# Patient Record
Sex: FEMALE | Race: BLACK OR AFRICAN AMERICAN | NOT HISPANIC OR LATINO | Employment: FULL TIME | URBAN - METROPOLITAN AREA
[De-identification: names, ages, dates, MRNs, and addresses within clinical notes are randomized per-mention and may not be internally consistent; named-entity substitution may affect disease eponyms.]

---

## 2017-01-17 ENCOUNTER — OFFICE VISIT (OUTPATIENT)
Dept: FAMILY MEDICINE | Facility: CLINIC | Age: 62
End: 2017-01-17
Payer: MEDICAID

## 2017-01-17 ENCOUNTER — HOSPITAL ENCOUNTER (OUTPATIENT)
Dept: RADIOLOGY | Facility: HOSPITAL | Age: 62
Discharge: HOME | End: 2017-01-17
Attending: FAMILY MEDICINE
Payer: MEDICAID

## 2017-01-17 VITALS
HEIGHT: 62 IN | WEIGHT: 172.81 LBS | BODY MASS INDEX: 31.8 KG/M2 | SYSTOLIC BLOOD PRESSURE: 134 MMHG | TEMPERATURE: 97 F | DIASTOLIC BLOOD PRESSURE: 62 MMHG | OXYGEN SATURATION: 98 % | HEART RATE: 68 BPM

## 2017-01-17 DIAGNOSIS — J00 ACUTE NASOPHARYNGITIS: ICD-10-CM

## 2017-01-17 DIAGNOSIS — R91.8 LUNG NODULES: ICD-10-CM

## 2017-01-17 DIAGNOSIS — R91.8 LUNG NODULES: Primary | ICD-10-CM

## 2017-01-17 PROCEDURE — 71020 XR CHEST PA AND LATERAL: CPT | Mod: 26,,, | Performed by: RADIOLOGY

## 2017-01-17 PROCEDURE — 99214 OFFICE O/P EST MOD 30 MIN: CPT | Mod: S$PBB,,, | Performed by: FAMILY MEDICINE

## 2017-01-17 PROCEDURE — 99999 PR PBB SHADOW E&M-EST. PATIENT-LVL IV: CPT | Mod: PBBFAC,,, | Performed by: FAMILY MEDICINE

## 2017-01-17 PROCEDURE — 71020 XR CHEST PA AND LATERAL: CPT | Mod: TC,PO

## 2017-01-17 RX ORDER — FLUTICASONE PROPIONATE 50 MCG
2 SPRAY, SUSPENSION (ML) NASAL DAILY
Qty: 16 G | Refills: 11 | Status: SHIPPED | OUTPATIENT
Start: 2017-01-17 | End: 2017-02-15 | Stop reason: SDUPTHER

## 2017-01-17 RX ORDER — AMOXICILLIN AND CLAVULANATE POTASSIUM 875; 125 MG/1; MG/1
1 TABLET, FILM COATED ORAL 2 TIMES DAILY
Qty: 20 TABLET | Refills: 0 | Status: SHIPPED | OUTPATIENT
Start: 2017-01-17 | End: 2017-01-27

## 2017-01-17 NOTE — MR AVS SNAPSHOT
[facility] Medicine  [address]  [facility] [address]  Phone: [phone]  Fax: [phone]                  Anne Farmer   2017 2:40 PM   Office Visit    Description:  Female : 1955   Provider:  Chiquita Peña MD   Department:  [facility] Medicine           Reason for Visit     Breast Pain     Nasal Congestion           Diagnoses this Visit        Comments    Lung nodules    -  Primary     Acute nasopharyngitis                To Do List           Future Appointments        Provider Department Dept Phone    2017 4:00 PM DSSH XR1 [facility] [phone]    2017 9:00 AM SUMH PETCT1 LIMIT 400 LBS [facility] [phone]    2017 1:30 PM Fabrice Cadet MD [place] - Hematology Oncology [phone]      Goals (5 Years of Data)     None       These Medications        Disp Refills Start End    amoxicillin-clavulanate 875-125mg (AUGMENTIN) 875-125 mg per tablet 20 tablet 0 2017    Take 1 tablet by mouth 2 (two) times daily. - Oral    Pharmacy: [facility]/pharmacy #[id number] - [place] Khan [address] AT [facility] [place] Ph #: [phone]       fluticasone (FLONASE) 50 mcg/actuation nasal spray 16 g 11 2017     2 sprays by Each Nare route once daily. - Each Nare    Pharmacy: [facility]/pharmacy #[id number] - [place] Khan - [address] AT [facility] [place] Ph #: [phone]         [facility]     [facility] On [facility] Nurse Care Line -  Assistance  Registered nurses in the [facility] On Call Center provide clinical advisement, health education, appointment booking, and other advisory services.  Call for this free service at [phone].             Medications           Message regarding Medications     Verify the changes and/or additions to your medication regime listed below are the same as discussed with your clinician today.  If any of these  changes or additions are incorrect, please notify your healthcare provider.        START taking these NEW medications        Refills    amoxicillin-clavulanate 875-125mg (AUGMENTIN) 875-125 mg per tablet 0    Sig: Take 1 tablet by mouth 2 (two) times daily.    Class: Normal    Route: Oral    fluticasone (FLONASE) 50 mcg/actuation nasal spray 11    Si sprays by Each Nare route once daily.    Class: Normal    Route: Each Nare           Verify that the below list of medications is an accurate representation of the medications you are currently taking.  If none reported, the list may be blank. If incorrect, please contact your healthcare provider. Carry this list with you in case of emergency.           Current Medications     amlodipine (NORVASC) 10 MG tablet TAKE 1 TABLET BY MOUTH EVERY DAY    aspirin (ECOTRIN) 81 MG EC tablet Take 1 tablet (81 mg total) by mouth once daily.    cetirizine (ZYRTEC) 10 MG tablet Take 10 mg by mouth once daily.    cyclobenzaprine (FLEXERIL) 10 MG tablet TAKE 1 TABLET BY MOUTH 3 TIMES A DAY AS NEEDED FOR MUSCLE SPASMS    fluticasone (FLONASE) 50 mcg/actuation nasal spray SPRAY 2 SPRAYS IN EACH NOSTRIL ONCE TIME A DAY    hydrochlorothiazide (HYDRODIURIL) 25 MG tablet TAKE 1 TABLET BY MOUTH EVERY DAY    hydroxychloroquine (PLAQUENIL) 200 mg tablet Take 200 mg by mouth once daily.     losartan (COZAAR) 100 MG tablet TAKE 1 TABLET BY MOUTH ONCE DAILY    omeprazole (PRILOSEC) 20 MG capsule Take 1 capsule (20 mg total) by mouth once daily.    pravastatin (PRAVACHOL) 20 MG tablet TAKE 1 TABLET BY MOUTH EVERY DAY    tramadol (ULTRAM) 50 mg tablet TAKE ONE TABLET BY MOUTH THREE TIMES DAILY AFTER A MEAL    amoxicillin-clavulanate 875-125mg (AUGMENTIN) 875-125 mg per tablet Take 1 tablet by mouth 2 (two) times daily.    fluticasone (FLONASE) 50 mcg/actuation nasal spray 2 sprays by Each Nare route once daily.    sodium,potassium,mag sulfates (SUPREP BOWEL PREP KIT) 17.5-3.13-1.6 gram SolR Take 1  "Units by mouth as directed.           Clinical Reference Information           Vital Signs - Last Recorded  Most recent update: 1/17/2017  3:02 PM by Jyoti Portillo MA    BP Pulse Temp Ht    134/62 (BP Location: Left arm, Patient Position: Sitting, BP Method: Manual) 68 97 °F (36.1 °C) (Tympanic) 5' 2" (1.575 m)    Wt SpO2 BMI    78.4 kg (172 lb 13.5 oz) 98% 31.61 kg/m2      Blood Pressure          Most Recent Value    BP  134/62      Allergies as of 1/17/2017     No Known Allergies      Immunizations Administered on Date of Encounter - 1/17/2017     None      Orders Placed During Today's Visit      Normal Orders This Visit    Ambulatory consult to Hematology / Oncology     Future Labs/Procedures Expected by Expires    NM PET CT Routine Skull to Mid Thigh  1/17/2017 4/17/2017    X-Ray Chest PA And Lateral  1/17/2017 4/17/2017      [facility] Sign-Up     Activating your [facility] account is as easy as 1-2-3!     1) Visit [email or web address], select Sign Up Now, enter this activation code and your date of birth, then select Next.  [email or web address]  Expires: 3/3/2017  3:44 PM      2) Create a username and password to use when you visit [facility] in the future and select a security question in case you lose your password and select Next.    3) Enter your e-mail address and click Sign Up!    Additional Information  If you have questions, please e-mail [email or web address] or call [phone] to talk to our [facility] staff. Remember, [facility] is NOT to be used for urgent needs. For medical emergencies, dial 911.         "

## 2017-01-17 NOTE — PROGRESS NOTES
Subjective:       Patient ID: Anne Farmer is a 61 y.o. female.    Chief Complaint: Breast Pain and Nasal Congestion    HPI Comments: 61 y old female with hx of cholelithiasis with   RUQ pain since the weekend . Not  Linked to melas . Rates it a 1 /10 . She has not taken any meds. [facility] . Intermittent . She also would like to f.u with HEM and ONC  Given lung nodules seen on 9/15 . Dr Sam recommended to see Hem and ONC then but she transferred care to [facility] clinic and per pt she had colectomy then given Colon Ca but she doesn't recall having lung nodules addressed  then . She has cold like symptoms now , no chest pain, sob , night sweats, weight loss     Review of Systems   Constitutional: Negative.    HENT: Positive for rhinorrhea and sinus pressure.    Respiratory: Positive for cough.    Cardiovascular: Negative.        Objective:      Physical Exam   Constitutional: She is oriented to person, place, and time. She appears well-developed and well-nourished. No distress.   HENT:   Head: Normocephalic and atraumatic.   Right Ear: External ear normal.   Left Ear: External ear normal.   Mouth/Throat: No oropharyngeal exudate.   Eyes: Conjunctivae and EOM are normal. Pupils are equal, round, and reactive to light. Right eye exhibits no discharge. Left eye exhibits no discharge. No scleral icterus.   Neck: Normal range of motion. Neck supple. No JVD present. No tracheal deviation present. No thyromegaly present.   Cardiovascular: Normal rate, regular rhythm and normal heart sounds.  Exam reveals no gallop and no friction rub.    No murmur heard.  Pulmonary/Chest: Effort normal. No stridor. No respiratory distress. She has decreased breath sounds in the right middle field and the left middle field. She has no wheezes. She has no rales. She exhibits no tenderness.   Abdominal: Soft. Bowel sounds are normal. She exhibits no distension. There is no tenderness. There is no rebound and no guarding.   Musculoskeletal:  Normal range of motion.   Lymphadenopathy:     She has no cervical adenopathy.   Neurological: She is alert and oriented to person, place, and time.   Skin: Skin is warm and dry. She is not diaphoretic.   Psychiatric: She has a normal mood and affect. Her behavior is normal. Judgment and thought content normal.       Assessment:     Anne was seen today for breast pain and nasal congestion.    Diagnoses and all orders for this visit:    Lung nodules  -     X-Ray Chest PA And Lateral; Future  -     NM PET CT Routine Skull to Mid Thigh; Future  -     Ambulatory consult to Hematology / Oncology    Acute nasopharyngitis    Other orders  -     amoxicillin-clavulanate 875-125mg (AUGMENTIN) 875-125 mg per tablet; Take 1 tablet by mouth 2 (two) times daily.  -     fluticasone (FLONASE) 50 mcg/actuation nasal spray; 2 sprays by Each Nare route once daily.      Plan:   F.u with ONC   Call or return to clinic prn if these symptoms worsen or fail to improve as anticipated.

## 2017-01-23 ENCOUNTER — HOSPITAL ENCOUNTER (OUTPATIENT)
Dept: RADIOLOGY | Facility: HOSPITAL | Age: 62
Discharge: HOME | End: 2017-01-23
Attending: FAMILY MEDICINE
Payer: MEDICAID

## 2017-01-23 ENCOUNTER — TELEPHONE (OUTPATIENT)
Dept: FAMILY MEDICINE | Facility: CLINIC | Age: 62
End: 2017-01-23

## 2017-01-23 DIAGNOSIS — R91.8 LUNG NODULES: ICD-10-CM

## 2017-01-23 PROCEDURE — 78815 PET IMAGE W/CT SKULL-THIGH: CPT | Mod: 26,,, | Performed by: RADIOLOGY

## 2017-01-23 PROCEDURE — A9552 F18 FDG: HCPCS | Mod: PO

## 2017-01-23 NOTE — TELEPHONE ENCOUNTER
----- Message from Stephanie Yousifite sent at 1/23/2017  9:00 AM CST -----  Contact: June with [employer]  June called to see if the peer to peer was done for this pt and if so she needs the authorization number. She can be reached at [phone].    Thanks,  TF

## 2017-01-23 NOTE — TELEPHONE ENCOUNTER
Called and spoke with [employer]. Informed response still not received on pts approval or denial of PET scan. Informed would let them know as soon as response was received.

## 2017-01-31 ENCOUNTER — LAB VISIT (OUTPATIENT)
Dept: LAB | Facility: HOSPITAL | Age: 62
End: 2017-01-31
Attending: INTERNAL MEDICINE
Payer: MEDICAID

## 2017-01-31 ENCOUNTER — INITIAL CONSULT (OUTPATIENT)
Dept: HEMATOLOGY/ONCOLOGY | Facility: CLINIC | Age: 62
End: 2017-01-31
Payer: MEDICAID

## 2017-01-31 VITALS
WEIGHT: 173.94 LBS | TEMPERATURE: 98 F | HEIGHT: 66 IN | HEART RATE: 66 BPM | RESPIRATION RATE: 18 BRPM | DIASTOLIC BLOOD PRESSURE: 62 MMHG | OXYGEN SATURATION: 96 % | BODY MASS INDEX: 27.95 KG/M2 | SYSTOLIC BLOOD PRESSURE: 134 MMHG

## 2017-01-31 DIAGNOSIS — Z85.038 HISTORY OF COLON CANCER, STAGE I: ICD-10-CM

## 2017-01-31 DIAGNOSIS — R59.0 MEDIASTINAL LYMPHADENOPATHY: ICD-10-CM

## 2017-01-31 DIAGNOSIS — R91.8 PULMONARY NODULES/LESIONS, MULTIPLE: Primary | ICD-10-CM

## 2017-01-31 DIAGNOSIS — R91.8 PULMONARY NODULES/LESIONS, MULTIPLE: ICD-10-CM

## 2017-01-31 LAB
ALBUMIN SERPL BCP-MCNC: 4 G/DL
ALP SERPL-CCNC: 129 U/L
ALT SERPL W/O P-5'-P-CCNC: 49 U/L
ANION GAP SERPL CALC-SCNC: 11 MMOL/L
AST SERPL-CCNC: 50 U/L
BASOPHILS # BLD AUTO: 0.03 K/UL
BASOPHILS NFR BLD: 0.6 %
BILIRUB SERPL-MCNC: 0.4 MG/DL
BUN SERPL-MCNC: 14 MG/DL
CALCIUM SERPL-MCNC: 10 MG/DL
CEA SERPL-MCNC: 2.8 NG/ML
CHLORIDE SERPL-SCNC: 101 MMOL/L
CO2 SERPL-SCNC: 25 MMOL/L
CREAT SERPL-MCNC: 0.8 MG/DL
DIFFERENTIAL METHOD: ABNORMAL
EOSINOPHIL # BLD AUTO: 0.3 K/UL
EOSINOPHIL NFR BLD: 5.9 %
ERYTHROCYTE [DISTWIDTH] IN BLOOD BY AUTOMATED COUNT: 13.9 %
EST. GFR  (AFRICAN AMERICAN): >60 ML/MIN/1.73 M^2
EST. GFR  (NON AFRICAN AMERICAN): >60 ML/MIN/1.73 M^2
GLUCOSE SERPL-MCNC: 180 MG/DL
HCT VFR BLD AUTO: 37.5 %
HGB BLD-MCNC: 12.7 G/DL
LDH SERPL L TO P-CCNC: 272 U/L
LYMPHOCYTES # BLD AUTO: 1.5 K/UL
LYMPHOCYTES NFR BLD: 27 %
MCH RBC QN AUTO: 31.4 PG
MCHC RBC AUTO-ENTMCNC: 33.9 %
MCV RBC AUTO: 93 FL
MONOCYTES # BLD AUTO: 0.5 K/UL
MONOCYTES NFR BLD: 9.1 %
NEUTROPHILS # BLD AUTO: 3.1 K/UL
NEUTROPHILS NFR BLD: 57.4 %
PLATELET # BLD AUTO: 263 K/UL
PMV BLD AUTO: 8.9 FL
POTASSIUM SERPL-SCNC: 3.9 MMOL/L
PROT SERPL-MCNC: 8.6 G/DL
RBC # BLD AUTO: 4.04 M/UL
SODIUM SERPL-SCNC: 137 MMOL/L
WBC # BLD AUTO: 5.4 K/UL

## 2017-01-31 PROCEDURE — 85025 COMPLETE CBC W/AUTO DIFF WBC: CPT | Mod: PO

## 2017-01-31 PROCEDURE — 82378 CARCINOEMBRYONIC ANTIGEN: CPT

## 2017-01-31 PROCEDURE — 82164 ANGIOTENSIN I ENZYME TEST: CPT

## 2017-01-31 PROCEDURE — 99999 PR PBB SHADOW E&M-EST. PATIENT-LVL III: CPT | Mod: PBBFAC,,, | Performed by: INTERNAL MEDICINE

## 2017-01-31 PROCEDURE — 83615 LACTATE (LD) (LDH) ENZYME: CPT | Mod: PO

## 2017-01-31 PROCEDURE — 36415 COLL VENOUS BLD VENIPUNCTURE: CPT | Mod: PO

## 2017-01-31 PROCEDURE — 99213 OFFICE O/P EST LOW 20 MIN: CPT | Mod: PBBFAC,PO | Performed by: INTERNAL MEDICINE

## 2017-01-31 PROCEDURE — 80053 COMPREHEN METABOLIC PANEL: CPT | Mod: PO

## 2017-01-31 PROCEDURE — 99204 OFFICE O/P NEW MOD 45 MIN: CPT | Mod: S$PBB,,, | Performed by: INTERNAL MEDICINE

## 2017-01-31 RX ORDER — AMOXICILLIN AND CLAVULANATE POTASSIUM 875; 125 MG/1; MG/1
1 TABLET, FILM COATED ORAL 2 TIMES DAILY
OUTPATIENT
Start: 2017-01-31

## 2017-01-31 RX ORDER — AMOXICILLIN AND CLAVULANATE POTASSIUM 875; 125 MG/1; MG/1
1 TABLET, FILM COATED ORAL 2 TIMES DAILY
COMMUNITY
End: 2017-04-12 | Stop reason: ALTCHOICE

## 2017-01-31 NOTE — TELEPHONE ENCOUNTER
Last office visit 01/17/2017. Last refill date 01/17/2017. Pt is requesting a refill on amox-clav 875/125mg #30 bid

## 2017-01-31 NOTE — LETTER
February 5, 2017      Chiquita Peña MD  [address] [address] [address]           [facility] - Hemotology Oncology  [address]  [place] [address]  Phone: [phone]  Fax: [phone]          Patient: Anne Farmer   MR Number: [id number]   YOB: 1955   Date of Visit: 1/31/2017       Dear Dr. Chiquita Peña:    Thank you for referring Anne Farmer to me for evaluation. Attached you will find relevant portions of my assessment and plan of care.    If you have questions, please do not hesitate to call me. I look forward to following Anne Farmer along with you.    Sincerely,    Bong Perez MD    Enclosure  CC:  No Recipients    If you would like to receive this communication electronically, please contact [email or web address] or [phone] to request more information on [employer] Link access.    For providers and/or their staff who would like to refer a patient to [facility], please contact us through our one-stop-shop provider referral line, [facility], at [phone].    If you feel you have received this communication in error or would no longer like to receive these types of communications, please e-mail [email or web address]

## 2017-02-01 LAB — ACE SERPL-CCNC: 88 U/L

## 2017-02-02 RX ORDER — AMOXICILLIN AND CLAVULANATE POTASSIUM 875; 125 MG/1; MG/1
1 TABLET, FILM COATED ORAL 2 TIMES DAILY
Qty: 20 TABLET | Refills: 0 | OUTPATIENT
Start: 2017-02-02 | End: 2017-02-12

## 2017-02-05 NOTE — PROGRESS NOTES
Provider requesting consultation: Dr. Chiquita Campos with internal medicine  Reason for Consult: Diffuse lymphadenopathy and bilateral lung nodules    HPI:   It is a 61-year-old -American female who presents to the hematology oncology clinic today in consultation to discuss further evaluation and management recommendations for diffuse lymphadenopathy and bilateral lung nodules.  The patient has been referred to me by Dr. Chiquita Campos with internal medicine.  I have reviewed all of the patient's relevant clinical history available in the medical record and have utilized this in my evaluation and management recommendations today.  Today the patient reports that overall she feels well and has no specific complaints she denies any fevers, chills or night sweats.  She denies any melena, hematochezia, hematemesis, hemoptysis or hematuria.  She denies any nausea, vomiting or abdominal pain.  She denies any bowel or urinary complaints.  She denies any chest pain or shortness of breath.  Of note the patient's prior medical history is significant for treatment for stage I colorectal carcinoma in the [facility] in 2015.    PAST MEDICAL HISTORY:   1.  History of stage I colorectal adenocarcinoma [T1 N0] in 2015  2.  Hypertension  3.  Osteoarthritis  4.  Vitamin D deficiency  5.  Hypertensive cardiomyopathy  6.  Diastolic dysfunction  7.  Type 2 diabetes mellitus  8.  Dyslipidemia  9.  Hepatitis C antibody positive with negative PCR testing    SURGICAL HISTORY:   1.  Laparoscopic right colon resection for colon cancer   2.      FAMILY HISTORY: She denies any immediate family members with cancer or bleeding/clotting disorders.    SOCIAL HISTORY: She drinks alcohol socially.  She has never used any recreational drugs.  She used to work in housekeeping.    ALLERGIES: [NKDA]    MEDICATIONS: [Medcard has been reviewed and/or reconciled.]    REVIEW OF SYSTEMS:   GENERAL: [No fevers, chills or sweats. No fatigue,  weight loss or loss of appetite.]  HEENT: [No blurred vision, tinnitus, nasal discharge, sorethroat or dysphagia.]  HEART: [No chest pain, palpitations or shortness of breath.]    LUNGS: [No cough, hemoptysis or breathing problems.]  ABDOMEN: [No abdominal pain, nausea, vomiting, diarrhea, constipation or melena.]  GENITOURINARY: [No dysuria, bleeding or malodorous discharge.]  NEURO: [No headache, dizziness or vertigo.]  HEMATOLOGY: [No easy bruising, spontaneous bleeding or blood clots in the past].  MUSCULOSKELETAL: [No arthralgias, myalgias or bone pains.]  SKIN: [No rashes or skin lesions.]  PSYCHIATRY: [No depression or anxiety.]    PHYSICAL EXAMINATION:   VS: Reviewed on nurse's notes.  APPEARANCE: The patient is a well-developed, well-nourished and well-groomed -American female who appears in no acute distress.  HEENT: No scleral icterus. Both external auditory canals clear. No oral ulcers, lesions. Throat clear  HEAD: No sinus tenderness.  NECK: Supple. No palpable lymphadenopathy. Thyroid non-tender, no palpable masses  CHEST: Breath sounds clear bilaterally. No rales. No rhonchi. Unlabored respirations.  CARDIOVASCULAR: Normal S1, S2. Normal rate. Regular rhythm.  ABDOMEN: Bowel sounds normal. No tenderness. No abdominal distention. No hepatomegaly. No splenomegaly.  LYMPHATIC: No palpable supraclavicular, axillary nodes  EXTREMITIES: No clubbing, cyanosis, edema  SKIN: No lesions. No petechiae. No ecchymoses. No induration or nodules  NEUROLOGIC: No focal findings. Alert & Oriented x 3. Mood appropriate to affect    LABS:   Reviewed    IMAGING:  Reviewed    IMPRESSION:  1.  Diffuse lymphadenopathy  2.  Bilateral pulmonary nodules  3.  History of stage I colorectal adenocarcinoma    PLAN:  1.  I had a detailed discussion with the patient today with regard to her current clinical situation.  We discussed that the differential diagnosis for her abnormal PET/CT scan was broad and did include  metastatic colorectal carcinoma [unlikely], new primary malignancy or benign/inflammatory etiologies such as sarcoidosis among other possibilities.  2.  I will check lab work today.  I will also plan on reviewing imaging studies with radiology.  I will contact patient with recommendations to discuss further management based on this.    She knows to call sooner if any new problems or questions.  All of the above was discussed in detail with her today and all of her questions were answered to her satisfaction today.    Bong Perez MD

## 2017-02-06 ENCOUNTER — TELEPHONE (OUTPATIENT)
Dept: FAMILY MEDICINE | Facility: CLINIC | Age: 62
End: 2017-02-06

## 2017-02-06 NOTE — TELEPHONE ENCOUNTER
----- Message from Isis Galeano sent at 2/6/2017  3:00 PM CST -----  Contact: [facility]  pharmacy calling rewarding fax sent for pt Augmentin 875 mg, please call [phone].

## 2017-02-06 NOTE — TELEPHONE ENCOUNTER
Returned call. No answer. No one available to take my call at the pharm. i waited on hold for 19 mins and 55 sec and still no answer. Call was disconnected.

## 2017-02-07 ENCOUNTER — TELEPHONE (OUTPATIENT)
Dept: HEMATOLOGY/ONCOLOGY | Facility: CLINIC | Age: 62
End: 2017-02-07

## 2017-02-07 NOTE — TELEPHONE ENCOUNTER
----- Message from Bong Preez MD sent at 2/7/2017 10:31 AM CST -----  Please let the patient know that overall her lab results appear to point to the fact that she may have a benign reason for her lymph nodes being abnormal on the recent PET/CT scan. In any case she will need biopsy of one of her lymph nodes to try to get an answer as to what is going on.  Please schedule her to see Dr. Ashford with pulmonary ASAP to discuss this further. Thank you.

## 2017-02-07 NOTE — TELEPHONE ENCOUNTER
Called l/m for pt to call me back/ appt sched for pulmonary. Also sent a note to staff to try and work pt in sooner

## 2017-02-09 ENCOUNTER — TELEPHONE (OUTPATIENT)
Dept: HEMATOLOGY/ONCOLOGY | Facility: CLINIC | Age: 62
End: 2017-02-09

## 2017-02-09 NOTE — TELEPHONE ENCOUNTER
----- Message from Jen Baumann sent at 2/9/2017  9:33 AM CST -----  Contact: pt   Call pt regarding getting lab results.   ..[phone] (opfy)

## 2017-02-15 ENCOUNTER — OFFICE VISIT (OUTPATIENT)
Dept: PULMONOLOGY | Facility: CLINIC | Age: 62
End: 2017-02-15
Payer: MEDICAID

## 2017-02-15 ENCOUNTER — LAB VISIT (OUTPATIENT)
Dept: LAB | Facility: HOSPITAL | Age: 62
End: 2017-02-15
Attending: INTERNAL MEDICINE
Payer: MEDICAID

## 2017-02-15 VITALS
HEIGHT: 66 IN | OXYGEN SATURATION: 93 % | HEART RATE: 54 BPM | DIASTOLIC BLOOD PRESSURE: 64 MMHG | SYSTOLIC BLOOD PRESSURE: 138 MMHG | RESPIRATION RATE: 18 BRPM | BODY MASS INDEX: 28.27 KG/M2 | WEIGHT: 175.94 LBS

## 2017-02-15 DIAGNOSIS — Z85.038 HISTORY OF COLON CANCER, STAGE I: Chronic | ICD-10-CM

## 2017-02-15 DIAGNOSIS — R59.0 MEDIASTINAL ADENOPATHY: Chronic | ICD-10-CM

## 2017-02-15 DIAGNOSIS — R59.0 MEDIASTINAL ADENOPATHY: Primary | Chronic | ICD-10-CM

## 2017-02-15 DIAGNOSIS — R74.8 ABNORMAL SERUM ACE LEVEL: Chronic | ICD-10-CM

## 2017-02-15 DIAGNOSIS — R94.8 ABNORMAL PET SCAN OF MEDIASTINUM: Chronic | ICD-10-CM

## 2017-02-15 PROCEDURE — 99205 OFFICE O/P NEW HI 60 MIN: CPT | Mod: S$PBB,,, | Performed by: INTERNAL MEDICINE

## 2017-02-15 PROCEDURE — 36415 COLL VENOUS BLD VENIPUNCTURE: CPT

## 2017-02-15 PROCEDURE — 86612 BLASTOMYCES ANTIBODY: CPT

## 2017-02-15 PROCEDURE — 99999 PR PBB SHADOW E&M-EST. PATIENT-LVL III: CPT | Mod: PBBFAC,,, | Performed by: INTERNAL MEDICINE

## 2017-02-15 PROCEDURE — 86480 TB TEST CELL IMMUN MEASURE: CPT

## 2017-02-15 NOTE — MR AVS SNAPSHOT
[facility]  [address] [place] [address]  [place] [address]  Phone: [phone]  Fax: [phone]                  Anne Farmer   2/15/2017 11:00 AM   Office Visit    Description:  Female : 1955   Provider:  Bandar sAhford MD   Department:  [facility]           Reason for Visit     Pulmonary Nodules           Diagnoses this Visit        Comments    Mediastinal adenopathy    -  Primary     Abnormal PET scan of mediastinum         History of colon cancer, stage I         Abnormal serum ACE level                To Do List           Goals (5 Years of Data)     None      Follow-Up and Disposition     Return in about 4 weeks (around 3/15/2017), or if symptoms worsen or fail to improve, for EBUS, Labs today, PPD, .      [facility] On [facility]     [facility] On Call Nurse Care Line -  Assistance  Registered nurses in the [facility] On Call Center provide clinical advisement, health education, appointment booking, and other advisory services.  Call for this free service at [phone].             Medications           Message regarding Medications     Verify the changes and/or additions to your medication regime listed below are the same as discussed with your clinician today.  If any of these changes or additions are incorrect, please notify your healthcare provider.             Verify that the below list of medications is an accurate representation of the medications you are currently taking.  If none reported, the list may be blank. If incorrect, please contact your healthcare provider. Carry this list with you in case of emergency.           Current Medications     amlodipine (NORVASC) 10 MG tablet TAKE 1 TABLET BY MOUTH EVERY DAY    amoxicillin-clavulanate 875-125mg (AUGMENTIN) 875-125 mg per tablet Take 1 tablet by mouth 2 (two) times daily.    aspirin (ECOTRIN) 81 MG EC tablet Take 1 tablet (81 mg total) by mouth once daily.    cetirizine (ZYRTEC) 10 MG  "tablet Take 10 mg by mouth once daily.    cyclobenzaprine (FLEXERIL) 10 MG tablet TAKE 1 TABLET BY MOUTH 3 TIMES A DAY AS NEEDED FOR MUSCLE SPASMS    fluticasone (FLONASE) 50 mcg/actuation nasal spray SPRAY 2 SPRAYS IN EACH NOSTRIL ONCE TIME A DAY    hydrochlorothiazide (HYDRODIURIL) 25 MG tablet TAKE 1 TABLET BY MOUTH EVERY DAY    hydroxychloroquine (PLAQUENIL) 200 mg tablet Take 200 mg by mouth once daily.     losartan (COZAAR) 100 MG tablet TAKE 1 TABLET BY MOUTH ONCE DAILY    omeprazole (PRILOSEC) 20 MG capsule Take 1 capsule (20 mg total) by mouth once daily.    pravastatin (PRAVACHOL) 20 MG tablet TAKE 1 TABLET BY MOUTH EVERY DAY    tramadol (ULTRAM) 50 mg tablet TAKE ONE TABLET BY MOUTH THREE TIMES DAILY AFTER A MEAL    sodium,potassium,mag sulfates (SUPREP BOWEL PREP KIT) 17.5-3.13-1.6 gram SolR Take 1 Units by mouth as directed.           Clinical Reference Information           Your Vitals Were     BP Pulse Resp Height Weight SpO2    138/64 54 18 5' 6" (1.676 m) 79.8 kg (175 lb 14.8 oz) 93%    BMI                28.4 kg/m2          Blood Pressure          Most Recent Value    BP  138/64      Allergies as of 2/15/2017     No Known Allergies      Immunizations Administered on Date of Encounter - 2/15/2017     Name Date Dose VIS Date Route    PPD Test  Incomplete 0.1 mL N/A Subcutaneous      Orders Placed During Today's Visit      Normal Orders This Visit    Case request GI: BRONCHOSCOPY     POCT TB Skin Test     Future Labs/Procedures Expected by Expires    FUNGAL IMMUNODIFFUSION - BLOOD  2/15/2017 4/16/2018    QUANTIFERON GOLD TB  2/15/2017 4/16/2018    Complete PFT w/ bronchodilator  As directed 2/15/2018      [facility] Sign-Up     Activating your [facility] account is as easy as 1-2-3!     1) Visit [email or web address], select Sign Up Now, enter this activation code and your date of birth, then select Next.  [email or web address]  Expires: 3/3/2017  3:44 PM      2) Create a username and password to use when you " visit [facility] in the future and select a security question in case you lose your password and select Next.    3) Enter your e-mail address and click Sign Up!    Additional Information  If you have questions, please e-mail [email or web address] or call [phone] to talk to our [facility] staff. Remember, [facility] is NOT to be used for urgent needs. For medical emergencies, dial 911.         Instructions      Flexible Bronchoscopy  A flexible bronchoscopy is an exam of the airways of your lungs. A thin, flexible instrument called a bronchoscope is used. It has a light and small camera that allow the healthcare provider to view your airways.  Call your doctor if you have shortness of breath, a temperature above 101.0°F (38.3°C) for more than 24 hours, or bleeding from your nose or throat. If you have chest pain or severe shortness of breath, call right away.   Before your test    · Follow your healthcare provider's instructions carefully. If you dont, the exam may be canceled or need to be repeated.  · If you are taking blood-thinning medicine, ask your health care provider whether you should stop taking the medicine before this test.  · Have no food or drink for 6 to 12 hours before the test. Also, avoid smoking for 24 hours before the test.  · You will need to remove any dentures or bridgework.  · Right before the test, you will be given sedating medications to help you relax. The medication may be given intravenously (IV) into one of your veins. In addition, your nose and throat may be numbed with a special spray to help prevent gagging and coughing.  · If you are having this test as an outpatient, make sure you have an adult friend or family member to drive you home.  During your test  Bronchoscopy takes 45 to 60 minutes and includes the following steps:  · You may receive anesthesia so that you are unconscious or asleep during the procedure.  · The healthcare provider inserts the tube into your nose or  mouth.  · If you have not received anesthesia, you might feel a gagging sensation. To help relieve this feeling, you will be told to swallow or take deep breaths. Your airway will remain open even with the tube in place. But you wont be able to talk.  · The provider examines your breathing passages. He or she may also remove tiny tissue samples for biopsy.  After your test  · You may have a mild sore throat. Your voice may also be hoarse. And, you may have a cough.  · Do not eat or drink until the anesthesia wears off.  · If you had a biopsy, avoid coughing hard and clearing your throat.  · Call your healthcare provider if you have:  ¨ Shortness of breath  ¨ Chest pain  ¨ Bleeding from your nose or throat  ¨ A fever  Date Last Reviewed: 7/24/2014  © 2258-3639 [employer]. [address] [place] [address]. All rights reserved. This information is not intended as a substitute for professional medical care. Always follow your healthcare professional's instructions.        Causes of an elevated serum angiotensin converting enzyme (ACE) level  Asbestosis   Beryllium disease   Coccidioidomycosis   Diabetes mellitus   Gaucher disease   Hodgkin disease   Hypersensitivity pneumonitis   Hyperthyroidism   Leprosy   Lung cancer   Primary biliary cirrhosis   Sarcoidosis   Silicosis   Tuberculosis            Language Assistance Services     ATTENTION: Language assistance services are available, free of charge. Please call [phone].      ATENCIÓN: Si habla español, tiene a wilkins disposición servicios gratuitos de asistencia lingüística. Llame al [phone].     [facility] Ý: N?u b?n nói Ti?ng Vi?t, có các d?ch v? h? tr? ngôn ng? mi?n phí dành cho b?n. G?i s? [phone].         [facility] complies with applicable Federal civil rights laws and does not discriminate on the basis of race, color, national origin, age, disability, or sex.

## 2017-02-15 NOTE — PROGRESS NOTES
History & Physical    SUBJECTIVE:     History of Present Illness:  Patient is a 61 y.o. female presents with  abnormal chest CT scan and PET scan  His referred to me by Dr. Arias  Patient is a history of colon cancer treated surgically [facility] 2015. No chemo  No respiratory symptoms: No cough, No SOB, No wheezing,  No fever or weight loss  Never smoker  No exposure   Worked as house keeper  No Mold exposure  No TB or asbestos exposure  Reviewed CT chest and PET CT: [facility]  FDG avid areas in midline: Cervical, paratracheal, hilar, paraaortic  ACE was elevated  Patient had an EKG and stress test normal 11/2016  Discussed differentials  Will need some type of LN sampling  EBUS is indicated, so nodes may also be accessible by EUS  If  Non diagnostic then Mediastinoscopy or surgical biopsy of Cervical LN.            Chief Complaint   Patient presents with    Pulmonary Nodules     ref pft       Review of patient's allergies indicates:  No Known Allergies    Current Outpatient Prescriptions   Medication Sig Dispense Refill    amlodipine (NORVASC) 10 MG tablet TAKE 1 TABLET BY MOUTH EVERY DAY 30 tablet 2    amoxicillin-clavulanate 875-125mg (AUGMENTIN) 875-125 mg per tablet Take 1 tablet by mouth 2 (two) times daily.      aspirin (ECOTRIN) 81 MG EC tablet Take 1 tablet (81 mg total) by mouth once daily. 30 tablet 0    cetirizine (ZYRTEC) 10 MG tablet Take 10 mg by mouth once daily.      cyclobenzaprine (FLEXERIL) 10 MG tablet TAKE 1 TABLET BY MOUTH 3 TIMES A DAY AS NEEDED FOR MUSCLE SPASMS 30 tablet 2    fluticasone (FLONASE) 50 mcg/actuation nasal spray SPRAY 2 SPRAYS IN EACH NOSTRIL ONCE TIME A DAY 16 g 1    hydrochlorothiazide (HYDRODIURIL) 25 MG tablet TAKE 1 TABLET BY MOUTH EVERY DAY 30 tablet 2    hydroxychloroquine (PLAQUENIL) 200 mg tablet Take 200 mg by mouth once daily.       losartan (COZAAR) 100 MG tablet TAKE 1 TABLET BY MOUTH ONCE DAILY 30 tablet 2    omeprazole (PRILOSEC) 20 MG capsule Take 1  capsule (20 mg total) by mouth once daily. 30 capsule 11    pravastatin (PRAVACHOL) 20 MG tablet TAKE 1 TABLET BY MOUTH EVERY DAY 30 tablet 2    tramadol (ULTRAM) 50 mg tablet TAKE ONE TABLET BY MOUTH THREE TIMES DAILY AFTER A MEAL 90 tablet 2    sodium,potassium,mag sulfates (SUPREP BOWEL PREP KIT) 17.5-3.13-1.6 gram SolR Take 1 Units by mouth as directed. 1 Bottle 0     No current facility-administered medications for this visit.        Past Medical History   Diagnosis Date    Allergy     Arthritis     CHF (congestive heart failure)     Colon cancer screening 2015    Diabetes mellitus type 2 in nonobese 3/9/2016    Diverticulosis     Hyperlipidemia 10/25/2016    Hypertension     Insomnia      Past Surgical History   Procedure Laterality Date     section      Colonoscopy N/A 2015     Procedure: COLONOSCOPY;  Surgeon: Adela Sam MD;  Location: [facility];  Service: Endoscopy;  Laterality: N/A;     Family History   Problem Relation Age of Onset    Hypertension Mother     Diabetes Mother     Hypertension Father     Hypertension Sister     Hypertension Brother     Hypertension Sister     Hypertension Sister     Hypertension Sister     Hypertension Brother     Hypertension Brother      Social History   Substance Use Topics    Smoking status: Never Smoker    Smokeless tobacco: Never Used    Alcohol use 0.0 oz/week     0 Standard drinks or equivalent per week      Comment: weekends        Review of Systems:  Review of Systems   Constitutional: Positive for appetite change.        Denies night sweats   HENT: Negative.    Eyes: Negative.    Respiratory: Negative for apnea, cough, choking, chest tightness, shortness of breath, wheezing and stridor.    Cardiovascular: Negative.    Gastrointestinal: Negative.    Endocrine: Negative.    Genitourinary: Negative.    Musculoskeletal: Negative.    Skin: Negative.    Allergic/Immunologic: Negative.    Neurological: Negative.   "  Hematological: Negative.    Psychiatric/Behavioral: Negative.        OBJECTIVE:     Vital Signs (Most Recent)  Pulse: (!) 54 (02/15/17 1051)  Resp: 18 (02/15/17 1051)  BP: 138/64 (02/15/17 1051)  SpO2: (!) 93 % (02/15/17 1051)  5' 6" (1.676 m)  79.8 kg (175 lb 14.8 oz)     Physical Exam:  Physical Exam   Constitutional: She is oriented to person, place, and time. She appears well-developed and well-nourished. No distress.   HENT:   Head: Normocephalic and atraumatic.   Nose: Nose normal.   Mouth/Throat: Oropharynx is clear and moist. No oropharyngeal exudate.   Has partial denture mandibular   Eyes: EOM are normal. Pupils are equal, round, and reactive to light. Right eye exhibits no discharge. Left eye exhibits no discharge. No scleral icterus.   Neck: Normal range of motion. Neck supple. No JVD present. No tracheal deviation present. No thyromegaly present.   Cardiovascular: Normal rate, regular rhythm, normal heart sounds and intact distal pulses.    No murmur heard.  Pulmonary/Chest: Effort normal and breath sounds normal. No respiratory distress. She has no wheezes. She has no rales. She exhibits no tenderness.   Abdominal: Soft. Bowel sounds are normal. She exhibits no distension. There is no tenderness.   Musculoskeletal: Normal range of motion. She exhibits no edema, tenderness or deformity.   Lymphadenopathy:     She has no cervical adenopathy.     She has no axillary adenopathy.        Right: No supraclavicular adenopathy present.        Left: No supraclavicular adenopathy present.   Neurological: She is alert and oriented to person, place, and time. She has normal reflexes. No cranial nerve deficit.   Skin: Skin is warm and dry. She is not diaphoretic. No erythema.   Psychiatric: She has a normal mood and affect. Her behavior is normal.   Nursing note and vitals reviewed.      Laboratory  CBC: Reviewed  BMP: Reviewed  ACE level was 88    Diagnostic Results:  PET CT  1. Extensive FDG avid lymphadenopathy " in the bilateral supraclavicular regions, mediastinum, bilateral hilar regions and  periportal region.    Findings may be related to metastatic disease although a 2nd primary would be difficult to exclude.  Other infectious or inflammatory etiologies cannot be completely excluded as well.    The majority of these lymph nodes were present on the CT from 09/29/2015 with a a few new lymph nodes better on the current study.  No FDG avidity associated with the pulmonary opacities.       Chest CT  No evidence of hepatic metastatic disease.  Cholelithiasis.  No evidence of abdominal mass or ascites.    Multiple poorly defined pulmonary opacities involving both lung fields not particularly suspicious for pulmonary metastatic disease.    Indeterminate mediastinal lymph nodes as well as multiple calcified mediastinal lymph nodes.    Overall findings of low degree of suspicion for metastatic disease.  Follow-up recommended.  Mild thoracolumbar degenerative disk disease.    No evidence of skeletal metastatic disease.    Path  Specimen:              Terminal ileum, cecum, appendix, and right     colon                                            Procedure:             Right hemicolectomy                              Tumor site:            Right (ascending) colon, per clinical                                   information                                      Tumor size:            1.6 cm in greatest dimension (includes                                  adenoma and invasive carcinoma)                  Macroscopic tumor      Not identified                                   perforation:                                                            Macroscopic intactness Not applicable                                   of mesorectum:                                                          Histologic type:       Adenocarcinoma                                   Histologic grade:      Low grade (moderately differentiated)               Microscopic tumor      Tumor focally invades into the submucosa         extension:                                                              Margins:               All margins uninvolved by invasive                                      carcinoma.                        ASSESSMENT/PLAN:     Problem List Items Addressed This Visit     Mediastinal adenopathy - Primary (Chronic)     Differentials: Lymphoreticular neoplasm vs granulomatous disorder  Level 4, 7 and 10 LN are accessible by EBUS  EUS may also be considered  In non diagnostic will need mediastinoscopy  Consider ENT eval for uptake in mandibular area         Relevant Orders    POCT TB Skin Test (Completed)    QUANTIFERON GOLD TB    FUNGAL IMMUNODIFFUSION - BLOOD    Case request GI: BRONCHOSCOPY (Completed)    Complete PFT w/ bronchodilator    Abnormal PET scan of mediastinum (Chronic)     For EBUS assessment of LN in mediastinum         Relevant Orders    POCT TB Skin Test (Completed)    QUANTIFERON GOLD TB    FUNGAL IMMUNODIFFUSION - BLOOD    Case request GI: BRONCHOSCOPY (Completed)    Complete PFT w/ bronchodilator    History of colon cancer, stage I (Chronic)    Relevant Orders    POCT TB Skin Test (Completed)    QUANTIFERON GOLD TB    FUNGAL IMMUNODIFFUSION - BLOOD    Case request GI: BRONCHOSCOPY (Completed)    Abnormal serum ACE level (Chronic)     Either false +ve or related to sarcoid in appropriate clinical setting         Relevant Orders    Complete PFT w/ bronchodilator          PLAN:Plan      My diagnostic impression and work-up plan were discussed at length with patient. Risks were discussed. EBUS procedure. Complications of the procedure discussed in detail with patient. Complications including but not limited to infection that may require hospital admission, bleeding that may require blood transfusion and or hospital admission, perforation of the lung which may require surgery,chance of injury to the throat, windpipe or bronchial tubes,  laryngospam, coughing, aspiration, hypoxemia or cardiac arrythmias. Patient expressed and verbalized understanding. The material risks of anesthesia in connection with the procedure including brain damage, paralysis from the neck downwards, paralysis from the waist downwards, loss of function of an arm or a leg, disfigurement (incluing scars)and death were discussed with patient who expressedand verbalized understanding. Alternate treatments and material risks associated with such alternatives were discussed with pateint. These include radiologic surveillance with minimal risk and sugery with an indeterminate risk. The material risks of refusing the procedure was discussed in detail. This includes no diagnosis or confirmation of diagnisis and rendering of appropriate treatment the risk of which depends on the nature of the diagnosed illness. Patient expressed and verbalized understanding. Procedure scheduled for date 02/20/2017. Consent signed. Orders entered. Coagulation studies per orders.   All questions were answered and the patient expressed understanding      Return in about 4 weeks (around 3/15/2017), or if symptoms worsen or fail to improve, for EBUS, Labs today, PPD, .    This note was prepared using voice recognition system and is likely to have sound alike errors that may have been overlooked even after proof reading.  Please call me with any questions    Discussed diagnosis, its evaluation, treatment and usual course. All questions answered.    Thank you for the courtesy of participating in the care of this patient    Bandar Ashford MD

## 2017-02-15 NOTE — PATIENT INSTRUCTIONS
Flexible Bronchoscopy  A flexible bronchoscopy is an exam of the airways of your lungs. A thin, flexible instrument called a bronchoscope is used. It has a light and small camera that allow the healthcare provider to view your airways.  Call your doctor if you have shortness of breath, a temperature above 101.0°F (38.3°C) for more than 24 hours, or bleeding from your nose or throat. If you have chest pain or severe shortness of breath, call right away.   Before your test    · Follow your healthcare provider's instructions carefully. If you dont, the exam may be canceled or need to be repeated.  · If you are taking blood-thinning medicine, ask your health care provider whether you should stop taking the medicine before this test.  · Have no food or drink for 6 to 12 hours before the test. Also, avoid smoking for 24 hours before the test.  · You will need to remove any dentures or bridgework.  · Right before the test, you will be given sedating medications to help you relax. The medication may be given intravenously (IV) into one of your veins. In addition, your nose and throat may be numbed with a special spray to help prevent gagging and coughing.  · If you are having this test as an outpatient, make sure you have an adult friend or family member to drive you home.  During your test  Bronchoscopy takes 45 to 60 minutes and includes the following steps:  · You may receive anesthesia so that you are unconscious or asleep during the procedure.  · The healthcare provider inserts the tube into your nose or mouth.  · If you have not received anesthesia, you might feel a gagging sensation. To help relieve this feeling, you will be told to swallow or take deep breaths. Your airway will remain open even with the tube in place. But you wont be able to talk.  · The provider examines your breathing passages. He or she may also remove tiny tissue samples for biopsy.  After your test  · You may have a mild sore throat. Your  voice may also be hoarse. And, you may have a cough.  · Do not eat or drink until the anesthesia wears off.  · If you had a biopsy, avoid coughing hard and clearing your throat.  · Call your healthcare provider if you have:  ¨ Shortness of breath  ¨ Chest pain  ¨ Bleeding from your nose or throat  ¨ A fever  Date Last Reviewed: 7/24/2014  © 8215-0217 [employer]. [address] [place] [address], [place] [address]. All rights reserved. This information is not intended as a substitute for professional medical care. Always follow your healthcare professional's instructions.        Causes of an elevated serum angiotensin converting enzyme (ACE) level  Asbestosis   Beryllium disease   Coccidioidomycosis   Diabetes mellitus   Gaucher disease   Hodgkin disease   Hypersensitivity pneumonitis   Hyperthyroidism   Leprosy   Lung cancer   Primary biliary cirrhosis   Sarcoidosis   Silicosis   Tuberculosis

## 2017-02-15 NOTE — ASSESSMENT & PLAN NOTE
Differentials: Lymphoreticular neoplasm vs granulomatous disorder  Level 4, 7 and 10 LN are accessible by EBUS  EUS may also be considered  In non diagnostic will need mediastinoscopy  Consider ENT eval for uptake in mandibular area

## 2017-02-17 ENCOUNTER — PROCEDURE VISIT (OUTPATIENT)
Dept: PULMONOLOGY | Facility: CLINIC | Age: 62
End: 2017-02-17
Payer: MEDICAID

## 2017-02-17 DIAGNOSIS — Z11.1 ENCOUNTER FOR PPD SKIN TEST READING: Primary | ICD-10-CM

## 2017-02-17 LAB
MITOGEN NIL: 2.48 IU/ML
NIL: 0.08 IU/ML
TB ANTIGEN NIL: 0.01 IU/ML
TB ANTIGEN: 0.09 IU/ML
TB GOLD: NEGATIVE
TB INDURATION - 48 HR READ: ABNORMAL MM
TB INDURATION - 72 HR READ: ABNORMAL MM
TB SKIN TEST - 48 HR READ: ABNORMAL
TB SKIN TEST - 72 HR READ: ABNORMAL

## 2017-02-27 ENCOUNTER — SURGERY (OUTPATIENT)
Age: 62
End: 2017-02-27

## 2017-02-27 ENCOUNTER — HOSPITAL ENCOUNTER (OUTPATIENT)
Facility: HOSPITAL | Age: 62
Discharge: HOME | End: 2017-02-27
Attending: INTERNAL MEDICINE | Admitting: INTERNAL MEDICINE
Payer: MEDICAID

## 2017-02-27 VITALS
WEIGHT: 175 LBS | TEMPERATURE: 98 F | SYSTOLIC BLOOD PRESSURE: 127 MMHG | RESPIRATION RATE: 18 BRPM | OXYGEN SATURATION: 96 % | HEART RATE: 55 BPM | BODY MASS INDEX: 28.12 KG/M2 | DIASTOLIC BLOOD PRESSURE: 58 MMHG | HEIGHT: 66 IN

## 2017-02-27 DIAGNOSIS — R59.0 MEDIASTINAL ADENOPATHY: ICD-10-CM

## 2017-02-27 LAB — POCT GLUCOSE: 132 MG/DL (ref 70–110)

## 2017-02-27 PROCEDURE — 25000003 PHARM REV CODE 250: Performed by: INTERNAL MEDICINE

## 2017-02-27 PROCEDURE — 37000009 HC ANESTHESIA EA ADD 15 MINS: Performed by: INTERNAL MEDICINE

## 2017-02-27 PROCEDURE — 88172 CYTP DX EVAL FNA 1ST EA SITE: CPT | Performed by: PATHOLOGY

## 2017-02-27 PROCEDURE — 88305 TISSUE EXAM BY PATHOLOGIST: CPT | Mod: 26,,, | Performed by: PATHOLOGY

## 2017-02-27 PROCEDURE — 88305 TISSUE EXAM BY PATHOLOGIST: CPT | Performed by: PATHOLOGY

## 2017-02-27 PROCEDURE — 31653 BRONCH EBUS SAMPLNG 3/> NODE: CPT | Mod: ,,, | Performed by: INTERNAL MEDICINE

## 2017-02-27 PROCEDURE — 88173 CYTOPATH EVAL FNA REPORT: CPT | Mod: 26,,, | Performed by: PATHOLOGY

## 2017-02-27 PROCEDURE — 31653 BRONCH EBUS SAMPLNG 3/> NODE: CPT | Performed by: INTERNAL MEDICINE

## 2017-02-27 PROCEDURE — 88172 CYTP DX EVAL FNA 1ST EA SITE: CPT | Mod: 26,,, | Performed by: PATHOLOGY

## 2017-02-27 PROCEDURE — 37000008 HC ANESTHESIA 1ST 15 MINUTES: Performed by: INTERNAL MEDICINE

## 2017-02-27 RX ORDER — SODIUM CHLORIDE, SODIUM LACTATE, POTASSIUM CHLORIDE, CALCIUM CHLORIDE 600; 310; 30; 20 MG/100ML; MG/100ML; MG/100ML; MG/100ML
INJECTION, SOLUTION INTRAVENOUS CONTINUOUS
Status: DISCONTINUED | OUTPATIENT
Start: 2017-02-27 | End: 2017-02-27 | Stop reason: HOSPADM

## 2017-02-27 RX ADMIN — SODIUM CHLORIDE, SODIUM LACTATE, POTASSIUM CHLORIDE, AND CALCIUM CHLORIDE: 600; 310; 30; 20 INJECTION, SOLUTION INTRAVENOUS at 06:02

## 2017-02-27 NOTE — OR NURSING
Specimen # 3, Station 4 L, Air fixed to frozen Pass 1, 2, 3, 4                                                95% ETOH fixed, Pass 1, 2, 3, 4                                                All passes in formalin for cell block  15.1 cm

## 2017-02-27 NOTE — OP NOTE
Flexible bronchoscopy performed  Normal Airways  Some erythema and scanty secretions    EBUS performed  Start 07:21 am  End 08:44 am  1 hr 23 mins    Station 10 R: 7.8 mm  4 passes      Station 4 R: 14.7 mm  3 passes    Station 4 L: 15.1 mm  4 massess

## 2017-02-27 NOTE — OR NURSING
Specimen # 2, Station 4 R , Air fixed to frozen Pass 1, 2, 3                                                95% ETOH fixed, Pass 1, 2, 3                                                All passes in formalin for cell block  14.7 cm

## 2017-02-27 NOTE — H&P (VIEW-ONLY)
History & Physical    SUBJECTIVE:     History of Present Illness:  Patient is a 61 y.o. female presents with  abnormal chest CT scan and PET scan  His referred to me by Dr. Arias  Patient is a history of colon cancer treated surgically [facility] 2015. No chemo  No respiratory symptoms: No cough, No SOB, No wheezing,  No fever or weight loss  Never smoker  No exposure   Worked as house keeper  No Mold exposure  No TB or asbestos exposure  Reviewed CT chest and PET CT: [facility]  FDG avid areas in midline: Cervical, paratracheal, hilar, paraaortic  ACE was elevated  Patient had an EKG and stress test normal 11/2016  Discussed differentials  Will need some type of LN sampling  EBUS is indicated, so nodes may also be accessible by EUS  If  Non diagnostic then Mediastinoscopy or surgical biopsy of Cervical LN.            Chief Complaint   Patient presents with    Pulmonary Nodules     ref pft       Review of patient's allergies indicates:  No Known Allergies    Current Outpatient Prescriptions   Medication Sig Dispense Refill    amlodipine (NORVASC) 10 MG tablet TAKE 1 TABLET BY MOUTH EVERY DAY 30 tablet 2    amoxicillin-clavulanate 875-125mg (AUGMENTIN) 875-125 mg per tablet Take 1 tablet by mouth 2 (two) times daily.      aspirin (ECOTRIN) 81 MG EC tablet Take 1 tablet (81 mg total) by mouth once daily. 30 tablet 0    cetirizine (ZYRTEC) 10 MG tablet Take 10 mg by mouth once daily.      cyclobenzaprine (FLEXERIL) 10 MG tablet TAKE 1 TABLET BY MOUTH 3 TIMES A DAY AS NEEDED FOR MUSCLE SPASMS 30 tablet 2    fluticasone (FLONASE) 50 mcg/actuation nasal spray SPRAY 2 SPRAYS IN EACH NOSTRIL ONCE TIME A DAY 16 g 1    hydrochlorothiazide (HYDRODIURIL) 25 MG tablet TAKE 1 TABLET BY MOUTH EVERY DAY 30 tablet 2    hydroxychloroquine (PLAQUENIL) 200 mg tablet Take 200 mg by mouth once daily.       losartan (COZAAR) 100 MG tablet TAKE 1 TABLET BY MOUTH ONCE DAILY 30 tablet 2    omeprazole (PRILOSEC) 20 MG capsule Take 1  capsule (20 mg total) by mouth once daily. 30 capsule 11    pravastatin (PRAVACHOL) 20 MG tablet TAKE 1 TABLET BY MOUTH EVERY DAY 30 tablet 2    tramadol (ULTRAM) 50 mg tablet TAKE ONE TABLET BY MOUTH THREE TIMES DAILY AFTER A MEAL 90 tablet 2    sodium,potassium,mag sulfates (SUPREP BOWEL PREP KIT) 17.5-3.13-1.6 gram SolR Take 1 Units by mouth as directed. 1 Bottle 0     No current facility-administered medications for this visit.        Past Medical History   Diagnosis Date    Allergy     Arthritis     CHF (congestive heart failure)     Colon cancer screening 2015    Diabetes mellitus type 2 in nonobese 3/9/2016    Diverticulosis     Hyperlipidemia 10/25/2016    Hypertension     Insomnia      Past Surgical History   Procedure Laterality Date     section      Colonoscopy N/A 2015     Procedure: COLONOSCOPY;  Surgeon: Adela Sam MD;  Location[facility];  Service: Endoscopy;  Laterality: N/A;     Family History   Problem Relation Age of Onset    Hypertension Mother     Diabetes Mother     Hypertension Father     Hypertension Sister     Hypertension Brother     Hypertension Sister     Hypertension Sister     Hypertension Sister     Hypertension Brother     Hypertension Brother      Social History   Substance Use Topics    Smoking status: Never Smoker    Smokeless tobacco: Never Used    Alcohol use 0.0 oz/week     0 Standard drinks or equivalent per week      Comment: weekends        Review of Systems:  Review of Systems   Constitutional: Positive for appetite change.        Denies night sweats   HENT: Negative.    Eyes: Negative.    Respiratory: Negative for apnea, cough, choking, chest tightness, shortness of breath, wheezing and stridor.    Cardiovascular: Negative.    Gastrointestinal: Negative.    Endocrine: Negative.    Genitourinary: Negative.    Musculoskeletal: Negative.    Skin: Negative.    Allergic/Immunologic: Negative.    Neurological: Negative.   "  Hematological: Negative.    Psychiatric/Behavioral: Negative.        OBJECTIVE:     Vital Signs (Most Recent)  Pulse: (!) 54 (02/15/17 1051)  Resp: 18 (02/15/17 1051)  BP: 138/64 (02/15/17 1051)  SpO2: (!) 93 % (02/15/17 1051)  5' 6" (1.676 m)  79.8 kg (175 lb 14.8 oz)     Physical Exam:  Physical Exam   Constitutional: She is oriented to person, place, and time. She appears well-developed and well-nourished. No distress.   HENT:   Head: Normocephalic and atraumatic.   Nose: Nose normal.   Mouth/Throat: Oropharynx is clear and moist. No oropharyngeal exudate.   Has partial denture mandibular   Eyes: EOM are normal. Pupils are equal, round, and reactive to light. Right eye exhibits no discharge. Left eye exhibits no discharge. No scleral icterus.   Neck: Normal range of motion. Neck supple. No JVD present. No tracheal deviation present. No thyromegaly present.   Cardiovascular: Normal rate, regular rhythm, normal heart sounds and intact distal pulses.    No murmur heard.  Pulmonary/Chest: Effort normal and breath sounds normal. No respiratory distress. She has no wheezes. She has no rales. She exhibits no tenderness.   Abdominal: Soft. Bowel sounds are normal. She exhibits no distension. There is no tenderness.   Musculoskeletal: Normal range of motion. She exhibits no edema, tenderness or deformity.   Lymphadenopathy:     She has no cervical adenopathy.     She has no axillary adenopathy.        Right: No supraclavicular adenopathy present.        Left: No supraclavicular adenopathy present.   Neurological: She is alert and oriented to person, place, and time. She has normal reflexes. No cranial nerve deficit.   Skin: Skin is warm and dry. She is not diaphoretic. No erythema.   Psychiatric: She has a normal mood and affect. Her behavior is normal.   Nursing note and vitals reviewed.      Laboratory  CBC: Reviewed  BMP: Reviewed  ACE level was 88    Diagnostic Results:  PET CT  1. Extensive FDG avid lymphadenopathy " in the bilateral supraclavicular regions, mediastinum, bilateral hilar regions and  periportal region.    Findings may be related to metastatic disease although a 2nd primary would be difficult to exclude.  Other infectious or inflammatory etiologies cannot be completely excluded as well.    The majority of these lymph nodes were present on the CT from 09/29/2015 with a a few new lymph nodes better on the current study.  No FDG avidity associated with the pulmonary opacities.       Chest CT  No evidence of hepatic metastatic disease.  Cholelithiasis.  No evidence of abdominal mass or ascites.    Multiple poorly defined pulmonary opacities involving both lung fields not particularly suspicious for pulmonary metastatic disease.    Indeterminate mediastinal lymph nodes as well as multiple calcified mediastinal lymph nodes.    Overall findings of low degree of suspicion for metastatic disease.  Follow-up recommended.  Mild thoracolumbar degenerative disk disease.    No evidence of skeletal metastatic disease.    Path  Specimen:              Terminal ileum, cecum, appendix, and right     colon                                            Procedure:             Right hemicolectomy                              Tumor site:            Right (ascending) colon, per clinical                                   information                                      Tumor size:            1.6 cm in greatest dimension (includes                                  adenoma and invasive carcinoma)                  Macroscopic tumor      Not identified                                   perforation:                                                            Macroscopic intactness Not applicable                                   of mesorectum:                                                          Histologic type:       Adenocarcinoma                                   Histologic grade:      Low grade (moderately differentiated)               Microscopic tumor      Tumor focally invades into the submucosa         extension:                                                              Margins:               All margins uninvolved by invasive                                      carcinoma.                        ASSESSMENT/PLAN:     Problem List Items Addressed This Visit     Mediastinal adenopathy - Primary (Chronic)     Differentials: Lymphoreticular neoplasm vs granulomatous disorder  Level 4, 7 and 10 LN are accessible by EBUS  EUS may also be considered  In non diagnostic will need mediastinoscopy  Consider ENT eval for uptake in mandibular area         Relevant Orders    POCT TB Skin Test (Completed)    QUANTIFERON GOLD TB    FUNGAL IMMUNODIFFUSION - BLOOD    Case request GI: BRONCHOSCOPY (Completed)    Complete PFT w/ bronchodilator    Abnormal PET scan of mediastinum (Chronic)     For EBUS assessment of LN in mediastinum         Relevant Orders    POCT TB Skin Test (Completed)    QUANTIFERON GOLD TB    FUNGAL IMMUNODIFFUSION - BLOOD    Case request GI: BRONCHOSCOPY (Completed)    Complete PFT w/ bronchodilator    History of colon cancer, stage I (Chronic)    Relevant Orders    POCT TB Skin Test (Completed)    QUANTIFERON GOLD TB    FUNGAL IMMUNODIFFUSION - BLOOD    Case request GI: BRONCHOSCOPY (Completed)    Abnormal serum ACE level (Chronic)     Either false +ve or related to sarcoid in appropriate clinical setting         Relevant Orders    Complete PFT w/ bronchodilator          PLAN:Plan      My diagnostic impression and work-up plan were discussed at length with patient. Risks were discussed. EBUS procedure. Complications of the procedure discussed in detail with patient. Complications including but not limited to infection that may require hospital admission, bleeding that may require blood transfusion and or hospital admission, perforation of the lung which may require surgery,chance of injury to the throat, windpipe or bronchial tubes,  laryngospam, coughing, aspiration, hypoxemia or cardiac arrythmias. Patient expressed and verbalized understanding. The material risks of anesthesia in connection with the procedure including brain damage, paralysis from the neck downwards, paralysis from the waist downwards, loss of function of an arm or a leg, disfigurement (incluing scars)and death were discussed with patient who expressedand verbalized understanding. Alternate treatments and material risks associated with such alternatives were discussed with pateint. These include radiologic surveillance with minimal risk and sugery with an indeterminate risk. The material risks of refusing the procedure was discussed in detail. This includes no diagnosis or confirmation of diagnisis and rendering of appropriate treatment the risk of which depends on the nature of the diagnosed illness. Patient expressed and verbalized understanding. Procedure scheduled for date 02/20/2017. Consent signed. Orders entered. Coagulation studies per orders.   All questions were answered and the patient expressed understanding      Return in about 4 weeks (around 3/15/2017), or if symptoms worsen or fail to improve, for EBUS, Labs today, PPD, .    This note was prepared using voice recognition system and is likely to have sound alike errors that may have been overlooked even after proof reading.  Please call me with any questions    Discussed diagnosis, its evaluation, treatment and usual course. All questions answered.    Thank you for the courtesy of participating in the care of this patient    Bandar Ashford MD

## 2017-02-27 NOTE — IP AVS SNAPSHOT
[address] [place] [address]           Patient Discharge Instructions     Our goal is to set you up for success. This packet includes information on your condition, medications, and your home care. It will help you to care for yourself so you don't get sicker and need to go back to the hospital.     Please ask your nurse if you have any questions.        There are many details to remember when preparing to leave the hospital. Here is what you will need to do:    1. Take your medicine. If you are prescribed medications, review your Medication List in the following pages. You may have new medications to  at the pharmacy and others that you'll need to stop taking. Review the instructions for how and when to take your medications. Talk with your doctor or nurses if you are unsure of what to do.     2. Go to your follow-up appointments. Specific follow-up information is listed in the following pages. Your may be contacted by a transition nurse or clinical provider about future appointments. Be sure we have all of the phone numbers to reach you, if needed. Please contact your provider's office if you are unable to make an appointment.     3. Watch for warning signs. Your doctor or nurse will give you detailed warning signs to watch for and when to call for assistance. These instructions may also include educational information about your condition. If you experience any of warning signs to your health, call your doctor.               ** Verify the list of medication(s) below is accurate and up to date. Carry this with you in case of emergency. If your medications have changed, please notify your healthcare provider.             Medication List      CONTINUE taking these medications        Additional Info                      amlodipine 10 MG tablet   Commonly known as:  NORVASC   Quantity:  30 tablet   Refills:  2    Instructions:  TAKE 1 TABLET BY MOUTH EVERY DAY      Begin Date    AM    Noon    PM    Bedtime       amoxicillin-clavulanate 875-125mg 875-125 mg per tablet   Commonly known as:  AUGMENTIN   Refills:  0   Dose:  1 tablet    Instructions:  Take 1 tablet by mouth 2 (two) times daily.     Begin Date    AM    Noon    PM    Bedtime       aspirin 81 MG EC tablet   Commonly known as:  ECOTRIN   Quantity:  30 tablet   Refills:  0   Dose:  81 mg    Instructions:  Take 1 tablet (81 mg total) by mouth once daily.     Begin Date    AM    Noon    PM    Bedtime       cetirizine 10 MG tablet   Commonly known as:  ZYRTEC   Refills:  0   Dose:  10 mg    Instructions:  Take 10 mg by mouth once daily.     Begin Date    AM    Noon    PM    Bedtime       cyclobenzaprine 10 MG tablet   Commonly known as:  FLEXERIL   Quantity:  30 tablet   Refills:  2    Instructions:  TAKE 1 TABLET BY MOUTH 3 TIMES A DAY AS NEEDED FOR MUSCLE SPASMS     Begin Date    AM    Noon    PM    Bedtime       fluticasone 50 mcg/actuation nasal spray   Commonly known as:  FLONASE   Quantity:  16 g   Refills:  1    Instructions:  SPRAY 2 SPRAYS IN EACH NOSTRIL ONCE TIME A DAY     Begin Date    AM    Noon    PM    Bedtime       hydrochlorothiazide 25 MG tablet   Commonly known as:  HYDRODIURIL   Quantity:  30 tablet   Refills:  2    Instructions:  TAKE 1 TABLET BY MOUTH EVERY DAY     Begin Date    AM    Noon    PM    Bedtime       hydroxychloroquine 200 mg tablet   Commonly known as:  PLAQUENIL   Refills:  0   Dose:  200 mg    Instructions:  Take 200 mg by mouth once daily.     Begin Date    AM    Noon    PM    Bedtime       losartan 100 MG tablet   Commonly known as:  COZAAR   Quantity:  30 tablet   Refills:  2    Instructions:  TAKE 1 TABLET BY MOUTH ONCE DAILY     Begin Date    AM    Noon    PM    Bedtime       omeprazole 20 MG capsule   Commonly known as:  PRILOSEC   Quantity:  30 capsule   Refills:  11   Dose:  20 mg    Instructions:  Take 1 capsule (20 mg total) by mouth once daily.     Begin Date    AM     Noon    PM    Bedtime       pravastatin 20 MG tablet   Commonly known as:  PRAVACHOL   Quantity:  30 tablet   Refills:  2    Instructions:  TAKE 1 TABLET BY MOUTH EVERY DAY     Begin Date    AM    Noon    PM    Bedtime       sodium,potassium,mag sulfates 17.5-3.13-1.6 gram Solr   Commonly known as:  SUPREP BOWEL PREP KIT   Quantity:  1 Bottle   Refills:  0   Dose:  1 Units    Instructions:  Take 1 Units by mouth as directed.     Begin Date    AM    Noon    PM    Bedtime       tramadol 50 mg tablet   Commonly known as:  ULTRAM   Quantity:  90 tablet   Refills:  2    Instructions:  TAKE ONE TABLET BY MOUTH THREE TIMES DAILY AFTER A MEAL     Begin Date    AM    Noon    PM    Bedtime                  Please bring to all follow up appointments:    1. A copy of your discharge instructions.  2. All medicines you are currently taking in their original bottles.  3. Identification and insurance card.    Please arrive 15 minutes ahead of scheduled appointment time.    Please call 24 hours in advance if you must reschedule your appointment and/or time.        Your Scheduled Appointments     Mar 15, 2017  9:40 AM CDT   Established Patient Visit with MD Judy Rios - [facility] ([facility])    [address] [place] [id number] [phone]                  Discharge Instructions       Bronchoscopy   Call your doctor if you have shortness of breath, a temperature above 101.0° F for more than 24 hours, or bleeding from your nose or throat. If you have chest pain or severe shortness of breath, call right away.      Bronchoscopy is an exam used to help diagnose lung problems. A thin, flexible tube called a bronchoscope is used. A special light and a tiny camera are attached to the tube. This equipment lets your doctor view your breathing passages.   Before Your Test   Follow your doctors instructions carefully. If you dont, the exam may be cancelled or need to be repeated.   Have no food  or drink for 6-12 hours before the test. Also, avoid smoking for 24 hours before the test.   Remove any dentures or bridgework.   Before changing into a hospital gown, empty your bladder.   Right before the test, you will be given medications to help you relax and to prevent gagging. These medications may be given by IV. In addition, your nose and throat may be numbed with a special spray.   Make sure you have an adult friend or family member available to drive you home.  During Your Test   You will lie on a table with your head raised or sit in a special chair. The room is likely to be darkened. Bronchoscopy takes 45-60 minutes and includes the following steps:   The doctor inserts the tube into your nose or mouth. You might feel a gagging sensation. To help relieve this feeling, you will be told to swallow or take deep breaths. Your airway will remain open even with the tube in place. But you wont be able to talk.   The doctor examines your breathing passages. He or she may also remove tiny tissue samples for biopsy.  After Your Test   To avoid choking, spit out any mouth secretions instead of swallowing.   Do not eat or drink until the anesthetic wears off fully.   If you had a biopsy, avoid coughing hard and clearing your throat.   The doctor will discuss your results over the phone or at your next visit.    Please call office [phone] for any questions        General Information:    1.  Do not drink alcoholic beverages including beer for 24 hours or as long as you are on pain medication..  2.  Do not drive a motor vehicle, operate machinery or power tools, or signs legal papers for 24 hours or as long as you are on pain medication.   3.  You may experience light-headedness, dizziness, and sleepiness following surgery. Please do not stay alone. A responsible adult should be with you for this 24 hour period.  4.  Go home and rest.    5. Progress slowly to a normal diet unless instructed.  Otherwise, begin  with liquids such as soft drinks, then soup and crackers working up to solid foods. Drink plenty of nonalcoholic fluids.  6.  Certain anesthetics and pain medications produce nausea and vomiting in certain       individuals. If nausea becomes a problem at home, call you doctor.    7. A nurse will be calling you sometime after surgery. Do not be alarmed. This is our way of finding out how you are doing.    8. Several times every hour while you are awake, take 2-3 deep breaths and cough. If you had stomach surgery hold a pillow or rolled towel firmly against your stomach before you cough. This will help with any pain the cough might cause.  9. Several times every hour while you are awake, pump and flex your feet 5-6 times and do foot circles. This will help prevent blood clots.    10.Call your doctor for severe pain, bleeding, fever, or signs or symptoms of infection (pain, swelling, redness, foul odor, drainage).    11.You can contact your doctor anytime by [phone] for the [facility] (at [facility]) or [phone] for the [facility] on [facility].   [email or web address] is another way to contact your doctor if you are an active participant online with [facility].        Discharge References/Attachments     BRONCHOSCOPY (ENGLISH)        Admission Information     Date & Time Provider Department CSN    2017  5:34 AM Bandar Ashford MD [facility] [id number]      Care Providers     Provider Role Specialty Primary office phone    Bandar Ashford MD Attending Provider Pulmonary Disease [phone]    Bandar Ashford MD Surgeon  Pulmonary Disease [phone]      Your Vitals Were     BP                   149/90           Recent Lab Values        1/15/2015 3/8/2016 10/20/2016                     9:46 AM  1:31 PM  3:14 PM         A1C 6.6 (H) 6.9 (H) 7.2 (H)         Comment for A1C at  3:14 PM on 10/20/2016:  According to ADA guidelines, hemoglobin A1C <7.0%  represents  optimal control in non-pregnant diabetic patients.  Different  metrics may apply to specific populations.   Standards of Medical Care in Diabetes - 2016.  For the purpose of screening for the presence of diabetes:  <5.7%     Consistent with the absence of diabetes  5.7-6.4%  Consistent with increasing risk for diabetes   (prediabetes)  >or=6.5%  Consistent with diabetes  Currently no consensus exists for use of hemoglobin A1C  for diagnosis of diabetes for children.        Pending Labs     Order Current Status    Cytology Specimen-FNA Radiology Guided, Bronch/EBUS, EUS/GI with Path Adequacy Collected (02/27/17 0836)    Cytology Specimen-FNA Radiology Guided, Bronch/EBUS, EUS/GI with Path Adequacy Collected (02/27/17 0842)    Cytology Specimen-FNA Radiology Guided, Bronch/EBUS, EUS/GI with Path Adequacy In process      Allergies as of 2/27/2017     No Known Allergies      [facility]     [facility] Nurse Care Line - 24/7 Assistance  Unless otherwise directed by your provider, please contact [facility], our nurse care line that is available for 24/7 assistance.     Registered nurses in the [facility] provide clinical advisement, health education, appointment booking, and other advisory services.  Call for this free service at [phone].        Advance Directives     An advance directive is a document which, in the event you are no longer able to make decisions for yourself, tells your healthcare team what kind of treatment you do or do not want to receive, or who you would like to make those decisions for you.  If you do not currently have an advance directive, [facility] encourages you to create one.  For more information call:  [phone]), [phone] ([phone]),  or log on to [email or web address].        Language Assistance Services     ATTENTION: Language assistance services are available, free of charge. Please call [phone].      ATENCIÓN:  Si habla sueañol, tiene a wilkins disposición servicios gratuitos de asistencia lingüística. Llame al [phone].     [facility] Ý: N?u b?n nói Ti?ng Vi?t, có các d?ch v? h? tr? ngôn ng? mi?n phí dành cho b?n. G?i s? [phone].        Diabetes Discharge Instructions                                   [facility] Sign-Up     Activating your [facility] account is as easy as 1-2-3!     1) Visit [email or web address], select Sign Up Now, enter this activation code and your date of birth, then select Next.  [email or web address]  Expires: 3/3/2017  3:44 PM      2) Create a username and password to use when you visit [facility] in the future and select a security question in case you lose your password and select Next.    3) Enter your e-mail address and click Sign Up!    Additional Information  If you have questions, please e-mail [email or web address] or call [phone] to talk to our [facility] staff. Remember, [facility] is NOT to be used for urgent needs. For medical emergencies, dial 911.          [facility] complies with applicable Federal civil rights laws and does not discriminate on the basis of race, color, national origin, age, disability, or sex.

## 2017-02-27 NOTE — BRIEF OP NOTE
Flexible bronchoscopy performed  Normal Airways  Some erythema and scanty secretions    EBUS performed  Start 07:21 am  End 08:44 am  1 hr 23 mins    Station 10 R: 7.8 mm  4 passes      Station 4 R: 14.7 mm  3 passes    Station 4 L: 15.1 mm  4 massess      The elements below should be documented in the discharge summary:   Date of discharge: 02/27/2017   Principal diagnosis: Mediastinal adenopathy   Outcome of the treatment, procedures or surgery: fair   Disposition of the case (d/c disposition): home   Plan for follow up care: in office    Patient instructed to call [phone] for any questions  Has follow up appointment  Post procedure cxr okay

## 2017-02-27 NOTE — DISCHARGE SUMMARY
Flexible bronchoscopy performed  Normal Airways  Some erythema and scanty secretions    EBUS performed  Start 07:21 am  End 08:44 am  1 hr 23 mins    Station 10 R: 7.8 mm  4 passes      Station 4 R: 14.7 mm  3 passes    Station 4 L: 15.1 mm  4 massess      The elements below should be documented in the discharge summary:   Date of discharge: 02/27/2017   Principal diagnosis: Mediastinal adenopathy   Outcome of the treatment, procedures or surgery: fair   Disposition of the case (d/c disposition): home   Plan for follow up care: in office    Patient instructed to call [phone] for any questions  Has follow up appointment  Post procedure cxr okay

## 2017-02-27 NOTE — DISCHARGE INSTRUCTIONS
Bronchoscopy   Call your doctor if you have shortness of breath, a temperature above 101.0° F for more than 24 hours, or bleeding from your nose or throat. If you have chest pain or severe shortness of breath, call right away.      Bronchoscopy is an exam used to help diagnose lung problems. A thin, flexible tube called a bronchoscope is used. A special light and a tiny camera are attached to the tube. This equipment lets your doctor view your breathing passages.   Before Your Test   Follow your doctors instructions carefully. If you dont, the exam may be cancelled or need to be repeated.   Have no food or drink for 6-12 hours before the test. Also, avoid smoking for 24 hours before the test.   Remove any dentures or bridgework.   Before changing into a hospital gown, empty your bladder.   Right before the test, you will be given medications to help you relax and to prevent gagging. These medications may be given by IV. In addition, your nose and throat may be numbed with a special spray.   Make sure you have an adult friend or family member available to drive you home.  During Your Test   You will lie on a table with your head raised or sit in a special chair. The room is likely to be darkened. Bronchoscopy takes 45-60 minutes and includes the following steps:   The doctor inserts the tube into your nose or mouth. You might feel a gagging sensation. To help relieve this feeling, you will be told to swallow or take deep breaths. Your airway will remain open even with the tube in place. But you wont be able to talk.   The doctor examines your breathing passages. He or she may also remove tiny tissue samples for biopsy.  After Your Test   To avoid choking, spit out any mouth secretions instead of swallowing.   Do not eat or drink until the anesthetic wears off fully.   If you had a biopsy, avoid coughing hard and clearing your throat.   The doctor will discuss your results over the phone or at your next  visit.    Please call office [phone] for any questions        General Information:    1.  Do not drink alcoholic beverages including beer for 24 hours or as long as you are on pain medication..  2.  Do not drive a motor vehicle, operate machinery or power tools, or signs legal papers for 24 hours or as long as you are on pain medication.   3.  You may experience light-headedness, dizziness, and sleepiness following surgery. Please do not stay alone. A responsible adult should be with you for this 24 hour period.  4.  Go home and rest.    5. Progress slowly to a normal diet unless instructed.  Otherwise, begin with liquids such as soft drinks, then soup and crackers working up to solid foods. Drink plenty of nonalcoholic fluids.  6.  Certain anesthetics and pain medications produce nausea and vomiting in certain       individuals. If nausea becomes a problem at home, call you doctor.    7. A nurse will be calling you sometime after surgery. Do not be alarmed. This is our way of finding out how you are doing.    8. Several times every hour while you are awake, take 2-3 deep breaths and cough. If you had stomach surgery hold a pillow or rolled towel firmly against your stomach before you cough. This will help with any pain the cough might cause.  9. Several times every hour while you are awake, pump and flex your feet 5-6 times and do foot circles. This will help prevent blood clots.    10.Call your doctor for severe pain, bleeding, fever, or signs or symptoms of infection (pain, swelling, redness, foul odor, drainage).    11.You can contact your doctor anytime by [phone] for the [facility] (at [facility]) or [phone] for the [facility] on [facility].   [email or web address] is another way to contact your doctor if you are an active participant online with [facility].

## 2017-03-06 ENCOUNTER — TELEPHONE (OUTPATIENT)
Dept: HEMATOLOGY/ONCOLOGY | Facility: CLINIC | Age: 62
End: 2017-03-06

## 2017-03-06 NOTE — TELEPHONE ENCOUNTER
----- Message from Bong Perez MD sent at 3/6/2017  8:39 AM CST -----  Please schedule patient for consultation with Dr. Conde with surgery to discuss mediastinoscopy for enlarged lymph nodes. Please also schedule follow up for the patient to see me either before Dr. Conde's visit or on the same day if possible to discuss. Please let patient know that I have reviewed and discussed with Dr. Ashford as well. Thank you.  ----- Message -----     From: Bandar Ashford MD     Sent: 3/3/2017   3:14 PM       To: Bong Perez MD    EBUS specimens were non diagnostic  Please ask Huang to do mediastinoscopy to the high tracheal LN  Spoke with patient

## 2017-03-10 ENCOUNTER — LAB VISIT (OUTPATIENT)
Dept: LAB | Facility: HOSPITAL | Age: 62
End: 2017-03-10
Attending: SURGERY
Payer: MEDICAID

## 2017-03-10 ENCOUNTER — OFFICE VISIT (OUTPATIENT)
Dept: SURGERY | Facility: CLINIC | Age: 62
End: 2017-03-10
Payer: MEDICAID

## 2017-03-10 ENCOUNTER — OFFICE VISIT (OUTPATIENT)
Dept: HEMATOLOGY/ONCOLOGY | Facility: CLINIC | Age: 62
End: 2017-03-10
Payer: MEDICAID

## 2017-03-10 VITALS
TEMPERATURE: 98 F | WEIGHT: 177.25 LBS | SYSTOLIC BLOOD PRESSURE: 130 MMHG | BODY MASS INDEX: 28.49 KG/M2 | HEIGHT: 66 IN | DIASTOLIC BLOOD PRESSURE: 62 MMHG | HEART RATE: 59 BPM | OXYGEN SATURATION: 98 %

## 2017-03-10 VITALS
HEART RATE: 60 BPM | TEMPERATURE: 98 F | WEIGHT: 177.25 LBS | DIASTOLIC BLOOD PRESSURE: 78 MMHG | SYSTOLIC BLOOD PRESSURE: 138 MMHG | BODY MASS INDEX: 28.61 KG/M2

## 2017-03-10 DIAGNOSIS — Z85.038 HISTORY OF COLON CANCER, STAGE I: Chronic | ICD-10-CM

## 2017-03-10 DIAGNOSIS — R94.8 ABNORMAL PET SCAN OF MEDIASTINUM: Primary | Chronic | ICD-10-CM

## 2017-03-10 DIAGNOSIS — R59.0 LYMPHADENOPATHY, MEDIASTINAL: ICD-10-CM

## 2017-03-10 DIAGNOSIS — R74.8 ABNORMAL SERUM ACE LEVEL: Chronic | ICD-10-CM

## 2017-03-10 DIAGNOSIS — R59.0 MEDIASTINAL ADENOPATHY: Chronic | ICD-10-CM

## 2017-03-10 DIAGNOSIS — R59.0 LYMPHADENOPATHY, MEDIASTINAL: Primary | ICD-10-CM

## 2017-03-10 LAB
ALBUMIN SERPL BCP-MCNC: 4 G/DL
ALP SERPL-CCNC: 86 U/L
ALT SERPL W/O P-5'-P-CCNC: 27 U/L
ANION GAP SERPL CALC-SCNC: 14 MMOL/L
AST SERPL-CCNC: 36 U/L
BASOPHILS # BLD AUTO: 0.03 K/UL
BASOPHILS NFR BLD: 0.6 %
BILIRUB SERPL-MCNC: 0.5 MG/DL
BUN SERPL-MCNC: 15 MG/DL
CALCIUM SERPL-MCNC: 9.9 MG/DL
CHLORIDE SERPL-SCNC: 101 MMOL/L
CO2 SERPL-SCNC: 24 MMOL/L
CREAT SERPL-MCNC: 0.7 MG/DL
DIFFERENTIAL METHOD: ABNORMAL
EOSINOPHIL # BLD AUTO: 0.3 K/UL
EOSINOPHIL NFR BLD: 5.9 %
ERYTHROCYTE [DISTWIDTH] IN BLOOD BY AUTOMATED COUNT: 14.3 %
EST. GFR  (AFRICAN AMERICAN): >60 ML/MIN/1.73 M^2
EST. GFR  (NON AFRICAN AMERICAN): >60 ML/MIN/1.73 M^2
GLUCOSE SERPL-MCNC: 102 MG/DL
HCT VFR BLD AUTO: 37 %
HGB BLD-MCNC: 12.3 G/DL
LYMPHOCYTES # BLD AUTO: 1.5 K/UL
LYMPHOCYTES NFR BLD: 30.2 %
MCH RBC QN AUTO: 31.4 PG
MCHC RBC AUTO-ENTMCNC: 33.2 %
MCV RBC AUTO: 94 FL
MONOCYTES # BLD AUTO: 0.4 K/UL
MONOCYTES NFR BLD: 8.7 %
NEUTROPHILS # BLD AUTO: 2.7 K/UL
NEUTROPHILS NFR BLD: 54.4 %
PLATELET # BLD AUTO: 295 K/UL
PMV BLD AUTO: 9.7 FL
POTASSIUM SERPL-SCNC: 3.6 MMOL/L
PROT SERPL-MCNC: 8.3 G/DL
RBC # BLD AUTO: 3.92 M/UL
SODIUM SERPL-SCNC: 139 MMOL/L
WBC # BLD AUTO: 4.93 K/UL

## 2017-03-10 PROCEDURE — 99213 OFFICE O/P EST LOW 20 MIN: CPT | Mod: PBBFAC,27,PO | Performed by: INTERNAL MEDICINE

## 2017-03-10 PROCEDURE — 99203 OFFICE O/P NEW LOW 30 MIN: CPT | Mod: S$PBB,,, | Performed by: SURGERY

## 2017-03-10 PROCEDURE — 99999 PR PBB SHADOW E&M-EST. PATIENT-LVL III: CPT | Mod: PBBFAC,,, | Performed by: INTERNAL MEDICINE

## 2017-03-10 PROCEDURE — 99999 PR PBB SHADOW E&M-EST. PATIENT-LVL III: CPT | Mod: PBBFAC,,, | Performed by: SURGERY

## 2017-03-10 PROCEDURE — 99214 OFFICE O/P EST MOD 30 MIN: CPT | Mod: S$PBB,,, | Performed by: INTERNAL MEDICINE

## 2017-03-10 RX ORDER — SODIUM CHLORIDE 9 MG/ML
INJECTION, SOLUTION INTRAVENOUS CONTINUOUS
Status: CANCELLED | OUTPATIENT
Start: 2017-03-10

## 2017-03-10 NOTE — LETTER
March 10, 2017      Bong Perez MD  [address] [facility] [address]  [place] [address]           [facility]  [address] [place] [id number]  Phone: [phone]  Fax: [phone]          Patient: Anne Farmer   MR Number: [id number]   YOB: 1955   Date of Visit: 3/10/2017       Dear Dr. Bong Perez:    Thank you for referring Anne Farmer to me for evaluation. Attached you will find relevant portions of my assessment and plan of care.    If you have questions, please do not hesitate to call me. I look forward to following Anne Farmer along with you.    Sincerely,    Huang Conde MD    Enclosure  CC:  No Recipients    If you would like to receive this communication electronically, please contact [email or web address] or [phone] to request more information on [employer] [facility] access.    For providers and/or their staff who would like to refer a patient to [facility], please contact us through our one-stop-shop provider referral line, [facility], at [phone].    If you feel you have received this communication in error or would no longer like to receive these types of communications, please e-mail [email or web address]

## 2017-03-10 NOTE — MR AVS SNAPSHOT
[facility]  [address]  [place] [address]  Phone: [phone]  Fax: [phone]                  Anne Farmer   3/10/2017 9:00 AM   Office Visit    Description:  Female : 1955   Provider:  Huang Conde MD   Department:  [facility] Surgery           Reason for Visit     Consult           Diagnoses this Visit        Comments    Lymphadenopathy, mediastinal    -  Primary            To Do List           Future Appointments        Provider Department Dept Phone    3/10/2017 10:45 AM LAB, SAME DAY [facility] [phone]    3/10/2017 11:20 AM Bong Perez MD [facility] - Hemotology Oncology [phone]    3/15/2017 9:40 AM Bandar Ashford MD [facility] - Pulmonary Services [phone]    2017 9:00 AM Huang Conde MD [facility] [phone]      Goals (5 Years of Data)     None      [facility]     [facility] -  Assistance  Registered nurses in the [facility] provide clinical advisement, health education, appointment booking, and other advisory services.  Call for this free service at [phone].             Medications           Message regarding Medications     Verify the changes and/or additions to your medication regime listed below are the same as discussed with your clinician today.  If any of these changes or additions are incorrect, please notify your healthcare provider.             Verify that the below list of medications is an accurate representation of the medications you are currently taking.  If none reported, the list may be blank. If incorrect, please contact your healthcare provider. Carry this list with you in case of emergency.           Current Medications     amlodipine (NORVASC) 10 MG tablet TAKE 1 TABLET BY MOUTH EVERY DAY    amoxicillin-clavulanate 875-125mg (AUGMENTIN) 875-125 mg per tablet Take 1 tablet by mouth 2 (two) times  daily.    aspirin (ECOTRIN) 81 MG EC tablet Take 1 tablet (81 mg total) by mouth once daily.    cetirizine (ZYRTEC) 10 MG tablet Take 10 mg by mouth once daily.    cyclobenzaprine (FLEXERIL) 10 MG tablet TAKE 1 TABLET BY MOUTH 3 TIMES A DAY AS NEEDED FOR MUSCLE SPASMS    fluticasone (FLONASE) 50 mcg/actuation nasal spray SPRAY 2 SPRAYS IN EACH NOSTRIL ONCE TIME A DAY    hydrochlorothiazide (HYDRODIURIL) 25 MG tablet TAKE 1 TABLET BY MOUTH EVERY DAY    hydroxychloroquine (PLAQUENIL) 200 mg tablet Take 200 mg by mouth once daily.     losartan (COZAAR) 100 MG tablet TAKE 1 TABLET BY MOUTH ONCE DAILY    omeprazole (PRILOSEC) 20 MG capsule Take 1 capsule (20 mg total) by mouth once daily.    pravastatin (PRAVACHOL) 20 MG tablet TAKE 1 TABLET BY MOUTH EVERY DAY    tramadol (ULTRAM) 50 mg tablet TAKE ONE TABLET BY MOUTH THREE TIMES DAILY AFTER A MEAL    sodium,potassium,mag sulfates (SUPREP BOWEL PREP KIT) 17.5-3.13-1.6 gram SolR Take 1 Units by mouth as directed.           Clinical Reference Information           Your Vitals Were     BP Pulse Temp Weight BMI    138/78 60 98.2 °F (36.8 °C) (Oral) 80.4 kg (177 lb 4 oz) 28.61 kg/m2      Blood Pressure          Most Recent Value    BP  138/78      Allergies as of 3/10/2017     No Known Allergies      Immunizations Administered on Date of Encounter - 3/10/2017     None      Orders Placed During Today's Visit      Normal Orders This Visit    Case Request Operating Room: MEDIASTINOSCOPY,cervical     Future Labs/Procedures Expected by Expires    CBC auto differential  3/10/2017 5/9/2018    Comprehensive metabolic panel  3/10/2017 5/9/2018      [facility] Sign-Up     Activating your [facility] account is as easy as 1-2-3!     1) Visit [email or web address], select Sign Up Now, enter this activation code and your date of birth, then select Next.  [email or web address]  Expires: 4/24/2017  9:53 AM      2) Create a username and password to use when you visit [facility] in the future and select a  security question in case you lose your password and select Next.    3) Enter your e-mail address and click Sign Up!    Additional Information  If you have questions, please e-mail [email or web address] or call [phone] to talk to our [facility] staff. Remember, [facility] is NOT to be used for urgent needs. For medical emergencies, dial 911.         Language Assistance Services     ATTENTION: Language assistance services are available, free of charge. Please call [phone].      ATENCIÓN: Si habla español, tiene a wilkins disposición servicios gratuitos de asistencia lingüística. Llame al [phone].     CHÚ Ý: N?u b?n nói Ti?ng Vi?t, có các d?ch v? h? tr? ngôn ng? mi?n phí dành cho b?n. G?i s? [phone].         [facility] complies with applicable Federal civil rights laws and does not discriminate on the basis of race, color, national origin, age, disability, or sex.

## 2017-03-14 NOTE — PROGRESS NOTES
Reason for visit: Diffuse lymphadenopathy and bilateral lung nodules    HPI:   The patient is a 61-year-old -American female who presents to the hematology oncology clinic today to discuss further evaluation and management recommendations for diffuse lymphadenopathy and bilateral lung nodules.  I have reviewed all of the patient's relevant clinical history available in the medical record and have utilized this in my evaluation and management recommendations today.  EBUS from 17 was non diagnostic.  Today the patient reports that overall she feels well and has no specific complaints she denies any fevers, chills or night sweats.  She denies any melena, hematochezia, hematemesis, hemoptysis or hematuria.  She denies any nausea, vomiting or abdominal pain.  She denies any bowel or urinary complaints.  She denies any chest pain or shortness of breath.  Of note the patient's prior medical history is significant for treatment for stage I colorectal carcinoma in the [facility] in .    PAST MEDICAL HISTORY:   1.  History of stage I colorectal adenocarcinoma [T1 N0] in 2015  2.  Hypertension  3.  Osteoarthritis  4.  Vitamin D deficiency  5.  Hypertensive cardiomyopathy  6.  Diastolic dysfunction  7.  Type 2 diabetes mellitus  8.  Dyslipidemia  9.  Hepatitis C antibody positive with negative PCR testing    SURGICAL HISTORY:   1.  Laparoscopic right colon resection for colon cancer   2.      FAMILY HISTORY: She denies any immediate family members with cancer or bleeding/clotting disorders.    SOCIAL HISTORY: She drinks alcohol socially.  She has never used any recreational drugs.  She used to work in housekeeping.    ALLERGIES: [NKDA]    MEDICATIONS: [Medcard has been reviewed and/or reconciled.]    REVIEW OF SYSTEMS:   GENERAL: [No fevers, chills or sweats. No fatigue, weight loss or loss of appetite.]  HEENT: [No blurred vision, tinnitus, nasal discharge, sorethroat or dysphagia.]  HEART: [No chest  pain, palpitations or shortness of breath.]    LUNGS: [No cough, hemoptysis or breathing problems.]  ABDOMEN: [No abdominal pain, nausea, vomiting, diarrhea, constipation or melena.]  GENITOURINARY: [No dysuria, bleeding or malodorous discharge.]  NEURO: [No headache, dizziness or vertigo.]  HEMATOLOGY: [No easy bruising, spontaneous bleeding or blood clots in the past].  MUSCULOSKELETAL: [No arthralgias, myalgias or bone pains.]  SKIN: [No rashes or skin lesions.]  PSYCHIATRY: [No depression or anxiety.]    PHYSICAL EXAMINATION:   VS: Reviewed on nurse's notes.  APPEARANCE: The patient is a well-developed, well-nourished and well-groomed -American female who appears in no acute distress.  HEENT: No scleral icterus. Both external auditory canals clear. No oral ulcers, lesions. Throat clear  HEAD: No sinus tenderness.  NECK: Supple. No palpable lymphadenopathy. Thyroid non-tender, no palpable masses  CHEST: Breath sounds clear bilaterally. No rales. No rhonchi. Unlabored respirations.  CARDIOVASCULAR: Normal S1, S2. Normal rate. Regular rhythm.  ABDOMEN: Bowel sounds normal. No tenderness. No abdominal distention. No hepatomegaly. No splenomegaly.  LYMPHATIC: No palpable supraclavicular, axillary nodes  EXTREMITIES: No clubbing, cyanosis, edema  SKIN: No lesions. No petechiae. No ecchymoses. No induration or nodules  NEUROLOGIC: No focal findings. Alert & Oriented x 3. Mood appropriate to affect    LABS:   Reviewed    IMAGING:  Reviewed    IMPRESSION:  1.  Diffuse lymphadenopathy  2.  Bilateral pulmonary nodules  3.  History of stage I colorectal adenocarcinoma    PLAN:  1.  I had a detailed discussion with the patient today with regard to her current clinical situation.  We discussed that the differential diagnosis for her abnormal PET/CT scan was broad and did include metastatic colorectal carcinoma [unlikely], new primary malignancy or benign/inflammatory etiologies such as sarcoidosis among other  possibilities. I have discussed that amongst these possibilities sarcoidosis was the most likely diagnosis.  2. She did meet with Dr. Conde with surgery today and is scheduled to get mediastinoscopy for further evaluation of her mediastinal lymphadenopathy.    She will follow up with Dr. Ashford as recommended. She will see me back for follow up if needed if results do show evidence of malignancy. All of this was discussed in detail with her today and all of her questions were answered to her satisfaction today. She expressed understanding.    Bong Perez MD

## 2017-03-14 NOTE — PROGRESS NOTES
CONSULTATION NOTE    The patient is seen in consultation for Dr. Perez.    REASON FOR CONSULTATION:  Mediastinal lymphadenopathy.    HISTORY OF PRESENT ILLNESS:  The patient is a 61-year-old black female who was   noticed to have some diffuse lymphadenopathy.  The lymphadenopathy with some in   the neck, most of it appears to be in the chest and these nodes also appear to   be in the periportal area.  They had been present at least for about a year.    They are PET avid with some nodes in the mediastinum with an SUV of 8.6.  The   patient does have a history of colon cancer removed in , but she did not   need any chemotherapy.  She denies really any weight loss, or any fevers or   chills.  She occasionally gets some sweats at night, but that has been on for   years since menopause.  Denies any vomiting, any fatigue.  No real GI or    complaints.  The patient had an EBUS done to biopsy some of these lymph nodes;   however, some of these have no adequate tissue could be obtained.  The patient   denies any family history of any sarcoidosis.  Her ACE level is mildly elevated   at 88.  She denies any cough or any shortness of breath.  She also has some mild   changes of sort of infiltrates or interstitial-type changes that have been   present over the last year.    PAST MEDICAL HISTORY:  Again, stage I colorectal cancer in 2015, hypertension,   arthritis, vitamin D deficiency.  She also had some mild cardiomyopathy;   however, her last echo showed normal ejection fraction.  She has type 2 diabetes   and hyperlipidemia.  She is hepatitis C positive, negative by PCR testing.    PAST SURGICAL HISTORY:  Again, laparoscopic-assisted right hemicolectomy for   colon cancer in 2015 and .    SOCIAL HISTORY:  She does not smoke.  Occasionally drinks alcohol.    FAMILY HISTORY:  Positive for hypertension and diabetes.    MEDICATIONS:  Include Norvasc, hydrochlorothiazide, Cozaar, also omeprazole for   mild reflux  symptoms.  She is also on Pravachol.    ALLERGIES:  She has no known drug allergies.    REVIEW OF SYSTEMS:  GENERAL:  Again, no fever, chills or any weight loss.  HEENT:  Denies any gross visual or auditory complaints.  She says her vision has   decreased slowly over the years, but there is no acute change in her vision.    Denies any gross auditory complaints.  SKIN:  She denies any significant rash.  CARDIOVASCULAR:  She denies any chest pains, any palpitations or shortness of   breath.  She had a normal stress test in December of last year and has had an   echo, which also showed normal ejection fraction and some mild aortic and mitral   regurgitation.  RESPIRATORY:  See present illness, but no real significant cough or shortness of   breath.  GASTROINTESTINAL:  No vomiting, no abdominal pains, diarrhea, or constipation.    She had a colonoscopy in followup a couple of years ago, which was normal.  GENITOURINARY:  No genitourinary complaints, trouble starting or stopping urine.  HEMATOLOGIC:  Denies any excess bleeding or any anemia.  MUSCULOSKELETAL:  Occasional joint pains.  PSYCHIATRIC:  Denies any severe depression or anxiety.  The balance of the   review of systems is negative.    PHYSICAL EXAMINATION:  VITAL SIGNS:  Blood pressure 138/78, pulse of 60 and temperature 98.2.  She is   80 kg.  GENERAL:  She is alert and oriented x3.  HEENT:  Extraocular movements were intact.  No gross visual or auditory defect.  NECK:  Shows no deformity.  No tracheal deviation.  I cannot feel any distinct   lymphadenopathy.  Still have a little bit of fullness just to the right of the   larynx, but I cannot feel any distinct adenopathy.  CHEST:  Clear to auscultation throughout.  Good respiratory excursion.  HEART:  Reveals a normal rate and rhythm with soft murmur.  She has no axillary   adenopathy.  ABDOMEN:  Mildly obese.  It is soft and nontender.  EXTREMITIES:  Reveal adequate strength and range of motion.  NEUROLOGIC:   Gross neuro is intact.  No gross deformities on the back.    I did review her CT scans and her PET scans.  The significant findings on those   scan shows some mild small areas of interstitial changes in the lungs.  Also has   lymphadenopathy some in the neck, which is not palpable to me.  There are also   several large lymph nodes along the paratracheal area and in the hilar area and   the periportal area.  These nodes are PET avid.    IMPRESSION:  Diffuse lymphadenopathy with PET avid lymph nodes and the nodes   appear to have been there for a while.  The concern is whether this might   represent as metastatic disease or possible lymphoma, although the patient does   not seem to be real symptomatic.  I think in view of the interstitial changes in   the lung and her elevated ACE level are compatible with probable sarcoidosis.    I did talk to the patient about the cervical mediastinoscopy with biopsy of some   paratracheal nodes.  These nodes cannot be reached or aspirated with EBUS.  I   told her the main reason is for biopsy of the nodes to rule out any metastatic   cancer or lymphoma.  Also, if the nodes prove to show findings consistent with   sarcoidosis, then if she were to develop symptoms, then would proceed with   therapy.  I have counseled her as to the procedure, the pre and postoperative   course, the risks including bleeding, infection, bleeding significant enough to   require a thoracotomy, the risk of possible pneumothorax, possibly not being   able to reach or obtain any lymph nodes, and the patient agrees with the   procedure.  She is to hold her aspirin for five days before, take her morning   losartan, amlodipine and Prilosec.      BPC/PN  dd: 03/10/2017 14:45:16 (CST)  td: 03/10/2017 16:25:41 (CST)  Doc ID   #[id number]  Job ID #[id number]    CC: Bandar Perez M.D.

## 2017-03-15 LAB
ASPERGILLUS AB SER QL ID: NORMAL
B DERMAT AB SER QL ID: NORMAL
COCCIDIOIDES AB SPEC QL ID: NORMAL
H CAPSUL AB TITR SER ID: NORMAL {TITER}

## 2017-03-27 ENCOUNTER — ANESTHESIA EVENT (OUTPATIENT)
Dept: SURGERY | Facility: HOSPITAL | Age: 62
End: 2017-03-27
Payer: MEDICAID

## 2017-03-27 RX ORDER — MULTIVITAMIN
1 TABLET ORAL DAILY
COMMUNITY

## 2017-03-27 RX ORDER — AMOXICILLIN 500 MG
2 CAPSULE ORAL DAILY
COMMUNITY
End: 2017-09-20

## 2017-03-27 NOTE — PRE ADMISSION SCREENING
Pre op instructions reviewed with patient per phone:    To confirm, Your surgeon has instructed you:  Surgery is scheduled 03/28/17 at 0700.      Please report to [facility] 1st floor main lobby by 0530  Pre admit office to call later today only if arrival time changes.      INSTRUCTIONS IMPORTANT!!!  ¨ Do not eat, drink, or smoke after 12 midnight-including water. OK to brush teeth, no gum, candy or mints!    ¨ Take only these medicines with a small swallow of water-morning of surgery.  Amlodipine, Losartan, Prilosec, Pravastatin       ____  Do not wear makeup, including mascara.  ____  No powder, lotions or creams to surgical area.  ____  Please remove all jewelry, including piercings and leave at home.  ____  No money or valuables needed. Please leave at home.  ____  Please bring identification and insurance information to hospital.  ____  If going home the same day, arrange for a ride home. You will not be able to   drive if Anesthesia was used.  ____  Children, under 12 years old, must remain in the waiting room with an adult.  They are not allowed in patient areas.  ____  Wear loose fitting clothing. Allow for dressings, bandages.  ____  Stop Aspirin, Ibuprofen, Motrin and Aleve at least 5-7 days before surgery, unless otherwise instructed by your doctor, or the nurse.   You MAY use Tylenol/acetaminophen until day of surgery.  ____  If you take diabetic medication, do not take am of surgery unless instructed by   Doctor.  ____ Stop taking any Fish Oil supplement or any Vitamins that contain Vitamin E at least 5 days prior to surgery.          Bathing Instructions-- The night before surgery and the morning prior to coming to the hospital:   -Do not shave the surgical area.   -Shower and wash your hair and body as usual with anti-bacterial  soap and shampoo.   -Rinse your hair and body completely.   -Use one packet of hibiclens to wash the surgical site (using your hand) gently for 5  minutes.  Do not scrub you skin too hard.   -Do not use hibiclens on your head, face, or genitals.   -Do not wash with anti-bacterial soap after you use the hibiclens.   -Rinse your body thoroughly.   -Dry with clean, soft towel.  Do not use lotion, cream, deodorant, or powders on   the surgical site.    Use antibacterial soap in place of hibiclens if your surgery is on the head, face or genitals.         Surgical Site Infection    Prevention of surgical site infections:     -Keep incisions clean and dry.   -Do not soak/submerge incisions in water until completely healed.   -Do not apply lotions, powders, creams, or deodorants to site.   -Always make sure hands are cleaned with antibacterial soap/ alcohol-based   prior to touching the surgical site.  (This includes doctors, nurses, staff, and yourself.)    Signs and symptoms:   -Redness and pain around the area where you had surgery   -Drainage of cloudy fluid from your surgical wound   -Fever over 100.4  I have read or had read and explained to me, and understand the above information.

## 2017-03-27 NOTE — H&P (VIEW-ONLY)
CONSULTATION NOTE    The patient is seen in consultation for Dr. Perez.    REASON FOR CONSULTATION:  Mediastinal lymphadenopathy.    HISTORY OF PRESENT ILLNESS:  The patient is a 61-year-old black female who was   noticed to have some diffuse lymphadenopathy.  The lymphadenopathy with some in   the neck, most of it appears to be in the chest and these nodes also appear to   be in the periportal area.  They had been present at least for about a year.    They are PET avid with some nodes in the mediastinum with an SUV of 8.6.  The   patient does have a history of colon cancer removed in , but she did not   need any chemotherapy.  She denies really any weight loss, or any fevers or   chills.  She occasionally gets some sweats at night, but that has been on for   years since menopause.  Denies any vomiting, any fatigue.  No real GI or    complaints.  The patient had an EBUS done to biopsy some of these lymph nodes;   however, some of these have no adequate tissue could be obtained.  The patient   denies any family history of any sarcoidosis.  Her ACE level is mildly elevated   at 88.  She denies any cough or any shortness of breath.  She also has some mild   changes of sort of infiltrates or interstitial-type changes that have been   present over the last year.    PAST MEDICAL HISTORY:  Again, stage I colorectal cancer in 2015, hypertension,   arthritis, vitamin D deficiency.  She also had some mild cardiomyopathy;   however, her last echo showed normal ejection fraction.  She has type 2 diabetes   and hyperlipidemia.  She is hepatitis C positive, negative by PCR testing.    PAST SURGICAL HISTORY:  Again, laparoscopic-assisted right hemicolectomy for   colon cancer in 2015 and .    SOCIAL HISTORY:  She does not smoke.  Occasionally drinks alcohol.    FAMILY HISTORY:  Positive for hypertension and diabetes.    MEDICATIONS:  Include Norvasc, hydrochlorothiazide, Cozaar, also omeprazole for   mild reflux  symptoms.  She is also on Pravachol.    ALLERGIES:  She has no known drug allergies.    REVIEW OF SYSTEMS:  GENERAL:  Again, no fever, chills or any weight loss.  HEENT:  Denies any gross visual or auditory complaints.  She says her vision has   decreased slowly over the years, but there is no acute change in her vision.    Denies any gross auditory complaints.  SKIN:  She denies any significant rash.  CARDIOVASCULAR:  She denies any chest pains, any palpitations or shortness of   breath.  She had a normal stress test in December of last year and has had an   echo, which also showed normal ejection fraction and some mild aortic and mitral   regurgitation.  RESPIRATORY:  See present illness, but no real significant cough or shortness of   breath.  GASTROINTESTINAL:  No vomiting, no abdominal pains, diarrhea, or constipation.    She had a colonoscopy in followup a couple of years ago, which was normal.  GENITOURINARY:  No genitourinary complaints, trouble starting or stopping urine.  HEMATOLOGIC:  Denies any excess bleeding or any anemia.  MUSCULOSKELETAL:  Occasional joint pains.  PSYCHIATRIC:  Denies any severe depression or anxiety.  The balance of the   review of systems is negative.    PHYSICAL EXAMINATION:  VITAL SIGNS:  Blood pressure 138/78, pulse of 60 and temperature 98.2.  She is   80 kg.  GENERAL:  She is alert and oriented x3.  HEENT:  Extraocular movements were intact.  No gross visual or auditory defect.  NECK:  Shows no deformity.  No tracheal deviation.  I cannot feel any distinct   lymphadenopathy.  Still have a little bit of fullness just to the right of the   larynx, but I cannot feel any distinct adenopathy.  CHEST:  Clear to auscultation throughout.  Good respiratory excursion.  HEART:  Reveals a normal rate and rhythm with soft murmur.  She has no axillary   adenopathy.  ABDOMEN:  Mildly obese.  It is soft and nontender.  EXTREMITIES:  Reveal adequate strength and range of motion.  NEUROLOGIC:   Gross neuro is intact.  No gross deformities on the back.    I did review her CT scans and her PET scans.  The significant findings on those   scan shows some mild small areas of interstitial changes in the lungs.  Also has   lymphadenopathy some in the neck, which is not palpable to me.  There are also   several large lymph nodes along the paratracheal area and in the hilar area and   the periportal area.  These nodes are PET avid.    IMPRESSION:  Diffuse lymphadenopathy with PET avid lymph nodes and the nodes   appear to have been there for a while.  The concern is whether this might   represent as metastatic disease or possible lymphoma, although the patient does   not seem to be real symptomatic.  I think in view of the interstitial changes in   the lung and her elevated ACE level are compatible with probable sarcoidosis.    I did talk to the patient about the cervical mediastinoscopy with biopsy of some   paratracheal nodes.  These nodes cannot be reached or aspirated with EBUS.  I   told her the main reason is for biopsy of the nodes to rule out any metastatic   cancer or lymphoma.  Also, if the nodes prove to show findings consistent with   sarcoidosis, then if she were to develop symptoms, then would proceed with   therapy.  I have counseled her as to the procedure, the pre and postoperative   course, the risks including bleeding, infection, bleeding significant enough to   require a thoracotomy, the risk of possible pneumothorax, possibly not being   able to reach or obtain any lymph nodes, and the patient agrees with the   procedure.  She is to hold her aspirin for five days before, take her morning   losartan, amlodipine and Prilosec.      BPC/PN  dd: 03/10/2017 14:45:16 (CST)  td: 03/10/2017 16:25:41 (CST)  Doc ID   #[id number]  Job ID #[id number]    CC: Bandar Perez M.D.

## 2017-03-28 ENCOUNTER — ANESTHESIA (OUTPATIENT)
Dept: SURGERY | Facility: HOSPITAL | Age: 62
End: 2017-03-28
Payer: MEDICAID

## 2017-03-28 ENCOUNTER — HOSPITAL ENCOUNTER (OUTPATIENT)
Facility: HOSPITAL | Age: 62
Discharge: HOME | End: 2017-03-28
Attending: SURGERY | Admitting: SURGERY
Payer: MEDICAID

## 2017-03-28 VITALS
OXYGEN SATURATION: 95 % | RESPIRATION RATE: 14 BRPM | WEIGHT: 173.06 LBS | HEART RATE: 56 BPM | HEIGHT: 66 IN | TEMPERATURE: 98 F | SYSTOLIC BLOOD PRESSURE: 139 MMHG | DIASTOLIC BLOOD PRESSURE: 68 MMHG | BODY MASS INDEX: 27.81 KG/M2

## 2017-03-28 DIAGNOSIS — R59.0 LYMPHADENOPATHY, MEDIASTINAL: ICD-10-CM

## 2017-03-28 LAB
POCT GLUCOSE: 132 MG/DL (ref 70–110)
POCT GLUCOSE: 142 MG/DL (ref 70–110)

## 2017-03-28 PROCEDURE — 39402 MEDIASTINOSCPY W/LMPH NOD BX: CPT | Mod: ,,, | Performed by: SURGERY

## 2017-03-28 PROCEDURE — 71000015 HC POSTOP RECOV 1ST HR: Performed by: SURGERY

## 2017-03-28 PROCEDURE — 63600175 PHARM REV CODE 636 W HCPCS: Performed by: ANESTHESIOLOGY

## 2017-03-28 PROCEDURE — 71000039 HC RECOVERY, EACH ADD'L HOUR: Performed by: SURGERY

## 2017-03-28 PROCEDURE — 88312 SPECIAL STAINS GROUP 1: CPT | Mod: 26,,, | Performed by: PATHOLOGY

## 2017-03-28 PROCEDURE — 63600175 PHARM REV CODE 636 W HCPCS: Performed by: NURSE ANESTHETIST, CERTIFIED REGISTERED

## 2017-03-28 PROCEDURE — 88305 TISSUE EXAM BY PATHOLOGIST: CPT | Performed by: PATHOLOGY

## 2017-03-28 PROCEDURE — 25000003 PHARM REV CODE 250: Performed by: NURSE ANESTHETIST, CERTIFIED REGISTERED

## 2017-03-28 PROCEDURE — 87102 FUNGUS ISOLATION CULTURE: CPT

## 2017-03-28 PROCEDURE — 36000711: Performed by: SURGERY

## 2017-03-28 PROCEDURE — 87116 MYCOBACTERIA CULTURE: CPT

## 2017-03-28 PROCEDURE — 63600175 PHARM REV CODE 636 W HCPCS: Performed by: SURGERY

## 2017-03-28 PROCEDURE — P9045 ALBUMIN (HUMAN), 5%, 250 ML: HCPCS | Performed by: NURSE ANESTHETIST, CERTIFIED REGISTERED

## 2017-03-28 PROCEDURE — 88305 TISSUE EXAM BY PATHOLOGIST: CPT | Mod: 26,,, | Performed by: PATHOLOGY

## 2017-03-28 PROCEDURE — 37000009 HC ANESTHESIA EA ADD 15 MINS: Performed by: SURGERY

## 2017-03-28 PROCEDURE — 25000003 PHARM REV CODE 250: Performed by: SURGERY

## 2017-03-28 PROCEDURE — 71000033 HC RECOVERY, INTIAL HOUR: Performed by: SURGERY

## 2017-03-28 PROCEDURE — 88331 PATH CONSLTJ SURG 1 BLK 1SPC: CPT | Mod: 26,,, | Performed by: PATHOLOGY

## 2017-03-28 PROCEDURE — 37000008 HC ANESTHESIA 1ST 15 MINUTES: Performed by: SURGERY

## 2017-03-28 PROCEDURE — 36000710: Performed by: SURGERY

## 2017-03-28 RX ORDER — ALBUMIN HUMAN 50 G/1000ML
SOLUTION INTRAVENOUS CONTINUOUS PRN
Status: DISCONTINUED | OUTPATIENT
Start: 2017-03-28 | End: 2017-03-28

## 2017-03-28 RX ORDER — SODIUM CHLORIDE, SODIUM LACTATE, POTASSIUM CHLORIDE, CALCIUM CHLORIDE 600; 310; 30; 20 MG/100ML; MG/100ML; MG/100ML; MG/100ML
INJECTION, SOLUTION INTRAVENOUS CONTINUOUS
Status: DISCONTINUED | OUTPATIENT
Start: 2017-03-28 | End: 2017-03-28 | Stop reason: HOSPADM

## 2017-03-28 RX ORDER — LIDOCAINE HYDROCHLORIDE 10 MG/ML
1 INJECTION, SOLUTION EPIDURAL; INFILTRATION; INTRACAUDAL; PERINEURAL ONCE
Status: DISCONTINUED | OUTPATIENT
Start: 2017-03-28 | End: 2017-03-28 | Stop reason: HOSPADM

## 2017-03-28 RX ORDER — HYDROCODONE BITARTRATE AND ACETAMINOPHEN 5; 325 MG/1; MG/1
1 TABLET ORAL
Qty: 12 TABLET | Refills: 0 | Status: SHIPPED | OUTPATIENT
Start: 2017-03-28 | End: 2017-04-07

## 2017-03-28 RX ORDER — ONDANSETRON 8 MG/1
8 TABLET, ORALLY DISINTEGRATING ORAL EVERY 8 HOURS PRN
Status: DISCONTINUED | OUTPATIENT
Start: 2017-03-28 | End: 2017-03-28 | Stop reason: HOSPADM

## 2017-03-28 RX ORDER — GLYCOPYRROLATE 0.2 MG/ML
INJECTION INTRAMUSCULAR; INTRAVENOUS
Status: DISCONTINUED | OUTPATIENT
Start: 2017-03-28 | End: 2017-03-28

## 2017-03-28 RX ORDER — ONDANSETRON 2 MG/ML
INJECTION INTRAMUSCULAR; INTRAVENOUS
Status: DISCONTINUED | OUTPATIENT
Start: 2017-03-28 | End: 2017-03-28

## 2017-03-28 RX ORDER — SODIUM CHLORIDE 9 MG/ML
3 INJECTION, SOLUTION INTRAMUSCULAR; INTRAVENOUS; SUBCUTANEOUS EVERY 8 HOURS
Status: DISCONTINUED | OUTPATIENT
Start: 2017-03-28 | End: 2017-03-28 | Stop reason: HOSPADM

## 2017-03-28 RX ORDER — LIDOCAINE HYDROCHLORIDE 10 MG/ML
INJECTION INFILTRATION; PERINEURAL
Status: DISCONTINUED | OUTPATIENT
Start: 2017-03-28 | End: 2017-03-28

## 2017-03-28 RX ORDER — NEOSTIGMINE METHYLSULFATE 1 MG/ML
INJECTION, SOLUTION INTRAVENOUS
Status: DISCONTINUED | OUTPATIENT
Start: 2017-03-28 | End: 2017-03-28

## 2017-03-28 RX ORDER — MEPERIDINE HYDROCHLORIDE 50 MG/ML
12.5 INJECTION INTRAMUSCULAR; INTRAVENOUS; SUBCUTANEOUS ONCE AS NEEDED
Status: DISCONTINUED | OUTPATIENT
Start: 2017-03-28 | End: 2017-03-28 | Stop reason: HOSPADM

## 2017-03-28 RX ORDER — SODIUM CHLORIDE 9 MG/ML
INJECTION, SOLUTION INTRAVENOUS CONTINUOUS
Status: DISCONTINUED | OUTPATIENT
Start: 2017-03-28 | End: 2017-03-28 | Stop reason: HOSPADM

## 2017-03-28 RX ORDER — CEFAZOLIN SODIUM 2 G/50ML
2 SOLUTION INTRAVENOUS
Status: COMPLETED | OUTPATIENT
Start: 2017-03-28 | End: 2017-03-28

## 2017-03-28 RX ORDER — PROPOFOL 10 MG/ML
VIAL (ML) INTRAVENOUS
Status: DISCONTINUED | OUTPATIENT
Start: 2017-03-28 | End: 2017-03-28

## 2017-03-28 RX ORDER — SODIUM CHLORIDE, SODIUM LACTATE, POTASSIUM CHLORIDE, CALCIUM CHLORIDE 600; 310; 30; 20 MG/100ML; MG/100ML; MG/100ML; MG/100ML
INJECTION, SOLUTION INTRAVENOUS CONTINUOUS PRN
Status: DISCONTINUED | OUTPATIENT
Start: 2017-03-28 | End: 2017-03-28

## 2017-03-28 RX ORDER — FENTANYL CITRATE 50 UG/ML
INJECTION, SOLUTION INTRAMUSCULAR; INTRAVENOUS
Status: DISCONTINUED | OUTPATIENT
Start: 2017-03-28 | End: 2017-03-28

## 2017-03-28 RX ORDER — HYDROCODONE BITARTRATE AND ACETAMINOPHEN 5; 325 MG/1; MG/1
1 TABLET ORAL EVERY 4 HOURS PRN
Status: DISCONTINUED | OUTPATIENT
Start: 2017-03-28 | End: 2017-03-28 | Stop reason: HOSPADM

## 2017-03-28 RX ORDER — ROCURONIUM BROMIDE 10 MG/ML
INJECTION, SOLUTION INTRAVENOUS
Status: DISCONTINUED | OUTPATIENT
Start: 2017-03-28 | End: 2017-03-28

## 2017-03-28 RX ORDER — MORPHINE SULFATE 10 MG/ML
2 INJECTION INTRAMUSCULAR; INTRAVENOUS; SUBCUTANEOUS EVERY 5 MIN PRN
Status: DISCONTINUED | OUTPATIENT
Start: 2017-03-28 | End: 2017-03-28 | Stop reason: HOSPADM

## 2017-03-28 RX ORDER — MIDAZOLAM HYDROCHLORIDE 1 MG/ML
INJECTION, SOLUTION INTRAMUSCULAR; INTRAVENOUS
Status: DISCONTINUED | OUTPATIENT
Start: 2017-03-28 | End: 2017-03-28

## 2017-03-28 RX ORDER — SODIUM CHLORIDE 9 MG/ML
3 INJECTION, SOLUTION INTRAMUSCULAR; INTRAVENOUS; SUBCUTANEOUS
Status: DISCONTINUED | OUTPATIENT
Start: 2017-03-28 | End: 2017-03-28 | Stop reason: HOSPADM

## 2017-03-28 RX ORDER — BUPIVACAINE HYDROCHLORIDE AND EPINEPHRINE 2.5; 5 MG/ML; UG/ML
INJECTION, SOLUTION EPIDURAL; INFILTRATION; INTRACAUDAL; PERINEURAL
Status: DISCONTINUED | OUTPATIENT
Start: 2017-03-28 | End: 2017-03-28 | Stop reason: HOSPADM

## 2017-03-28 RX ADMIN — EPHEDRINE SULFATE 10 MG: 50 INJECTION, SOLUTION INTRAMUSCULAR; INTRAVENOUS; SUBCUTANEOUS at 08:03

## 2017-03-28 RX ADMIN — ROCURONIUM BROMIDE 50 MG: 10 INJECTION, SOLUTION INTRAVENOUS at 07:03

## 2017-03-28 RX ADMIN — SODIUM CHLORIDE, SODIUM LACTATE, POTASSIUM CHLORIDE, AND CALCIUM CHLORIDE: 600; 310; 30; 20 INJECTION, SOLUTION INTRAVENOUS at 06:03

## 2017-03-28 RX ADMIN — MIDAZOLAM HYDROCHLORIDE 2 MG: 1 INJECTION, SOLUTION INTRAMUSCULAR; INTRAVENOUS at 06:03

## 2017-03-28 RX ADMIN — NEOSTIGMINE METHYLSULFATE 5 MG: 1 INJECTION INTRAVENOUS at 08:03

## 2017-03-28 RX ADMIN — PROPOFOL 150 MG: 10 INJECTION, EMULSION INTRAVENOUS at 07:03

## 2017-03-28 RX ADMIN — MORPHINE SULFATE 2 MG: 10 INJECTION, SOLUTION INTRAMUSCULAR; INTRAVENOUS at 10:03

## 2017-03-28 RX ADMIN — GLYCOPYRROLATE 0.8 MG: 0.2 INJECTION, SOLUTION INTRAMUSCULAR; INTRAVENOUS at 08:03

## 2017-03-28 RX ADMIN — ALBUMIN (HUMAN): 12.5 SOLUTION INTRAVENOUS at 07:03

## 2017-03-28 RX ADMIN — ONDANSETRON 4 MG: 2 INJECTION, SOLUTION INTRAMUSCULAR; INTRAVENOUS at 08:03

## 2017-03-28 RX ADMIN — GLYCOPYRROLATE 0.2 MG: 0.2 INJECTION, SOLUTION INTRAMUSCULAR; INTRAVENOUS at 06:03

## 2017-03-28 RX ADMIN — CEFAZOLIN SODIUM 2 G: 2 SOLUTION INTRAVENOUS at 07:03

## 2017-03-28 RX ADMIN — EPHEDRINE SULFATE 15 MG: 50 INJECTION, SOLUTION INTRAMUSCULAR; INTRAVENOUS; SUBCUTANEOUS at 08:03

## 2017-03-28 RX ADMIN — EPHEDRINE SULFATE 25 MG: 50 INJECTION, SOLUTION INTRAMUSCULAR; INTRAVENOUS; SUBCUTANEOUS at 07:03

## 2017-03-28 RX ADMIN — HYDROCODONE BITARTRATE AND ACETAMINOPHEN 1 TABLET: 5; 325 TABLET ORAL at 10:03

## 2017-03-28 RX ADMIN — GLYCOPYRROLATE 0.4 MG: 0.2 INJECTION, SOLUTION INTRAMUSCULAR; INTRAVENOUS at 07:03

## 2017-03-28 RX ADMIN — FENTANYL CITRATE 100 MCG: 50 INJECTION, SOLUTION INTRAMUSCULAR; INTRAVENOUS at 07:03

## 2017-03-28 RX ADMIN — LIDOCAINE HYDROCHLORIDE 80 MG: 10 INJECTION, SOLUTION INFILTRATION; PERINEURAL at 07:03

## 2017-03-28 NOTE — BRIEF OP NOTE
[facility] -   Brief Operative Note     SUMMARY     Surgery Date: 3/28/2017     Surgeon(s) and Role:     * Huang Conde MD - Primary    Assisting Surgeon: None    Pre-op Diagnosis:  Lymphadenopathy, mediastinal [R59.0]    Post-op Diagnosis:  Post-Op Diagnosis Codes:     * Lymphadenopathy, mediastinal [R59.0]with lymph node biopsy c/w sarcoidosis    Procedure(s) (LRB):  MEDIASTINOSCOPY,cervical (N/A),biopsy of deep cervical lymph node    Anesthesia: General and 11ml of .25%marcaine with epi locally    Description of the findings of the procedure:      Findings/Key Components: firm lymph node right paratracheal in upper mediastinum . fs showed non caseating granulomas    Estimated Blood Loss:5ml         Specimens:   Specimen (12h ago through future)    Start     Ordered    03/28/17 0817  Specimen to Pathology - Surgery  Once     Comments:  1) Right Paratracheal Lymph Node (froz)  2) Right Paratracheal Lymph Node (perm)    Dx: Enlarged lymph nodes in chest    03/28/17 0823          Discharge Note    SUMMARY     Admit Date: 3/28/2017    Discharge Date and Time:  03/28/2017 8:54 AM    Hospital Course (synopsis of major diagnoses, care, treatment, and services provided during the course of the hospital stay): tolerated procedure well     Final Diagnosis: Post-Op Diagnosis Codes:     * Lymphadenopathy, mediastinal [R59.0]    Disposition: Home or Self Care    Follow Up/Patient Instructions:     Medications:  Reconciled Home Medications:   Current Discharge Medication List      START taking these medications    Details   hydrocodone-acetaminophen 5-325mg (NORCO) 5-325 mg per tablet Take 1 tablet by mouth every 4 to 6 hours as needed for Pain.  Qty: 12 tablet, Refills: 0         CONTINUE these medications which have NOT CHANGED    Details   amlodipine (NORVASC) 10 MG tablet TAKE 1 TABLET BY MOUTH EVERY DAY  Qty: 30 tablet, Refills: 2      amoxicillin-clavulanate 875-125mg (AUGMENTIN) 875-125 mg per tablet  Take 1 tablet by mouth 2 (two) times daily.      ASCORBIC ACID/VITAMIN E/BIOTIN (HAIR, SKIN, NAILS WITH BIOTIN ORAL) Take by mouth.      aspirin (ECOTRIN) 81 MG EC tablet Take 1 tablet (81 mg total) by mouth once daily.  Qty: 30 tablet, Refills: 0    Associated Diagnoses: Essential hypertension; Diabetes mellitus type 2 in nonobese      cetirizine (ZYRTEC) 10 MG tablet Take 10 mg by mouth once daily.      cyclobenzaprine (FLEXERIL) 10 MG tablet TAKE 1 TABLET BY MOUTH 3 TIMES A DAY AS NEEDED FOR MUSCLE SPASMS  Qty: 30 tablet, Refills: 2    Associated Diagnoses: Arthritis; Multiple joint pain      fish oil-omega-3 fatty acids 300-1,000 mg capsule Take 2 g by mouth once daily.      fluticasone (FLONASE) 50 mcg/actuation nasal spray SPRAY 2 SPRAYS IN EACH NOSTRIL ONCE TIME A DAY  Qty: 16 g, Refills: 1    Associated Diagnoses: Acute sinusitis, recurrence not specified, unspecified location      GARLIC ORAL Take by mouth.      hydrochlorothiazide (HYDRODIURIL) 25 MG tablet TAKE 1 TABLET BY MOUTH EVERY DAY  Qty: 30 tablet, Refills: 2      losartan (COZAAR) 100 MG tablet TAKE 1 TABLET BY MOUTH ONCE DAILY  Qty: 30 tablet, Refills: 2      multivitamin (ONE DAILY MULTIVITAMIN) per tablet Take 1 tablet by mouth once daily.      omeprazole (PRILOSEC) 20 MG capsule Take 1 capsule (20 mg total) by mouth once daily.  Qty: 30 capsule, Refills: 11      pravastatin (PRAVACHOL) 20 MG tablet TAKE 1 TABLET BY MOUTH EVERY DAY  Qty: 30 tablet, Refills: 2      UNABLE TO FIND medication name: Ocuvite      hydroxychloroquine (PLAQUENIL) 200 mg tablet Take 200 mg by mouth once daily.       sodium,potassium,mag sulfates (SUPREP BOWEL PREP KIT) 17.5-3.13-1.6 gram SolR Take 1 Units by mouth as directed.  Qty: 1 Bottle, Refills: 0    Associated Diagnoses: Colon cancer      tramadol (ULTRAM) 50 mg tablet TAKE ONE TABLET BY MOUTH THREE TIMES DAILY AFTER A MEAL  Qty: 90 tablet, Refills: 2             Discharge Procedure Orders  Diet general      Activity as tolerated     Shower on day dressing removed (No bath)     Lifting restrictions     Call MD for:  temperature >100.4     Call MD for:  difficulty breathing, headache or visual disturbances     Call MD for:  redness, tenderness, or signs of infection (pain, swelling, redness, odor or green/yellow discharge around incision site)     Remove dressing in 24 hours       Follow-up Information     Follow up with Huang Conde MD. Schedule an appointment as soon as possible for a visit in 10 days.    Specialty:  General Surgery    Why:  For wound re-check    Contact information:    [address] [id number] [phone]

## 2017-03-28 NOTE — ANESTHESIA RELEASE NOTE
"Anesthesia Release from PACU Note    Patient: Anne Farmer    Procedure(s) Performed: Procedure(s) (LRB):  MEDIASTINOSCOPY,cervical (N/A)    Anesthesia type: general    Post pain: Adequate analgesia    Post assessment: no apparent anesthetic complications, tolerated procedure well and no evidence of recall    Last Vitals:   Visit Vitals    /68    Pulse (!) 56    Temp 36.5 °C (97.7 °F) (Temporal)    Resp 14    Ht 5' 6" (1.676 m)    Wt 78.5 kg (173 lb 1 oz)    SpO2 95%    Breastfeeding No    BMI 27.93 kg/m2       Post vital signs: stable    Level of consciousness: awake, alert  and oriented    Nausea/Vomiting: no nausea/no vomiting    Complications: none    Airway Patency: patent    Respiratory: unassisted, spontaneous ventilation, room air    Cardiovascular: stable and blood pressure at baseline    Hydration: euvolemic  "

## 2017-03-28 NOTE — ANESTHESIA PREPROCEDURE EVALUATION
03/28/2017  Anne Farmer is a 61 y.o., female.    [facility] Anesthesia Evaluation    I have reviewed the Patient Summary Reports.    I have reviewed the Nursing Notes.   I have reviewed the Medications.     Review of Systems  Anesthesia Hx:  No problems with previous Anesthesia  History of prior surgery of interest to airway management or planning: Denies Family Hx of Anesthesia complications.   Denies Personal Hx of Anesthesia complications.   Social:  Non-Smoker    Hematology/Oncology:         -- Cancer in past history:   Cardiovascular:   Hypertension, well controlled CHF hyperlipidemia ECG has been reviewed. Echo 2014  CONCLUSIONS     1 - Concentric hypertrophy.     2 - Normal left ventricular systolic function (EF 60-65%).     3 - Normal left ventricular diastolic function.     4 - Right atrial enlargement.     5 - Normal right ventricular systolic function .     6 - Pulmonary hypertension.     7 - Mild aortic regurgitation.     8 - Mild mitral regurgitation.     9 - Moderate tricuspid regurgitation.     10 - Mild to moderate pulmonic regurgitation.      Congestive Heart Failure (CHF) , Chronic Congestive Heart Failure , compensated, on chronic Rx, no recent change EF 60-65% moderate tricuspid and pulmonic regurgitation mild aortic and mitral regurge Hypertension    Pulmonary:  Pulmonary Normal    Hepatic/GI:   Hepatitis, C    Musculoskeletal:   Arthritis     Endocrine:   Diabetes, type 2        Physical Exam  General:  Well nourished, Obesity    Airway/Jaw/Neck:  Airway Findings: Mouth Opening: Normal General Airway Assessment: Adult  Mallampati: II  Improves to II with phonation.  TM Distance: Normal, at least 6 cm  Jaw/Neck Findings:  Neck ROM: Normal ROM      Dental:  Dental Findings: upper partial dentures   Chest/Lungs:  Chest/Lungs Findings: Clear to auscultation, Normal Respiratory Rate      Heart/Vascular:  Heart Findings: Rate: Normal  Rhythm: Regular Rhythm  Heart Murmur  Systolic  Systolic Heart Murmur Description: R Upper Sternal Border, Ejection Type  Systolic Heart Murmur Grade: Grade III     Abdomen:  Abdomen Findings:  Normal       Mental Status:  Mental Status Findings:  Cooperative, Alert and Oriented         Anesthesia Plan  Type of Anesthesia, risks & benefits discussed:  Anesthesia Type:  general  Patient's Preference:   Intra-op Monitoring Plan: arterial line  Intra-op Monitoring Plan Comments:   Post Op Pain Control Plan:   Post Op Pain Control Plan Comments:   Induction:   IV  Beta Blocker:  Patient is not currently on a Beta-Blocker (No further documentation required).       Informed Consent: Patient understands risks and agrees with Anesthesia plan.  Questions answered. Anesthesia consent signed with patient.  ASA Score: 3     Day of Surgery Review of History & Physical: I have interviewed and examined the patient. I have reviewed the patient's H&P dated:  There are no significant changes.          Ready For Surgery From Anesthesia Perspective.

## 2017-03-28 NOTE — IP AVS SNAPSHOT
[address] [place] [address]           Patient Discharge Instructions   Our goal is to set you up for success. This packet includes information on your condition, medications, and your home care.  It will help you care for yourself to prevent having to return to the hospital.     Please ask your nurse if you have any questions.      There are many details to remember when preparing to leave the hospital. Here is what you will need to do:    1. Take your medicine. If you are prescribed medications, review your Medication List on the following pages. You may have new medications to  at the pharmacy and others that you'll need to stop taking. Review the instructions for how and when to take your medications. Talk with your doctor or nurses if you are unsure of what to do.     2. Go to your follow-up appointments. Specific follow-up information is listed in the following pages. Your may be contacted by a nurse or clinical provider about future appointments. Be sure we have all of the phone numbers to reach you. Please contact your provider's office if you are unable to make an appointment.     3. Watch for warning signs. Your doctor or nurse will give you detailed warning signs to watch for and when to call for assistance. These instructions may also include educational information about your condition. If you experience any of warning signs to your health, call your doctor.               ** Verify the list of medication(s) below is accurate and up to date. Carry this with you in case of emergency. If your medications have changed, please notify your healthcare provider.             Medication List      START taking these medications        Additional Info                      hydrocodone-acetaminophen 5-325mg 5-325 mg per tablet   Commonly known as:  NORCO   Quantity:  12 tablet   Refills:  0   Dose:  1 tablet    Instructions:  Take 1 tablet by mouth every 4 to  6 hours as needed for Pain.     Begin Date    AM    Noon    PM    Bedtime         CONTINUE taking these medications        Additional Info                      amlodipine 10 MG tablet   Commonly known as:  NORVASC   Quantity:  30 tablet   Refills:  2    Instructions:  TAKE 1 TABLET BY MOUTH EVERY DAY     Begin Date    AM    Noon    PM    Bedtime       amoxicillin-clavulanate 875-125mg 875-125 mg per tablet   Commonly known as:  AUGMENTIN   Refills:  0   Dose:  1 tablet    Instructions:  Take 1 tablet by mouth 2 (two) times daily.     Begin Date    AM    Noon    PM    Bedtime       aspirin 81 MG EC tablet   Commonly known as:  ECOTRIN   Quantity:  30 tablet   Refills:  0   Dose:  81 mg    Instructions:  Take 1 tablet (81 mg total) by mouth once daily.     Begin Date    AM    Noon    PM    Bedtime       cetirizine 10 MG tablet   Commonly known as:  ZYRTEC   Refills:  0   Dose:  10 mg    Instructions:  Take 10 mg by mouth once daily.     Begin Date    AM    Noon    PM    Bedtime       cyclobenzaprine 10 MG tablet   Commonly known as:  FLEXERIL   Quantity:  30 tablet   Refills:  2    Instructions:  TAKE 1 TABLET BY MOUTH 3 TIMES A DAY AS NEEDED FOR MUSCLE SPASMS     Begin Date    AM    Noon    PM    Bedtime       fish oil-omega-3 fatty acids 300-1,000 mg capsule   Refills:  0   Dose:  2 g    Instructions:  Take 2 g by mouth once daily.     Begin Date    AM    Noon    PM    Bedtime       fluticasone 50 mcg/actuation nasal spray   Commonly known as:  FLONASE   Quantity:  16 g   Refills:  1    Instructions:  SPRAY 2 SPRAYS IN EACH NOSTRIL ONCE TIME A DAY     Begin Date    AM    Noon    PM    Bedtime       GARLIC ORAL   Refills:  0    Instructions:  Take by mouth.     Begin Date    AM    Noon    PM    Bedtime       HAIR, SKIN, NAILS WITH BIOTIN ORAL   Refills:  0    Instructions:  Take by mouth.     Begin Date    AM    Noon    PM    Bedtime       hydrochlorothiazide 25 MG tablet   Commonly known as:  HYDRODIURIL   Quantity:   30 tablet   Refills:  2    Instructions:  TAKE 1 TABLET BY MOUTH EVERY DAY     Begin Date    AM    Noon    PM    Bedtime       hydroxychloroquine 200 mg tablet   Commonly known as:  PLAQUENIL   Refills:  0   Dose:  200 mg    Instructions:  Take 200 mg by mouth once daily.     Begin Date    AM    Noon    PM    Bedtime       losartan 100 MG tablet   Commonly known as:  COZAAR   Quantity:  30 tablet   Refills:  2    Instructions:  TAKE 1 TABLET BY MOUTH ONCE DAILY     Begin Date    AM    Noon    PM    Bedtime       omeprazole 20 MG capsule   Commonly known as:  PRILOSEC   Quantity:  30 capsule   Refills:  11   Dose:  20 mg    Instructions:  Take 1 capsule (20 mg total) by mouth once daily.     Begin Date    AM    Noon    PM    Bedtime       ONE DAILY MULTIVITAMIN per tablet   Refills:  0   Dose:  1 tablet   Generic drug:  multivitamin    Instructions:  Take 1 tablet by mouth once daily.     Begin Date    AM    Noon    PM    Bedtime       pravastatin 20 MG tablet   Commonly known as:  PRAVACHOL   Quantity:  30 tablet   Refills:  2    Instructions:  TAKE 1 TABLET BY MOUTH EVERY DAY     Begin Date    AM    Noon    PM    Bedtime       sodium,potassium,mag sulfates 17.5-3.13-1.6 gram Solr   Commonly known as:  SUPREP BOWEL PREP KIT   Quantity:  1 Bottle   Refills:  0   Dose:  1 Units    Instructions:  Take 1 Units by mouth as directed.     Begin Date    AM    Noon    PM    Bedtime       tramadol 50 mg tablet   Commonly known as:  ULTRAM   Quantity:  90 tablet   Refills:  2    Instructions:  TAKE ONE TABLET BY MOUTH THREE TIMES DAILY AFTER A MEAL     Begin Date    AM    Noon    PM    Bedtime       UNABLE TO FIND   Refills:  0    Instructions:  medication name: Ocuvite     Begin Date    AM    Noon    PM    Bedtime            Where to Get Your Medications      You can get these medications from any pharmacy     Bring a paper prescription for each of these medications     hydrocodone-acetaminophen 5-325mg 5-325 mg per tablet                   Please bring to all follow up appointments:    1. A copy of your discharge instructions.  2. All medicines you are currently taking in their original bottles.  3. Identification and insurance card.    Please arrive 15 minutes ahead of scheduled appointment time.    Please call 24 hours in advance if you must reschedule your appointment and/or time.        Your Scheduled Appointments     Apr 07, 2017 10:30 AM CDT   Established Patient Visit with Huang Conde MD   [facility] ([facility])    [address] [place] [id number] [phone]              Follow-up Information     Follow up with Huang Conde MD. Schedule an appointment as soon as possible for a visit in 10 days.    Specialty:  General Surgery    Why:  For wound re-check    Contact information:    [address]  [facility] [address] [phone]          Discharge Instructions     Future Orders    Activity as tolerated     Call MD for:  difficulty breathing, headache or visual disturbances     Call MD for:  redness, tenderness, or signs of infection (pain, swelling, redness, odor or green/yellow discharge around incision site)     Call MD for:  temperature >100.4     Diet general     Questions:    Total calories:      Fat restriction, if any:      Protein restriction, if any:      Na restriction, if any:      Fluid restriction:      Additional restrictions:      Lifting restrictions     Remove dressing in 24 hours     Shower on day dressing removed (No bath)         Discharge Instructions         Surgical Diagnosis of Chest, Lung Problems  Youve been told you need a surgical procedure to diagnose a problem in your chest or lung. Surgical procedures are used to get large samples of tissue or lymph nodes from the chest or lung. These samples allow for more complete testing. Surgical procedures typically require incisions and may take some time to recover from.     With mediastinoscopy, the scope  enters the mediastinum through an incision in the neck.            A sample of mass can be taken using video-assisted thoracic surgery (VATS). In some cases, a mass or part of the lung is removed if cancer is found.      Mediastinoscopy  Mediastinoscopy allows the doctor to see inside the mediastinum (area between the two lungs) and remove large lymph node samples (biopsy). First, an incision is made at the base of the neck. A scope is passed through the incision down into the mediastinum. Then a biopsy instrument is passed through the center of the scope. Once the lymph node sample is taken, the instrument is pulled up through the scope. The sample is then tested for cancer or other problems.  Preparing for the procedure  Before your procedure, do the following:  · Follow your doctors instructions about eating and drinking.  · Tell your doctor about the medications you take. You may need to stop taking certain medications before the procedure, especially aspirin, Coumadin, or other blood thinners.  · Discuss any allergies and health problems with your doctor.  · Tell your doctor if you are pregnant.  During the procedure  You receive general anesthesia (medication to make you sleep) during the procedure. Once you are asleep, incisions are made in the neck, chest, side, or back to allow the doctor to view the area or take a biopsy if needed. A tube placed in the chest during surgery drains fluid.     Risks and complications  · Hoarseness  · Bleeding  · Infection  · Abnormal heart rate  · Pneumothorax (collapsed lung)  · Injury to other structures in the chest  · Respiratory failure (rare)  · Nerve damage  · Death (rare)   Date Last Reviewed: 8/4/2014 © 2000-2016 [employer]. [address] [place] [address], [place] [address]. All rights reserved. This information is not intended as a substitute for professional medical care. Always follow your healthcare professional's instructions.      General  Information:  1.  Do not drink alcoholic beverages including beer for 24 hours or as long as you are on pain medication..  2.  Do not drive a motor vehicle, operate machinery or power tools, or signs legal papers for 24 hours or as long as you are on pain medication.   3.  You may experience light-headedness, dizziness, and sleepiness following surgery. Please do not stay alone. A responsible adult should be with you for this 24 hour period.  4.  Go home and rest.  5. Progress slowly to a normal diet unless instructed.  Otherwise, begin with liquids such as soft drinks, then soup and crackers working up to solid foods. Drink plenty of nonalcoholic fluids.  6.  Certain anesthetics and pain medications produce nausea and vomiting in certain       individuals. If nausea becomes a problem at home, call you doctor.  7. A nurse will be calling you sometime after surgery. Do not be alarmed. This is our way of finding out how you are doing.  8. Several times every hour while you are awake, take 2-3 deep breaths and cough. If you had stomach surgery hold a pillow or rolled towel firmly against your stomach before you cough. This will help with any pain the cough might cause.  9. Several times every hour while you are awake, pump and flex your feet 5-6 times and do foot circles. This will help prevent blood clots.  10.Call your doctor for severe pain, bleeding, fever, or signs or symptoms of infection (pain, swelling, redness, foul odor, drainage).  11.You can contact your doctor anytime by [phone] for the [facility] (at [facility]) or [phone] for the [facility] on [facility].   [email or web address] is another way to contact your doctor if you are an active participant online with [facility].          Acetaminophen; Hydrocodone tablets or capsules  What is this medicine?  ACETAMINOPHEN; HYDROCODONE (a set a SHAMA td fen; erasto droe KOE done) is a pain reliever. It is used to treat moderate to severe  pain.  How should I use this medicine?  Take this medicine by mouth with a glass of water. Follow the directions on the prescription label. You can take it with or without food. If it upsets your stomach, take it with food. Do not take your medicine more often than directed.  A special MedGuide will be given to you by the pharmacist with each prescription and refill. Be sure to read this information carefully each time.  Talk to your pediatrician regarding the use of this medicine in children. Special care may be needed.  What side effects may I notice from receiving this medicine?  Side effects that you should report to your doctor or health care professional as soon as possible:  · allergic reactions like skin rash, itching or hives, swelling of the face, lips, or tongue  · breathing problems  · confusion  · redness, blistering, peeling or loosening of the skin, including inside the mouth  · signs and symptoms of low blood pressure like dizziness; feeling faint or lightheaded, falls; unusually weak or tired  · trouble passing urine or change in the amount of urine  · yellowing of the eyes or skin  Side effects that usually do not require medical attention (report to your doctor or health care professional if they continue or are bothersome):  · constipation  · dry mouth  · nausea, vomiting  · tiredness  What may interact with this medicine?  This medicine may interact with the following medications:  · alcohol  · antiviral medicines for HIV or AIDS  · atropine  · antihistamines for allergy, cough and cold  · certain antibiotics like erythromycin, clarithromycin  · certain medicines for anxiety or sleep  · certain medicines for bladder problems like oxybutynin, tolterodine  · certain medicines for depression like amitriptyline, fluoxetine, sertraline  · certain medicines for fungal infections like ketoconazole and itraconazole  · certain medicines for Parkinson's disease like benztropine, trihexyphenidyl  · certain  medicines for seizures like carbamazepine, phenobarbital, phenytoin, primidone  · certain medicines for stomach problems like dicyclomine, hyoscyamine  · certain medicines for travel sickness like scopolamine  · general anesthetics like halothane, isoflurane, methoxyflurane, propofol  · ipratropium  · local anesthetics like lidocaine, pramoxine, tetracaine  · MAOIs like Carbex, Eldepryl, Marplan, Nardil, and Parnate  · medicines that relax muscles for surgery  · other medicines with acetaminophen  · other narcotic medicines for pain or cough  · phenothiazines like chlorpromazine, mesoridazine, prochlorperazine, thioridazine  · rifampin  What if I miss a dose?  If you miss a dose, take it as soon as you can. If it is almost time for your next dose, take only that dose. Do not take double or extra doses.  Where should I keep my medicine?  Keep out of the reach of children. This medicine can be abused. Keep your medicine in a safe place to protect it from theft. Do not share this medicine with anyone. Selling or giving away this medicine is dangerous and against the law.  This medicine may cause accidental overdose and death if it taken by other adults, children, or pets. Mix any unused medicine with a substance like cat litter or coffee grounds. Then throw the medicine away in a sealed container like a sealed bag or a coffee can with a lid. Do not use the medicine after the expiration date.  Store at room temperature between 15 and 30 degrees C (59 and 86 degrees F).  What should I tell my health care provider before I take this medicine?  They need to know if you have any of these conditions:  · brain tumor  · Crohn's disease, inflammatory bowel disease, or ulcerative colitis  · drug abuse or addiction  · head injury  · heart or circulation problems  · if you often drink alcohol  · kidney disease or problems going to the bathroom  · liver disease  · lung disease, asthma, or breathing problems  · an unusual or  allergic reaction to acetaminophen, hydrocodone, other opioid analgesics, other medicines, foods, dyes, or preservatives  · pregnant or trying to get pregnant  · breast-feeding  What should I watch for while using this medicine?  Tell your doctor or health care professional if your pain does not go away, if it gets worse, or if you have new or a different type of pain. You may develop tolerance to the medicine. Tolerance means that you will need a higher dose of the medicine for pain relief. Tolerance is normal and is expected if you take the medicine for a long time.  Do not suddenly stop taking your medicine because you may develop a severe reaction. Your body becomes used to the medicine. This does NOT mean you are addicted. Addiction is a behavior related to getting and using a drug for a non-medical reason. If you have pain, you have a medical reason to take pain medicine. Your doctor will tell you how much medicine to take. If your doctor wants you to stop the medicine, the dose will be slowly lowered over time to avoid any side effects.  There are different types of narcotic medicines (opiates). If you take more than one type at the same time or if you are taking another medicine that also causes drowsiness, you may have more side effects. Give your health care provider a list of all medicines you use. Your doctor will tell you how much medicine to take. Do not take more medicine than directed. Call emergency for help if you have problems breathing or unusual sleepiness.  Do not take other medicines that contain acetaminophen with this medicine. Always read labels carefully. If you have questions, ask your doctor or pharmacist.  If you take too much acetaminophen get medical help right away. Too much acetaminophen can be very dangerous and cause liver damage. Even if you do not have symptoms, it is important to get help right away.  You may get drowsy or dizzy. Do not drive, use machinery, or do anything that  "needs mental alertness until you know how this medicine affects you. Do not stand or sit up quickly, especially if you are an older patient. This reduces the risk of dizzy or fainting spells. Alcohol may interfere with the effect of this medicine. Avoid alcoholic drinks.  The medicine will cause constipation. Try to have a bowel movement at least every 2 to 3 days. If you do not have a bowel movement for 3 days, call your doctor or health care professional.  Your mouth may get dry. Chewing sugarless gum or sucking hard candy, and drinking plenty of water may help. Contact your doctor if the problem does not go away or is severe.  Date Last Reviewed:   NOTE:This sheet is a summary. It may not cover all possible information. If you have questions about this medicine, talk to your doctor, pharmacist, or health care provider. Copyright© 2016 [employer]              Primary Diagnosis     Your primary diagnosis was:  Mediastinal Disease      Admission Information     Date & Time Provider Department CSN    3/28/2017  5:37 AM Huang Conde MD [facility] [id number]      Care Providers     Provider Role Specialty Primary office phone    Huang Conde MD Attending Provider General Surgery [phone]    Huang Conde MD Surgeon  General Surgery [phone]      Your Vitals Were     BP Pulse Temp Resp Height Weight    126/63 63 97.9 °F (36.6 °C) (Temporal) 18 5' 6" (1.676 m) 78.5 kg (173 lb 1 oz)    SpO2 BMI             96% 27.93 kg/m2         Recent Lab Values        1/15/2015 3/8/2016 10/20/2016                     9:46 AM  1:31 PM  3:14 PM         A1C 6.6 (H) 6.9 (H) 7.2 (H)         Comment for A1C at  3:14 PM on 10/20/2016:  According to ADA guidelines, hemoglobin A1C <7.0% represents  optimal control in non-pregnant diabetic patients.  Different  metrics may apply to specific populations.   Standards of Medical Care in Diabetes - 2016.  For the purpose of screening for the presence of " diabetes:  <5.7%     Consistent with the absence of diabetes  5.7-6.4%  Consistent with increasing risk for diabetes   (prediabetes)  >or=6.5%  Consistent with diabetes  Currently no consensus exists for use of hemoglobin A1C  for diagnosis of diabetes for children.        Pending Labs     Order Current Status    AFB Culture & Smear In process    Fungus culture In process    Specimen to Pathology - Surgery In process      Allergies as of 3/28/2017     No Known Allergies      [facility]     [facility] Nurse Care Line - 24/7 Assistance  Unless otherwise directed by your provider, please contact [facility], our nurse care line that is available for 24/7 assistance.     Registered nurses in the [facility] provide clinical advisement, health education, appointment booking, and other advisory services.  Call for this free service at [phone].        Advance Directives     An advance directive is a document which, in the event you are no longer able to make decisions for yourself, tells your healthcare team what kind of treatment you do or do not want to receive, or who you would like to make those decisions for you.  If you do not currently have an advance directive, [facility] encourages you to create one.  For more information call:  [phone]), [phone] ([phone]),  or log on to [email or web address].        Language Assistance Services     ATTENTION: Language assistance services are available, free of charge. Please call [phone].      ATENCIÓN: Si habla español, tiene a wilkins disposición servicios gratuitos de asistencia lingüística. Llame al [phone].     [facility] Ý: N?u b?n nói Ti?ng Vi?t, có các d?ch v? h? tr? ngôn ng? mi?n phí dành cho b?n. G?i s? [phone].        Diabetes Discharge Instructions                                   [facility] Sign-Up     Activating your [facility] account is as easy as 1-2-3!     1) Visit [email or web address], select Sign  Up Now, enter this activation code and your date of birth, then select Next.  [email or web address]  Expires: 4/24/2017 10:53 AM      2) Create a username and password to use when you visit [facility] in the future and select a security question in case you lose your password and select Next.    3) Enter your e-mail address and click Sign Up!    Additional Information  If you have questions, please e-mail [email or web address] or call [phone] to talk to our [facility] staff. Remember, [facility] is NOT to be used for urgent needs. For medical emergencies, dial 911.          [facility] complies with applicable Federal civil rights laws and does not discriminate on the basis of race, color, national origin, age, disability, or sex.

## 2017-03-28 NOTE — ANESTHESIA POSTPROCEDURE EVALUATION
"Anesthesia Post Evaluation    Patient: Anne Farmer    Procedure(s) Performed: Procedure(s) (LRB):  MEDIASTINOSCOPY,cervical (N/A)    Final Anesthesia Type: general  Patient location during evaluation: PACU  Patient participation: Yes- Able to Participate  Level of consciousness: awake and alert and oriented  Post-procedure vital signs: reviewed and stable  Pain management: adequate  Airway patency: patent  PONV status at discharge: No PONV  Anesthetic complications: no      Cardiovascular status: hemodynamically stable  Respiratory status: spontaneous ventilation  Hydration status: euvolemic  Follow-up not needed.        Visit Vitals    /68    Pulse (!) 56    Temp 36.5 °C (97.7 °F) (Temporal)    Resp 14    Ht 5' 6" (1.676 m)    Wt 78.5 kg (173 lb 1 oz)    SpO2 95%    Breastfeeding No    BMI 27.93 kg/m2       Pain/Bernadette Score: Pain Assessment Performed: Yes (3/28/2017  8:48 AM)  Presence of Pain: denies (3/28/2017 10:45 AM)  Pain Rating Prior to Med Admin: 6 (3/28/2017 10:10 AM)  Bernadette Score: 9 (3/28/2017 10:45 AM)      "

## 2017-03-28 NOTE — TRANSFER OF CARE
"Anesthesia Transfer of Care Note    Patient: Anne Farmer    Procedure(s) Performed: Procedure(s) (LRB):  MEDIASTINOSCOPY,cervical (N/A)    Patient location: PACU    Anesthesia Type: general    Transport from OR: Transported from OR on room air with adequate spontaneous ventilation    Post pain: adequate analgesia    Post assessment: no apparent anesthetic complications    Post vital signs: stable    Level of consciousness: awake    Nausea/Vomiting: no nausea/vomiting    Complications: none          Last vitals:   Visit Vitals    BP (!) 148/69 (BP Location: Left arm, Patient Position: Lying, BP Method: Automatic)    Pulse (!) 57    Temp 36.6 °C (97.8 °F) (Oral)    Resp 18    Ht 5' 6" (1.676 m)    Wt 78.5 kg (173 lb 1 oz)    SpO2 95%    Breastfeeding No    BMI 27.93 kg/m2     "

## 2017-03-28 NOTE — PLAN OF CARE
Pt will maintain a patent airway. Pt will maintain vital signs WDL. Pt will remain in the perioperative care setting until PACU discharge criteria issatisfied. NAD Noted. Resp E/U. AAOX3. GCS 15. PERRLA. Neuro intact.

## 2017-03-28 NOTE — DISCHARGE INSTRUCTIONS
Surgical Diagnosis of Chest, Lung Problems  Youve been told you need a surgical procedure to diagnose a problem in your chest or lung. Surgical procedures are used to get large samples of tissue or lymph nodes from the chest or lung. These samples allow for more complete testing. Surgical procedures typically require incisions and may take some time to recover from.     With mediastinoscopy, the scope enters the mediastinum through an incision in the neck.            A sample of mass can be taken using video-assisted thoracic surgery (VATS). In some cases, a mass or part of the lung is removed if cancer is found.      Mediastinoscopy  Mediastinoscopy allows the doctor to see inside the mediastinum (area between the two lungs) and remove large lymph node samples (biopsy). First, an incision is made at the base of the neck. A scope is passed through the incision down into the mediastinum. Then a biopsy instrument is passed through the center of the scope. Once the lymph node sample is taken, the instrument is pulled up through the scope. The sample is then tested for cancer or other problems.  Preparing for the procedure  Before your procedure, do the following:  · Follow your doctors instructions about eating and drinking.  · Tell your doctor about the medications you take. You may need to stop taking certain medications before the procedure, especially aspirin, Coumadin, or other blood thinners.  · Discuss any allergies and health problems with your doctor.  · Tell your doctor if you are pregnant.  During the procedure  You receive general anesthesia (medication to make you sleep) during the procedure. Once you are asleep, incisions are made in the neck, chest, side, or back to allow the doctor to view the area or take a biopsy if needed. A tube placed in the chest during surgery drains fluid.     Risks and complications  · Hoarseness  · Bleeding  · Infection  · Abnormal heart rate  · Pneumothorax (collapsed  lung)  · Injury to other structures in the chest  · Respiratory failure (rare)  · Nerve damage  · Death (rare)   Date Last Reviewed: 2014-2016 [employer]. [address] [place] [address], [place] [address]. All rights reserved. This information is not intended as a substitute for professional medical care. Always follow your healthcare professional's instructions.      General Information:  1.  Do not drink alcoholic beverages including beer for 24 hours or as long as you are on pain medication..  2.  Do not drive a motor vehicle, operate machinery or power tools, or signs legal papers for 24 hours or as long as you are on pain medication.   3.  You may experience light-headedness, dizziness, and sleepiness following surgery. Please do not stay alone. A responsible adult should be with you for this 24 hour period.  4.  Go home and rest.  5. Progress slowly to a normal diet unless instructed.  Otherwise, begin with liquids such as soft drinks, then soup and crackers working up to solid foods. Drink plenty of nonalcoholic fluids.  6.  Certain anesthetics and pain medications produce nausea and vomiting in certain       individuals. If nausea becomes a problem at home, call you doctor.  7. A nurse will be calling you sometime after surgery. Do not be alarmed. This is our way of finding out how you are doing.  8. Several times every hour while you are awake, take 2-3 deep breaths and cough. If you had stomach surgery hold a pillow or rolled towel firmly against your stomach before you cough. This will help with any pain the cough might cause.  9. Several times every hour while you are awake, pump and flex your feet 5-6 times and do foot circles. This will help prevent blood clots.  10.Call your doctor for severe pain, bleeding, fever, or signs or symptoms of infection (pain, swelling, redness, foul odor, drainage).  11.You can contact your doctor anytime by [phone] for the [facility]  (at [facility]) or [phone] for the [facility] on [facility].   [email or web address] is another way to contact your doctor if you are an active participant online with [facility].          Acetaminophen; Hydrocodone tablets or capsules  What is this medicine?  ACETAMINOPHEN; HYDROCODONE (a set a SHAMA td fen; erasto droe KOE done) is a pain reliever. It is used to treat moderate to severe pain.  How should I use this medicine?  Take this medicine by mouth with a glass of water. Follow the directions on the prescription label. You can take it with or without food. If it upsets your stomach, take it with food. Do not take your medicine more often than directed.  A special MedGuide will be given to you by the pharmacist with each prescription and refill. Be sure to read this information carefully each time.  Talk to your pediatrician regarding the use of this medicine in children. Special care may be needed.  What side effects may I notice from receiving this medicine?  Side effects that you should report to your doctor or health care professional as soon as possible:  · allergic reactions like skin rash, itching or hives, swelling of the face, lips, or tongue  · breathing problems  · confusion  · redness, blistering, peeling or loosening of the skin, including inside the mouth  · signs and symptoms of low blood pressure like dizziness; feeling faint or lightheaded, falls; unusually weak or tired  · trouble passing urine or change in the amount of urine  · yellowing of the eyes or skin  Side effects that usually do not require medical attention (report to your doctor or health care professional if they continue or are bothersome):  · constipation  · dry mouth  · nausea, vomiting  · tiredness  What may interact with this medicine?  This medicine may interact with the following medications:  · alcohol  · antiviral medicines for HIV or AIDS  · atropine  · antihistamines for allergy, cough and cold  · certain  antibiotics like erythromycin, clarithromycin  · certain medicines for anxiety or sleep  · certain medicines for bladder problems like oxybutynin, tolterodine  · certain medicines for depression like amitriptyline, fluoxetine, sertraline  · certain medicines for fungal infections like ketoconazole and itraconazole  · certain medicines for Parkinson's disease like benztropine, trihexyphenidyl  · certain medicines for seizures like carbamazepine, phenobarbital, phenytoin, primidone  · certain medicines for stomach problems like dicyclomine, hyoscyamine  · certain medicines for travel sickness like scopolamine  · general anesthetics like halothane, isoflurane, methoxyflurane, propofol  · ipratropium  · local anesthetics like lidocaine, pramoxine, tetracaine  · MAOIs like Carbex, Eldepryl, Marplan, Nardil, and Parnate  · medicines that relax muscles for surgery  · other medicines with acetaminophen  · other narcotic medicines for pain or cough  · phenothiazines like chlorpromazine, mesoridazine, prochlorperazine, thioridazine  · rifampin  What if I miss a dose?  If you miss a dose, take it as soon as you can. If it is almost time for your next dose, take only that dose. Do not take double or extra doses.  Where should I keep my medicine?  Keep out of the reach of children. This medicine can be abused. Keep your medicine in a safe place to protect it from theft. Do not share this medicine with anyone. Selling or giving away this medicine is dangerous and against the law.  This medicine may cause accidental overdose and death if it taken by other adults, children, or pets. Mix any unused medicine with a substance like cat litter or coffee grounds. Then throw the medicine away in a sealed container like a sealed bag or a coffee can with a lid. Do not use the medicine after the expiration date.  Store at room temperature between 15 and 30 degrees C (59 and 86 degrees F).  What should I tell my health care provider before I  take this medicine?  They need to know if you have any of these conditions:  · brain tumor  · Crohn's disease, inflammatory bowel disease, or ulcerative colitis  · drug abuse or addiction  · head injury  · heart or circulation problems  · if you often drink alcohol  · kidney disease or problems going to the bathroom  · liver disease  · lung disease, asthma, or breathing problems  · an unusual or allergic reaction to acetaminophen, hydrocodone, other opioid analgesics, other medicines, foods, dyes, or preservatives  · pregnant or trying to get pregnant  · breast-feeding  What should I watch for while using this medicine?  Tell your doctor or health care professional if your pain does not go away, if it gets worse, or if you have new or a different type of pain. You may develop tolerance to the medicine. Tolerance means that you will need a higher dose of the medicine for pain relief. Tolerance is normal and is expected if you take the medicine for a long time.  Do not suddenly stop taking your medicine because you may develop a severe reaction. Your body becomes used to the medicine. This does NOT mean you are addicted. Addiction is a behavior related to getting and using a drug for a non-medical reason. If you have pain, you have a medical reason to take pain medicine. Your doctor will tell you how much medicine to take. If your doctor wants you to stop the medicine, the dose will be slowly lowered over time to avoid any side effects.  There are different types of narcotic medicines (opiates). If you take more than one type at the same time or if you are taking another medicine that also causes drowsiness, you may have more side effects. Give your health care provider a list of all medicines you use. Your doctor will tell you how much medicine to take. Do not take more medicine than directed. Call emergency for help if you have problems breathing or unusual sleepiness.  Do not take other medicines that contain  acetaminophen with this medicine. Always read labels carefully. If you have questions, ask your doctor or pharmacist.  If you take too much acetaminophen get medical help right away. Too much acetaminophen can be very dangerous and cause liver damage. Even if you do not have symptoms, it is important to get help right away.  You may get drowsy or dizzy. Do not drive, use machinery, or do anything that needs mental alertness until you know how this medicine affects you. Do not stand or sit up quickly, especially if you are an older patient. This reduces the risk of dizzy or fainting spells. Alcohol may interfere with the effect of this medicine. Avoid alcoholic drinks.  The medicine will cause constipation. Try to have a bowel movement at least every 2 to 3 days. If you do not have a bowel movement for 3 days, call your doctor or health care professional.  Your mouth may get dry. Chewing sugarless gum or sucking hard candy, and drinking plenty of water may help. Contact your doctor if the problem does not go away or is severe.  Date Last Reviewed:   NOTE:This sheet is a summary. It may not cover all possible information. If you have questions about this medicine, talk to your doctor, pharmacist, or health care provider. Copyright© 2016 [employer]

## 2017-03-28 NOTE — PLAN OF CARE
Pt and pt son given discharge instructions. Both stated understanding. Pt brought to main entrance via wheelchair with RN.

## 2017-03-28 NOTE — PLAN OF CARE
Pt lying in bed sleeping between care. Pt states no pain at this time. Son called and will be back at hospital around 11am.

## 2017-03-29 NOTE — OP NOTE
DATE OF PROCEDURE:  03/28/2017    PREOPERATIVE DIAGNOSIS:  Enlarged paratracheal mediastinal lymph nodes.    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    OPERATIVE PROCEDURE:  Cervical mediastinoscopy and biopsy of paratracheal lymph   nodes in the lower neck and upper mediastinum.    SURGEON:  Huang Conde M.D.    ESTIMATED BLOOD LOSS:  10 mL.    FLUIDS RECEIVED:  500 mL of crystalloid and 5% albumin.    ANESTHESIA:  General anesthesia.  Also, 11 mL of 0.25% Marcaine with epinephrine   injected locally, injecting muscle fascia, subcu and skin area.    OPERATIVE FINDINGS:  The patient had a large lymph node about 2 cm x 1 cm in the   right paratracheal area, it could be palpated in the mediastinum and thus   cervical mediastinoscope could see it.  However, on manipulating, I actually   pulled up into the neck incision and was removed from there.  The frozen section   showed noncaseating granulomatous inflammation, consistent with sarcoid,   although also cultures were sent for AFB and fungal.    DESCRIPTION OF PROCEDURE:  After adequate general endotracheal anesthesia   obtained in supine position, the patient's neck was slightly extended.  The arms   were tucked.  Preoperatively, she had SCD hose and IV antibiotics.  Next, a   transverse incision was made approximately 1 fingerbreadth above the sternal   notch through the skin and crease.  It was carried through the skin using knife   and through this into the subcutaneous tissue using cautery.  Dissection was   then carried down using cautery to the strap muscles.  The midline was   identified and dissection was carried in the midline between the strap muscles.    The fascia between the strap muscles were incised.  Dissection was then carried   bluntly down to the trachea and paratracheal fat was dissected off and   cauterized.   After this was done, a finger was then placed along the trachea and   used to dissect inferiorly.  Just below the level of the clavicle, the right   paratracheal lymph node was seen on the PET scan has been metabolically active,   was palpated and the cervical mediastinoscope was then placed and was visualized   and dissected out some.  Then, it was noted on grasping this node could be   actually pulled up into the wound and so was pulled up into the wound and pulled   up above the level of the clavicle, so under direct visualization, it could be   biopsied and it was grasped and using careful blunt dissection and light cautery   was dissected out.  It was clamped at its base and ligated with 2-0 silk.  Part   of the node was sent for frozen section, part for permanent and part for fungal   and AFB cultures.  After this was done, the wound was irrigated.  No bleeding   was noted.  After this was done, the strap muscles were reapproximated in the   midline using interrupted 3-0 Vicryl.  Platysma was closed using running 4-0   Vicryl.  Skin was closed using running 4-0 Vicryl subcuticular.  Steri-Strips   were applied, 4 x 4s and tape.  The patient was then awakened in the Operating   Room and taken to Recovery Room in stable condition.      HERBERTH/  dd: 03/28/2017 14:08:06 (CDT)  td: 03/28/2017 16:37:32 (CDT)  Doc ID   #[id number]  Job ID #[id number]    CC: [employer] [facility]

## 2017-03-30 ENCOUNTER — TELEPHONE (OUTPATIENT)
Dept: PULMONOLOGY | Facility: CLINIC | Age: 62
End: 2017-03-30

## 2017-03-30 DIAGNOSIS — M19.90 ARTHRITIS: ICD-10-CM

## 2017-03-30 DIAGNOSIS — M25.50 MULTIPLE JOINT PAIN: ICD-10-CM

## 2017-03-30 NOTE — TELEPHONE ENCOUNTER
Patient informed  No malignancy    Likely sarcoid    ORDERING PHYSICIAN(S)  ANNETTE CONDE ALEXANDER  CLINICAL DIAGNOSIS/INFORMATION  jm  PreOperative Diagnosis  Enlarged lymph nodes in chest.  SPECIMEN  1) Right paratracheal lymph node.  2) Right paratracheal lymph node.  FINAL PATHOLOGIC DIAGNOSIS  1. Right paratracheal lymph node:  Granulomatous inflammation with necrosis (caseating).  Special stains (AFB and GMS) will be performed and the results will be issued in a supplemental report.  2. Right paratracheal lymph node:  Granulomatous inflammation with necrosis (caseating).  Special stains (AFB and GMS) will be performed and the results will be issued in a supplemental report.  [facility]  Diagnosed by: Vinh Baires M.D.  (Electronically Signed: 2017-03-30 15:26:37)  Frozen Section Diagnosis  1. Noncaseating granulomas. WILLAM/GISELA  Called to Dr. Conde  [facility]  Vinh Baires M.D.  (Reported: 2017-03-28 08:20:00)

## 2017-04-03 RX ORDER — CYCLOBENZAPRINE HCL 10 MG
TABLET ORAL
Qty: 30 TABLET | Refills: 2 | Status: SHIPPED | OUTPATIENT
Start: 2017-04-03 | End: 2017-12-06 | Stop reason: SDUPTHER

## 2017-04-06 DIAGNOSIS — M25.50 MULTIPLE JOINT PAIN: ICD-10-CM

## 2017-04-06 DIAGNOSIS — M19.90 ARTHRITIS: ICD-10-CM

## 2017-04-06 RX ORDER — CYCLOBENZAPRINE HCL 10 MG
TABLET ORAL
Qty: 90 TABLET | Refills: 2 | Status: SHIPPED | OUTPATIENT
Start: 2017-04-06 | End: 2017-04-12

## 2017-04-06 RX ORDER — AMLODIPINE BESYLATE 10 MG/1
TABLET ORAL
Qty: 30 TABLET | Refills: 2 | Status: SHIPPED | OUTPATIENT
Start: 2017-04-06 | End: 2017-06-30 | Stop reason: SDUPTHER

## 2017-04-06 RX ORDER — HYDROCHLOROTHIAZIDE 25 MG/1
TABLET ORAL
Qty: 30 TABLET | Refills: 2 | Status: SHIPPED | OUTPATIENT
Start: 2017-04-06 | End: 2017-06-30 | Stop reason: SDUPTHER

## 2017-04-06 RX ORDER — PRAVASTATIN SODIUM 20 MG/1
TABLET ORAL
Qty: 30 TABLET | Refills: 2 | Status: SHIPPED | OUTPATIENT
Start: 2017-04-06 | End: 2017-06-30 | Stop reason: SDUPTHER

## 2017-04-07 ENCOUNTER — OFFICE VISIT (OUTPATIENT)
Dept: SURGERY | Facility: CLINIC | Age: 62
End: 2017-04-07
Payer: MEDICAID

## 2017-04-07 VITALS
TEMPERATURE: 98 F | SYSTOLIC BLOOD PRESSURE: 134 MMHG | HEART RATE: 60 BPM | BODY MASS INDEX: 28.47 KG/M2 | DIASTOLIC BLOOD PRESSURE: 74 MMHG | WEIGHT: 176.38 LBS

## 2017-04-07 DIAGNOSIS — Z98.890 POST-OPERATIVE STATE: Primary | ICD-10-CM

## 2017-04-07 PROCEDURE — 99213 OFFICE O/P EST LOW 20 MIN: CPT | Mod: PBBFAC | Performed by: SURGERY

## 2017-04-07 PROCEDURE — 99024 POSTOP FOLLOW-UP VISIT: CPT | Mod: ,,, | Performed by: SURGERY

## 2017-04-07 PROCEDURE — 99999 PR PBB SHADOW E&M-EST. PATIENT-LVL III: CPT | Mod: PBBFAC,,, | Performed by: SURGERY

## 2017-04-07 NOTE — PROGRESS NOTES
Subjective:       Patient ID: Anne Farmer is a 61 y.o. female.    Chief Complaint: Post-op Evaluation    HPI Comments: Patient returns now 10 days post cervical mediastinoscopy to bx  Enlarged paratracheal lymph nodes. Patient feels well is eating and ambulating well with no hoarseness or wound complaints    Review of Systems    Objective:      Physical Exam   Constitutional: She is oriented to person, place, and time. She appears well-developed and well-nourished.   Eyes: EOM are normal.   Neck: Normal range of motion. Neck supple.   Incision is clean and dry , healing well, no seroma   Cardiovascular: Normal rate and regular rhythm.    Pulmonary/Chest: Effort normal and breath sounds normal.   Neurological: She is alert and oriented to person, place, and time.   Psychiatric: She has a normal mood and affect.   Vitals reviewed.      Assessment:     stable post mediastinoscopy and paratracheal node bx. Patient has caseating granuloma. I think this is most likely sarcoid although caseation is present.    she has no fever or chills. The afb stains are negative.      Plan:       Follow up with Dr. Ashford. Await his opinion and await afb and fungal cultures. See me prn.

## 2017-04-07 NOTE — MR AVS SNAPSHOT
[facility]  [address]  [place] [address]  Phone: [phone]  Fax: [phone]                  Anne Farmer   2017 10:30 AM   Office Visit    Description:  Female : 1955   Provider:  Huang Conde MD   Department:  [facility]           Reason for Visit     Post-op Evaluation                To Do List           Future Appointments        Provider Department Dept Phone    2017 1:20 PM Bandar Ashford MD [facility] [phone]      Goals (5 Years of Data)     None      [facility]     [facility] On Call Nurse Care Line -  Assistance  Unless otherwise directed by your provider, please contact [facility], our nurse care line that is available for  assistance.     Registered nurses in the [facility] On Call Center provide: appointment scheduling, clinical advisement, health education, and other advisory services.  Call: [phone] (toll free)               Medications           Message regarding Medications     Verify the changes and/or additions to your medication regime listed below are the same as discussed with your clinician today.  If any of these changes or additions are incorrect, please notify your healthcare provider.        STOP taking these medications     hydrocodone-acetaminophen 5-325mg (NORCO) 5-325 mg per tablet Take 1 tablet by mouth every 4 to 6 hours as needed for Pain.           Verify that the below list of medications is an accurate representation of the medications you are currently taking.  If none reported, the list may be blank. If incorrect, please contact your healthcare provider. Carry this list with you in case of emergency.           Current Medications     amlodipine (NORVASC) 10 MG tablet TAKE 1 TABLET BY MOUTH EVERY DAY    amoxicillin-clavulanate 875-125mg (AUGMENTIN) 875-125 mg per tablet Take 1 tablet by mouth 2 (two) times daily.    ASCORBIC ACID/VITAMIN  E/BIOTIN (HAIR, SKIN, NAILS WITH BIOTIN ORAL) Take by mouth.    aspirin (ECOTRIN) 81 MG EC tablet Take 1 tablet (81 mg total) by mouth once daily.    cetirizine (ZYRTEC) 10 MG tablet Take 10 mg by mouth once daily.    cyclobenzaprine (FLEXERIL) 10 MG tablet TAKE 1 TABLET BY MOUTH 3 TIMES A DAY AS NEEDED FOR MUSCLE SPASMS    cyclobenzaprine (FLEXERIL) 10 MG tablet TAKE 1 TABLET BY MOUTH 3 TIMES A DAY AS NEEDED FOR MUSCLE SPASMS    fish oil-omega-3 fatty acids 300-1,000 mg capsule Take 2 g by mouth once daily.    fluticasone (FLONASE) 50 mcg/actuation nasal spray SPRAY 2 SPRAYS IN EACH NOSTRIL ONCE TIME A DAY    GARLIC ORAL Take by mouth.    hydrochlorothiazide (HYDRODIURIL) 25 MG tablet TAKE 1 TABLET BY MOUTH EVERY DAY    hydroxychloroquine (PLAQUENIL) 200 mg tablet Take 200 mg by mouth once daily.     losartan (COZAAR) 100 MG tablet TAKE 1 TABLET BY MOUTH ONCE DAILY    multivitamin (ONE DAILY MULTIVITAMIN) per tablet Take 1 tablet by mouth once daily.    omeprazole (PRILOSEC) 20 MG capsule Take 1 capsule (20 mg total) by mouth once daily.    pravastatin (PRAVACHOL) 20 MG tablet TAKE 1 TABLET BY MOUTH EVERY DAY    sodium,potassium,mag sulfates (SUPREP BOWEL PREP KIT) 17.5-3.13-1.6 gram SolR Take 1 Units by mouth as directed.    tramadol (ULTRAM) 50 mg tablet TAKE ONE TABLET BY MOUTH THREE TIMES DAILY AFTER A MEAL    UNABLE TO FIND medication name: Ocuvite           Clinical Reference Information           Your Vitals Were     BP Pulse Temp Weight BMI    134/74 60 97.9 °F (36.6 °C) (Oral) 80 kg (176 lb 5.9 oz) 28.47 kg/m2      Blood Pressure          Most Recent Value    BP  134/74      Allergies as of 4/7/2017     No Known Allergies      Immunizations Administered on Date of Encounter - 4/7/2017     None      [facility] Sign-Up     Activating your [facility] account is as easy as 1-2-3!     1) Visit [email or web address], select Sign Up Now, enter this activation code and your date of birth, then select  Next.  [email or web address]  Expires: 4/24/2017 10:53 AM      2) Create a username and password to use when you visit [facility] in the future and select a security question in case you lose your password and select Next.    3) Enter your e-mail address and click Sign Up!    Additional Information  If you have questions, please e-mail [email or web address] or call [phone] to talk to our [facility] staff. Remember, [facility] is NOT to be used for urgent needs. For medical emergencies, dial 911.         Language Assistance Services     ATTENTION: Language assistance services are available, free of charge. Please call [phone].      ATENCIÓN: Si kaileyla artem, tiene a wilkins disposición servicios gratuitos de asistencia lingüística. Llame al [phone].     CHÚ Ý: N?u b?n nói Ti?ng Vi?t, có các d?ch v? h? tr? ngôn ng? mi?n phí dành cho b?n. G?i s? [phone].         [facility] complies with applicable Federal civil rights laws and does not discriminate on the basis of race, color, national origin, age, disability, or sex.

## 2017-04-11 ENCOUNTER — TELEPHONE (OUTPATIENT)
Dept: PULMONOLOGY | Facility: CLINIC | Age: 62
End: 2017-04-11

## 2017-04-12 ENCOUNTER — OFFICE VISIT (OUTPATIENT)
Dept: PULMONOLOGY | Facility: CLINIC | Age: 62
End: 2017-04-12
Payer: MEDICAID

## 2017-04-12 VITALS
HEART RATE: 71 BPM | RESPIRATION RATE: 18 BRPM | WEIGHT: 177.94 LBS | HEIGHT: 66 IN | SYSTOLIC BLOOD PRESSURE: 120 MMHG | BODY MASS INDEX: 28.6 KG/M2 | DIASTOLIC BLOOD PRESSURE: 60 MMHG | OXYGEN SATURATION: 96 %

## 2017-04-12 DIAGNOSIS — R59.0 LYMPHADENOPATHY, MEDIASTINAL: ICD-10-CM

## 2017-04-12 DIAGNOSIS — D86.1 SARCOIDOSIS OF LYMPH NODES: Primary | ICD-10-CM

## 2017-04-12 DIAGNOSIS — R59.0 MEDIASTINAL ADENOPATHY: Chronic | ICD-10-CM

## 2017-04-12 DIAGNOSIS — R74.8 ABNORMAL SERUM ACE LEVEL: Chronic | ICD-10-CM

## 2017-04-12 PROCEDURE — 99999 PR PBB SHADOW E&M-EST. PATIENT-LVL IV: CPT | Mod: PBBFAC,,, | Performed by: INTERNAL MEDICINE

## 2017-04-12 PROCEDURE — 99214 OFFICE O/P EST MOD 30 MIN: CPT | Mod: PBBFAC | Performed by: INTERNAL MEDICINE

## 2017-04-12 PROCEDURE — 99213 OFFICE O/P EST LOW 20 MIN: CPT | Mod: S$PBB,,, | Performed by: INTERNAL MEDICINE

## 2017-04-12 NOTE — PROGRESS NOTES
Subjective:      Anne Farmer is a 61 y.o. female    Follow-up visit after mediastinoscopy.  Specimens were positive for granulomatous necrosis which would be consistent with sarcoidosis.  QuantiFERON Gold test was negative.  No respiratory symptoms no cough no wheezing no shortness of breath.  Immunizations up-to-date  Biopsy results were reviewed and information shared about natural disease course of sarcoid  I like her to see ophthalmology  We will get baseline spirometry  No indication for steroids intervention at this time    The following portions of the patient's history were reviewed and updated as appropriate:   She  has a past medical history of Allergy; Arthritis; CHF (congestive heart failure); Colon cancer screening (2015); Diabetes mellitus type 2 in nonobese (3/9/2016); Diverticulosis; Hyperlipidemia (10/25/2016); Hypertension; and Insomnia.  She  does not have any pertinent problems on file.  She  has a past surgical history that includes  section and Colonoscopy (N/A, 2015).  Her family history includes Diabetes in her mother; Hypertension in her brother, brother, brother, father, mother, sister, sister, sister, and sister.  She  reports that she has never smoked. She has never used smokeless tobacco. She reports that she drinks alcohol. She reports that she does not use illicit drugs.  She has a current medication list which includes the following prescription(s): amlodipine, ascorbic acid/vitamin e/biotin, aspirin, cetirizine, cyclobenzaprine, fish oil-omega-3 fatty acids, fluticasone, garlic, hydrochlorothiazide, hydroxychloroquine, losartan, multivitamin, omeprazole, pravastatin, tramadol, and UNABLE TO FIND.  Current Outpatient Prescriptions on File Prior to Visit   Medication Sig Dispense Refill    amlodipine (NORVASC) 10 MG tablet TAKE 1 TABLET BY MOUTH EVERY DAY 30 tablet 2    ASCORBIC ACID/VITAMIN E/BIOTIN (HAIR, SKIN, NAILS WITH BIOTIN ORAL) Take by mouth.       "aspirin (ECOTRIN) 81 MG EC tablet Take 1 tablet (81 mg total) by mouth once daily. 30 tablet 0    cetirizine (ZYRTEC) 10 MG tablet Take 10 mg by mouth once daily.      cyclobenzaprine (FLEXERIL) 10 MG tablet TAKE 1 TABLET BY MOUTH 3 TIMES A DAY AS NEEDED FOR MUSCLE SPASMS 30 tablet 2    fish oil-omega-3 fatty acids 300-1,000 mg capsule Take 2 g by mouth once daily.      fluticasone (FLONASE) 50 mcg/actuation nasal spray SPRAY 2 SPRAYS IN EACH NOSTRIL ONCE TIME A DAY 16 g 1    GARLIC ORAL Take by mouth.      hydrochlorothiazide (HYDRODIURIL) 25 MG tablet TAKE 1 TABLET BY MOUTH EVERY DAY 30 tablet 2    hydroxychloroquine (PLAQUENIL) 200 mg tablet Take 200 mg by mouth once daily.       losartan (COZAAR) 100 MG tablet TAKE 1 TABLET BY MOUTH ONCE DAILY 30 tablet 2    multivitamin (ONE DAILY MULTIVITAMIN) per tablet Take 1 tablet by mouth once daily.      omeprazole (PRILOSEC) 20 MG capsule Take 1 capsule (20 mg total) by mouth once daily. 30 capsule 11    pravastatin (PRAVACHOL) 20 MG tablet TAKE 1 TABLET BY MOUTH EVERY DAY 30 tablet 2    tramadol (ULTRAM) 50 mg tablet TAKE ONE TABLET BY MOUTH THREE TIMES DAILY AFTER A MEAL 90 tablet 2    UNABLE TO FIND medication name: Ocuvite      [DISCONTINUED] amoxicillin-clavulanate 875-125mg (AUGMENTIN) 875-125 mg per tablet Take 1 tablet by mouth 2 (two) times daily.      [DISCONTINUED] cyclobenzaprine (FLEXERIL) 10 MG tablet TAKE 1 TABLET BY MOUTH 3 TIMES A DAY AS NEEDED FOR MUSCLE SPASMS 90 tablet 2    [DISCONTINUED] sodium,potassium,mag sulfates (SUPREP BOWEL PREP KIT) 17.5-3.13-1.6 gram SolR Take 1 Units by mouth as directed. 1 Bottle 0     No current facility-administered medications on file prior to visit.      She has No Known Allergies..    Review of Systems  A comprehensive review of systems was negative.      Objective:      /60  Pulse 71  Resp 18  Ht 5' 6" (1.676 m)  Wt 80.7 kg (177 lb 14.6 oz)  SpO2 96%  BMI 28.72 kg/m2  General appearance: " alert, appears stated age and cooperative  Diagnostic Review  PAth  ORDERING PHYSICIAN(S)  ANNETTE CONDE ALEXANDER  CLINICAL DIAGNOSIS/INFORMATION  jm  PreOperative Diagnosis  Enlarged lymph nodes in chest.  SPECIMEN  1) Right paratracheal lymph node.  2) Right paratracheal lymph node.  FINAL PATHOLOGIC DIAGNOSIS  1. Right paratracheal lymph node:  Granulomatous inflammation with necrosis (caseating).  Special stains (AFB and GMS) will be performed and the results will be issued in a supplemental report.  2. Right paratracheal lymph node:  Granulomatous inflammation with necrosis (caseating).  Special stains (AFB and GMS) will be performed and the results will be issued in a supplemental report.  [facility]  Diagnosed by: Vinh Baires M.D.  (Electronically Signed: 2017-03-30 15:26:37)  Frozen Section Diagnosis  1. Noncaseating granulomas. WILLAM/GISELA  Called to Dr. Conde  [facility]  Vinh Baires M.D.  (Reported: 2017-03-28 08:20:00)       Assessment:      Problem List Items Addressed This Visit     Abnormal serum ACE level (Chronic)    Relevant Orders    Complete PFT without bronchodilator - Clinic    X-Ray Chest PA And Lateral    ANGIOTENSIN CONVERTING ENZYME    Vitamin D    Ambulatory Referral to Ophthalmology    Mediastinal adenopathy (Chronic)    Lymphadenopathy, mediastinal    Sarcoidosis of lymph nodes - Primary    Relevant Orders    Complete PFT without bronchodilator - Clinic    X-Ray Chest PA And Lateral    ANGIOTENSIN CONVERTING ENZYME    Vitamin D    Ambulatory Referral to Ophthalmology    Spirometry with/without bronchodilator         Plan:      Return in about 6 months (around 10/12/2017) for PFT next week, cxr next visit, vit D, ACE level, eye exam.    This note was prepared using voice recognition system and is likely to have sound alike errors that may have been overlooked even after proof reading.  Please call me with any questions      Time spent: 20 minutes in face to face  discussion  concerning diagnosis, prognosis, review of lab and test results, benefits of treatment as well as management of disease, counseling of patient and coordination of care between various health  care providers . Greater than half the time spent was used for coordination of care and counseling of patient.     Bandar Ashford    Pulmonary/Critical care/Sleepmedicine

## 2017-04-12 NOTE — MR AVS SNAPSHOT
[facility]  [address]  [place] [address]  Phone: [phone]  Fax: [phone]                  Anne Farmer   2017 1:20 PM   Office Visit    Description:  Female : 1955   Provider:  Bandar Ashford MD   Department:  [facility]           Reason for Visit     mediastinal adenopathy           Diagnoses this Visit        Comments    Sarcoidosis of lymph nodes    -  Primary     Lymphadenopathy, mediastinal         Mediastinal adenopathy         Abnormal serum ACE level                To Do List           Future Appointments        Provider Department Dept Phone    2017 3:40 PM PULMONARY LAB, [facility] [phone]    10/18/2017 1:50 PM LABORATORY, [facility] [phone]    10/18/2017 2:00 PM [facility] XR1- [facility] [phone]    10/18/2017 2:20 PM PULMONARY LAB, [facility] [phone]    10/18/2017 2:40 PM Bandar Ashford MD [facility] [phone]      Goals (5 Years of Data)     None      Follow-Up and Disposition     Return in about 6 months (around 10/12/2017) for PFT next week, cxr next visit, vit D, ACE level, eye exam.      [facility]     [facility] Nurse Care Line - / Assistance  Unless otherwise directed by your provider, please contact [facility], our nurse care line that is available for 24/7 assistance.     Registered nurses in the [facility] provide: appointment scheduling, clinical advisement, health education, and other advisory services.  Call: [phone] (toll free)               Medications           Message regarding Medications     Verify the changes and/or additions to your medication regime listed below are the same as discussed with your clinician today.  If any of these changes or additions are incorrect, please notify your healthcare provider.    "     STOP taking these medications     amoxicillin-clavulanate 875-125mg (AUGMENTIN) 875-125 mg per tablet Take 1 tablet by mouth 2 (two) times daily.    sodium,potassium,mag sulfates (SUPREP BOWEL PREP KIT) 17.5-3.13-1.6 gram SolR Take 1 Units by mouth as directed.           Verify that the below list of medications is an accurate representation of the medications you are currently taking.  If none reported, the list may be blank. If incorrect, please contact your healthcare provider. Carry this list with you in case of emergency.           Current Medications     amlodipine (NORVASC) 10 MG tablet TAKE 1 TABLET BY MOUTH EVERY DAY    ASCORBIC ACID/VITAMIN E/BIOTIN (HAIR, SKIN, NAILS WITH BIOTIN ORAL) Take by mouth.    aspirin (ECOTRIN) 81 MG EC tablet Take 1 tablet (81 mg total) by mouth once daily.    cetirizine (ZYRTEC) 10 MG tablet Take 10 mg by mouth once daily.    cyclobenzaprine (FLEXERIL) 10 MG tablet TAKE 1 TABLET BY MOUTH 3 TIMES A DAY AS NEEDED FOR MUSCLE SPASMS    fish oil-omega-3 fatty acids 300-1,000 mg capsule Take 2 g by mouth once daily.    fluticasone (FLONASE) 50 mcg/actuation nasal spray SPRAY 2 SPRAYS IN EACH NOSTRIL ONCE TIME A DAY    GARLIC ORAL Take by mouth.    hydrochlorothiazide (HYDRODIURIL) 25 MG tablet TAKE 1 TABLET BY MOUTH EVERY DAY    hydroxychloroquine (PLAQUENIL) 200 mg tablet Take 200 mg by mouth once daily.     losartan (COZAAR) 100 MG tablet TAKE 1 TABLET BY MOUTH ONCE DAILY    multivitamin (ONE DAILY MULTIVITAMIN) per tablet Take 1 tablet by mouth once daily.    omeprazole (PRILOSEC) 20 MG capsule Take 1 capsule (20 mg total) by mouth once daily.    pravastatin (PRAVACHOL) 20 MG tablet TAKE 1 TABLET BY MOUTH EVERY DAY    tramadol (ULTRAM) 50 mg tablet TAKE ONE TABLET BY MOUTH THREE TIMES DAILY AFTER A MEAL    UNABLE TO FIND medication name: Ocuvite           Clinical Reference Information           Your Vitals Were     BP Pulse Resp Height Weight SpO2    120/60 71 18 5' 6" " (1.676 m) 80.7 kg (177 lb 14.6 oz) 96%    BMI                28.72 kg/m2          Blood Pressure          Most Recent Value    BP  120/60      Allergies as of 4/12/2017     No Known Allergies      Immunizations Administered on Date of Encounter - 4/12/2017     None      Orders Placed During Today's Visit      Normal Orders This Visit    Ambulatory Referral to Ophthalmology     Future Labs/Procedures Expected by Expires    ANGIOTENSIN CONVERTING ENZYME  4/12/2017 6/11/2018    Vitamin D  4/12/2017 6/11/2018    X-Ray Chest PA And Lateral  4/12/2017 4/12/2018    Complete PFT without bronchodilator - Clinic  As directed 4/12/2018    Spirometry with/without bronchodilator  As directed 4/12/2018      [facility] Sign-Up     Activating your [facility] account is as easy as 1-2-3!     1) Visit [email or web address], select Sign Up Now, enter this activation code and your date of birth, then select Next.  [email or web address]  Expires: 4/24/2017 10:53 AM      2) Create a username and password to use when you visit [facility] in the future and select a security question in case you lose your password and select Next.    3) Enter your e-mail address and click Sign Up!    Additional Information  If you have questions, please e-mail [email or web address] or call [phone] to talk to our [facility] staff. Remember, [facility] is NOT to be used for urgent needs. For medical emergencies, dial 911.         Instructions    Sarcoidosis, Schaumann's Disease, Sarcoid of [place]  Sarcoidosis appears briefly and heals naturally in 60 to 70 percent of cases, often without the patient knowing or doing anything about it. 20 to 30 percent of patients with sarcoidosis are left with some permanent lung damage. In 10 to 15 percent of the patients, sarcoidosis can become chronic (long lasting). When either the granulomas or fibrosis seriously affect the function of a vital organ (lungs, heart, nervous system, liver, or kidneys), sarcoidosis can be fatal. This  occurs 5 to 10 percent of the time. No one can predict how sarcoidosis will progress in an individual patient. The symptoms the patient experiences, the caregiver's findings, and the patient's race can give some clues.  Sarcoidosis was once considered a rare disease. We now know that it is a common chronic illness that appears all over the world. It is the most common of the fibrotic (scarring) lung disorders. Anyone can get sarcoidosis. It occurs in all races and in both sexes. The risk is greater if you are a young black adult, especially a black woman, or are of Scandinavian, Bulgarian, Syriac, or B[place]an origin.  In sarcoidosis, small lumps (also called nodules or granulomas) develop in multiple organs of the body. These granulomas are small collections of inflamed cells. They commonly appear in the lungs. This is the most common organ affected. They also occur in the lymph nodes (your glands), skin, liver, and eyes. The granulomas vary in the amount of disease they produce from very little with no problems (symptoms) to causing severe illness. The cause of sarcoidosis is not known. It may be due to an abnormal immune reaction in the body. Most people will recover. A few people will develop long lasting conditions that may get worse. Women are affected more often than men. The majority of those affected are under forty years of age. Because we do not know the cause, we do not have ways to prevent it.  SYMPTOMS  Fever.   Loss of appetite.   Night sweats.   Joint pain.   Aching muscles   Symptoms vary because the disease affects different parts of the body in different people. Most people who see their caregiver with sarcoidosis have lung problems. The first signs are usually a dry cough and shortness of breath. There may also be wheezing, chest pain, or a cough that brings up bloody mucus. In severe cases, lung function may become so poor that the person cannot perform even the simple routine tasks of daily  life.  Other symptoms of sarcoidosis are less common than lung symptoms. They can include:  Skin symptoms. Sarcoidosis can appear as a collection of tender, red bumps called erythema nodosum. These bumps usually occur on the face, shins, and arms. They can also occur as a scaly, purplish discoloration on the nose, cheeks, and ears. This is called lupus pernio. Less often, sarcoidosis causes cysts, pimples, or disfiguring over growths of skin. In many cases, the disfiguring over growths develop in areas of scars or tattoos.   Eye symptoms. These include redness, eye pain, and sensitivity to light.   Heart symptoms. These include irregular heartbeat and heart failure.   Other symptoms. A person may have paralyzed facial muscles, seizures, psychiatric symptoms, swollen salivary glands, or bone pain.   DIAGNOSIS  Even when there are no symptoms, your caregiver can sometimes  signs of sarcoidosis during a routine examination, usually through a chest x-ray or when checking other complaints. The patient's age and race or ethnic group can raise an additional red flag that a sign or symptom could be related to sarcoidosis.  Enlargement of the salivary or tear glands and cysts in bone tissue may also be caused by sarcoidosis.   You may have had a biopsy done that shows signs of sarcoidosis. A biopsy is a small tissue sample that is removed for laboratory testing. This tissue sample can be taken from your lung, skin, lip, or another inflamed or abnormal area of the body.   You may have had an abnormal chest X-ray. Although you appear healthy, a chest X-ray ordered for other reasons may turn up abnormalities that suggest sarcoidosis.   Other tests may be needed. These tests may be done to rule out other illnesses or to determine the amount of organ damage caused by sarcoidosis. Some of the most common tests are:   Blood levels of calcium or angiotensin-converting enzyme may be high in people with sarcoidosis.   Blood  tests to evaluate how well your liver is functioning.   Lung function tests to measure how well you are breathing.   A complete eye examination.   TREATMENT  If sarcoidosis does not cause any problems, treatment may not be necessary. Your caregiver may decide to simply monitor your condition. As part of this monitoring process, you may have frequent office visits, follow-up chest X-rays, and tests of your lung function.If you have signs of moderate or severe lung disease, your doctor may recommend:  A corticosteroid drug, such as prednisone (sold under several brand names).   Corticosteroids also are used to treat sarcoidosis of the eyes, joints, skin, nerves, or heart.   Corticosteroid eye drops may be used for the eyes.   Over-the-counter medications like nonsteroidal anti-inflammatory drugs (NSAID) often are used to treat joint pain first before corticosteroids, which tend to have more side effects.   If corticosteroids are not effective or cause serious side effects, other drugs that alter or suppress the immune system may be used.   In rare cases, when sarcoidosis causes life-threatening lung disease, a lung transplant may be necessary. However, there is some risk that the new lungs also will be attacked by sarcoidosis.   SEEK IMMEDIATE MEDICAL CARE IF:  You suffer from shortness of breath or a lingering cough.   You develop new problems that may be related to the disease. Remember this disease can affect almost all organs of the body and cause many different problem      Please call office [phone] for any questions      Thank you for using [facility] and your visit to our facility.  To rate you experience with Dr Bandar Ashford please click on link below    [email or web address]    Rate your Physician         Language Assistance Services     ATTENTION: Language assistance services are available, free of charge. Please call [phone].      ATENCIÓN: Si  habla artem, tiene a wilkins disposición servicios gratuitos de asistencia lingüística. Llame al [phone].     CHÚ Ý: N?u b?n nói Ti?ng Vi?t, có các d?ch v? h? tr? ngôn ng? mi?n phí dành cho b?n. G?i s? [phone].         [facility] complies with applicable Federal civil rights laws and does not discriminate on the basis of race, color, national origin, age, disability, or sex.

## 2017-04-12 NOTE — PATIENT INSTRUCTIONS
Sarcoidosis, Schaumann's Disease, Sarcoid of [place]  Sarcoidosis appears briefly and heals naturally in 60 to 70 percent of cases, often without the patient knowing or doing anything about it. 20 to 30 percent of patients with sarcoidosis are left with some permanent lung damage. In 10 to 15 percent of the patients, sarcoidosis can become chronic (long lasting). When either the granulomas or fibrosis seriously affect the function of a vital organ (lungs, heart, nervous system, liver, or kidneys), sarcoidosis can be fatal. This occurs 5 to 10 percent of the time. No one can predict how sarcoidosis will progress in an individual patient. The symptoms the patient experiences, the caregiver's findings, and the patient's race can give some clues.  Sarcoidosis was once considered a rare disease. We now know that it is a common chronic illness that appears all over the world. It is the most common of the fibrotic (scarring) lung disorders. Anyone can get sarcoidosis. It occurs in all races and in both sexes. The risk is greater if you are a young black adult, especially a black woman, or are of Scandinavian, Jordanian, Icelandic, or Samoan origin.  In sarcoidosis, small lumps (also called nodules or granulomas) develop in multiple organs of the body. These granulomas are small collections of inflamed cells. They commonly appear in the lungs. This is the most common organ affected. They also occur in the lymph nodes (your glands), skin, liver, and eyes. The granulomas vary in the amount of disease they produce from very little with no problems (symptoms) to causing severe illness. The cause of sarcoidosis is not known. It may be due to an abnormal immune reaction in the body. Most people will recover. A few people will develop long lasting conditions that may get worse. Women are affected more often than men. The majority of those affected are under forty years of age. Because we do not know the cause, we do not have ways to  prevent it.  SYMPTOMS  Fever.   Loss of appetite.   Night sweats.   Joint pain.   Aching muscles   Symptoms vary because the disease affects different parts of the body in different people. Most people who see their caregiver with sarcoidosis have lung problems. The first signs are usually a dry cough and shortness of breath. There may also be wheezing, chest pain, or a cough that brings up bloody mucus. In severe cases, lung function may become so poor that the person cannot perform even the simple routine tasks of daily life.  Other symptoms of sarcoidosis are less common than lung symptoms. They can include:  Skin symptoms. Sarcoidosis can appear as a collection of tender, red bumps called erythema nodosum. These bumps usually occur on the face, shins, and arms. They can also occur as a scaly, purplish discoloration on the nose, cheeks, and ears. This is called lupus pernio. Less often, sarcoidosis causes cysts, pimples, or disfiguring over growths of skin. In many cases, the disfiguring over growths develop in areas of scars or tattoos.   Eye symptoms. These include redness, eye pain, and sensitivity to light.   Heart symptoms. These include irregular heartbeat and heart failure.   Other symptoms. A person may have paralyzed facial muscles, seizures, psychiatric symptoms, swollen salivary glands, or bone pain.   DIAGNOSIS  Even when there are no symptoms, your caregiver can sometimes  signs of sarcoidosis during a routine examination, usually through a chest x-ray or when checking other complaints. The patient's age and race or ethnic group can raise an additional red flag that a sign or symptom could be related to sarcoidosis.  Enlargement of the salivary or tear glands and cysts in bone tissue may also be caused by sarcoidosis.   You may have had a biopsy done that shows signs of sarcoidosis. A biopsy is a small tissue sample that is removed for laboratory testing. This tissue sample can be taken from  your lung, skin, lip, or another inflamed or abnormal area of the body.   You may have had an abnormal chest X-ray. Although you appear healthy, a chest X-ray ordered for other reasons may turn up abnormalities that suggest sarcoidosis.   Other tests may be needed. These tests may be done to rule out other illnesses or to determine the amount of organ damage caused by sarcoidosis. Some of the most common tests are:   Blood levels of calcium or angiotensin-converting enzyme may be high in people with sarcoidosis.   Blood tests to evaluate how well your liver is functioning.   Lung function tests to measure how well you are breathing.   A complete eye examination.   TREATMENT  If sarcoidosis does not cause any problems, treatment may not be necessary. Your caregiver may decide to simply monitor your condition. As part of this monitoring process, you may have frequent office visits, follow-up chest X-rays, and tests of your lung function.If you have signs of moderate or severe lung disease, your doctor may recommend:  A corticosteroid drug, such as prednisone (sold under several brand names).   Corticosteroids also are used to treat sarcoidosis of the eyes, joints, skin, nerves, or heart.   Corticosteroid eye drops may be used for the eyes.   Over-the-counter medications like nonsteroidal anti-inflammatory drugs (NSAID) often are used to treat joint pain first before corticosteroids, which tend to have more side effects.   If corticosteroids are not effective or cause serious side effects, other drugs that alter or suppress the immune system may be used.   In rare cases, when sarcoidosis causes life-threatening lung disease, a lung transplant may be necessary. However, there is some risk that the new lungs also will be attacked by sarcoidosis.   SEEK IMMEDIATE MEDICAL CARE IF:  You suffer from shortness of breath or a lingering cough.   You develop new problems that may be related to the disease. Remember this disease  can affect almost all organs of the body and cause many different problem      Please call office [phone] for any questions      Thank you for using [facility] and your visit to our facility.  To rate you experience with Dr Bandar Ashford please click on link below    [email or web address]    Rate your Physician

## 2017-04-19 ENCOUNTER — PROCEDURE VISIT (OUTPATIENT)
Dept: PULMONOLOGY | Facility: CLINIC | Age: 62
End: 2017-04-19
Payer: MEDICAID

## 2017-04-19 DIAGNOSIS — R74.8 ABNORMAL SERUM ACE LEVEL: Chronic | ICD-10-CM

## 2017-04-19 DIAGNOSIS — D86.1 SARCOIDOSIS OF LYMPH NODES: ICD-10-CM

## 2017-04-19 LAB
POST FEF 25 75: 2.81 L/S (ref 1.38–2.85)
POST FET 100: 1.97 S
POST FEV1 FVC: 92 %
POST FEV1: 1.56 L (ref 1.89–2.54)
POST FIF 50: 0.58 L/S
POST FVC: 1.7 L (ref 2.44–3.2)
POST PEF: 3.56 L/S (ref 4.68–6.82)
PRE DLCO: 14.22 ML/MMHG/MIN (ref 18.45–26.74)
PRE ERV: 0.16 L
PRE FEF 25 75: 2.82 L/S (ref 1.38–2.85)
PRE FET 100: 3.46 S
PRE FEV1 FVC: 89 %
PRE FEV1: 1.56 L (ref 1.89–2.54)
PRE FIF 50: 0.61 L/S
PRE FRC PL: 1.83 L (ref 2.33–3.28)
PRE FVC: 1.75 L (ref 2.44–3.2)
PRE KROGHS K: 3.64 1/MIN
PRE PEF: 5.24 L/S (ref 4.68–6.82)
PRE RV: 1.66 L (ref 1.68–2.39)
PRE SVC: 1.75 L
PRE TLC: 3.41 L (ref 4.89–5.66)
PREDICTED DLCO: 22.59 ML/MMHG/MIN (ref 18.45–26.74)
PREDICTED FEV1 FVC: 77.42 % (ref 72.52–82.32)
PREDICTED FEV1: 2.21 L (ref 1.89–2.54)
PREDICTED FRC N2: 2.81 L (ref 2.33–3.28)
PREDICTED FRC PL: 2.81 L (ref 2.33–3.28)
PREDICTED FVC: 2.82 L (ref 2.44–3.2)
PREDICTED RV: 2.03 L (ref 1.68–2.39)
PREDICTED SVC: 3.2 L
PREDICTED TLC: 5.27 L (ref 4.89–5.66)
PROVOCATION PROTOCOL: ABNORMAL

## 2017-04-19 PROCEDURE — 94060 EVALUATION OF WHEEZING: CPT | Mod: 26,S$PBB,, | Performed by: INTERNAL MEDICINE

## 2017-04-19 PROCEDURE — 94726 PLETHYSMOGRAPHY LUNG VOLUMES: CPT | Mod: PBBFAC

## 2017-04-19 PROCEDURE — 94729 DIFFUSING CAPACITY: CPT | Mod: 26,S$PBB,, | Performed by: INTERNAL MEDICINE

## 2017-04-19 PROCEDURE — 94729 DIFFUSING CAPACITY: CPT | Mod: PBBFAC

## 2017-04-19 PROCEDURE — 94060 EVALUATION OF WHEEZING: CPT | Mod: PBBFAC

## 2017-04-19 PROCEDURE — 94726 PLETHYSMOGRAPHY LUNG VOLUMES: CPT | Mod: 26,S$PBB,, | Performed by: INTERNAL MEDICINE

## 2017-04-28 ENCOUNTER — TELEPHONE (OUTPATIENT)
Dept: SURGERY | Facility: CLINIC | Age: 62
End: 2017-04-28

## 2017-04-28 NOTE — TELEPHONE ENCOUNTER
----- Message from Julia Flores sent at 4/28/2017  9:47 AM CDT -----  Contact: self [phone]  States that she is calling to check status on paperwork. Please call back at [phone]//thank you acc

## 2017-05-01 ENCOUNTER — TELEPHONE (OUTPATIENT)
Dept: SURGERY | Facility: CLINIC | Age: 62
End: 2017-05-01

## 2017-05-02 ENCOUNTER — OFFICE VISIT (OUTPATIENT)
Dept: OPHTHALMOLOGY | Facility: CLINIC | Age: 62
End: 2017-05-02
Payer: MEDICAID

## 2017-05-02 DIAGNOSIS — H52.4 BILATERAL PRESBYOPIA: ICD-10-CM

## 2017-05-02 DIAGNOSIS — E11.9 DIABETES MELLITUS TYPE 2 WITHOUT RETINOPATHY: Primary | ICD-10-CM

## 2017-05-02 PROCEDURE — 92014 COMPRE OPH EXAM EST PT 1/>: CPT | Mod: S$PBB,,, | Performed by: OPTOMETRIST

## 2017-05-02 PROCEDURE — 99211 OFF/OP EST MAY X REQ PHY/QHP: CPT | Mod: PBBFAC,PO | Performed by: OPTOMETRIST

## 2017-05-02 PROCEDURE — 99999 PR PBB SHADOW E&M-EST. PATIENT-LVL I: CPT | Mod: PBBFAC,,, | Performed by: OPTOMETRIST

## 2017-05-02 PROCEDURE — 92015 DETERMINE REFRACTIVE STATE: CPT | Mod: ,,, | Performed by: OPTOMETRIST

## 2017-05-02 NOTE — LETTER
May 2, 2017      Bandar Ashford MD  [address] [facility] [address] [place] [address]           [facility] - Ophthalmology  [address] [facility] [address] [place] [address]  Phone: [phone]  Fax: [phone]          Patient: Anne Farmer   MR Number: [id number]   YOB: 1955   Date of Visit: 5/2/2017       Dear Dr. Bandar Ashford:    Thank you for referring Anne Farmer to me for evaluation. Attached you will find relevant portions of my assessment and plan of care.    If you have questions, please do not hesitate to call me. I look forward to following Anne Farmer along with you.    Sincerely,    Ajay Dinero, OD    Enclosure  CC:  No Recipients    If you would like to receive this communication electronically, please contact [email or web address] or [phone] to request more information on [facility] [facility] access.    For providers and/or their staff who would like to refer a patient to [facility], please contact us through our one-stop-shop provider referral line, [facility] , at [phone].    If you feel you have received this communication in error or would no longer like to receive these types of communications, please e-mail [email or web address]

## 2017-05-03 LAB — FUNGUS SPEC CULT: NORMAL

## 2017-05-03 RX ORDER — LOSARTAN POTASSIUM 100 MG/1
TABLET ORAL
Qty: 90 TABLET | Refills: 2 | Status: SHIPPED | OUTPATIENT
Start: 2017-05-03 | End: 2018-04-26 | Stop reason: SDUPTHER

## 2017-05-15 ENCOUNTER — OFFICE VISIT (OUTPATIENT)
Dept: FAMILY MEDICINE | Facility: CLINIC | Age: 62
End: 2017-05-15
Payer: MEDICAID

## 2017-05-15 VITALS
WEIGHT: 177.38 LBS | HEIGHT: 66 IN | TEMPERATURE: 98 F | SYSTOLIC BLOOD PRESSURE: 140 MMHG | DIASTOLIC BLOOD PRESSURE: 84 MMHG | BODY MASS INDEX: 28.51 KG/M2 | OXYGEN SATURATION: 97 % | HEART RATE: 64 BPM

## 2017-05-15 DIAGNOSIS — A08.4 VIRAL ENTERITIS: ICD-10-CM

## 2017-05-15 DIAGNOSIS — Z85.038 HISTORY OF COLON CANCER: Primary | ICD-10-CM

## 2017-05-15 DIAGNOSIS — F51.01 PRIMARY INSOMNIA: ICD-10-CM

## 2017-05-15 PROCEDURE — 99213 OFFICE O/P EST LOW 20 MIN: CPT | Mod: S$PBB,,, | Performed by: FAMILY MEDICINE

## 2017-05-15 PROCEDURE — 99999 PR PBB SHADOW E&M-EST. PATIENT-LVL III: CPT | Mod: PBBFAC,,, | Performed by: FAMILY MEDICINE

## 2017-05-15 PROCEDURE — 99213 OFFICE O/P EST LOW 20 MIN: CPT | Mod: PBBFAC,PO | Performed by: FAMILY MEDICINE

## 2017-05-15 RX ORDER — ZOLPIDEM TARTRATE 5 MG/1
5 TABLET ORAL NIGHTLY PRN
Qty: 30 TABLET | Refills: 2 | Status: SHIPPED | OUTPATIENT
Start: 2017-05-15 | End: 2018-04-26 | Stop reason: SDUPTHER

## 2017-05-15 NOTE — MR AVS SNAPSHOT
[facility]  [address]  [facility] [address]  Phone: [phone]  Fax: [phone]                  Anne Farmer   5/15/2017 3:40 PM   Office Visit    Description:  Female : 1955   Provider:  Chiquita Peña MD   Department:  [facility]           Reason for Visit     Diarrhea     Insomnia           Diagnoses this Visit        Comments    History of colon cancer    -  Primary     Viral enteritis         Primary insomnia                To Do List           Future Appointments        Provider Department Dept Phone    10/18/2017 1:50 PM LABORATORY, [facility] [phone]    10/18/2017 2:00 PM ONLH XR1- [facility] [phone]    10/18/2017 2:20 PM PULMONARY LAB, [facility] [phone]    10/18/2017 2:40 PM Bandar Ashford MD [facility] [phone]      Goals (5 Years of Data)     None       These Medications        Disp Refills Start End    zolpidem (AMBIEN) 5 MG Tab 30 tablet 2 5/15/2017 2017    Take 1 tablet (5 mg total) by mouth nightly as needed. - Oral    Pharmacy: [id number] - [place] - [address] AT [facility] Ph #: [phone]         [facility] On Call     [facility] On [facility] Nurse Care Line - 24/ Assistance  Unless otherwise directed by your provider, please contact [facility], our nurse care line that is available for 24/7 assistance.     Registered nurses in the [facility] provide: appointment scheduling, clinical advisement, health education, and other advisory services.  Call: [phone] (toll free)               Medications           Message regarding Medications     Verify the changes and/or additions to your medication regime listed below are the same as discussed with your clinician today.  If any of these changes or additions are incorrect, please  notify your healthcare provider.        START taking these NEW medications        Refills    zolpidem (AMBIEN) 5 MG Tab 2    Sig: Take 1 tablet (5 mg total) by mouth nightly as needed.    Class: Print    Route: Oral      STOP taking these medications     hydroxychloroquine (PLAQUENIL) 200 mg tablet Take 200 mg by mouth once daily.            Verify that the below list of medications is an accurate representation of the medications you are currently taking.  If none reported, the list may be blank. If incorrect, please contact your healthcare provider. Carry this list with you in case of emergency.           Current Medications     amlodipine (NORVASC) 10 MG tablet TAKE 1 TABLET BY MOUTH EVERY DAY    ASCORBIC ACID/VITAMIN E/BIOTIN (HAIR, SKIN, NAILS WITH BIOTIN ORAL) Take by mouth.    aspirin (ECOTRIN) 81 MG EC tablet Take 1 tablet (81 mg total) by mouth once daily.    cetirizine (ZYRTEC) 10 MG tablet Take 10 mg by mouth once daily.    cyclobenzaprine (FLEXERIL) 10 MG tablet TAKE 1 TABLET BY MOUTH 3 TIMES A DAY AS NEEDED FOR MUSCLE SPASMS    fish oil-omega-3 fatty acids 300-1,000 mg capsule Take 2 g by mouth once daily.    fluticasone (FLONASE) 50 mcg/actuation nasal spray SPRAY 2 SPRAYS IN EACH NOSTRIL ONCE TIME A DAY    GARLIC ORAL Take by mouth.    hydrochlorothiazide (HYDRODIURIL) 25 MG tablet TAKE 1 TABLET BY MOUTH EVERY DAY    losartan (COZAAR) 100 MG tablet TAKE 1 TABLET BY MOUTH ONCE DAILY    multivitamin (ONE DAILY MULTIVITAMIN) per tablet Take 1 tablet by mouth once daily.    omeprazole (PRILOSEC) 20 MG capsule Take 1 capsule (20 mg total) by mouth once daily.    pravastatin (PRAVACHOL) 20 MG tablet TAKE 1 TABLET BY MOUTH EVERY DAY    tramadol (ULTRAM) 50 mg tablet TAKE ONE TABLET BY MOUTH THREE TIMES DAILY AFTER A MEAL    UNABLE TO FIND medication name: Ocuvite    zolpidem (AMBIEN) 5 MG Tab Take 1 tablet (5 mg total) by mouth nightly as needed.           Clinical Reference Information           Your Vitals  "Were     BP Pulse Temp Height Weight SpO2    140/84 (BP Location: Left arm, Patient Position: Sitting, BP Method: Manual) 64 98.1 °F (36.7 °C) (Oral) 5' 6" (1.676 m) 80.4 kg (177 lb 5.8 oz) 97%    BMI                28.63 kg/m2          Blood Pressure          Most Recent Value    BP  (!)  140/84      Allergies as of 5/15/2017     No Known Allergies      Immunizations Administered on Date of Encounter - 5/15/2017     None      [facility] Sign-Up     Activating your [facility] account is as easy as 1-2-3!     1) Visit [email or web address], select Sign Up Now, enter this activation code and your date of birth, then select Next.  [email or web address]  Expires: 6/29/2017  4:27 PM      2) Create a username and password to use when you visit [facility] in the future and select a security question in case you lose your password and select Next.    3) Enter your e-mail address and click Sign Up!    Additional Information  If you have questions, please e-mail [email or web address] or call [phone] to talk to our [facility] staff. Remember, [facility] is NOT to be used for urgent needs. For medical emergencies, dial 911.         Language Assistance Services     ATTENTION: Language assistance services are available, free of charge. Please call [phone].      ATENCIÓN: Si habla español, tiene a wilkins disposición servicios gratuitos de asistencia lingüística. Llame al [phone].     [facility] Ý: N?u b?n nói Ti?ng Vi?t, có các d?ch v? h? tr? ngôn ng? mi?n phí dành cho b?n. G?i s? [phone].         [facility] complies with applicable Federal civil rights laws and does not discriminate on the basis of race, color, national origin, age, disability, or sex.        "

## 2017-05-15 NOTE — PROGRESS NOTES
Subjective:       Patient ID: Anne Farmer is a 61 y.o. female.    Chief Complaint: Diarrhea and Insomnia    HPI Comments: 61 y old female with hx of colon Ca(s/p colectomy 3 y ago)  with losses  stools since Saturday  , no sick contacts , no blood in the stool .  No abx exposure , no cramps , no fever . She has  Been taking Pepto bismol . No nausea . No  vomit . Not eating much . She is also having a hard time falling and staying asleep . No stressor , no naps , minimal coffee intake      Diarrhea        Review of Systems   Constitutional: Negative.    HENT: Negative.    Gastrointestinal: Positive for diarrhea.   Psychiatric/Behavioral: Negative.        Objective:      Physical Exam   Constitutional: She is oriented to person, place, and time. She appears well-developed and well-nourished. No distress.   HENT:   Head: Normocephalic and atraumatic.   Right Ear: External ear normal.   Left Ear: External ear normal.   Mouth/Throat: No oropharyngeal exudate.   Eyes: Conjunctivae and EOM are normal. Pupils are equal, round, and reactive to light. Right eye exhibits no discharge. Left eye exhibits no discharge. No scleral icterus.   Neck: Normal range of motion. Neck supple. No JVD present. No tracheal deviation present. No thyromegaly present.   Cardiovascular: Normal rate, regular rhythm and normal heart sounds.  Exam reveals no gallop and no friction rub.    No murmur heard.  Pulmonary/Chest: Effort normal and breath sounds normal. No stridor. No respiratory distress. She has no wheezes. She has no rales. She exhibits no tenderness.   Abdominal: Soft. Bowel sounds are normal. She exhibits no distension. There is no tenderness. There is no rebound and no guarding.   Musculoskeletal: Normal range of motion.   Lymphadenopathy:     She has no cervical adenopathy.   Neurological: She is alert and oriented to person, place, and time.   Skin: Skin is warm and dry. She is not diaphoretic.   Psychiatric: She has a  normal mood and affect. Her behavior is normal. Judgment and thought content normal.       Assessment:     Anne was seen today for diarrhea and insomnia.    Diagnoses and all orders for this visit:    History of colon cancer    Viral enteritis    Primary insomnia    Other orders  -     zolpidem (AMBIEN) 5 MG Tab; Take 1 tablet (5 mg total) by mouth nightly as needed.      Plan:   Get rec. F.u with GI   Symptomatic treatment . Prn f.u   Start Ambien . SE discussed

## 2017-05-24 ENCOUNTER — TELEPHONE (OUTPATIENT)
Dept: SURGERY | Facility: CLINIC | Age: 62
End: 2017-05-24

## 2017-05-24 NOTE — TELEPHONE ENCOUNTER
----- Message from Cash Guerrero sent at 5/24/2017 11:01 AM CDT -----  Contact: pt  She's calling in regards to the critical paperwork dropped of for the dr to complete, please advise, [phone] (home)

## 2017-05-30 LAB
ACID FAST MOD KINY STN SPEC: NORMAL
MYCOBACTERIUM SPEC QL CULT: NORMAL

## 2017-06-30 NOTE — TELEPHONE ENCOUNTER
----- Message from Stephanie Arellano sent at 6/30/2017 11:13 AM CDT -----  Contact: Ayanna with [facility]   Ayanna called and stated she needed to speak to the nurse. She stated she was checking the status of refill requests that were faxed over. She can be reached at [phone].    Thanks,  TF

## 2017-07-03 RX ORDER — AMLODIPINE BESYLATE 10 MG/1
10 TABLET ORAL DAILY
Qty: 30 TABLET | Refills: 0 | Status: SHIPPED | OUTPATIENT
Start: 2017-07-03 | End: 2017-07-06 | Stop reason: SDUPTHER

## 2017-07-03 RX ORDER — HYDROCHLOROTHIAZIDE 25 MG/1
25 TABLET ORAL DAILY
Qty: 30 TABLET | Refills: 0 | Status: SHIPPED | OUTPATIENT
Start: 2017-07-03 | End: 2017-07-06 | Stop reason: SDUPTHER

## 2017-07-03 RX ORDER — PRAVASTATIN SODIUM 20 MG/1
20 TABLET ORAL DAILY
Qty: 30 TABLET | Refills: 0 | Status: SHIPPED | OUTPATIENT
Start: 2017-07-03 | End: 2017-07-06 | Stop reason: SDUPTHER

## 2017-07-06 RX ORDER — HYDROCHLOROTHIAZIDE 25 MG/1
TABLET ORAL
Qty: 90 TABLET | Refills: 1 | Status: SHIPPED | OUTPATIENT
Start: 2017-07-06 | End: 2017-12-07 | Stop reason: SDUPTHER

## 2017-07-06 RX ORDER — AMLODIPINE BESYLATE 10 MG/1
TABLET ORAL
Qty: 90 TABLET | Refills: 1 | Status: SHIPPED | OUTPATIENT
Start: 2017-07-06 | End: 2017-12-07 | Stop reason: SDUPTHER

## 2017-07-06 RX ORDER — PRAVASTATIN SODIUM 20 MG/1
TABLET ORAL
Qty: 90 TABLET | Refills: 1 | Status: SHIPPED | OUTPATIENT
Start: 2017-07-06 | End: 2018-04-26 | Stop reason: SDUPTHER

## 2017-08-15 ENCOUNTER — TELEPHONE (OUTPATIENT)
Dept: FAMILY MEDICINE | Facility: CLINIC | Age: 62
End: 2017-08-15

## 2017-08-15 NOTE — TELEPHONE ENCOUNTER
----- Message from Adela Sam MD sent at 8/14/2017  3:32 PM CDT -----  Patient has h/o colon cancer but looks like she did not follow up for a repeat colonoscopy. I will ask my MA to get her in for a GI appointment to proceed with surveillance colonoscopies.

## 2017-08-18 ENCOUNTER — OFFICE VISIT (OUTPATIENT)
Dept: GASTROENTEROLOGY | Facility: CLINIC | Age: 62
End: 2017-08-18
Payer: MEDICAID

## 2017-08-18 VITALS
SYSTOLIC BLOOD PRESSURE: 144 MMHG | BODY MASS INDEX: 28.14 KG/M2 | HEIGHT: 66 IN | DIASTOLIC BLOOD PRESSURE: 62 MMHG | WEIGHT: 175.06 LBS | HEART RATE: 60 BPM

## 2017-08-18 DIAGNOSIS — Z12.11 COLON CANCER SCREENING: Primary | ICD-10-CM

## 2017-08-18 DIAGNOSIS — Z85.038 HISTORY OF COLON CANCER: ICD-10-CM

## 2017-08-18 PROCEDURE — 99213 OFFICE O/P EST LOW 20 MIN: CPT | Mod: PBBFAC | Performed by: NURSE PRACTITIONER

## 2017-08-18 PROCEDURE — 99999 PR PBB SHADOW E&M-EST. PATIENT-LVL III: CPT | Mod: PBBFAC,,, | Performed by: NURSE PRACTITIONER

## 2017-08-18 PROCEDURE — 99499 UNLISTED E&M SERVICE: CPT | Mod: S$PBB,,, | Performed by: NURSE PRACTITIONER

## 2017-08-18 RX ORDER — SODIUM, POTASSIUM,MAG SULFATES 17.5-3.13G
SOLUTION, RECONSTITUTED, ORAL ORAL
Qty: 354 ML | Refills: 0 | Status: ON HOLD | OUTPATIENT
Start: 2017-08-18 | End: 2017-08-24

## 2017-08-18 NOTE — PROGRESS NOTES
Clinic Consult:  [facility] Consultation [facility]    Reason for Consult:  The primary encounter diagnosis was Colon cancer screening. A diagnosis of History of colon cancer was also pertinent to this visit.    PCP: TOMÁS COX       HPI:  This is a 61 y.o. female here for evaluation of colon cancer screening. She has a history of colon cancer that was diagnosed in 2015 via colonoscopy. That was her last colonoscopy. It revealed a 3mm polyp in the transverse colon and a 15mm polypoid lesion at the hepatic flexure. Pathology was consistent with adenocarcinoma. She had a laparoscopic right hemicolectomy on 11/5/15 with [facility]. Colonoscopy recall in 1 year. She has no family history of colon cancer. She denies any GI complaints at this time. No abdominal pain, hematochezia, melena, nausea, vomiting, or weight loss.     Review of Systems   Constitutional: Negative for fever, malaise/fatigue and weight loss.   HENT: Negative for sore throat.    Respiratory: Negative for cough and wheezing.    Cardiovascular: Negative for chest pain and palpitations.   Gastrointestinal: Negative for abdominal pain, blood in stool, constipation, diarrhea, heartburn, melena, nausea and vomiting.   Genitourinary: Negative for dysuria and frequency.   Musculoskeletal: Negative for back pain, joint pain, myalgias and neck pain.   Skin: Negative for itching and rash.   Neurological: Negative for dizziness, speech change, seizures, loss of consciousness and headaches.   Psychiatric/Behavioral: Negative for depression and substance abuse. The patient is not nervous/anxious.        Medical History:  has a past medical history of Allergy; Arthritis; CHF (congestive heart failure); Colon cancer screening (2015); Diabetes mellitus type 2 in nonobese (3/9/2016); Diverticulosis; Hyperlipidemia (10/25/2016); Hypertension; and Insomnia.    Surgical History:  has a past surgical history that includes   section and Colonoscopy (N/A, 9/21/2015).    Family History: family history includes Diabetes in her mother; Hypertension in her brother, brother, brother, father, mother, sister, sister, sister, and sister..     Social History:  reports that she has never smoked. She has never used smokeless tobacco. She reports that she drinks alcohol. She reports that she does not use drugs.    Allergies: Reviewed    Home Medications:   Current Outpatient Prescriptions on File Prior to Visit   Medication Sig Dispense Refill    amlodipine (NORVASC) 10 MG tablet TAKE 1 TABLET BY MOUTH EVERY DAY 90 tablet 1    aspirin (ECOTRIN) 81 MG EC tablet Take 1 tablet (81 mg total) by mouth once daily. 30 tablet 0    cetirizine (ZYRTEC) 10 MG tablet Take 10 mg by mouth once daily.      cyclobenzaprine (FLEXERIL) 10 MG tablet TAKE 1 TABLET BY MOUTH 3 TIMES A DAY AS NEEDED FOR MUSCLE SPASMS 30 tablet 2    fish oil-omega-3 fatty acids 300-1,000 mg capsule Take 2 g by mouth once daily.      fluticasone (FLONASE) 50 mcg/actuation nasal spray SPRAY 2 SPRAYS IN EACH NOSTRIL ONCE TIME A DAY 16 g 1    GARLIC ORAL Take by mouth.      hydrochlorothiazide (HYDRODIURIL) 25 MG tablet TAKE 1 TABLET BY MOUTH EVERY DAY 90 tablet 1    losartan (COZAAR) 100 MG tablet TAKE 1 TABLET BY MOUTH ONCE DAILY 90 tablet 2    multivitamin (ONE DAILY MULTIVITAMIN) per tablet Take 1 tablet by mouth once daily.      omeprazole (PRILOSEC) 20 MG capsule Take 1 capsule (20 mg total) by mouth once daily. 30 capsule 11    pravastatin (PRAVACHOL) 20 MG tablet TAKE 1 TABLET BY MOUTH EVERY DAY 90 tablet 1    tramadol (ULTRAM) 50 mg tablet TAKE ONE TABLET BY MOUTH THREE TIMES DAILY AFTER A MEAL 90 tablet 2    zolpidem (AMBIEN) 5 MG Tab Take 1 tablet (5 mg total) by mouth nightly as needed. 30 tablet 2    [DISCONTINUED] ASCORBIC ACID/VITAMIN E/BIOTIN (HAIR, SKIN, NAILS WITH BIOTIN ORAL) Take by mouth.      [DISCONTINUED] UNABLE TO FIND medication name: Ocuvite       No  "current facility-administered medications on file prior to visit.        Physical Exam:  Vital Signs:  BP (!) 144/62   Pulse 60   Ht 5' 6" (1.676 m)   Wt 79.4 kg (175 lb 0.7 oz)   BMI 28.25 kg/m²   Body mass index is 28.25 kg/m².  Physical Exam   Constitutional: She is oriented to person, place, and time and well-developed, well-nourished, and in no distress. No distress.   HENT:   Head: Normocephalic.   Eyes: Conjunctivae are normal. Pupils are equal, round, and reactive to light.   Cardiovascular: Normal rate, regular rhythm and normal heart sounds.    Pulmonary/Chest: Effort normal and breath sounds normal. No respiratory distress.   Abdominal: Soft. Bowel sounds are normal. She exhibits no distension. There is no tenderness.   Neurological: She is alert and oriented to person, place, and time. No cranial nerve deficit.   Skin: Skin is warm and dry. No rash noted.   Psychiatric: Mood and affect normal.       Labs: Pertinent labs reviewed.     CRC Screening: September 2015    Assessment:  1. Colon cancer screening    2. History of colon cancer           Recommendations:  Due for screening colonoscopy. High risk surveillance.   -     Case request GI: COLONOSCOPY  -     SUPREP BOWEL PREP KIT 17.5-3.13-1.6 gram SolR; Use as directed  Dispense: 354 mL; Refill: 0    Follow up to be determined after procedure.    Thank you so much for allowing me to participate in the care of ALVIN Almanza  "

## 2017-08-22 ENCOUNTER — TELEPHONE (OUTPATIENT)
Dept: GASTROENTEROLOGY | Facility: CLINIC | Age: 62
End: 2017-08-22

## 2017-08-22 NOTE — TELEPHONE ENCOUNTER
----- Message from Maxine Esparza sent at 8/22/2017  3:07 PM CDT -----  Contact: ani/aj  Would like to speak to nurse about rx medication not being covered under insurance. Please call back at [phone]. thanks

## 2017-08-22 NOTE — TELEPHONE ENCOUNTER
----- Message from Maxine Esparza sent at 8/22/2017  3:07 PM CDT -----  Contact: ain/aj  Would like to speak to nurse about rx medication not being covered under insurance. Please call back at [phone]. thanks

## 2017-08-23 RX ORDER — POLYETHYLENE GLYCOL 3350, SODIUM SULFATE ANHYDROUS, SODIUM BICARBONATE, SODIUM CHLORIDE, POTASSIUM CHLORIDE 236; 22.74; 6.74; 5.86; 2.97 G/4L; G/4L; G/4L; G/4L; G/4L
POWDER, FOR SOLUTION ORAL
Qty: 4000 ML | Refills: 0 | Status: ON HOLD | OUTPATIENT
Start: 2017-08-23 | End: 2017-08-24

## 2017-08-24 ENCOUNTER — SURGERY (OUTPATIENT)
Age: 62
End: 2017-08-24

## 2017-08-24 ENCOUNTER — TELEPHONE (OUTPATIENT)
Dept: FAMILY MEDICINE | Facility: CLINIC | Age: 62
End: 2017-08-24

## 2017-08-24 ENCOUNTER — HOSPITAL ENCOUNTER (OUTPATIENT)
Facility: HOSPITAL | Age: 62
Discharge: HOME | End: 2017-08-24
Attending: INTERNAL MEDICINE | Admitting: INTERNAL MEDICINE
Payer: MEDICAID

## 2017-08-24 ENCOUNTER — ANESTHESIA EVENT (OUTPATIENT)
Dept: ENDOSCOPY | Facility: HOSPITAL | Age: 62
End: 2017-08-24
Payer: MEDICAID

## 2017-08-24 ENCOUNTER — ANESTHESIA (OUTPATIENT)
Dept: ENDOSCOPY | Facility: HOSPITAL | Age: 62
End: 2017-08-24
Payer: MEDICAID

## 2017-08-24 VITALS
RESPIRATION RATE: 17 BRPM | OXYGEN SATURATION: 97 % | DIASTOLIC BLOOD PRESSURE: 71 MMHG | BODY MASS INDEX: 28.28 KG/M2 | SYSTOLIC BLOOD PRESSURE: 139 MMHG | HEART RATE: 54 BPM | RESPIRATION RATE: 16 BRPM | HEIGHT: 66 IN | TEMPERATURE: 98 F | WEIGHT: 176 LBS

## 2017-08-24 DIAGNOSIS — Z86.010 PERSONAL HISTORY OF COLONIC POLYPS: ICD-10-CM

## 2017-08-24 DIAGNOSIS — D12.4 BENIGN NEOPLASM OF DESCENDING COLON: ICD-10-CM

## 2017-08-24 DIAGNOSIS — Z85.038 HISTORY OF COLON CANCER, STAGE I: Primary | Chronic | ICD-10-CM

## 2017-08-24 DIAGNOSIS — K57.30 DIVERTICULOSIS OF LARGE INTESTINE WITHOUT HEMORRHAGE: ICD-10-CM

## 2017-08-24 PROBLEM — Z86.0100 PERSONAL HISTORY OF COLONIC POLYPS: Status: ACTIVE | Noted: 2017-08-24

## 2017-08-24 PROCEDURE — 25000003 PHARM REV CODE 250: Performed by: INTERNAL MEDICINE

## 2017-08-24 PROCEDURE — 37000009 HC ANESTHESIA EA ADD 15 MINS: Performed by: INTERNAL MEDICINE

## 2017-08-24 PROCEDURE — 88305 TISSUE EXAM BY PATHOLOGIST: CPT | Mod: 26,,, | Performed by: PATHOLOGY

## 2017-08-24 PROCEDURE — 45380 COLONOSCOPY AND BIOPSY: CPT | Performed by: INTERNAL MEDICINE

## 2017-08-24 PROCEDURE — 37000008 HC ANESTHESIA 1ST 15 MINUTES: Performed by: INTERNAL MEDICINE

## 2017-08-24 PROCEDURE — 27201012 HC FORCEPS, HOT/COLD, DISP: Performed by: INTERNAL MEDICINE

## 2017-08-24 PROCEDURE — 88305 TISSUE EXAM BY PATHOLOGIST: CPT | Performed by: PATHOLOGY

## 2017-08-24 PROCEDURE — 45380 COLONOSCOPY AND BIOPSY: CPT | Mod: ,,, | Performed by: INTERNAL MEDICINE

## 2017-08-24 RX ORDER — SODIUM CHLORIDE, SODIUM LACTATE, POTASSIUM CHLORIDE, CALCIUM CHLORIDE 600; 310; 30; 20 MG/100ML; MG/100ML; MG/100ML; MG/100ML
INJECTION, SOLUTION INTRAVENOUS CONTINUOUS
Status: DISCONTINUED | OUTPATIENT
Start: 2017-08-24 | End: 2017-08-24 | Stop reason: HOSPADM

## 2017-08-24 RX ADMIN — SODIUM CHLORIDE, SODIUM LACTATE, POTASSIUM CHLORIDE, AND CALCIUM CHLORIDE: 600; 310; 30; 20 INJECTION, SOLUTION INTRAVENOUS at 01:08

## 2017-08-24 RX ADMIN — SODIUM CHLORIDE, SODIUM LACTATE, POTASSIUM CHLORIDE, AND CALCIUM CHLORIDE: 600; 310; 30; 20 INJECTION, SOLUTION INTRAVENOUS at 02:08

## 2017-08-24 NOTE — TRANSFER OF CARE
"Anesthesia Transfer of Care Note    Patient: Anne Farmer    Procedure(s) Performed: Procedure(s) (LRB):  COLONOSCOPY (N/A)    Patient location: PACU    Anesthesia Type: MAC    Transport from OR: Transported from OR on room air with adequate spontaneous ventilation    Post pain: adequate analgesia    Post assessment: no apparent anesthetic complications and tolerated procedure well    Post vital signs: stable    Level of consciousness: awake and responds to stimulation    Nausea/Vomiting: no nausea/vomiting    Complications: none    Transfer of care protocol was followed      Last vitals:   Visit Vitals  BP (!) 128/55   Pulse (!) 51   Temp 36.6 °C (97.9 °F) (Oral)   Resp 14   Ht 5' 6" (1.676 m)   Wt 79.8 kg (176 lb)   SpO2 97%   Breastfeeding? No   BMI 28.41 kg/m²     "

## 2017-08-24 NOTE — ANESTHESIA RELEASE NOTE
"Anesthesia Release from PACU Note    Patient: Anne Farmer    Procedure(s) Performed: Procedure(s) (LRB):  COLONOSCOPY (N/A)    Anesthesia type: MAC    Post pain: Adequate analgesia    Post assessment: no apparent anesthetic complications, tolerated procedure well and no evidence of recall    Last Vitals:   Visit Vitals  BP (!) 128/55   Pulse (!) 51   Temp 36.6 °C (97.9 °F) (Oral)   Resp 14   Ht 5' 6" (1.676 m)   Wt 79.8 kg (176 lb)   SpO2 97%   Breastfeeding? No   BMI 28.41 kg/m²       Post vital signs: stable    Level of consciousness: awake, alert  and oriented    Nausea/Vomiting: no nausea/no vomiting    Complications: none    Airway Patency: patent    Respiratory: unassisted, spontaneous ventilation, room air    Cardiovascular: stable and blood pressure at baseline    Hydration: euvolemic  "

## 2017-08-24 NOTE — H&P (VIEW-ONLY)
Clinic Consult:  [facility] Consultation Note    Reason for Consult:  The primary encounter diagnosis was Colon cancer screening. A diagnosis of History of colon cancer was also pertinent to this visit.    PCP: TOMÁS COX       HPI:  This is a 61 y.o. female here for evaluation of colon cancer screening. She has a history of colon cancer that was diagnosed in 2015 via colonoscopy. That was her last colonoscopy. It revealed a 3mm polyp in the transverse colon and a 15mm polypoid lesion at the hepatic flexure. Pathology was consistent with adenocarcinoma. She had a laparoscopic right hemicolectomy on 11/5/15 with [facility]. Colonoscopy recall in 1 year. She has no family history of colon cancer. She denies any GI complaints at this time. No abdominal pain, hematochezia, melena, nausea, vomiting, or weight loss.     Review of Systems   Constitutional: Negative for fever, malaise/fatigue and weight loss.   HENT: Negative for sore throat.    Respiratory: Negative for cough and wheezing.    Cardiovascular: Negative for chest pain and palpitations.   Gastrointestinal: Negative for abdominal pain, blood in stool, constipation, diarrhea, heartburn, melena, nausea and vomiting.   Genitourinary: Negative for dysuria and frequency.   Musculoskeletal: Negative for back pain, joint pain, myalgias and neck pain.   Skin: Negative for itching and rash.   Neurological: Negative for dizziness, speech change, seizures, loss of consciousness and headaches.   Psychiatric/Behavioral: Negative for depression and substance abuse. The patient is not nervous/anxious.        Medical History:  has a past medical history of Allergy; Arthritis; CHF (congestive heart failure); Colon cancer screening (2015); Diabetes mellitus type 2 in nonobese (3/9/2016); Diverticulosis; Hyperlipidemia (10/25/2016); Hypertension; and Insomnia.    Surgical History:  has a past surgical history that includes   section and Colonoscopy (N/A, 9/21/2015).    Family History: family history includes Diabetes in her mother; Hypertension in her brother, brother, brother, father, mother, sister, sister, sister, and sister..     Social History:  reports that she has never smoked. She has never used smokeless tobacco. She reports that she drinks alcohol. She reports that she does not use drugs.    Allergies: Reviewed    Home Medications:   Current Outpatient Prescriptions on File Prior to Visit   Medication Sig Dispense Refill    amlodipine (NORVASC) 10 MG tablet TAKE 1 TABLET BY MOUTH EVERY DAY 90 tablet 1    aspirin (ECOTRIN) 81 MG EC tablet Take 1 tablet (81 mg total) by mouth once daily. 30 tablet 0    cetirizine (ZYRTEC) 10 MG tablet Take 10 mg by mouth once daily.      cyclobenzaprine (FLEXERIL) 10 MG tablet TAKE 1 TABLET BY MOUTH 3 TIMES A DAY AS NEEDED FOR MUSCLE SPASMS 30 tablet 2    fish oil-omega-3 fatty acids 300-1,000 mg capsule Take 2 g by mouth once daily.      fluticasone (FLONASE) 50 mcg/actuation nasal spray SPRAY 2 SPRAYS IN EACH NOSTRIL ONCE TIME A DAY 16 g 1    GARLIC ORAL Take by mouth.      hydrochlorothiazide (HYDRODIURIL) 25 MG tablet TAKE 1 TABLET BY MOUTH EVERY DAY 90 tablet 1    losartan (COZAAR) 100 MG tablet TAKE 1 TABLET BY MOUTH ONCE DAILY 90 tablet 2    multivitamin (ONE DAILY MULTIVITAMIN) per tablet Take 1 tablet by mouth once daily.      omeprazole (PRILOSEC) 20 MG capsule Take 1 capsule (20 mg total) by mouth once daily. 30 capsule 11    pravastatin (PRAVACHOL) 20 MG tablet TAKE 1 TABLET BY MOUTH EVERY DAY 90 tablet 1    tramadol (ULTRAM) 50 mg tablet TAKE ONE TABLET BY MOUTH THREE TIMES DAILY AFTER A MEAL 90 tablet 2    zolpidem (AMBIEN) 5 MG Tab Take 1 tablet (5 mg total) by mouth nightly as needed. 30 tablet 2    [DISCONTINUED] ASCORBIC ACID/VITAMIN E/BIOTIN (HAIR, SKIN, NAILS WITH BIOTIN ORAL) Take by mouth.      [DISCONTINUED] UNABLE TO FIND medication name: Ocuvite       No  "current facility-administered medications on file prior to visit.        Physical Exam:  Vital Signs:  BP (!) 144/62   Pulse 60   Ht 5' 6" (1.676 m)   Wt 79.4 kg (175 lb 0.7 oz)   BMI 28.25 kg/m²   Body mass index is 28.25 kg/m².  Physical Exam   Constitutional: She is oriented to person, place, and time and well-developed, well-nourished, and in no distress. No distress.   HENT:   Head: Normocephalic.   Eyes: Conjunctivae are normal. Pupils are equal, round, and reactive to light.   Cardiovascular: Normal rate, regular rhythm and normal heart sounds.    Pulmonary/Chest: Effort normal and breath sounds normal. No respiratory distress.   Abdominal: Soft. Bowel sounds are normal. She exhibits no distension. There is no tenderness.   Neurological: She is alert and oriented to person, place, and time. No cranial nerve deficit.   Skin: Skin is warm and dry. No rash noted.   Psychiatric: Mood and affect normal.       Labs: Pertinent labs reviewed.     CRC Screening: September 2015    Assessment:  1. Colon cancer screening    2. History of colon cancer           Recommendations:  Due for screening colonoscopy. High risk surveillance.   -     Case request GI: COLONOSCOPY  -     SUPREP BOWEL PREP KIT 17.5-3.13-1.6 gram SolR; Use as directed  Dispense: 354 mL; Refill: 0    Follow up to be determined after procedure.    Thank you so much for allowing me to participate in the care of ALVIN Almanza  "

## 2017-08-24 NOTE — ANESTHESIA PREPROCEDURE EVALUATION
08/24/2017  Anne Farmer is a 61 y.o., female.    Pre-op Assessment    I have reviewed the Patient Summary Reports.     I have reviewed the Nursing Notes.   I have reviewed the Medications.     Review of Systems  Anesthesia Hx:  No problems with previous Anesthesia  History of prior surgery of interest to airway management or planning: Denies Family Hx of Anesthesia complications.   Denies Personal Hx of Anesthesia complications.   Social:  Non-Smoker    Hematology/Oncology:         -- Cancer in past history:   Cardiovascular:   Hypertension, well controlled CHF hyperlipidemia ECG has been reviewed. Echo 2014  CONCLUSIONS     1 - Concentric hypertrophy.     2 - Normal left ventricular systolic function (EF 60-65%).     3 - Normal left ventricular diastolic function.     4 - Right atrial enlargement.     5 - Normal right ventricular systolic function .     6 - Pulmonary hypertension.     7 - Mild aortic regurgitation.     8 - Mild mitral regurgitation.     9 - Moderate tricuspid regurgitation.     10 - Mild to moderate pulmonic regurgitation.      Congestive Heart Failure (CHF) , Chronic Congestive Heart Failure , compensated, on chronic Rx, no recent change EF 60-65% moderate tricuspid and pulmonic regurgitation mild aortic and mitral regurge Hypertension    Pulmonary:  Pulmonary Normal    Hepatic/GI:   Bowel Prep. Hepatitis, C    Musculoskeletal:   Arthritis     Endocrine:   Diabetes, type 2        Physical Exam  General:  Well nourished, Obesity    Airway/Jaw/Neck:  Airway Findings: Mouth Opening: Normal General Airway Assessment: Adult  Mallampati: II  Improves to II with phonation.  TM Distance: Normal, at least 6 cm  Jaw/Neck Findings:  Neck ROM: Normal ROM      Dental:  Dental Findings: upper partial dentures   Chest/Lungs:  Chest/Lungs Findings: Clear to auscultation, Normal Respiratory Rate      Heart/Vascular:  Heart Findings: Rate: Normal  Rhythm: Regular Rhythm  Heart Murmur  Systolic  Systolic Heart Murmur Description: R Upper Sternal Border, Ejection Type  Systolic Heart Murmur Grade: Grade III     Abdomen:  Abdomen Findings:  Normal       Mental Status:  Mental Status Findings:  Cooperative, Alert and Oriented         Anesthesia Plan  Type of Anesthesia, risks & benefits discussed:  Anesthesia Type:  MAC  Patient's Preference:   Intra-op Monitoring Plan:   Intra-op Monitoring Plan Comments:   Post Op Pain Control Plan:   Post Op Pain Control Plan Comments:   Induction:   IV  Beta Blocker:  Patient is not currently on a Beta-Blocker (No further documentation required).       Informed Consent: Patient understands risks and agrees with Anesthesia plan.  Questions answered. Anesthesia consent signed with patient.  ASA Score: 3     Day of Surgery Review of History & Physical: I have interviewed and examined the patient. I have reviewed the patient's H&P dated:  There are no significant changes.          Ready For Surgery From Anesthesia Perspective.

## 2017-08-24 NOTE — ANESTHESIA POSTPROCEDURE EVALUATION
"Anesthesia Post Evaluation    Patient: Anne Farmer    Procedure(s) Performed: Procedure(s) (LRB):  COLONOSCOPY (N/A)    Final Anesthesia Type: MAC  Patient location during evaluation: PACU  Patient participation: Yes- Able to Participate  Level of consciousness: awake and alert and oriented  Post-procedure vital signs: reviewed and stable  Pain management: adequate  Airway patency: patent  PONV status at discharge: No PONV  Anesthetic complications: no      Cardiovascular status: blood pressure returned to baseline, hemodynamically stable and stable  Respiratory status: unassisted, room air and spontaneous ventilation  Hydration status: euvolemic  Follow-up not needed.        Visit Vitals  BP (!) 128/55   Pulse (!) 51   Temp 36.6 °C (97.9 °F) (Oral)   Resp 14   Ht 5' 6" (1.676 m)   Wt 79.8 kg (176 lb)   SpO2 97%   Breastfeeding? No   BMI 28.41 kg/m²       Pain/Bernadette Score: Pain Assessment Performed: Yes (8/24/2017  1:06 PM)  Presence of Pain: non-verbal indicators absent (8/24/2017  2:32 PM)  Bernadette Score: 7 (8/24/2017  2:32 PM)      "

## 2017-08-24 NOTE — TELEPHONE ENCOUNTER
----- Message from Prerna Whatley sent at 8/24/2017 10:32 AM CDT -----  Contact: Pt   Pt called and requested to be seen today, pt states she has had a sinus headache for the longest, callback number is [phone]

## 2017-08-24 NOTE — INTERVAL H&P NOTE
The patient has been examined and the H&P has been reviewed:    I concur with the findings and no changes have occurred since H&P was written.    Anesthesia/Surgery risks, benefits and alternative options discussed and understood by patient/family.          Active Hospital Problems    Diagnosis  POA    *History of colon cancer, stage I [Z85.038]  Yes     Chronic      Resolved Hospital Problems    Diagnosis Date Resolved POA   No resolved problems to display.

## 2017-08-24 NOTE — DISCHARGE INSTRUCTIONS
If you develop fevers, severe abdominal pain, significant bleeding, nausea or vomiting, please contact us or come to our Emergency Department at [facility].  Our  contact number is [phone] available for emergencies or any question that you may have.      You may return to normal activities tomorrow.  Written discharge instructions were provided to you today and have them handy since you may not remember our conversation today.       I also recommend that you follow a high fiber diet and take calcium supplements daily as well as to continue your present medications.      If you take Aspirin, please resume taking it at your prior dose today. If we obtained biopsies or removed polyps during your procedure, we will contact you within 5 to 6 days with the results.      Thanks for trusting us with your healthcare needs.      Sincerely,     Chnitan Flores M.D.        To rate your experience with Dr. Flores, please click on the link below     [email or web address]

## 2017-08-24 NOTE — DISCHARGE SUMMARY
Endoscopy Discharge Summary      Admit Date: 8/24/2017    Discharge Date and Time:  8/24/2017 2:30 PM    Attending Physician: Chintan Flores MD     Discharge Physician: Chintan Flores MD     Principal Admitting Diagnoses: History of colon cancer, stage I         Discharge Diagnosis: The primary encounter diagnosis was History of colon cancer, stage I. Diagnoses of Personal history of colonic polyps, Diverticulosis of large intestine without hemorrhage, and Benign neoplasm of descending colon were also pertinent to this visit.     Discharged Condition: Good    Indication for Admission: History of colon cancer, stage I     Hospital Course: Patient was admitted for an inpatient procedure and tolerated the procedure well with no complications.    Significant Diagnostic Studies:  COLON    Pathology (if any):  Specimen (12h ago through future)    Start     Ordered    08/24/17 1421  Specimen to Pathology - Surgery  Once     Comments:  #1 colon polyp      08/24/17 1426          Disposition: Home.    Bleeding: None    No Implants         Follow Up/Patient Instructions:   Current Discharge Medication List      CONTINUE these medications which have NOT CHANGED    Details   amlodipine (NORVASC) 10 MG tablet TAKE 1 TABLET BY MOUTH EVERY DAY  Qty: 90 tablet, Refills: 1      cetirizine (ZYRTEC) 10 MG tablet Take 10 mg by mouth once daily.      cyclobenzaprine (FLEXERIL) 10 MG tablet TAKE 1 TABLET BY MOUTH 3 TIMES A DAY AS NEEDED FOR MUSCLE SPASMS  Qty: 30 tablet, Refills: 2    Associated Diagnoses: Arthritis; Multiple joint pain      fish oil-omega-3 fatty acids 300-1,000 mg capsule Take 2 g by mouth once daily.      fluticasone (FLONASE) 50 mcg/actuation nasal spray SPRAY 2 SPRAYS IN EACH NOSTRIL ONCE TIME A DAY  Qty: 16 g, Refills: 1    Associated Diagnoses: Acute sinusitis, recurrence not specified, unspecified location      GARLIC ORAL Take by mouth.      hydrochlorothiazide (HYDRODIURIL) 25 MG tablet TAKE 1 TABLET BY MOUTH  EVERY DAY  Qty: 90 tablet, Refills: 1      losartan (COZAAR) 100 MG tablet TAKE 1 TABLET BY MOUTH ONCE DAILY  Qty: 90 tablet, Refills: 2      multivitamin (ONE DAILY MULTIVITAMIN) per tablet Take 1 tablet by mouth once daily.      omeprazole (PRILOSEC) 20 MG capsule Take 1 capsule (20 mg total) by mouth once daily.  Qty: 30 capsule, Refills: 11      pravastatin (PRAVACHOL) 20 MG tablet TAKE 1 TABLET BY MOUTH EVERY DAY  Qty: 90 tablet, Refills: 1      zolpidem (AMBIEN) 5 MG Tab Take 1 tablet (5 mg total) by mouth nightly as needed.  Qty: 30 tablet, Refills: 2      aspirin (ECOTRIN) 81 MG EC tablet Take 1 tablet (81 mg total) by mouth once daily.  Qty: 30 tablet, Refills: 0    Associated Diagnoses: Essential hypertension; Diabetes mellitus type 2 in nonobese      tramadol (ULTRAM) 50 mg tablet TAKE ONE TABLET BY MOUTH THREE TIMES DAILY AFTER A MEAL  Qty: 90 tablet, Refills: 2               Discharge Procedure Orders  Diet general     Activity as tolerated     Call MD for:  temperature >100.4     Call MD for:  persistent nausea and vomiting     Call MD for:  severe uncontrolled pain     Call MD for:  difficulty breathing, headache or visual disturbances     Call MD for:  redness, tenderness, or signs of infection (pain, swelling, redness, odor or green/yellow discharge around incision site)     Call MD for:  hives     Call MD for:  persistent dizziness or light-headedness     No dressing needed         Follow-up Information     TOMÁS COX MD.    Specialty:  Family Medicine  Why:  As needed  Contact information:  [address] [place] [id number] [phone]

## 2017-09-18 RX ORDER — OMEPRAZOLE 20 MG/1
CAPSULE, DELAYED RELEASE ORAL
Qty: 30 CAPSULE | Refills: 11 | Status: SHIPPED | OUTPATIENT
Start: 2017-09-18 | End: 2018-01-16 | Stop reason: SDUPTHER

## 2017-09-20 ENCOUNTER — OFFICE VISIT (OUTPATIENT)
Dept: FAMILY MEDICINE | Facility: CLINIC | Age: 62
End: 2017-09-20
Payer: MEDICAID

## 2017-09-20 ENCOUNTER — TELEPHONE (OUTPATIENT)
Dept: FAMILY MEDICINE | Facility: CLINIC | Age: 62
End: 2017-09-20

## 2017-09-20 DIAGNOSIS — J06.9 UPPER RESPIRATORY TRACT INFECTION, UNSPECIFIED TYPE: Primary | ICD-10-CM

## 2017-09-20 PROCEDURE — 96372 THER/PROPH/DIAG INJ SC/IM: CPT | Mod: PBBFAC,PO

## 2017-09-20 PROCEDURE — 99999 PR PBB SHADOW E&M-EST. PATIENT-LVL II: CPT | Mod: PBBFAC,,, | Performed by: NURSE PRACTITIONER

## 2017-09-20 PROCEDURE — 3008F BODY MASS INDEX DOCD: CPT | Mod: ,,, | Performed by: NURSE PRACTITIONER

## 2017-09-20 PROCEDURE — 3078F DIAST BP <80 MM HG: CPT | Mod: ,,, | Performed by: NURSE PRACTITIONER

## 2017-09-20 PROCEDURE — 99213 OFFICE O/P EST LOW 20 MIN: CPT | Mod: S$PBB,,, | Performed by: NURSE PRACTITIONER

## 2017-09-20 PROCEDURE — 3077F SYST BP >= 140 MM HG: CPT | Mod: ,,, | Performed by: NURSE PRACTITIONER

## 2017-09-20 PROCEDURE — 99212 OFFICE O/P EST SF 10 MIN: CPT | Mod: PBBFAC,PO | Performed by: NURSE PRACTITIONER

## 2017-09-20 RX ADMIN — METHYLPREDNISOLONE ACETATE 40 MG: 40 INJECTION, SUSPENSION INTRALESIONAL; INTRAMUSCULAR; INTRASYNOVIAL; SOFT TISSUE at 02:09

## 2017-09-20 NOTE — PROGRESS NOTES
CC: No chief complaint on file.    HPI: This is a new problem.   Anne Farmer is a 61 y.o. female with a complaint of URI.  The current episode started in the past 4 days.   The problem has been gradually worsening.   Associated symptoms included nasal congestion, rhinorrhea.    Pertinent negatives include fever, chills, dyspnea, wheezing   Treatments tried: zyrtec has been used and this has provided no relief.     [unfilled]  Outpatient Medications Prior to Visit   Medication Sig Dispense Refill    amlodipine (NORVASC) 10 MG tablet TAKE 1 TABLET BY MOUTH EVERY DAY 90 tablet 1    aspirin (ECOTRIN) 81 MG EC tablet Take 1 tablet (81 mg total) by mouth once daily. 30 tablet 0    cetirizine (ZYRTEC) 10 MG tablet Take 10 mg by mouth once daily.      cyclobenzaprine (FLEXERIL) 10 MG tablet TAKE 1 TABLET BY MOUTH 3 TIMES A DAY AS NEEDED FOR MUSCLE SPASMS 30 tablet 2    fluticasone (FLONASE) 50 mcg/actuation nasal spray SPRAY 2 SPRAYS IN EACH NOSTRIL ONCE TIME A DAY 16 g 1    hydrochlorothiazide (HYDRODIURIL) 25 MG tablet TAKE 1 TABLET BY MOUTH EVERY DAY 90 tablet 1    losartan (COZAAR) 100 MG tablet TAKE 1 TABLET BY MOUTH ONCE DAILY 90 tablet 2    multivitamin (ONE DAILY MULTIVITAMIN) per tablet Take 1 tablet by mouth once daily.      omeprazole (PRILOSEC) 20 MG capsule TAKE 1 CAPSULE BY MOUTH DAILY 30 capsule 11    pravastatin (PRAVACHOL) 20 MG tablet TAKE 1 TABLET BY MOUTH EVERY DAY 90 tablet 1    tramadol (ULTRAM) 50 mg tablet TAKE ONE TABLET BY MOUTH THREE TIMES DAILY AFTER A MEAL 90 tablet 2    zolpidem (AMBIEN) 5 MG Tab Take 1 tablet (5 mg total) by mouth nightly as needed. 30 tablet 2     No facility-administered medications prior to visit.         Physical Exam   There were no vitals taken for this visit.  Constitutional: The patient appears well-developed and well-nourished.   Head: Normocephalic and atraumatic.   Right Ear: Tympanic membrane and ear canal normal. No drainage, swelling or  tenderness. Tympanic membrane is not injected, not erythematous and not bulging.   Left Ear: Ear canal normal. No drainage, swelling or tenderness. Tympanic membrane is not injected, not erythematous and not bulging.   Nose: Mucosal edema and rhinorrhea present. No sinus tenderness on palpation  Mouth/Throat: Uvula is midline. Posterior oropharyngeal erythema present. No oropharyngeal exudate.        THE MUCOSA IS BOGGY AND ERYTHEMATOUS.     Eyes: Conjunctivae normal and lids are normal. Pupils are equal, round, and reactive to light. Right eye exhibits no discharge. Left eye exhibits no discharge. Right eye exhibits normal extraocular motion. Left eye exhibits normal extraocular motion.   Neck: Trachea normal and normal range of motion. Neck supple. No tracheal tenderness present. No mass and no thyromegaly present.   Cardiovascular: Normal rate, regular rhythm, S1 normal, S2 normal and normal heart sounds.  Exam reveals no gallop, no S3, no S4 and no friction rub.    No murmur heard.  Pulmonary/Chest: Effort normal and breath sounds normal. No stridor. Not tachypneic. No respiratory distress. The patient has no wheezes. The patient has no rhonchi. The patient has no rales.   Skin: The patient is not diaphoretic.     No diagnosis found.    PLAN:    Upper respiratory tract infection, viral    -continue zyrtec.  Pt will purchase otc flonase.    Depo medrol 40mg IM given  There are no diagnoses linked to this encounter.     No orders of the defined types were placed in this encounter.    RTC if symptoms are worsening or changing significantly or if not improved by the end of therapy.

## 2017-09-20 NOTE — TELEPHONE ENCOUNTER
----- Message from David Butler sent at 9/20/2017 10:35 AM CDT -----  Contact: Pt  Pt is requesting to be seen today for her sinuses./ Pt can be reached at [phone] ([email or web address])

## 2017-09-22 ENCOUNTER — CLINICAL SUPPORT (OUTPATIENT)
Dept: FAMILY MEDICINE | Facility: CLINIC | Age: 62
End: 2017-09-22
Payer: MEDICAID

## 2017-09-22 DIAGNOSIS — Z23 IMMUNIZATION DUE: Primary | ICD-10-CM

## 2017-09-22 PROCEDURE — 99999 PR PBB SHADOW E&M-EST. PATIENT-LVL II: CPT | Mod: PBBFAC,,,

## 2017-09-22 PROCEDURE — 90688 IIV4 VACCINE SPLT 0.5 ML IM: CPT | Mod: PBBFAC,PO

## 2017-09-22 PROCEDURE — 99212 OFFICE O/P EST SF 10 MIN: CPT | Mod: PBBFAC,PO

## 2017-10-17 ENCOUNTER — TELEPHONE (OUTPATIENT)
Dept: PULMONOLOGY | Facility: CLINIC | Age: 62
End: 2017-10-17

## 2017-11-03 ENCOUNTER — PATIENT OUTREACH (OUTPATIENT)
Dept: ADMINISTRATIVE | Facility: HOSPITAL | Age: 62
End: 2017-11-03

## 2017-11-10 DIAGNOSIS — E11.9 TYPE 2 DIABETES MELLITUS WITHOUT COMPLICATION: ICD-10-CM

## 2017-12-06 DIAGNOSIS — M19.90 ARTHRITIS: ICD-10-CM

## 2017-12-06 DIAGNOSIS — M25.50 MULTIPLE JOINT PAIN: ICD-10-CM

## 2017-12-06 RX ORDER — ZOLPIDEM TARTRATE 5 MG/1
TABLET ORAL
Qty: 30 TABLET | OUTPATIENT
Start: 2017-12-06

## 2017-12-07 RX ORDER — AMLODIPINE BESYLATE 10 MG/1
TABLET ORAL
Qty: 30 TABLET | Refills: 2 | Status: SHIPPED | OUTPATIENT
Start: 2017-12-07 | End: 2018-03-06 | Stop reason: SDUPTHER

## 2017-12-07 RX ORDER — CYCLOBENZAPRINE HCL 10 MG
TABLET ORAL
Qty: 30 TABLET | Refills: 2 | Status: SHIPPED | OUTPATIENT
Start: 2017-12-07 | End: 2019-01-10 | Stop reason: SDUPTHER

## 2017-12-07 RX ORDER — HYDROCHLOROTHIAZIDE 25 MG/1
TABLET ORAL
Qty: 30 TABLET | Refills: 2 | Status: SHIPPED | OUTPATIENT
Start: 2017-12-07 | End: 2018-02-24 | Stop reason: SDUPTHER

## 2017-12-07 NOTE — TELEPHONE ENCOUNTER
Called patient to inform needs an appointment to get prescription. No answer, unable to leave message. Line rings then makes busy signal.

## 2018-01-16 RX ORDER — OMEPRAZOLE 20 MG/1
CAPSULE, DELAYED RELEASE ORAL
Qty: 30 CAPSULE | Refills: 11 | Status: SHIPPED | OUTPATIENT
Start: 2018-01-16 | End: 2018-04-26 | Stop reason: SDUPTHER

## 2018-01-24 ENCOUNTER — OFFICE VISIT (OUTPATIENT)
Dept: FAMILY MEDICINE | Facility: CLINIC | Age: 63
End: 2018-01-24
Payer: COMMERCIAL

## 2018-01-24 VITALS
TEMPERATURE: 98 F | BODY MASS INDEX: 28.7 KG/M2 | DIASTOLIC BLOOD PRESSURE: 66 MMHG | OXYGEN SATURATION: 97 % | SYSTOLIC BLOOD PRESSURE: 126 MMHG | HEIGHT: 66 IN | WEIGHT: 178.56 LBS | HEART RATE: 75 BPM

## 2018-01-24 DIAGNOSIS — J01.10 ACUTE NON-RECURRENT FRONTAL SINUSITIS: Primary | ICD-10-CM

## 2018-01-24 DIAGNOSIS — I10 ESSENTIAL HYPERTENSION: ICD-10-CM

## 2018-01-24 DIAGNOSIS — E11.9 DIABETES MELLITUS TYPE 2 IN NONOBESE: ICD-10-CM

## 2018-01-24 PROCEDURE — 99999 PR PBB SHADOW E&M-EST. PATIENT-LVL III: CPT | Mod: PBBFAC,,, | Performed by: FAMILY MEDICINE

## 2018-01-24 PROCEDURE — 99214 OFFICE O/P EST MOD 30 MIN: CPT | Mod: 25,S$GLB,, | Performed by: FAMILY MEDICINE

## 2018-01-24 PROCEDURE — 96372 THER/PROPH/DIAG INJ SC/IM: CPT | Mod: S$GLB,,, | Performed by: FAMILY MEDICINE

## 2018-01-24 RX ORDER — BETAMETHASONE SODIUM PHOSPHATE AND BETAMETHASONE ACETATE 3; 3 MG/ML; MG/ML
6 INJECTION, SUSPENSION INTRA-ARTICULAR; INTRALESIONAL; INTRAMUSCULAR; SOFT TISSUE
Status: COMPLETED | OUTPATIENT
Start: 2018-01-24 | End: 2018-01-24

## 2018-01-24 RX ORDER — PROMETHAZINE HYDROCHLORIDE AND DEXTROMETHORPHAN HYDROBROMIDE 6.25; 15 MG/5ML; MG/5ML
5 SYRUP ORAL 3 TIMES DAILY PRN
Qty: 118 ML | Refills: 0 | Status: SHIPPED | OUTPATIENT
Start: 2018-01-24 | End: 2019-01-24 | Stop reason: SDUPTHER

## 2018-01-24 RX ORDER — AMOXICILLIN AND CLAVULANATE POTASSIUM 875; 125 MG/1; MG/1
1 TABLET, FILM COATED ORAL EVERY 12 HOURS
Qty: 20 TABLET | Refills: 0 | Status: SHIPPED | OUTPATIENT
Start: 2018-01-24 | End: 2018-04-26 | Stop reason: ALTCHOICE

## 2018-01-24 RX ADMIN — BETAMETHASONE SODIUM PHOSPHATE AND BETAMETHASONE ACETATE 6 MG: 3; 3 INJECTION, SUSPENSION INTRA-ARTICULAR; INTRALESIONAL; INTRAMUSCULAR; SOFT TISSUE at 03:01

## 2018-01-24 NOTE — PROGRESS NOTES
Subjective:       Patient ID: Anne Farmer is a 62 y.o. female.    Chief Complaint: Cough; Chest Congestion; and Nasal Congestion      HPI Comments:       Current Outpatient Prescriptions:     amLODIPine (NORVASC) 10 MG tablet, TAKE 1 TABLET BY MOUTH EVERY DAY, Disp: 30 tablet, Rfl: 2    cetirizine (ZYRTEC) 10 MG tablet, Take 10 mg by mouth once daily., Disp: , Rfl:     cyclobenzaprine (FLEXERIL) 10 MG tablet, TAKE 1 TABLET BY MOUTH 3 TIMES A DAY AS NEEDED FOR MUSCLE SPASMS, Disp: 30 tablet, Rfl: 2    fluticasone (FLONASE) 50 mcg/actuation nasal spray, SPRAY 2 SPRAYS IN EACH NOSTRIL ONCE TIME A DAY, Disp: 16 g, Rfl: 1    hydroCHLOROthiazide (HYDRODIURIL) 25 MG tablet, TAKE 1 TABLET BY MOUTH EVERY DAY, Disp: 30 tablet, Rfl: 2    losartan (COZAAR) 100 MG tablet, TAKE 1 TABLET BY MOUTH ONCE DAILY, Disp: 90 tablet, Rfl: 2    multivitamin (ONE DAILY MULTIVITAMIN) per tablet, Take 1 tablet by mouth once daily., Disp: , Rfl:     omeprazole (PRILOSEC) 20 MG capsule, TAKE 1 CAPSULE BY MOUTH DAILY, Disp: 30 capsule, Rfl: 11    pravastatin (PRAVACHOL) 20 MG tablet, TAKE 1 TABLET BY MOUTH EVERY DAY, Disp: 90 tablet, Rfl: 1    tramadol (ULTRAM) 50 mg tablet, TAKE ONE TABLET BY MOUTH THREE TIMES DAILY AFTER A MEAL, Disp: 90 tablet, Rfl: 2    zolpidem (AMBIEN) 5 MG Tab, Take 1 tablet (5 mg total) by mouth nightly as needed., Disp: 30 tablet, Rfl: 2    amoxicillin-clavulanate 875-125mg (AUGMENTIN) 875-125 mg per tablet, Take 1 tablet by mouth every 12 (twelve) hours., Disp: 20 tablet, Rfl: 0    promethazine-dextromethorphan (PROMETHAZINE-DM) 6.25-15 mg/5 mL Syrp, Take 5 mLs by mouth 3 (three) times daily as needed., Disp: 118 mL, Rfl: 0  No current facility-administered medications for this visit.       This is my first time seeing this patient who is a fairly well-controlled diabetic.  He presents with 2 weeks of cold symptoms cough and congestion.  Intermittent body aches and frontal headache.  Occasional  "chills but no fever.  Cough is nonproductive. Using Kristie-[place] plus.  Nonsmoker.  Has had  Intramuscular steroids in the past without any complications.      Cough   This is a chronic problem. The current episode started 1 to 4 weeks ago. The problem has been unchanged. The problem occurs every few minutes. The cough is non-productive. Associated symptoms include chills, headaches, nasal congestion, postnasal drip, rhinorrhea and shortness of breath. Pertinent negatives include no chest pain, ear pain, fever, hemoptysis, myalgias, sore throat or wheezing. She has tried OTC cough suppressant for the symptoms. There is no history of asthma or pneumonia.     Review of Systems   Constitutional: Positive for chills. Negative for activity change, appetite change and fever.   HENT: Positive for postnasal drip and rhinorrhea. Negative for ear pain and sore throat.    Respiratory: Positive for cough and shortness of breath. Negative for hemoptysis and wheezing.    Cardiovascular: Negative for chest pain.   Gastrointestinal: Negative for abdominal pain, diarrhea and nausea.   Genitourinary: Negative for difficulty urinating.   Musculoskeletal: Negative for arthralgias and myalgias.   Neurological: Positive for headaches. Negative for dizziness.       Objective:      Vitals:    01/24/18 1501   BP: 126/66   Pulse: 75   Temp: 98.2 °F (36.8 °C)   TempSrc: Tympanic   SpO2: 97%   Weight: 81 kg (178 lb 9.2 oz)   Height: 5' 6" (1.676 m)     Physical Exam   Constitutional: She is oriented to person, place, and time. She appears well-developed and well-nourished. She does not appear ill. No distress.   HENT:   Head: Normocephalic.   Right Ear: Tympanic membrane, external ear and ear canal normal.   Left Ear: Tympanic membrane, external ear and ear canal normal.   Nose: Mucosal edema and rhinorrhea present. Right sinus exhibits frontal sinus tenderness. Left sinus exhibits frontal sinus tenderness.   Mouth/Throat: Mucous membranes are " normal. Posterior oropharyngeal edema present. No oropharyngeal exudate or posterior oropharyngeal erythema.   Neck: Neck supple. No thyromegaly present.   Cardiovascular: Normal rate, regular rhythm and normal heart sounds.    No murmur heard.  Pulmonary/Chest: Effort normal and breath sounds normal. She has no wheezes. She has no rales.   Abdominal: Soft. She exhibits no distension.   Musculoskeletal: She exhibits no edema.   Lymphadenopathy:     She has no cervical adenopathy.   Neurological: She is alert and oriented to person, place, and time.   Skin: Skin is warm and dry. She is not diaphoretic.   Psychiatric: She has a normal mood and affect. Her behavior is normal. Judgment and thought content normal.   Nursing note and vitals reviewed.      Assessment:       1. Acute non-recurrent frontal sinusitis    2. Diabetes mellitus type 2 in nonobese    3. Essential hypertension        Plan:   Acute non-recurrent frontal sinusitis  Comments:  Prolonged course.  Augmentin, Celestone, Promethazine DM at night.  Mucinex DM during day.  Rest and hydration  Orders:  -     betamethasone acetate-betamethasone sodium phosphate injection 6 mg; Inject 1 mL (6 mg total) into the muscle one time.    Diabetes mellitus type 2 in nonobese  Comments:  has tolerated IM steroids in past    Essential hypertension  Comments:  controlled    Other orders  -     amoxicillin-clavulanate 875-125mg (AUGMENTIN) 875-125 mg per tablet; Take 1 tablet by mouth every 12 (twelve) hours.  Dispense: 20 tablet; Refill: 0  -     promethazine-dextromethorphan (PROMETHAZINE-DM) 6.25-15 mg/5 mL Syrp; Take 5 mLs by mouth 3 (three) times daily as needed.  Dispense: 118 mL; Refill: 0

## 2018-02-26 RX ORDER — HYDROCHLOROTHIAZIDE 25 MG/1
TABLET ORAL
Qty: 30 TABLET | Refills: 0 | Status: SHIPPED | OUTPATIENT
Start: 2018-02-26 | End: 2018-03-25 | Stop reason: SDUPTHER

## 2018-03-06 RX ORDER — AMLODIPINE BESYLATE 10 MG/1
TABLET ORAL
Qty: 30 TABLET | Refills: 2 | Status: SHIPPED | OUTPATIENT
Start: 2018-03-06 | End: 2018-04-26 | Stop reason: SDUPTHER

## 2018-03-26 RX ORDER — HYDROCHLOROTHIAZIDE 25 MG/1
TABLET ORAL
Qty: 30 TABLET | Refills: 0 | Status: SHIPPED | OUTPATIENT
Start: 2018-03-26 | End: 2018-04-17 | Stop reason: SDUPTHER

## 2018-03-26 NOTE — TELEPHONE ENCOUNTER
Needs current labs and health maintenance update. Phylicia schedule appt with myself or Dr. camarillo before next refill request

## 2018-04-17 RX ORDER — HYDROCHLOROTHIAZIDE 25 MG/1
TABLET ORAL
Qty: 30 TABLET | Refills: 0 | Status: SHIPPED | OUTPATIENT
Start: 2018-04-17 | End: 2018-04-26 | Stop reason: SDUPTHER

## 2018-04-17 NOTE — TELEPHONE ENCOUNTER
Pt is due for DM chronic care appt. Please schedule before next refill request. Medication will not be refilled if no appt with current labs. Please come fasting

## 2018-04-25 ENCOUNTER — PATIENT OUTREACH (OUTPATIENT)
Dept: ADMINISTRATIVE | Facility: HOSPITAL | Age: 63
End: 2018-04-25

## 2018-04-25 NOTE — LETTER
April 25, 2018    Anne Farmer  [address] [place] [address]             [facility]  [address]  [place] [address]  Phone: [phone] Dear Mrs. Farmer:    [facility] is committed to your overall health.  To help you get the most out of each of your visits, we will review your information to make sure you are up to date on all of your recommended tests and/or procedures.      TOMÁS COX MD has found that you may be due for   Health Maintenance Due   Topic    Pneumococcal PCV13 (High Risk) (1 - PCV13 Required)    Pneumococcal PPSV23 (High Risk) (1)    Zoster Vaccine     Mammogram     Hemoglobin A1c     DM Foot Exam     Lipid Panel     DM Eye Exam     If you have had any of the above done at another facility, please bring the records or information with you so that your record at [facility] will be complete.    If you are currently taking medication, please bring it with you to your appointment for review.    We will be happy to assist you with scheduling any necessary appointments or you may contact the [facility] appointment desk at [phone] to schedule at your convenience.     Thank you for choosing [facility] for your healthcare needs,    Maria Del Rosario GATICA LPN  Care Coordination Department  [facility]  Phone Number: [phone]

## 2018-04-26 ENCOUNTER — LAB VISIT (OUTPATIENT)
Dept: LAB | Facility: HOSPITAL | Age: 63
End: 2018-04-26
Attending: FAMILY MEDICINE
Payer: COMMERCIAL

## 2018-04-26 ENCOUNTER — OFFICE VISIT (OUTPATIENT)
Dept: FAMILY MEDICINE | Facility: CLINIC | Age: 63
End: 2018-04-26
Payer: COMMERCIAL

## 2018-04-26 VITALS
SYSTOLIC BLOOD PRESSURE: 130 MMHG | DIASTOLIC BLOOD PRESSURE: 62 MMHG | HEART RATE: 58 BPM | HEIGHT: 66 IN | WEIGHT: 175.94 LBS | OXYGEN SATURATION: 97 % | BODY MASS INDEX: 28.27 KG/M2 | TEMPERATURE: 98 F

## 2018-04-26 DIAGNOSIS — E11.69 TYPE 2 DIABETES MELLITUS WITH HYPERCHOLESTEROLEMIA: ICD-10-CM

## 2018-04-26 DIAGNOSIS — E11.9 TYPE 2 DIABETES MELLITUS WITHOUT COMPLICATION: ICD-10-CM

## 2018-04-26 DIAGNOSIS — G47.00 INSOMNIA, UNSPECIFIED TYPE: ICD-10-CM

## 2018-04-26 DIAGNOSIS — I10 HYPERTENSION, UNSPECIFIED TYPE: ICD-10-CM

## 2018-04-26 DIAGNOSIS — E78.00 TYPE 2 DIABETES MELLITUS WITH HYPERCHOLESTEROLEMIA: ICD-10-CM

## 2018-04-26 DIAGNOSIS — E78.5 HYPERLIPIDEMIA, UNSPECIFIED HYPERLIPIDEMIA TYPE: ICD-10-CM

## 2018-04-26 DIAGNOSIS — J01.90 ACUTE SINUSITIS, RECURRENCE NOT SPECIFIED, UNSPECIFIED LOCATION: ICD-10-CM

## 2018-04-26 DIAGNOSIS — Z12.31 ENCOUNTER FOR SCREENING MAMMOGRAM FOR MALIGNANT NEOPLASM OF BREAST: Primary | ICD-10-CM

## 2018-04-26 PROBLEM — D12.4 BENIGN NEOPLASM OF DESCENDING COLON: Status: RESOLVED | Noted: 2017-08-24 | Resolved: 2018-04-26

## 2018-04-26 PROBLEM — R59.0 LYMPHADENOPATHY, MEDIASTINAL: Status: RESOLVED | Noted: 2017-03-28 | Resolved: 2018-04-26

## 2018-04-26 PROBLEM — R94.8 ABNORMAL PET SCAN OF MEDIASTINUM: Chronic | Status: RESOLVED | Noted: 2017-02-15 | Resolved: 2018-04-26

## 2018-04-26 PROBLEM — R59.0 MEDIASTINAL ADENOPATHY: Chronic | Status: RESOLVED | Noted: 2017-02-15 | Resolved: 2018-04-26

## 2018-04-26 PROBLEM — R74.8 ABNORMAL SERUM ACE LEVEL: Chronic | Status: RESOLVED | Noted: 2017-02-15 | Resolved: 2018-04-26

## 2018-04-26 LAB
ALBUMIN SERPL BCP-MCNC: 4 G/DL
ALP SERPL-CCNC: 87 U/L
ALT SERPL W/O P-5'-P-CCNC: 30 U/L
ANION GAP SERPL CALC-SCNC: 11 MMOL/L
AST SERPL-CCNC: 36 U/L
BASOPHILS # BLD AUTO: 0.05 K/UL
BASOPHILS NFR BLD: 1 %
BILIRUB SERPL-MCNC: 0.5 MG/DL
BUN SERPL-MCNC: 15 MG/DL
CALCIUM SERPL-MCNC: 10.5 MG/DL
CHLORIDE SERPL-SCNC: 101 MMOL/L
CHOLEST SERPL-MCNC: 224 MG/DL
CHOLEST SERPL-MCNC: 224 MG/DL
CHOLEST/HDLC SERPL: 2.8 {RATIO}
CHOLEST/HDLC SERPL: 2.8 {RATIO}
CO2 SERPL-SCNC: 27 MMOL/L
CREAT SERPL-MCNC: 0.7 MG/DL
CREAT UR-MCNC: 191 MG/DL
DIFFERENTIAL METHOD: ABNORMAL
EOSINOPHIL # BLD AUTO: 0.4 K/UL
EOSINOPHIL NFR BLD: 8.7 %
ERYTHROCYTE [DISTWIDTH] IN BLOOD BY AUTOMATED COUNT: 13.3 %
EST. GFR  (AFRICAN AMERICAN): >60 ML/MIN/1.73 M^2
EST. GFR  (NON AFRICAN AMERICAN): >60 ML/MIN/1.73 M^2
ESTIMATED AVG GLUCOSE: 148 MG/DL
GLUCOSE SERPL-MCNC: 117 MG/DL
HBA1C MFR BLD HPLC: 6.8 %
HCT VFR BLD AUTO: 38.7 %
HDLC SERPL-MCNC: 81 MG/DL
HDLC SERPL-MCNC: 81 MG/DL
HDLC SERPL: 36.2 %
HDLC SERPL: 36.2 %
HGB BLD-MCNC: 12.8 G/DL
IMM GRANULOCYTES # BLD AUTO: 0.01 K/UL
IMM GRANULOCYTES NFR BLD AUTO: 0.2 %
LDLC SERPL CALC-MCNC: 129 MG/DL
LDLC SERPL CALC-MCNC: 129 MG/DL
LYMPHOCYTES # BLD AUTO: 1.5 K/UL
LYMPHOCYTES NFR BLD: 29.7 %
MCH RBC QN AUTO: 31 PG
MCHC RBC AUTO-ENTMCNC: 33.1 G/DL
MCV RBC AUTO: 94 FL
MICROALBUMIN UR DL<=1MG/L-MCNC: 36 UG/ML
MICROALBUMIN/CREATININE RATIO: 18.8 UG/MG
MONOCYTES # BLD AUTO: 0.5 K/UL
MONOCYTES NFR BLD: 10.1 %
NEUTROPHILS # BLD AUTO: 2.5 K/UL
NEUTROPHILS NFR BLD: 50.3 %
NONHDLC SERPL-MCNC: 143 MG/DL
NONHDLC SERPL-MCNC: 143 MG/DL
NRBC BLD-RTO: 0 /100 WBC
PLATELET # BLD AUTO: 280 K/UL
PMV BLD AUTO: 9.6 FL
POTASSIUM SERPL-SCNC: 4.1 MMOL/L
PROT SERPL-MCNC: 8.4 G/DL
RBC # BLD AUTO: 4.13 M/UL
SODIUM SERPL-SCNC: 139 MMOL/L
TRIGL SERPL-MCNC: 70 MG/DL
TRIGL SERPL-MCNC: 70 MG/DL
WBC # BLD AUTO: 4.95 K/UL

## 2018-04-26 PROCEDURE — 90471 IMMUNIZATION ADMIN: CPT | Mod: S$GLB,,, | Performed by: FAMILY MEDICINE

## 2018-04-26 PROCEDURE — 36415 COLL VENOUS BLD VENIPUNCTURE: CPT | Mod: PO

## 2018-04-26 PROCEDURE — 3075F SYST BP GE 130 - 139MM HG: CPT | Mod: CPTII,S$GLB,, | Performed by: FAMILY MEDICINE

## 2018-04-26 PROCEDURE — 90732 PPSV23 VACC 2 YRS+ SUBQ/IM: CPT | Mod: S$GLB,,, | Performed by: FAMILY MEDICINE

## 2018-04-26 PROCEDURE — 99999 PR PBB SHADOW E&M-EST. PATIENT-LVL IV: CPT | Mod: PBBFAC,,, | Performed by: FAMILY MEDICINE

## 2018-04-26 PROCEDURE — 82043 UR ALBUMIN QUANTITATIVE: CPT

## 2018-04-26 PROCEDURE — 85025 COMPLETE CBC W/AUTO DIFF WBC: CPT

## 2018-04-26 PROCEDURE — 99214 OFFICE O/P EST MOD 30 MIN: CPT | Mod: 25,S$GLB,, | Performed by: FAMILY MEDICINE

## 2018-04-26 PROCEDURE — 83036 HEMOGLOBIN GLYCOSYLATED A1C: CPT

## 2018-04-26 PROCEDURE — 80053 COMPREHEN METABOLIC PANEL: CPT

## 2018-04-26 PROCEDURE — 80061 LIPID PANEL: CPT

## 2018-04-26 PROCEDURE — 3078F DIAST BP <80 MM HG: CPT | Mod: CPTII,S$GLB,, | Performed by: FAMILY MEDICINE

## 2018-04-26 RX ORDER — OMEPRAZOLE 20 MG/1
20 CAPSULE, DELAYED RELEASE ORAL DAILY
Qty: 30 CAPSULE | Refills: 2 | Status: SHIPPED | OUTPATIENT
Start: 2018-04-26 | End: 2019-01-10 | Stop reason: SDUPTHER

## 2018-04-26 RX ORDER — OMEPRAZOLE 20 MG/1
20 CAPSULE, DELAYED RELEASE ORAL DAILY
Qty: 30 CAPSULE | Refills: 2 | Status: SHIPPED | OUTPATIENT
Start: 2018-04-26 | End: 2018-04-26 | Stop reason: SDUPTHER

## 2018-04-26 RX ORDER — FLUTICASONE PROPIONATE 50 MCG
SPRAY, SUSPENSION (ML) NASAL
Qty: 16 G | Refills: 1 | Status: SHIPPED | OUTPATIENT
Start: 2018-04-26 | End: 2018-10-23 | Stop reason: SDUPTHER

## 2018-04-26 RX ORDER — FLUTICASONE PROPIONATE 50 MCG
SPRAY, SUSPENSION (ML) NASAL
Qty: 16 G | Refills: 1 | Status: SHIPPED | OUTPATIENT
Start: 2018-04-26 | End: 2018-04-26 | Stop reason: SDUPTHER

## 2018-04-26 RX ORDER — ZOLPIDEM TARTRATE 5 MG/1
5 TABLET ORAL NIGHTLY PRN
Qty: 30 TABLET | Refills: 2 | Status: SHIPPED | OUTPATIENT
Start: 2018-04-26 | End: 2019-01-10

## 2018-04-26 RX ORDER — PRAVASTATIN SODIUM 20 MG/1
20 TABLET ORAL DAILY
Qty: 30 TABLET | Refills: 2 | Status: SHIPPED | OUTPATIENT
Start: 2018-04-26 | End: 2019-01-10 | Stop reason: SDUPTHER

## 2018-04-26 RX ORDER — OXYCODONE AND ACETAMINOPHEN TABLETS 5; 300 MG/1; MG/1
TABLET ORAL
COMMUNITY
End: 2019-01-21

## 2018-04-26 RX ORDER — AMLODIPINE BESYLATE 10 MG/1
10 TABLET ORAL DAILY
Qty: 30 TABLET | Refills: 2 | Status: SHIPPED | OUTPATIENT
Start: 2018-04-26 | End: 2018-04-26 | Stop reason: SDUPTHER

## 2018-04-26 RX ORDER — HYDROCHLOROTHIAZIDE 25 MG/1
25 TABLET ORAL DAILY
Qty: 30 TABLET | Refills: 3 | Status: SHIPPED | OUTPATIENT
Start: 2018-04-26 | End: 2018-05-15 | Stop reason: SDUPTHER

## 2018-04-26 RX ORDER — AMLODIPINE BESYLATE 10 MG/1
10 TABLET ORAL DAILY
Qty: 30 TABLET | Refills: 2 | Status: SHIPPED | OUTPATIENT
Start: 2018-04-26 | End: 2018-10-15 | Stop reason: SDUPTHER

## 2018-04-26 RX ORDER — HYDROCHLOROTHIAZIDE 25 MG/1
25 TABLET ORAL DAILY
Qty: 30 TABLET | Refills: 3 | Status: SHIPPED | OUTPATIENT
Start: 2018-04-26 | End: 2018-04-26 | Stop reason: SDUPTHER

## 2018-04-26 RX ORDER — LOSARTAN POTASSIUM 100 MG/1
100 TABLET ORAL DAILY
Qty: 30 TABLET | Refills: 2 | Status: SHIPPED | OUTPATIENT
Start: 2018-04-26 | End: 2018-04-26 | Stop reason: SDUPTHER

## 2018-04-26 RX ORDER — LOSARTAN POTASSIUM 100 MG/1
100 TABLET ORAL DAILY
Qty: 30 TABLET | Refills: 2 | Status: SHIPPED | OUTPATIENT
Start: 2018-04-26 | End: 2018-05-30 | Stop reason: SDUPTHER

## 2018-04-26 RX ORDER — PRAVASTATIN SODIUM 20 MG/1
20 TABLET ORAL DAILY
Qty: 30 TABLET | Refills: 2 | Status: SHIPPED | OUTPATIENT
Start: 2018-04-26 | End: 2018-04-26 | Stop reason: SDUPTHER

## 2018-04-26 NOTE — PROGRESS NOTES
Subjective:       Patient ID: Anne Farmer is a 62 y.o. female.    Chief Complaint: Medication Refill (health maintenance)    62 y  Old female  here for f/u on DM, htn  And dlp. On aspirin, exercises  : stays active at work , Opthalmology ,: seen By Dr Dinero  last May  . Not on hypoglycemic agents. Sleeps well with Ambien       Medication Refill       Review of Systems   Constitutional: Negative.    HENT: Negative.    Eyes: Negative.    Respiratory: Negative.    Cardiovascular: Negative.    Gastrointestinal: Negative.    Genitourinary: Negative.    Musculoskeletal: Negative.    Skin: Negative.    Hematological: Negative.        Objective:      Physical Exam   Constitutional: She is oriented to person, place, and time. She appears well-developed and well-nourished. No distress.   HENT:   Head: Normocephalic and atraumatic.   Right Ear: External ear normal.   Left Ear: External ear normal.   Mouth/Throat: No oropharyngeal exudate.   Eyes: Conjunctivae and EOM are normal. Pupils are equal, round, and reactive to light. Right eye exhibits no discharge. Left eye exhibits no discharge. No scleral icterus.   Neck: Normal range of motion. Neck supple. No JVD present. No tracheal deviation present. No thyromegaly present.   Cardiovascular: Normal rate, regular rhythm and normal heart sounds.  Exam reveals no gallop and no friction rub.    No murmur heard.  Pulses:       Dorsalis pedis pulses are 2+ on the right side, and 2+ on the left side.   Pulmonary/Chest: Effort normal and breath sounds normal. No stridor. No respiratory distress. She has no wheezes. She has no rales. She exhibits no tenderness.   Abdominal: Soft. Bowel sounds are normal. She exhibits no distension. There is no tenderness. There is no rebound and no guarding.   Musculoskeletal: Normal range of motion.        Right foot: There is normal range of motion and no deformity.   Feet:   Right Foot:   Protective Sensation: 10 sites tested. 10 sites  sensed.   Skin Integrity: Negative for ulcer, blister, skin breakdown, erythema, callus or dry skin.   Left Foot:   Protective Sensation: 10 sites tested. 10 sites sensed.   Skin Integrity: Negative for ulcer, blister, skin breakdown, erythema, warmth, callus or dry skin.   Lymphadenopathy:     She has no cervical adenopathy.   Neurological: She is alert and oriented to person, place, and time.   Skin: Skin is warm and dry. She is not diaphoretic.   Dystrophic yellowish toenail plates bilaterally    Psychiatric: She has a normal mood and affect. Her behavior is normal. Judgment and thought content normal.       Assessment:       Anne was seen today for medication refill.    Diagnoses and all orders for this visit:    Encounter for screening mammogram for malignant neoplasm of breast  -     Mammo Digital Screening Bilateral With CAD; Future    Hyperlipidemia, unspecified hyperlipidemia type    Type 2 diabetes mellitus with hypercholesterolemia  -     CBC auto differential; Future  -     Comprehensive metabolic panel; Future  -     Hemoglobin A1c; Future  -     Lipid panel; Future  -     Microalbumin/creatinine urine ratio  -     Ambulatory consult to Podiatry    Hypertension, unspecified type    Acute sinusitis, recurrence not specified, unspecified location  -     fluticasone (FLONASE) 50 mcg/actuation nasal spray; SPRAY 2 SPRAYS IN EACH NOSTRIL ONCE TIME A DAY    Insomnia, unspecified type    Other orders  -     Cancel: Hemoglobin A1c; Future  -     Cancel: Lipid panel; Future  -     Cancel: Mammo Digital Screening Bilateral With CAD; Future  -     (In Office Administered) Pneumococcal Polysaccharide Vaccine (23 Valent) (SQ/IM)  -     zolpidem (AMBIEN) 5 MG Tab; Take 1 tablet (5 mg total) by mouth nightly as needed.  -     pravastatin (PRAVACHOL) 20 MG tablet; Take 1 tablet (20 mg total) by mouth once daily.  -     omeprazole (PRILOSEC) 20 MG capsule; Take 1 capsule (20 mg total) by mouth once daily.  -      losartan (COZAAR) 100 MG tablet; Take 1 tablet (100 mg total) by mouth once daily.  -     hydroCHLOROthiazide (HYDRODIURIL) 25 MG tablet; Take 1 tablet (25 mg total) by mouth once daily.  -     amLODIPine (NORVASC) 10 MG tablet; Take 1 tablet (10 mg total) by mouth once daily.      Plan:     Anne was seen today for medication refill.    Diagnoses and all orders for this visit:    Encounter for screening mammogram for malignant neoplasm of breast    Hyperlipidemia, unspecified hyperlipidemia type    Other orders  -     Cancel: Hemoglobin A1c; Future  -     Cancel: Lipid panel; Future  -     Cancel: Mammo Digital Screening Bilateral With CAD; Future     Diet and ex     Pt. encouraged to follow a strict diabetic type diet.   Encouraged daily physical activity/exercise for at least 30mins if tolerated.   Weight control recommenced as always.   Discussed how lifestyle changes make biggest impact on diabetes control.   Continue home glucose monitoring once daily and keep log.   Counseling provided today about hypoglycemia as well as about how diabetes increases risk of cardiovascular, cerebrovascular ,peripheral vascular disease and other serious health complications. He has been instructed to call if developing any new symptoms or hypoglycemia related symptoms.   See encounter for associated orders/medications.   - Lipid panel; Future  Controlled. Cont med    accessed. Controlled. Cont med

## 2018-05-15 RX ORDER — HYDROCHLOROTHIAZIDE 25 MG/1
TABLET ORAL
Qty: 90 TABLET | Refills: 0 | Status: SHIPPED | OUTPATIENT
Start: 2018-05-15 | End: 2018-08-08 | Stop reason: SDUPTHER

## 2018-05-30 RX ORDER — LOSARTAN POTASSIUM 100 MG/1
TABLET ORAL
Qty: 90 TABLET | Refills: 2 | Status: SHIPPED | OUTPATIENT
Start: 2018-05-30 | End: 2019-01-10 | Stop reason: SDUPTHER

## 2018-05-30 NOTE — TELEPHONE ENCOUNTER
Pt due for DM eye exam and mammogram.  Pt was scheduled, but cancelled. Would she like to reschedule?

## 2018-08-08 RX ORDER — HYDROCHLOROTHIAZIDE 25 MG/1
TABLET ORAL
Qty: 90 TABLET | Refills: 0 | Status: SHIPPED | OUTPATIENT
Start: 2018-08-08 | End: 2018-11-01 | Stop reason: SDUPTHER

## 2018-08-17 RX ORDER — AMLODIPINE BESYLATE 10 MG/1
TABLET ORAL
Qty: 30 TABLET | Refills: 0 | Status: SHIPPED | OUTPATIENT
Start: 2018-08-17 | End: 2018-10-15 | Stop reason: SDUPTHER

## 2018-10-15 RX ORDER — AMLODIPINE BESYLATE 10 MG/1
TABLET ORAL
Qty: 30 TABLET | Refills: 0 | Status: SHIPPED | OUTPATIENT
Start: 2018-10-15 | End: 2019-01-10 | Stop reason: SDUPTHER

## 2018-10-23 RX ORDER — FLUTICASONE PROPIONATE 50 MCG
SPRAY, SUSPENSION (ML) NASAL
Qty: 16 ML | Refills: 0 | Status: SHIPPED | OUTPATIENT
Start: 2018-10-23 | End: 2019-01-21 | Stop reason: SDUPTHER

## 2018-11-01 RX ORDER — HYDROCHLOROTHIAZIDE 25 MG/1
TABLET ORAL
Qty: 90 TABLET | Refills: 0 | Status: SHIPPED | OUTPATIENT
Start: 2018-11-01 | End: 2019-01-10 | Stop reason: SDUPTHER

## 2018-11-23 DIAGNOSIS — E11.9 TYPE 2 DIABETES MELLITUS WITHOUT COMPLICATION: ICD-10-CM

## 2018-12-03 DIAGNOSIS — M25.50 MULTIPLE JOINT PAIN: ICD-10-CM

## 2018-12-03 DIAGNOSIS — M19.90 ARTHRITIS: ICD-10-CM

## 2018-12-03 RX ORDER — CYCLOBENZAPRINE HCL 10 MG
TABLET ORAL
Qty: 30 TABLET | Refills: 2 | OUTPATIENT
Start: 2018-12-03

## 2018-12-31 DIAGNOSIS — M25.50 MULTIPLE JOINT PAIN: ICD-10-CM

## 2018-12-31 DIAGNOSIS — M19.90 ARTHRITIS: ICD-10-CM

## 2019-01-02 RX ORDER — CYCLOBENZAPRINE HCL 10 MG
TABLET ORAL
Qty: 30 TABLET | Refills: 2 | OUTPATIENT
Start: 2019-01-02

## 2019-01-09 DIAGNOSIS — M19.90 ARTHRITIS: ICD-10-CM

## 2019-01-09 DIAGNOSIS — M25.50 MULTIPLE JOINT PAIN: ICD-10-CM

## 2019-01-09 RX ORDER — CYCLOBENZAPRINE HCL 10 MG
TABLET ORAL
Qty: 30 TABLET | Refills: 2 | OUTPATIENT
Start: 2019-01-09

## 2019-01-10 ENCOUNTER — OFFICE VISIT (OUTPATIENT)
Dept: FAMILY MEDICINE | Facility: CLINIC | Age: 64
End: 2019-01-10
Attending: FAMILY MEDICINE
Payer: MEDICARE

## 2019-01-10 VITALS
HEART RATE: 82 BPM | SYSTOLIC BLOOD PRESSURE: 140 MMHG | TEMPERATURE: 99 F | OXYGEN SATURATION: 98 % | HEIGHT: 66 IN | DIASTOLIC BLOOD PRESSURE: 70 MMHG | BODY MASS INDEX: 28.31 KG/M2 | WEIGHT: 176.13 LBS

## 2019-01-10 DIAGNOSIS — G47.00 INSOMNIA, UNSPECIFIED TYPE: ICD-10-CM

## 2019-01-10 DIAGNOSIS — E78.5 DM TYPE 2 WITH DIABETIC DYSLIPIDEMIA: Primary | ICD-10-CM

## 2019-01-10 DIAGNOSIS — M25.50 MULTIPLE JOINT PAIN: ICD-10-CM

## 2019-01-10 DIAGNOSIS — Z12.39 BREAST CANCER SCREENING: ICD-10-CM

## 2019-01-10 DIAGNOSIS — Z13.820 OSTEOPOROSIS SCREENING: ICD-10-CM

## 2019-01-10 DIAGNOSIS — M19.90 ARTHRITIS: ICD-10-CM

## 2019-01-10 DIAGNOSIS — M94.9 DISORDER OF CARTILAGE: ICD-10-CM

## 2019-01-10 DIAGNOSIS — E11.69 DM TYPE 2 WITH DIABETIC DYSLIPIDEMIA: Primary | ICD-10-CM

## 2019-01-10 DIAGNOSIS — J01.90 ACUTE NON-RECURRENT SINUSITIS, UNSPECIFIED LOCATION: ICD-10-CM

## 2019-01-10 PROCEDURE — 99999 PR PBB SHADOW E&M-EST. PATIENT-LVL IV: ICD-10-PCS | Mod: PBBFAC,,, | Performed by: FAMILY MEDICINE

## 2019-01-10 PROCEDURE — 99214 OFFICE O/P EST MOD 30 MIN: CPT | Mod: S$GLB,,, | Performed by: FAMILY MEDICINE

## 2019-01-10 PROCEDURE — 82043 UR ALBUMIN QUANTITATIVE: CPT

## 2019-01-10 PROCEDURE — 99214 PR OFFICE/OUTPT VISIT, EST, LEVL IV, 30-39 MIN: ICD-10-PCS | Mod: S$GLB,,, | Performed by: FAMILY MEDICINE

## 2019-01-10 PROCEDURE — 99214 OFFICE O/P EST MOD 30 MIN: CPT | Mod: PBBFAC,PO | Performed by: FAMILY MEDICINE

## 2019-01-10 PROCEDURE — 99999 PR PBB SHADOW E&M-EST. PATIENT-LVL IV: CPT | Mod: PBBFAC,,, | Performed by: FAMILY MEDICINE

## 2019-01-10 RX ORDER — TRAZODONE HYDROCHLORIDE 50 MG/1
50 TABLET ORAL NIGHTLY PRN
Qty: 30 TABLET | Refills: 3 | Status: SHIPPED | OUTPATIENT
Start: 2019-01-10 | End: 2019-01-10 | Stop reason: SDUPTHER

## 2019-01-10 RX ORDER — LOSARTAN POTASSIUM 100 MG/1
100 TABLET ORAL DAILY
Qty: 90 TABLET | Refills: 2 | Status: SHIPPED | OUTPATIENT
Start: 2019-01-10 | End: 2020-01-16

## 2019-01-10 RX ORDER — PRAVASTATIN SODIUM 20 MG/1
20 TABLET ORAL DAILY
Qty: 30 TABLET | Refills: 2 | Status: SHIPPED | OUTPATIENT
Start: 2019-01-10 | End: 2019-03-29 | Stop reason: SDUPTHER

## 2019-01-10 RX ORDER — AMOXICILLIN AND CLAVULANATE POTASSIUM 875; 125 MG/1; MG/1
1 TABLET, FILM COATED ORAL 2 TIMES DAILY
Qty: 20 TABLET | Refills: 0 | Status: SHIPPED | OUTPATIENT
Start: 2019-01-10 | End: 2019-01-10 | Stop reason: SDUPTHER

## 2019-01-10 RX ORDER — AMLODIPINE BESYLATE 10 MG/1
10 TABLET ORAL DAILY
Qty: 30 TABLET | Refills: 0 | Status: SHIPPED | OUTPATIENT
Start: 2019-01-10 | End: 2019-02-25 | Stop reason: SDUPTHER

## 2019-01-10 RX ORDER — OMEPRAZOLE 20 MG/1
20 CAPSULE, DELAYED RELEASE ORAL DAILY
Qty: 30 CAPSULE | Refills: 2 | Status: SHIPPED | OUTPATIENT
Start: 2019-01-10 | End: 2019-03-29 | Stop reason: SDUPTHER

## 2019-01-10 RX ORDER — HYDROCHLOROTHIAZIDE 25 MG/1
25 TABLET ORAL DAILY
Qty: 90 TABLET | Refills: 0 | Status: SHIPPED | OUTPATIENT
Start: 2019-01-10 | End: 2019-01-27 | Stop reason: SDUPTHER

## 2019-01-10 RX ORDER — FLUTICASONE PROPIONATE 50 MCG
2 SPRAY, SUSPENSION (ML) NASAL DAILY
Qty: 16 G | Refills: 0 | Status: SHIPPED | OUTPATIENT
Start: 2019-01-10 | End: 2019-03-08 | Stop reason: SDUPTHER

## 2019-01-10 RX ORDER — FLUTICASONE PROPIONATE 50 MCG
2 SPRAY, SUSPENSION (ML) NASAL DAILY
Qty: 16 G | Refills: 0 | Status: SHIPPED | OUTPATIENT
Start: 2019-01-10 | End: 2019-01-10 | Stop reason: SDUPTHER

## 2019-01-10 RX ORDER — CYCLOBENZAPRINE HCL 10 MG
10 TABLET ORAL 3 TIMES DAILY PRN
Qty: 30 TABLET | Refills: 2 | Status: SHIPPED | OUTPATIENT
Start: 2019-01-10 | End: 2019-04-04 | Stop reason: SDUPTHER

## 2019-01-10 RX ORDER — TRAZODONE HYDROCHLORIDE 50 MG/1
50 TABLET ORAL NIGHTLY PRN
Qty: 30 TABLET | Refills: 3 | Status: SHIPPED | OUTPATIENT
Start: 2019-01-10 | End: 2019-04-25 | Stop reason: SDUPTHER

## 2019-01-10 RX ORDER — AMOXICILLIN AND CLAVULANATE POTASSIUM 875; 125 MG/1; MG/1
1 TABLET, FILM COATED ORAL 2 TIMES DAILY
Qty: 20 TABLET | Refills: 0 | Status: SHIPPED | OUTPATIENT
Start: 2019-01-10 | End: 2019-01-21

## 2019-01-10 NOTE — PROGRESS NOTES
Subjective:       Patient ID: Anne Farmer is a 63 y.o. female.    Chief Complaint: Nasal Congestion    63  Old female  With nasal congestion , otalgia and non productive cough for 3 w .Taking grady seltzer cold and flu. No sob , no cp, she works at a nursing home . Also with insomnia . Ambien has not worked.       Review of Systems   Constitutional: Negative.    HENT: Negative.    Eyes: Negative.    Respiratory: Negative.    Cardiovascular: Negative.    Gastrointestinal: Negative.    Genitourinary: Negative.    Musculoskeletal: Negative.    Skin: Negative.    Hematological: Negative.        Objective:      Physical Exam   Constitutional: She is oriented to person, place, and time. She appears well-developed and well-nourished. No distress.   HENT:   Head: Normocephalic and atraumatic.   Right Ear: External ear normal.   Left Ear: External ear normal.   Nose: Mucosal edema and rhinorrhea present.   Mouth/Throat: No oropharyngeal exudate.   Eyes: Conjunctivae and EOM are normal. Pupils are equal, round, and reactive to light. Right eye exhibits no discharge. Left eye exhibits no discharge. No scleral icterus.   Neck: Normal range of motion. Neck supple. No JVD present. No tracheal deviation present. No thyromegaly present.   Cardiovascular: Normal rate, regular rhythm and normal heart sounds. Exam reveals no gallop and no friction rub.   No murmur heard.  Pulmonary/Chest: Effort normal and breath sounds normal. No stridor. No respiratory distress. She has no wheezes. She has no rales. She exhibits no tenderness.   Abdominal: Soft. Bowel sounds are normal. She exhibits no distension. There is no tenderness. There is no rebound and no guarding.   Musculoskeletal: Normal range of motion.   Lymphadenopathy:     She has no cervical adenopathy.   Neurological: She is alert and oriented to person, place, and time.   Skin: Skin is warm and dry. She is not diaphoretic.   Psychiatric: She has a normal mood and affect.  Her behavior is normal. Judgment and thought content normal.       Assessment:       Anne was seen today for nasal congestion.    Diagnoses and all orders for this visit:    DM type 2 with diabetic dyslipidemia  -     CBC auto differential; Future  -     Comprehensive metabolic panel; Future  -     Hemoglobin A1c; Future  -     Microalbumin/creatinine urine ratio  -     Lipid panel; Future    Breast cancer screening  -     Mammo Digital Screening Bilateral With CAD; Future    Osteoporosis screening  -     DXA Bone Density Spine And Hip; Future    Disorder of cartilage   -     DXA Bone Density Spine And Hip; Future    Insomnia, unspecified type    Arthritis  -     cyclobenzaprine (FLEXERIL) 10 MG tablet; Take 1 tablet (10 mg total) by mouth 3 (three) times daily as needed.    Multiple joint pain  -     cyclobenzaprine (FLEXERIL) 10 MG tablet; Take 1 tablet (10 mg total) by mouth 3 (three) times daily as needed.    Acute non-recurrent sinusitis, unspecified location    Other orders  -     Discontinue: amoxicillin-clavulanate 875-125mg (AUGMENTIN) 875-125 mg per tablet; Take 1 tablet by mouth 2 (two) times daily. for 10 days  -     Discontinue: fluticasone (FLONASE) 50 mcg/actuation nasal spray; 2 sprays (100 mcg total) by Each Nare route once daily.  -     Discontinue: traZODone (DESYREL) 50 MG tablet; Take 1 tablet (50 mg total) by mouth nightly as needed for Insomnia.  -     traZODone (DESYREL) 50 MG tablet; Take 1 tablet (50 mg total) by mouth nightly as needed for Insomnia.  -     pravastatin (PRAVACHOL) 20 MG tablet; Take 1 tablet (20 mg total) by mouth once daily.  -     omeprazole (PRILOSEC) 20 MG capsule; Take 1 capsule (20 mg total) by mouth once daily.  -     losartan (COZAAR) 100 MG tablet; Take 1 tablet (100 mg total) by mouth once daily.  -     hydroCHLOROthiazide (HYDRODIURIL) 25 MG tablet; Take 1 tablet (25 mg total) by mouth once daily.  -     fluticasone (FLONASE) 50 mcg/actuation nasal spray; 2  sprays (100 mcg total) by Each Nare route once daily.  -     amoxicillin-clavulanate 875-125mg (AUGMENTIN) 875-125 mg per tablet; Take 1 tablet by mouth 2 (two) times daily. for 10 days  -     amLODIPine (NORVASC) 10 MG tablet; Take 1 tablet (10 mg total) by mouth once daily.      Plan:   Due for labs .   Avoid Cough suppressant with Sugar   Trazodone   Call or return to clinic prn if these symptoms worsen or fail to improve as anticipated.  High BP . Avoid decongestants

## 2019-01-11 ENCOUNTER — LAB VISIT (OUTPATIENT)
Dept: LAB | Facility: HOSPITAL | Age: 64
End: 2019-01-11
Attending: FAMILY MEDICINE
Payer: COMMERCIAL

## 2019-01-11 DIAGNOSIS — E11.69 DM TYPE 2 WITH DIABETIC DYSLIPIDEMIA: ICD-10-CM

## 2019-01-11 DIAGNOSIS — E78.5 DM TYPE 2 WITH DIABETIC DYSLIPIDEMIA: ICD-10-CM

## 2019-01-11 LAB
ALBUMIN SERPL BCP-MCNC: 4 G/DL
ALBUMIN/CREAT UR: 41.2 UG/MG
ALP SERPL-CCNC: 69 U/L
ALT SERPL W/O P-5'-P-CCNC: 25 U/L
ANION GAP SERPL CALC-SCNC: 11 MMOL/L
AST SERPL-CCNC: 32 U/L
BASOPHILS # BLD AUTO: 0.04 K/UL
BASOPHILS NFR BLD: 1 %
BILIRUB SERPL-MCNC: 0.5 MG/DL
BUN SERPL-MCNC: 18 MG/DL
CALCIUM SERPL-MCNC: 10.2 MG/DL
CHLORIDE SERPL-SCNC: 102 MMOL/L
CHOLEST SERPL-MCNC: 213 MG/DL
CHOLEST/HDLC SERPL: 2.8 {RATIO}
CO2 SERPL-SCNC: 24 MMOL/L
CREAT SERPL-MCNC: 0.9 MG/DL
CREAT UR-MCNC: 85 MG/DL
DIFFERENTIAL METHOD: ABNORMAL
EOSINOPHIL # BLD AUTO: 0.3 K/UL
EOSINOPHIL NFR BLD: 7.8 %
ERYTHROCYTE [DISTWIDTH] IN BLOOD BY AUTOMATED COUNT: 13.1 %
EST. GFR  (AFRICAN AMERICAN): >60 ML/MIN/1.73 M^2
EST. GFR  (NON AFRICAN AMERICAN): >60 ML/MIN/1.73 M^2
GLUCOSE SERPL-MCNC: 249 MG/DL
HCT VFR BLD AUTO: 37.7 %
HDLC SERPL-MCNC: 76 MG/DL
HDLC SERPL: 35.7 %
HGB BLD-MCNC: 12.1 G/DL
IMM GRANULOCYTES # BLD AUTO: 0.01 K/UL
IMM GRANULOCYTES NFR BLD AUTO: 0.3 %
LDLC SERPL CALC-MCNC: 121.2 MG/DL
LYMPHOCYTES # BLD AUTO: 1.2 K/UL
LYMPHOCYTES NFR BLD: 29.2 %
MCH RBC QN AUTO: 30.6 PG
MCHC RBC AUTO-ENTMCNC: 32.1 G/DL
MCV RBC AUTO: 95 FL
MICROALBUMIN UR DL<=1MG/L-MCNC: 35 UG/ML
MONOCYTES # BLD AUTO: 0.4 K/UL
MONOCYTES NFR BLD: 11.1 %
NEUTROPHILS # BLD AUTO: 2 K/UL
NEUTROPHILS NFR BLD: 50.6 %
NONHDLC SERPL-MCNC: 137 MG/DL
NRBC BLD-RTO: 0 /100 WBC
PLATELET # BLD AUTO: 277 K/UL
PMV BLD AUTO: 9.7 FL
POTASSIUM SERPL-SCNC: 4.1 MMOL/L
PROT SERPL-MCNC: 8.1 G/DL
RBC # BLD AUTO: 3.96 M/UL
SODIUM SERPL-SCNC: 137 MMOL/L
TRIGL SERPL-MCNC: 79 MG/DL
WBC # BLD AUTO: 3.97 K/UL

## 2019-01-11 PROCEDURE — 36415 COLL VENOUS BLD VENIPUNCTURE: CPT | Mod: PO

## 2019-01-11 PROCEDURE — 80061 LIPID PANEL: CPT

## 2019-01-11 PROCEDURE — 85025 COMPLETE CBC W/AUTO DIFF WBC: CPT

## 2019-01-11 PROCEDURE — 83036 HEMOGLOBIN GLYCOSYLATED A1C: CPT

## 2019-01-11 PROCEDURE — 80053 COMPREHEN METABOLIC PANEL: CPT

## 2019-01-12 LAB
ESTIMATED AVG GLUCOSE: 151 MG/DL
HBA1C MFR BLD HPLC: 6.9 %

## 2019-01-21 ENCOUNTER — OFFICE VISIT (OUTPATIENT)
Dept: FAMILY MEDICINE | Facility: CLINIC | Age: 64
End: 2019-01-21
Attending: FAMILY MEDICINE
Payer: COMMERCIAL

## 2019-01-21 VITALS
HEART RATE: 77 BPM | SYSTOLIC BLOOD PRESSURE: 124 MMHG | HEIGHT: 66 IN | WEIGHT: 180.13 LBS | OXYGEN SATURATION: 98 % | DIASTOLIC BLOOD PRESSURE: 70 MMHG | BODY MASS INDEX: 28.95 KG/M2 | TEMPERATURE: 98 F

## 2019-01-21 DIAGNOSIS — E78.5 DM TYPE 2 WITH DIABETIC DYSLIPIDEMIA: Primary | ICD-10-CM

## 2019-01-21 DIAGNOSIS — I10 HYPERTENSION, UNSPECIFIED TYPE: ICD-10-CM

## 2019-01-21 DIAGNOSIS — E11.69 DM TYPE 2 WITH DIABETIC DYSLIPIDEMIA: Primary | ICD-10-CM

## 2019-01-21 PROCEDURE — 99214 PR OFFICE/OUTPT VISIT, EST, LEVL IV, 30-39 MIN: ICD-10-PCS | Mod: S$GLB,,, | Performed by: FAMILY MEDICINE

## 2019-01-21 PROCEDURE — 99999 PR PBB SHADOW E&M-EST. PATIENT-LVL III: ICD-10-PCS | Mod: PBBFAC,,, | Performed by: FAMILY MEDICINE

## 2019-01-21 PROCEDURE — 99999 PR PBB SHADOW E&M-EST. PATIENT-LVL III: CPT | Mod: PBBFAC,,, | Performed by: FAMILY MEDICINE

## 2019-01-21 PROCEDURE — 99214 OFFICE O/P EST MOD 30 MIN: CPT | Mod: S$GLB,,, | Performed by: FAMILY MEDICINE

## 2019-01-21 PROCEDURE — 99213 OFFICE O/P EST LOW 20 MIN: CPT | Mod: PBBFAC,PO | Performed by: FAMILY MEDICINE

## 2019-01-21 NOTE — PROGRESS NOTES
Subjective:       Patient ID: Anne Farmer is a 63 y.o. female.    Chief Complaint: Follow-up    63 y old  Female here for f/u on DM, HTN and dlp .  On aspirin , exercises ; None , Ophthalmology : will see Dr Yanelis isaac  , not monitoring BS . Not on any diet       Review of Systems   Constitutional: Negative.    HENT: Negative.    Eyes: Negative.    Respiratory: Negative.    Cardiovascular: Negative.    Gastrointestinal: Negative.    Genitourinary: Negative.    Musculoskeletal: Negative.    Skin: Negative.    Hematological: Negative.        Objective:      Physical Exam   Constitutional: She is oriented to person, place, and time. No distress.   HENT:   Head: Normocephalic and atraumatic.   Right Ear: External ear normal.   Left Ear: External ear normal.   Mouth/Throat: No oropharyngeal exudate.   Eyes: Conjunctivae and EOM are normal. Pupils are equal, round, and reactive to light. Right eye exhibits no discharge. Left eye exhibits no discharge. No scleral icterus.   Neck: Normal range of motion. Neck supple. No JVD present. No tracheal deviation present. No thyromegaly present.   Cardiovascular: Normal rate, regular rhythm and normal heart sounds. Exam reveals no gallop and no friction rub.   No murmur heard.  Pulmonary/Chest: Effort normal and breath sounds normal. No stridor. No respiratory distress. She has no wheezes. She has no rales. She exhibits no tenderness.   Abdominal: Soft. Bowel sounds are normal. She exhibits no distension. There is no tenderness. There is no rebound and no guarding.   Musculoskeletal: Normal range of motion.   Lymphadenopathy:     She has no cervical adenopathy.   Neurological: She is alert and oriented to person, place, and time.   Skin: Skin is warm and dry. She is not diaphoretic.   Psychiatric: She has a normal mood and affect. Her behavior is normal. Judgment and thought content normal.       Assessment:     Anne was seen today for follow-up.    Diagnoses and all  orders for this visit:    DM type 2 with diabetic dyslipidemia    Hypertension, unspecified type      Plan:   . Type II diabetes mellitus  Stable and well controlled currently. Continue Current medications as prescribed. No medication changes made today.   Pt. encouraged to follow a strict diabetic type diet.   Encouraged daily physical activity/exercise for at least 30mins if tolerated.   Weight control recommenced as always.   Discussed how lifestyle changes make biggest impact on diabetes control.   Continue home glucose monitoring once daily and keep log.   Counseling provided today about hypoglycemia as well as about how diabetes increases risk of cardiovascular, cerebrovascular ,peripheral vascular disease and other serious health complications. He has been instructed to call if developing any new symptoms or hypoglycemia related symptoms.   See encounter for associated orders/medications.   - Lipid panel; Future    Controlled. Cont med .   F.u in 4 m

## 2019-01-24 RX ORDER — PROMETHAZINE HYDROCHLORIDE AND DEXTROMETHORPHAN HYDROBROMIDE 6.25; 15 MG/5ML; MG/5ML
5 SYRUP ORAL 3 TIMES DAILY PRN
Qty: 118 ML | Refills: 0 | Status: SHIPPED | OUTPATIENT
Start: 2019-01-24 | End: 2019-02-03

## 2019-01-24 RX ORDER — AMOXICILLIN AND CLAVULANATE POTASSIUM 875; 125 MG/1; MG/1
1 TABLET, FILM COATED ORAL 2 TIMES DAILY
Qty: 20 TABLET | Refills: 0 | OUTPATIENT
Start: 2019-01-24 | End: 2019-02-03

## 2019-01-25 ENCOUNTER — HOSPITAL ENCOUNTER (OUTPATIENT)
Dept: RADIOLOGY | Facility: HOSPITAL | Age: 64
Discharge: HOME | End: 2019-01-25
Attending: FAMILY MEDICINE
Payer: COMMERCIAL

## 2019-01-25 DIAGNOSIS — Z12.39 BREAST CANCER SCREENING: ICD-10-CM

## 2019-01-25 PROCEDURE — 77063 BREAST TOMOSYNTHESIS BI: CPT | Mod: 26,,, | Performed by: RADIOLOGY

## 2019-01-25 PROCEDURE — 77067 SCR MAMMO BI INCL CAD: CPT | Mod: 26,,, | Performed by: RADIOLOGY

## 2019-01-25 PROCEDURE — 77067 MAMMO DIGITAL SCREENING BILAT WITH TOMOSYNTHESIS_CAD: ICD-10-PCS | Mod: 26,,, | Performed by: RADIOLOGY

## 2019-01-25 PROCEDURE — 77063 MAMMO DIGITAL SCREENING BILAT WITH TOMOSYNTHESIS_CAD: ICD-10-PCS | Mod: 26,,, | Performed by: RADIOLOGY

## 2019-01-25 PROCEDURE — 77067 SCR MAMMO BI INCL CAD: CPT | Mod: TC

## 2019-01-28 RX ORDER — HYDROCHLOROTHIAZIDE 25 MG/1
TABLET ORAL
Qty: 90 TABLET | Refills: 0 | Status: SHIPPED | OUTPATIENT
Start: 2019-01-28 | End: 2019-06-19 | Stop reason: SDUPTHER

## 2019-02-13 ENCOUNTER — OFFICE VISIT (OUTPATIENT)
Dept: OPHTHALMOLOGY | Facility: CLINIC | Age: 64
End: 2019-02-13
Payer: MEDICARE

## 2019-02-13 DIAGNOSIS — H25.13 CATARACT, NUCLEAR SCLEROTIC SENILE, BILATERAL: ICD-10-CM

## 2019-02-13 DIAGNOSIS — H52.4 BILATERAL PRESBYOPIA: ICD-10-CM

## 2019-02-13 DIAGNOSIS — E11.9 DIABETES MELLITUS TYPE 2 WITHOUT RETINOPATHY: Primary | ICD-10-CM

## 2019-02-13 PROCEDURE — 92015 DETERMINE REFRACTIVE STATE: CPT | Mod: S$GLB,,, | Performed by: OPTOMETRIST

## 2019-02-13 PROCEDURE — 92015 PR REFRACTION: ICD-10-PCS | Mod: S$GLB,,, | Performed by: OPTOMETRIST

## 2019-02-13 PROCEDURE — 92014 PR EYE EXAM, EST PATIENT,COMPREHESV: ICD-10-PCS | Mod: S$GLB,,, | Performed by: OPTOMETRIST

## 2019-02-13 PROCEDURE — 99211 OFF/OP EST MAY X REQ PHY/QHP: CPT | Mod: PBBFAC | Performed by: OPTOMETRIST

## 2019-02-13 PROCEDURE — 99999 PR PBB SHADOW E&M-EST. PATIENT-LVL I: CPT | Mod: PBBFAC,,, | Performed by: OPTOMETRIST

## 2019-02-13 PROCEDURE — 99999 PR PBB SHADOW E&M-EST. PATIENT-LVL I: ICD-10-PCS | Mod: PBBFAC,,, | Performed by: OPTOMETRIST

## 2019-02-13 PROCEDURE — 92014 COMPRE OPH EXAM EST PT 1/>: CPT | Mod: S$GLB,,, | Performed by: OPTOMETRIST

## 2019-02-13 NOTE — PROGRESS NOTES
HPI     NIDDM exam.  Decrease night visual acuity.  Lights bother patient at night.  Last eye exam 05/02/2017 [facility].    Last edited by Kristen Butler on 2/13/2019  3:04 PM. (History)            Assessment /Plan     For exam results, see Encounter Report.    Diabetes mellitus type 2 without retinopathy    Cataract, nuclear sclerotic senile, bilateral    Bilateral presbyopia      No Background Diabetic Retinopathy    Significant cataracts OU, pt defers the cataract evaluation consult.  Failed glare test OD    Dispense Final Rx for glasses or may use OTC glasses.  RTC 1 year, sooner if decreased vision  Discussed above and answered questions.

## 2019-02-15 ENCOUNTER — OFFICE VISIT (OUTPATIENT)
Dept: FAMILY MEDICINE | Facility: CLINIC | Age: 64
End: 2019-02-15
Payer: COMMERCIAL

## 2019-02-15 VITALS
OXYGEN SATURATION: 97 % | HEIGHT: 66 IN | HEART RATE: 66 BPM | BODY MASS INDEX: 28.9 KG/M2 | TEMPERATURE: 98 F | DIASTOLIC BLOOD PRESSURE: 73 MMHG | WEIGHT: 179.81 LBS | SYSTOLIC BLOOD PRESSURE: 156 MMHG

## 2019-02-15 DIAGNOSIS — J06.9 VIRAL UPPER RESPIRATORY TRACT INFECTION: Primary | ICD-10-CM

## 2019-02-15 PROCEDURE — 99213 PR OFFICE/OUTPT VISIT, EST, LEVL III, 20-29 MIN: ICD-10-PCS | Mod: S$GLB,,, | Performed by: NURSE PRACTITIONER

## 2019-02-15 PROCEDURE — 99999 PR PBB SHADOW E&M-EST. PATIENT-LVL IV: ICD-10-PCS | Mod: PBBFAC,,, | Performed by: NURSE PRACTITIONER

## 2019-02-15 PROCEDURE — 99214 OFFICE O/P EST MOD 30 MIN: CPT | Mod: PBBFAC,25,PO | Performed by: NURSE PRACTITIONER

## 2019-02-15 PROCEDURE — 99213 OFFICE O/P EST LOW 20 MIN: CPT | Mod: S$GLB,,, | Performed by: NURSE PRACTITIONER

## 2019-02-15 PROCEDURE — 99999 PR PBB SHADOW E&M-EST. PATIENT-LVL IV: CPT | Mod: PBBFAC,,, | Performed by: NURSE PRACTITIONER

## 2019-02-15 PROCEDURE — 96372 THER/PROPH/DIAG INJ SC/IM: CPT | Mod: PBBFAC,PO

## 2019-02-15 RX ORDER — METHYLPREDNISOLONE ACETATE 40 MG/ML
40 INJECTION, SUSPENSION INTRA-ARTICULAR; INTRALESIONAL; INTRAMUSCULAR; SOFT TISSUE
Status: COMPLETED | OUTPATIENT
Start: 2019-02-15 | End: 2019-02-15

## 2019-02-15 RX ORDER — LEVOCETIRIZINE DIHYDROCHLORIDE 5 MG/1
5 TABLET, FILM COATED ORAL NIGHTLY
Qty: 30 TABLET | Refills: 0 | Status: SHIPPED | OUTPATIENT
Start: 2019-02-15 | End: 2019-03-06 | Stop reason: SDUPTHER

## 2019-02-15 RX ORDER — PROMETHAZINE HYDROCHLORIDE AND DEXTROMETHORPHAN HYDROBROMIDE 6.25; 15 MG/5ML; MG/5ML
5 SYRUP ORAL 3 TIMES DAILY PRN
Qty: 118 ML | Refills: 0 | Status: SHIPPED | OUTPATIENT
Start: 2019-02-15 | End: 2019-02-25

## 2019-02-15 RX ADMIN — METHYLPREDNISOLONE ACETATE 40 MG: 40 INJECTION, SUSPENSION INTRALESIONAL; INTRAMUSCULAR; INTRASYNOVIAL; SOFT TISSUE at 03:02

## 2019-02-15 NOTE — PROGRESS NOTES
"CC:   Chief Complaint   Patient presents with    Chest Congestion    Cough     HPI: This is a new problem.   Anne Farmer is a 63 y.o. female with a complaint of URI.  The current episode started in the past 7 days.   The problem has been gradually worsening.   Associated symptoms included nasal congestion, rhinorrhea, cough.    Pertinent negatives include fever, chills, chest pain, dyspnea, wheezing   Treatments tried: flonase has been used and this has provided no relief.     [unfilled]  Outpatient Medications Prior to Visit   Medication Sig Dispense Refill    amLODIPine (NORVASC) 10 MG tablet Take 1 tablet (10 mg total) by mouth once daily. 30 tablet 0    cyclobenzaprine (FLEXERIL) 10 MG tablet Take 1 tablet (10 mg total) by mouth 3 (three) times daily as needed. 30 tablet 2    fluticasone (FLONASE) 50 mcg/actuation nasal spray 2 sprays (100 mcg total) by Each Nare route once daily. 16 g 0    hydroCHLOROthiazide (HYDRODIURIL) 25 MG tablet TAKE 1 TABLET BY MOUTH EVERY DAY 90 tablet 0    losartan (COZAAR) 100 MG tablet Take 1 tablet (100 mg total) by mouth once daily. 90 tablet 2    multivitamin (ONE DAILY MULTIVITAMIN) per tablet Take 1 tablet by mouth once daily.      omeprazole (PRILOSEC) 20 MG capsule Take 1 capsule (20 mg total) by mouth once daily. 30 capsule 2    pravastatin (PRAVACHOL) 20 MG tablet Take 1 tablet (20 mg total) by mouth once daily. 30 tablet 2    tramadol (ULTRAM) 50 mg tablet TAKE ONE TABLET BY MOUTH THREE TIMES DAILY AFTER A MEAL 90 tablet 2    traZODone (DESYREL) 50 MG tablet Take 1 tablet (50 mg total) by mouth nightly as needed for Insomnia. 30 tablet 3     No facility-administered medications prior to visit.         Physical Exam   BP (!) 156/73   Pulse 66   Temp 98.4 °F (36.9 °C) (Tympanic)   Ht 5' 6" (1.676 m)   Wt 81.5 kg (179 lb 12.6 oz)   SpO2 97%   BMI 29.02 kg/m²   Constitutional: The patient appears well-developed and well-nourished.   Head: " Normocephalic and atraumatic.   Right Ear: Tympanic membrane and ear canal normal. No drainage, swelling or tenderness. Tympanic membrane is not injected, not erythematous and not bulging.   Left Ear: Ear canal normal. No drainage, swelling or tenderness. Tympanic membrane is not injected, not erythematous and not bulging.   Nose: Mucosal edema and rhinorrhea present. No sinus tenderness on palpation   Mouth/Throat: Uvula is midline. Posterior oropharyngeal erythema present. No oropharyngeal exudate.        THE MUCOSA IS BOGGY AND ERYTHEMATOUS.     Eyes: Conjunctivae normal and lids are normal. Pupils are equal, round, and reactive to light. Right eye exhibits no discharge. Left eye exhibits no discharge. Right eye exhibits normal extraocular motion. Left eye exhibits normal extraocular motion.   Neck: Trachea normal and normal range of motion. Neck supple. No tracheal tenderness present. No mass and no thyromegaly present.   Cardiovascular: Normal rate, regular rhythm, S1 normal, S2 normal and normal heart sounds.  Exam reveals no gallop, no S3, no S4 and no friction rub.    No murmur heard.  Pulmonary/Chest: Effort normal and breath sounds normal. No stridor. Not tachypneic. No respiratory distress. The patient has no wheezes. The patient has no rhonchi. The patient has no rales.   Skin: The patient is not diaphoretic.     Encounter Diagnosis   Name Primary?    Viral upper respiratory tract infection Yes       PLAN:    Anne was seen today for chest congestion and cough.    Diagnoses and all orders for this visit:    Viral upper respiratory tract infection  -     levocetirizine (XYZAL) 5 MG tablet; Take 1 tablet (5 mg total) by mouth every evening.  -     promethazine-dextromethorphan (PROMETHAZINE-DM) 6.25-15 mg/5 mL Syrp; Take 5 mLs by mouth 3 (three) times daily as needed (cough).  -     methylPREDNISolone acetate injection 40 mg  -symptomatic treatment discussed. Verbalized understanding     Medications  Ordered This Encounter   Medications    levocetirizine (XYZAL) 5 MG tablet     Sig: Take 1 tablet (5 mg total) by mouth every evening.     Dispense:  30 tablet     Refill:  0    methylPREDNISolone acetate injection 40 mg    promethazine-dextromethorphan (PROMETHAZINE-DM) 6.25-15 mg/5 mL Syrp     Sig: Take 5 mLs by mouth 3 (three) times daily as needed (cough).     Dispense:  118 mL     Refill:  0     No orders of the defined types were placed in this encounter.    RTC if symptoms are worsening or changing significantly or if not improved by the end of therapy.

## 2019-02-25 RX ORDER — AMLODIPINE BESYLATE 10 MG/1
TABLET ORAL
Qty: 90 TABLET | Refills: 0 | Status: SHIPPED | OUTPATIENT
Start: 2019-02-25 | End: 2019-04-01 | Stop reason: SDUPTHER

## 2019-03-04 RX ORDER — AMLODIPINE BESYLATE 10 MG/1
TABLET ORAL
Qty: 30 TABLET | Refills: 0 | Status: SHIPPED | OUTPATIENT
Start: 2019-03-04 | End: 2019-03-30 | Stop reason: SDUPTHER

## 2019-03-06 DIAGNOSIS — J06.9 VIRAL UPPER RESPIRATORY TRACT INFECTION: ICD-10-CM

## 2019-03-06 RX ORDER — PROMETHAZINE HYDROCHLORIDE AND DEXTROMETHORPHAN HYDROBROMIDE 6.25; 15 MG/5ML; MG/5ML
5 SYRUP ORAL 3 TIMES DAILY PRN
Qty: 118 ML | Refills: 0 | OUTPATIENT
Start: 2019-03-06 | End: 2019-03-16

## 2019-03-06 RX ORDER — LEVOCETIRIZINE DIHYDROCHLORIDE 5 MG/1
TABLET, FILM COATED ORAL
Qty: 30 TABLET | Refills: 0 | Status: SHIPPED | OUTPATIENT
Start: 2019-03-06 | End: 2019-04-23 | Stop reason: SDUPTHER

## 2019-03-08 RX ORDER — FLUTICASONE PROPIONATE 50 MCG
SPRAY, SUSPENSION (ML) NASAL
Qty: 16 ML | Refills: 0 | Status: SHIPPED | OUTPATIENT
Start: 2019-03-08 | End: 2019-03-30 | Stop reason: SDUPTHER

## 2019-03-20 DIAGNOSIS — J06.9 VIRAL UPPER RESPIRATORY TRACT INFECTION: ICD-10-CM

## 2019-03-21 RX ORDER — PROMETHAZINE HYDROCHLORIDE AND DEXTROMETHORPHAN HYDROBROMIDE 6.25; 15 MG/5ML; MG/5ML
5 SYRUP ORAL 3 TIMES DAILY PRN
Qty: 118 ML | Refills: 0 | OUTPATIENT
Start: 2019-03-21 | End: 2019-03-31

## 2019-03-29 RX ORDER — PRAVASTATIN SODIUM 20 MG/1
TABLET ORAL
Qty: 30 TABLET | Refills: 2 | Status: SHIPPED | OUTPATIENT
Start: 2019-03-29 | End: 2019-07-30 | Stop reason: SDUPTHER

## 2019-03-29 RX ORDER — OMEPRAZOLE 20 MG/1
CAPSULE, DELAYED RELEASE ORAL
Qty: 30 CAPSULE | Refills: 2 | Status: SHIPPED | OUTPATIENT
Start: 2019-03-29 | End: 2019-07-08 | Stop reason: SDUPTHER

## 2019-04-01 RX ORDER — FLUTICASONE PROPIONATE 50 MCG
SPRAY, SUSPENSION (ML) NASAL
Qty: 16 ML | Refills: 0 | Status: SHIPPED | OUTPATIENT
Start: 2019-04-01 | End: 2019-11-11 | Stop reason: SDUPTHER

## 2019-04-01 RX ORDER — AMLODIPINE BESYLATE 10 MG/1
TABLET ORAL
Qty: 90 TABLET | Refills: 0 | Status: SHIPPED | OUTPATIENT
Start: 2019-04-01 | End: 2019-06-27 | Stop reason: SDUPTHER

## 2019-04-04 DIAGNOSIS — M25.50 MULTIPLE JOINT PAIN: ICD-10-CM

## 2019-04-04 DIAGNOSIS — M19.90 ARTHRITIS: ICD-10-CM

## 2019-04-05 RX ORDER — CYCLOBENZAPRINE HCL 10 MG
TABLET ORAL
Qty: 30 TABLET | Refills: 0 | Status: SHIPPED | OUTPATIENT
Start: 2019-04-05 | End: 2019-06-03 | Stop reason: SDUPTHER

## 2019-04-22 DIAGNOSIS — J06.9 VIRAL UPPER RESPIRATORY TRACT INFECTION: ICD-10-CM

## 2019-04-22 RX ORDER — PROMETHAZINE HYDROCHLORIDE AND DEXTROMETHORPHAN HYDROBROMIDE 6.25; 15 MG/5ML; MG/5ML
5 SYRUP ORAL 3 TIMES DAILY PRN
Qty: 118 ML | Refills: 0 | OUTPATIENT
Start: 2019-04-22 | End: 2019-05-02

## 2019-04-23 DIAGNOSIS — J06.9 VIRAL UPPER RESPIRATORY TRACT INFECTION: ICD-10-CM

## 2019-04-23 RX ORDER — LEVOCETIRIZINE DIHYDROCHLORIDE 5 MG/1
TABLET, FILM COATED ORAL
Qty: 90 TABLET | Refills: 0 | Status: SHIPPED | OUTPATIENT
Start: 2019-04-23 | End: 2020-09-23 | Stop reason: SDUPTHER

## 2019-04-23 RX ORDER — PROMETHAZINE HYDROCHLORIDE AND DEXTROMETHORPHAN HYDROBROMIDE 6.25; 15 MG/5ML; MG/5ML
5 SYRUP ORAL 3 TIMES DAILY PRN
Qty: 118 ML | Refills: 0 | OUTPATIENT
Start: 2019-04-23 | End: 2019-05-03

## 2019-04-25 RX ORDER — TRAZODONE HYDROCHLORIDE 50 MG/1
50 TABLET ORAL NIGHTLY PRN
Qty: 30 TABLET | Refills: 3 | Status: SHIPPED | OUTPATIENT
Start: 2019-04-25 | End: 2020-09-23 | Stop reason: SDUPTHER

## 2019-05-02 DIAGNOSIS — J06.9 VIRAL UPPER RESPIRATORY TRACT INFECTION: ICD-10-CM

## 2019-05-03 RX ORDER — PROMETHAZINE HYDROCHLORIDE AND DEXTROMETHORPHAN HYDROBROMIDE 6.25; 15 MG/5ML; MG/5ML
5 SYRUP ORAL 3 TIMES DAILY PRN
Qty: 118 ML | Refills: 0 | OUTPATIENT
Start: 2019-05-03 | End: 2019-05-13

## 2019-05-11 DIAGNOSIS — J06.9 VIRAL UPPER RESPIRATORY TRACT INFECTION: ICD-10-CM

## 2019-05-11 RX ORDER — PROMETHAZINE HYDROCHLORIDE AND DEXTROMETHORPHAN HYDROBROMIDE 6.25; 15 MG/5ML; MG/5ML
5 SYRUP ORAL 3 TIMES DAILY PRN
Qty: 118 ML | Refills: 0 | OUTPATIENT
Start: 2019-05-11 | End: 2019-05-21

## 2019-05-21 ENCOUNTER — OFFICE VISIT (OUTPATIENT)
Dept: FAMILY MEDICINE | Facility: CLINIC | Age: 64
End: 2019-05-21
Attending: FAMILY MEDICINE
Payer: COMMERCIAL

## 2019-05-21 VITALS
DIASTOLIC BLOOD PRESSURE: 78 MMHG | WEIGHT: 184.31 LBS | OXYGEN SATURATION: 95 % | HEART RATE: 92 BPM | HEIGHT: 66 IN | SYSTOLIC BLOOD PRESSURE: 138 MMHG | TEMPERATURE: 98 F | BODY MASS INDEX: 29.62 KG/M2

## 2019-05-21 DIAGNOSIS — E11.29 TYPE 2 DIABETES MELLITUS WITH MICROALBUMINURIA, WITHOUT LONG-TERM CURRENT USE OF INSULIN: Primary | ICD-10-CM

## 2019-05-21 DIAGNOSIS — R80.9 TYPE 2 DIABETES MELLITUS WITH MICROALBUMINURIA, WITHOUT LONG-TERM CURRENT USE OF INSULIN: Primary | ICD-10-CM

## 2019-05-21 DIAGNOSIS — I10 HYPERTENSION, UNSPECIFIED TYPE: ICD-10-CM

## 2019-05-21 LAB
ALBUMIN/CREAT UR: 25.2 UG/MG (ref 0–30)
CREAT UR-MCNC: 111 MG/DL (ref 15–325)
MICROALBUMIN UR DL<=1MG/L-MCNC: 28 UG/ML

## 2019-05-21 PROCEDURE — 99999 PR PBB SHADOW E&M-EST. PATIENT-LVL III: ICD-10-PCS | Mod: PBBFAC,,, | Performed by: FAMILY MEDICINE

## 2019-05-21 PROCEDURE — 99213 OFFICE O/P EST LOW 20 MIN: CPT | Mod: PBBFAC,PO | Performed by: FAMILY MEDICINE

## 2019-05-21 PROCEDURE — 99999 PR PBB SHADOW E&M-EST. PATIENT-LVL III: CPT | Mod: PBBFAC,,, | Performed by: FAMILY MEDICINE

## 2019-05-21 PROCEDURE — 82043 UR ALBUMIN QUANTITATIVE: CPT

## 2019-05-21 PROCEDURE — G0009 ADMIN PNEUMOCOCCAL VACCINE: HCPCS | Mod: PBBFAC,PO

## 2019-05-21 PROCEDURE — 99214 PR OFFICE/OUTPT VISIT, EST, LEVL IV, 30-39 MIN: ICD-10-PCS | Mod: 25,S$GLB,, | Performed by: FAMILY MEDICINE

## 2019-05-21 PROCEDURE — 99214 OFFICE O/P EST MOD 30 MIN: CPT | Mod: 25,S$GLB,, | Performed by: FAMILY MEDICINE

## 2019-05-21 RX ORDER — PROMETHAZINE HYDROCHLORIDE AND DEXTROMETHORPHAN HYDROBROMIDE 6.25; 15 MG/5ML; MG/5ML
SYRUP ORAL
Qty: 118 ML | Refills: 0 | Status: SHIPPED | OUTPATIENT
Start: 2019-05-21 | End: 2019-11-11

## 2019-05-21 NOTE — PROGRESS NOTES
Subjective:       Patient ID: Anne Farmer is a 63 y.o. female.    Chief Complaint: Follow-up    63 y old  Female here for f/u on DM, HTN and dlp .  On aspirin , exercises ; None , Ophthalmology : current eye exam   , not monitoring BS . Not on any diet     Review of Systems   Constitutional: Negative.    HENT: Negative.    Eyes: Negative.    Respiratory: Negative.    Cardiovascular: Negative.    Gastrointestinal: Negative.    Genitourinary: Negative.    Musculoskeletal: Negative.    Skin: Negative.    Hematological: Negative.        Objective:      Physical Exam   Constitutional: She is oriented to person, place, and time. No distress.   HENT:   Head: Normocephalic and atraumatic.   Right Ear: External ear normal.   Left Ear: External ear normal.   Mouth/Throat: No oropharyngeal exudate.   Eyes: Pupils are equal, round, and reactive to light. Conjunctivae and EOM are normal. Right eye exhibits no discharge. Left eye exhibits no discharge. No scleral icterus.   Neck: Normal range of motion. Neck supple. No JVD present. No tracheal deviation present. No thyromegaly present.   Cardiovascular: Normal rate, regular rhythm and normal heart sounds. Exam reveals no gallop and no friction rub.   No murmur heard.  Pulses:       Dorsalis pedis pulses are 2+ on the right side, and 2+ on the left side.        Posterior tibial pulses are 2+ on the right side, and 2+ on the left side.   Pulmonary/Chest: Effort normal and breath sounds normal. No stridor. No respiratory distress. She has no wheezes. She has no rales. She exhibits no tenderness.   Abdominal: Soft. Bowel sounds are normal. She exhibits no distension. There is no tenderness. There is no rebound and no guarding.   Musculoskeletal: Normal range of motion.        Right foot: There is normal range of motion and no deformity.        Left foot: There is normal range of motion and no deformity.   Feet:   Right Foot:   Protective Sensation: 10 sites tested. 0 sites  sensed.   Skin Integrity: Positive for dry skin. Negative for ulcer, blister, skin breakdown, erythema, warmth or callus.   Left Foot:   Protective Sensation: 10 sites tested. 0 sites sensed.   Skin Integrity: Positive for dry skin. Negative for ulcer, blister, skin breakdown, erythema, warmth or callus.   Lymphadenopathy:     She has no cervical adenopathy.   Neurological: She is alert and oriented to person, place, and time.   Skin: Skin is warm and dry. She is not diaphoretic.   Psychiatric: She has a normal mood and affect. Her behavior is normal. Judgment and thought content normal.       Assessment:     Anne was seen today for follow-up.    Diagnoses and all orders for this visit:    Type 2 diabetes mellitus with microalbuminuria, without long-term current use of insulin  -     CBC auto differential; Future  -     Comprehensive metabolic panel; Future  -     Hemoglobin A1c; Future  -     Lipid panel; Future  -     Microalbumin/creatinine urine ratio  -     Ambulatory consult to Podiatry    Hypertension, unspecified type  -     CBC auto differential; Future  -     Comprehensive metabolic panel; Future  -     Hemoglobin A1c; Future  -     Lipid panel; Future  -     Microalbumin/creatinine urine ratio  -     Ambulatory consult to Podiatry    Other orders  -     (In Office Administered) Pneumococcal Conjugate Vaccine (13 Valent) (IM)  -     promethazine-dextromethorphan (PROMETHAZINE-DM) 6.25-15 mg/5 mL Syrp; 5 ml TID prn      Plan:    . Type II diabetes mellitus  Stable and well controlled currently. Continue Current medications as prescribed. No medication changes made today.   Pt. encouraged to follow a strict diabetic type diet.   Encouraged daily physical activity/exercise for at least 30mins if tolerated.   Weight control recommenced as always.   Discussed how lifestyle changes make biggest impact on diabetes control.   Continue home glucose monitoring once daily and keep log.   Counseling provided today  about hypoglycemia as well as about how diabetes increases risk of cardiovascular, cerebrovascular ,peripheral vascular disease and other serious health complications. He has been instructed to call if developing any new symptoms or hypoglycemia related symptoms.   See encounter for associated orders/medications.   - Lipid panel; Future    Controlled. Cont med

## 2019-06-03 DIAGNOSIS — M25.50 MULTIPLE JOINT PAIN: ICD-10-CM

## 2019-06-03 DIAGNOSIS — M19.90 ARTHRITIS: ICD-10-CM

## 2019-06-05 RX ORDER — CYCLOBENZAPRINE HCL 10 MG
TABLET ORAL
Qty: 30 TABLET | Refills: 0 | Status: SHIPPED | OUTPATIENT
Start: 2019-06-05 | End: 2019-10-24 | Stop reason: SDUPTHER

## 2019-06-07 ENCOUNTER — LAB VISIT (OUTPATIENT)
Dept: LAB | Facility: HOSPITAL | Age: 64
End: 2019-06-07
Attending: FAMILY MEDICINE
Payer: MEDICARE

## 2019-06-07 DIAGNOSIS — E11.29 TYPE 2 DIABETES MELLITUS WITH MICROALBUMINURIA, WITHOUT LONG-TERM CURRENT USE OF INSULIN: ICD-10-CM

## 2019-06-07 DIAGNOSIS — R80.9 TYPE 2 DIABETES MELLITUS WITH MICROALBUMINURIA, WITHOUT LONG-TERM CURRENT USE OF INSULIN: ICD-10-CM

## 2019-06-07 DIAGNOSIS — I10 HYPERTENSION, UNSPECIFIED TYPE: ICD-10-CM

## 2019-06-07 LAB
ALBUMIN SERPL BCP-MCNC: 4 G/DL (ref 3.5–5.2)
ALP SERPL-CCNC: 83 U/L (ref 55–135)
ALT SERPL W/O P-5'-P-CCNC: 23 U/L (ref 10–44)
ANION GAP SERPL CALC-SCNC: 11 MMOL/L (ref 8–16)
AST SERPL-CCNC: 28 U/L (ref 10–40)
BASOPHILS # BLD AUTO: 0.05 K/UL (ref 0–0.2)
BASOPHILS NFR BLD: 1 % (ref 0–1.9)
BILIRUB SERPL-MCNC: 0.4 MG/DL (ref 0.1–1)
BUN SERPL-MCNC: 20 MG/DL (ref 8–23)
CALCIUM SERPL-MCNC: 10.3 MG/DL (ref 8.7–10.5)
CHLORIDE SERPL-SCNC: 101 MMOL/L (ref 95–110)
CHOLEST SERPL-MCNC: 204 MG/DL (ref 120–199)
CHOLEST/HDLC SERPL: 2.1 {RATIO} (ref 2–5)
CO2 SERPL-SCNC: 26 MMOL/L (ref 23–29)
CREAT SERPL-MCNC: 0.8 MG/DL (ref 0.5–1.4)
DIFFERENTIAL METHOD: ABNORMAL
EOSINOPHIL # BLD AUTO: 0.3 K/UL (ref 0–0.5)
EOSINOPHIL NFR BLD: 5.9 % (ref 0–8)
ERYTHROCYTE [DISTWIDTH] IN BLOOD BY AUTOMATED COUNT: 13.2 % (ref 11.5–14.5)
EST. GFR  (AFRICAN AMERICAN): >60 ML/MIN/1.73 M^2
EST. GFR  (NON AFRICAN AMERICAN): >60 ML/MIN/1.73 M^2
GLUCOSE SERPL-MCNC: 114 MG/DL (ref 70–110)
HCT VFR BLD AUTO: 38.4 % (ref 37–48.5)
HDLC SERPL-MCNC: 97 MG/DL (ref 40–75)
HDLC SERPL: 47.5 % (ref 20–50)
HGB BLD-MCNC: 12.9 G/DL (ref 12–16)
IMM GRANULOCYTES # BLD AUTO: 0.01 K/UL (ref 0–0.04)
IMM GRANULOCYTES NFR BLD AUTO: 0.2 % (ref 0–0.5)
LDLC SERPL CALC-MCNC: 93.4 MG/DL (ref 63–159)
LYMPHOCYTES # BLD AUTO: 1.5 K/UL (ref 1–4.8)
LYMPHOCYTES NFR BLD: 30.4 % (ref 18–48)
MCH RBC QN AUTO: 31.1 PG (ref 27–31)
MCHC RBC AUTO-ENTMCNC: 33.6 G/DL (ref 32–36)
MCV RBC AUTO: 93 FL (ref 82–98)
MONOCYTES # BLD AUTO: 0.5 K/UL (ref 0.3–1)
MONOCYTES NFR BLD: 10.6 % (ref 4–15)
NEUTROPHILS # BLD AUTO: 2.5 K/UL (ref 1.8–7.7)
NEUTROPHILS NFR BLD: 51.9 % (ref 38–73)
NONHDLC SERPL-MCNC: 107 MG/DL
NRBC BLD-RTO: 0 /100 WBC
PLATELET # BLD AUTO: 285 K/UL (ref 150–350)
PMV BLD AUTO: 10 FL (ref 9.2–12.9)
POTASSIUM SERPL-SCNC: 3.8 MMOL/L (ref 3.5–5.1)
PROT SERPL-MCNC: 8.2 G/DL (ref 6–8.4)
RBC # BLD AUTO: 4.15 M/UL (ref 4–5.4)
SODIUM SERPL-SCNC: 138 MMOL/L (ref 136–145)
TRIGL SERPL-MCNC: 68 MG/DL (ref 30–150)
WBC # BLD AUTO: 4.9 K/UL (ref 3.9–12.7)

## 2019-06-07 PROCEDURE — 85025 COMPLETE CBC W/AUTO DIFF WBC: CPT

## 2019-06-07 PROCEDURE — 80053 COMPREHEN METABOLIC PANEL: CPT

## 2019-06-07 PROCEDURE — 80061 LIPID PANEL: CPT

## 2019-06-07 PROCEDURE — 83036 HEMOGLOBIN GLYCOSYLATED A1C: CPT

## 2019-06-07 PROCEDURE — 36415 COLL VENOUS BLD VENIPUNCTURE: CPT | Mod: PO

## 2019-06-08 LAB
ESTIMATED AVG GLUCOSE: 163 MG/DL (ref 68–131)
HBA1C MFR BLD HPLC: 7.3 % (ref 4–5.6)

## 2019-06-19 ENCOUNTER — OFFICE VISIT (OUTPATIENT)
Dept: FAMILY MEDICINE | Facility: CLINIC | Age: 64
End: 2019-06-19
Attending: FAMILY MEDICINE
Payer: MEDICARE

## 2019-06-19 VITALS
DIASTOLIC BLOOD PRESSURE: 80 MMHG | TEMPERATURE: 98 F | OXYGEN SATURATION: 98 % | BODY MASS INDEX: 29.07 KG/M2 | HEART RATE: 57 BPM | HEIGHT: 66 IN | SYSTOLIC BLOOD PRESSURE: 132 MMHG | WEIGHT: 180.88 LBS

## 2019-06-19 DIAGNOSIS — E11.69 DM TYPE 2 WITH DIABETIC DYSLIPIDEMIA: Primary | ICD-10-CM

## 2019-06-19 DIAGNOSIS — E78.5 DM TYPE 2 WITH DIABETIC DYSLIPIDEMIA: Primary | ICD-10-CM

## 2019-06-19 DIAGNOSIS — I10 HYPERTENSION, UNSPECIFIED TYPE: ICD-10-CM

## 2019-06-19 PROCEDURE — 99999 PR PBB SHADOW E&M-EST. PATIENT-LVL III: CPT | Mod: PBBFAC,,, | Performed by: FAMILY MEDICINE

## 2019-06-19 PROCEDURE — 99214 PR OFFICE/OUTPT VISIT, EST, LEVL IV, 30-39 MIN: ICD-10-PCS | Mod: S$GLB,,, | Performed by: FAMILY MEDICINE

## 2019-06-19 PROCEDURE — 99213 OFFICE O/P EST LOW 20 MIN: CPT | Mod: PBBFAC,PO | Performed by: FAMILY MEDICINE

## 2019-06-19 PROCEDURE — 99214 OFFICE O/P EST MOD 30 MIN: CPT | Mod: S$GLB,,, | Performed by: FAMILY MEDICINE

## 2019-06-19 PROCEDURE — 99999 PR PBB SHADOW E&M-EST. PATIENT-LVL III: ICD-10-PCS | Mod: PBBFAC,,, | Performed by: FAMILY MEDICINE

## 2019-06-19 RX ORDER — INSULIN PUMP SYRINGE, 3 ML
EACH MISCELLANEOUS
Qty: 1 EACH | Refills: 0 | Status: SHIPPED | OUTPATIENT
Start: 2019-06-19 | End: 2019-06-21 | Stop reason: SDUPTHER

## 2019-06-19 RX ORDER — METFORMIN HYDROCHLORIDE 750 MG/1
750 TABLET, EXTENDED RELEASE ORAL
Qty: 30 TABLET | Refills: 3 | Status: SHIPPED | OUTPATIENT
Start: 2019-06-19 | End: 2019-10-08 | Stop reason: SDUPTHER

## 2019-06-19 RX ORDER — LANCETS
EACH MISCELLANEOUS
Qty: 180 EACH | Refills: 0 | Status: SHIPPED | OUTPATIENT
Start: 2019-06-19 | End: 2019-06-21 | Stop reason: SDUPTHER

## 2019-06-19 RX ORDER — HYDROCHLOROTHIAZIDE 25 MG/1
25 TABLET ORAL DAILY
Qty: 90 TABLET | Refills: 0 | Status: SHIPPED | OUTPATIENT
Start: 2019-06-19 | End: 2019-10-14 | Stop reason: SDUPTHER

## 2019-06-19 NOTE — PROGRESS NOTES
Subjective:       Patient ID: Anne Farmer is a 63 y.o. female.    Chief Complaint: Follow-up    63 y old female with t2 dm , htn and dlp here to [facility] on Labs . DM has been managed with diet . Has had dietary indiscretions lately , not exercising    Lab Results       Component                Value               Date                       HGBA1C                   7.3 (H)             06/07/2019              Review of Systems   Constitutional: Negative.    HENT: Negative.    Eyes: Negative.    Respiratory: Negative.    Cardiovascular: Negative.    Gastrointestinal: Negative.    Genitourinary: Negative.    Musculoskeletal: Negative.    Skin: Negative.    Hematological: Negative.        Objective:      Physical Exam   Constitutional: She is oriented to person, place, and time. No distress.   HENT:   Head: Normocephalic and atraumatic.   Right Ear: External ear normal.   Left Ear: External ear normal.   Mouth/Throat: No oropharyngeal exudate.   Eyes: Pupils are equal, round, and reactive to light. Conjunctivae and EOM are normal. Right eye exhibits no discharge. Left eye exhibits no discharge. No scleral icterus.   Neck: Normal range of motion. Neck supple. No JVD present. No tracheal deviation present. No thyromegaly present.   Cardiovascular: Normal rate, regular rhythm and normal heart sounds. Exam reveals no gallop and no friction rub.   No murmur heard.  Pulmonary/Chest: Effort normal and breath sounds normal. No stridor. No respiratory distress. She has no wheezes. She has no rales. She exhibits no tenderness.   Abdominal: Soft. Bowel sounds are normal. She exhibits no distension. There is no tenderness. There is no rebound and no guarding.   Musculoskeletal: Normal range of motion.   Lymphadenopathy:     She has no cervical adenopathy.   Neurological: She is alert and oriented to person, place, and time.   Skin: Skin is warm and dry. She is not diaphoretic.   Psychiatric: She has a normal mood and affect.  Her behavior is normal. Judgment and thought content normal.       Assessment:         Anne was seen today for follow-up.    Diagnoses and all orders for this visit:    DM type 2 with diabetic dyslipidemia    Hypertension, unspecified type    Other orders  -     hydroCHLOROthiazide (HYDRODIURIL) 25 MG tablet; Take 1 tablet (25 mg total) by mouth once daily.  -     metFORMIN (GLUCOPHAGE-XR) 750 MG 24 hr tablet; Take 1 tablet (750 mg total) by mouth daily with breakfast.  -     blood-glucose meter kit; To check BG 2  times daily, to use with insurance preferred meter  -     lancets Misc; To check BG 2 times daily, to use with insurance preferred meter  -     blood sugar diagnostic Strp; To check BG 2 times daily, to use with insurance preferred meter      Plan:     Anne was seen today for follow-up.    Diagnoses and all orders for this visit:    DM type 2 with diabetic dyslipidemia    Other orders  -     hydroCHLOROthiazide (HYDRODIURIL) 25 MG tablet; Take 1 tablet (25 mg total) by mouth once daily.  -     metFORMIN (GLUCOPHAGE-XR) 750 MG 24 hr tablet; Take 1 tablet (750 mg total) by mouth daily with breakfast.  -     blood-glucose meter kit; To check BG 2  times daily, to use with insurance preferred meter  -     lancets Misc; To check BG 2 times daily, to use with insurance preferred meter  -     blood sugar diagnostic Strp; To check BG 2 times daily, to use with insurance preferred meter     start metformin . Diet and ex. Keep BSL   Diet and ex   Controlled. Cont med

## 2019-06-19 NOTE — PATIENT INSTRUCTIONS
Diet: Diabetes  Food is an important tool that you can use to control diabetes and stay healthy. Eating well-balanced meals in the correct amounts will help you control your blood glucose levels and prevent low blood sugar reactions. It will also help you reduce the health risks of diabetes. There is no one specific diet that is right for everyone with diabetes. But there are general guidelines to follow. A registered dietitian (RD) will create a tailored diet approach thats just right for you. He or she will also help you plan healthy meals and snacks. If you have any questions, call your dietitian for advice.     Guidelines for success  Talk with your healthcare provider before starting a diabetes diet or weight loss program. If you haven't talked with a dietitian yet, ask your provider for a referral. The following guidelines can help you succeed:  · Select foods from the 6 food groups below. Your dietitian will help you find food choices within each group. He or she will also show you serving sizes and how many servings you can have at each meal.  ¨ Grains, beans, and starchy vegetables  ¨ Vegetables  ¨ Fruit  ¨ Milk or yogurt  ¨ Meat, poultry, fish, or tofu  ¨ Healthy fats  · Check your blood sugar levels as directed by your provider. Take any medicine as prescribed by your provider.  · Learn to read food labels and pick the right portion sizes.  · Eat only the amount of food in your meal plan. Eat about the same amount of food at regular times each day. Dont skip meals. Eat meals 4 to 5 hours apart, with snacks in between.  · Limit alcohol. It raises blood sugar levels. Drink water or calorie-free diet drinks that use safe sweeteners.  · Eat less fat to help lower your risk of heart disease. Use nonfat or low-fat dairy products and lean meats. Avoid fried foods. Use cooking oils that are unsaturated, such as olive, canola, or peanut oil.  · Talk with your dietitian about safe sugar substitutes.  · Avoid  added salt. It can contribute to high blood pressure, which can cause heart disease. People with diabetes already have a risk of high blood pressure and heart disease.  · Stay at a healthy weight. If you need to lose weight, cut down on your portion sizes. But dont skip meals. Exercise is an important part of any weight management program. Talk with your provider about an exercise program thats right for you.  · For more information about the best diet plan for you, talk with a registered dietitian (RD). To find an RD in your area, contact:  ¨ Academy of Nutrition and Dietetics www.eatright.org  ¨ The American Diabetes Association [phone] www.diabetes.org  Date Last Reviewed: 8/1/2016 © 2000-2017 [employer]. [address] [place] [address], [place] [address]. All rights reserved. This information is not intended as a substitute for professional medical care. Always follow your healthcare professional's instructions.        Healthy Meals for Diabetes     A healthcare provider will help you develop a meal plan that fits your needs.   Ask your healthcare team to help you make a meal plan that fits your needs. Your meal plan tells you when to eat your meals and snacks, what kinds of foods to eat, and how much of each food to eat. You dont have to give up all the foods you like. But you do need to follow some guidelines.  Choose healthy carbohydrates  Starches, sugars, and fiber are all types of carbohydrates. Fiber can help lower your cholesterol and triglycerides. Fiber is also healthy for your heart. You should have 20 to 35 grams of total fiber each day. Fiber-rich foods include:  · Whole-grain breads and cereals  · Bulgur wheat  · Brown rice     · Whole-wheat pasta  · Fruits and vegetables  · Dry beans, and peas   Keep track of the amount of carbohydrates you eat. This can help you keep the right balance of physical activity and medicine. The amount of carbohydrates needed will vary for each person.  It depends on many things such as your health, the medicines you take, and how active you are. Your healthcare team will help you figure out the right amount of carbohydrates for you. You may start with around 45 to 60 grams of carbohydrates per meal, depending on your situation.   Here are some examples of foods containing about 15 grams of carbohydrates (1 serving of carbohydrates):  · 1/2 cup of canned or frozen fruit  · A small piece of fresh fruit (4 ounces)  · 1 slice of bread  · 1/2 cup of oatmeal  · 1/3 cup of rice  · 4 to 6 crackers  · 1/2 English muffin  · 1/2 cup of black beans  · 1/4 of a large baked potato (3 ounces)  · 2/3 cup of plain fat-free yogurt  · 1 cup of soup  · 1/2 cup of casserole  · 6 chicken nuggets  · 2-inch-square brownie or cake without frosting  · 2 small cookies  · 1/2 cup of ice cream or sherbet  Choose healthy protein foods  Eating protein that is low in fat can help you control your weight. It also helps keep your heart healthy. Low-fat protein foods include:  · Fish  · Plant proteins, such as dry beans and peas, nuts, and soy products like tofu and soymilk  · Lean meat with all visible fat removed  · Poultry with the skin removed  · Low-fat or nonfat milk, cheese, and yogurt  Limit unhealthy fats and sugar  Saturated and trans fats are unhealthy for your heart. They raise LDL (bad) cholesterol. Fat is also high in calories, so it can make you gain weight. To cut down on unhealthy fats and sugar, limit these foods:  · Butter or margarine  · Palm and palm kernel oils and coconut oil  · Cream  · Cheese  · Spivey  · Lunch meats     · Ice cream  · Sweet bakery goods such as pies, muffins, and donuts  · Jams and jellies  · Candy bars  · Regular sodas   How much to eat  The amount of food you eat affects your blood sugar. It also affects your weight. Your healthcare team will tell you how much of each type of food you should eat.  · Use measuring cups and spoons and a food scale to  measure serving sizes.  · Learn what a correct serving size looks like on your plate. This will help when you are away from home and cant measure your servings.  · Eat only the number of servings given on your meal plan for each food. Dont take seconds.  · Learn to read food labels. Be sure to look at serving size, total carbohydrates, fiber, calories, sugar, and saturated and trans fats. Look for healthier alternatives to foods that have added sugar.  · Plan ahead for parties so you can still have a good time without going overboard with unhealthy food choices. Set a good example yourself by bringing a healthy dish to pot lucks.   Choose healthy snacks  When it comes to snacks, we usually think about foods with added sugar and fats. But there are many other options for healthier snack choices. Here are a few snack ideas to choose from:  Snacks with less than 5 grams of carbohydrates  · 1 piece of string cheese  · 3 celery sticks plus 1 tablespoon of peanut butter  · 5 cherry tomatoes plus 1 tablespoon of ranch dressing  · 1 hard-boiled egg  · 1/4 cup of fresh blueberries  ·  5 baby carrots  · 1 cup of light popcorn  · 1/2 cup of sugar-free gelatin  · 15 almonds  Snacks with about 10 to 20 grams of carbohydrates  · 1/3 cup of hummus plus 1 cup of fresh cut nonstarchy vegetables (carrots, green peppers, broccoli, celery, or a combination)  · 1/2 cup of fresh or canned fruit plus 1/4 cup of cottage cheese  · 1/2 cup of tuna salad with 4 crackers  · 2 rice cakes and a tablespoon of peanut butter  · 1 small apple or orange  · 3 cups light popcorn  · 1/2 of a turkey sandwich (1 slice of whole-wheat bread, 2 ounces of turkey, and mustard)  Portion sizes are important to controlling your blood sugar and staying at a healthy weight. Stock up on healthy snack items so you always have them on hand.  When to eat  Your meal plan will likely include breakfast, lunch, dinner, and some snacks.  · Try to eat your meals and snacks  "at about the same times each day.  · Eat all your meals and snacks. Skipping a meal or snack can make your blood sugar drop too low. It can also cause you to eat too much at the next meal or snack. Then your blood sugar could get too high.  Date Last Reviewed: 7/1/2016  © 6149-5513 [employer]. [address] [place] [address], [place] [address]. All rights reserved. This information is not intended as a substitute for professional medical care. Always follow your healthcare professional's instructions.        Diabetes: Shopping for and Preparing Meals    Having diabetes doesnt mean you have to shop in a special aisle or look for special foods. But you will need to make choices. By comparing items and reading food labels, you can find the healthiest foods for you and your family.  Comparing items  When you shop, compare items to find the best ones for your needs. Keep these facts in mind:  · No sugar added does not mean a product is sugar-free.  · "Sugar-free" means less than 1/2 gram (g) of sugar per serving.  · Fat free means less than 1/2 g of fat per serving. This does not necessarily mean the product is low in calories.  · Low fat means 3 g fat or less per serving. Reduced fat or less fat means 25% less fat than the regular version. Some of this fat may be saturated or trans fat. And calories per serving may be similar to the regular version.  Making small changes  Dont try to change all of your eating habits at once. Here are some ideas to start with:  · Try fat-free or low-fat cheese, milk, and yogurt. Also try leaner cuts of meat. This will help you cut down on saturated fat.  · Try whole-grain breads, brown rice, and whole-wheat pasta.  · Load up on fresh or frozen vegetables. If you buy canned, choose low-sodium vegetables.  · Avoid processed foods as much as possible. They tend to be low in fiber and high in trans fats and sodium.  · Try tofu, soymilk, or meat substitutes.?They can help " you cut cholesterol and saturated fat out of your diet.  Learning to read food labels  To find healthy foods that help you control blood sugar, learn how to read food labels. Look for the Nutrition Facts label on packaged foods. It will tell you how much carbohydrate, sugar, fat, and fiber is in each serving. Then, you can decide whether or not the food fits into your meal plan.  Using the food label  So, once you have the food label, what do you do with it? The food label helps in many ways. Use it to:  · Compare items and decide which is the best for your health needs.  · Track the number of carbohydrates in your portions.  · Figure out how many servings of a food you can have and still stay within the number of carbohydrates for that meal.  Planning meals  For good blood sugar control, plan what and when youll eat. Start by creating a meal plan that includes all the food groups. Then, time your meals to help keep your blood sugar level steady. You may need to adjust your plan for special situations.  Eat from all the food groups  The basis of a healthy meal plan is variety (eating many different types of foods). Look for lean meats, fresh fruits and vegetables, whole grains, and low-fat or non-fat dairy products. Eating a wide variety of foods provides the nutrients your body needs. It can also keep you from getting bored with your meal plan.  Reduce liquid sugars  Extra calories from sodas, sports drinks, and fruit drinks make it hard to keep blood sugar in range. Cut as many liquid sugars from your meal plan as you can. This includes most fruit juices, which are often high in natural or added sugar. Instead, drink plenty of water and other sugar-free beverages.  Eat less fat  If you need to lose some weight, try to reduce the amount of fat in your diet. This can also help lower your cholesterol level to keep blood vessels healthier. Cut fat by using only small amounts of liquid oil for cooking. Read food  labels carefully to avoid foods with unhealthy trans fats.  Timing your meals  When it comes to blood sugar control, when you eat is as important as what you eat. You may need to eat several small meals spaced evenly throughout the day to stay in your target range. So dont skip breakfast or wait until late in the day to get most of your calories. Doing so can cause your blood sugar to rise too high or fall too low.  Cooking wisely  · Broil, steam, bake, or grill meats and vegetables, instead of frying.  · Instead of cream-based sauces or sugary glazes, flavor foods with vegetable purée, lemon or lime juice, or herb seasonings.  · Remove skin from chicken and turkey before serving.  · Look in cookbooks for easy, low-fat, low-sugar recipes. When making your usual recipes, cut sugar by 1/2 and fat by 1/3.  Date Last Reviewed: 8/1/2016  © 1099-8273 [employer]. [address] [place] [address], [place] [address]. All rights reserved. This information is not intended as a substitute for professional medical care. Always follow your healthcare professional's instructions.        Diabetes: The Benefits of Exercise     Take the stairs whenever you can.   Exercise can lower blood sugar, help control weight, and boost your mood. It can also improve blood flow, lower blood pressure, help you use oxygen more efficiently, and improve heart health. Even a small amount of regular activity can have a big impact on your health.  What can exercise help?  · Blood sugar. Regular exercise improves blood sugar control by helping your body use insulin.  · Mental and emotional health. Physical activity relieves stress and helps you sleep better.  · Heart health. With regular exercise, you can reduce your risk of heart disease and high blood pressure. You can also improve your cholesterol and triglyceride levels.  · Weight. Exercise helps you lose fat, gain muscle, and control your weight.  · Health of blood vessels and  nerves. Activity helps lower blood sugar. This helps prevent damage to blood vessels and nerves that can cause problems with your brain, eyes, feet, and legs.  · Finances. If you manage your blood sugar, you may spend less on medical care.  2 types of exercise  Two types of exercise help your body use blood sugar. Experts advise both types of exercise for people with diabetes:  · Aerobic exercise. This is a rhythmic, repeated, continued movement of large muscle groups for at least 10 minutes at a time. You should do this about 30 minutes a day on most days of the week. Examples include walking, bicycling, jogging, swimming, water aerobics, and many sports.  · Resistance exercise (strength training). This type of exercise uses muscles to move weight or work against resistance. You can do it with free weights, machines, resistance tubing, or your own body weight. Adults with diabetes should aim for 2 to 3 sessions of resistance exercise each week. Its best to skip a day in between.  A goal to shoot for  Your main goal is to become more active. Even a little bit helps. Choose an activity that you like. Walking is one great form of exercise that everyone can do. Talk to your healthcare provider about any limits you may have before starting with an exercise program. Then aim for 150 minutes a week of physical activity. Dont let more than 2 days go by without exercise. When you are sitting for long periods of time, get up for short sessions of light activity every 30 minutes.  Getting activity into your day  Being more active doesnt have to be hard work. Try these to get more activity into your day:  · Take the stairs instead of the elevator  · Garden, do housework, and yard work  · Choose a parking space farther from the store  · Walk to talk to a co-worker instead of calling  · Take a 10-minute walk around the block at lunch  · Walk to a bus stop a little farther from your home or office  · Walk the dog after  dinner  Date Last Reviewed: 5/1/2016  © 1296-6745 [employer]. [address] [place] [address], [place] [address]. All rights reserved. This information is not intended as a substitute for professional medical care. Always follow your healthcare professional's instructions.

## 2019-06-21 DIAGNOSIS — E78.5 DM TYPE 2 WITH DIABETIC DYSLIPIDEMIA: Primary | ICD-10-CM

## 2019-06-21 DIAGNOSIS — E11.69 DM TYPE 2 WITH DIABETIC DYSLIPIDEMIA: Primary | ICD-10-CM

## 2019-06-21 RX ORDER — LANCETS
1 EACH MISCELLANEOUS DAILY
Qty: 180 EACH | Refills: 0 | Status: SHIPPED | OUTPATIENT
Start: 2019-06-21 | End: 2022-06-16

## 2019-06-21 RX ORDER — INSULIN PUMP SYRINGE, 3 ML
EACH MISCELLANEOUS
Qty: 1 EACH | Refills: 0 | Status: SHIPPED | OUTPATIENT
Start: 2019-06-21 | End: 2022-06-16

## 2019-06-21 NOTE — TELEPHONE ENCOUNTER
Received fax from [facility] stating that since the patient does not use insulin, insurance will only pay for testing once daily. Requesting new prescription for One touch ultra strips, meter, and delica lancets with diagnosis code.

## 2019-06-27 RX ORDER — AMLODIPINE BESYLATE 10 MG/1
TABLET ORAL
Qty: 90 TABLET | Refills: 0 | Status: SHIPPED | OUTPATIENT
Start: 2019-06-27 | End: 2019-10-13 | Stop reason: SDUPTHER

## 2019-07-08 RX ORDER — OMEPRAZOLE 20 MG/1
CAPSULE, DELAYED RELEASE ORAL
Qty: 30 CAPSULE | Refills: 2 | Status: SHIPPED | OUTPATIENT
Start: 2019-07-08 | End: 2019-08-30 | Stop reason: SDUPTHER

## 2019-07-18 RX ORDER — TRAZODONE HYDROCHLORIDE 50 MG/1
TABLET ORAL
Qty: 30 TABLET | Refills: 3 | OUTPATIENT
Start: 2019-07-18

## 2019-07-30 DIAGNOSIS — D86.1 SARCOIDOSIS OF LYMPH NODES: Primary | ICD-10-CM

## 2019-07-30 RX ORDER — PRAVASTATIN SODIUM 20 MG/1
TABLET ORAL
Qty: 90 TABLET | Refills: 0 | Status: SHIPPED | OUTPATIENT
Start: 2019-07-30 | End: 2019-09-27 | Stop reason: SDUPTHER

## 2019-07-31 ENCOUNTER — OFFICE VISIT (OUTPATIENT)
Dept: PODIATRY | Facility: CLINIC | Age: 64
End: 2019-07-31
Attending: FAMILY MEDICINE
Payer: COMMERCIAL

## 2019-07-31 ENCOUNTER — HOSPITAL ENCOUNTER (OUTPATIENT)
Dept: RADIOLOGY | Facility: HOSPITAL | Age: 64
Discharge: HOME | End: 2019-07-31
Attending: INTERNAL MEDICINE
Payer: COMMERCIAL

## 2019-07-31 ENCOUNTER — OFFICE VISIT (OUTPATIENT)
Dept: PULMONOLOGY | Facility: CLINIC | Age: 64
End: 2019-07-31
Attending: FAMILY MEDICINE
Payer: MEDICARE

## 2019-07-31 VITALS
HEART RATE: 74 BPM | SYSTOLIC BLOOD PRESSURE: 118 MMHG | HEIGHT: 66 IN | BODY MASS INDEX: 28.23 KG/M2 | WEIGHT: 175.69 LBS | OXYGEN SATURATION: 98 % | WEIGHT: 175.63 LBS | DIASTOLIC BLOOD PRESSURE: 63 MMHG | RESPIRATION RATE: 18 BRPM | HEART RATE: 56 BPM | BODY MASS INDEX: 28.34 KG/M2 | DIASTOLIC BLOOD PRESSURE: 68 MMHG | SYSTOLIC BLOOD PRESSURE: 146 MMHG

## 2019-07-31 DIAGNOSIS — E78.00 PURE HYPERCHOLESTEROLEMIA: ICD-10-CM

## 2019-07-31 DIAGNOSIS — M79.674 PAIN IN TOES OF BOTH FEET: ICD-10-CM

## 2019-07-31 DIAGNOSIS — I43 CARDIOMYOPATHY DUE TO HYPERTENSION, WITHOUT HEART FAILURE: ICD-10-CM

## 2019-07-31 DIAGNOSIS — I10 ESSENTIAL HYPERTENSION: ICD-10-CM

## 2019-07-31 DIAGNOSIS — I51.89 DIASTOLIC DYSFUNCTION: ICD-10-CM

## 2019-07-31 DIAGNOSIS — D86.1 SARCOIDOSIS OF LYMPH NODES: Primary | ICD-10-CM

## 2019-07-31 DIAGNOSIS — M79.675 PAIN IN TOES OF BOTH FEET: ICD-10-CM

## 2019-07-31 DIAGNOSIS — I27.89 OTHER SPECIFIED PULMONARY HEART DISEASES: ICD-10-CM

## 2019-07-31 DIAGNOSIS — E11.9 DIABETES MELLITUS TYPE 2 IN NONOBESE: ICD-10-CM

## 2019-07-31 DIAGNOSIS — J98.4 CHRONIC RESTRICTIVE LUNG DISEASE: ICD-10-CM

## 2019-07-31 DIAGNOSIS — B35.1 ONYCHOMYCOSIS: Primary | ICD-10-CM

## 2019-07-31 DIAGNOSIS — R94.2 ABNORMAL DIFFUSION CAPACITY DETERMINED BY PULMONARY FUNCTION TEST: ICD-10-CM

## 2019-07-31 DIAGNOSIS — D86.1 SARCOIDOSIS OF LYMPH NODES: ICD-10-CM

## 2019-07-31 DIAGNOSIS — I11.9 CARDIOMYOPATHY DUE TO HYPERTENSION, WITHOUT HEART FAILURE: ICD-10-CM

## 2019-07-31 PROCEDURE — 99999 PR PBB SHADOW E&M-EST. PATIENT-LVL II: ICD-10-PCS | Mod: PBBFAC,,, | Performed by: PODIATRIST

## 2019-07-31 PROCEDURE — 99203 PR OFFICE/OUTPT VISIT, NEW, LEVL III, 30-44 MIN: ICD-10-PCS | Mod: S$GLB,,, | Performed by: PODIATRIST

## 2019-07-31 PROCEDURE — 99999 PR PBB SHADOW E&M-EST. PATIENT-LVL II: CPT | Mod: PBBFAC,,, | Performed by: PODIATRIST

## 2019-07-31 PROCEDURE — 99999 PR PBB SHADOW E&M-EST. PATIENT-LVL IV: ICD-10-PCS | Mod: PBBFAC,,, | Performed by: INTERNAL MEDICINE

## 2019-07-31 PROCEDURE — 99212 OFFICE O/P EST SF 10 MIN: CPT | Mod: PBBFAC,25 | Performed by: PODIATRIST

## 2019-07-31 PROCEDURE — 71046 X-RAY EXAM CHEST 2 VIEWS: CPT | Mod: TC

## 2019-07-31 PROCEDURE — 99214 OFFICE O/P EST MOD 30 MIN: CPT | Mod: S$GLB,,, | Performed by: INTERNAL MEDICINE

## 2019-07-31 PROCEDURE — 71046 XR CHEST PA AND LATERAL: ICD-10-PCS | Mod: 26,,, | Performed by: RADIOLOGY

## 2019-07-31 PROCEDURE — 99203 OFFICE O/P NEW LOW 30 MIN: CPT | Mod: S$GLB,,, | Performed by: PODIATRIST

## 2019-07-31 PROCEDURE — 99214 PR OFFICE/OUTPT VISIT, EST, LEVL IV, 30-39 MIN: ICD-10-PCS | Mod: S$GLB,,, | Performed by: INTERNAL MEDICINE

## 2019-07-31 PROCEDURE — 99999 PR PBB SHADOW E&M-EST. PATIENT-LVL IV: CPT | Mod: PBBFAC,,, | Performed by: INTERNAL MEDICINE

## 2019-07-31 PROCEDURE — 71046 X-RAY EXAM CHEST 2 VIEWS: CPT | Mod: 26,,, | Performed by: RADIOLOGY

## 2019-07-31 PROCEDURE — 99214 OFFICE O/P EST MOD 30 MIN: CPT | Mod: PBBFAC,25,27 | Performed by: INTERNAL MEDICINE

## 2019-07-31 RX ORDER — TERBINAFINE HYDROCHLORIDE 250 MG/1
250 TABLET ORAL DAILY
Qty: 30 TABLET | Refills: 2 | Status: SHIPPED | OUTPATIENT
Start: 2019-07-31 | End: 2019-08-30

## 2019-07-31 NOTE — PROGRESS NOTES
Subjective:       Patient ID: Anne Farmer is a 63 y.o. female.    Chief Complaint: Foot Problem (Possible bilateral foot fungus, pt states no pain, diabetic, wears tennis shoes, PCP Dr Campos)      HPI:  Patient presents to the office this afternoon, with complaint of elongated and thickened nail plates to the left and right.  Patient states using over-the-counter topical medications which have not been helpful curative.  The patient states slight discomfort due to the nail thickening and/or elongation.  Patient is interested in the definitive medical diagnosis.  Symptoms are exacerbated with tight shoe gear.  Pains are approximately 5/10. This patient's PMD is Chiquita Talley MD. The patient states that the the nail plate/nails have appeared such for the past several months to years. Denies recent trauma which could lead to the diagnoses of nail dystrophy.     Review of patient's allergies indicates:  No Known Allergies    Past Medical History:   Diagnosis Date    Allergy     Arthritis     Cancer 2016    colon    CHF (congestive heart failure)     Colon cancer 2004    Colon cancer screening 9/21/2015    Diabetes mellitus type 2 in nonobese 3/9/2016    borderline    Diverticulosis     DM (diabetes mellitus) 03/2016    BS didn't check 02/13/2019    Hyperlipidemia 10/25/2016    Hypertension     Insomnia        Family History   Problem Relation Age of Onset    Hypertension Mother     Diabetes Mother     Hypertension Father     Hypertension Sister     Hypertension Brother     Hypertension Sister     Hypertension Sister     Hypertension Sister     Hypertension Brother     Hypertension Brother        Social History     Socioeconomic History    Marital status: Single     Spouse name: Not on file    Number of children: Not on file    Years of education: Not on file    Highest education level: Not on file   Occupational History     Employer: [employer]   Social Needs     Financial resource strain: Not on file    Food insecurity:     Worry: Not on file     Inability: Not on file    Transportation needs:     Medical: Not on file     Non-medical: Not on file   Tobacco Use    Smoking status: Never Smoker    Smokeless tobacco: Never Used   Substance and Sexual Activity    Alcohol use: Yes     Alcohol/week: 0.0 oz     Comment: weekends.   No alcohol prior to sx    Drug use: No    Sexual activity: Never   Lifestyle    Physical activity:     Days per week: Not on file     Minutes per session: Not on file    Stress: Not on file   Relationships    Social connections:     Talks on phone: Not on file     Gets together: Not on file     Attends [employer]ism service: Not on file     Active member of club or organization: Not on file     Attends meetings of clubs or organizations: Not on file     Relationship status: Not on file   Other Topics Concern    Not on file   Social History Narrative    Not on file       Past Surgical History:   Procedure Laterality Date    BIOPSY-LYMPH NODE N/A 3/28/2017    Performed [facility] at [facility]    BRONCHOSCOPY/EBUS N/A 2017    Performed by Bandar Ashford MD at [facility] ENDO     SECTION      COLON SURGERY      COLONOSCOPY N/A 2017    Performed by Chintan Flores MD at [facility] ENDO    COLONOSCOPY N/A 2015    Performed by Adela Sam MD at [facility] ENDO    MEDIASTINOSCOPY N/A 3/28/2017    Performed by Huang [facility]de [facility] at [facility] OR       Review of Systems   Constitutional: Negative for chills, fatigue and fever.   HENT: Negative for hearing loss.    Eyes: Negative for photophobia and visual disturbance.   Respiratory: Negative for cough, chest tightness, shortness of breath and wheezing.    Cardiovascular: Negative for chest pain and palpitations.   Gastrointestinal: Negative for constipation, diarrhea, nausea and vomiting.   Endocrine: Negative for cold intolerance and heat intolerance.   Genitourinary: Negative  for flank pain.   Musculoskeletal: Negative for gait problem, neck pain and neck stiffness.   Skin: Negative for wound.   Neurological: Negative for light-headedness and headaches.   Psychiatric/Behavioral: Negative for sleep disturbance.          Objective:   BP (!) 146/63 (BP Location: Right arm, Patient Position: Sitting, BP Method: Medium (Automatic))   Pulse (!) 56   Wt 79.6 kg (175 lb 9.5 oz)   BMI 28.34 kg/m²     Comprehensive metabolic panel   Order: [id number]   Status:  Final result   Visible to patient:  No (Not Released) Next appt:  Today at 09:00 AM in Pulmonology (Bandar Ashford MD) Dx:  Type 2 diabetes mellitus with microal...    Ref Range & Units 1mo ago 6mo ago 1yr ago   Sodium 136 - 145 mmol/L 138  137  139    Potassium 3.5 - 5.1 mmol/L 3.8  4.1  4.1    Chloride 95 - 110 mmol/L 101  102  101    CO2 23 - 29 mmol/L 26  24  27    Glucose 70 - 110 mg/dL 114High   249High   117High     BUN, Bld 8 - 23 mg/dL 20  18  15    Creatinine 0.5 - 1.4 mg/dL 0.8  0.9  0.7    Calcium 8.7 - 10.5 mg/dL 10.3  10.2  10.5    Total Protein 6.0 - 8.4 g/dL 8.2  8.1  8.4    Albumin 3.5 - 5.2 g/dL 4.0  4.0  4.0    Total Bilirubin 0.1 - 1.0 mg/dL 0.4  0.5 CM 0.5 CM   Comment: For infants and newborns, interpretation of results should be based   on gestational age, weight and in agreement with clinical   observations.   Premature Infant recommended reference ranges:   Up to 24 hours.............<8.0 mg/dL   Up to 48 hours............<12.0 mg/dL   3-5 days..................<15.0 mg/dL   6-29 days.................<15.0 mg/dL    Alkaline Phosphatase 55 - 135 U/L 83  69  87    AST 10 - 40 U/L 28  32  36    ALT 10 - 44 U/L 23  25  30    Anion Gap 8 - 16 mmol/L 11  11  11    eGFR if African American >60 mL/min/1.73 m^2 >60.0  >60.0  >60.0    eGFR if non African American >60 mL/min/1.73 m^2 >60.0  >60.0 CM >60.0 CM   Comment: Calculation used to obtain the estimated glomerular filtration   rate (eGFR) is the CKD-EPI equation.     Resulting Agency  [facility]         Specimen Collected: 06/07/19 11:40 Last Resulted: 06/07/19 22:35 Lab Flowsheet Order Details View Encounter Lab and Collection Details Routing Result History      CM=Additional comments                  LOWER EXTREMITY PHYSICAL EXAMINATION  NEUROLOGY: Sensation to light touch is intact. Proprioception is intact.     DERMATOLOGY:  Skin is supple, dry and intact. No ulcerations are noted. No hyperkeratosis or calluses are noted. No ecchymosis appreciated.  There are nail changes which are consistent with onychomycosis on the left and right foot. On the left foot, nail #1 is/are thickened, is/are dystrophic, with yellow discoloration, and with malodorous subungual debris. On the right foot, nail #1 is/are thickened, is/are dystrophic, with yellow discoloration, and with malodorous subungual debris.     ORTHOPEDIC: Manual Muscle Testing is 5/5 in all planes on the left and right, without pains, with and without resistance. No pains to palpation of the medial or lateral ankle ligaments. No discomfort to palpation of the posterior tibial tendon, peroneal tendon, Achilles tendon or the anterior ankle tendons.  Rectus foot type is noted. No pain upon range of motion of the midfoot or hindfoot joints. No crepitus upon range of motion and midfoot or hindfoot joints. No ankle pathology is noted. Gait pattern is non-antalgic.    VASCULAR: The right DP pulse is 2/4 and the left DP is 2/4. The right PT pulse is 2/4 and the left PT pulse is 2/4. Proximal to distal, warm to warm. No dependent rubor or elevation palor is noted. Capillary refill time is less than 3 seconds. Hair growth is appreciated to the dorsal foot and digits.     Assessment:     1. Onychomycosis    2. Pain in toes of both feet        Plan:     Onychomycosis    Pain in toes of both feet      Most recent labs reviewed in detail with the patient. All questions and concerns regarding findings and its/their implications are  outlined and discussed.  Patient's past medical history is thoroughly reviewed today, in light of the fact that surgical intervention is discussed/planned/initiated.     Discussed the various treatment options concerning onychomycosis, these being over-the-counter topicals (efficacy is low in regards to cure), prescription strength topicals (better efficacy as compared to OTC versions, but only slightly so, and potentially costly), laser therapy (which is not covered by insurance companies), oral medications (patient is advised that there is a potential, though less than 5%, for the potential of adverse health and side effects; namely liver pathology) and doing nothing (frequent debridements) as onychomycosis is a cosmetic ailment, and has no potential for long-term deleterious effects on the health. Did discuss the sides effects of PO therapy and what to monitor for, namely, headache, diarrhea, itching and rash, indigestion, liver enzyme abnormalities, taste disturbance, nausea, abdominal pain, flatulence (gas), vomiting and upper respiratory tract infection. Rx. for 90 days course is provided. Please repeat LFTs Q30 days.         Future Appointments   Date Time Provider Department Center   7/31/2019  8:45 AM [facility] XR1-DR [facility]   7/31/2019  9:00 AM MD DANIELLE Rios [facility]   2/19/2020  3:15 PM NEWTON Ely [facility]

## 2019-07-31 NOTE — LETTER
July 31, 2019      Chiquita Talley MD  [address] [facility] [address]           [facility]  [address] [place] [id number]  Phone: [phone]  Fax: [phone]          Patient: Anne Farmer   MR Number: [id number]   YOB: 1955   Date of Visit: 7/31/2019       Dear Dr. Chiquita Talley:    Thank you for referring Anne Farmer to me for evaluation. Attached you will find relevant portions of my assessment and plan of care.    If you have questions, please do not hesitate to call me. I look forward to following Anne Farmer along with you.    Sincerely,    Bandar Ashford MD    Enclosure  CC:  No Recipients    If you would like to receive this communication electronically, please contact [email or web address] or [phone] to request more information on [employer] Link access.    For providers and/or their staff who would like to refer a patient to [facility], please contact us through our one-stop-shop provider referral line, [facility], at [phone].    If you feel you have received this communication in error or would no longer like to receive these types of communications, please e-mail [email or web address]

## 2019-07-31 NOTE — PROGRESS NOTES
Subjective:     Patient Active Problem List   Diagnosis    HTN (hypertension)    Arthritis    Hypertensive cardiomyopathy    Diastolic dysfunction    Allergic rhinitis    Vitamin D deficiency    Hepatitis C antibody test positive    Diabetes mellitus type 2 in nonobese    Hyperlipidemia    History of colon cancer, stage I    Sarcoidosis of lymph nodes    Personal history of colonic polyps    Diverticulosis of large intestine without hemorrhage    Chronic restrictive lung disease    Abnormal diffusion capacity determined by pulmonary function test          Anne Farmer is a 63 y.o. female    Last visit was 2017: missed f/u appts  Reviewed CXR today: STABLE chronic findings   LN sarcoidosis: not of steroids; Restrictive lung physiology and reduced DLCO  Also has diastolic dysfunction  Doing well  No cough, No SOB, No wheezing  Saw eye doc, pending cardiology  Not on oxygen.  Some snoring and daytime fatigue  Last PFT : restrictive and reduced DLCO      Immunization History   Administered Date(s) Administered    Influenza - Quadrivalent 2017    Influenza - Quadrivalent - PF 2009, 2010    PPD Test 02/15/2017    Pneumococcal Conjugate - 13 Valent 2019    Pneumococcal Polysaccharide - 23 Valent 2018    Tdap 01/15/2015       Social History     Tobacco Use   Smoking Status Never Smoker   Smokeless Tobacco Never Used         The following portions of the patient's history were reviewed and updated as appropriate:   She  has a past medical history of Allergy, Arthritis, Cancer (), CHF (congestive heart failure), Colon cancer (), Colon cancer screening (2015), Diabetes mellitus type 2 in nonobese (3/9/2016), Diverticulosis, DM (diabetes mellitus) (2016), Hyperlipidemia (10/25/2016), Hypertension, and Insomnia.  She does not have any pertinent problems on file.  She  has a past surgical history that includes  section; Colonoscopy (N/A, 2015);  Colon surgery; and Colonoscopy (N/A, 8/24/2017).  Her family history includes Diabetes in her mother; Hypertension in her brother, brother, brother, father, mother, sister, sister, sister, and sister.  She  reports that she has never smoked. She has never used smokeless tobacco. She reports that she drinks alcohol. She reports that she does not use drugs.  She has a current medication list which includes the following prescription(s): amlodipine, blood sugar diagnostic, blood-glucose meter, cyclobenzaprine, fluticasone propionate, hydrochlorothiazide, lancets, levocetirizine, losartan, metformin, one daily multivitamin, omeprazole, pravastatin, promethazine-dextromethorphan, terbinafine hcl, tramadol, and trazodone.  Current Outpatient Medications on File Prior to Visit   Medication Sig Dispense Refill    amLODIPine (NORVASC) 10 MG tablet TAKE 1 TABLET BY MOUTH EVERY DAY 90 tablet 0    blood sugar diagnostic Strp 1 strip by Misc.(Non-Drug; Combo Route) route once daily. One Touch Ultra 180 each 0    blood-glucose meter kit Use to check blood sugar once daily.  One Touch Ultra 1 each 0    cyclobenzaprine (FLEXERIL) 10 MG tablet TAKE 1 TABLET BY MOUTH THREE TIMES A DAY AS NEEDED 30 tablet 0    fluticasone (FLONASE) 50 mcg/actuation nasal spray SPRAY 2 SPRAYS INTO EACH NOSTRIL EVERY DAY 16 mL 0    hydroCHLOROthiazide (HYDRODIURIL) 25 MG tablet Take 1 tablet (25 mg total) by mouth once daily. 90 tablet 0    lancets Misc 1 lancet by Misc.(Non-Drug; Combo Route) route once daily. One Touch Delica 180 each 0    levocetirizine (XYZAL) 5 MG tablet TAKE 1 TABLET BY MOUTH EVERY DAY IN THE EVENING 90 tablet 0    losartan (COZAAR) 100 MG tablet Take 1 tablet (100 mg total) by mouth once daily. 90 tablet 2    metFORMIN (GLUCOPHAGE-XR) 750 MG 24 hr tablet Take 1 tablet (750 mg total) by mouth daily with breakfast. 30 tablet 3    multivitamin (ONE DAILY MULTIVITAMIN) per tablet Take 1 tablet by mouth once daily.       "omeprazole (PRILOSEC) 20 MG capsule TAKE 1 CAPSULE BY MOUTH EVERY DAY 30 capsule 2    pravastatin (PRAVACHOL) 20 MG tablet TAKE 1 TABLET BY MOUTH EVERY DAY 90 tablet 0    promethazine-dextromethorphan (PROMETHAZINE-DM) 6.25-15 mg/5 mL Syrp 5 ml TID prn 118 mL 0    tramadol (ULTRAM) 50 mg tablet TAKE ONE TABLET BY MOUTH THREE TIMES DAILY AFTER A MEAL 90 tablet 2    traZODone (DESYREL) 50 MG tablet TAKE 1 TABLET (50 MG TOTAL) BY MOUTH NIGHTLY AS NEEDED FOR INSOMNIA. 30 tablet 3     No current facility-administered medications on file prior to visit.      She has No Known Allergies..    Review of Systems   Constitutional: Negative.    HENT: Negative.    Eyes: Negative.    Respiratory: Negative.  Negative for cough, hemoptysis, sputum production, shortness of breath and wheezing.    Cardiovascular: Negative.  Negative for chest pain.   Gastrointestinal: Negative.    Genitourinary: Negative.    Musculoskeletal: Negative.    Skin: Negative.    Neurological: Negative.    Endo/Heme/Allergies: Negative.    Psychiatric/Behavioral: Negative.    All other systems reviewed and are negative.       Objective:      /68   Pulse 74   Resp 18   Ht 5' 6" (1.676 m)   Wt 79.7 kg (175 lb 11.3 oz)   SpO2 98%   BMI 28.36 kg/m²     Physical Exam   Constitutional: She is oriented to person, place, and time. She appears well-developed and well-nourished.   HENT:   Head: Normocephalic and atraumatic.   Mouth/Throat: Oropharynx is clear and moist.   Eyes: Pupils are equal, round, and reactive to light. Conjunctivae and EOM are normal.   Neck: Normal range of motion. Neck supple.   Cardiovascular: Normal rate, regular rhythm and normal heart sounds.   No murmur heard.  Pulmonary/Chest: Effort normal and breath sounds normal. No stridor. No respiratory distress. She has no wheezes.   Abdominal: Soft. Bowel sounds are normal.   Musculoskeletal: Normal range of motion. She exhibits no edema or deformity.   Neurological: She is alert " and oriented to person, place, and time. No cranial nerve deficit.   Skin: Skin is warm and dry. Capillary refill takes 2 to 3 seconds.   Psychiatric: She has a normal mood and affect.   Nursing note and vitals reviewed.        Diagnostic Review  CXR  COMPARISON:  Chest x-ray which was performed in March 2017.    FINDINGS:  Scarring or atelectasis in the right lower lung zone.  No focal consolidation or effusion.  Aortic atherosclerosis and tortuosity is noted.  Some prominence in the upper mediastinal regions is noted.  This may relate to lymphadenopathy which was better visualized on the PET-CT from 01/23/2017.  Patient reportedly has history of sarcoid.  The cardiomediastinal silhouette is stable in appearance.  Degenerative changes of the spine are visualized    Component      Latest Ref Rng & Units 1/31/2017   Angio Convert Enzyme      8 - 53 U/L 88 (H)       Assessment:      Problem List Items Addressed This Visit     HTN (hypertension)     stable         Hypertensive cardiomyopathy     Will refer to cardiology, last seen 2016         Relevant Orders    Ambulatory Referral to Cardiology    Diastolic dysfunction     Will refer to cardiology, last seen 2016         Relevant Orders    Ambulatory Referral to Cardiology    Diabetes mellitus type 2 in nonobese    Hyperlipidemia     stable         Sarcoidosis of lymph nodes - Primary     Not currently on streiods  No symptoms  Follow up: opthalmology, cardiology         Relevant Orders    Ambulatory Referral to Cardiology    Vitamin D    ANGIOTENSIN CONVERTING ENZYME    Complete PFT without bronchodilator - Clinic    X-Ray Chest PA And Lateral    Stress test, pulmonary    PULSE OXIMETRY OVERNIGHT    PULM - Arterial Blood Gases--in addition to PFT only    Chronic restrictive lung disease     FVC: 1.75L( 62%)  TLC: 3.41L( 65%)  DLCO 14.2( 63%)  Will follow next visit         Relevant Orders    Vitamin D    ANGIOTENSIN CONVERTING ENZYME    Complete PFT without  bronchodilator - Clinic    X-Ray Chest PA And Lateral    Stress test, pulmonary    PULSE OXIMETRY OVERNIGHT    PULM - Arterial Blood Gases--in addition to PFT only    Abnormal diffusion capacity determined by pulmonary function test     Not on Oxygen  DLCO was 63%         Relevant Orders    Vitamin D    ANGIOTENSIN CONVERTING ENZYME    Complete PFT without bronchodilator - Clinic    X-Ray Chest PA And Lateral    Stress test, pulmonary    PULSE OXIMETRY OVERNIGHT    PULM - Arterial Blood Gases--in addition to PFT only      Other Visit Diagnoses     Other specified pulmonary heart diseases         Relevant Orders    PULSE OXIMETRY OVERNIGHT         Plan:      Follow up in about 1 year (around 7/31/2020), or Desoto score, ONSAT next week, ref cardiology, labs next visit, PFT, ABG, 6MWD, .    Orders Placed This Encounter   Procedures    X-Ray Chest PA And Lateral     Standing Status:   Future     Standing Expiration Date:   7/30/2020    Vitamin D     Standing Status:   Future     Standing Expiration Date:   9/28/2020    ANGIOTENSIN CONVERTING ENZYME     Standing Status:   Future     Standing Expiration Date:   9/28/2020    Ambulatory Referral to Cardiology     Referral Priority:   Routine     Referral Type:   Consultation     Referral Reason:   Specialty Services Required     Requested Specialty:   Cardiology     Number of Visits Requested:   1    Complete PFT without bronchodilator - Clinic     Standing Status:   Future     Standing Expiration Date:   7/31/2020    Stress test, pulmonary     Standing Status:   Future     Standing Expiration Date:   7/30/2020    PULM - Arterial Blood Gases--in addition to PFT only     Standing Status:   Future     Standing Expiration Date:   7/30/2020    PULSE OXIMETRY OVERNIGHT     Standing Status:   Future     Standing Expiration Date:   7/31/2020     Order Specific Question:   Reason for Test?     Answer:   O2 Qualification     Order Specific Question:   Symptoms:     Answer:    Snoring         This note was prepared using voice recognition system and is likely to have sound alike errors that may have been overlooked even after proof reading.  Please call me with any questions      Time spent: 20 minutes in face to face  discussion concerning diagnosis, prognosis, review of lab and test results, benefits of treatment as well as management of disease, counseling of patient and coordination of care between various health  care providers . Greater than half the time spent was used for coordination of care and counseling of patient.     Bandar Ashford    Pulmonary/Critical care/Sleepmedicine

## 2019-08-09 ENCOUNTER — CLINICAL SUPPORT (OUTPATIENT)
Dept: PULMONOLOGY | Facility: CLINIC | Age: 64
End: 2019-08-09
Payer: MEDICARE

## 2019-08-09 DIAGNOSIS — I27.89 OTHER SPECIFIED PULMONARY HEART DISEASES: ICD-10-CM

## 2019-08-09 DIAGNOSIS — R94.2 ABNORMAL DIFFUSION CAPACITY DETERMINED BY PULMONARY FUNCTION TEST: ICD-10-CM

## 2019-08-09 DIAGNOSIS — D86.1 SARCOIDOSIS OF LYMPH NODES: ICD-10-CM

## 2019-08-09 DIAGNOSIS — J98.4 CHRONIC RESTRICTIVE LUNG DISEASE: ICD-10-CM

## 2019-08-09 PROCEDURE — 94762 N-INVAS EAR/PLS OXIMTRY CONT: CPT | Mod: PBBFAC

## 2019-08-12 NOTE — PROCEDURES
[facility]  [address] * [place] [address]  Telephone: [phone]  Test date: 19 Start: 19 20:56:05 Anne Farmer  Doctor: Dr. Ashford End: 19 03:40:05 [id number]  Oximetry: Summary Report  Comments: Room Air  Recording time: 06:44:00 Highest pulse: 104 Highest SpO2: 98%  Excluded samplin:00:08 Lowest pulse: 47 Lowest SpO2: 81%  Total valid samplin:43:52 Mean pulse: 60 Mean SpO2: 91.1%  1 S.D.: 6.3 1 S.D.: 1.8  Time with SpO2<90: 1:16:08, 18.9%  Time with SpO2<80: 0:00:00, 0.0%  Time with SpO2<70: 0:00:00, 0.0%  Time with SpO2<60: 0:00:00, 0.0%  Time with SpO2<89: 0:24:40, 6.1%  Time with SpO2 =>90: 5:27:44, 81.1%  Time with SpO2=>80 & <90: 1:16:08, 18.9%  Time with SpO2=>70 & <80: 0:00:00, 0.0%  Time with SpO2=>60 & <70: 0:00:00, 0.0%  The longest continuous time with saturation <=88 was 00:02:28, which started at  19 22:28:13.  A desaturation event was defined as a decrease of saturation by 4 or more.  No events were excluded due to artifact.  There were 23 desaturation events over 3 minutes duration.  There were 26 desaturation events of less than 3 minutes duration during which:  The mean high was 93.2%. The mean low was 88.2%.  The number of these events that were:  > 0 & <10 seconds: 0 > 0 seconds: 26  =>10 & <20 seconds: 2 =>10 seconds: 26  =>20 & <30 seconds: 5 =>20 seconds: 24  =>30 & <40 seconds: 1 =>30 seconds: 19  =>40 & <50 seconds: 2 =>40 seconds: 18  =>50 & <60 seconds: 2 =>50 seconds: 16  =>60 seconds: 14 =>60 seconds: 14  The mean length of desaturation events that were >=10 sec & <=3 mins was: 68.2 sec.  Desaturation event index (events >=10 sec per sampled hour): 3.9  Desaturation event index (events >= 0 sec per sampled hour): 3.9    OVERNIGHT OXIMETRY REPORT:    Dictated by: Bandar Ashford MD  Test date: 2019  Dictated on: 2019      Comment: This test was performed on Room Air     A desaturation event was defined  as a decrease of saturation by 4 or more.    REPORT SUMMARY  Total valid samplin:43:2   High SpO2: 98%    Low SpO2: 81%    Mean SpO2  91.1 %  Cumulative time with oxygen saturation less than 88% (TC88): 0:24:40    CLINICAL INTERPRETATION   There is  significant nocturnal oxygen desaturation,  Clinical correlation is advised and  Recommend overnight polysomnography if clinically indicated    Medicare Criteria Comments:   Oximetry test results suggest the patient falls under Medicare Group 1 Criteria. ( Arterial oxygen saturation at or below 88% for at least 5 minutes taken during sleep)  Bandar Ashford MD    Details about Medicare Group Criteria coverage can be found at http://www.cms.hhs.gov/manuals/downloads/

## 2019-08-13 ENCOUNTER — TELEPHONE (OUTPATIENT)
Dept: PULMONOLOGY | Facility: CLINIC | Age: 64
End: 2019-08-13

## 2019-08-13 NOTE — TELEPHONE ENCOUNTER
----- Message from Bandar Ashford MD sent at 2019  9:28 PM CDT -----   Please inform patient overnight saturation study  Showed  the need for nighttime oxygen  Please send her report, needs to come discuss.    Bandar Ashford MD    OVERNIGHT OXIMETRY REPORT:    Dictated by: Bandar Ashford MD  Test date: 2019  Dictated on: 2019      Comment: This test was performed on Room Air     A desaturation event was defined as a decrease of saturation by 4 or more.    REPORT SUMMARY  Total valid samplin:43:2   High SpO2: 98%    Low SpO2: 81%    Mean SpO2  91.1 %  Cumulative time with oxygen saturation less than 88% (TC88): 0:24:40    CLINICAL INTERPRETATION   There is  significant nocturnal oxygen desaturation,  Clinical correlation is advised and  Recommend overnight polysomnography if clinically indicated    Medicare Criteria Comments:   Oximetry test results suggest the patient falls under Medicare Group 1 Criteria. ( Arterial oxygen saturation at or below 88% for at least 5 minutes taken during sleep)  Bandar Ashford MD    Details about Medicare Group Criteria coverage can be found at http://www.cms.hhs.gov/manuals/downloads/

## 2019-08-27 DIAGNOSIS — I10 HTN (HYPERTENSION): Primary | ICD-10-CM

## 2019-08-29 ENCOUNTER — CLINICAL SUPPORT (OUTPATIENT)
Dept: CARDIOLOGY | Facility: CLINIC | Age: 64
End: 2019-08-29
Payer: COMMERCIAL

## 2019-08-29 ENCOUNTER — OFFICE VISIT (OUTPATIENT)
Dept: CARDIOLOGY | Facility: CLINIC | Age: 64
End: 2019-08-29
Payer: COMMERCIAL

## 2019-08-29 VITALS
HEART RATE: 61 BPM | SYSTOLIC BLOOD PRESSURE: 140 MMHG | WEIGHT: 170.63 LBS | DIASTOLIC BLOOD PRESSURE: 52 MMHG | HEIGHT: 66 IN | BODY MASS INDEX: 27.42 KG/M2

## 2019-08-29 DIAGNOSIS — I43 CARDIOMYOPATHY DUE TO HYPERTENSION, WITHOUT HEART FAILURE: ICD-10-CM

## 2019-08-29 DIAGNOSIS — I10 ESSENTIAL HYPERTENSION: Primary | ICD-10-CM

## 2019-08-29 DIAGNOSIS — E11.9 DIABETES MELLITUS TYPE 2 IN NONOBESE: ICD-10-CM

## 2019-08-29 DIAGNOSIS — I51.89 DIASTOLIC DYSFUNCTION: ICD-10-CM

## 2019-08-29 DIAGNOSIS — E78.00 PURE HYPERCHOLESTEROLEMIA: ICD-10-CM

## 2019-08-29 DIAGNOSIS — I11.9 CARDIOMYOPATHY DUE TO HYPERTENSION, WITHOUT HEART FAILURE: ICD-10-CM

## 2019-08-29 DIAGNOSIS — R76.8 HEPATITIS C ANTIBODY TEST POSITIVE: ICD-10-CM

## 2019-08-29 DIAGNOSIS — J98.4 CHRONIC RESTRICTIVE LUNG DISEASE: ICD-10-CM

## 2019-08-29 DIAGNOSIS — Z85.038 HISTORY OF COLON CANCER, STAGE I: Chronic | ICD-10-CM

## 2019-08-29 DIAGNOSIS — I10 HTN (HYPERTENSION): ICD-10-CM

## 2019-08-29 DIAGNOSIS — D86.1 SARCOIDOSIS OF LYMPH NODES: ICD-10-CM

## 2019-08-29 DIAGNOSIS — E55.9 VITAMIN D DEFICIENCY: ICD-10-CM

## 2019-08-29 DIAGNOSIS — R94.2 ABNORMAL DIFFUSION CAPACITY DETERMINED BY PULMONARY FUNCTION TEST: ICD-10-CM

## 2019-08-29 PROCEDURE — 93005 ELECTROCARDIOGRAM TRACING: CPT | Mod: PBBFAC | Performed by: INTERNAL MEDICINE

## 2019-08-29 PROCEDURE — 93010 ELECTROCARDIOGRAM REPORT: CPT | Mod: S$GLB,,, | Performed by: INTERNAL MEDICINE

## 2019-08-29 PROCEDURE — 99999 PR PBB SHADOW E&M-EST. PATIENT-LVL III: ICD-10-PCS | Mod: PBBFAC,,, | Performed by: INTERNAL MEDICINE

## 2019-08-29 PROCEDURE — 93010 EKG 12-LEAD: ICD-10-PCS | Mod: S$GLB,,, | Performed by: INTERNAL MEDICINE

## 2019-08-29 PROCEDURE — 99999 PR PBB SHADOW E&M-EST. PATIENT-LVL III: CPT | Mod: PBBFAC,,, | Performed by: INTERNAL MEDICINE

## 2019-08-29 PROCEDURE — 99214 OFFICE O/P EST MOD 30 MIN: CPT | Mod: S$GLB,,, | Performed by: INTERNAL MEDICINE

## 2019-08-29 PROCEDURE — 93005 EKG 12-LEAD: ICD-10-PCS | Mod: S$GLB,,, | Performed by: INTERNAL MEDICINE

## 2019-08-29 PROCEDURE — 99213 OFFICE O/P EST LOW 20 MIN: CPT | Mod: PBBFAC | Performed by: INTERNAL MEDICINE

## 2019-08-29 PROCEDURE — 93005 ELECTROCARDIOGRAM TRACING: CPT | Mod: S$GLB,,, | Performed by: INTERNAL MEDICINE

## 2019-08-29 PROCEDURE — 99214 PR OFFICE/OUTPT VISIT, EST, LEVL IV, 30-39 MIN: ICD-10-PCS | Mod: S$GLB,,, | Performed by: INTERNAL MEDICINE

## 2019-08-29 NOTE — LETTER
August 29, 2019      Bandar Ashford MD  [address]  [place] [address]           [facility]  [address] [place] [address]  Phone: [phone]  Fax: [phone]          Patient: Anne Farmer   MR Number: [id number]   YOB: 1955   Date of Visit: 8/29/2019       Dear Dr. Bandar Ashford:    Thank you for referring Anne Farmer to me for evaluation. Attached you will find relevant portions of my assessment and plan of care.    If you have questions, please do not hesitate to call me. I look forward to following Anne Farmer along with you.    Sincerely,    Amy Gonzales MD    Enclosure  CC:  No Recipients    If you would like to receive this communication electronically, please contact [email or web address] or [phone] to request more information on [employer] Link access.    For providers and/or their staff who would like to refer a patient to [facility], please contact us through our one-stop-shop provider referral line, [facility], at [phone].    If you feel you have received this communication in error or would no longer like to receive these types of communications, please e-mail [email or web address]

## 2019-08-29 NOTE — PROGRESS NOTES
Subjective:   Patient ID:  Anne Farmer is a 63 y.o. female who presents for follow up of Hypertension      HPI  A 62 yo female with restrictive lung disease htn hlp diabetes is here for f/u,. She sarcoidosis and has seen pulmonary she ahs shortness of breath with minimal activity . This has been stable has cough no wheezing no leg swelling  Her diabetes is better controlled she  Is better with compliance now but exercise is an issue due to shortness of breath.no chf symptoms no0 angina shortness of breath unchanged.she needs better compliance with diet.  Past Medical History:   Diagnosis Date    Allergy     Arthritis     Cancer 2016    colon    CHF (congestive heart failure)     Colon cancer 2004    Colon cancer screening 2015    Diabetes mellitus type 2 in nonobese 3/9/2016    borderline    Diverticulosis     DM (diabetes mellitus) 2016    BS didn't check 2019    Hyperlipidemia 10/25/2016    Hypertension     Insomnia        Past Surgical History:   Procedure Laterality Date    BIOPSY-LYMPH NODE N/A 3/28/2017    [facility]de [facility] at [facility] OR    BRONCHOSCOPY/EBUS N/A 2017    [facility] at [facility] ENDO     SECTION      COLON SURGERY      COLONOSCOPY N/A 2017    [facility]an Flores [facility] at [facility] ENDO    COLONOSCOPY N/A 2015    [facility] Sam [facility] at [facility] ENDO    MEDIASTINOSCOPY N/A 3/28/2017    Per[facility] by [facility]de [facility] at [facility] OR       Social History     Tobacco Use    Smoking status: Never Smoker    Smokeless tobacco: Never Used   Substance Use Topics    Alcohol use: Yes     Alcohol/week: 0.0 oz     Comment: weekends.   No alcohol prior to sx    Drug use: No       Family History   Problem Relation Age of Onset    Hypertension Mother     Diabetes Mother     Hypertension Father     Hypertension Sister     Hypertension Brother     Hypertension Sister     Hypertension Sister      Hypertension Sister     Hypertension Brother     Hypertension Brother        Current Outpatient Medications   Medication Sig    amLODIPine (NORVASC) 10 MG tablet TAKE 1 TABLET BY MOUTH EVERY DAY    blood sugar diagnostic Strp 1 strip by Misc.(Non-Drug; Combo Route) route once daily. One Touch Ultra    blood-glucose meter kit Use to check blood sugar once daily.  One Touch Ultra    cyclobenzaprine (FLEXERIL) 10 MG tablet TAKE 1 TABLET BY MOUTH THREE TIMES A DAY AS NEEDED    fluticasone (FLONASE) 50 mcg/actuation nasal spray SPRAY 2 SPRAYS INTO EACH NOSTRIL EVERY DAY    hydroCHLOROthiazide (HYDRODIURIL) 25 MG tablet Take 1 tablet (25 mg total) by mouth once daily.    lancets Misc 1 lancet by Misc.(Non-Drug; Combo Route) route once daily. One Touch Delica    levocetirizine (XYZAL) 5 MG tablet TAKE 1 TABLET BY MOUTH EVERY DAY IN THE EVENING    losartan (COZAAR) 100 MG tablet Take 1 tablet (100 mg total) by mouth once daily.    metFORMIN (GLUCOPHAGE-XR) 750 MG 24 hr tablet Take 1 tablet (750 mg total) by mouth daily with breakfast.    multivitamin (ONE DAILY MULTIVITAMIN) per tablet Take 1 tablet by mouth once daily.    omeprazole (PRILOSEC) 20 MG capsule TAKE 1 CAPSULE BY MOUTH EVERY DAY    pravastatin (PRAVACHOL) 20 MG tablet TAKE 1 TABLET BY MOUTH EVERY DAY    promethazine-dextromethorphan (PROMETHAZINE-DM) 6.25-15 mg/5 mL Syrp 5 ml TID prn    terbinafine HCl (LAMISIL) 250 mg tablet Take 1 tablet (250 mg total) by mouth once daily.    tramadol (ULTRAM) 50 mg tablet TAKE ONE TABLET BY MOUTH THREE TIMES DAILY AFTER A MEAL    traZODone (DESYREL) 50 MG tablet TAKE 1 TABLET (50 MG TOTAL) BY MOUTH NIGHTLY AS NEEDED FOR INSOMNIA.     No current facility-administered medications for this visit.      Current Outpatient Medications on File Prior to Visit   Medication Sig    amLODIPine (NORVASC) 10 MG tablet TAKE 1 TABLET BY MOUTH EVERY DAY    blood sugar diagnostic Strp 1 strip by Misc.(Non-Drug; Combo  Route) route once daily. One Touch Ultra    blood-glucose meter kit Use to check blood sugar once daily.  One Touch Ultra    cyclobenzaprine (FLEXERIL) 10 MG tablet TAKE 1 TABLET BY MOUTH THREE TIMES A DAY AS NEEDED    fluticasone (FLONASE) 50 mcg/actuation nasal spray SPRAY 2 SPRAYS INTO EACH NOSTRIL EVERY DAY    hydroCHLOROthiazide (HYDRODIURIL) 25 MG tablet Take 1 tablet (25 mg total) by mouth once daily.    lancets Misc 1 lancet by Misc.(Non-Drug; Combo Route) route once daily. One Touch Delica    levocetirizine (XYZAL) 5 MG tablet TAKE 1 TABLET BY MOUTH EVERY DAY IN THE EVENING    losartan (COZAAR) 100 MG tablet Take 1 tablet (100 mg total) by mouth once daily.    metFORMIN (GLUCOPHAGE-XR) 750 MG 24 hr tablet Take 1 tablet (750 mg total) by mouth daily with breakfast.    multivitamin (ONE DAILY MULTIVITAMIN) per tablet Take 1 tablet by mouth once daily.    omeprazole (PRILOSEC) 20 MG capsule TAKE 1 CAPSULE BY MOUTH EVERY DAY    pravastatin (PRAVACHOL) 20 MG tablet TAKE 1 TABLET BY MOUTH EVERY DAY    promethazine-dextromethorphan (PROMETHAZINE-DM) 6.25-15 mg/5 mL Syrp 5 ml TID prn    terbinafine HCl (LAMISIL) 250 mg tablet Take 1 tablet (250 mg total) by mouth once daily.    tramadol (ULTRAM) 50 mg tablet TAKE ONE TABLET BY MOUTH THREE TIMES DAILY AFTER A MEAL    traZODone (DESYREL) 50 MG tablet TAKE 1 TABLET (50 MG TOTAL) BY MOUTH NIGHTLY AS NEEDED FOR INSOMNIA.     No current facility-administered medications on file prior to visit.      Review of patient's allergies indicates:  No Known Allergies  Review of Systems   Constitution: Negative for diaphoresis, malaise/fatigue and weight gain.   HENT: Negative for hoarse voice.    Eyes: Negative for double vision and visual disturbance.   Cardiovascular: Negative for chest pain, claudication, cyanosis, dyspnea on exertion, irregular heartbeat, leg swelling, near-syncope, orthopnea, palpitations, paroxysmal nocturnal dyspnea and syncope.    Respiratory: Positive for shortness of breath. Negative for cough, hemoptysis and snoring.    Hematologic/Lymphatic: Negative for bleeding problem. Does not bruise/bleed easily.   Skin: Negative for color change and poor wound healing.   Musculoskeletal: Negative for muscle cramps, muscle weakness and myalgias.   Gastrointestinal: Negative for bloating, abdominal pain, change in bowel habit, diarrhea, heartburn, hematemesis, hematochezia, melena and nausea.   Neurological: Negative for excessive daytime sleepiness, dizziness, headaches, light-headedness, loss of balance, numbness and weakness.   Psychiatric/Behavioral: Negative for memory loss. The patient does not have insomnia.    Allergic/Immunologic: Negative for hives.       Objective:   Physical Exam   Constitutional: She is oriented to person, place, and time. She appears well-developed and well-nourished. She does not appear ill. No distress.   HENT:   Head: Normocephalic and atraumatic.   Eyes: Pupils are equal, round, and reactive to light. EOM are normal. No scleral icterus.   Neck: Normal range of motion. Neck supple. Normal carotid pulses, no hepatojugular reflux and no JVD present. Carotid bruit is not present. No tracheal deviation present. No thyromegaly present.   Cardiovascular: Normal rate, regular rhythm and normal pulses. Exam reveals no gallop and no friction rub.   Murmur heard.   Harsh midsystolic murmur is present with a grade of 1/6 at the upper right sternal border radiating to the neck.  Pulses:       Carotid pulses are 2+ on the right side, and 2+ on the left side.       Radial pulses are 2+ on the right side, and 2+ on the left side.        Femoral pulses are 2+ on the right side, and 2+ on the left side.       Popliteal pulses are 2+ on the right side, and 2+ on the left side.        Dorsalis pedis pulses are 2+ on the right side, and 2+ on the left side.        Posterior tibial pulses are 2+ on the right side, and 2+ on the left  "side.   Pulmonary/Chest: Effort normal and breath sounds normal. No respiratory distress. She has no wheezes. She has no rhonchi. She has no rales. She exhibits no tenderness.   Abdominal: Soft. Normal appearance, normal aorta and bowel sounds are normal. She exhibits no abdominal bruit, no ascites and no pulsatile midline mass. There is no hepatomegaly. There is no tenderness.   Musculoskeletal: She exhibits no edema.        Right shoulder: She exhibits no deformity.   Neurological: She is alert and oriented to person, place, and time. She has normal strength. No cranial nerve deficit. Coordination normal.   Skin: Skin is warm and dry. No rash noted. She is not diaphoretic. No cyanosis or erythema. Nails show no clubbing.   Psychiatric: She has a normal mood and affect. Her speech is normal and behavior is normal.   Nursing note and vitals reviewed.    Vitals:    08/29/19 1534 08/29/19 1543   BP: (!) 140/50 (!) 140/52   BP Location: Right arm Left arm   Pulse: 61    Weight: 77.4 kg (170 lb 10.2 oz)    Height: 5' 6" (1.676 m)      Lab Results   Component Value Date    CHOL 204 (H) 06/07/2019    CHOL 213 (H) 01/11/2019    CHOL 224 (H) 04/26/2018    CHOL 224 (H) 04/26/2018     Lab Results   Component Value Date    HDL 97 (H) 06/07/2019    HDL 76 (H) 01/11/2019    HDL 81 (H) 04/26/2018    HDL 81 (H) 04/26/2018     Lab Results   Component Value Date    LDLCALC 93.4 06/07/2019    LDLCALC 121.2 01/11/2019    LDLCALC 129.0 04/26/2018    LDLCALC 129.0 04/26/2018     Lab Results   Component Value Date    TRIG 68 06/07/2019    TRIG 79 01/11/2019    TRIG 70 04/26/2018    TRIG 70 04/26/2018     Lab Results   Component Value Date    CHOLHDL 47.5 06/07/2019    CHOLHDL 35.7 01/11/2019    CHOLHDL 36.2 04/26/2018    CHOLHDL 36.2 04/26/2018       Chemistry        Component Value Date/Time     06/07/2019 1140    K 3.8 06/07/2019 1140     06/07/2019 1140    CO2 26 06/07/2019 1140    BUN 20 06/07/2019 1140    CREATININE 0.8 " 06/07/2019 1140     (H) 06/07/2019 1140        Component Value Date/Time    CALCIUM 10.3 06/07/2019 1140    ALKPHOS 83 06/07/2019 1140    AST 28 06/07/2019 1140    ALT 23 06/07/2019 1140    BILITOT 0.4 06/07/2019 1140    ESTGFRAFRICA >60.0 06/07/2019 1140    EGFRNONAA >60.0 06/07/2019 1140        Lab Results   Component Value Date    HGBA1C 7.3 (H) 06/07/2019       Lab Results   Component Value Date    TSH 1.444 10/22/2013     Lab Results   Component Value Date    INR 0.9 04/03/2012     Lab Results   Component Value Date    WBC 4.90 06/07/2019    HGB 12.9 06/07/2019    HCT 38.4 06/07/2019    MCV 93 06/07/2019     06/07/2019     BMP  Sodium   Date Value Ref Range Status   06/07/2019 138 136 - 145 mmol/L Final     Potassium   Date Value Ref Range Status   06/07/2019 3.8 3.5 - 5.1 mmol/L Final     Chloride   Date Value Ref Range Status   06/07/2019 101 95 - 110 mmol/L Final     CO2   Date Value Ref Range Status   06/07/2019 26 23 - 29 mmol/L Final     BUN, Bld   Date Value Ref Range Status   06/07/2019 20 8 - 23 mg/dL Final     Creatinine   Date Value Ref Range Status   06/07/2019 0.8 0.5 - 1.4 mg/dL Final     Calcium   Date Value Ref Range Status   06/07/2019 10.3 8.7 - 10.5 mg/dL Final     Anion Gap   Date Value Ref Range Status   06/07/2019 11 8 - 16 mmol/L Final     eGFR if    Date Value Ref Range Status   06/07/2019 >60.0 >60 mL/min/1.73 m^2 Final     eGFR if non    Date Value Ref Range Status   06/07/2019 >60.0 >60 mL/min/1.73 m^2 Final     Comment:     Calculation used to obtain the estimated glomerular filtration  rate (eGFR) is the CKD-EPI equation.        CrCl cannot be calculated (Patient's most recent lab result is older than the maximum 7 days allowed.).    Assessment:     1. Essential hypertension    2. History of colon cancer, stage I    3. Cardiomyopathy due to hypertension, without heart failure    4. Diastolic dysfunction    5. Vitamin D deficiency    6.  Hepatitis C antibody test positive    7. Diabetes mellitus type 2 in nonobese    8. Pure hypercholesterolemia    9. Sarcoidosis of lymph nodes    10. Chronic restrictive lung disease    11. Abnormal diffusion capacity determined by pulmonary function test      She needs better compliance with diet. counseled to improve diabetes and htn control as well as regular exercise. She has no cardiac symptoms ekg lvh with non specific t wave changes.  Will revaluate with echo make sure pa pressures are not more elevated.  Plan:   Continue current therapy  Cardiac low salt diet.  Risk factor modification and excercise program.g  Echo   F/u yearly..

## 2019-08-30 RX ORDER — OMEPRAZOLE 20 MG/1
CAPSULE, DELAYED RELEASE ORAL
Qty: 30 CAPSULE | Refills: 2 | Status: SHIPPED | OUTPATIENT
Start: 2019-08-30 | End: 2019-10-29 | Stop reason: SDUPTHER

## 2019-09-27 RX ORDER — PRAVASTATIN SODIUM 20 MG/1
TABLET ORAL
Qty: 90 TABLET | Refills: 0 | Status: SHIPPED | OUTPATIENT
Start: 2019-09-27 | End: 2019-11-25 | Stop reason: SDUPTHER

## 2019-10-08 RX ORDER — METFORMIN HYDROCHLORIDE 750 MG/1
TABLET, EXTENDED RELEASE ORAL
Qty: 90 TABLET | Refills: 1 | Status: SHIPPED | OUTPATIENT
Start: 2019-10-08 | End: 2020-04-27 | Stop reason: SDUPTHER

## 2019-10-14 RX ORDER — HYDROCHLOROTHIAZIDE 25 MG/1
TABLET ORAL
Qty: 90 TABLET | Refills: 0 | Status: SHIPPED | OUTPATIENT
Start: 2019-10-14 | End: 2019-11-15 | Stop reason: SDUPTHER

## 2019-10-14 RX ORDER — AMLODIPINE BESYLATE 10 MG/1
TABLET ORAL
Qty: 90 TABLET | Refills: 0 | Status: SHIPPED | OUTPATIENT
Start: 2019-10-14 | End: 2020-01-13

## 2019-10-17 DIAGNOSIS — M79.674 PAIN IN TOES OF BOTH FEET: ICD-10-CM

## 2019-10-17 DIAGNOSIS — B35.1 ONYCHOMYCOSIS: ICD-10-CM

## 2019-10-17 DIAGNOSIS — M79.675 PAIN IN TOES OF BOTH FEET: ICD-10-CM

## 2019-10-17 RX ORDER — TERBINAFINE HYDROCHLORIDE 250 MG/1
TABLET ORAL
Qty: 30 TABLET | Refills: 2 | OUTPATIENT
Start: 2019-10-17

## 2019-10-18 RX ORDER — HYDROCHLOROTHIAZIDE 25 MG/1
TABLET ORAL
Qty: 90 TABLET | Refills: 0 | Status: SHIPPED | OUTPATIENT
Start: 2019-10-18 | End: 2020-04-08

## 2019-10-24 DIAGNOSIS — M25.50 MULTIPLE JOINT PAIN: ICD-10-CM

## 2019-10-24 DIAGNOSIS — M19.90 ARTHRITIS: ICD-10-CM

## 2019-10-24 RX ORDER — CYCLOBENZAPRINE HCL 10 MG
TABLET ORAL
Qty: 30 TABLET | Refills: 0 | Status: SHIPPED | OUTPATIENT
Start: 2019-10-24 | End: 2020-04-03

## 2019-10-29 RX ORDER — OMEPRAZOLE 20 MG/1
CAPSULE, DELAYED RELEASE ORAL
Qty: 30 CAPSULE | Refills: 2 | Status: SHIPPED | OUTPATIENT
Start: 2019-10-29 | End: 2019-12-26

## 2019-11-07 RX ORDER — PROMETHAZINE HYDROCHLORIDE AND DEXTROMETHORPHAN HYDROBROMIDE 6.25; 15 MG/5ML; MG/5ML
SYRUP ORAL
Qty: 118 ML | Refills: 0 | OUTPATIENT
Start: 2019-11-07

## 2019-11-11 ENCOUNTER — TELEPHONE (OUTPATIENT)
Dept: FAMILY MEDICINE | Facility: CLINIC | Age: 64
End: 2019-11-11

## 2019-11-11 ENCOUNTER — HOSPITAL ENCOUNTER (OUTPATIENT)
Dept: RADIOLOGY | Facility: HOSPITAL | Age: 64
Discharge: HOME | End: 2019-11-11
Attending: FAMILY MEDICINE
Payer: COMMERCIAL

## 2019-11-11 ENCOUNTER — OFFICE VISIT (OUTPATIENT)
Dept: FAMILY MEDICINE | Facility: CLINIC | Age: 64
End: 2019-11-11
Attending: FAMILY MEDICINE
Payer: COMMERCIAL

## 2019-11-11 VITALS
TEMPERATURE: 99 F | HEIGHT: 66 IN | WEIGHT: 161.69 LBS | HEART RATE: 72 BPM | DIASTOLIC BLOOD PRESSURE: 62 MMHG | BODY MASS INDEX: 25.98 KG/M2 | SYSTOLIC BLOOD PRESSURE: 112 MMHG | OXYGEN SATURATION: 96 %

## 2019-11-11 DIAGNOSIS — K63.5 POLYP OF COLON, UNSPECIFIED PART OF COLON, UNSPECIFIED TYPE: ICD-10-CM

## 2019-11-11 DIAGNOSIS — J18.9 PNEUMONIA OF RIGHT MIDDLE LOBE DUE TO INFECTIOUS ORGANISM: ICD-10-CM

## 2019-11-11 DIAGNOSIS — E78.5 DM TYPE 2 WITH DIABETIC DYSLIPIDEMIA: ICD-10-CM

## 2019-11-11 DIAGNOSIS — D86.9 SARCOIDOSIS: ICD-10-CM

## 2019-11-11 DIAGNOSIS — R06.02 SOB (SHORTNESS OF BREATH): Primary | ICD-10-CM

## 2019-11-11 DIAGNOSIS — R06.02 SOB (SHORTNESS OF BREATH): ICD-10-CM

## 2019-11-11 DIAGNOSIS — E11.69 DM TYPE 2 WITH DIABETIC DYSLIPIDEMIA: ICD-10-CM

## 2019-11-11 PROCEDURE — 99214 OFFICE O/P EST MOD 30 MIN: CPT | Mod: S$GLB,,, | Performed by: FAMILY MEDICINE

## 2019-11-11 PROCEDURE — 71046 XR CHEST PA AND LATERAL: ICD-10-PCS | Mod: 26,,, | Performed by: RADIOLOGY

## 2019-11-11 PROCEDURE — 71046 X-RAY EXAM CHEST 2 VIEWS: CPT | Mod: TC,PO

## 2019-11-11 PROCEDURE — 99999 PR PBB SHADOW E&M-EST. PATIENT-LVL III: CPT | Mod: PBBFAC,,, | Performed by: FAMILY MEDICINE

## 2019-11-11 PROCEDURE — 99213 OFFICE O/P EST LOW 20 MIN: CPT | Mod: PBBFAC,25,PO | Performed by: FAMILY MEDICINE

## 2019-11-11 PROCEDURE — 71046 X-RAY EXAM CHEST 2 VIEWS: CPT | Mod: 26,,, | Performed by: RADIOLOGY

## 2019-11-11 PROCEDURE — 99999 PR PBB SHADOW E&M-EST. PATIENT-LVL III: ICD-10-PCS | Mod: PBBFAC,,, | Performed by: FAMILY MEDICINE

## 2019-11-11 PROCEDURE — 99214 PR OFFICE/OUTPT VISIT, EST, LEVL IV, 30-39 MIN: ICD-10-PCS | Mod: S$GLB,,, | Performed by: FAMILY MEDICINE

## 2019-11-11 RX ORDER — FLUTICASONE PROPIONATE 50 MCG
SPRAY, SUSPENSION (ML) NASAL
Qty: 16 ML | Refills: 0 | Status: SHIPPED | OUTPATIENT
Start: 2019-11-11 | End: 2019-12-03 | Stop reason: SDUPTHER

## 2019-11-11 RX ORDER — LEVOFLOXACIN 750 MG/1
750 TABLET ORAL DAILY
Qty: 10 TABLET | Refills: 0 | Status: SHIPPED | OUTPATIENT
Start: 2019-11-11 | End: 2020-03-12

## 2019-11-11 RX ORDER — AMOXICILLIN AND CLAVULANATE POTASSIUM 875; 125 MG/1; MG/1
1 TABLET, FILM COATED ORAL 2 TIMES DAILY
Qty: 14 TABLET | Refills: 0 | Status: SHIPPED | OUTPATIENT
Start: 2019-11-11 | End: 2019-11-11

## 2019-11-11 NOTE — PROGRESS NOTES
Subjective:       Patient ID: Anne Farmer is a 63 y.o. female.    Chief Complaint: Cough and Chest Congestion    63 y old female with chronic restrictive lung dx with productive cough and sob for 1 w. Not taking any meds . No fever.     Review of Systems   Constitutional: Negative.    HENT: Negative.    Eyes: Negative.    Respiratory: Positive for cough and shortness of breath.    Cardiovascular: Negative.    Gastrointestinal: Negative.    Genitourinary: Negative.    Musculoskeletal: Negative.    Skin: Negative.    Hematological: Negative.        Objective:      Physical Exam   Constitutional: She is oriented to person, place, and time. No distress.   HENT:   Head: Normocephalic and atraumatic.   Right Ear: External ear normal.   Left Ear: External ear normal.   Mouth/Throat: No oropharyngeal exudate.   Eyes: Pupils are equal, round, and reactive to light. Conjunctivae and EOM are normal. Right eye exhibits no discharge. Left eye exhibits no discharge. No scleral icterus.   Neck: Normal range of motion. Neck supple. No JVD present. No tracheal deviation present. No thyromegaly present.   Cardiovascular: Normal rate, regular rhythm and normal heart sounds. Exam reveals no gallop and no friction rub.   No murmur heard.  Pulmonary/Chest: Effort normal. No stridor. No respiratory distress. She has wheezes in the right lower field and the left lower field. She has no rales. She exhibits no tenderness.   Abdominal: Soft. Bowel sounds are normal. She exhibits no distension. There is no tenderness. There is no rebound and no guarding.   Musculoskeletal: Normal range of motion.   Lymphadenopathy:     She has no cervical adenopathy.   Neurological: She is alert and oriented to person, place, and time.   Skin: Skin is warm and dry. She is not diaphoretic.   Psychiatric: She has a normal mood and affect. Her behavior is normal. Judgment and thought content normal.       Assessment:     Anne was seen today for cough  and chest congestion.    Diagnoses and all orders for this visit:    SOB (shortness of breath)  -     X-Ray Chest PA And Lateral; Future    Polyp of colon, unspecified part of colon, unspecified type  -     Case request GI: COLONOSCOPY    Pneumonia of right middle lobe due to infectious organism  -     CBC auto differential; Future  -     Comprehensive metabolic panel; Future    Sarcoidosis    DM type 2 with diabetic dyslipidemia    Other orders  -     fluticasone propionate (FLONASE) 50 mcg/actuation nasal spray; SPRAY 2 SPRAYS INTO EACH NOSTRIL EVERY DAY  -     Discontinue: amoxicillin-clavulanate 875-125mg (AUGMENTIN) 875-125 mg per tablet; Take 1 tablet by mouth 2 (two) times daily.      Plan:     Anne was seen today for cough and chest congestion.    Diagnoses and all orders for this visit:    SOB (shortness of breath)  -     X-Ray Chest PA And Lateral; Future    Other orders  -     fluticasone propionate (FLONASE) 50 mcg/actuation nasal spray; SPRAY 2 SPRAYS INTO EACH NOSTRIL EVERY DAY  -     amoxicillin-clavulanate 875-125mg (AUGMENTIN) 875-125 mg per tablet; Take 1 tablet by mouth 2 (two) times daily.     Start Levaquin . WS discussed.   F.u in 3 d

## 2019-11-12 ENCOUNTER — TELEPHONE (OUTPATIENT)
Dept: ENDOSCOPY | Facility: HOSPITAL | Age: 64
End: 2019-11-12

## 2019-11-12 NOTE — TELEPHONE ENCOUNTER
Pt has an order for a colonoscopy. Will wait to schedule due to recent abnormal ekg and pneumonia dx.

## 2019-11-15 ENCOUNTER — OFFICE VISIT (OUTPATIENT)
Dept: FAMILY MEDICINE | Facility: CLINIC | Age: 64
End: 2019-11-15
Payer: COMMERCIAL

## 2019-11-15 ENCOUNTER — LAB VISIT (OUTPATIENT)
Dept: LAB | Facility: HOSPITAL | Age: 64
End: 2019-11-15
Attending: NURSE PRACTITIONER
Payer: COMMERCIAL

## 2019-11-15 DIAGNOSIS — J18.9 PNEUMONIA OF RIGHT MIDDLE LOBE DUE TO INFECTIOUS ORGANISM: Primary | ICD-10-CM

## 2019-11-15 DIAGNOSIS — E11.9 DIABETES MELLITUS TYPE 2 IN NONOBESE: ICD-10-CM

## 2019-11-15 PROCEDURE — 83036 HEMOGLOBIN GLYCOSYLATED A1C: CPT

## 2019-11-15 PROCEDURE — 99214 OFFICE O/P EST MOD 30 MIN: CPT | Mod: S$GLB,,, | Performed by: NURSE PRACTITIONER

## 2019-11-15 PROCEDURE — 99999 PR PBB SHADOW E&M-EST. PATIENT-LVL IV: ICD-10-PCS | Mod: PBBFAC,,, | Performed by: NURSE PRACTITIONER

## 2019-11-15 PROCEDURE — 99214 PR OFFICE/OUTPT VISIT, EST, LEVL IV, 30-39 MIN: ICD-10-PCS | Mod: S$GLB,,, | Performed by: NURSE PRACTITIONER

## 2019-11-15 PROCEDURE — 99214 OFFICE O/P EST MOD 30 MIN: CPT | Mod: PBBFAC,PO | Performed by: NURSE PRACTITIONER

## 2019-11-15 PROCEDURE — 36415 COLL VENOUS BLD VENIPUNCTURE: CPT | Mod: PO

## 2019-11-15 PROCEDURE — 99999 PR PBB SHADOW E&M-EST. PATIENT-LVL IV: CPT | Mod: PBBFAC,,, | Performed by: NURSE PRACTITIONER

## 2019-11-15 RX ORDER — ALBUTEROL SULFATE 90 UG/1
2 AEROSOL, METERED RESPIRATORY (INHALATION) EVERY 6 HOURS PRN
Qty: 1 INHALER | Refills: 11 | Status: SHIPPED | OUTPATIENT
Start: 2019-11-15 | End: 2020-09-23

## 2019-11-15 RX ORDER — PREDNISONE 20 MG/1
20 TABLET ORAL DAILY
Qty: 3 TABLET | Refills: 0 | Status: SHIPPED | OUTPATIENT
Start: 2019-11-15 | End: 2020-09-23

## 2019-11-15 NOTE — LETTER
November 15, 2019      [facility]  [address]  [facility] [address]  Phone: [phone]  Fax: [phone]       Patient: Anne Farmer   YOB: 1955  Date of Visit: 11/15/2019    To Whom It May Concern:    Gladis Farmer  was at [facility] on 11/15/2019. She may also be excused from 11/14/19. She may return to work on 11/18/19 with no restrictions. If you have any questions or concerns, or if I can be of further assistance, please do not hesitate to contact me.    Sincerely,    Maxine Olmedo MA

## 2019-11-16 LAB
ESTIMATED AVG GLUCOSE: 134 MG/DL (ref 68–131)
HBA1C MFR BLD HPLC: 6.3 % (ref 4–5.6)

## 2019-11-21 ENCOUNTER — HOSPITAL ENCOUNTER (OUTPATIENT)
Dept: RADIOLOGY | Facility: HOSPITAL | Age: 64
Discharge: HOME | End: 2019-11-21
Attending: NURSE PRACTITIONER
Payer: COMMERCIAL

## 2019-11-21 ENCOUNTER — OFFICE VISIT (OUTPATIENT)
Dept: FAMILY MEDICINE | Facility: CLINIC | Age: 64
End: 2019-11-21
Payer: COMMERCIAL

## 2019-11-21 VITALS
OXYGEN SATURATION: 98 % | WEIGHT: 160.94 LBS | BODY MASS INDEX: 25.86 KG/M2 | SYSTOLIC BLOOD PRESSURE: 120 MMHG | HEIGHT: 66 IN | DIASTOLIC BLOOD PRESSURE: 68 MMHG | HEART RATE: 81 BPM | TEMPERATURE: 97 F

## 2019-11-21 VITALS
BODY MASS INDEX: 26.19 KG/M2 | DIASTOLIC BLOOD PRESSURE: 59 MMHG | WEIGHT: 162.94 LBS | SYSTOLIC BLOOD PRESSURE: 124 MMHG | HEIGHT: 66 IN | OXYGEN SATURATION: 97 % | HEART RATE: 72 BPM | TEMPERATURE: 98 F

## 2019-11-21 DIAGNOSIS — J18.9 PNEUMONIA OF RIGHT MIDDLE LOBE DUE TO INFECTIOUS ORGANISM: Primary | ICD-10-CM

## 2019-11-21 DIAGNOSIS — J16.0: ICD-10-CM

## 2019-11-21 PROCEDURE — 71046 X-RAY EXAM CHEST 2 VIEWS: CPT | Mod: TC,PO

## 2019-11-21 PROCEDURE — 99999 PR PBB SHADOW E&M-EST. PATIENT-LVL V: ICD-10-PCS | Mod: PBBFAC,,, | Performed by: NURSE PRACTITIONER

## 2019-11-21 PROCEDURE — 71046 XR CHEST PA AND LATERAL: ICD-10-PCS | Mod: 26,,, | Performed by: RADIOLOGY

## 2019-11-21 PROCEDURE — 99215 OFFICE O/P EST HI 40 MIN: CPT | Mod: PBBFAC,25,PO | Performed by: NURSE PRACTITIONER

## 2019-11-21 PROCEDURE — 99214 PR OFFICE/OUTPT VISIT, EST, LEVL IV, 30-39 MIN: ICD-10-PCS | Mod: S$GLB,,, | Performed by: NURSE PRACTITIONER

## 2019-11-21 PROCEDURE — 99999 PR PBB SHADOW E&M-EST. PATIENT-LVL V: CPT | Mod: PBBFAC,,, | Performed by: NURSE PRACTITIONER

## 2019-11-21 PROCEDURE — 71046 X-RAY EXAM CHEST 2 VIEWS: CPT | Mod: 26,,, | Performed by: RADIOLOGY

## 2019-11-21 PROCEDURE — 99214 OFFICE O/P EST MOD 30 MIN: CPT | Mod: S$GLB,,, | Performed by: NURSE PRACTITIONER

## 2019-11-21 RX ORDER — TRAMADOL HYDROCHLORIDE 50 MG/1
TABLET ORAL
Qty: 30 TABLET | Refills: 0 | Status: SHIPPED | OUTPATIENT
Start: 2019-11-21 | End: 2020-09-23 | Stop reason: SDUPTHER

## 2019-11-21 NOTE — PROGRESS NOTES
"Subjective:       Patient ID: Anne Farmer is a 63 y.o. female.    Chief Complaint: Follow-up  Pt reports to clinic for follow up evaluation of RLL pneumonia.  Reports "feeling better".  No fever, notes SOB on exertion.  Cough is non productive.  Notes wheezing.  Treated 4 days ago with levaquin and rocephin.  CXR results: 11/11/19 Cardiac silhouette and mediastinal contours are unchanged with cardiomegaly.  Lungs demonstrate developing right middle lobe opacity.  No large pleural effusion.  Osseous structures are intact.  Pneumonia   She complains of cough, difficulty breathing and wheezing. This is a new problem. The problem has been waxing and waning. The cough is productive of sputum. Associated symptoms include dyspnea on exertion. Her symptoms are aggravated by minimal activity. Her symptoms are alleviated by rest.     Review of Systems   Constitutional: Negative.    HENT: Negative.    Respiratory: Positive for cough and wheezing.    Cardiovascular: Positive for dyspnea on exertion.   Gastrointestinal: Negative.    Genitourinary: Negative.        Objective:      Physical Exam   Constitutional: She is oriented to person, place, and time. She appears well-developed and well-nourished.   Eyes: EOM are normal.   Neck: Neck supple.   Cardiovascular: Normal rate and normal heart sounds.   Pulmonary/Chest: She has wheezes in the right upper field, the right lower field, the left upper field and the left lower field. She has rhonchi.   Neurological: She is alert and oriented to person, place, and time.   Skin: Skin is warm and dry.   Vitals reviewed.      Assessment:       1. Pneumonia of right middle lobe due to infectious organism    2. Diabetes mellitus type 2 in nonobese        Plan:   Pneumonia of right middle lobe due to infectious organism  -     albuterol (PROVENTIL/VENTOLIN HFA) 90 mcg/actuation inhaler; Inhale 2 puffs into the lungs every 6 (six) hours as needed for Wheezing. Dispense with spacer.  " Dispense: 1 Inhaler; Refill: 11  -     predniSONE (DELTASONE) 20 MG tablet; Take 1 tablet (20 mg total) by mouth once daily.  Dispense: 3 tablet; Refill: 0  -follow up in 7 days   Diabetes mellitus type 2 in nonobese  -     Hemoglobin A1c; Future; Expected date: 11/15/2019  -     predniSONE (DELTASONE) 20 MG tablet; Take 1 tablet (20 mg total) by mouth once daily.  Dispense: 3 tablet; Refill: 0      No follow-ups on file.

## 2019-11-25 RX ORDER — PRAVASTATIN SODIUM 20 MG/1
TABLET ORAL
Qty: 30 TABLET | Refills: 2 | Status: SHIPPED | OUTPATIENT
Start: 2019-11-25 | End: 2020-01-22 | Stop reason: SDUPTHER

## 2019-11-27 NOTE — PROGRESS NOTES
Subjective:       Patient ID: Anne Farmer is a 63 y.o. female.    Chief Complaint: Back Pain  Pt reports to clinic for 10 day follow up of pneumonia.  Denies SOB or fever.  Reports improvement.  Cough remains, non productive.  Treated with levaquin and rocephin, prednisone taper and phenergan DM   Pneumonia   She complains of cough. There is no shortness of breath or sputum production. The current episode started in the past 7 days. The cough is productive of sputum. Her symptoms are aggravated by strenuous activity. She reports significant improvement on treatment.     Review of Systems   Constitutional: Negative.    HENT: Negative.    Respiratory: Positive for cough. Negative for sputum production and shortness of breath.    Cardiovascular: Negative.    Gastrointestinal: Negative.    Genitourinary: Negative.        Objective:      Physical Exam   Constitutional: She is oriented to person, place, and time. She appears well-developed and well-nourished.   HENT:   Head: Normocephalic.   Eyes: EOM are normal.   Neck: Neck supple.   Cardiovascular: Normal rate and normal heart sounds.   Pulmonary/Chest: Effort normal. She has decreased breath sounds in the right middle field, the right lower field and the left lower field.   Neurological: She is alert and oriented to person, place, and time.   Skin: Skin is warm and dry.   Vitals reviewed.      Assessment:       1. Pneumonia of right middle lobe due to infectious organism        Plan:   Pneumonia of right middle lobe due to infectious organism  -     X-Ray Chest PA And Lateral; Future; Expected date: 11/21/2019  Improving.  Interval improvement in previously noted right midlung airspace opacity with mild residual postinflammatory change or scarring noted.  No new focal consolidation.  Faint nodular densities are seen in the lungs.  For example, there is a 3.5 mm density projecting about the peripheral left upper lung.  A PET-CT from 01/23/2017 demonstrated  pulmonary nodules without uptake.  Chest CT from 09/29/2015 also demonstrated bilateral nodules.  If indicated, correlation with repeat chest CT could be performed.  Cardiomediastinal silhouette is enlarged.  Aortic atherosclerosis is noted.  Degenerative changes of the spine and shoulders are present    -CT recommended.   Other orders  -     traMADol (ULTRAM) 50 mg tablet; TAKE ONE TABLET BY MOUTH THREE TIMES DAILY AFTER A MEAL  Dispense: 30 tablet; Refill: 0      No follow-ups on file.

## 2019-12-03 RX ORDER — FLUTICASONE PROPIONATE 50 MCG
SPRAY, SUSPENSION (ML) NASAL
Qty: 16 ML | Refills: 0 | Status: SHIPPED | OUTPATIENT
Start: 2019-12-03 | End: 2020-01-02

## 2019-12-26 RX ORDER — OMEPRAZOLE 20 MG/1
CAPSULE, DELAYED RELEASE ORAL
Qty: 30 CAPSULE | Refills: 2 | Status: SHIPPED | OUTPATIENT
Start: 2019-12-26 | End: 2020-03-19

## 2020-01-02 RX ORDER — FLUTICASONE PROPIONATE 50 MCG
SPRAY, SUSPENSION (ML) NASAL
Qty: 16 ML | Refills: 0 | Status: SHIPPED | OUTPATIENT
Start: 2020-01-02 | End: 2020-02-03

## 2020-01-13 RX ORDER — AMLODIPINE BESYLATE 10 MG/1
TABLET ORAL
Qty: 90 TABLET | Refills: 0 | Status: SHIPPED | OUTPATIENT
Start: 2020-01-13 | End: 2020-04-13 | Stop reason: SDUPTHER

## 2020-01-16 RX ORDER — LOSARTAN POTASSIUM 100 MG/1
TABLET ORAL
Qty: 90 TABLET | Refills: 2 | Status: SHIPPED | OUTPATIENT
Start: 2020-01-16 | End: 2020-04-20 | Stop reason: SDUPTHER

## 2020-01-24 RX ORDER — PRAVASTATIN SODIUM 20 MG/1
TABLET ORAL
Qty: 30 TABLET | Refills: 2 | Status: SHIPPED | OUTPATIENT
Start: 2020-01-24 | End: 2020-04-20 | Stop reason: SDUPTHER

## 2020-01-24 RX ORDER — PRAVASTATIN SODIUM 20 MG/1
20 TABLET ORAL DAILY
Qty: 30 TABLET | Refills: 2 | Status: SHIPPED | OUTPATIENT
Start: 2020-01-24 | End: 2020-01-24 | Stop reason: SDUPTHER

## 2020-01-28 ENCOUNTER — TELEPHONE (OUTPATIENT)
Dept: FAMILY MEDICINE | Facility: CLINIC | Age: 65
End: 2020-01-28

## 2020-01-28 DIAGNOSIS — Z12.39 BREAST CANCER SCREENING: Primary | ICD-10-CM

## 2020-01-28 DIAGNOSIS — Z12.31 ENCOUNTER FOR SCREENING MAMMOGRAM FOR MALIGNANT NEOPLASM OF BREAST: ICD-10-CM

## 2020-01-28 NOTE — TELEPHONE ENCOUNTER
----- Message from Elena Zapata sent at 1/28/2020  1:42 PM CST -----  Contact: pt  She's calling in regards to mammo orders     ,pls call pt back at [phone] (home)

## 2020-02-03 RX ORDER — FLUTICASONE PROPIONATE 50 MCG
SPRAY, SUSPENSION (ML) NASAL
Qty: 16 ML | Refills: 0 | Status: SHIPPED | OUTPATIENT
Start: 2020-02-03 | End: 2020-09-23 | Stop reason: SDUPTHER

## 2020-02-06 RX ORDER — TRAMADOL HYDROCHLORIDE 50 MG/1
TABLET ORAL
Qty: 21 TABLET | Refills: 1 | OUTPATIENT
Start: 2020-02-06

## 2020-02-28 ENCOUNTER — PATIENT OUTREACH (OUTPATIENT)
Dept: ADMINISTRATIVE | Facility: OTHER | Age: 65
End: 2020-02-28

## 2020-02-28 NOTE — PROGRESS NOTES
Chart Reviewed  Care Everywhere updated  Immunizations reconciled  Health Maintenance updated  Orders placed n/a  Upcoming Appts: 3/4/2020 ophthalmology

## 2020-03-04 ENCOUNTER — OFFICE VISIT (OUTPATIENT)
Dept: OPHTHALMOLOGY | Facility: CLINIC | Age: 65
End: 2020-03-04
Payer: COMMERCIAL

## 2020-03-04 DIAGNOSIS — E11.36 DIABETIC CATARACT: ICD-10-CM

## 2020-03-04 DIAGNOSIS — E11.9 DIABETES MELLITUS TYPE 2 WITHOUT RETINOPATHY: Primary | ICD-10-CM

## 2020-03-04 DIAGNOSIS — H52.4 BILATERAL PRESBYOPIA: ICD-10-CM

## 2020-03-04 PROCEDURE — 92015 DETERMINE REFRACTIVE STATE: CPT | Mod: S$GLB,,, | Performed by: OPTOMETRIST

## 2020-03-04 PROCEDURE — 99211 OFF/OP EST MAY X REQ PHY/QHP: CPT | Mod: PBBFAC | Performed by: OPTOMETRIST

## 2020-03-04 PROCEDURE — 92014 COMPRE OPH EXAM EST PT 1/>: CPT | Mod: S$GLB,,, | Performed by: OPTOMETRIST

## 2020-03-04 PROCEDURE — 99999 PR PBB SHADOW E&M-EST. PATIENT-LVL I: CPT | Mod: PBBFAC,,, | Performed by: OPTOMETRIST

## 2020-03-04 PROCEDURE — 99999 PR PBB SHADOW E&M-EST. PATIENT-LVL I: ICD-10-PCS | Mod: PBBFAC,,, | Performed by: OPTOMETRIST

## 2020-03-04 PROCEDURE — 92014 PR EYE EXAM, EST PATIENT,COMPREHESV: ICD-10-PCS | Mod: S$GLB,,, | Performed by: OPTOMETRIST

## 2020-03-04 PROCEDURE — 92015 PR REFRACTION: ICD-10-PCS | Mod: S$GLB,,, | Performed by: OPTOMETRIST

## 2020-03-04 NOTE — PROGRESS NOTES
HPI     NIDDM exam.  Decreased night visual acuity.  Lights bother patient at night.  Decrease distance visual acuity.  Last eye exam 02/13/2019 TRF.  Update glasses RX.  Lab Results       Component                Value               Date                       HGBA1C                   6.3 (H)             11/15/2019                Last edited by Ajay Dinero, OD on 3/4/2020  3:09 PM. (History)            Assessment /Plan     For exam results, see Encounter Report.    Diabetes mellitus type 2 without retinopathy    Diabetic cataract    Bilateral presbyopia      No Background Diabetic Retinopathy    Moderate cataracts OU, not surgical    Dispense Final Rx for glasses.  RTC 1 year  Discussed above and answered questions.

## 2020-03-12 ENCOUNTER — OFFICE VISIT (OUTPATIENT)
Dept: FAMILY MEDICINE | Facility: CLINIC | Age: 65
End: 2020-03-12
Attending: FAMILY MEDICINE
Payer: MEDICARE

## 2020-03-12 ENCOUNTER — HOSPITAL ENCOUNTER (OUTPATIENT)
Dept: RADIOLOGY | Facility: HOSPITAL | Age: 65
Discharge: HOME | End: 2020-03-12
Attending: FAMILY MEDICINE
Payer: MEDICARE

## 2020-03-12 VITALS
WEIGHT: 165.13 LBS | HEIGHT: 66 IN | OXYGEN SATURATION: 94 % | HEART RATE: 57 BPM | DIASTOLIC BLOOD PRESSURE: 72 MMHG | TEMPERATURE: 99 F | SYSTOLIC BLOOD PRESSURE: 168 MMHG | BODY MASS INDEX: 26.54 KG/M2

## 2020-03-12 DIAGNOSIS — E78.5 DM TYPE 2 WITH DIABETIC DYSLIPIDEMIA: ICD-10-CM

## 2020-03-12 DIAGNOSIS — J01.40 ACUTE PANSINUSITIS, RECURRENCE NOT SPECIFIED: ICD-10-CM

## 2020-03-12 DIAGNOSIS — E11.69 DM TYPE 2 WITH DIABETIC DYSLIPIDEMIA: ICD-10-CM

## 2020-03-12 DIAGNOSIS — D86.9 SARCOIDOSIS: ICD-10-CM

## 2020-03-12 DIAGNOSIS — R06.2 WHEEZE: ICD-10-CM

## 2020-03-12 DIAGNOSIS — K63.5 POLYP OF COLON, UNSPECIFIED PART OF COLON, UNSPECIFIED TYPE: ICD-10-CM

## 2020-03-12 DIAGNOSIS — R06.2 WHEEZE: Primary | ICD-10-CM

## 2020-03-12 DIAGNOSIS — I10 HYPERTENSION, UNSPECIFIED TYPE: ICD-10-CM

## 2020-03-12 PROCEDURE — 99999 PR PBB SHADOW E&M-EST. PATIENT-LVL III: ICD-10-PCS | Mod: PBBFAC,,, | Performed by: FAMILY MEDICINE

## 2020-03-12 PROCEDURE — 99999 PR PBB SHADOW E&M-EST. PATIENT-LVL III: CPT | Mod: PBBFAC,,, | Performed by: FAMILY MEDICINE

## 2020-03-12 PROCEDURE — 71046 X-RAY EXAM CHEST 2 VIEWS: CPT | Mod: 26,,, | Performed by: RADIOLOGY

## 2020-03-12 PROCEDURE — 71046 XR CHEST PA AND LATERAL: ICD-10-PCS | Mod: 26,,, | Performed by: RADIOLOGY

## 2020-03-12 PROCEDURE — 99214 PR OFFICE/OUTPT VISIT, EST, LEVL IV, 30-39 MIN: ICD-10-PCS | Mod: S$GLB,,, | Performed by: FAMILY MEDICINE

## 2020-03-12 PROCEDURE — 99214 OFFICE O/P EST MOD 30 MIN: CPT | Mod: S$GLB,,, | Performed by: FAMILY MEDICINE

## 2020-03-12 PROCEDURE — 71046 X-RAY EXAM CHEST 2 VIEWS: CPT | Mod: TC,PO

## 2020-03-12 PROCEDURE — 99213 OFFICE O/P EST LOW 20 MIN: CPT | Mod: PBBFAC,25,PO | Performed by: FAMILY MEDICINE

## 2020-03-12 RX ORDER — AMOXICILLIN AND CLAVULANATE POTASSIUM 875; 125 MG/1; MG/1
1 TABLET, FILM COATED ORAL EVERY 12 HOURS
Qty: 20 TABLET | Refills: 0 | Status: SHIPPED | OUTPATIENT
Start: 2020-03-12 | End: 2020-04-27

## 2020-03-12 RX ORDER — CODEINE PHOSPHATE AND GUAIFENESIN 10; 100 MG/5ML; MG/5ML
5 SOLUTION ORAL 3 TIMES DAILY PRN
Qty: 118 ML | Refills: 0 | Status: SHIPPED | OUTPATIENT
Start: 2020-03-12 | End: 2020-03-22

## 2020-03-12 NOTE — PROGRESS NOTES
Subjective:       Patient ID: Anne Farmer is a 64 y.o. female.    Chief Complaint: Cough (x 1 week) and Headache    64 y old female with sarcoidosis , t2 dm , htn with productive cough , wheezes and nasal congestion for 1 w. Not taking any meds. She works as a  at [employer]     Review of Systems   Constitutional: Negative.    HENT: Positive for sinus pressure and sinus pain.    Eyes: Negative.    Respiratory: Positive for shortness of breath and wheezing.    Cardiovascular: Negative.    Gastrointestinal: Negative.    Genitourinary: Negative.    Musculoskeletal: Negative.    Skin: Negative.    Hematological: Negative.        Objective:      Physical Exam   Constitutional: She is oriented to person, place, and time. No distress.   HENT:   Head: Normocephalic and atraumatic.   Right Ear: External ear normal.   Left Ear: External ear normal.   Mouth/Throat: No oropharyngeal exudate.   Eyes: Pupils are equal, round, and reactive to light. Conjunctivae and EOM are normal. Right eye exhibits no discharge. Left eye exhibits no discharge. No scleral icterus.   Neck: Normal range of motion. Neck supple. No JVD present. No tracheal deviation present. No thyromegaly present.   Cardiovascular: Normal rate, regular rhythm and normal heart sounds. Exam reveals no gallop and no friction rub.   No murmur heard.  Pulmonary/Chest: Effort normal. No stridor. No respiratory distress. She has no wheezes. She has rhonchi in the right upper field, the right middle field and the left middle field. She has no rales. She exhibits no tenderness.   Abdominal: Soft. Bowel sounds are normal. She exhibits no distension. There is no tenderness. There is no rebound and no guarding.   Musculoskeletal: Normal range of motion.   Lymphadenopathy:     She has no cervical adenopathy.   Neurological: She is alert and oriented to person, place, and time.   Skin: Skin is warm and dry. She is not diaphoretic.   Psychiatric: She has a  normal mood and affect. Her behavior is normal. Judgment and thought content normal.       Assessment:       1. Wheeze    2. Polyp of colon, unspecified part of colon, unspecified type    3. Sarcoidosis    4. DM type 2 with diabetic dyslipidemia    5. Hypertension, unspecified type        Plan:     Anne was seen today for cough and headache.    Diagnoses and all orders for this visit:    Wheeze  -     X-Ray Chest PA And Lateral; Future    Polyp of colon, unspecified part of colon, unspecified type  -     Case request GI: COLONOSCOPY    Sarcoidosis    DM type 2 with diabetic dyslipidemia    Hypertension, unspecified type    Other orders  -     guaifenesin-codeine 100-10 mg/5 ml (TUSSI-ORGANIDIN NR)  mg/5 mL syrup; Take 5 mLs by mouth 3 (three) times daily as needed for Cough.     Anne was seen today for cough and headache.    Diagnoses and all orders for this visit:    Wheeze  -     X-Ray Chest PA And Lateral; Future    Polyp of colon, unspecified part of colon, unspecified type  -     Case request GI: COLONOSCOPY    Sarcoidosis    DM type 2 with diabetic dyslipidemia    Hypertension, unspecified type    Other orders  -     guaifenesin-codeine 100-10 mg/5 ml (TUSSI-ORGANIDIN NR)  mg/5 mL syrup; Take 5 mLs by mouth 3 (three) times daily as needed for Cough.     Uncontrolled BP . She has not taken antihypertensive today. F.u in 1 w     No acute findings seen IN CXR .  ABx.Symptomatic treatment . WS discussed.

## 2020-03-19 RX ORDER — OMEPRAZOLE 20 MG/1
CAPSULE, DELAYED RELEASE ORAL
Qty: 30 CAPSULE | Refills: 2 | Status: SHIPPED | OUTPATIENT
Start: 2020-03-19 | End: 2020-06-11

## 2020-03-24 ENCOUNTER — TELEPHONE (OUTPATIENT)
Dept: FAMILY MEDICINE | Facility: CLINIC | Age: 65
End: 2020-03-24

## 2020-03-24 NOTE — TELEPHONE ENCOUNTER
----- Message from Chiquita Talley MD sent at 3/12/2020 11:28 AM CDT -----  No new findings in CXR. Abx for sinus infection also sent  To pharmacy . F.u in 1 w for DM and HTN please

## 2020-04-03 DIAGNOSIS — M19.90 ARTHRITIS: ICD-10-CM

## 2020-04-03 DIAGNOSIS — M25.50 MULTIPLE JOINT PAIN: ICD-10-CM

## 2020-04-03 RX ORDER — CYCLOBENZAPRINE HCL 10 MG
TABLET ORAL
Qty: 30 TABLET | Refills: 0 | Status: SHIPPED | OUTPATIENT
Start: 2020-04-03 | End: 2020-04-20 | Stop reason: SDUPTHER

## 2020-04-07 DIAGNOSIS — M19.90 ARTHRITIS: ICD-10-CM

## 2020-04-07 DIAGNOSIS — M25.50 MULTIPLE JOINT PAIN: ICD-10-CM

## 2020-04-07 RX ORDER — CYCLOBENZAPRINE HCL 10 MG
10 TABLET ORAL 3 TIMES DAILY PRN
Qty: 30 TABLET | Refills: 0 | OUTPATIENT
Start: 2020-04-07

## 2020-04-07 RX ORDER — TRAMADOL HYDROCHLORIDE 50 MG/1
TABLET ORAL
Qty: 30 TABLET | Refills: 0 | OUTPATIENT
Start: 2020-04-07

## 2020-04-07 NOTE — TELEPHONE ENCOUNTER
----- Message from Kalyn Diaz sent at 4/7/2020 11:12 AM CDT -----  Contact: Self  Pt calling in regards to results to her test, and refills on her meds     ==cyclobenzaprine (FLEXERIL) 10 MG tablet    ==traMADol (ULTRAM) 50 mg tablet    [id number] - [place], [place] - [address] [facility]    Pt needs nurse to contact her at [phone]

## 2020-04-08 RX ORDER — HYDROCHLOROTHIAZIDE 25 MG/1
TABLET ORAL
Qty: 90 TABLET | Refills: 0 | Status: SHIPPED | OUTPATIENT
Start: 2020-04-08 | End: 2020-04-20 | Stop reason: SDUPTHER

## 2020-04-13 RX ORDER — AMLODIPINE BESYLATE 10 MG/1
10 TABLET ORAL DAILY
Qty: 90 TABLET | Refills: 0 | Status: SHIPPED | OUTPATIENT
Start: 2020-04-13 | End: 2020-08-05

## 2020-04-20 DIAGNOSIS — M19.90 ARTHRITIS: ICD-10-CM

## 2020-04-20 DIAGNOSIS — M25.50 MULTIPLE JOINT PAIN: ICD-10-CM

## 2020-04-20 RX ORDER — PRAVASTATIN SODIUM 20 MG/1
20 TABLET ORAL DAILY
Qty: 30 TABLET | Refills: 2 | Status: SHIPPED | OUTPATIENT
Start: 2020-04-20 | End: 2020-09-23

## 2020-04-20 RX ORDER — LOSARTAN POTASSIUM 100 MG/1
100 TABLET ORAL DAILY
Qty: 90 TABLET | Refills: 2 | Status: SHIPPED | OUTPATIENT
Start: 2020-04-20 | End: 2020-09-23 | Stop reason: SDUPTHER

## 2020-04-20 RX ORDER — CYCLOBENZAPRINE HCL 10 MG
10 TABLET ORAL 3 TIMES DAILY PRN
Qty: 30 TABLET | Refills: 0 | Status: SHIPPED | OUTPATIENT
Start: 2020-04-20 | End: 2020-07-16

## 2020-04-20 RX ORDER — TRAMADOL HYDROCHLORIDE 50 MG/1
TABLET ORAL
Qty: 30 TABLET | Refills: 0 | Status: CANCELLED | OUTPATIENT
Start: 2020-04-20

## 2020-04-20 RX ORDER — HYDROCHLOROTHIAZIDE 25 MG/1
25 TABLET ORAL DAILY
Qty: 90 TABLET | Refills: 0 | Status: SHIPPED | OUTPATIENT
Start: 2020-04-20 | End: 2020-09-23 | Stop reason: SDUPTHER

## 2020-04-20 NOTE — TELEPHONE ENCOUNTER
----- Message from Susan Mathews sent at 4/20/2020 12:12 PM CDT -----  Pt needing a refill on   amoxicillin-clavulanate 875-125mg (AUGMENTIN) 875-125 mg per tablet    cyclobenzaprine (FLEXERIL) 10 MG tablet   hydroCHLOROthiazide (HYDRODIURIL) 25 MG tablet    losartan (COZAAR) 100 MG tablet  traMADol (ULTRAM) 50 mg tablet      Pt contact# [phone]

## 2020-04-22 ENCOUNTER — TELEPHONE (OUTPATIENT)
Dept: FAMILY MEDICINE | Facility: CLINIC | Age: 65
End: 2020-04-22

## 2020-04-22 NOTE — TELEPHONE ENCOUNTER
----- Message from Arsh Hernandez sent at 4/22/2020  9:47 AM CDT -----  Contact: pt   .Type:  RX Refill Request    Who Called:  Pt   Refill or New Rx:refill   RX Name and Strength amoxicillin-clavulanate 875-125mg (AUGMENTIN) 875-125 mg per tablet  How is the patient currently taking it? (ex. 1XDay): as needed   Is this a 30 day or 90 day RX: 30 day   [facility]   Local or Mail Order: local   Ordering Provider:rabia   Would the patient rather a call back or a response via [facility]? Callback   Best Call Back Number: .[phone] (home)    Additional Information:             [id number] - [place] - [address] [facility]  [address] [facility] [address]  Phone: [phone] Fax: [phone]

## 2020-04-27 RX ORDER — AMOXICILLIN AND CLAVULANATE POTASSIUM 875; 125 MG/1; MG/1
TABLET, FILM COATED ORAL
Qty: 20 TABLET | Refills: 0 | Status: SHIPPED | OUTPATIENT
Start: 2020-04-27 | End: 2020-09-23

## 2020-04-27 RX ORDER — METFORMIN HYDROCHLORIDE 750 MG/1
750 TABLET, EXTENDED RELEASE ORAL DAILY
Qty: 90 TABLET | Refills: 1 | Status: SHIPPED | OUTPATIENT
Start: 2020-04-27 | End: 2020-09-23 | Stop reason: SDUPTHER

## 2020-06-02 ENCOUNTER — TELEPHONE (OUTPATIENT)
Dept: ENDOSCOPY | Facility: HOSPITAL | Age: 65
End: 2020-06-02

## 2020-06-02 DIAGNOSIS — K63.5 POLYP OF COLON, UNSPECIFIED PART OF COLON, UNSPECIFIED TYPE: Primary | ICD-10-CM

## 2020-06-02 RX ORDER — SODIUM, POTASSIUM,MAG SULFATES 17.5-3.13G
1 SOLUTION, RECONSTITUTED, ORAL ORAL DAILY
Qty: 1 KIT | Refills: 0 | Status: SHIPPED | OUTPATIENT
Start: 2020-06-02 | End: 2020-06-04

## 2020-06-02 NOTE — TELEPHONE ENCOUNTER
COVID Screening     1. Have you had a fever in the last 7 days or have you used fever reducing medicines for a fever in the last 7 days?  no    2. Are you experiencing shortness of breath, cough, muscle aches, loss of taste or loss of smell?  no    3. Are you residing with anyone who has tested positive for Covid?  no    If answered yes to any of the above questions, the pt must be scheduled for an appointment with their PCP.    A message also needs to be sent to the endoscopist to ensure the patient gets rescheduled at a later date.     ENDO screening    1. Have you been admitted overnight to the hospital in the past 3 months? no   If yes, schedule an appointment with PCP before scheduling endoscopic procedure.     2. Have you had a stent placed in the last 12 months? no   If yes, for a screening visit, cancel and message the ordering provider.  The patient will need a new order when the time is appropriate.     3. Have you had a stroke or heart attack in the past 6 months? no   If yes, cancel and refer patient to ordering provider for clearance, also message ordering provider to inform.     4. Have you had any chest pain in the past 3 months? no   If yes, Have you been evaluated by your PCP and/or cardiologist and it was determined to not be heart related? not applicable   If No, Pt needs to be seen by PCP or Cardiologist .  Pt can be scheduled once clearance obtained by either of those providers.     5. Do you take prescription weight loss medications?  no   If yes, must stop for 2 weeks prior to procedure.     6. Have you been diagnosed with diverticulitis within the past 3 months? no   If yes, must have been seen by GI within the last 3 months, if not schedule with GI JEFF.    If pt has been seen by GI, schedule procedure 8-12 weeks post antibiotic treatment.     7. Are you on Dialysis? no  If yes, schedule procedure for the day AFTER dialysis.  Appt time should be 9am or later, patient arrival time is 2 hours  "prior.  Nulytely or    miralax prep for all patients with kidney disease.     8. Are you diabetic?  no   If yes, schedule morning appt. Advise pt to hold all diabetic meds day of procedure.     9. If pt is older than 80 years of age and HAS NOT been seen by GI or PCP within the last 6 months, needs appt with GI JEFF.   If pt has been seen by the GI provider or PCP within the past 6  months AND meets criteria, schedule procedure AND send message to the endoscopist.     10. Is patient on a "high risk" medication (blood thinner/antiplatelet agent)?  no   If yes, has cardiac clearance been obtained within the last 60 days? N/A   If no, a new clearance needs to be obtained.       I have reviewed the last colonoscopy for recommendations regarding next procedure bowel prep.  yes  I have reviewed medications and allergies.  yes  I have verified the pharmacy information and appropriate prep sent if needed. yes  Prep instructions have been mailed or sent to portal per patient request. yes  Mailed info. dw  If answers yes to any of the following, schedule at [place] ONLY. If No, OK for either location.     Is BMI over 45?   Any complaints of chest pain, new onset or at rest?  Does pt have an AICD?  Is there a diagnosis of heart failure?  Does patient have an insulin pump?  If procedure for esophageal banding?    06- [place]    "

## 2020-06-28 ENCOUNTER — APPOINTMENT (OUTPATIENT)
Dept: URGENT CARE | Facility: CLINIC | Age: 65
End: 2020-06-28
Payer: MEDICARE

## 2020-06-28 DIAGNOSIS — K63.5 POLYP OF COLON, UNSPECIFIED PART OF COLON, UNSPECIFIED TYPE: ICD-10-CM

## 2020-06-28 PROCEDURE — U0003 INFECTIOUS AGENT DETECTION BY NUCLEIC ACID (DNA OR RNA); SEVERE ACUTE RESPIRATORY SYNDROME CORONAVIRUS 2 (SARS-COV-2) (CORONAVIRUS DISEASE [COVID-19]), AMPLIFIED PROBE TECHNIQUE, MAKING USE OF HIGH THROUGHPUT TECHNOLOGIES AS DESCRIBED BY CMS-2020-01-R: HCPCS

## 2020-06-29 LAB — SARS-COV-2 RNA RESP QL NAA+PROBE: NOT DETECTED

## 2020-06-30 ENCOUNTER — HOSPITAL ENCOUNTER (OUTPATIENT)
Facility: HOSPITAL | Age: 65
Discharge: HOME | End: 2020-06-30
Attending: INTERNAL MEDICINE | Admitting: INTERNAL MEDICINE
Payer: MEDICARE

## 2020-06-30 ENCOUNTER — ANESTHESIA EVENT (OUTPATIENT)
Dept: ENDOSCOPY | Facility: HOSPITAL | Age: 65
End: 2020-06-30
Payer: MEDICARE

## 2020-06-30 ENCOUNTER — ANESTHESIA (OUTPATIENT)
Dept: ENDOSCOPY | Facility: HOSPITAL | Age: 65
End: 2020-06-30
Payer: MEDICARE

## 2020-06-30 VITALS
BODY MASS INDEX: 26.75 KG/M2 | WEIGHT: 166.44 LBS | HEART RATE: 54 BPM | DIASTOLIC BLOOD PRESSURE: 79 MMHG | OXYGEN SATURATION: 97 % | HEIGHT: 66 IN | TEMPERATURE: 98 F | SYSTOLIC BLOOD PRESSURE: 152 MMHG | RESPIRATION RATE: 18 BRPM

## 2020-06-30 DIAGNOSIS — Z85.038 HISTORY OF COLON CANCER, STAGE I: Primary | Chronic | ICD-10-CM

## 2020-06-30 DIAGNOSIS — Z86.010 PERSONAL HISTORY OF COLONIC POLYPS: ICD-10-CM

## 2020-06-30 DIAGNOSIS — E11.9 TYPE 2 DIABETES MELLITUS WITHOUT COMPLICATION: ICD-10-CM

## 2020-06-30 LAB — POCT GLUCOSE: 98 MG/DL (ref 70–110)

## 2020-06-30 PROCEDURE — G0105 COLORECTAL SCRN; HI RISK IND: HCPCS | Performed by: INTERNAL MEDICINE

## 2020-06-30 PROCEDURE — 37000008 HC ANESTHESIA 1ST 15 MINUTES: Performed by: INTERNAL MEDICINE

## 2020-06-30 PROCEDURE — 37000009 HC ANESTHESIA EA ADD 15 MINS: Performed by: INTERNAL MEDICINE

## 2020-06-30 PROCEDURE — 63600175 PHARM REV CODE 636 W HCPCS: Performed by: NURSE ANESTHETIST, CERTIFIED REGISTERED

## 2020-06-30 PROCEDURE — G0105 COLORECTAL SCRN; HI RISK IND: HCPCS | Mod: ,,, | Performed by: INTERNAL MEDICINE

## 2020-06-30 PROCEDURE — 25000003 PHARM REV CODE 250: Performed by: NURSE ANESTHETIST, CERTIFIED REGISTERED

## 2020-06-30 PROCEDURE — G0105 COLORECTAL SCRN; HI RISK IND: ICD-10-PCS | Mod: ,,, | Performed by: INTERNAL MEDICINE

## 2020-06-30 RX ORDER — PROPOFOL 10 MG/ML
INJECTION, EMULSION INTRAVENOUS
Status: DISCONTINUED | OUTPATIENT
Start: 2020-06-30 | End: 2020-06-30

## 2020-06-30 RX ORDER — LIDOCAINE HCL/PF 100 MG/5ML
SYRINGE (ML) INTRAVENOUS
Status: DISCONTINUED | OUTPATIENT
Start: 2020-06-30 | End: 2020-06-30

## 2020-06-30 RX ORDER — SODIUM CHLORIDE, SODIUM LACTATE, POTASSIUM CHLORIDE, CALCIUM CHLORIDE 600; 310; 30; 20 MG/100ML; MG/100ML; MG/100ML; MG/100ML
INJECTION, SOLUTION INTRAVENOUS CONTINUOUS PRN
Status: DISCONTINUED | OUTPATIENT
Start: 2020-06-30 | End: 2020-06-30

## 2020-06-30 RX ADMIN — PROPOFOL 30 MG: 10 INJECTION, EMULSION INTRAVENOUS at 12:06

## 2020-06-30 RX ADMIN — LIDOCAINE HYDROCHLORIDE 50 MG: 20 INJECTION, SOLUTION INTRAVENOUS at 12:06

## 2020-06-30 RX ADMIN — SODIUM CHLORIDE, SODIUM LACTATE, POTASSIUM CHLORIDE, AND CALCIUM CHLORIDE: 600; 310; 30; 20 INJECTION, SOLUTION INTRAVENOUS at 12:06

## 2020-06-30 RX ADMIN — PROPOFOL 70 MG: 10 INJECTION, EMULSION INTRAVENOUS at 12:06

## 2020-06-30 NOTE — [employer]OVATION PATIENT INSTRUCTIONS
Discharge Summary/Instructions after an Endoscopic Procedure  Patient Name: Anne Farmer  Patient MRN: [id number]  Patient YOB: 1955 Tuesday, June 30, 2020 Grisel Atkins MD  RESTRICTIONS:  During your procedure today, you received medications for sedation.  These   medications may affect your judgment, balance and coordination.  Therefore,   for 24 hours, you have the following restrictions:   - DO NOT drive a car, operate machinery, make legal/financial decisions,   sign important papers or drink alcohol.    ACTIVITY:  Today: no heavy lifting, straining or running due to procedural   sedation/anesthesia.  The following day: return to full activity including work.  DIET:  Eat and drink normally unless instructed otherwise.     TREATMENT FOR COMMON SIDE EFFECTS:  - Mild abdominal pain, nausea, belching, bloating or excessive gas:  rest,   eat lightly and use a heating pad.  - Sore Throat: treat with throat lozenges and/or gargle with warm salt   water.  - Because air was used during the procedure, expelling large amounts of air   from your rectum or belching is normal.  - If a bowel prep was taken, you may not have a bowel movement for 1-3 days.    This is normal.  SYMPTOMS TO WATCH FOR AND REPORT TO YOUR PHYSICIAN:  1. Abdominal pain or bloating, other than gas cramps.  2. Chest pain.  3. Back pain.  4. Signs of infection such as: chills or fever occurring within 24 hours   after the procedure.  5. Rectal bleeding, which would show as bright red, maroon, or black stools.   (A tablespoon of blood from the rectum is not serious, especially if   hemorrhoids are present.)  6. Vomiting.  7. Weakness or dizziness.  GO DIRECTLY TO THE NEAREST EMERGENCY ROOM IF YOU HAVE ANY OF THE FOLLOWING:      Difficulty breathing              Chills and/or fever over 101 F   Persistent vomiting and/or vomiting blood   Severe abdominal pain   Severe chest pain   Black, tarry stools   Bleeding- more than one  tablespoon   Any other symptom or condition that you feel may need urgent attention  Your doctor recommends these additional instructions:  If any biopsies were taken, your doctors clinic will contact you in 1 to 2   weeks with any results.  - Discharge patient to home (with escort).   - Resume previous diet.   - Continue present medications.   - Repeat colonoscopy in 5 years for surveillance.   - Patient has a contact number available for emergencies.  The signs and   symptoms of potential delayed complications were discussed with the   patient.  Return to normal activities tomorrow.  Written discharge   instructions were provided to the patient.  For questions, problems or results please call your physician Grisel Atkins MD at Work:  [phone]  If you have any questions about the above instructions, call the GI   department at [phone] or call the endoscopy unit at [phone]   from 7am until 3 pm.  [facility] [place], EMERGENCY ROOM PHONE NUMBER:   [phone]  IF A COMPLICATION OR EMERGENCY SITUATION ARISES AND YOU ARE UNABLE TO REACH   YOUR PHYSICIAN - GO DIRECTLY TO THE EMERGENCY ROOM.  I have read or have had read to me these discharge instructions for my   procedure and have received a written copy.  I understand these   instructions and will follow-up with my physician if I have any questions.     __________________________________       _____________________________________  Nurse Signature                                          Patient/Designated   Responsible Party Signature  MD Grisel Hummel MD  6/30/2020 12:47:30 PM  This report has been verified and signed electronically.  [employer]

## 2020-06-30 NOTE — ANESTHESIA PREPROCEDURE EVALUATION
06/30/2020  Anne Farmer is a 64 y.o., female.    Anesthesia Evaluation    I have reviewed the Patient Summary Reports.    I have reviewed the Nursing Notes. I have reviewed the NPO Status.   I have reviewed the Medications.     Review of Systems  Anesthesia Hx:  No problems with previous Anesthesia  Denies Family Hx of Anesthesia complications.   Denies Personal Hx of Anesthesia complications.   Social:  Non-Smoker    Hematology/Oncology:  Hematology Normal   Oncology Normal     EENT/Dental:EENT/Dental Normal   Cardiovascular:   Hypertension CHF hyperlipidemia    Pulmonary:  Pulmonary Normal    Renal/:  Renal/ Normal     Hepatic/GI:  Hepatic/GI Normal    Musculoskeletal:  Musculoskeletal Normal    Neurological:  Neurology Normal    Endocrine:   Diabetes, type 2    Dermatological:  Skin Normal    Psych:  Psychiatric Normal           Physical Exam  General:  Obesity    Airway/Jaw/Neck:  Airway Findings: Mouth Opening: Normal Tongue: Normal  General Airway Assessment: Adult, Average  Mallampati: II  TM Distance: Normal, at least 6 cm      Dental:  Dental Findings: In tact   Chest/Lungs:  Chest/Lungs Findings: Clear to auscultation, Normal Respiratory Rate     Heart/Vascular:  Heart Findings: Rate: Normal  Rhythm: Regular Rhythm        Mental Status:  Mental Status Findings:  Cooperative, Alert and Oriented         Anesthesia Plan  Type of Anesthesia, risks & benefits discussed:  Anesthesia Type:  MAC  Patient's Preference:   Intra-op Monitoring Plan: standard ASA monitors  Intra-op Monitoring Plan Comments:   Post Op Pain Control Plan:   Post Op Pain Control Plan Comments:   Induction:   IV  Beta Blocker:  Patient is not currently on a Beta-Blocker (No further documentation required).       Informed Consent: Patient understands risks and agrees with Anesthesia plan.  Questions answered. Anesthesia  consent signed with patient.  ASA Score: 2     Day of Surgery Review of History & Physical: I have interviewed and examined the patient. I have reviewed the patient's H&P dated:  There are no significant changes.          Ready For Surgery From Anesthesia Perspective.

## 2020-06-30 NOTE — H&P
PRE PROCEDURE H&P    Patient Name: Anne Farmer  MRN: [id number]  : 1955  Date of Procedure:  2020  Referring Physician: Richard Talley*  Primary Physician: Chiquita Talley MD  Procedure Physician: Grisel Atkins MD       Planned Procedure: Colonoscopy  Diagnosis: previous adenomatous polyp  Chief Complaint: Same as above    HPI: Patient is an 64 y.o. female is here for the above. She has a personal hx of colon cancer s/p right maia-colectomy. Last colonoscopy by Dr. Flores in . TA was removed in the descending colon. Surveillance was recommended in 2 years. She was also noted to have a healthy end-to-side ileo-colonic anastomosis at the hepatic flexure.     Last colonoscopy:   Family history: none  Anticoagulation: none    Past Medical History:   Past Medical History:   Diagnosis Date    Allergy     Arthritis     Cancer 2016    colon    CHF (congestive heart failure)     Colon cancer 2004    Colon cancer screening 2015    Diabetes mellitus type 2 in nonobese 3/9/2016    borderline    Diverticulosis     DM (diabetes mellitus) 2016    BS didn't check 2019    DM (diabetes mellitus) 2016    BS didn't check 2020    Hyperlipidemia 10/25/2016    Hypertension     Insomnia         Past Surgical History:  Past Surgical History:   Procedure Laterality Date     SECTION      COLON SURGERY      COLONOSCOPY N/A 2015    Procedure: COLONOSCOPY;  Surgeon: Adela Sam MD;  [facility] ENDO;  Service: Endoscopy;  Laterality: N/A;    COLONOSCOPY N/A 2017    Procedure: COLONOSCOPY;  Surgeon: Chintan Flores MD;  Location: [facility];  Service: Endoscopy;  Laterality: N/A;        Home Medications:  Prior to Admission medications    Medication Sig Start Date End Date Taking? Authorizing Provider   amLODIPine (NORVASC) 10 MG tablet Take 1 tablet (10 mg total) by mouth once daily. 20  Yes Chiquita Tlaley MD    cyclobenzaprine (FLEXERIL) 10 MG tablet Take 1 tablet (10 mg total) by mouth 3 (three) times daily as needed. 4/20/20  Yes Chiquita Talley MD   hydroCHLOROthiazide (HYDRODIURIL) 25 MG tablet Take 1 tablet (25 mg total) by mouth once daily. 4/20/20  Yes Chiquita Talley MD   levocetirizine (XYZAL) 5 MG tablet TAKE 1 TABLET BY MOUTH EVERY DAY IN THE EVENING 4/23/19  Yes Lana Jose NP   losartan (COZAAR) 100 MG tablet Take 1 tablet (100 mg total) by mouth once daily. 4/20/20  Yes Chiquita Talley MD   metFORMIN (GLUCOPHAGE-XR) 750 MG XR 24hr tablet Take 1 tablet (750 mg total) by mouth once daily. 4/27/20  Yes Chiquita Talley MD   multivitamin (ONE DAILY MULTIVITAMIN) per tablet Take 1 tablet by mouth once daily.   Yes Alfredo Barajas MD   omeprazole (PRILOSEC) 20 MG capsule TAKE 1 CAPSULE BY MOUTH EVERY DAY 6/11/20  Yes Chiquita Talley MD   pravastatin (PRAVACHOL) 20 MG tablet Take 1 tablet (20 mg total) by mouth once daily. 4/20/20  Yes Chiquita Talley MD   predniSONE (DELTASONE) 20 MG tablet Take 1 tablet (20 mg total) by mouth once daily. 11/15/19  Yes Lana Jose NP   traMADol (ULTRAM) 50 mg tablet TAKE ONE TABLET BY MOUTH THREE TIMES DAILY AFTER A MEAL 11/21/19  Yes Lana Jose NP   traZODone (DESYREL) 50 MG tablet TAKE 1 TABLET (50 MG TOTAL) BY MOUTH NIGHTLY AS NEEDED FOR INSOMNIA. 4/25/19  Yes Lana Jose NP   albuterol (PROVENTIL/VENTOLIN HFA) 90 mcg/actuation inhaler Inhale 2 puffs into the lungs every 6 (six) hours as needed for Wheezing. Dispense with spacer. 11/15/19 11/14/20  Lana Jose NP   amoxicillin-clavulanate 875-125mg (AUGMENTIN) 875-125 mg per tablet TAKE 1 TABLET BY MOUTH EVERY 12 HOURS 4/27/20   Chiquita Talley MD   blood sugar diagnostic Strp 1 strip by Misc.(Non-Drug; Combo Route) route once daily. One Touch Ultra 6/21/19   Lana Jose NP   blood-glucose meter kit Use to  check blood sugar once daily.  One Touch Ultra 6/21/19   Lana Jose NP   fluticasone propionate (FLONASE) 50 mcg/actuation nasal spray SPRAY 2 SPRAYS INTO EACH NOSTRIL EVERY DAY 2/3/20   Lana Jose NP   lancets Misc 1 lancet by Misc.(Non-Drug; Combo Route) route once daily. One Touch Delica 6/21/19   Lana Jose NP        Allergies:  Review of patient's allergies indicates:  No Known Allergies     Social History:   Social History     Socioeconomic History    Marital status: Single     Spouse name: Not on file    Number of children: Not on file    Years of education: Not on file    Highest education level: Not on file   Occupational History     Employer: [employer]   Social Needs    Financial resource strain: Not on file    Food insecurity     Worry: Not on file     Inability: Not on file    Transportation needs     Medical: Not on file     Non-medical: Not on file   Tobacco Use    Smoking status: Never Smoker    Smokeless tobacco: Never Used   Substance and Sexual Activity    Alcohol use: Yes     Alcohol/week: 0.0 standard drinks     Comment: weekends.       Drug use: No    Sexual activity: Not on file   Lifestyle    Physical activity     Days per week: Not on file     Minutes per session: Not on file    Stress: Not on file   Relationships    Social connections     Talks on phone: Not on file     Gets together: Not on file     Attends Oriental orthodox service: Not on file     Active member of club or organization: Not on file     Attends meetings of clubs or organizations: Not on file     Relationship status: Not on file   Other Topics Concern    Not on file   Social History Narrative    Not on file       Family History:  Family History   Problem Relation Age of Onset    Hypertension Mother     Diabetes Mother     Hypertension Father     Hypertension Sister     Hypertension Brother     Hypertension Sister     Hypertension Sister     Hypertension Sister     Hypertension  "Brother     Hypertension Brother        ROS: No acute cardiac events, no acute respiratory complaints.     Physical Exam (all patients):    BP (!) 148/68 (BP Location: Left arm, Patient Position: Lying)   Pulse 60   Temp 97.8 °F (36.6 °C) (Temporal)   Resp 16   Ht 5' 6" (1.676 m)   Wt 75.5 kg (166 lb 7.2 oz)   SpO2 98%   Breastfeeding No   BMI 26.87 kg/m²   Lungs: Clear to auscultation bilaterally, respirations unlabored  Heart: Regular rate and rhythm, S1 and S2 normal, no obvious murmurs  Abdomen:         Soft, non-tender, bowel sounds normal, no masses, no organomegaly    Lab Results   Component Value Date    WBC 4.90 06/07/2019    MCV 93 06/07/2019    RDW 13.2 06/07/2019     06/07/2019    INR 0.9 04/03/2012     (H) 06/07/2019    HGBA1C 6.3 (H) 11/15/2019    BUN 20 06/07/2019     06/07/2019    K 3.8 06/07/2019     06/07/2019        SEDATION PLAN: per anesthesia      History reviewed, vital signs satisfactory, cardiopulmonary status satisfactory, sedation options, risks and plans have been discussed with the patient  All their questions were answered and the patient agrees to the sedation procedures as planned and the patient is deemed an appropriate candidate for the sedation as planned.    The risks, benefits and alternatives of the procedure were discussed with the patient in detail. This discussion was had in the presence of endoscopy staff. The risks include, risks of adverse reaction to sedation requiring the use of reversal agents, bleeding requiring blood transfusion, perforation requiring surgical intervention and technical failure. Other risks include aspiration leading to respiratory distress and respiratory failure resulting in endotracheal intubation and mechanical ventilation including death. If anesthesia is being utilized for this procedure, it is up to the anesthesiologist to determine airway safety including elective endotracheal intubation. Questions were " answered, they agree to proceed. There was no language barriers.     Procedure explained to patient, informed consent obtained and placed in chart.    Grisel Atkins  6/30/2020  11:45 AM

## 2020-06-30 NOTE — ANESTHESIA POSTPROCEDURE EVALUATION
Anesthesia Post Evaluation    Patient: Anne Farmer    Procedure(s) Performed: Procedure(s) (LRB):  COLONOSCOPY (N/A)    Final Anesthesia Type: MAC    Patient location during evaluation: PACU  Patient participation: Yes- Able to Participate  Level of consciousness: awake and alert and awake  Post-procedure vital signs: reviewed and stable  Pain management: adequate  Airway patency: patent    PONV status at discharge: No PONV  Anesthetic complications: no      Cardiovascular status: blood pressure returned to baseline  Respiratory status: unassisted, spontaneous ventilation and room air  Hydration status: euvolemic  Follow-up not needed.          Vitals Value Taken Time   /68 06/30/20 1126   Temp 36.6 °C (97.8 °F) 06/30/20 1126   Pulse 60 06/30/20 1126   Resp 16 06/30/20 1126   SpO2 98 % 06/30/20 1126         No case tracking events are documented in the log.      Pain/Bernadette Score: No data recorded

## 2020-06-30 NOTE — DISCHARGE INSTRUCTIONS

## 2020-06-30 NOTE — TRANSFER OF CARE
"Anesthesia Transfer of Care Note    Patient: Anne Farmer    Procedure(s) Performed: Procedure(s) (LRB):  COLONOSCOPY (N/A)    Patient location: PACU    Anesthesia Type: MAC    Transport from OR: Transported from OR on room air with adequate spontaneous ventilation    Post pain: adequate analgesia    Post assessment: no apparent anesthetic complications    Post vital signs: stable    Level of consciousness: responds to stimulation    Nausea/Vomiting: no nausea/vomiting    Complications: none    Transfer of care protocol was followed      Last vitals:   Visit Vitals  BP (!) 148/68 (BP Location: Left arm, Patient Position: Lying)   Pulse 60   Temp 36.6 °C (97.8 °F) (Temporal)   Resp 16   Ht 5' 6" (1.676 m)   Wt 75.5 kg (166 lb 7.2 oz)   SpO2 98%   Breastfeeding No   BMI 26.87 kg/m²     "

## 2020-06-30 NOTE — ANESTHESIA RELEASE NOTE
"Anesthesia Release from PACU Note    Patient: Anne Farmer    Procedure(s) Performed: Procedure(s) (LRB):  COLONOSCOPY (N/A)    Anesthesia type: MAC    Post pain: Adequate analgesia    Post assessment: no apparent anesthetic complications, tolerated procedure well and no evidence of recall    Last Vitals:   Visit Vitals  BP (!) 148/68 (BP Location: Left arm, Patient Position: Lying)   Pulse 60   Temp 36.6 °C (97.8 °F) (Temporal)   Resp 16   Ht 5' 6" (1.676 m)   Wt 75.5 kg (166 lb 7.2 oz)   SpO2 98%   Breastfeeding No   BMI 26.87 kg/m²       Post vital signs: stable    Level of consciousness: responds to stimulation    Nausea/Vomiting: no nausea/no vomiting    Complications: none    Airway Patency: patent    Respiratory: unassisted    Cardiovascular: stable and blood pressure at baseline    Hydration: euvolemic     "

## 2020-07-09 DIAGNOSIS — D86.1 SARCOIDOSIS OF LYMPH NODES: ICD-10-CM

## 2020-07-09 DIAGNOSIS — J98.4 CHRONIC RESTRICTIVE LUNG DISEASE: Primary | ICD-10-CM

## 2020-07-17 ENCOUNTER — TELEPHONE (OUTPATIENT)
Dept: PULMONOLOGY | Facility: CLINIC | Age: 65
End: 2020-07-17

## 2020-07-17 NOTE — TELEPHONE ENCOUNTER
Left vm informing pt that upcoming appt has been rescheduled due to provider not being in clinic. Return call with any questions, appt letter mailed.

## 2020-09-01 RX ORDER — PRAVASTATIN SODIUM 20 MG/1
TABLET ORAL
Qty: 30 TABLET | Refills: 2 | OUTPATIENT
Start: 2020-09-01

## 2020-09-23 ENCOUNTER — OFFICE VISIT (OUTPATIENT)
Dept: FAMILY MEDICINE | Facility: CLINIC | Age: 65
End: 2020-09-23
Attending: FAMILY MEDICINE
Payer: MEDICARE

## 2020-09-23 VITALS
WEIGHT: 174.06 LBS | SYSTOLIC BLOOD PRESSURE: 132 MMHG | OXYGEN SATURATION: 97 % | DIASTOLIC BLOOD PRESSURE: 70 MMHG | HEART RATE: 92 BPM | BODY MASS INDEX: 27.97 KG/M2 | HEIGHT: 66 IN | TEMPERATURE: 99 F

## 2020-09-23 DIAGNOSIS — E78.5 DM TYPE 2 WITH DIABETIC DYSLIPIDEMIA: Primary | ICD-10-CM

## 2020-09-23 DIAGNOSIS — M51.36 DDD (DEGENERATIVE DISC DISEASE), LUMBAR: ICD-10-CM

## 2020-09-23 DIAGNOSIS — E11.69 DM TYPE 2 WITH DIABETIC DYSLIPIDEMIA: Primary | ICD-10-CM

## 2020-09-23 DIAGNOSIS — I10 HYPERTENSION, UNSPECIFIED TYPE: ICD-10-CM

## 2020-09-23 DIAGNOSIS — J06.9 VIRAL UPPER RESPIRATORY TRACT INFECTION: ICD-10-CM

## 2020-09-23 PROBLEM — M51.369 DDD (DEGENERATIVE DISC DISEASE), LUMBAR: Status: ACTIVE | Noted: 2020-09-23

## 2020-09-23 PROCEDURE — 99999 PR PBB SHADOW E&M-EST. PATIENT-LVL III: CPT | Mod: PBBFAC,,, | Performed by: FAMILY MEDICINE

## 2020-09-23 PROCEDURE — 99999 PR PBB SHADOW E&M-EST. PATIENT-LVL III: ICD-10-PCS | Mod: PBBFAC,,, | Performed by: FAMILY MEDICINE

## 2020-09-23 PROCEDURE — 99213 OFFICE O/P EST LOW 20 MIN: CPT | Mod: PBBFAC,PO | Performed by: FAMILY MEDICINE

## 2020-09-23 PROCEDURE — 99214 PR OFFICE/OUTPT VISIT, EST, LEVL IV, 30-39 MIN: ICD-10-PCS | Mod: S$PBB,,, | Performed by: FAMILY MEDICINE

## 2020-09-23 PROCEDURE — 99214 OFFICE O/P EST MOD 30 MIN: CPT | Mod: S$PBB,,, | Performed by: FAMILY MEDICINE

## 2020-09-23 RX ORDER — TRAMADOL HYDROCHLORIDE 50 MG/1
TABLET ORAL
Qty: 30 TABLET | Refills: 0 | Status: SHIPPED | OUTPATIENT
Start: 2020-09-23 | End: 2021-05-13 | Stop reason: SDUPTHER

## 2020-09-23 RX ORDER — TRAZODONE HYDROCHLORIDE 50 MG/1
50 TABLET ORAL NIGHTLY PRN
Qty: 30 TABLET | Refills: 3 | Status: SHIPPED | OUTPATIENT
Start: 2020-09-23 | End: 2020-12-16

## 2020-09-23 RX ORDER — TRAZODONE HYDROCHLORIDE 50 MG/1
50 TABLET ORAL NIGHTLY PRN
Qty: 30 TABLET | Refills: 3 | Status: SHIPPED | OUTPATIENT
Start: 2020-09-23 | End: 2020-09-23 | Stop reason: SDUPTHER

## 2020-09-23 RX ORDER — METFORMIN HYDROCHLORIDE 750 MG/1
750 TABLET, EXTENDED RELEASE ORAL DAILY
Qty: 90 TABLET | Refills: 1 | Status: SHIPPED | OUTPATIENT
Start: 2020-09-23 | End: 2021-03-23

## 2020-09-23 RX ORDER — PRAVASTATIN SODIUM 20 MG/1
20 TABLET ORAL DAILY
Qty: 30 TABLET | Refills: 2 | Status: SHIPPED | OUTPATIENT
Start: 2020-09-23 | End: 2020-12-16

## 2020-09-23 RX ORDER — HYDROCHLOROTHIAZIDE 25 MG/1
25 TABLET ORAL DAILY
Qty: 90 TABLET | Refills: 0 | Status: SHIPPED | OUTPATIENT
Start: 2020-09-23 | End: 2020-12-21

## 2020-09-23 RX ORDER — LOSARTAN POTASSIUM 100 MG/1
100 TABLET ORAL DAILY
Qty: 90 TABLET | Refills: 2 | Status: SHIPPED | OUTPATIENT
Start: 2020-09-23 | End: 2021-06-23

## 2020-09-23 RX ORDER — FLUTICASONE PROPIONATE 50 MCG
SPRAY, SUSPENSION (ML) NASAL
Qty: 16 ML | Refills: 0 | Status: SHIPPED | OUTPATIENT
Start: 2020-09-23 | End: 2020-10-15

## 2020-09-23 RX ORDER — LEVOCETIRIZINE DIHYDROCHLORIDE 5 MG/1
5 TABLET, FILM COATED ORAL NIGHTLY
Qty: 90 TABLET | Refills: 0 | Status: SHIPPED | OUTPATIENT
Start: 2020-09-23 | End: 2020-12-16

## 2020-09-23 NOTE — PROGRESS NOTES
Subjective:       Patient ID: Anne Farmer is a 64 y.o. female.    Chief Complaint: Follow-up    64  y old female with t2 dm , htn , chh , insomnia, lumbar ddd  here for f.u . Doing well . Current eye exam . Not on a structure exercise routine. Sleeps well with trazodone . No sob , no cp     Review of Systems   Constitutional: Negative.    HENT: Negative.    Eyes: Negative.    Respiratory: Negative.    Cardiovascular: Negative.    Gastrointestinal: Negative.    Genitourinary: Negative.    Musculoskeletal: Negative.    Skin: Negative.    Hematological: Negative.        Objective:      Physical Exam  Vitals signs and nursing note reviewed.   Constitutional:       General: She is not in acute distress.     Appearance: She is well-developed. She is not diaphoretic.   HENT:      Head: Normocephalic and atraumatic.      Right Ear: External ear normal.      Left Ear: External ear normal.      Nose: Nose normal.   Eyes:      General: No scleral icterus.        Left eye: No discharge.      Conjunctiva/sclera: Conjunctivae normal.      Pupils: Pupils are equal, round, and reactive to light.   Neck:      Musculoskeletal: Normal range of motion and neck supple.      Thyroid: No thyromegaly.      Vascular: No JVD.      Trachea: No tracheal deviation.   Cardiovascular:      Rate and Rhythm: Normal rate and regular rhythm.      Pulses:           Dorsalis pedis pulses are 2+ on the right side and 2+ on the left side.        Posterior tibial pulses are 2+ on the left side.      Heart sounds: Normal heart sounds. No murmur. No friction rub. No gallop.    Pulmonary:      Effort: Pulmonary effort is normal. No respiratory distress.      Breath sounds: Normal breath sounds. No wheezing or rales.   Chest:      Chest wall: No tenderness.   Abdominal:      General: Bowel sounds are normal. There is no distension.      Palpations: Abdomen is soft. There is no mass.      Tenderness: There is no abdominal tenderness. There is no  guarding.   Musculoskeletal:      Right foot: Normal range of motion. No deformity, bunion, Charcot foot, foot drop or prominent metatarsal heads.      Left foot: Normal range of motion. No deformity, bunion, Charcot foot, foot drop or prominent metatarsal heads.   Feet:      Right foot:      Protective Sensation: 10 sites tested. 10 sites sensed.      Skin integrity: Skin integrity normal. No ulcer, blister, skin breakdown, erythema, warmth, callus, dry skin or fissure.      Toenail Condition: Right toenails are abnormally thick. Fungal disease present.     Left foot:      Protective Sensation: 10 sites tested. 10 sites sensed.      Skin integrity: Skin integrity normal. No ulcer, blister, skin breakdown, erythema, warmth, callus, dry skin or fissure.      Toenail Condition: Left toenails are abnormally thick. Fungal disease present.  Lymphadenopathy:      Cervical: No cervical adenopathy.         Assessment:       Anne was seen today for follow-up.    Diagnoses and all orders for this visit:    DM type 2 with diabetic dyslipidemia  -     CBC auto differential; Future  -     Comprehensive metabolic panel; Future  -     Hemoglobin A1C; Future  -     Lipid Panel; Future  -     Microalbumin/creatinine urine ratio; Future    Hypertension, unspecified type    Viral upper respiratory tract infection  -     levocetirizine (XYZAL) 5 MG tablet; Take 1 tablet (5 mg total) by mouth every evening.    DDD (degenerative disc disease), lumbar    Other orders  -     Discontinue: traZODone (DESYREL) 50 MG tablet; Take 1 tablet (50 mg total) by mouth nightly as needed for Insomnia.  -     fluticasone propionate (FLONASE) 50 mcg/actuation nasal spray; SPRAY 2 SPRAYS INTO EACH NOSTRIL EVERY DAY  -     hydroCHLOROthiazide (HYDRODIURIL) 25 MG tablet; Take 1 tablet (25 mg total) by mouth once daily.  -     losartan (COZAAR) 100 MG tablet; Take 1 tablet (100 mg total) by mouth once daily.  -     metFORMIN (GLUCOPHAGE-XR) 750 MG ER 24hr  tablet; Take 1 tablet (750 mg total) by mouth once daily.  -     pravastatin (PRAVACHOL) 20 MG tablet; Take 1 tablet (20 mg total) by mouth once daily.  -     traZODone (DESYREL) 50 MG tablet; Take 1 tablet (50 mg total) by mouth nightly as needed for Insomnia.  -     traMADoL (ULTRAM) 50 mg tablet; TAKE ONE TABLET po tid prn pain      Plan:     Anne was seen today for follow-up.    Diagnoses and all orders for this visit:    DM type 2 with diabetic dyslipidemia  -     CBC auto differential; Future  -     Comprehensive metabolic panel; Future  -     Hemoglobin A1C; Future  -     Lipid Panel; Future  -     Microalbumin/creatinine urine ratio; Future    Hypertension, unspecified type    Viral upper respiratory tract infection  -     levocetirizine (XYZAL) 5 MG tablet; Take 1 tablet (5 mg total) by mouth every evening.    Other orders  -     Discontinue: traZODone (DESYREL) 50 MG tablet; Take 1 tablet (50 mg total) by mouth nightly as needed for Insomnia.  -     fluticasone propionate (FLONASE) 50 mcg/actuation nasal spray; SPRAY 2 SPRAYS INTO EACH NOSTRIL EVERY DAY  -     hydroCHLOROthiazide (HYDRODIURIL) 25 MG tablet; Take 1 tablet (25 mg total) by mouth once daily.  -     losartan (COZAAR) 100 MG tablet; Take 1 tablet (100 mg total) by mouth once daily.  -     metFORMIN (GLUCOPHAGE-XR) 750 MG ER 24hr tablet; Take 1 tablet (750 mg total) by mouth once daily.  -     pravastatin (PRAVACHOL) 20 MG tablet; Take 1 tablet (20 mg total) by mouth once daily.  -     traZODone (DESYREL) 50 MG tablet; Take 1 tablet (50 mg total) by mouth nightly as needed for Insomnia.  -     traMADoL (ULTRAM) 50 mg tablet; TAKE ONE TABLET po tid prn pain    diet and ex   Controlled . Cont med   Prn Tramadol /

## 2020-09-24 ENCOUNTER — LAB VISIT (OUTPATIENT)
Dept: LAB | Facility: HOSPITAL | Age: 65
End: 2020-09-24
Attending: FAMILY MEDICINE
Payer: MEDICARE

## 2020-09-24 DIAGNOSIS — E78.5 DM TYPE 2 WITH DIABETIC DYSLIPIDEMIA: ICD-10-CM

## 2020-09-24 DIAGNOSIS — E11.69 DM TYPE 2 WITH DIABETIC DYSLIPIDEMIA: ICD-10-CM

## 2020-09-24 LAB
BASOPHILS # BLD AUTO: 0.05 K/UL (ref 0–0.2)
BASOPHILS NFR BLD: 0.9 % (ref 0–1.9)
DIFFERENTIAL METHOD: ABNORMAL
EOSINOPHIL # BLD AUTO: 0.3 K/UL (ref 0–0.5)
EOSINOPHIL NFR BLD: 5 % (ref 0–8)
ERYTHROCYTE [DISTWIDTH] IN BLOOD BY AUTOMATED COUNT: 13.4 % (ref 11.5–14.5)
HCT VFR BLD AUTO: 36.9 % (ref 37–48.5)
HGB BLD-MCNC: 12 G/DL (ref 12–16)
IMM GRANULOCYTES # BLD AUTO: 0.03 K/UL (ref 0–0.04)
IMM GRANULOCYTES NFR BLD AUTO: 0.6 % (ref 0–0.5)
LYMPHOCYTES # BLD AUTO: 1.5 K/UL (ref 1–4.8)
LYMPHOCYTES NFR BLD: 27.6 % (ref 18–48)
MCH RBC QN AUTO: 31.4 PG (ref 27–31)
MCHC RBC AUTO-ENTMCNC: 32.5 G/DL (ref 32–36)
MCV RBC AUTO: 97 FL (ref 82–98)
MONOCYTES # BLD AUTO: 0.6 K/UL (ref 0.3–1)
MONOCYTES NFR BLD: 10.9 % (ref 4–15)
NEUTROPHILS # BLD AUTO: 3 K/UL (ref 1.8–7.7)
NEUTROPHILS NFR BLD: 55 % (ref 38–73)
NRBC BLD-RTO: 0 /100 WBC
PLATELET # BLD AUTO: 291 K/UL (ref 150–350)
PMV BLD AUTO: 9.7 FL (ref 9.2–12.9)
RBC # BLD AUTO: 3.82 M/UL (ref 4–5.4)
WBC # BLD AUTO: 5.4 K/UL (ref 3.9–12.7)

## 2020-09-24 PROCEDURE — 82043 UR ALBUMIN QUANTITATIVE: CPT

## 2020-09-24 PROCEDURE — 85025 COMPLETE CBC W/AUTO DIFF WBC: CPT

## 2020-09-24 PROCEDURE — 80053 COMPREHEN METABOLIC PANEL: CPT

## 2020-09-24 PROCEDURE — 36415 COLL VENOUS BLD VENIPUNCTURE: CPT | Mod: PO

## 2020-09-24 PROCEDURE — 83036 HEMOGLOBIN GLYCOSYLATED A1C: CPT

## 2020-09-24 PROCEDURE — 80061 LIPID PANEL: CPT

## 2020-09-25 LAB
ALBUMIN SERPL BCP-MCNC: 4.4 G/DL (ref 3.5–5.2)
ALBUMIN/CREAT UR: 60 UG/MG (ref 0–30)
ALP SERPL-CCNC: 70 U/L (ref 55–135)
ALT SERPL W/O P-5'-P-CCNC: 19 U/L (ref 10–44)
ANION GAP SERPL CALC-SCNC: 14 MMOL/L (ref 8–16)
AST SERPL-CCNC: 24 U/L (ref 10–40)
BILIRUB SERPL-MCNC: 0.3 MG/DL (ref 0.1–1)
BUN SERPL-MCNC: 21 MG/DL (ref 8–23)
CALCIUM SERPL-MCNC: 9.7 MG/DL (ref 8.7–10.5)
CHLORIDE SERPL-SCNC: 99 MMOL/L (ref 95–110)
CHOLEST SERPL-MCNC: 209 MG/DL (ref 120–199)
CHOLEST/HDLC SERPL: 2.5 {RATIO} (ref 2–5)
CO2 SERPL-SCNC: 24 MMOL/L (ref 23–29)
CREAT SERPL-MCNC: 0.8 MG/DL (ref 0.5–1.4)
CREAT UR-MCNC: 65 MG/DL (ref 15–325)
EST. GFR  (AFRICAN AMERICAN): >60 ML/MIN/1.73 M^2
EST. GFR  (NON AFRICAN AMERICAN): >60 ML/MIN/1.73 M^2
ESTIMATED AVG GLUCOSE: 140 MG/DL (ref 68–131)
GLUCOSE SERPL-MCNC: 79 MG/DL (ref 70–110)
HBA1C MFR BLD HPLC: 6.5 % (ref 4–5.6)
HDLC SERPL-MCNC: 85 MG/DL (ref 40–75)
HDLC SERPL: 40.7 % (ref 20–50)
LDLC SERPL CALC-MCNC: 106 MG/DL (ref 63–159)
MICROALBUMIN UR DL<=1MG/L-MCNC: 39 UG/ML
NONHDLC SERPL-MCNC: 124 MG/DL
POTASSIUM SERPL-SCNC: 3.8 MMOL/L (ref 3.5–5.1)
PROT SERPL-MCNC: 8.1 G/DL (ref 6–8.4)
SODIUM SERPL-SCNC: 137 MMOL/L (ref 136–145)
TRIGL SERPL-MCNC: 90 MG/DL (ref 30–150)

## 2020-10-30 ENCOUNTER — TELEPHONE (OUTPATIENT)
Dept: PULMONOLOGY | Facility: CLINIC | Age: 65
End: 2020-10-30

## 2020-10-30 NOTE — TELEPHONE ENCOUNTER
Left vm informing pt that upcoming appts had been canceled due to provider not being in clinic. Return call to clinic to reschedule appts.

## 2020-12-13 ENCOUNTER — LAB VISIT (OUTPATIENT)
Dept: URGENT CARE | Facility: CLINIC | Age: 65
End: 2020-12-13
Payer: MEDICARE

## 2020-12-13 DIAGNOSIS — J98.4 CHRONIC RESTRICTIVE LUNG DISEASE: ICD-10-CM

## 2020-12-13 DIAGNOSIS — D86.1 SARCOIDOSIS OF LYMPH NODES: ICD-10-CM

## 2020-12-13 PROCEDURE — U0003 INFECTIOUS AGENT DETECTION BY NUCLEIC ACID (DNA OR RNA); SEVERE ACUTE RESPIRATORY SYNDROME CORONAVIRUS 2 (SARS-COV-2) (CORONAVIRUS DISEASE [COVID-19]), AMPLIFIED PROBE TECHNIQUE, MAKING USE OF HIGH THROUGHPUT TECHNOLOGIES AS DESCRIBED BY CMS-2020-01-R: HCPCS

## 2020-12-14 LAB — SARS-COV-2 RNA RESP QL NAA+PROBE: NOT DETECTED

## 2020-12-16 ENCOUNTER — HOSPITAL ENCOUNTER (OUTPATIENT)
Dept: RADIOLOGY | Facility: HOSPITAL | Age: 65
Discharge: HOME | End: 2020-12-16
Attending: INTERNAL MEDICINE
Payer: MEDICARE

## 2020-12-16 ENCOUNTER — OFFICE VISIT (OUTPATIENT)
Dept: PULMONOLOGY | Facility: CLINIC | Age: 65
End: 2020-12-16
Payer: MEDICARE

## 2020-12-16 ENCOUNTER — CLINICAL SUPPORT (OUTPATIENT)
Dept: PULMONOLOGY | Facility: CLINIC | Age: 65
End: 2020-12-16
Payer: MEDICARE

## 2020-12-16 VITALS
HEIGHT: 66 IN | OXYGEN SATURATION: 98 % | DIASTOLIC BLOOD PRESSURE: 77 MMHG | BODY MASS INDEX: 27.99 KG/M2 | RESPIRATION RATE: 16 BRPM | HEIGHT: 66 IN | WEIGHT: 174.19 LBS | WEIGHT: 176.06 LBS | BODY MASS INDEX: 28.3 KG/M2 | HEART RATE: 57 BPM | SYSTOLIC BLOOD PRESSURE: 148 MMHG

## 2020-12-16 DIAGNOSIS — I43 CARDIOMYOPATHY DUE TO HYPERTENSION, WITHOUT HEART FAILURE: ICD-10-CM

## 2020-12-16 DIAGNOSIS — J98.4 CHRONIC RESTRICTIVE LUNG DISEASE: ICD-10-CM

## 2020-12-16 DIAGNOSIS — D86.1 SARCOIDOSIS OF LYMPH NODES: ICD-10-CM

## 2020-12-16 DIAGNOSIS — I11.9 CARDIOMYOPATHY DUE TO HYPERTENSION, WITHOUT HEART FAILURE: ICD-10-CM

## 2020-12-16 DIAGNOSIS — E78.00 PURE HYPERCHOLESTEROLEMIA: ICD-10-CM

## 2020-12-16 DIAGNOSIS — R94.2 ABNORMAL DIFFUSION CAPACITY DETERMINED BY PULMONARY FUNCTION TEST: ICD-10-CM

## 2020-12-16 DIAGNOSIS — I10 ESSENTIAL HYPERTENSION: ICD-10-CM

## 2020-12-16 DIAGNOSIS — J98.4 CHRONIC RESTRICTIVE LUNG DISEASE: Primary | ICD-10-CM

## 2020-12-16 LAB
ALLENS TEST: ABNORMAL
BRPFT: NORMAL
DELSYS: ABNORMAL
DLCO ADJ PRE: 10.83 ML/(MIN*MMHG)
DLCO SINGLE BREATH LLN: 17.32
DLCO SINGLE BREATH PRE REF: 47 %
DLCO SINGLE BREATH REF: 23.05
DLCOC SBVA LLN: 3
DLCOC SBVA PRE REF: 88.9 %
DLCOC SBVA REF: 4.35
DLCOC SINGLE BREATH LLN: 17.32
DLCOC SINGLE BREATH PRE REF: 47 %
DLCOC SINGLE BREATH REF: 23.05
DLCOVA LLN: 3
DLCOVA PRE REF: 88.9 %
DLCOVA PRE: 3.87 ML/(MIN*MMHG*L)
DLCOVA REF: 4.35
DLVAADJ PRE: 3.87 ML/(MIN*MMHG*L)
ERV LLN: -16449.28
ERV PRE REF: 47.8 %
ERV REF: 0.72
FEF 25 75 LLN: 0.73
FEF 25 75 PRE REF: 78 %
FEF 25 75 REF: 1.83
FEV1 FVC LLN: 66
FEV1 FVC PRE REF: 102.2 %
FEV1 FVC REF: 79
FEV1 LLN: 1.5
FEV1 PRE REF: 62.2 %
FEV1 REF: 2.12
FRCPLETH LLN: 2.01
FRCPLETH PREREF: 57.1 %
FRCPLETH REF: 2.83
FVC LLN: 1.95
FVC PRE REF: 60.5 %
FVC REF: 2.72
HCO3 UR-SCNC: 29.9 MMOL/L (ref 24–28)
IVC PRE: 1.73 L
IVC SINGLE BREATH LLN: 1.95
IVC SINGLE BREATH PRE REF: 63.8 %
IVC SINGLE BREATH REF: 2.72
MVV LLN: 80
MVV PRE REF: 80.5 %
MVV REF: 94
PCO2 BLDA: 45.6 MMHG (ref 35–45)
PEF LLN: 3.31
PEF PRE REF: 71.7 %
PEF REF: 5.45
PH SMN: 7.42 [PH] (ref 7.35–7.45)
PO2 BLDA: 76 MMHG (ref 80–100)
POC BE: 5 MMOL/L
POC SATURATED O2: 95 % (ref 95–100)
PRE DLCO: 10.83 ML/(MIN*MMHG)
PRE ERV: 0.34 L
PRE FEF 25 75: 1.43 L/S
PRE FET 100: 7.2 SEC
PRE FEV1 FVC: 80.35 %
PRE FEV1: 1.32 L
PRE FRC PL: 1.62 L
PRE FVC: 1.64 L
PRE MVV: 76 L/MIN
PRE PEF: 3.91 L/S
PRE RV: 1.27 L
PRE TLC: 3.11 L
RAW LLN: 3.06
RAW PRE REF: 162.1 %
RAW PRE: 4.96 CMH2O*S/L
RAW REF: 3.06
RV LLN: 1.54
RV PRE REF: 59.9 %
RV REF: 2.11
RVTLC LLN: 32
RVTLC PRE REF: 97.7 %
RVTLC PRE: 40.76 %
RVTLC REF: 42
SAMPLE: ABNORMAL
SITE: ABNORMAL
TLC LLN: 4.31
TLC PRE REF: 58.6 %
TLC REF: 5.3
VA PRE: 2.8 L
VA SINGLE BREATH LLN: 5.15
VA SINGLE BREATH PRE REF: 54.4 %
VA SINGLE BREATH REF: 5.15
VC LLN: 1.95
VC PRE REF: 67.7 %
VC PRE: 1.84 L
VC REF: 2.72
VTGRAWPRE: 2.19 L

## 2020-12-16 PROCEDURE — 99214 PR OFFICE/OUTPT VISIT, EST, LEVL IV, 30-39 MIN: ICD-10-PCS | Mod: 25,S$GLB,, | Performed by: INTERNAL MEDICINE

## 2020-12-16 PROCEDURE — 94726 PULM FUNCT TST PLETHYSMOGRAP: ICD-10-PCS | Mod: S$GLB,,, | Performed by: INTERNAL MEDICINE

## 2020-12-16 PROCEDURE — 3008F BODY MASS INDEX DOCD: CPT | Mod: CPTII,S$GLB,, | Performed by: INTERNAL MEDICINE

## 2020-12-16 PROCEDURE — 94726 PLETHYSMOGRAPHY LUNG VOLUMES: CPT | Mod: S$GLB,,, | Performed by: INTERNAL MEDICINE

## 2020-12-16 PROCEDURE — 1159F MED LIST DOCD IN RCRD: CPT | Mod: S$GLB,,, | Performed by: INTERNAL MEDICINE

## 2020-12-16 PROCEDURE — 99214 OFFICE O/P EST MOD 30 MIN: CPT | Mod: 25,S$GLB,, | Performed by: INTERNAL MEDICINE

## 2020-12-16 PROCEDURE — 3008F PR BODY MASS INDEX (BMI) DOCUMENTED: ICD-10-PCS | Mod: CPTII,S$GLB,, | Performed by: INTERNAL MEDICINE

## 2020-12-16 PROCEDURE — 3077F SYST BP >= 140 MM HG: CPT | Mod: CPTII,S$GLB,, | Performed by: INTERNAL MEDICINE

## 2020-12-16 PROCEDURE — 99999 PR PBB SHADOW E&M-EST. PATIENT-LVL IV: ICD-10-PCS | Mod: PBBFAC,,, | Performed by: INTERNAL MEDICINE

## 2020-12-16 PROCEDURE — 99999 PR PBB SHADOW E&M-EST. PATIENT-LVL IV: CPT | Mod: PBBFAC,,, | Performed by: INTERNAL MEDICINE

## 2020-12-16 PROCEDURE — 94729 PR C02/MEMBANE DIFFUSE CAPACITY: ICD-10-PCS | Mod: S$GLB,,, | Performed by: INTERNAL MEDICINE

## 2020-12-16 PROCEDURE — 1159F PR MEDICATION LIST DOCUMENTED IN MEDICAL RECORD: ICD-10-PCS | Mod: S$GLB,,, | Performed by: INTERNAL MEDICINE

## 2020-12-16 PROCEDURE — 71046 XR CHEST PA AND LATERAL: ICD-10-PCS | Mod: 26,,, | Performed by: RADIOLOGY

## 2020-12-16 PROCEDURE — 36600 WITHDRAWAL OF ARTERIAL BLOOD: CPT | Mod: S$GLB,,, | Performed by: INTERNAL MEDICINE

## 2020-12-16 PROCEDURE — 94618 PULMONARY STRESS TESTING: CPT | Mod: S$GLB,,, | Performed by: INTERNAL MEDICINE

## 2020-12-16 PROCEDURE — 99499 UNLISTED E&M SERVICE: CPT | Mod: S$GLB,,, | Performed by: INTERNAL MEDICINE

## 2020-12-16 PROCEDURE — 94729 DIFFUSING CAPACITY: CPT | Mod: S$GLB,,, | Performed by: INTERNAL MEDICINE

## 2020-12-16 PROCEDURE — 71046 X-RAY EXAM CHEST 2 VIEWS: CPT | Mod: 26,,, | Performed by: RADIOLOGY

## 2020-12-16 PROCEDURE — 3078F PR MOST RECENT DIASTOLIC BLOOD PRESSURE < 80 MM HG: ICD-10-PCS | Mod: CPTII,S$GLB,, | Performed by: INTERNAL MEDICINE

## 2020-12-16 PROCEDURE — 36600 PR WITHDRAWAL OF ARTERIAL BLOOD: ICD-10-PCS | Mod: S$GLB,,, | Performed by: INTERNAL MEDICINE

## 2020-12-16 PROCEDURE — 3078F DIAST BP <80 MM HG: CPT | Mod: CPTII,S$GLB,, | Performed by: INTERNAL MEDICINE

## 2020-12-16 PROCEDURE — 94618 PULMONARY STRESS TESTING: ICD-10-PCS | Mod: S$GLB,,, | Performed by: INTERNAL MEDICINE

## 2020-12-16 PROCEDURE — 82803 BLOOD GASES ANY COMBINATION: CPT | Mod: S$GLB,,, | Performed by: INTERNAL MEDICINE

## 2020-12-16 PROCEDURE — 94010 BREATHING CAPACITY TEST: CPT | Mod: S$GLB,,, | Performed by: INTERNAL MEDICINE

## 2020-12-16 PROCEDURE — 94010 BREATHING CAPACITY TEST: ICD-10-PCS | Mod: S$GLB,,, | Performed by: INTERNAL MEDICINE

## 2020-12-16 PROCEDURE — 99499 RISK ADDL DX/OHS AUDIT: ICD-10-PCS | Mod: S$GLB,,, | Performed by: INTERNAL MEDICINE

## 2020-12-16 PROCEDURE — 82803 PR  BLOOD GASES: PH, PO2 & PCO2: ICD-10-PCS | Mod: S$GLB,,, | Performed by: INTERNAL MEDICINE

## 2020-12-16 PROCEDURE — 3077F PR MOST RECENT SYSTOLIC BLOOD PRESSURE >= 140 MM HG: ICD-10-PCS | Mod: CPTII,S$GLB,, | Performed by: INTERNAL MEDICINE

## 2020-12-16 PROCEDURE — 71046 X-RAY EXAM CHEST 2 VIEWS: CPT | Mod: TC

## 2020-12-16 RX ORDER — INFLUENZA A VIRUS A/MICHIGAN/45/2015 X-275 (H1N1) ANTIGEN (FORMALDEHYDE INACTIVATED), INFLUENZA A VIRUS A/SINGAPORE/INFIMH-16-0019/2016 IVR-186 (H3N2) ANTIGEN (FORMALDEHYDE INACTIVATED), INFLUENZA B VIRUS B/PHUKET/3073/2013 ANTIGEN (FORMALDEHYDE INACTIVATED), AND INFLUENZA B VIRUS B/MARYLAND/15/2016 BX-69A ANTIGEN (FORMALDEHYDE INACTIVATED) 60; 60; 60; 60 UG/.7ML; UG/.7ML; UG/.7ML; UG/.7ML
INJECTION, SUSPENSION INTRAMUSCULAR
COMMUNITY
Start: 2020-09-23 | End: 2022-09-08

## 2020-12-16 NOTE — PROCEDURES
See ABG Results.    Primary metabolic alkalosis,  with superimposed respiratory acidosis    (expected Pco2 = 39 - 43)    expected pH = 7.43  expected CO2 = 43  expected HCO3- = 30

## 2020-12-16 NOTE — PROGRESS NOTES
Subjective:     Patient Active Problem List   Diagnosis    HTN (hypertension)    Arthritis    Hypertensive cardiomyopathy    Diastolic dysfunction    Allergic rhinitis    Vitamin D deficiency    Hepatitis C antibody test positive    Diabetes mellitus type 2 in nonobese    Hyperlipidemia    History of colon cancer, stage I    Sarcoidosis of lymph nodes    Personal history of colonic polyps    Diverticulosis of large intestine without hemorrhage    Chronic restrictive lung disease    Abnormal diffusion capacity determined by pulmonary function test    DDD (degenerative disc disease), lumbar          Anne Farmer is a 65 y.o. female    Last visit was 2019  Reviewed CXR today: STABLE chronic findings   Also had  ABMWD: Normal exercise capacity  PFT: Restriction with Normal corrected DLCO  CXR  LN sarcoidosis: not of steroids; Restrictive lung physiology and reduced DLCO  TLC and FVC declined: consider adding Prednisone  ACE level pending  Also has diastolic dysfunction  Doing well  No cough, No SOB, No wheezing  Saw EYE doc and  cardiology  Not on oxygen.  Last PFT : restrictive and reduced DLCO  Flu shot from [facility]    Immunization History   Administered Date(s) Administered    Influenza - Quadrivalent 2017    Influenza - Quadrivalent - PF *Preferred* (6 months and older) 2009, 2010    PPD Test 02/15/2017    Pneumococcal Conjugate - 13 Valent 2019    Pneumococcal Polysaccharide - 23 Valent 2018    Tdap 01/15/2015       Social History     Tobacco Use   Smoking Status Never Smoker   Smokeless Tobacco Never Used         The following portions of the patient's history were reviewed and updated as appropriate:   She  has a past medical history of Allergy, Arthritis, Cancer (), CHF (congestive heart failure), Colon cancer (), Colon cancer screening (2015), Diabetes mellitus type 2 in nonobese (3/9/2016), Diverticulosis, DM (diabetes mellitus)  (2016), DM (diabetes mellitus) (2016), Hyperlipidemia (10/25/2016), Hypertension, and Insomnia.  She does not have any pertinent problems on file.  She  has a past surgical history that includes  section; Colonoscopy (N/A, 2015); Colon surgery; Colonoscopy (N/A, 2017); and Colonoscopy (N/A, 2020).  Her family history includes Diabetes in her mother; Hypertension in her brother, brother, brother, father, mother, sister, sister, sister, and sister.  She  reports that she has never smoked. She has never used smokeless tobacco. She reports current alcohol use. She reports that she does not use drugs.  She has a current medication list which includes the following prescription(s): amlodipine, blood sugar diagnostic, blood-glucose meter, cyclobenzaprine, fluticasone propionate, fluzone highdose quad 20-21 pf, hydrochlorothiazide, lancets, levocetirizine, losartan, metformin, one daily multivitamin, omeprazole, pravastatin, tramadol, and trazodone.  Current Outpatient Medications on File Prior to Visit   Medication Sig Dispense Refill    amLODIPine (NORVASC) 10 MG tablet TAKE 1 TABLET BY MOUTH EVERY DAY 90 tablet 0    blood sugar diagnostic Strp 1 strip by Misc.(Non-Drug; Combo Route) route once daily. One Touch Ultra 180 each 0    blood-glucose meter kit Use to check blood sugar once daily.  One Touch Ultra 1 each 0    cyclobenzaprine (FLEXERIL) 10 MG tablet TAKE 1 TABLET BY MOUTH THREE TIMES A DAY AS NEEDED 30 tablet 0    fluticasone propionate (FLONASE) 50 mcg/actuation nasal spray SPRAY 2 SPRAYS INTO EACH NOSTRIL EVERY DAY 16 mL 0    FLUZONE HIGHDOSE QUAD 20-21  mcg/0.7 mL Syrg PHARMACY ADMINISTERED      hydroCHLOROthiazide (HYDRODIURIL) 25 MG tablet Take 1 tablet (25 mg total) by mouth once daily. 90 tablet 0    lancets Misc 1 lancet by Misc.(Non-Drug; Combo Route) route once daily. One Touch Delica 180 each 0    losartan (COZAAR) 100 MG tablet Take 1 tablet (100 mg total)  "by mouth once daily. 90 tablet 2    metFORMIN (GLUCOPHAGE-XR) 750 MG ER 24hr tablet Take 1 tablet (750 mg total) by mouth once daily. 90 tablet 1    multivitamin (ONE DAILY MULTIVITAMIN) per tablet Take 1 tablet by mouth once daily.      omeprazole (PRILOSEC) 20 MG capsule TAKE 1 CAPSULE BY MOUTH EVERY DAY 90 capsule 3    traMADoL (ULTRAM) 50 mg tablet TAKE ONE TABLET po tid prn pain 30 tablet 0    [DISCONTINUED] levocetirizine (XYZAL) 5 MG tablet Take 1 tablet (5 mg total) by mouth every evening. 90 tablet 0    [DISCONTINUED] pravastatin (PRAVACHOL) 20 MG tablet Take 1 tablet (20 mg total) by mouth once daily. 30 tablet 2    [DISCONTINUED] traZODone (DESYREL) 50 MG tablet Take 1 tablet (50 mg total) by mouth nightly as needed for Insomnia. 30 tablet 3     No current facility-administered medications on file prior to visit.      She has No Known Allergies..    Review of Systems   Constitutional: Negative.    HENT: Negative.    Eyes: Negative.    Respiratory: Negative.  Negative for cough, sputum production, shortness of breath and wheezing.    Cardiovascular: Negative.  Negative for chest pain.   Gastrointestinal: Negative.    Genitourinary: Negative.    Musculoskeletal: Negative.    Skin: Negative.    Neurological: Negative.    Endo/Heme/Allergies: Negative.    Psychiatric/Behavioral: Negative.    All other systems reviewed and are negative.       Objective:      BP (!) 148/77   Pulse (!) 57   Resp 16   Ht 5' 6" (1.676 m)   Wt 79 kg (174 lb 2.6 oz)   SpO2 98%   BMI 28.11 kg/m²     Physical Exam  Vitals signs and nursing note reviewed.   Constitutional:       Appearance: She is well-developed.   HENT:      Head: Normocephalic and atraumatic.   Eyes:      Conjunctiva/sclera: Conjunctivae normal.      Pupils: Pupils are equal, round, and reactive to light.   Neck:      Musculoskeletal: Normal range of motion and neck supple.   Cardiovascular:      Rate and Rhythm: Normal rate and regular rhythm.      " "Heart sounds: Normal heart sounds. No murmur.   Pulmonary:      Effort: Pulmonary effort is normal. No respiratory distress.      Breath sounds: Normal breath sounds. No stridor. No wheezing.   Abdominal:      General: Bowel sounds are normal.      Palpations: Abdomen is soft.   Musculoskeletal: Normal range of motion.         General: No deformity.   Skin:     General: Skin is warm and dry.      Capillary Refill: Capillary refill takes 2 to 3 seconds.   Neurological:      Mental Status: She is alert and oriented to person, place, and time.      Cranial Nerves: No cranial nerve deficit.           Diagnostic Review  CXR         Recent Labs     12/16/20  1508   PH 7.425   PCO2 45.6*   PO2 76*   HCO3 29.9*   POCSATURATED 95   BE 5        6MW Test  Height: 5' 6" (167.6 cm)  Weight: 79 kg (174 lb 2.6 oz)  BMI (Calculated): 28.1  Predicted Distance: 312.05  Interpretation  Predicted Distance Meters (Calculated): 452.47 meters  Ordering Provider: Dr. Ashford          Interpreting Provider: Dr. Ashford  Performing nurse/tech/RT: ARETHA Villarreal CRT  Diagnosis: (Chronic Restrictive Lung Disease)  Height: 5' 6" (167.6 cm)  Weight: 79.9 kg (176 lb 0.6 oz)  BMI (Calculated): 28.4              Patient Race:                                                                 Phase Oxygen Assessment Supplemental O2 Heart   Rate Blood Pressure Dorothy Dyspnea Scale Rating   Resting 98 % Room Air 57 bpm 148/77 2   Exercise             Minute             1 94 % Room Air 81 bpm       2 95 % Room Air 90 bpm       3 94 % Room Air 94 bpm       4 93 % Room Air 95 bpm       5 95 % Room Air 93 bpm       6  96 % Room Air 92 bpm 161/77 4   Recovery             Minute             1 96 % Room Air 87 bpm       2 97 % Room Air 79 bpm       3 97 % Room Air 68 bpm       4 99 % Room Air 60 bpm 151/69 1      Six Minute Walk Summary  6MWT Status: completed without stopping  Patient Reported: Dyspnea         Interpretation:  Did the patient stop " or pause?: No  Total Time Walked (Calculated): 360 seconds  Final Partial Lap Distance (feet): 125 feet  Total Distance Meters (Calculated): 403.86 meters  Predicted Distance Meters (Calculated): 450.52 meters  Percentage of Predicted (Calculated): 89.64  Peak VO2 (Calculated): 16.1  Mets: 4.6  Has The Patient Had a Previous Six Minute Walk Test?: No     Previous 6MWT Results  Has The Patient Had a Previous Six Minute Walk Test?: No      PFT  FEV1: 1.32L( 62.2%), FVC 1.64L( 60.5%)  FEV1/FVC 80  TLC 3.11L( 58.6%)  DLCO 10.83 ( 47%)    Assessment:      Problem List Items Addressed This Visit     Hypertensive cardiomyopathy    HTN (hypertension)     Stable : Losartan HCTZ         Hyperlipidemia     STABLE on Pravachol         Sarcoidosis of lymph nodes     Not currently on streiods  No symptoms  CRLD with decline in FVC and TLC  Await ACE  Follow up: opthalmology, cardiology         Chronic restrictive lung disease - Primary     FVC: 1.64L( 60.5%)  TLC: 3.11L( 58.6%)  DLCO  ( 88.9%)                 Plan:      Follow up in about 3 months (around 3/16/2021).    No orders of the defined types were placed in this encounter.    Await ACE level and trial of steriods    This note was prepared using voice recognition system and is likely to have sound alike errors that may have been overlooked even after proof reading.  Please call me with any questions      Time spent: 20 minutes in face to face  discussion concerning diagnosis, prognosis, review of lab and test results, benefits of treatment as well as management of disease, counseling of patient and coordination of care between various health  care providers . Greater than half the time spent was used for coordination of care and counseling of patient.     Bandar Ashford    Pulmonary/Critical care/Sleepmedicine

## 2020-12-16 NOTE — PROCEDURES
"[facility] - Pulm Function [facility]  Six Minute Walk     SUMMARY     Ordering Provider: Dr. Ashford   Interpreting Provider: Dr. Ashford  Performing nurse/tech/RT: ARETHA Villarreal CRT  Diagnosis: (Chronic Restrictive Lung Disease)  Height: 5' 6" (167.6 cm)  Weight: 79.9 kg (176 lb 0.6 oz)  BMI (Calculated): 28.4   Patient Race:             Phase Oxygen Assessment Supplemental O2 Heart   Rate Blood Pressure Dorohty Dyspnea Scale Rating   Resting 98 % Room Air 57 bpm 148/77 2   Exercise        Minute        1 94 % Room Air 81 bpm     2 95 % Room Air 90 bpm     3 94 % Room Air 94 bpm     4 93 % Room Air 95 bpm     5 95 % Room Air 93 bpm     6  96 % Room Air 92 bpm 161/77 4   Recovery        Minute        1 96 % Room Air 87 bpm     2 97 % Room Air 79 bpm     3 97 % Room Air 68 bpm     4 99 % Room Air 60 bpm 151/69 1     Six Minute Walk Summary  6MWT Status: completed without stopping  Patient Reported: Dyspnea     Interpretation:  Did the patient stop or pause?: No         Total Time Walked (Calculated): 360 seconds  Final Partial Lap Distance (feet): 125 feet  Total Distance Meters (Calculated): 403.86 meters  Predicted Distance Meters (Calculated): 450.52 meters  Percentage of Predicted (Calculated): 89.64  Peak VO2 (Calculated): 16.1  Mets: 4.6  Has The Patient Had a Previous Six Minute Walk Test?: No       Previous 6MWT Results  Has The Patient Had a Previous Six Minute Walk Test?: No         CLINICAL INTERPRETATION:  Six minute walk distance is 403.86m (89.64 % predicted) with moderate dyspnea.  During exercise, there was mild desaturation while breathing room air.  Blood pressure remained stable and Heart rate remained stable with walking.  The patient did not report non-pulmonary symptoms during exercise.  The patient did complete the study, walking 360 seconds of the 360 second test.  The patient may not benefit from using supplemental oxygen during exertion.  No previous study performed.  Based upon age and " body mass index, exercise capacity is normal.

## 2020-12-17 NOTE — ASSESSMENT & PLAN NOTE
Not currently on streiods  No symptoms  CRLD with decline in FVC and TLC  Await ACE  Follow up: opthalmology, cardiology

## 2020-12-21 RX ORDER — PROMETHAZINE HYDROCHLORIDE AND DEXTROMETHORPHAN HYDROBROMIDE 6.25; 15 MG/5ML; MG/5ML
SYRUP ORAL
Qty: 118 ML | Refills: 0 | OUTPATIENT
Start: 2020-12-21

## 2020-12-21 RX ORDER — HYDROCHLOROTHIAZIDE 25 MG/1
TABLET ORAL
Qty: 90 TABLET | Refills: 0 | Status: SHIPPED | OUTPATIENT
Start: 2020-12-21 | End: 2021-03-19

## 2021-01-25 ENCOUNTER — PATIENT OUTREACH (OUTPATIENT)
Dept: ADMINISTRATIVE | Facility: HOSPITAL | Age: 66
End: 2021-01-25

## 2021-01-25 DIAGNOSIS — Z12.31 ENCOUNTER FOR SCREENING MAMMOGRAM FOR BREAST CANCER: Primary | ICD-10-CM

## 2021-02-05 ENCOUNTER — HOSPITAL ENCOUNTER (OUTPATIENT)
Dept: RADIOLOGY | Facility: HOSPITAL | Age: 66
Discharge: HOME | End: 2021-02-05
Attending: FAMILY MEDICINE
Payer: MEDICARE

## 2021-02-05 DIAGNOSIS — Z12.31 ENCOUNTER FOR SCREENING MAMMOGRAM FOR BREAST CANCER: ICD-10-CM

## 2021-02-05 PROCEDURE — 77067 SCR MAMMO BI INCL CAD: CPT | Mod: 26,,, | Performed by: RADIOLOGY

## 2021-02-05 PROCEDURE — 77063 MAMMO DIGITAL SCREENING BILAT WITH TOMO: ICD-10-PCS | Mod: 26,,, | Performed by: RADIOLOGY

## 2021-02-05 PROCEDURE — 77063 BREAST TOMOSYNTHESIS BI: CPT | Mod: 26,,, | Performed by: RADIOLOGY

## 2021-02-05 PROCEDURE — 77067 MAMMO DIGITAL SCREENING BILAT WITH TOMO: ICD-10-PCS | Mod: 26,,, | Performed by: RADIOLOGY

## 2021-02-05 PROCEDURE — 77067 SCR MAMMO BI INCL CAD: CPT | Mod: TC

## 2021-03-09 ENCOUNTER — PATIENT OUTREACH (OUTPATIENT)
Dept: ADMINISTRATIVE | Facility: OTHER | Age: 66
End: 2021-03-09

## 2021-03-10 ENCOUNTER — TELEPHONE (OUTPATIENT)
Dept: OPHTHALMOLOGY | Facility: CLINIC | Age: 66
End: 2021-03-10

## 2021-03-10 ENCOUNTER — OFFICE VISIT (OUTPATIENT)
Dept: OPHTHALMOLOGY | Facility: CLINIC | Age: 66
End: 2021-03-10
Payer: MEDICARE

## 2021-03-10 DIAGNOSIS — H52.4 BILATERAL PRESBYOPIA: ICD-10-CM

## 2021-03-10 DIAGNOSIS — E11.36 DIABETIC CATARACT: ICD-10-CM

## 2021-03-10 DIAGNOSIS — E11.9 DIABETES MELLITUS TYPE 2 WITHOUT RETINOPATHY: Primary | ICD-10-CM

## 2021-03-10 PROCEDURE — 92014 COMPRE OPH EXAM EST PT 1/>: CPT | Mod: S$GLB,,, | Performed by: OPTOMETRIST

## 2021-03-10 PROCEDURE — 2023F PR DILATED RETINAL EXAM W/O EVID OF RETINOPATHY: ICD-10-PCS | Mod: S$GLB,,, | Performed by: OPTOMETRIST

## 2021-03-10 PROCEDURE — 92014 PR EYE EXAM, EST PATIENT,COMPREHESV: ICD-10-PCS | Mod: S$GLB,,, | Performed by: OPTOMETRIST

## 2021-03-10 PROCEDURE — 99999 PR PBB SHADOW E&M-EST. PATIENT-LVL III: CPT | Mod: PBBFAC,,, | Performed by: OPTOMETRIST

## 2021-03-10 PROCEDURE — 99499 RISK ADDL DX/OHS AUDIT: ICD-10-PCS | Mod: S$PBB,,, | Performed by: OPTOMETRIST

## 2021-03-10 PROCEDURE — 99499 UNLISTED E&M SERVICE: CPT | Mod: S$PBB,,, | Performed by: OPTOMETRIST

## 2021-03-10 PROCEDURE — 92015 DETERMINE REFRACTIVE STATE: CPT | Mod: S$GLB,,, | Performed by: OPTOMETRIST

## 2021-03-10 PROCEDURE — 99999 PR PBB SHADOW E&M-EST. PATIENT-LVL III: ICD-10-PCS | Mod: PBBFAC,,, | Performed by: OPTOMETRIST

## 2021-03-10 PROCEDURE — 92015 PR REFRACTION: ICD-10-PCS | Mod: S$GLB,,, | Performed by: OPTOMETRIST

## 2021-03-10 PROCEDURE — 2023F DILAT RTA XM W/O RTNOPTHY: CPT | Mod: S$GLB,,, | Performed by: OPTOMETRIST

## 2021-03-17 ENCOUNTER — OFFICE VISIT (OUTPATIENT)
Dept: PULMONOLOGY | Facility: CLINIC | Age: 66
End: 2021-03-17
Payer: MEDICARE

## 2021-03-17 ENCOUNTER — HOSPITAL ENCOUNTER (OUTPATIENT)
Dept: RADIOLOGY | Facility: HOSPITAL | Age: 66
Discharge: HOME | End: 2021-03-17
Attending: INTERNAL MEDICINE
Payer: MEDICARE

## 2021-03-17 ENCOUNTER — TELEPHONE (OUTPATIENT)
Dept: PULMONOLOGY | Facility: CLINIC | Age: 66
End: 2021-03-17

## 2021-03-17 VITALS
HEIGHT: 66 IN | BODY MASS INDEX: 28.68 KG/M2 | SYSTOLIC BLOOD PRESSURE: 140 MMHG | HEART RATE: 61 BPM | RESPIRATION RATE: 20 BRPM | DIASTOLIC BLOOD PRESSURE: 64 MMHG | OXYGEN SATURATION: 97 % | WEIGHT: 178.44 LBS

## 2021-03-17 DIAGNOSIS — D86.1 SARCOIDOSIS OF LYMPH NODES: Primary | ICD-10-CM

## 2021-03-17 DIAGNOSIS — J98.4 CHRONIC RESTRICTIVE LUNG DISEASE: ICD-10-CM

## 2021-03-17 DIAGNOSIS — E11.9 DIABETES MELLITUS TYPE 2 IN NONOBESE: ICD-10-CM

## 2021-03-17 DIAGNOSIS — D86.1 SARCOIDOSIS OF LYMPH NODES: ICD-10-CM

## 2021-03-17 DIAGNOSIS — R94.2 ABNORMAL DIFFUSION CAPACITY DETERMINED BY PULMONARY FUNCTION TEST: ICD-10-CM

## 2021-03-17 DIAGNOSIS — J98.4 CHRONIC RESTRICTIVE LUNG DISEASE: Primary | ICD-10-CM

## 2021-03-17 DIAGNOSIS — E78.00 PURE HYPERCHOLESTEROLEMIA: ICD-10-CM

## 2021-03-17 DIAGNOSIS — I10 ESSENTIAL HYPERTENSION: ICD-10-CM

## 2021-03-17 PROCEDURE — 3078F DIAST BP <80 MM HG: CPT | Mod: CPTII,S$GLB,, | Performed by: INTERNAL MEDICINE

## 2021-03-17 PROCEDURE — 99499 UNLISTED E&M SERVICE: CPT | Mod: S$PBB,,, | Performed by: INTERNAL MEDICINE

## 2021-03-17 PROCEDURE — 99499 RISK ADDL DX/OHS AUDIT: ICD-10-PCS | Mod: S$PBB,,, | Performed by: INTERNAL MEDICINE

## 2021-03-17 PROCEDURE — 3008F BODY MASS INDEX DOCD: CPT | Mod: CPTII,S$GLB,, | Performed by: INTERNAL MEDICINE

## 2021-03-17 PROCEDURE — 3044F PR MOST RECENT HEMOGLOBIN A1C LEVEL <7.0%: ICD-10-PCS | Mod: CPTII,S$GLB,, | Performed by: INTERNAL MEDICINE

## 2021-03-17 PROCEDURE — 1101F PR PT FALLS ASSESS DOC 0-1 FALLS W/OUT INJ PAST YR: ICD-10-PCS | Mod: CPTII,S$GLB,, | Performed by: INTERNAL MEDICINE

## 2021-03-17 PROCEDURE — 71046 XR CHEST PA AND LATERAL: ICD-10-PCS | Mod: 26,,, | Performed by: RADIOLOGY

## 2021-03-17 PROCEDURE — 1159F PR MEDICATION LIST DOCUMENTED IN MEDICAL RECORD: ICD-10-PCS | Mod: S$GLB,,, | Performed by: INTERNAL MEDICINE

## 2021-03-17 PROCEDURE — 3288F PR FALLS RISK ASSESSMENT DOCUMENTED: ICD-10-PCS | Mod: CPTII,S$GLB,, | Performed by: INTERNAL MEDICINE

## 2021-03-17 PROCEDURE — 99214 OFFICE O/P EST MOD 30 MIN: CPT | Mod: S$GLB,,, | Performed by: INTERNAL MEDICINE

## 2021-03-17 PROCEDURE — 99999 PR PBB SHADOW E&M-EST. PATIENT-LVL V: CPT | Mod: PBBFAC,,, | Performed by: INTERNAL MEDICINE

## 2021-03-17 PROCEDURE — 71046 X-RAY EXAM CHEST 2 VIEWS: CPT | Mod: 26,,, | Performed by: RADIOLOGY

## 2021-03-17 PROCEDURE — 3078F PR MOST RECENT DIASTOLIC BLOOD PRESSURE < 80 MM HG: ICD-10-PCS | Mod: CPTII,S$GLB,, | Performed by: INTERNAL MEDICINE

## 2021-03-17 PROCEDURE — 3077F PR MOST RECENT SYSTOLIC BLOOD PRESSURE >= 140 MM HG: ICD-10-PCS | Mod: CPTII,S$GLB,, | Performed by: INTERNAL MEDICINE

## 2021-03-17 PROCEDURE — 3008F PR BODY MASS INDEX (BMI) DOCUMENTED: ICD-10-PCS | Mod: CPTII,S$GLB,, | Performed by: INTERNAL MEDICINE

## 2021-03-17 PROCEDURE — 99999 PR PBB SHADOW E&M-EST. PATIENT-LVL V: ICD-10-PCS | Mod: PBBFAC,,, | Performed by: INTERNAL MEDICINE

## 2021-03-17 PROCEDURE — 1101F PT FALLS ASSESS-DOCD LE1/YR: CPT | Mod: CPTII,S$GLB,, | Performed by: INTERNAL MEDICINE

## 2021-03-17 PROCEDURE — 3077F SYST BP >= 140 MM HG: CPT | Mod: CPTII,S$GLB,, | Performed by: INTERNAL MEDICINE

## 2021-03-17 PROCEDURE — 99214 PR OFFICE/OUTPT VISIT, EST, LEVL IV, 30-39 MIN: ICD-10-PCS | Mod: S$GLB,,, | Performed by: INTERNAL MEDICINE

## 2021-03-17 PROCEDURE — 3044F HG A1C LEVEL LT 7.0%: CPT | Mod: CPTII,S$GLB,, | Performed by: INTERNAL MEDICINE

## 2021-03-17 PROCEDURE — 1159F MED LIST DOCD IN RCRD: CPT | Mod: S$GLB,,, | Performed by: INTERNAL MEDICINE

## 2021-03-17 PROCEDURE — 71046 X-RAY EXAM CHEST 2 VIEWS: CPT | Mod: TC

## 2021-03-17 PROCEDURE — 3288F FALL RISK ASSESSMENT DOCD: CPT | Mod: CPTII,S$GLB,, | Performed by: INTERNAL MEDICINE

## 2021-03-17 RX ORDER — ALBUTEROL SULFATE 90 UG/1
2 AEROSOL, METERED RESPIRATORY (INHALATION) EVERY 6 HOURS
Qty: 18 G | Refills: 3 | Status: SHIPPED | OUTPATIENT
Start: 2021-03-17 | End: 2021-08-01

## 2021-03-23 RX ORDER — METFORMIN HYDROCHLORIDE 750 MG/1
750 TABLET, EXTENDED RELEASE ORAL DAILY
Qty: 7 TABLET | Refills: 0 | Status: SHIPPED | OUTPATIENT
Start: 2021-03-23 | End: 2022-06-16

## 2021-04-09 ENCOUNTER — OFFICE VISIT (OUTPATIENT)
Dept: FAMILY MEDICINE | Facility: CLINIC | Age: 66
End: 2021-04-09
Payer: MEDICARE

## 2021-04-09 VITALS
DIASTOLIC BLOOD PRESSURE: 60 MMHG | OXYGEN SATURATION: 95 % | TEMPERATURE: 98 F | WEIGHT: 176.81 LBS | BODY MASS INDEX: 28.42 KG/M2 | HEIGHT: 66 IN | HEART RATE: 62 BPM | SYSTOLIC BLOOD PRESSURE: 138 MMHG

## 2021-04-09 DIAGNOSIS — I10 HYPERTENSION, UNSPECIFIED TYPE: ICD-10-CM

## 2021-04-09 DIAGNOSIS — G89.29 CHRONIC PAIN OF RIGHT KNEE: ICD-10-CM

## 2021-04-09 DIAGNOSIS — M25.561 CHRONIC PAIN OF RIGHT KNEE: ICD-10-CM

## 2021-04-09 DIAGNOSIS — D86.1 SARCOIDOSIS OF LYMPH NODES: ICD-10-CM

## 2021-04-09 DIAGNOSIS — J98.4 CHRONIC RESTRICTIVE LUNG DISEASE: ICD-10-CM

## 2021-04-09 DIAGNOSIS — J32.1 CHRONIC FRONTAL SINUSITIS: Primary | ICD-10-CM

## 2021-04-09 DIAGNOSIS — E78.5 DM TYPE 2 WITH DIABETIC DYSLIPIDEMIA: ICD-10-CM

## 2021-04-09 DIAGNOSIS — I51.89 DIASTOLIC DYSFUNCTION: ICD-10-CM

## 2021-04-09 DIAGNOSIS — E11.69 DM TYPE 2 WITH DIABETIC DYSLIPIDEMIA: ICD-10-CM

## 2021-04-09 PROCEDURE — 1159F PR MEDICATION LIST DOCUMENTED IN MEDICAL RECORD: ICD-10-PCS | Mod: S$GLB,,, | Performed by: FAMILY MEDICINE

## 2021-04-09 PROCEDURE — 1101F PR PT FALLS ASSESS DOC 0-1 FALLS W/OUT INJ PAST YR: ICD-10-PCS | Mod: CPTII,S$GLB,, | Performed by: FAMILY MEDICINE

## 2021-04-09 PROCEDURE — 99499 UNLISTED E&M SERVICE: CPT | Mod: S$PBB,,, | Performed by: FAMILY MEDICINE

## 2021-04-09 PROCEDURE — 3288F FALL RISK ASSESSMENT DOCD: CPT | Mod: CPTII,S$GLB,, | Performed by: FAMILY MEDICINE

## 2021-04-09 PROCEDURE — 99499 RISK ADDL DX/OHS AUDIT: ICD-10-PCS | Mod: S$PBB,,, | Performed by: FAMILY MEDICINE

## 2021-04-09 PROCEDURE — 96372 THER/PROPH/DIAG INJ SC/IM: CPT | Mod: S$GLB,,, | Performed by: FAMILY MEDICINE

## 2021-04-09 PROCEDURE — 96372 PR INJECTION,THERAP/PROPH/DIAG2ST, IM OR SUBCUT: ICD-10-PCS | Mod: S$GLB,,, | Performed by: FAMILY MEDICINE

## 2021-04-09 PROCEDURE — 3288F PR FALLS RISK ASSESSMENT DOCUMENTED: ICD-10-PCS | Mod: CPTII,S$GLB,, | Performed by: FAMILY MEDICINE

## 2021-04-09 PROCEDURE — 1159F MED LIST DOCD IN RCRD: CPT | Mod: S$GLB,,, | Performed by: FAMILY MEDICINE

## 2021-04-09 PROCEDURE — 3008F PR BODY MASS INDEX (BMI) DOCUMENTED: ICD-10-PCS | Mod: CPTII,S$GLB,, | Performed by: FAMILY MEDICINE

## 2021-04-09 PROCEDURE — 99214 PR OFFICE/OUTPT VISIT, EST, LEVL IV, 30-39 MIN: ICD-10-PCS | Mod: 25,S$GLB,, | Performed by: FAMILY MEDICINE

## 2021-04-09 PROCEDURE — 99214 OFFICE O/P EST MOD 30 MIN: CPT | Mod: 25,S$GLB,, | Performed by: FAMILY MEDICINE

## 2021-04-09 PROCEDURE — 99999 PR PBB SHADOW E&M-EST. PATIENT-LVL III: ICD-10-PCS | Mod: PBBFAC,,, | Performed by: FAMILY MEDICINE

## 2021-04-09 PROCEDURE — 1101F PT FALLS ASSESS-DOCD LE1/YR: CPT | Mod: CPTII,S$GLB,, | Performed by: FAMILY MEDICINE

## 2021-04-09 PROCEDURE — 3008F BODY MASS INDEX DOCD: CPT | Mod: CPTII,S$GLB,, | Performed by: FAMILY MEDICINE

## 2021-04-09 PROCEDURE — 99999 PR PBB SHADOW E&M-EST. PATIENT-LVL III: CPT | Mod: PBBFAC,,, | Performed by: FAMILY MEDICINE

## 2021-04-09 RX ORDER — BETAMETHASONE SODIUM PHOSPHATE AND BETAMETHASONE ACETATE 3; 3 MG/ML; MG/ML
6 INJECTION, SUSPENSION INTRA-ARTICULAR; INTRALESIONAL; INTRAMUSCULAR; SOFT TISSUE
Status: COMPLETED | OUTPATIENT
Start: 2021-04-09 | End: 2021-04-09

## 2021-04-09 RX ORDER — LEVOCETIRIZINE DIHYDROCHLORIDE 5 MG/1
5 TABLET, FILM COATED ORAL NIGHTLY
Qty: 90 TABLET | Refills: 0 | Status: SHIPPED | OUTPATIENT
Start: 2021-04-09 | End: 2021-11-01 | Stop reason: SDUPTHER

## 2021-04-09 RX ORDER — DICLOFENAC SODIUM 10 MG/G
2 GEL TOPICAL 2 TIMES DAILY PRN
Qty: 150 G | Refills: 1 | Status: SHIPPED | OUTPATIENT
Start: 2021-04-09 | End: 2021-06-21

## 2021-04-09 RX ADMIN — BETAMETHASONE SODIUM PHOSPHATE AND BETAMETHASONE ACETATE 6 MG: 3; 3 INJECTION, SUSPENSION INTRA-ARTICULAR; INTRALESIONAL; INTRAMUSCULAR; SOFT TISSUE at 03:04

## 2021-04-14 ENCOUNTER — TELEPHONE (OUTPATIENT)
Dept: OPHTHALMOLOGY | Facility: CLINIC | Age: 66
End: 2021-04-14

## 2021-04-16 ENCOUNTER — TELEPHONE (OUTPATIENT)
Dept: OPHTHALMOLOGY | Facility: CLINIC | Age: 66
End: 2021-04-16

## 2021-04-25 ENCOUNTER — PATIENT OUTREACH (OUTPATIENT)
Dept: ADMINISTRATIVE | Facility: OTHER | Age: 66
End: 2021-04-25

## 2021-04-26 ENCOUNTER — OFFICE VISIT (OUTPATIENT)
Dept: OPHTHALMOLOGY | Facility: CLINIC | Age: 66
End: 2021-04-26
Payer: MEDICARE

## 2021-04-26 DIAGNOSIS — E11.9 DIABETES MELLITUS TYPE 2 WITHOUT RETINOPATHY: ICD-10-CM

## 2021-04-26 DIAGNOSIS — H26.9 CORTICAL CATARACT OF BOTH EYES: Primary | ICD-10-CM

## 2021-04-26 DIAGNOSIS — H25.13 NUCLEAR SCLEROSIS OF BOTH EYES: ICD-10-CM

## 2021-04-26 DIAGNOSIS — E11.36 DIABETIC CATARACT: ICD-10-CM

## 2021-04-26 LAB
LEFT EYE DM RETINOPATHY: NEGATIVE
RIGHT EYE DM RETINOPATHY: NEGATIVE

## 2021-04-26 PROCEDURE — 99999 PR PBB SHADOW E&M-EST. PATIENT-LVL III: ICD-10-PCS | Mod: PBBFAC,,, | Performed by: OPHTHALMOLOGY

## 2021-04-26 PROCEDURE — 99999 PR PBB SHADOW E&M-EST. PATIENT-LVL III: CPT | Mod: PBBFAC,,, | Performed by: OPHTHALMOLOGY

## 2021-04-26 PROCEDURE — 1159F MED LIST DOCD IN RCRD: CPT | Mod: S$GLB,,, | Performed by: OPHTHALMOLOGY

## 2021-04-26 PROCEDURE — 92136 OPHTHALMIC BIOMETRY: CPT | Mod: RT,S$GLB,, | Performed by: OPHTHALMOLOGY

## 2021-04-26 PROCEDURE — 1159F PR MEDICATION LIST DOCUMENTED IN MEDICAL RECORD: ICD-10-PCS | Mod: S$GLB,,, | Performed by: OPHTHALMOLOGY

## 2021-04-26 PROCEDURE — 99204 PR OFFICE/OUTPT VISIT, NEW, LEVL IV, 45-59 MIN: ICD-10-PCS | Mod: S$GLB,,, | Performed by: OPHTHALMOLOGY

## 2021-04-26 PROCEDURE — 92136 IOL MASTER - OD - RIGHT EYE: ICD-10-PCS | Mod: RT,S$GLB,, | Performed by: OPHTHALMOLOGY

## 2021-04-26 PROCEDURE — 99204 OFFICE O/P NEW MOD 45 MIN: CPT | Mod: S$GLB,,, | Performed by: OPHTHALMOLOGY

## 2021-04-26 RX ORDER — PREDNISOLONE ACETATE 10 MG/ML
1 SUSPENSION/ DROPS OPHTHALMIC 4 TIMES DAILY
Qty: 1 BOTTLE | Refills: 2 | Status: SHIPPED | OUTPATIENT
Start: 2021-04-26 | End: 2021-07-26

## 2021-04-26 RX ORDER — MOXIFLOXACIN 5 MG/ML
1 SOLUTION/ DROPS OPHTHALMIC EVERY 12 HOURS
Qty: 5 ML | Refills: 2 | Status: SHIPPED | OUTPATIENT
Start: 2021-04-26 | End: 2022-06-16

## 2021-04-26 RX ORDER — KETOROLAC TROMETHAMINE 5 MG/ML
1 SOLUTION OPHTHALMIC 4 TIMES DAILY
Qty: 1 BOTTLE | Refills: 2 | Status: SHIPPED | OUTPATIENT
Start: 2021-04-26 | End: 2021-07-26

## 2021-05-13 ENCOUNTER — OFFICE VISIT (OUTPATIENT)
Dept: FAMILY MEDICINE | Facility: CLINIC | Age: 66
End: 2021-05-13
Attending: FAMILY MEDICINE
Payer: MEDICARE

## 2021-05-13 ENCOUNTER — HOSPITAL ENCOUNTER (OUTPATIENT)
Dept: RADIOLOGY | Facility: HOSPITAL | Age: 66
Discharge: HOME | End: 2021-05-13
Attending: FAMILY MEDICINE
Payer: MEDICARE

## 2021-05-13 VITALS
DIASTOLIC BLOOD PRESSURE: 70 MMHG | HEIGHT: 66 IN | SYSTOLIC BLOOD PRESSURE: 134 MMHG | HEART RATE: 60 BPM | TEMPERATURE: 98 F | BODY MASS INDEX: 28.68 KG/M2 | OXYGEN SATURATION: 96 % | WEIGHT: 178.44 LBS

## 2021-05-13 DIAGNOSIS — E11.69 DM TYPE 2 WITH DIABETIC DYSLIPIDEMIA: Primary | ICD-10-CM

## 2021-05-13 DIAGNOSIS — D86.9 SARCOIDOSIS: ICD-10-CM

## 2021-05-13 DIAGNOSIS — I11.9 CARDIOMYOPATHY DUE TO HYPERTENSION, WITHOUT HEART FAILURE: ICD-10-CM

## 2021-05-13 DIAGNOSIS — M19.90 ARTHRITIS: ICD-10-CM

## 2021-05-13 DIAGNOSIS — M25.569 KNEE PAIN, UNSPECIFIED CHRONICITY, UNSPECIFIED LATERALITY: ICD-10-CM

## 2021-05-13 DIAGNOSIS — I43 CARDIOMYOPATHY DUE TO HYPERTENSION, WITHOUT HEART FAILURE: ICD-10-CM

## 2021-05-13 DIAGNOSIS — C18.9 MALIGNANT NEOPLASM OF COLON, UNSPECIFIED PART OF COLON: ICD-10-CM

## 2021-05-13 DIAGNOSIS — M25.50 MULTIPLE JOINT PAIN: ICD-10-CM

## 2021-05-13 DIAGNOSIS — E78.5 DM TYPE 2 WITH DIABETIC DYSLIPIDEMIA: Primary | ICD-10-CM

## 2021-05-13 PROCEDURE — 1101F PT FALLS ASSESS-DOCD LE1/YR: CPT | Mod: CPTII,S$GLB,, | Performed by: FAMILY MEDICINE

## 2021-05-13 PROCEDURE — 3288F PR FALLS RISK ASSESSMENT DOCUMENTED: ICD-10-PCS | Mod: CPTII,S$GLB,, | Performed by: FAMILY MEDICINE

## 2021-05-13 PROCEDURE — 99999 PR PBB SHADOW E&M-EST. PATIENT-LVL IV: ICD-10-PCS | Mod: PBBFAC,,, | Performed by: FAMILY MEDICINE

## 2021-05-13 PROCEDURE — 99499 RISK ADDL DX/OHS AUDIT: ICD-10-PCS | Mod: S$PBB,,, | Performed by: FAMILY MEDICINE

## 2021-05-13 PROCEDURE — 3008F PR BODY MASS INDEX (BMI) DOCUMENTED: ICD-10-PCS | Mod: CPTII,S$GLB,, | Performed by: FAMILY MEDICINE

## 2021-05-13 PROCEDURE — 1125F PR PAIN SEVERITY QUANTIFIED, PAIN PRESENT: ICD-10-PCS | Mod: S$GLB,,, | Performed by: FAMILY MEDICINE

## 2021-05-13 PROCEDURE — 73562 XR KNEE ORTHO BILAT: ICD-10-PCS | Mod: 26,RT,, | Performed by: RADIOLOGY

## 2021-05-13 PROCEDURE — 73562 X-RAY EXAM OF KNEE 3: CPT | Mod: 26,LT,, | Performed by: RADIOLOGY

## 2021-05-13 PROCEDURE — 1159F MED LIST DOCD IN RCRD: CPT | Mod: S$GLB,,, | Performed by: FAMILY MEDICINE

## 2021-05-13 PROCEDURE — 73562 X-RAY EXAM OF KNEE 3: CPT | Mod: TC,50,PO

## 2021-05-13 PROCEDURE — 99214 PR OFFICE/OUTPT VISIT, EST, LEVL IV, 30-39 MIN: ICD-10-PCS | Mod: S$GLB,,, | Performed by: FAMILY MEDICINE

## 2021-05-13 PROCEDURE — 1159F PR MEDICATION LIST DOCUMENTED IN MEDICAL RECORD: ICD-10-PCS | Mod: S$GLB,,, | Performed by: FAMILY MEDICINE

## 2021-05-13 PROCEDURE — 99999 PR PBB SHADOW E&M-EST. PATIENT-LVL IV: CPT | Mod: PBBFAC,,, | Performed by: FAMILY MEDICINE

## 2021-05-13 PROCEDURE — 3288F FALL RISK ASSESSMENT DOCD: CPT | Mod: CPTII,S$GLB,, | Performed by: FAMILY MEDICINE

## 2021-05-13 PROCEDURE — 73562 X-RAY EXAM OF KNEE 3: CPT | Mod: 26,RT,, | Performed by: RADIOLOGY

## 2021-05-13 PROCEDURE — 1101F PR PT FALLS ASSESS DOC 0-1 FALLS W/OUT INJ PAST YR: ICD-10-PCS | Mod: CPTII,S$GLB,, | Performed by: FAMILY MEDICINE

## 2021-05-13 PROCEDURE — 99499 UNLISTED E&M SERVICE: CPT | Mod: S$PBB,,, | Performed by: FAMILY MEDICINE

## 2021-05-13 PROCEDURE — 1125F AMNT PAIN NOTED PAIN PRSNT: CPT | Mod: S$GLB,,, | Performed by: FAMILY MEDICINE

## 2021-05-13 PROCEDURE — 99214 OFFICE O/P EST MOD 30 MIN: CPT | Mod: S$GLB,,, | Performed by: FAMILY MEDICINE

## 2021-05-13 PROCEDURE — 3008F BODY MASS INDEX DOCD: CPT | Mod: CPTII,S$GLB,, | Performed by: FAMILY MEDICINE

## 2021-05-13 RX ORDER — TRAMADOL HYDROCHLORIDE 50 MG/1
TABLET ORAL
Qty: 12 TABLET | Refills: 0 | Status: SHIPPED | OUTPATIENT
Start: 2021-05-13 | End: 2022-09-08

## 2021-05-13 RX ORDER — CYCLOBENZAPRINE HCL 10 MG
10 TABLET ORAL 3 TIMES DAILY PRN
Qty: 30 TABLET | Refills: 0 | Status: SHIPPED | OUTPATIENT
Start: 2021-05-13 | End: 2021-11-10

## 2021-05-14 ENCOUNTER — TELEPHONE (OUTPATIENT)
Dept: FAMILY MEDICINE | Facility: CLINIC | Age: 66
End: 2021-05-14

## 2021-05-14 DIAGNOSIS — E83.52 HYPERCALCEMIA: Primary | ICD-10-CM

## 2021-06-02 ENCOUNTER — OUTSIDE PLACE OF SERVICE (OUTPATIENT)
Dept: OPHTHALMOLOGY | Facility: CLINIC | Age: 66
End: 2021-06-02
Payer: MEDICARE

## 2021-06-02 PROCEDURE — 66984 PR REMOVAL, CATARACT, W/INSRT INTRAOC LENS, W/O ENDO CYCLO: ICD-10-PCS | Mod: RT,,, | Performed by: OPHTHALMOLOGY

## 2021-06-02 PROCEDURE — 66984 XCAPSL CTRC RMVL W/O ECP: CPT | Mod: RT,,, | Performed by: OPHTHALMOLOGY

## 2021-06-03 ENCOUNTER — OFFICE VISIT (OUTPATIENT)
Dept: OPHTHALMOLOGY | Facility: CLINIC | Age: 66
End: 2021-06-03
Payer: MEDICARE

## 2021-06-03 DIAGNOSIS — Z98.890 POST-OPERATIVE STATE: Primary | ICD-10-CM

## 2021-06-03 PROCEDURE — 3288F FALL RISK ASSESSMENT DOCD: CPT | Mod: CPTII,S$GLB,, | Performed by: OPHTHALMOLOGY

## 2021-06-03 PROCEDURE — 3044F HG A1C LEVEL LT 7.0%: CPT | Mod: CPTII,S$GLB,, | Performed by: OPHTHALMOLOGY

## 2021-06-03 PROCEDURE — 99999 PR PBB SHADOW E&M-EST. PATIENT-LVL III: CPT | Mod: PBBFAC,,, | Performed by: OPHTHALMOLOGY

## 2021-06-03 PROCEDURE — 99024 PR POST-OP FOLLOW-UP VISIT: ICD-10-PCS | Mod: S$GLB,,, | Performed by: OPHTHALMOLOGY

## 2021-06-03 PROCEDURE — 3288F PR FALLS RISK ASSESSMENT DOCUMENTED: ICD-10-PCS | Mod: CPTII,S$GLB,, | Performed by: OPHTHALMOLOGY

## 2021-06-03 PROCEDURE — 3044F PR MOST RECENT HEMOGLOBIN A1C LEVEL <7.0%: ICD-10-PCS | Mod: CPTII,S$GLB,, | Performed by: OPHTHALMOLOGY

## 2021-06-03 PROCEDURE — 1101F PT FALLS ASSESS-DOCD LE1/YR: CPT | Mod: CPTII,S$GLB,, | Performed by: OPHTHALMOLOGY

## 2021-06-03 PROCEDURE — 99024 POSTOP FOLLOW-UP VISIT: CPT | Mod: S$GLB,,, | Performed by: OPHTHALMOLOGY

## 2021-06-03 PROCEDURE — 1126F PR PAIN SEVERITY QUANTIFIED, NO PAIN PRESENT: ICD-10-PCS | Mod: S$GLB,,, | Performed by: OPHTHALMOLOGY

## 2021-06-03 PROCEDURE — 1101F PR PT FALLS ASSESS DOC 0-1 FALLS W/OUT INJ PAST YR: ICD-10-PCS | Mod: CPTII,S$GLB,, | Performed by: OPHTHALMOLOGY

## 2021-06-03 PROCEDURE — 1126F AMNT PAIN NOTED NONE PRSNT: CPT | Mod: S$GLB,,, | Performed by: OPHTHALMOLOGY

## 2021-06-03 PROCEDURE — 99999 PR PBB SHADOW E&M-EST. PATIENT-LVL III: ICD-10-PCS | Mod: PBBFAC,,, | Performed by: OPHTHALMOLOGY

## 2021-06-08 ENCOUNTER — OFFICE VISIT (OUTPATIENT)
Dept: OPHTHALMOLOGY | Facility: CLINIC | Age: 66
End: 2021-06-08
Payer: MEDICARE

## 2021-06-08 DIAGNOSIS — Z98.890 POST-OPERATIVE STATE: Primary | ICD-10-CM

## 2021-06-08 PROCEDURE — 99024 POSTOP FOLLOW-UP VISIT: CPT | Mod: S$GLB,,, | Performed by: OPHTHALMOLOGY

## 2021-06-08 PROCEDURE — 99024 PR POST-OP FOLLOW-UP VISIT: ICD-10-PCS | Mod: S$GLB,,, | Performed by: OPHTHALMOLOGY

## 2021-06-08 PROCEDURE — 99999 PR PBB SHADOW E&M-EST. PATIENT-LVL III: CPT | Mod: PBBFAC,,, | Performed by: OPHTHALMOLOGY

## 2021-06-08 PROCEDURE — 3044F PR MOST RECENT HEMOGLOBIN A1C LEVEL <7.0%: ICD-10-PCS | Mod: CPTII,S$GLB,, | Performed by: OPHTHALMOLOGY

## 2021-06-08 PROCEDURE — 3044F HG A1C LEVEL LT 7.0%: CPT | Mod: CPTII,S$GLB,, | Performed by: OPHTHALMOLOGY

## 2021-06-08 PROCEDURE — 99999 PR PBB SHADOW E&M-EST. PATIENT-LVL III: ICD-10-PCS | Mod: PBBFAC,,, | Performed by: OPHTHALMOLOGY

## 2021-06-29 ENCOUNTER — OFFICE VISIT (OUTPATIENT)
Dept: OPHTHALMOLOGY | Facility: CLINIC | Age: 66
End: 2021-06-29
Payer: MEDICARE

## 2021-06-29 DIAGNOSIS — Z98.890 POST-OPERATIVE STATE: Primary | ICD-10-CM

## 2021-06-29 PROCEDURE — 3044F HG A1C LEVEL LT 7.0%: CPT | Mod: CPTII,S$GLB,, | Performed by: OPHTHALMOLOGY

## 2021-06-29 PROCEDURE — 99999 PR PBB SHADOW E&M-EST. PATIENT-LVL II: CPT | Mod: PBBFAC,,, | Performed by: OPHTHALMOLOGY

## 2021-06-29 PROCEDURE — 3044F PR MOST RECENT HEMOGLOBIN A1C LEVEL <7.0%: ICD-10-PCS | Mod: CPTII,S$GLB,, | Performed by: OPHTHALMOLOGY

## 2021-06-29 PROCEDURE — 99024 POSTOP FOLLOW-UP VISIT: CPT | Mod: S$GLB,,, | Performed by: OPHTHALMOLOGY

## 2021-06-29 PROCEDURE — 99024 PR POST-OP FOLLOW-UP VISIT: ICD-10-PCS | Mod: S$GLB,,, | Performed by: OPHTHALMOLOGY

## 2021-06-29 PROCEDURE — 99999 PR PBB SHADOW E&M-EST. PATIENT-LVL II: ICD-10-PCS | Mod: PBBFAC,,, | Performed by: OPHTHALMOLOGY

## 2021-07-06 ENCOUNTER — TELEPHONE (OUTPATIENT)
Dept: PULMONOLOGY | Facility: CLINIC | Age: 66
End: 2021-07-06

## 2021-07-27 ENCOUNTER — OFFICE VISIT (OUTPATIENT)
Dept: OPHTHALMOLOGY | Facility: CLINIC | Age: 66
End: 2021-07-27
Payer: MEDICARE

## 2021-07-27 DIAGNOSIS — Z98.890 POST-OPERATIVE STATE: Primary | ICD-10-CM

## 2021-07-27 PROCEDURE — 92015 DETERMINE REFRACTIVE STATE: CPT | Mod: S$GLB,,, | Performed by: OPTOMETRIST

## 2021-07-27 PROCEDURE — 3044F HG A1C LEVEL LT 7.0%: CPT | Mod: CPTII,S$GLB,, | Performed by: OPTOMETRIST

## 2021-07-27 PROCEDURE — 3044F PR MOST RECENT HEMOGLOBIN A1C LEVEL <7.0%: ICD-10-PCS | Mod: CPTII,S$GLB,, | Performed by: OPTOMETRIST

## 2021-07-27 PROCEDURE — 92015 PR REFRACTION: ICD-10-PCS | Mod: S$GLB,,, | Performed by: OPTOMETRIST

## 2021-07-27 PROCEDURE — 92014 COMPRE OPH EXAM EST PT 1/>: CPT | Mod: S$GLB,,, | Performed by: OPTOMETRIST

## 2021-07-27 PROCEDURE — 1159F MED LIST DOCD IN RCRD: CPT | Mod: CPTII,S$GLB,, | Performed by: OPTOMETRIST

## 2021-07-27 PROCEDURE — 99999 PR PBB SHADOW E&M-EST. PATIENT-LVL III: ICD-10-PCS | Mod: PBBFAC,,, | Performed by: OPTOMETRIST

## 2021-07-27 PROCEDURE — 92014 PR EYE EXAM, EST PATIENT,COMPREHESV: ICD-10-PCS | Mod: S$GLB,,, | Performed by: OPTOMETRIST

## 2021-07-27 PROCEDURE — 1159F PR MEDICATION LIST DOCUMENTED IN MEDICAL RECORD: ICD-10-PCS | Mod: CPTII,S$GLB,, | Performed by: OPTOMETRIST

## 2021-07-27 PROCEDURE — 99999 PR PBB SHADOW E&M-EST. PATIENT-LVL III: CPT | Mod: PBBFAC,,, | Performed by: OPTOMETRIST

## 2021-07-28 ENCOUNTER — PATIENT OUTREACH (OUTPATIENT)
Dept: ADMINISTRATIVE | Facility: OTHER | Age: 66
End: 2021-07-28

## 2021-07-29 ENCOUNTER — OFFICE VISIT (OUTPATIENT)
Dept: RHEUMATOLOGY | Facility: CLINIC | Age: 66
End: 2021-07-29
Payer: MEDICARE

## 2021-07-29 ENCOUNTER — LAB VISIT (OUTPATIENT)
Dept: LAB | Facility: HOSPITAL | Age: 66
End: 2021-07-29
Attending: INTERNAL MEDICINE
Payer: MEDICARE

## 2021-07-29 VITALS
HEART RATE: 63 BPM | HEIGHT: 66 IN | DIASTOLIC BLOOD PRESSURE: 70 MMHG | BODY MASS INDEX: 28.84 KG/M2 | SYSTOLIC BLOOD PRESSURE: 145 MMHG | WEIGHT: 179.44 LBS

## 2021-07-29 DIAGNOSIS — M15.9 PRIMARY OSTEOARTHRITIS INVOLVING MULTIPLE JOINTS: ICD-10-CM

## 2021-07-29 DIAGNOSIS — D86.1 SARCOIDOSIS OF LYMPH NODES: Primary | ICD-10-CM

## 2021-07-29 DIAGNOSIS — Z71.89 COUNSELING ON HEALTH PROMOTION AND DISEASE PREVENTION: ICD-10-CM

## 2021-07-29 DIAGNOSIS — D86.1 SARCOIDOSIS OF LYMPH NODES: ICD-10-CM

## 2021-07-29 LAB
ALBUMIN SERPL BCP-MCNC: 4.3 G/DL (ref 3.5–5.2)
ALP SERPL-CCNC: 88 U/L (ref 55–135)
ALT SERPL W/O P-5'-P-CCNC: 32 U/L (ref 10–44)
ANION GAP SERPL CALC-SCNC: 12 MMOL/L (ref 8–16)
AST SERPL-CCNC: 35 U/L (ref 10–40)
BILIRUB SERPL-MCNC: 0.4 MG/DL (ref 0.1–1)
BUN SERPL-MCNC: 16 MG/DL (ref 8–23)
CALCIUM SERPL-MCNC: 10.8 MG/DL (ref 8.7–10.5)
CHLORIDE SERPL-SCNC: 104 MMOL/L (ref 95–110)
CO2 SERPL-SCNC: 26 MMOL/L (ref 23–29)
CREAT SERPL-MCNC: 0.8 MG/DL (ref 0.5–1.4)
EST. GFR  (AFRICAN AMERICAN): >60 ML/MIN/1.73 M^2
EST. GFR  (NON AFRICAN AMERICAN): >60 ML/MIN/1.73 M^2
GLUCOSE SERPL-MCNC: 119 MG/DL (ref 70–110)
POTASSIUM SERPL-SCNC: 3.4 MMOL/L (ref 3.5–5.1)
PROT SERPL-MCNC: 9 G/DL (ref 6–8.4)
SODIUM SERPL-SCNC: 142 MMOL/L (ref 136–145)

## 2021-07-29 PROCEDURE — 3078F DIAST BP <80 MM HG: CPT | Mod: CPTII,S$GLB,, | Performed by: INTERNAL MEDICINE

## 2021-07-29 PROCEDURE — 20610 LARGE JOINT ASPIRATION/INJECTION: R KNEE JOINT: ICD-10-PCS | Mod: RT,S$GLB,, | Performed by: INTERNAL MEDICINE

## 2021-07-29 PROCEDURE — 99999 PR PBB SHADOW E&M-EST. PATIENT-LVL V: ICD-10-PCS | Mod: PBBFAC,,, | Performed by: INTERNAL MEDICINE

## 2021-07-29 PROCEDURE — 20610 DRAIN/INJ JOINT/BURSA W/O US: CPT | Mod: RT,S$GLB,, | Performed by: INTERNAL MEDICINE

## 2021-07-29 PROCEDURE — 83520 IMMUNOASSAY QUANT NOS NONAB: CPT | Performed by: INTERNAL MEDICINE

## 2021-07-29 PROCEDURE — 3077F SYST BP >= 140 MM HG: CPT | Mod: CPTII,S$GLB,, | Performed by: INTERNAL MEDICINE

## 2021-07-29 PROCEDURE — 80053 COMPREHEN METABOLIC PANEL: CPT | Performed by: INTERNAL MEDICINE

## 2021-07-29 PROCEDURE — 3008F PR BODY MASS INDEX (BMI) DOCUMENTED: ICD-10-PCS | Mod: CPTII,S$GLB,, | Performed by: INTERNAL MEDICINE

## 2021-07-29 PROCEDURE — 1125F PR PAIN SEVERITY QUANTIFIED, PAIN PRESENT: ICD-10-PCS | Mod: CPTII,S$GLB,, | Performed by: INTERNAL MEDICINE

## 2021-07-29 PROCEDURE — 3044F HG A1C LEVEL LT 7.0%: CPT | Mod: CPTII,S$GLB,, | Performed by: INTERNAL MEDICINE

## 2021-07-29 PROCEDURE — 1159F MED LIST DOCD IN RCRD: CPT | Mod: CPTII,S$GLB,, | Performed by: INTERNAL MEDICINE

## 2021-07-29 PROCEDURE — 99999 PR PBB SHADOW E&M-EST. PATIENT-LVL V: CPT | Mod: PBBFAC,,, | Performed by: INTERNAL MEDICINE

## 2021-07-29 PROCEDURE — 99205 PR OFFICE/OUTPT VISIT, NEW, LEVL V, 60-74 MIN: ICD-10-PCS | Mod: 25,S$GLB,, | Performed by: INTERNAL MEDICINE

## 2021-07-29 PROCEDURE — 1101F PR PT FALLS ASSESS DOC 0-1 FALLS W/OUT INJ PAST YR: ICD-10-PCS | Mod: CPTII,S$GLB,, | Performed by: INTERNAL MEDICINE

## 2021-07-29 PROCEDURE — 3078F PR MOST RECENT DIASTOLIC BLOOD PRESSURE < 80 MM HG: ICD-10-PCS | Mod: CPTII,S$GLB,, | Performed by: INTERNAL MEDICINE

## 2021-07-29 PROCEDURE — 3044F PR MOST RECENT HEMOGLOBIN A1C LEVEL <7.0%: ICD-10-PCS | Mod: CPTII,S$GLB,, | Performed by: INTERNAL MEDICINE

## 2021-07-29 PROCEDURE — 36415 COLL VENOUS BLD VENIPUNCTURE: CPT | Performed by: INTERNAL MEDICINE

## 2021-07-29 PROCEDURE — 99205 OFFICE O/P NEW HI 60 MIN: CPT | Mod: 25,S$GLB,, | Performed by: INTERNAL MEDICINE

## 2021-07-29 PROCEDURE — 3077F PR MOST RECENT SYSTOLIC BLOOD PRESSURE >= 140 MM HG: ICD-10-PCS | Mod: CPTII,S$GLB,, | Performed by: INTERNAL MEDICINE

## 2021-07-29 PROCEDURE — 82164 ANGIOTENSIN I ENZYME TEST: CPT | Performed by: INTERNAL MEDICINE

## 2021-07-29 PROCEDURE — 3008F BODY MASS INDEX DOCD: CPT | Mod: CPTII,S$GLB,, | Performed by: INTERNAL MEDICINE

## 2021-07-29 PROCEDURE — 82652 VIT D 1 25-DIHYDROXY: CPT | Performed by: INTERNAL MEDICINE

## 2021-07-29 PROCEDURE — 1125F AMNT PAIN NOTED PAIN PRSNT: CPT | Mod: CPTII,S$GLB,, | Performed by: INTERNAL MEDICINE

## 2021-07-29 PROCEDURE — 3288F FALL RISK ASSESSMENT DOCD: CPT | Mod: CPTII,S$GLB,, | Performed by: INTERNAL MEDICINE

## 2021-07-29 PROCEDURE — 1159F PR MEDICATION LIST DOCUMENTED IN MEDICAL RECORD: ICD-10-PCS | Mod: CPTII,S$GLB,, | Performed by: INTERNAL MEDICINE

## 2021-07-29 PROCEDURE — 1101F PT FALLS ASSESS-DOCD LE1/YR: CPT | Mod: CPTII,S$GLB,, | Performed by: INTERNAL MEDICINE

## 2021-07-29 PROCEDURE — 3288F PR FALLS RISK ASSESSMENT DOCUMENTED: ICD-10-PCS | Mod: CPTII,S$GLB,, | Performed by: INTERNAL MEDICINE

## 2021-07-29 RX ORDER — TRIAMCINOLONE ACETONIDE 40 MG/ML
40 INJECTION, SUSPENSION INTRA-ARTICULAR; INTRAMUSCULAR
Status: DISCONTINUED | OUTPATIENT
Start: 2021-07-29 | End: 2021-07-29 | Stop reason: HOSPADM

## 2021-07-29 RX ADMIN — TRIAMCINOLONE ACETONIDE 40 MG: 40 INJECTION, SUSPENSION INTRA-ARTICULAR; INTRAMUSCULAR at 09:07

## 2021-07-31 LAB
ACE SERPL-CCNC: 102 U/L (ref 16–85)
SOL IL2 RECEP SERPL-MCNC: 580.2 PG/ML (ref 175.3–858.2)

## 2021-08-02 LAB — 1,25(OH)2D3 SERPL-MCNC: 80 PG/ML (ref 20–79)

## 2021-08-03 ENCOUNTER — TELEPHONE (OUTPATIENT)
Dept: RHEUMATOLOGY | Facility: CLINIC | Age: 66
End: 2021-08-03

## 2021-08-03 DIAGNOSIS — D86.1 SARCOIDOSIS OF LYMPH NODES: Primary | ICD-10-CM

## 2021-08-10 RX ORDER — HYDROXYCHLOROQUINE SULFATE 200 MG/1
200 TABLET, FILM COATED ORAL 2 TIMES DAILY
Qty: 60 TABLET | Refills: 3 | Status: SHIPPED | OUTPATIENT
Start: 2021-08-10 | End: 2021-12-01 | Stop reason: SDUPTHER

## 2021-11-01 ENCOUNTER — OFFICE VISIT (OUTPATIENT)
Dept: PULMONOLOGY | Facility: CLINIC | Age: 66
End: 2021-11-01
Payer: MEDICARE

## 2021-11-01 ENCOUNTER — CLINICAL SUPPORT (OUTPATIENT)
Dept: PULMONOLOGY | Facility: CLINIC | Age: 66
End: 2021-11-01
Payer: MEDICARE

## 2021-11-01 VITALS
SYSTOLIC BLOOD PRESSURE: 140 MMHG | HEART RATE: 96 BPM | RESPIRATION RATE: 17 BRPM | OXYGEN SATURATION: 96 % | HEIGHT: 66 IN | WEIGHT: 179.44 LBS | DIASTOLIC BLOOD PRESSURE: 90 MMHG | BODY MASS INDEX: 28.84 KG/M2

## 2021-11-01 DIAGNOSIS — D86.1 SARCOIDOSIS OF LYMPH NODES: ICD-10-CM

## 2021-11-01 DIAGNOSIS — R94.2 ABNORMAL DIFFUSION CAPACITY DETERMINED BY PULMONARY FUNCTION TEST: ICD-10-CM

## 2021-11-01 DIAGNOSIS — J30.9 ALLERGIC RHINITIS, UNSPECIFIED SEASONALITY, UNSPECIFIED TRIGGER: ICD-10-CM

## 2021-11-01 DIAGNOSIS — J98.4 CHRONIC RESTRICTIVE LUNG DISEASE: Primary | ICD-10-CM

## 2021-11-01 DIAGNOSIS — J98.4 CHRONIC RESTRICTIVE LUNG DISEASE: ICD-10-CM

## 2021-11-01 DIAGNOSIS — R06.2 SYMPTOM OF WHEEZING: ICD-10-CM

## 2021-11-01 PROCEDURE — 94010 BREATHING CAPACITY TEST: CPT | Mod: HCNC,S$GLB,, | Performed by: INTERNAL MEDICINE

## 2021-11-01 PROCEDURE — 1159F PR MEDICATION LIST DOCUMENTED IN MEDICAL RECORD: ICD-10-PCS | Mod: HCNC,CPTII,S$GLB, | Performed by: NURSE PRACTITIONER

## 2021-11-01 PROCEDURE — 3077F PR MOST RECENT SYSTOLIC BLOOD PRESSURE >= 140 MM HG: ICD-10-PCS | Mod: HCNC,CPTII,S$GLB, | Performed by: NURSE PRACTITIONER

## 2021-11-01 PROCEDURE — 3008F BODY MASS INDEX DOCD: CPT | Mod: HCNC,CPTII,S$GLB, | Performed by: NURSE PRACTITIONER

## 2021-11-01 PROCEDURE — 3077F SYST BP >= 140 MM HG: CPT | Mod: HCNC,CPTII,S$GLB, | Performed by: NURSE PRACTITIONER

## 2021-11-01 PROCEDURE — 3044F HG A1C LEVEL LT 7.0%: CPT | Mod: HCNC,CPTII,S$GLB, | Performed by: NURSE PRACTITIONER

## 2021-11-01 PROCEDURE — 1159F MED LIST DOCD IN RCRD: CPT | Mod: HCNC,CPTII,S$GLB, | Performed by: NURSE PRACTITIONER

## 2021-11-01 PROCEDURE — 99999 PR PBB SHADOW E&M-EST. PATIENT-LVL IV: CPT | Mod: PBBFAC,HCNC,, | Performed by: NURSE PRACTITIONER

## 2021-11-01 PROCEDURE — 1101F PR PT FALLS ASSESS DOC 0-1 FALLS W/OUT INJ PAST YR: ICD-10-PCS | Mod: HCNC,CPTII,S$GLB, | Performed by: NURSE PRACTITIONER

## 2021-11-01 PROCEDURE — 1160F PR REVIEW ALL MEDS BY PRESCRIBER/CLIN PHARMACIST DOCUMENTED: ICD-10-PCS | Mod: HCNC,CPTII,S$GLB, | Performed by: NURSE PRACTITIONER

## 2021-11-01 PROCEDURE — 94010 BREATHING CAPACITY TEST: ICD-10-PCS | Mod: HCNC,S$GLB,, | Performed by: INTERNAL MEDICINE

## 2021-11-01 PROCEDURE — 1101F PT FALLS ASSESS-DOCD LE1/YR: CPT | Mod: HCNC,CPTII,S$GLB, | Performed by: NURSE PRACTITIONER

## 2021-11-01 PROCEDURE — 3044F PR MOST RECENT HEMOGLOBIN A1C LEVEL <7.0%: ICD-10-PCS | Mod: HCNC,CPTII,S$GLB, | Performed by: NURSE PRACTITIONER

## 2021-11-01 PROCEDURE — 3080F PR MOST RECENT DIASTOLIC BLOOD PRESSURE >= 90 MM HG: ICD-10-PCS | Mod: HCNC,CPTII,S$GLB, | Performed by: NURSE PRACTITIONER

## 2021-11-01 PROCEDURE — 3288F PR FALLS RISK ASSESSMENT DOCUMENTED: ICD-10-PCS | Mod: HCNC,CPTII,S$GLB, | Performed by: NURSE PRACTITIONER

## 2021-11-01 PROCEDURE — 4010F PR ACE/ARB THEARPY RXD/TAKEN: ICD-10-PCS | Mod: HCNC,CPTII,S$GLB, | Performed by: NURSE PRACTITIONER

## 2021-11-01 PROCEDURE — 3288F FALL RISK ASSESSMENT DOCD: CPT | Mod: HCNC,CPTII,S$GLB, | Performed by: NURSE PRACTITIONER

## 2021-11-01 PROCEDURE — 99214 PR OFFICE/OUTPT VISIT, EST, LEVL IV, 30-39 MIN: ICD-10-PCS | Mod: 25,HCNC,S$GLB, | Performed by: NURSE PRACTITIONER

## 2021-11-01 PROCEDURE — 3080F DIAST BP >= 90 MM HG: CPT | Mod: HCNC,CPTII,S$GLB, | Performed by: NURSE PRACTITIONER

## 2021-11-01 PROCEDURE — 99214 OFFICE O/P EST MOD 30 MIN: CPT | Mod: 25,HCNC,S$GLB, | Performed by: NURSE PRACTITIONER

## 2021-11-01 PROCEDURE — 99999 PR PBB SHADOW E&M-EST. PATIENT-LVL IV: ICD-10-PCS | Mod: PBBFAC,HCNC,, | Performed by: NURSE PRACTITIONER

## 2021-11-01 PROCEDURE — 3008F PR BODY MASS INDEX (BMI) DOCUMENTED: ICD-10-PCS | Mod: HCNC,CPTII,S$GLB, | Performed by: NURSE PRACTITIONER

## 2021-11-01 PROCEDURE — 1160F RVW MEDS BY RX/DR IN RCRD: CPT | Mod: HCNC,CPTII,S$GLB, | Performed by: NURSE PRACTITIONER

## 2021-11-01 PROCEDURE — 4010F ACE/ARB THERAPY RXD/TAKEN: CPT | Mod: HCNC,CPTII,S$GLB, | Performed by: NURSE PRACTITIONER

## 2021-11-01 RX ORDER — FLUTICASONE PROPIONATE 50 MCG
2 SPRAY, SUSPENSION (ML) NASAL DAILY
Qty: 48 ML | Refills: 3 | Status: SHIPPED | OUTPATIENT
Start: 2021-11-01

## 2021-11-01 RX ORDER — LEVOCETIRIZINE DIHYDROCHLORIDE 5 MG/1
5 TABLET, FILM COATED ORAL NIGHTLY
Qty: 90 TABLET | Refills: 3 | Status: SHIPPED | OUTPATIENT
Start: 2021-11-01 | End: 2021-12-09

## 2021-11-03 LAB
BRPFT: ABNORMAL
FEF 25 75 LLN: 0.7
FEF 25 75 PRE REF: 87.2 %
FEF 25 75 REF: 1.8
FEV1 FVC LLN: 66
FEV1 FVC PRE REF: 103.5 %
FEV1 FVC REF: 79
FEV1 LLN: 1.48
FEV1 PRE REF: 66.5 %
FEV1 REF: 2.1
FVC LLN: 1.92
FVC PRE REF: 63.8 %
FVC REF: 2.69
PEF LLN: 3.31
PEF PRE REF: 106.1 %
PEF REF: 5.45
PRE FEF 25 75: 1.57 L/S (ref 0.7–2.9)
PRE FET 100: 7.32 SEC
PRE FEV1 FVC: 81.32 % (ref 65.96–91.13)
PRE FEV1: 1.4 L (ref 1.48–2.72)
PRE FVC: 1.72 L (ref 1.92–3.46)
PRE PEF: 5.78 L/S (ref 3.31–7.59)

## 2021-11-10 DIAGNOSIS — M25.50 MULTIPLE JOINT PAIN: ICD-10-CM

## 2021-11-10 DIAGNOSIS — M19.90 ARTHRITIS: ICD-10-CM

## 2021-11-10 RX ORDER — PRAVASTATIN SODIUM 20 MG/1
TABLET ORAL
Qty: 90 TABLET | Refills: 0 | Status: SHIPPED | OUTPATIENT
Start: 2021-11-10 | End: 2021-12-07 | Stop reason: SDUPTHER

## 2021-11-10 RX ORDER — TRAZODONE HYDROCHLORIDE 50 MG/1
TABLET ORAL
Qty: 90 TABLET | Refills: 1 | Status: SHIPPED | OUTPATIENT
Start: 2021-11-10 | End: 2021-12-07 | Stop reason: SDUPTHER

## 2021-11-10 RX ORDER — TRAMADOL HYDROCHLORIDE 50 MG/1
TABLET ORAL
Qty: 12 TABLET | OUTPATIENT
Start: 2021-11-10

## 2021-11-10 RX ORDER — AMLODIPINE BESYLATE 10 MG/1
TABLET ORAL
Qty: 90 TABLET | Refills: 0 | Status: SHIPPED | OUTPATIENT
Start: 2021-11-10 | End: 2021-12-07

## 2021-11-10 RX ORDER — CYCLOBENZAPRINE HCL 10 MG
TABLET ORAL
Qty: 30 TABLET | Refills: 0 | Status: SHIPPED | OUTPATIENT
Start: 2021-11-10 | End: 2022-06-24

## 2021-11-27 ENCOUNTER — PATIENT OUTREACH (OUTPATIENT)
Dept: ADMINISTRATIVE | Facility: OTHER | Age: 66
End: 2021-11-27
Payer: MEDICARE

## 2021-11-27 DIAGNOSIS — E11.9 DIABETES MELLITUS WITHOUT COMPLICATION: Primary | ICD-10-CM

## 2021-12-01 ENCOUNTER — OFFICE VISIT (OUTPATIENT)
Dept: RHEUMATOLOGY | Facility: CLINIC | Age: 66
End: 2021-12-01
Payer: MEDICARE

## 2021-12-01 ENCOUNTER — LAB VISIT (OUTPATIENT)
Dept: LAB | Facility: HOSPITAL | Age: 66
End: 2021-12-01
Attending: INTERNAL MEDICINE
Payer: MEDICARE

## 2021-12-01 VITALS
DIASTOLIC BLOOD PRESSURE: 65 MMHG | SYSTOLIC BLOOD PRESSURE: 138 MMHG | WEIGHT: 182.75 LBS | HEIGHT: 66 IN | HEART RATE: 51 BPM | BODY MASS INDEX: 29.37 KG/M2

## 2021-12-01 DIAGNOSIS — D86.1 SARCOIDOSIS OF LYMPH NODES: ICD-10-CM

## 2021-12-01 DIAGNOSIS — D86.1 SARCOIDOSIS OF LYMPH NODES: Primary | ICD-10-CM

## 2021-12-01 DIAGNOSIS — M15.9 PRIMARY OSTEOARTHRITIS INVOLVING MULTIPLE JOINTS: ICD-10-CM

## 2021-12-01 DIAGNOSIS — Z71.89 COUNSELING ON HEALTH PROMOTION AND DISEASE PREVENTION: ICD-10-CM

## 2021-12-01 LAB
ALBUMIN SERPL BCP-MCNC: 4.2 G/DL (ref 3.5–5.2)
ALP SERPL-CCNC: 88 U/L (ref 55–135)
ALT SERPL W/O P-5'-P-CCNC: 24 U/L (ref 10–44)
ANION GAP SERPL CALC-SCNC: 14 MMOL/L (ref 8–16)
AST SERPL-CCNC: 35 U/L (ref 10–40)
BILIRUB SERPL-MCNC: 0.3 MG/DL (ref 0.1–1)
BUN SERPL-MCNC: 22 MG/DL (ref 8–23)
CALCIUM SERPL-MCNC: 10.2 MG/DL (ref 8.7–10.5)
CHLORIDE SERPL-SCNC: 101 MMOL/L (ref 95–110)
CO2 SERPL-SCNC: 24 MMOL/L (ref 23–29)
CREAT SERPL-MCNC: 1.1 MG/DL (ref 0.5–1.4)
EST. GFR  (AFRICAN AMERICAN): >60 ML/MIN/1.73 M^2
EST. GFR  (NON AFRICAN AMERICAN): 52 ML/MIN/1.73 M^2
GLUCOSE SERPL-MCNC: 124 MG/DL (ref 70–110)
POTASSIUM SERPL-SCNC: 3.6 MMOL/L (ref 3.5–5.1)
PROT SERPL-MCNC: 8.7 G/DL (ref 6–8.4)
SODIUM SERPL-SCNC: 139 MMOL/L (ref 136–145)

## 2021-12-01 PROCEDURE — 99999 PR PBB SHADOW E&M-EST. PATIENT-LVL IV: ICD-10-PCS | Mod: PBBFAC,HCNC,, | Performed by: INTERNAL MEDICINE

## 2021-12-01 PROCEDURE — 99214 PR OFFICE/OUTPT VISIT, EST, LEVL IV, 30-39 MIN: ICD-10-PCS | Mod: HCNC,25,S$GLB, | Performed by: INTERNAL MEDICINE

## 2021-12-01 PROCEDURE — 20610 LARGE JOINT ASPIRATION/INJECTION: R KNEE: ICD-10-PCS | Mod: HCNC,RT,S$GLB, | Performed by: INTERNAL MEDICINE

## 2021-12-01 PROCEDURE — 82164 ANGIOTENSIN I ENZYME TEST: CPT | Mod: HCNC | Performed by: INTERNAL MEDICINE

## 2021-12-01 PROCEDURE — 99999 PR PBB SHADOW E&M-EST. PATIENT-LVL IV: CPT | Mod: PBBFAC,HCNC,, | Performed by: INTERNAL MEDICINE

## 2021-12-01 PROCEDURE — 80053 COMPREHEN METABOLIC PANEL: CPT | Mod: HCNC | Performed by: INTERNAL MEDICINE

## 2021-12-01 PROCEDURE — 99214 OFFICE O/P EST MOD 30 MIN: CPT | Mod: HCNC,25,S$GLB, | Performed by: INTERNAL MEDICINE

## 2021-12-01 PROCEDURE — 4010F ACE/ARB THERAPY RXD/TAKEN: CPT | Mod: HCNC,CPTII,S$GLB, | Performed by: INTERNAL MEDICINE

## 2021-12-01 PROCEDURE — 20610 DRAIN/INJ JOINT/BURSA W/O US: CPT | Mod: HCNC,RT,S$GLB, | Performed by: INTERNAL MEDICINE

## 2021-12-01 PROCEDURE — 99499 RISK ADDL DX/OHS AUDIT: ICD-10-PCS | Mod: S$GLB,,, | Performed by: INTERNAL MEDICINE

## 2021-12-01 PROCEDURE — 99499 UNLISTED E&M SERVICE: CPT | Mod: S$GLB,,, | Performed by: INTERNAL MEDICINE

## 2021-12-01 PROCEDURE — 4010F PR ACE/ARB THEARPY RXD/TAKEN: ICD-10-PCS | Mod: HCNC,CPTII,S$GLB, | Performed by: INTERNAL MEDICINE

## 2021-12-01 RX ORDER — TRIAMCINOLONE ACETONIDE 40 MG/ML
40 INJECTION, SUSPENSION INTRA-ARTICULAR; INTRAMUSCULAR
Status: DISCONTINUED | OUTPATIENT
Start: 2021-12-01 | End: 2021-12-01 | Stop reason: HOSPADM

## 2021-12-01 RX ORDER — HYDROXYCHLOROQUINE SULFATE 200 MG/1
200 TABLET, FILM COATED ORAL 2 TIMES DAILY
Qty: 60 TABLET | Refills: 3 | Status: SHIPPED | OUTPATIENT
Start: 2021-12-01 | End: 2022-06-15

## 2021-12-01 RX ADMIN — TRIAMCINOLONE ACETONIDE 40 MG: 40 INJECTION, SUSPENSION INTRA-ARTICULAR; INTRAMUSCULAR at 03:12

## 2021-12-03 LAB — ACE SERPL-CCNC: 78 U/L (ref 16–85)

## 2021-12-07 DIAGNOSIS — J30.9 ALLERGIC RHINITIS, UNSPECIFIED SEASONALITY, UNSPECIFIED TRIGGER: ICD-10-CM

## 2021-12-07 RX ORDER — LOSARTAN POTASSIUM 100 MG/1
100 TABLET ORAL DAILY
Qty: 90 TABLET | Refills: 2 | Status: SHIPPED | OUTPATIENT
Start: 2021-12-07 | End: 2022-06-24

## 2021-12-07 RX ORDER — TRAZODONE HYDROCHLORIDE 50 MG/1
50 TABLET ORAL NIGHTLY
Qty: 90 TABLET | Refills: 1 | Status: SHIPPED | OUTPATIENT
Start: 2021-12-07 | End: 2022-09-08 | Stop reason: SDUPTHER

## 2021-12-07 RX ORDER — AMLODIPINE BESYLATE 10 MG/1
10 TABLET ORAL DAILY
Qty: 90 TABLET | Refills: 0 | Status: SHIPPED | OUTPATIENT
Start: 2021-12-07 | End: 2022-03-10

## 2021-12-07 RX ORDER — PRAVASTATIN SODIUM 20 MG/1
20 TABLET ORAL DAILY
Qty: 90 TABLET | Refills: 0 | Status: SHIPPED | OUTPATIENT
Start: 2021-12-07 | End: 2022-03-10

## 2021-12-09 RX ORDER — LEVOCETIRIZINE DIHYDROCHLORIDE 5 MG/1
5 TABLET, FILM COATED ORAL NIGHTLY
Qty: 90 TABLET | Refills: 0 | Status: SHIPPED | OUTPATIENT
Start: 2021-12-09 | End: 2022-09-08 | Stop reason: SDUPTHER

## 2022-01-19 DIAGNOSIS — E11.9 TYPE 2 DIABETES MELLITUS WITHOUT COMPLICATION: ICD-10-CM

## 2022-02-08 ENCOUNTER — TELEPHONE (OUTPATIENT)
Dept: FAMILY MEDICINE | Facility: CLINIC | Age: 67
End: 2022-02-08
Payer: MEDICARE

## 2022-02-08 DIAGNOSIS — Z12.31 ENCOUNTER FOR SCREENING MAMMOGRAM FOR MALIGNANT NEOPLASM OF BREAST: Primary | ICD-10-CM

## 2022-02-08 NOTE — TELEPHONE ENCOUNTER
----- Message from Laila Clark sent at 2/8/2022 10:29 AM CST -----  Contact: SELF/[phone]  Patient is requesting a order for a Mammo, please call back at [phone]. thanks/AR

## 2022-03-07 DIAGNOSIS — Z12.39 ENCOUNTER FOR SCREENING FOR MALIGNANT NEOPLASM OF BREAST, UNSPECIFIED SCREENING MODALITY: Primary | ICD-10-CM

## 2022-03-07 DIAGNOSIS — Z12.31 ENCOUNTER FOR SCREENING MAMMOGRAM FOR MALIGNANT NEOPLASM OF BREAST: ICD-10-CM

## 2022-03-08 NOTE — TELEPHONE ENCOUNTER
Care Due:                  Date            Visit Type   Department     Provider  --------------------------------------------------------------------------------                                EP -                              PRIMARY      [facility] INTERNAL  Chiquita MARK  Last Visit: 05-      CARE ([facility])   MEDICINE       Mayank  Next Visit: None Scheduled  None         None Found                                                            Last  Test          Frequency    Reason                     Performed    Due Date  --------------------------------------------------------------------------------    Office Visit  12 months..  amLODIPine,                05- 05-                             levocetirizine, losartan,                             metFORMIN, pravastatin,                             traZODone................    HBA1C.......  6 months...  metFORMIN................  05-   11-    Lipid Panel.  12 months..  pravastatin..............  05- 05-    Powered by [employer]. Reference number: [id number].   3/08/2022 10:26:02 AM CST

## 2022-03-10 RX ORDER — AMLODIPINE BESYLATE 10 MG/1
TABLET ORAL
Qty: 90 TABLET | Refills: 0 | Status: SHIPPED | OUTPATIENT
Start: 2022-03-10 | End: 2022-04-29

## 2022-03-10 RX ORDER — PRAVASTATIN SODIUM 20 MG/1
TABLET ORAL
Qty: 90 TABLET | Refills: 0 | Status: SHIPPED | OUTPATIENT
Start: 2022-03-10 | End: 2022-05-23

## 2022-03-10 RX ORDER — AMLODIPINE BESYLATE 10 MG/1
TABLET ORAL
Qty: 90 TABLET | Refills: 0 | OUTPATIENT
Start: 2022-03-10

## 2022-03-10 NOTE — TELEPHONE ENCOUNTER
No new care gaps identified.  Powered by [employer]. Reference number: [id number].   3/09/2022 9:22:28 PM CST

## 2022-03-10 NOTE — TELEPHONE ENCOUNTER
Encounter details require adjustment(s)/ updating by [facility]C Staff  As of this time Protocols and CDM: did not populate or display   Adjustment(s) made: Department  CDM should display. Medication(s) delegated by the OR.  Will resend refill request encounter to P Centralized Refill Staff Pool.   [facility]   Note composed:9:21 PM 03/09/2022

## 2022-03-10 NOTE — TELEPHONE ENCOUNTER
Quick DC. Request already responded to by other means (e.g. phone or fax)   Refill Authorization Note   Anne Farmer  is requesting a refill authorization.  Brief Assessment and Rationale for Refill:  Quick Discontinue  Medication Therapy Plan:       Medication Reconciliation Completed:  No      Comments:   Pended Medication(s)       Requested Prescriptions     Pending Prescriptions Disp Refills    amLODIPine (NORVASC) 10 MG tablet [Pharmacy Med Name: AMLODIPINE BESYLATE 10 MG TAB] 90 tablet 0     Sig: TAKE 1 TABLET BY MOUTH EVERY DAY        Duplicate Pended Encounter(s)/ Last Prescribed Details: (includes pharmacy & prescriber details)   Ordering Encounter Report    Associated Reports   View Encounter                Note composed:9:55 AM 03/10/2022

## 2022-03-11 DIAGNOSIS — M25.50 MULTIPLE JOINT PAIN: ICD-10-CM

## 2022-03-11 DIAGNOSIS — M19.90 ARTHRITIS: ICD-10-CM

## 2022-03-11 RX ORDER — CYCLOBENZAPRINE HCL 10 MG
TABLET ORAL
Qty: 30 TABLET | Refills: 0 | OUTPATIENT
Start: 2022-03-11

## 2022-03-11 NOTE — TELEPHONE ENCOUNTER
No new care gaps identified.  Powered by [employer]. Reference number: [id number].   3/11/2022 9:08:10 AM CST

## 2022-04-27 ENCOUNTER — HOSPITAL ENCOUNTER (OUTPATIENT)
Dept: RADIOLOGY | Facility: HOSPITAL | Age: 67
Discharge: HOME | End: 2022-04-27
Attending: FAMILY MEDICINE
Payer: MEDICARE

## 2022-04-27 DIAGNOSIS — Z12.31 ENCOUNTER FOR SCREENING MAMMOGRAM FOR MALIGNANT NEOPLASM OF BREAST: ICD-10-CM

## 2022-04-27 DIAGNOSIS — Z12.39 ENCOUNTER FOR SCREENING FOR MALIGNANT NEOPLASM OF BREAST, UNSPECIFIED SCREENING MODALITY: ICD-10-CM

## 2022-04-27 PROCEDURE — 77067 SCR MAMMO BI INCL CAD: CPT | Mod: TC

## 2022-04-27 PROCEDURE — 77063 BREAST TOMOSYNTHESIS BI: CPT | Mod: 26,,, | Performed by: RADIOLOGY

## 2022-04-27 PROCEDURE — 77067 SCR MAMMO BI INCL CAD: CPT | Mod: 26,,, | Performed by: RADIOLOGY

## 2022-04-27 PROCEDURE — 77063 MAMMO DIGITAL SCREENING BILAT WITH TOMO: ICD-10-PCS | Mod: 26,,, | Performed by: RADIOLOGY

## 2022-04-27 PROCEDURE — 77067 MAMMO DIGITAL SCREENING BILAT WITH TOMO: ICD-10-PCS | Mod: 26,,, | Performed by: RADIOLOGY

## 2022-04-28 ENCOUNTER — TELEPHONE (OUTPATIENT)
Dept: FAMILY MEDICINE | Facility: CLINIC | Age: 67
End: 2022-04-28
Payer: MEDICARE

## 2022-04-28 DIAGNOSIS — N63.0 BREAST MASS IN FEMALE: Primary | ICD-10-CM

## 2022-04-28 DIAGNOSIS — R92.2 INCONCLUSIVE MAMMOGRAM: ICD-10-CM

## 2022-04-29 ENCOUNTER — HOSPITAL ENCOUNTER (EMERGENCY)
Facility: HOSPITAL | Age: 67
Discharge: HOME | End: 2022-04-29
Attending: EMERGENCY MEDICINE
Payer: MEDICARE

## 2022-04-29 VITALS
RESPIRATION RATE: 20 BRPM | SYSTOLIC BLOOD PRESSURE: 161 MMHG | TEMPERATURE: 98 F | OXYGEN SATURATION: 95 % | HEIGHT: 65 IN | BODY MASS INDEX: 30.45 KG/M2 | DIASTOLIC BLOOD PRESSURE: 63 MMHG | HEART RATE: 79 BPM | WEIGHT: 182.75 LBS

## 2022-04-29 DIAGNOSIS — M25.561 ACUTE PAIN OF RIGHT KNEE: Primary | ICD-10-CM

## 2022-04-29 PROCEDURE — 99283 EMERGENCY DEPT VISIT LOW MDM: CPT

## 2022-04-29 RX ORDER — HYDROCODONE BITARTRATE AND ACETAMINOPHEN 7.5; 325 MG/1; MG/1
1 TABLET ORAL EVERY 6 HOURS PRN
Qty: 12 TABLET | Refills: 0 | Status: SHIPPED | OUTPATIENT
Start: 2022-04-29 | End: 2022-09-08

## 2022-04-29 NOTE — ED PROVIDER NOTES
Encounter Date: 2022       History     Chief Complaint   Patient presents with    Knee Pain     Right knee pain x 2 weeks      The history is provided by the patient.   66-year-old female presents emergency department with complaints of right knee pain x2 weeks.  Patient states she has a history of chronic knee pain and has to get frequent steroid injections in the knee.  Patient denies any injury, fever, chills, nausea/vomiting or any other symptoms at this time.      Review of patient's allergies indicates:  No Known Allergies  Past Medical History:   Diagnosis Date    Allergy     Arthritis     Cancer 2016    colon    CHF (congestive heart failure)     Colon cancer 2004    Colon cancer screening 2015    Diabetes mellitus type 2 in nonobese 3/9/2016    borderline    Diverticulosis     DM (diabetes mellitus) 2016    BS didn't check 2019    DM (diabetes mellitus) 2016    BS didn't check 2020    Hyperlipidemia 10/25/2016    Hypertension     Insomnia      Past Surgical History:   Procedure Laterality Date     SECTION      COLON SURGERY      COLONOSCOPY N/A 2015    Procedure: COLONOSCOPY;  Surgeon: Adela Sam MD;  Location: [facility];  Service: Endoscopy;  Laterality: N/A;    COLONOSCOPY N/A 2017    Procedure: COLONOSCOPY;  Surgeon: Chintan Flores MD;  Location: [facility];  Service: Endoscopy;  Laterality: N/A;    COLONOSCOPY N/A 2020    Procedure: COLONOSCOPY;  Surgeon: Grisel Atkins MD;  Location: [facility];  Service: Endoscopy;  Laterality: N/A;     Family History   Problem Relation Age of Onset    Hypertension Mother     Diabetes Mother     Hypertension Father     Hypertension Sister     Hypertension Brother     Hypertension Sister     Hypertension Sister     Hypertension Sister     Hypertension Brother     Hypertension Brother      Social History     Tobacco Use    Smoking status: Never Smoker    Smokeless tobacco: Never Used    Substance Use Topics    Alcohol use: Yes     Alcohol/week: 0.0 standard drinks     Comment: weekends.       Drug use: No     Review of Systems   Constitutional: Negative for fever.   HENT: Negative for sore throat.    Respiratory: Negative for shortness of breath.    Cardiovascular: Negative for chest pain.   Gastrointestinal: Negative for nausea.   Genitourinary: Negative for dysuria.   Musculoskeletal: Positive for arthralgias. Negative for back pain.        +right knee pain   Skin: Negative for rash.   Neurological: Negative for weakness.   Hematological: Does not bruise/bleed easily.   All other systems reviewed and are negative.      Physical Exam     Initial Vitals [04/29/22 1521]   BP Pulse Resp Temp SpO2   (!) 161/63 79 20 98.2 °F (36.8 °C) 95 %      MAP       --         Physical Exam    Constitutional: She appears well-developed and well-nourished. She is not diaphoretic. No distress.   HENT:   Head: Normocephalic and atraumatic.   Eyes: Conjunctivae and EOM are normal. Pupils are equal, round, and reactive to light.   Neck: Neck supple.   Normal range of motion.  Cardiovascular: Normal rate, regular rhythm and normal heart sounds.   No murmur heard.  Pulmonary/Chest: Breath sounds normal. No respiratory distress. She has no wheezes. She has no rales.   Abdominal: Abdomen is soft. Bowel sounds are normal. There is no abdominal tenderness. There is no rebound and no guarding.   Musculoskeletal:         General: No edema. Normal range of motion.      Cervical back: Normal range of motion and neck supple.      Right knee: No swelling or crepitus. Normal range of motion. Tenderness present over the medial joint line and lateral joint line. Normal alignment.     Neurological: She is alert and oriented to person, place, and time. No cranial nerve deficit. GCS score is 15. GCS eye subscore is 4. GCS verbal subscore is 5. GCS motor subscore is 6.   Skin: Skin is warm and dry. Capillary refill takes less than 2  seconds.   Psychiatric: She has a normal mood and affect. Thought content normal.         ED Course   Procedures  Labs Reviewed - No data to display       Imaging Results    None          Medications - No data to display       I discussed with patient and/or family/caretaker that negative X-ray does not rule out occult fracture or other soft tissue injury.  Persistent pain greater than 7-10 days or increased pain requires follow up, specifically with orthopedics.                  Clinical Impression:   Final diagnoses:  [M25.561] Acute pain of right knee (Primary)          ED Disposition Condition    Discharge Stable        ED Prescriptions     Medication Sig Dispense Start Date End Date Auth. Provider    HYDROcodone-acetaminophen (NORCO) 7.5-325 mg per tablet Take 1 tablet by mouth every 6 (six) hours as needed for Pain. 12 tablet 4/29/2022  Andres Chauhan Jr., ELVIRA        Follow-up Information     Follow up With Specialties Details Why Contact Info    Chiquita Talley MD Family Medicine In 1 week  [address] [facility] [address]  [phone]             Andres Chauhan Jr., ELVIRA  04/29/22 4623

## 2022-04-29 NOTE — Clinical Note
"Anne Miller" Darian was seen and treated in our emergency department on 4/29/2022.  She may return to work on 05/02/2022.       If you have any questions or concerns, please don't hesitate to call.      Andres Chauhan Jr., DIANAP"

## 2022-05-02 ENCOUNTER — HOSPITAL ENCOUNTER (OUTPATIENT)
Dept: RADIOLOGY | Facility: HOSPITAL | Age: 67
Discharge: HOME | End: 2022-05-02
Attending: FAMILY MEDICINE
Payer: MEDICARE

## 2022-05-02 ENCOUNTER — TELEPHONE (OUTPATIENT)
Dept: FAMILY MEDICINE | Facility: CLINIC | Age: 67
End: 2022-05-02
Payer: MEDICARE

## 2022-05-02 DIAGNOSIS — N63.0 BREAST MASS: Primary | ICD-10-CM

## 2022-05-02 DIAGNOSIS — R92.8 ABNORMAL MAMMOGRAM: ICD-10-CM

## 2022-05-02 PROCEDURE — 77065 MAMMO DIGITAL DIAGNOSTIC LEFT WITH TOMO: ICD-10-PCS | Mod: 26,LT,, | Performed by: RADIOLOGY

## 2022-05-02 PROCEDURE — 77061 BREAST TOMOSYNTHESIS UNI: CPT | Mod: 26,LT,, | Performed by: RADIOLOGY

## 2022-05-02 PROCEDURE — 77065 DX MAMMO INCL CAD UNI: CPT | Mod: 26,LT,, | Performed by: RADIOLOGY

## 2022-05-02 PROCEDURE — 77061 MAMMO DIGITAL DIAGNOSTIC LEFT WITH TOMO: ICD-10-PCS | Mod: 26,LT,, | Performed by: RADIOLOGY

## 2022-05-02 PROCEDURE — 76642 ULTRASOUND BREAST LIMITED: CPT | Mod: TC,LT

## 2022-05-02 PROCEDURE — 77065 DX MAMMO INCL CAD UNI: CPT | Mod: TC,LT

## 2022-05-02 PROCEDURE — 76642 ULTRASOUND BREAST LIMITED: CPT | Mod: 26,LT,, | Performed by: RADIOLOGY

## 2022-05-02 PROCEDURE — 76642 US BREAST LEFT LIMITED: ICD-10-PCS | Mod: 26,LT,, | Performed by: RADIOLOGY

## 2022-05-03 ENCOUNTER — TELEPHONE (OUTPATIENT)
Dept: RADIOLOGY | Facility: HOSPITAL | Age: 67
End: 2022-05-03
Payer: MEDICARE

## 2022-05-04 ENCOUNTER — PATIENT OUTREACH (OUTPATIENT)
Dept: ADMINISTRATIVE | Facility: OTHER | Age: 67
End: 2022-05-04
Payer: MEDICARE

## 2022-05-05 ENCOUNTER — OFFICE VISIT (OUTPATIENT)
Dept: SURGERY | Facility: CLINIC | Age: 67
End: 2022-05-05
Attending: FAMILY MEDICINE
Payer: MEDICARE

## 2022-05-05 VITALS — WEIGHT: 180.13 LBS | BODY MASS INDEX: 30.01 KG/M2 | HEIGHT: 65 IN | TEMPERATURE: 99 F

## 2022-05-05 DIAGNOSIS — R92.8 ABNORMAL MAMMOGRAPHY: Primary | ICD-10-CM

## 2022-05-05 DIAGNOSIS — N63.0 BREAST MASS: ICD-10-CM

## 2022-05-05 PROCEDURE — 1126F AMNT PAIN NOTED NONE PRSNT: CPT | Mod: CPTII,S$GLB,, | Performed by: SURGERY

## 2022-05-05 PROCEDURE — 3072F PR LOW RISK FOR RETINOPATHY: ICD-10-PCS | Mod: CPTII,S$GLB,, | Performed by: SURGERY

## 2022-05-05 PROCEDURE — 1160F PR REVIEW ALL MEDS BY PRESCRIBER/CLIN PHARMACIST DOCUMENTED: ICD-10-PCS | Mod: CPTII,S$GLB,, | Performed by: SURGERY

## 2022-05-05 PROCEDURE — 99999 PR PBB SHADOW E&M-EST. PATIENT-LVL V: CPT | Mod: PBBFAC,,, | Performed by: SURGERY

## 2022-05-05 PROCEDURE — 99999 PR PBB SHADOW E&M-EST. PATIENT-LVL V: ICD-10-PCS | Mod: PBBFAC,,, | Performed by: SURGERY

## 2022-05-05 PROCEDURE — 99204 PR OFFICE/OUTPT VISIT, NEW, LEVL IV, 45-59 MIN: ICD-10-PCS | Mod: S$GLB,,, | Performed by: SURGERY

## 2022-05-05 PROCEDURE — 1159F PR MEDICATION LIST DOCUMENTED IN MEDICAL RECORD: ICD-10-PCS | Mod: CPTII,S$GLB,, | Performed by: SURGERY

## 2022-05-05 PROCEDURE — 3008F PR BODY MASS INDEX (BMI) DOCUMENTED: ICD-10-PCS | Mod: CPTII,S$GLB,, | Performed by: SURGERY

## 2022-05-05 PROCEDURE — 3072F LOW RISK FOR RETINOPATHY: CPT | Mod: CPTII,S$GLB,, | Performed by: SURGERY

## 2022-05-05 PROCEDURE — 99204 OFFICE O/P NEW MOD 45 MIN: CPT | Mod: S$GLB,,, | Performed by: SURGERY

## 2022-05-05 PROCEDURE — 1160F RVW MEDS BY RX/DR IN RCRD: CPT | Mod: CPTII,S$GLB,, | Performed by: SURGERY

## 2022-05-05 PROCEDURE — 1159F MED LIST DOCD IN RCRD: CPT | Mod: CPTII,S$GLB,, | Performed by: SURGERY

## 2022-05-05 PROCEDURE — 1126F PR PAIN SEVERITY QUANTIFIED, NO PAIN PRESENT: ICD-10-PCS | Mod: CPTII,S$GLB,, | Performed by: SURGERY

## 2022-05-05 PROCEDURE — 3008F BODY MASS INDEX DOCD: CPT | Mod: CPTII,S$GLB,, | Performed by: SURGERY

## 2022-05-05 NOTE — PATIENT INSTRUCTIONS
I will call you with the results of your breast biopsies soon as they are available to me.    If you have any questions or concerns please call the office    Our office phone numbers are  [phone] and

## 2022-05-05 NOTE — PROGRESS NOTES
Patient ID: Anne Farmer is a 66 y.o. female.    Abnormal mammogram    Chief Complaint: Consult (Left breast mass)      HPI:  Patient presents for findings of 2 small masses seen on mammogram and ultrasound done for breast cancer screening.  She denies any palpable masses.  She denies any breast changes.  There is no family history of breast cancer      Review of Systems   Constitutional: Negative for appetite change, chills, fatigue, fever and unexpected weight change.   HENT: Negative for hearing loss and rhinorrhea.    Eyes: Negative for visual disturbance.   Respiratory: Negative for apnea, cough, shortness of breath and wheezing.    Cardiovascular: Negative for chest pain and palpitations.   Gastrointestinal: Negative for abdominal distention, abdominal pain, blood in stool, constipation, diarrhea, nausea and vomiting.   Genitourinary: Negative for dysuria, frequency and urgency.   Musculoskeletal: Negative for arthralgias and neck pain.   Skin: Negative for rash.        No breast masses or changes   Neurological: Negative for seizures, weakness, numbness and headaches.   Hematological: Negative for adenopathy. Does not bruise/bleed easily.   Psychiatric/Behavioral: Negative for hallucinations. The patient is not nervous/anxious.        Current Outpatient Medications   Medication Sig Dispense Refill    albuterol (PROVENTIL/VENTOLIN HFA) 90 mcg/actuation inhaler Inhale 1-2 puffs into the lungs every 6 (six) hours as needed for Wheezing or Shortness of Breath. 18 g 3    amLODIPine (NORVASC) 10 MG tablet TAKE 1 TABLET BY MOUTH EVERY DAY 90 tablet 0    blood sugar diagnostic Strp 1 strip by Misc.(Non-Drug; Combo Route) route once daily. One Touch Ultra 180 each 0    blood-glucose meter kit Use to check blood sugar once daily.  One Touch Ultra 1 each 0    cyclobenzaprine (FLEXERIL) 10 MG tablet TAKE 1 TABLET BY MOUTH THREE TIMES A DAY AS NEEDED 30 tablet 0    diclofenac sodium (VOLTAREN) 1 % Gel APPLY  2 GRAMS TOPICALLY 2 (TWO) TIMES DAILY AS NEEDED. 100 g 2    fluticasone propionate (FLONASE) 50 mcg/actuation nasal spray 2 sprays (100 mcg total) by Each Nostril route once daily. 48 mL 3    FLUZONE HIGHDOSE QUAD 20-21  mcg/0.7 mL Syrg PHARMACY ADMINISTERED      glycopyrrolate-formoteroL (BEVESPI AEROSPHERE) 9-4.8 mcg HFAA Inhale 2 puffs into the lungs 2 (two) times daily. Controller 10.9 g 11    hydroCHLOROthiazide (HYDRODIURIL) 25 MG tablet TAKE 1 TABLET BY MOUTH EVERY DAY 90 tablet 3    HYDROcodone-acetaminophen (NORCO) 7.5-325 mg per tablet Take 1 tablet by mouth every 6 (six) hours as needed for Pain. 12 tablet 0    hydrOXYchloroQUINE (PLAQUENIL) 200 mg tablet Take 1 tablet (200 mg total) by mouth 2 (two) times daily. 60 tablet 3    ketorolac 0.5% (ACULAR) 0.5 % Drop PLACE 1 DROP INTO THE RIGHT EYE 4 (FOUR) TIMES DAILY. EYEDROPS TO START ONE DAY BEFORE SURGERY 5 mL 2    lancets Misc 1 lancet by Misc.(Non-Drug; Combo Route) route once daily. One Touch Delica 180 each 0    levocetirizine (XYZAL) 5 MG tablet Take 1 tablet (5 mg total) by mouth every evening. 90 tablet 0    losartan (COZAAR) 100 MG tablet Take 1 tablet (100 mg total) by mouth once daily. 90 tablet 2    metFORMIN (GLUCOPHAGE-XR) 750 MG ER 24hr tablet TAKE 1 TABLET (750 MG TOTAL) BY MOUTH ONCE DAILY. 7 tablet 0    moxifloxacin (VIGAMOX) 0.5 % ophthalmic solution Place 1 drop into the right eye every 12 (twelve) hours. Start eyedrops one day before surgery 5 mL 2    multivitamin (ONE DAILY MULTIVITAMIN) per tablet Take 1 tablet by mouth once daily.      omeprazole (PRILOSEC) 20 MG capsule TAKE 1 CAPSULE BY MOUTH EVERY DAY 90 capsule 3    pravastatin (PRAVACHOL) 20 MG tablet TAKE 1 TABLET EVERY DAY 90 tablet 0    prednisoLONE acetate (PRED FORTE) 1 % DrpS PLACE 1 DROP INTO THE RIGHT EYE 4 (FOUR) TIMES DAILY. START ONE DAY BEFORE SURGERY 5 mL 2    traMADoL (ULTRAM) 50 mg tablet TAKE ONE TABLET po tid prn pain 12 tablet 0     traZODone (DESYREL) 50 MG tablet Take 1 tablet (50 mg total) by mouth nightly. 90 tablet 1     No current facility-administered medications for this visit.       Review of patient's allergies indicates:  No Known Allergies    Past Medical History:   Diagnosis Date    Allergy     Arthritis     Cancer 2016    colon    CHF (congestive heart failure)     Colon cancer 2004    Colon cancer screening 2015    Diabetes mellitus type 2 in nonobese 3/9/2016    borderline    Diverticulosis     DM (diabetes mellitus) 2016    BS didn't check 2019    DM (diabetes mellitus) 2016    BS didn't check 2020    Hyperlipidemia 10/25/2016    Hypertension     Insomnia        Past Surgical History:   Procedure Laterality Date     SECTION      COLON SURGERY      COLONOSCOPY N/A 2015    Procedure: COLONOSCOPY;  Surgeon: Adela Sam MD;  Location: [facility];  Service: Endoscopy;  Laterality: N/A;    COLONOSCOPY N/A 2017    Procedure: COLONOSCOPY;  Surgeon: Chintan Flores MD;  Location: [facility];  Service: Endoscopy;  Laterality: N/A;    COLONOSCOPY N/A 2020    Procedure: COLONOSCOPY;  Surgeon: Grisel Atkins MD;  Location: [facility];  Service: Endoscopy;  Laterality: N/A;       Family History   Problem Relation Age of Onset    Hypertension Mother     Diabetes Mother     Hypertension Father     Hypertension Sister     Hypertension Brother     Hypertension Sister     Hypertension Sister     Hypertension Sister     Hypertension Brother     Hypertension Brother      No breast or colon    Social History     Socioeconomic History    Marital status: Single   Occupational History     Employer: [employer]   Tobacco Use    Smoking status: Never Smoker    Smokeless tobacco: Never Used   Substance and Sexual Activity    Alcohol use: Yes     Alcohol/week: 0.0 standard drinks     Comment: weekends.       Drug use: No       Menses 14  Child at 22  Breast feed  no  Menopause unsure  HRT no      Physical Exam  Vitals reviewed. Exam conducted with a chaperone present.   Constitutional:       Appearance: She is well-developed.   HENT:      Head: Normocephalic.   Eyes:      Pupils: Pupils are equal, round, and reactive to light.   Neck:      Thyroid: No thyromegaly.      Vascular: No JVD.      Trachea: No tracheal deviation.   Cardiovascular:      Rate and Rhythm: Normal rate and regular rhythm.      Heart sounds: Normal heart sounds.   Pulmonary:      Breath sounds: Normal breath sounds. No wheezing.   Abdominal:      General: Bowel sounds are normal. There is no distension.      Palpations: Abdomen is soft. Abdomen is not rigid. There is no mass.      Tenderness: There is no abdominal tenderness. There is no guarding or rebound.   Musculoskeletal:         General: Normal range of motion.   Lymphadenopathy:      Cervical: No cervical adenopathy.   Skin:     General: Skin is warm and dry.      Findings: No erythema or rash.      Comments: Bilateral breast exam was performed.  There are air are no skin changes, masses, nipple discharge nor axillary adenopathy bilaterally.  There is prominence of the inframammary ridge bilaterally   Neurological:      Mental Status: She is oriented to person, place, and time.       Result:   Mammo Digital Diagnostic Left with Frankie  US Breast Left Limited     History:  Patient is 66 y.o. and is seen for diagnostic imaging.     Films Compared:  Compared to: 04/27/2022 Mammo Digital Screening Bilat w/ Frankie, 02/05/2021 Mammo Digital Screening Bilat w/ Frankie, and 01/25/2019 Mammo Digital Screening Bilat w/ Frankie     Findings:  This procedure was performed using tomosynthesis. Computer-aided detection was utilized in the interpretation of this examination.  The left breast is heterogeneously dense, which may obscure small masses.      Mammo Digital Diagnostic Left with Frankie  Left  Focal Asymmetry: There is a 25 mm focal asymmetry seen in the lower outer  quadrant of the left breast in the middle depth.      US Breast Left Limited  Left  Mass: There are 2 similar oval, hypoechoic masses with indistinct margins with no posterior features seen in the left breast at 4 o'clock, 6 cm from the nipple. The masses measure 6 x 5 x 6 mm and 8 x 3 x 6 mm respectively.  Masses are  by distance of approximately 1.3 cm and may both potentially be within a duct. Findings appear to correlate with the mammographic asymmetry. Both lesions could be sampled with a single biopsy procedure given their close proximity.      No suspicious adenopathy demonstrated in the left axilla.      Impression:  Left  Focal Asymmetry: Left breast 25 mm focal asymmetry at the lower outer middle position. Assessment: 4 - Suspicious finding. Biopsy is recommended.      BI-RADS Category:   Overall: 4 - Suspicious     Recommendation:  Ultrasound-guided Biopsy is recommended.     Your estimated lifetime risk of breast cancer (to age 85) based on Tyrer-Cuzick risk assessment model is Tyrer-Cuzick: 4.4 %. According to the American Cancer Society, patients with a lifetime breast cancer risk of 20% or higher might benefit from supplemental screening tests.  Assessment & Plan:        Abnormal mammography of the left breast with 2 areas of concern.    The patient would benefit from ultrasound-guided biopsy.    We will call her with the results.    If this is benign then she would need a 6 month follow-up mammogram.    If this is malignant she would need to see Oncology and Radiation Oncology to discuss treatment planning.    She is a candidate for breast conservation therapy

## 2022-05-12 ENCOUNTER — HOSPITAL ENCOUNTER (OUTPATIENT)
Dept: RADIOLOGY | Facility: HOSPITAL | Age: 67
Discharge: HOME | End: 2022-05-12
Attending: SURGERY
Payer: MEDICARE

## 2022-05-12 DIAGNOSIS — R92.8 ABNORMAL MAMMOGRAPHY: ICD-10-CM

## 2022-05-12 PROCEDURE — 19081 BX BREAST 1ST LESION STRTCTC: CPT | Mod: LT,,, | Performed by: RADIOLOGY

## 2022-05-12 PROCEDURE — 88342 IMHCHEM/IMCYTCHM 1ST ANTB: CPT | Mod: 26,,, | Performed by: STUDENT IN AN ORGANIZED HEALTH CARE EDUCATION/TRAINING PROGRAM

## 2022-05-12 PROCEDURE — 88305 TISSUE EXAM BY PATHOLOGIST: CPT | Performed by: STUDENT IN AN ORGANIZED HEALTH CARE EDUCATION/TRAINING PROGRAM

## 2022-05-12 PROCEDURE — 88377 M/PHMTRC ALYS ISHQUANT/SEMIQ: CPT | Performed by: STUDENT IN AN ORGANIZED HEALTH CARE EDUCATION/TRAINING PROGRAM

## 2022-05-12 PROCEDURE — 88305 TISSUE EXAM BY PATHOLOGIST: CPT | Mod: 26,,, | Performed by: STUDENT IN AN ORGANIZED HEALTH CARE EDUCATION/TRAINING PROGRAM

## 2022-05-12 PROCEDURE — 19081 BX BREAST 1ST LESION STRTCTC: CPT | Mod: LT

## 2022-05-12 PROCEDURE — 88342 IMHCHEM/IMCYTCHM 1ST ANTB: CPT | Performed by: STUDENT IN AN ORGANIZED HEALTH CARE EDUCATION/TRAINING PROGRAM

## 2022-05-12 PROCEDURE — 88341 IMHCHEM/IMCYTCHM EA ADD ANTB: CPT | Mod: 26,,, | Performed by: STUDENT IN AN ORGANIZED HEALTH CARE EDUCATION/TRAINING PROGRAM

## 2022-05-12 PROCEDURE — 88305 TISSUE EXAM BY PATHOLOGIST: ICD-10-PCS | Mod: 26,,, | Performed by: STUDENT IN AN ORGANIZED HEALTH CARE EDUCATION/TRAINING PROGRAM

## 2022-05-12 PROCEDURE — 88341 PR IHC OR ICC EACH ADD'L SINGLE ANTIBODY  STAINPR: ICD-10-PCS | Mod: 26,,, | Performed by: STUDENT IN AN ORGANIZED HEALTH CARE EDUCATION/TRAINING PROGRAM

## 2022-05-12 PROCEDURE — 19081 MAMMO BREAST STEREOTACTIC BREAST BIOPSY LEFT: ICD-10-PCS | Mod: LT,,, | Performed by: RADIOLOGY

## 2022-05-12 PROCEDURE — 88342 CHG IMMUNOCYTOCHEMISTRY: ICD-10-PCS | Mod: 26,,, | Performed by: STUDENT IN AN ORGANIZED HEALTH CARE EDUCATION/TRAINING PROGRAM

## 2022-05-12 PROCEDURE — 88341 IMHCHEM/IMCYTCHM EA ADD ANTB: CPT | Mod: 59 | Performed by: STUDENT IN AN ORGANIZED HEALTH CARE EDUCATION/TRAINING PROGRAM

## 2022-05-22 NOTE — TELEPHONE ENCOUNTER
Care Due:                  Date            Visit Type   Department     Provider  --------------------------------------------------------------------------------                                EP -                              PRIMARY      [facility] INTERNAL  Chiquita MARK  Last Visit: 05-      CARE ([facility])   MEDICINE       Mayank  Next Visit: None Scheduled  None         None Found                                                            Last  Test          Frequency    Reason                     Performed    Due Date  --------------------------------------------------------------------------------    Office Visit  12 months..  levocetirizine, losartan,   05- 05-                             metFORMIN, pravastatin,                             traZODone................    HBA1C.......  6 months...  metFORMIN................  05-   11-    Lipid Panel.  12 months..  pravastatin..............  05- 05-    [facility] Embedded Care Gaps. Reference number: [id number]. 5/22/2022   2:13:51 AM CDT

## 2022-05-22 NOTE — TELEPHONE ENCOUNTER
Refill Routing Note   Medication(s) are not appropriate for processing by [facility] for the following reason(s):      - Required laboratory values are outdated  - Required laboratory values are abnormal  - Patient has been seen in the ED/Hospital since the last PCP visit    [facility] action(s):  Defer Medication-related problems identified:   Requires labs  Requires appointment        Medication reconciliation completed: No     Appointments  past 12m or future 3m with PCP    Date Provider   Last Visit   5/13/2021 Chiquita Talley MD   Next Visit   Visit date not found Chiquita Talley MD   ED visits in past 90 days: 1        Note composed:4:48 PM 05/22/2022

## 2022-05-23 RX ORDER — PRAVASTATIN SODIUM 20 MG/1
TABLET ORAL
Qty: 90 TABLET | Refills: 0 | Status: SHIPPED | OUTPATIENT
Start: 2022-05-23 | End: 2022-09-08 | Stop reason: SDUPTHER

## 2022-05-24 DIAGNOSIS — M19.90 ARTHRITIS: ICD-10-CM

## 2022-05-24 DIAGNOSIS — M25.50 MULTIPLE JOINT PAIN: ICD-10-CM

## 2022-05-24 NOTE — TELEPHONE ENCOUNTER
No new care gaps identified.  [facility] Embedded Care Gaps. Reference number: [id number]. 5/24/2022   5:07:05 PM CDT

## 2022-05-25 RX ORDER — CYCLOBENZAPRINE HCL 10 MG
TABLET ORAL
Qty: 30 TABLET | Refills: 0 | OUTPATIENT
Start: 2022-05-25

## 2022-05-27 ENCOUNTER — TELEPHONE (OUTPATIENT)
Dept: SURGERY | Facility: HOSPITAL | Age: 67
End: 2022-05-27
Payer: MEDICARE

## 2022-05-27 LAB
FINAL PATHOLOGIC DIAGNOSIS: NORMAL
GROSS: NORMAL
Lab: NORMAL

## 2022-05-27 NOTE — TELEPHONE ENCOUNTER
Attempted to call patient about her pathology results.  Went to voicemail left message.  Patient will need to be contacted later today or 1st thing Tuesday morning after the Memorial Day holiday weekend

## 2022-05-31 ENCOUNTER — DOCUMENTATION ONLY (OUTPATIENT)
Dept: HEMATOLOGY/ONCOLOGY | Facility: CLINIC | Age: 67
End: 2022-05-31
Payer: MEDICARE

## 2022-05-31 ENCOUNTER — TELEPHONE (OUTPATIENT)
Dept: SURGERY | Facility: HOSPITAL | Age: 67
End: 2022-05-31
Payer: MEDICARE

## 2022-05-31 DIAGNOSIS — Z17.0 MALIGNANT NEOPLASM OF UPPER-OUTER QUADRANT OF LEFT BREAST IN FEMALE, ESTROGEN RECEPTOR POSITIVE: Primary | ICD-10-CM

## 2022-05-31 DIAGNOSIS — C50.412 MALIGNANT NEOPLASM OF UPPER-OUTER QUADRANT OF LEFT BREAST IN FEMALE, ESTROGEN RECEPTOR POSITIVE: Primary | ICD-10-CM

## 2022-05-31 NOTE — TELEPHONE ENCOUNTER
Patient was called about her pathology.  Invasive breast cancer.  Oncology and Radiation Oncology consultation.  She is a good candidate for breast conservation therapy.  She would however need a reflector placed prior to surgical intervention to localize the cancer    Final Pathologic Diagnosis Left breast, stereotactic core biopsy:   Invasive ductal carcinoma with mucinous features   [place] grade 2:  Tubule formation-3, nuclear pleomorphism -2 and mitotic   count -1   Invasive carcinoma measures 10 mm in greatest dimension   Ductal carcinoma in Situ, intermediate nuclear grade, solid and cribriform   patterns with associated microcalcifications   Calcifications are also associated with the invasive component   No lymphovascular invasion is identified   Tumor biomarkers   Estrogen receptor (ER):  Positive, greater than 95%, strong   Progesterone receptor (PGR):  Positive, 40-45%, intermediate to strong   HER2 (IHC):  Equivocal, 2+   Ki-67:  25-30%   Additional IHC:   P63:  Highlights loss of myoepithelial cells, confirming invasive component   while focal retention of myoepithelial cells in the area of carcinoma in Situ.   All immunostains were performed with appropriate controls.   This case was reviewed by Dr. JUAN Lan who concurs with the above diagnosis.   Due to equivocal HER2 IHC, HER2 FISH will be performed with results provided   in a supplemental report.   HER2, Breast Tumor, FISH, Tissue   Result Summary   Negative   Interpretation   There is no evidence of HER2 (ERBB2) gene amplification in this tumor sample.   Jackie Saldivar M.D.   Report attached   Performing location:   [place] [address]    Comment: Interp By Amee Mae M.D., Signed on 05/27/2022 at 12:25

## 2022-05-31 NOTE — NURSING
Called pt about referral to medical oncology for newly dx breast cancer. Reviewed my role as NN and offered appt with Dr. Murray 6/9 @ 920am at the . Pt agreeable, all questions answered and she voiced understanding all information.  Oncology Navigation   Intake  Date of Diagnosis: 5/12/2022  Cancer Type: Breast  Internal / External Referral: Internal  Referral Source: Work queue  Date of Referral: 5/31/2022  Initial Nurse Navigator Contact: 5/31/2022  Referral to Initial Contact Timeline (days): 0  Date Worked: 5/31/2022  Appointment Date: 6/9/2022  Reason if booked > 7 days after scheduling: Specific provider / access     Treatment  Current Status: Staging work-up       Medical Oncologist: Dr. Soraya Murray  Consult Date: 6/9/2022                       Acuity      Follow Up  Follow up in 9 days (on 6/9/2022) for breast cancer consult.

## 2022-06-06 ENCOUNTER — PATIENT OUTREACH (OUTPATIENT)
Dept: ADMINISTRATIVE | Facility: HOSPITAL | Age: 67
End: 2022-06-06
Payer: MEDICARE

## 2022-06-06 ENCOUNTER — TUMOR BOARD CONFERENCE (OUTPATIENT)
Dept: HEMATOLOGY/ONCOLOGY | Facility: CLINIC | Age: 67
End: 2022-06-06
Payer: MEDICARE

## 2022-06-06 NOTE — PROGRESS NOTES
Working Dm Report:       Pt has upcoming lab appt. Linked A1c and micro to non fasting lab appt.

## 2022-06-06 NOTE — PROGRESS NOTES
Interdisciplinary Breast Cancer Conference    Anne Farmer    Female    Date Presented to Tumor Board: 06/06/22    Presenting Hospital / Clinic: [facility]    Tumor Laterality: Left         Presenter: Dr. Arvin Hernandez    Reason for Consultation: Initial Presentation    Specialties Present: Medical Oncology;Hematology;Radiation Oncology;Surgical Oncology;Navigation;Genetics    Patient Status: a current patient    Treatment to Date: None    Clinical Trial Eligibility: Not discussed    ER: Positive    WA: Positive    Her2: Negative (by FISH)    Cancer Staging  No matching staging information was found for the patient.    Recommended Plan: Surgery;Genomic Testing;Chemotherapy;Radiation    Offer breast conservation therapy/Obtain oncotype after surgery & consider repeat Her2 after final path/Whole or partial breast radiation pending surgical path

## 2022-06-07 ENCOUNTER — LAB VISIT (OUTPATIENT)
Dept: LAB | Facility: HOSPITAL | Age: 67
End: 2022-06-07
Attending: INTERNAL MEDICINE
Payer: MEDICARE

## 2022-06-07 DIAGNOSIS — E11.9 DIABETES MELLITUS WITHOUT COMPLICATION: ICD-10-CM

## 2022-06-07 DIAGNOSIS — E11.9 TYPE 2 DIABETES MELLITUS WITHOUT COMPLICATION: ICD-10-CM

## 2022-06-07 DIAGNOSIS — D86.1 SARCOIDOSIS OF LYMPH NODES: ICD-10-CM

## 2022-06-07 PROCEDURE — 83036 HEMOGLOBIN GLYCOSYLATED A1C: CPT | Performed by: FAMILY MEDICINE

## 2022-06-07 PROCEDURE — 80053 COMPREHEN METABOLIC PANEL: CPT | Performed by: INTERNAL MEDICINE

## 2022-06-07 PROCEDURE — 82043 UR ALBUMIN QUANTITATIVE: CPT | Performed by: FAMILY MEDICINE

## 2022-06-07 PROCEDURE — 82164 ANGIOTENSIN I ENZYME TEST: CPT | Performed by: INTERNAL MEDICINE

## 2022-06-07 PROCEDURE — 82570 ASSAY OF URINE CREATININE: CPT | Performed by: FAMILY MEDICINE

## 2022-06-07 PROCEDURE — 36415 COLL VENOUS BLD VENIPUNCTURE: CPT | Mod: PO | Performed by: INTERNAL MEDICINE

## 2022-06-08 LAB
ALBUMIN SERPL BCP-MCNC: 4.1 G/DL (ref 3.5–5.2)
ALBUMIN/CREAT UR: 31.7 UG/MG (ref 0–30)
ALP SERPL-CCNC: 75 U/L (ref 55–135)
ALT SERPL W/O P-5'-P-CCNC: 22 U/L (ref 10–44)
ANION GAP SERPL CALC-SCNC: 13 MMOL/L (ref 8–16)
AST SERPL-CCNC: 28 U/L (ref 10–40)
BILIRUB SERPL-MCNC: 0.5 MG/DL (ref 0.1–1)
BUN SERPL-MCNC: 18 MG/DL (ref 8–23)
CALCIUM SERPL-MCNC: 10.1 MG/DL (ref 8.7–10.5)
CHLORIDE SERPL-SCNC: 98 MMOL/L (ref 95–110)
CO2 SERPL-SCNC: 25 MMOL/L (ref 23–29)
CREAT SERPL-MCNC: 0.9 MG/DL (ref 0.5–1.4)
CREAT UR-MCNC: 186 MG/DL (ref 15–325)
EST. GFR  (AFRICAN AMERICAN): >60 ML/MIN/1.73 M^2
EST. GFR  (NON AFRICAN AMERICAN): >60 ML/MIN/1.73 M^2
ESTIMATED AVG GLUCOSE: 146 MG/DL (ref 68–131)
GLUCOSE SERPL-MCNC: 93 MG/DL (ref 70–110)
HBA1C MFR BLD: 6.7 % (ref 4–5.6)
MICROALBUMIN UR DL<=1MG/L-MCNC: 59 UG/ML
POTASSIUM SERPL-SCNC: 3 MMOL/L (ref 3.5–5.1)
PROT SERPL-MCNC: 8.1 G/DL (ref 6–8.4)
SODIUM SERPL-SCNC: 136 MMOL/L (ref 136–145)

## 2022-06-09 ENCOUNTER — OFFICE VISIT (OUTPATIENT)
Dept: RADIATION ONCOLOGY | Facility: CLINIC | Age: 67
End: 2022-06-09
Payer: MEDICARE

## 2022-06-09 ENCOUNTER — OFFICE VISIT (OUTPATIENT)
Dept: HEMATOLOGY/ONCOLOGY | Facility: CLINIC | Age: 67
End: 2022-06-09
Payer: MEDICARE

## 2022-06-09 VITALS
SYSTOLIC BLOOD PRESSURE: 162 MMHG | RESPIRATION RATE: 18 BRPM | BODY MASS INDEX: 28.63 KG/M2 | DIASTOLIC BLOOD PRESSURE: 74 MMHG | HEART RATE: 60 BPM | OXYGEN SATURATION: 96 % | WEIGHT: 178.13 LBS | TEMPERATURE: 98 F | HEIGHT: 66 IN

## 2022-06-09 VITALS
SYSTOLIC BLOOD PRESSURE: 150 MMHG | DIASTOLIC BLOOD PRESSURE: 74 MMHG | OXYGEN SATURATION: 98 % | WEIGHT: 178.13 LBS | RESPIRATION RATE: 18 BRPM | BODY MASS INDEX: 28.75 KG/M2 | HEART RATE: 60 BPM

## 2022-06-09 DIAGNOSIS — C50.412 MALIGNANT NEOPLASM OF UPPER-OUTER QUADRANT OF LEFT BREAST IN FEMALE, ESTROGEN RECEPTOR POSITIVE: ICD-10-CM

## 2022-06-09 DIAGNOSIS — C50.512 MALIGNANT NEOPLASM OF LOWER-OUTER QUADRANT OF LEFT BREAST OF FEMALE, ESTROGEN RECEPTOR POSITIVE: Primary | ICD-10-CM

## 2022-06-09 DIAGNOSIS — Z17.0 MALIGNANT NEOPLASM OF UPPER-OUTER QUADRANT OF LEFT BREAST IN FEMALE, ESTROGEN RECEPTOR POSITIVE: Primary | ICD-10-CM

## 2022-06-09 DIAGNOSIS — C50.412 MALIGNANT NEOPLASM OF UPPER-OUTER QUADRANT OF LEFT BREAST IN FEMALE, ESTROGEN RECEPTOR POSITIVE: Primary | ICD-10-CM

## 2022-06-09 DIAGNOSIS — Z17.0 MALIGNANT NEOPLASM OF LOWER-OUTER QUADRANT OF LEFT BREAST OF FEMALE, ESTROGEN RECEPTOR POSITIVE: Primary | ICD-10-CM

## 2022-06-09 DIAGNOSIS — Z85.038 HISTORY OF COLON CANCER, STAGE I: ICD-10-CM

## 2022-06-09 DIAGNOSIS — Z17.0 MALIGNANT NEOPLASM OF UPPER-OUTER QUADRANT OF LEFT BREAST IN FEMALE, ESTROGEN RECEPTOR POSITIVE: ICD-10-CM

## 2022-06-09 LAB — ACE SERPL-CCNC: 65 U/L (ref 16–85)

## 2022-06-09 PROCEDURE — 3072F LOW RISK FOR RETINOPATHY: CPT | Mod: CPTII,S$GLB,, | Performed by: RADIOLOGY

## 2022-06-09 PROCEDURE — 1101F PR PT FALLS ASSESS DOC 0-1 FALLS W/OUT INJ PAST YR: ICD-10-PCS | Mod: CPTII,S$GLB,, | Performed by: INTERNAL MEDICINE

## 2022-06-09 PROCEDURE — 3044F HG A1C LEVEL LT 7.0%: CPT | Mod: CPTII,S$GLB,, | Performed by: RADIOLOGY

## 2022-06-09 PROCEDURE — 3008F BODY MASS INDEX DOCD: CPT | Mod: CPTII,S$GLB,, | Performed by: INTERNAL MEDICINE

## 2022-06-09 PROCEDURE — 99205 PR OFFICE/OUTPT VISIT, NEW, LEVL V, 60-74 MIN: ICD-10-PCS | Mod: S$GLB,,, | Performed by: RADIOLOGY

## 2022-06-09 PROCEDURE — 3066F NEPHROPATHY DOC TX: CPT | Mod: CPTII,S$GLB,, | Performed by: INTERNAL MEDICINE

## 2022-06-09 PROCEDURE — 3078F PR MOST RECENT DIASTOLIC BLOOD PRESSURE < 80 MM HG: ICD-10-PCS | Mod: CPTII,S$GLB,, | Performed by: INTERNAL MEDICINE

## 2022-06-09 PROCEDURE — 3060F POS MICROALBUMINURIA REV: CPT | Mod: CPTII,S$GLB,, | Performed by: INTERNAL MEDICINE

## 2022-06-09 PROCEDURE — 1126F PR PAIN SEVERITY QUANTIFIED, NO PAIN PRESENT: ICD-10-PCS | Mod: CPTII,S$GLB,, | Performed by: INTERNAL MEDICINE

## 2022-06-09 PROCEDURE — 3077F SYST BP >= 140 MM HG: CPT | Mod: CPTII,S$GLB,, | Performed by: INTERNAL MEDICINE

## 2022-06-09 PROCEDURE — 99999 PR PBB SHADOW E&M-EST. PATIENT-LVL V: CPT | Mod: PBBFAC,,, | Performed by: RADIOLOGY

## 2022-06-09 PROCEDURE — 1159F MED LIST DOCD IN RCRD: CPT | Mod: CPTII,S$GLB,, | Performed by: INTERNAL MEDICINE

## 2022-06-09 PROCEDURE — 3288F PR FALLS RISK ASSESSMENT DOCUMENTED: ICD-10-PCS | Mod: CPTII,S$GLB,, | Performed by: RADIOLOGY

## 2022-06-09 PROCEDURE — 1101F PT FALLS ASSESS-DOCD LE1/YR: CPT | Mod: CPTII,S$GLB,, | Performed by: INTERNAL MEDICINE

## 2022-06-09 PROCEDURE — 99204 PR OFFICE/OUTPT VISIT, NEW, LEVL IV, 45-59 MIN: ICD-10-PCS | Mod: S$GLB,,, | Performed by: INTERNAL MEDICINE

## 2022-06-09 PROCEDURE — 3077F PR MOST RECENT SYSTOLIC BLOOD PRESSURE >= 140 MM HG: ICD-10-PCS | Mod: CPTII,S$GLB,, | Performed by: INTERNAL MEDICINE

## 2022-06-09 PROCEDURE — 3077F SYST BP >= 140 MM HG: CPT | Mod: CPTII,S$GLB,, | Performed by: RADIOLOGY

## 2022-06-09 PROCEDURE — 99999 PR PBB SHADOW E&M-EST. PATIENT-LVL V: CPT | Mod: PBBFAC,,, | Performed by: INTERNAL MEDICINE

## 2022-06-09 PROCEDURE — 3066F PR DOCUMENTATION OF TREATMENT FOR NEPHROPATHY: ICD-10-PCS | Mod: CPTII,S$GLB,, | Performed by: RADIOLOGY

## 2022-06-09 PROCEDURE — 3288F PR FALLS RISK ASSESSMENT DOCUMENTED: ICD-10-PCS | Mod: CPTII,S$GLB,, | Performed by: INTERNAL MEDICINE

## 2022-06-09 PROCEDURE — 99205 OFFICE O/P NEW HI 60 MIN: CPT | Mod: S$GLB,,, | Performed by: RADIOLOGY

## 2022-06-09 PROCEDURE — 3288F FALL RISK ASSESSMENT DOCD: CPT | Mod: CPTII,S$GLB,, | Performed by: RADIOLOGY

## 2022-06-09 PROCEDURE — 99204 OFFICE O/P NEW MOD 45 MIN: CPT | Mod: S$GLB,,, | Performed by: INTERNAL MEDICINE

## 2022-06-09 PROCEDURE — 3044F HG A1C LEVEL LT 7.0%: CPT | Mod: CPTII,S$GLB,, | Performed by: INTERNAL MEDICINE

## 2022-06-09 PROCEDURE — 3078F PR MOST RECENT DIASTOLIC BLOOD PRESSURE < 80 MM HG: ICD-10-PCS | Mod: CPTII,S$GLB,, | Performed by: RADIOLOGY

## 2022-06-09 PROCEDURE — 3060F POS MICROALBUMINURIA REV: CPT | Mod: CPTII,S$GLB,, | Performed by: RADIOLOGY

## 2022-06-09 PROCEDURE — 1159F MED LIST DOCD IN RCRD: CPT | Mod: CPTII,S$GLB,, | Performed by: RADIOLOGY

## 2022-06-09 PROCEDURE — 99499 RISK ADDL DX/OHS AUDIT: ICD-10-PCS | Mod: S$GLB,,, | Performed by: RADIOLOGY

## 2022-06-09 PROCEDURE — 3008F BODY MASS INDEX DOCD: CPT | Mod: CPTII,S$GLB,, | Performed by: RADIOLOGY

## 2022-06-09 PROCEDURE — 1159F PR MEDICATION LIST DOCUMENTED IN MEDICAL RECORD: ICD-10-PCS | Mod: CPTII,S$GLB,, | Performed by: INTERNAL MEDICINE

## 2022-06-09 PROCEDURE — 3044F PR MOST RECENT HEMOGLOBIN A1C LEVEL <7.0%: ICD-10-PCS | Mod: CPTII,S$GLB,, | Performed by: INTERNAL MEDICINE

## 2022-06-09 PROCEDURE — 99999 PR PBB SHADOW E&M-EST. PATIENT-LVL V: ICD-10-PCS | Mod: PBBFAC,,, | Performed by: RADIOLOGY

## 2022-06-09 PROCEDURE — 3044F PR MOST RECENT HEMOGLOBIN A1C LEVEL <7.0%: ICD-10-PCS | Mod: CPTII,S$GLB,, | Performed by: RADIOLOGY

## 2022-06-09 PROCEDURE — 1101F PT FALLS ASSESS-DOCD LE1/YR: CPT | Mod: CPTII,S$GLB,, | Performed by: RADIOLOGY

## 2022-06-09 PROCEDURE — 3078F DIAST BP <80 MM HG: CPT | Mod: CPTII,S$GLB,, | Performed by: RADIOLOGY

## 2022-06-09 PROCEDURE — 1126F AMNT PAIN NOTED NONE PRSNT: CPT | Mod: CPTII,S$GLB,, | Performed by: INTERNAL MEDICINE

## 2022-06-09 PROCEDURE — 1101F PR PT FALLS ASSESS DOC 0-1 FALLS W/OUT INJ PAST YR: ICD-10-PCS | Mod: CPTII,S$GLB,, | Performed by: RADIOLOGY

## 2022-06-09 PROCEDURE — 3008F PR BODY MASS INDEX (BMI) DOCUMENTED: ICD-10-PCS | Mod: CPTII,S$GLB,, | Performed by: RADIOLOGY

## 2022-06-09 PROCEDURE — 3078F DIAST BP <80 MM HG: CPT | Mod: CPTII,S$GLB,, | Performed by: INTERNAL MEDICINE

## 2022-06-09 PROCEDURE — 3066F PR DOCUMENTATION OF TREATMENT FOR NEPHROPATHY: ICD-10-PCS | Mod: CPTII,S$GLB,, | Performed by: INTERNAL MEDICINE

## 2022-06-09 PROCEDURE — 3288F FALL RISK ASSESSMENT DOCD: CPT | Mod: CPTII,S$GLB,, | Performed by: INTERNAL MEDICINE

## 2022-06-09 PROCEDURE — 3008F PR BODY MASS INDEX (BMI) DOCUMENTED: ICD-10-PCS | Mod: CPTII,S$GLB,, | Performed by: INTERNAL MEDICINE

## 2022-06-09 PROCEDURE — 1126F PR PAIN SEVERITY QUANTIFIED, NO PAIN PRESENT: ICD-10-PCS | Mod: CPTII,S$GLB,, | Performed by: RADIOLOGY

## 2022-06-09 PROCEDURE — 3072F LOW RISK FOR RETINOPATHY: CPT | Mod: CPTII,S$GLB,, | Performed by: INTERNAL MEDICINE

## 2022-06-09 PROCEDURE — 99499 UNLISTED E&M SERVICE: CPT | Mod: S$GLB,,, | Performed by: RADIOLOGY

## 2022-06-09 PROCEDURE — 1160F PR REVIEW ALL MEDS BY PRESCRIBER/CLIN PHARMACIST DOCUMENTED: ICD-10-PCS | Mod: CPTII,S$GLB,, | Performed by: INTERNAL MEDICINE

## 2022-06-09 PROCEDURE — 3072F PR LOW RISK FOR RETINOPATHY: ICD-10-PCS | Mod: CPTII,S$GLB,, | Performed by: RADIOLOGY

## 2022-06-09 PROCEDURE — 1159F PR MEDICATION LIST DOCUMENTED IN MEDICAL RECORD: ICD-10-PCS | Mod: CPTII,S$GLB,, | Performed by: RADIOLOGY

## 2022-06-09 PROCEDURE — 3077F PR MOST RECENT SYSTOLIC BLOOD PRESSURE >= 140 MM HG: ICD-10-PCS | Mod: CPTII,S$GLB,, | Performed by: RADIOLOGY

## 2022-06-09 PROCEDURE — 99999 PR PBB SHADOW E&M-EST. PATIENT-LVL V: ICD-10-PCS | Mod: PBBFAC,,, | Performed by: INTERNAL MEDICINE

## 2022-06-09 PROCEDURE — 3066F NEPHROPATHY DOC TX: CPT | Mod: CPTII,S$GLB,, | Performed by: RADIOLOGY

## 2022-06-09 PROCEDURE — 3060F PR POS MICROALBUMINURIA RESULT DOCUMENTED/REVIEW: ICD-10-PCS | Mod: CPTII,S$GLB,, | Performed by: RADIOLOGY

## 2022-06-09 PROCEDURE — 1126F AMNT PAIN NOTED NONE PRSNT: CPT | Mod: CPTII,S$GLB,, | Performed by: RADIOLOGY

## 2022-06-09 PROCEDURE — 3060F PR POS MICROALBUMINURIA RESULT DOCUMENTED/REVIEW: ICD-10-PCS | Mod: CPTII,S$GLB,, | Performed by: INTERNAL MEDICINE

## 2022-06-09 PROCEDURE — 1160F RVW MEDS BY RX/DR IN RCRD: CPT | Mod: CPTII,S$GLB,, | Performed by: INTERNAL MEDICINE

## 2022-06-09 PROCEDURE — 3072F PR LOW RISK FOR RETINOPATHY: ICD-10-PCS | Mod: CPTII,S$GLB,, | Performed by: INTERNAL MEDICINE

## 2022-06-09 NOTE — PROGRESS NOTES
[facility]  RADIATION ONCOLOGY CONSULTATION    Name: Anne Farmer  : 1955      Patient Referred To Radiation Oncology By:  Dr. Arvin Hernandez MD  [address] [place] [address]    DIAGNOSIS: L breast invasive ductal carcinoma, grade 2, [id number], ER 95%, DC 40-45%, Her2 negative    HISTORY OF PRESENT ILLNESS:  Anne Farmer is a 66 y.o. female who presents for consultation for the above diagnosis.   Hx of colon cancer a few years ago, s/p surgery.    Screening MMG 22 showed focal asymmetry in LOQ of L breast.   Dx MMG and US showed 2.5cm asymmetry in L breast, rec biopsy, no suspicious axillary nodes.  L breast biopsy pathology showed invasive ductal carcinoma w mucinous features, grade 2, 10mm, DCIS present, ER 95%, DC 40-45%, Her2 2+ equivocal, Ki67 25-30%.   FISH negative    Today, she is doing well overall.  She denies breast pain, skin changes, nipple changes, new axillary/supraclavicular masses, discharge, induration, or erythema.     REVIEW OF SYSTEMS: (Positive findings bold, otherwise negative)   Constitutional: fever, fatigue, weight change  Eyes: blurred vision in the past 3 months, double vision   ENT: ear pain, new mouth lesions, jaw pain, difficulty swallowing, sore throat  Cardiovascular: chest pain on exertion, reflux, leg swelling  Respiratory: shortness of breath, dyspnea, cough, hemoptysis.   GI: abdominal pain, diarrhea, constipation, blood in stool, painful bowel movements  : painful or burning urination, blood in urine  Musculoskeletal: new bone or joint pains  Neurologic: headache, seizure, focal numbness or tingling, balance changes, speech changes  Lymph: new or enlarged lymph nodes  Psychiatric: depression, anxiety    PRIOR RADIATION HISTORY: none    PAST MEDICAL HISTORY:  Past Medical History:   Diagnosis Date    Allergy     Arthritis     Cancer 2016    colon    CHF (congestive heart failure)     Colon cancer 2004     Colon cancer screening 2015    Diabetes mellitus type 2 in nonobese 3/9/2016    borderline    Diverticulosis     DM (diabetes mellitus) 2016    BS didn't check 2019    DM (diabetes mellitus) 2016    BS didn't check 2020    Hyperlipidemia 10/25/2016    Hypertension     Insomnia        PAST SURGICAL HISTORY:  Past Surgical History:   Procedure Laterality Date     SECTION      COLON SURGERY      COLONOSCOPY N/A 2015    Procedure: COLONOSCOPY;  Surgeon: Adela Sam MD;  Location: [facility] ENDO;  Service: Endoscopy;  Laterality: N/A;    COLONOSCOPY N/A 2017    Procedure: COLONOSCOPY;  Surgeon: Chintan Flores MD;  Location: [facility] ENDO;  Service: Endoscopy;  Laterality: N/A;    COLONOSCOPY N/A 2020    Procedure: COLONOSCOPY;  Surgeon: Grisel Atkins MD;  Location: [facility];  Service: Endoscopy;  Laterality: N/A;       ALLERGIES:   Review of patient's allergies indicates:  No Known Allergies    MEDICATIONS:    Current Outpatient Medications:     albuterol (PROVENTIL/VENTOLIN HFA) 90 mcg/actuation inhaler, Inhale 1-2 puffs into the lungs every 6 (six) hours as needed for Wheezing or Shortness of Breath., Disp: 18 g, Rfl: 3    amLODIPine (NORVASC) 10 MG tablet, TAKE 1 TABLET BY MOUTH EVERY DAY, Disp: 90 tablet, Rfl: 0    blood sugar diagnostic Strp, 1 strip by Misc.(Non-Drug; Combo Route) route once daily. One Touch Ultra, Disp: 180 each, Rfl: 0    blood-glucose meter kit, Use to check blood sugar once daily. One Touch Ultra, Disp: 1 each, Rfl: 0    cyclobenzaprine (FLEXERIL) 10 MG tablet, TAKE 1 TABLET BY MOUTH THREE TIMES A DAY AS NEEDED, Disp: 30 tablet, Rfl: 0    diclofenac sodium (VOLTAREN) 1 % Gel, APPLY 2 GRAMS TOPICALLY 2 (TWO) TIMES DAILY AS NEEDED., Disp: 100 g, Rfl: 2    fluticasone propionate (FLONASE) 50 mcg/actuation nasal spray, 2 sprays (100 mcg total) by Each Nostril route once daily., Disp: 48 mL, Rfl: 3    FLUZONE HIGHDOSE QUAD 20-21 PF  240 mcg/0.7 mL Syrg, PHARMACY ADMINISTERED, Disp: , Rfl:     glycopyrrolate-formoteroL (BEVESPI AEROSPHERE) 9-4.8 mcg HFAA, Inhale 2 puffs into the lungs 2 (two) times daily. Controller, Disp: 10.9 g, Rfl: 11    hydroCHLOROthiazide (HYDRODIURIL) 25 MG tablet, TAKE 1 TABLET BY MOUTH EVERY DAY, Disp: 90 tablet, Rfl: 3    HYDROcodone-acetaminophen (NORCO) 7.5-325 mg per tablet, Take 1 tablet by mouth every 6 (six) hours as needed for Pain., Disp: 12 tablet, Rfl: 0    hydrOXYchloroQUINE (PLAQUENIL) 200 mg tablet, Take 1 tablet (200 mg total) by mouth 2 (two) times daily., Disp: 60 tablet, Rfl: 3    ketorolac 0.5% (ACULAR) 0.5 % Drop, PLACE 1 DROP INTO THE RIGHT EYE 4 (FOUR) TIMES DAILY. EYEDROPS TO START ONE DAY BEFORE SURGERY, Disp: 5 mL, Rfl: 2    lancets Misc, 1 lancet by Misc.(Non-Drug; Combo Route) route once daily. One Touch Delica, Disp: 180 each, Rfl: 0    levocetirizine (XYZAL) 5 MG tablet, Take 1 tablet (5 mg total) by mouth every evening., Disp: 90 tablet, Rfl: 0    losartan (COZAAR) 100 MG tablet, Take 1 tablet (100 mg total) by mouth once daily., Disp: 90 tablet, Rfl: 2    metFORMIN (GLUCOPHAGE-XR) 750 MG ER 24hr tablet, TAKE 1 TABLET (750 MG TOTAL) BY MOUTH ONCE DAILY., Disp: 7 tablet, Rfl: 0    moxifloxacin (VIGAMOX) 0.5 % ophthalmic solution, Place 1 drop into the right eye every 12 (twelve) hours. Start eyedrops one day before surgery, Disp: 5 mL, Rfl: 2    multivitamin (ONE DAILY MULTIVITAMIN) per tablet, Take 1 tablet by mouth once daily., Disp: , Rfl:     omeprazole (PRILOSEC) 20 MG capsule, TAKE 1 CAPSULE BY MOUTH EVERY DAY, Disp: 90 capsule, Rfl: 3    pravastatin (PRAVACHOL) 20 MG tablet, TAKE 1 TABLET EVERY DAY, Disp: 90 tablet, Rfl: 0    prednisoLONE acetate (PRED FORTE) 1 % DrpS, PLACE 1 DROP INTO THE RIGHT EYE 4 (FOUR) TIMES DAILY. START ONE DAY BEFORE SURGERY, Disp: 5 mL, Rfl: 2    traMADoL (ULTRAM) 50 mg tablet, TAKE ONE TABLET po tid prn pain, Disp: 12 tablet, Rfl: 0     "traZODone (DESYREL) 50 MG tablet, Take 1 tablet (50 mg total) by mouth nightly., Disp: 90 tablet, Rfl: 1    SOCIAL HISTORY:  Social History     Socioeconomic History    Marital status: Single   Occupational History     Employer: [employer]   Tobacco Use    Smoking status: Never Smoker    Smokeless tobacco: Never Used   Substance and Sexual Activity    Alcohol use: Yes     Alcohol/week: 0.0 standard drinks     Comment: weekends.       Drug use: No     Lives in [place]    FAMILY HISTORY:  Family History   Problem Relation Age of Onset    Hypertension Mother     Diabetes Mother     Hypertension Father     Hypertension Sister     Hypertension Brother     Hypertension Sister     Hypertension Sister     Hypertension Sister     Hypertension Brother     Hypertension Brother        PHYSICAL EXAMINATION:  Constitutional: well appearing, no acute distress, ECOG 0 - Fully Active  Vitals:    BP (!) 162/74   Pulse 60   Temp 97.7 °F (36.5 °C)   Resp 18   Ht 5' 6" (1.676 m)   Wt 80.8 kg (178 lb 1.6 oz)   SpO2 96%   BMI 28.75 kg/m²   Eyes: sclera anicteric, EOMI, pupils equal, round and reactive to light  ENT: oral cavity without lesions, moist mucous membranes  Neck: trachea midline, neck supple  Lymphatic: no cervical, supraclavicular or axillary adenopathy  Breast: deferred today  Cardiovascular: regular rate, no edema of the upper or lower extremities, radial pulse 2+  Respiratory: unlabored effort, clear to auscultation, no wheezes  Abdomen: soft, non-tender, no rigidity, no masses, no hepatomegaly  Neuro: Cranial nerves III-XII intact, speech not slurred, gait non-ataxic, no dysdiadochokinesia, strength 5/5 upper and lower extremities  Spine: non-tender to percussion cervical, thoracic and lumbosacral spine    IMAGING AND LABORATORY FINDINGS: As per HPI; images reviewed personally.    ASSESSMENT: 66 y.o. female with L breast invasive ductal carcinoma, grade 2, [id number], ER 95%, MI 40-45%, Her2 " negative    PLAN: Today we discussed the risks, benefits, and potential acute/late toxicities of radiation in the breast conservation and post-mastectomy setting.   Pending final surgical pathology results, I would plan to offer either L partial breast radiation 30Gy/5fx vs whole breast radiation 40Gy/15fx to reduce local recurrence.  Boost and/or comprehensive samy coverage may be required, pending final surgical pathology.    Potential acute toxicities include fatigue, skin irritation, and breast edema. Potential late toxicities include breast edema, lymphedema, hyperpigmentation, poor cosmetic outcome, radiation pneumonitis, or very low risk of damage to heart.     We will have her return in ~4 weeks after surgery to allow for adequate healing and surgical pathology results to confirm our final plan. We can likely perform CT simulation at that time.    We discussed the techniques, toxicities and indications of radiation and I answered the patient's questions to their apparent satisfaction.    I spent approximately 60 minutes reviewing the available records and evaluating the patient, out of which over 50% of the time was spent face to face with the patient in counseling and coordinating this patient's care.    Martina Pope III, M.D.  Radiation Oncology  [facility] [address] [place] [address]  Ph: [phone]  [email or web address]

## 2022-06-09 NOTE — Clinical Note
Please see note/AVS and have her return to us after final pathology please discussed with may have Oncotype indicated and send would be best to have follow-up after these results return if indicated.  Additionally I would like to repeat her 2 testing on final pathology sample with IHC and if equivocal repeat fish please we can make a note of this with pathology assigned to her surgical pathology specimen.

## 2022-06-09 NOTE — PROGRESS NOTES
Subjective:      DATE OF VISIT: 6/9/22     ?  Patient ID:?Anne Farmer is a 66 y.o. female.?? MR#: [id number]   ?   REFERRING PROVIDER: Arvin Hernandez MD  [address] [place] [address]     ? Primary Care Providers:  Chiquita Talley MD, MD (General)     CHIEF COMPLAINT: ?  Establish care with Medical Oncology for newly diagnosed IDC  ?   ONCOLOGIC DIAGNOSIS: Stage IA, cT1b, cN0, cM0 ER/CT positive, HER2 IHC equivocal, FISH negative, left lower outer breast invasive ductal carcinoma grade 2:  ?   CURRENT TREATMENT: TBD    PAST TREATMENT:  Biopsy only  ?   ONCOLOGIC HISTORY:   Ms. Farmer was in her usual state of health and presented for routine screening mammogram 4/2022 with abnormality found in left upper outer quadrant breast. She denies any personal history of prior breast biopsy.  No family history of malignancy including breast/ovarian that she is aware of.  She denies any breast concerns including no pain, skin change/distortion or nipple discharge.  She has never used hormone replacement therapy.    Follow-up diagnostic mammogram on 05/02/2022 showed to areas of abnormality in close proximity 6 mm and 8 mm area spanning 1.3 cm per report.  No lymphadenopathy appreciated.  05/12/2022 biopsy showed invasive ductal carcinoma with mucinous features grade 2, no lymphovascular invasion, ER 95%, CT 40-45%, HER2 2+ IHC, fish negative    She is in good health follows with primary care for chronic medical issues including type 2 diabetes hypertension hyperlipidemia.  She has history of colon cancer stage I 2016 status post resection in surveillance most recent colonoscopy in June 2020 recommended 5 year follow-up per report.        Review of Systems    ?   A comprehensive 14-point review of systems was reviewed with patient and was negative other than as specified above.   ?   PAST MEDICAL HISTORY:   Past Medical History:   Diagnosis Date    Allergy     Arthritis     Cancer 2016     colon    CHF (congestive heart failure)     Colon cancer 2004    Colon cancer screening 2015    Diabetes mellitus type 2 in nonobese 3/9/2016    borderline    Diverticulosis     DM (diabetes mellitus) 2016    BS didn't check 2019    DM (diabetes mellitus) 2016    BS didn't check 2020    Hyperlipidemia 10/25/2016    Hypertension     Insomnia     Malignant neoplasm of lower-outer quadrant of left breast of female, estrogen receptor positive 2022    ?     PAST SURGICAL HISTORY:   Past Surgical History:   Procedure Laterality Date     SECTION      COLON SURGERY      COLONOSCOPY N/A 2015    Procedure: COLONOSCOPY;  Surgeon: Adela Sam MD;  Location: [facility] ENDO;  Service: Endoscopy;  Laterality: N/A;    COLONOSCOPY N/A 2017    Procedure: COLONOSCOPY;  Surgeon: Chintan Flores MD;  Location: [facility] ENDO;  Service: Endoscopy;  Laterality: N/A;    COLONOSCOPY N/A 2020    Procedure: COLONOSCOPY;  Surgeon: Grisel Atkins MD;  Location: [facility];  Service: Endoscopy;  Laterality: N/A;      ?   ALLERGIES:   Allergies as of 2022    (No Known Allergies)      ?   MEDICATIONS:?   Outpatient Medications Marked as Taking for the 22 encounter (Office Visit) with Soraya Murray MD   Medication Sig Dispense Refill    albuterol (PROVENTIL/VENTOLIN HFA) 90 mcg/actuation inhaler Inhale 1-2 puffs into the lungs every 6 (six) hours as needed for Wheezing or Shortness of Breath. 18 g 3    amLODIPine (NORVASC) 10 MG tablet TAKE 1 TABLET BY MOUTH EVERY DAY 90 tablet 0    cyclobenzaprine (FLEXERIL) 10 MG tablet TAKE 1 TABLET BY MOUTH THREE TIMES A DAY AS NEEDED 30 tablet 0    diclofenac sodium (VOLTAREN) 1 % Gel APPLY 2 GRAMS TOPICALLY 2 (TWO) TIMES DAILY AS NEEDED. 100 g 2    fluticasone propionate (FLONASE) 50 mcg/actuation nasal spray 2 sprays (100 mcg total) by Each Nostril route once daily. 48 mL 3    glycopyrrolate-formoteroL (BEVESPI AEROSPHERE)  9-4.8 mcg HFAA Inhale 2 puffs into the lungs 2 (two) times daily. Controller 10.9 g 11    hydroCHLOROthiazide (HYDRODIURIL) 25 MG tablet TAKE 1 TABLET BY MOUTH EVERY DAY 90 tablet 3    HYDROcodone-acetaminophen (NORCO) 7.5-325 mg per tablet Take 1 tablet by mouth every 6 (six) hours as needed for Pain. 12 tablet 0    hydrOXYchloroQUINE (PLAQUENIL) 200 mg tablet Take 1 tablet (200 mg total) by mouth 2 (two) times daily. 60 tablet 3    ketorolac 0.5% (ACULAR) 0.5 % Drop PLACE 1 DROP INTO THE RIGHT EYE 4 (FOUR) TIMES DAILY. EYEDROPS TO START ONE DAY BEFORE SURGERY 5 mL 2    levocetirizine (XYZAL) 5 MG tablet Take 1 tablet (5 mg total) by mouth every evening. 90 tablet 0    losartan (COZAAR) 100 MG tablet Take 1 tablet (100 mg total) by mouth once daily. 90 tablet 2    metFORMIN (GLUCOPHAGE-XR) 750 MG ER 24hr tablet TAKE 1 TABLET (750 MG TOTAL) BY MOUTH ONCE DAILY. 7 tablet 0    moxifloxacin (VIGAMOX) 0.5 % ophthalmic solution Place 1 drop into the right eye every 12 (twelve) hours. Start eyedrops one day before surgery 5 mL 2    multivitamin (ONE DAILY MULTIVITAMIN) per tablet Take 1 tablet by mouth once daily.      omeprazole (PRILOSEC) 20 MG capsule TAKE 1 CAPSULE BY MOUTH EVERY DAY 90 capsule 3    pravastatin (PRAVACHOL) 20 MG tablet TAKE 1 TABLET EVERY DAY 90 tablet 0    prednisoLONE acetate (PRED FORTE) 1 % DrpS PLACE 1 DROP INTO THE RIGHT EYE 4 (FOUR) TIMES DAILY. START ONE DAY BEFORE SURGERY 5 mL 2    traMADoL (ULTRAM) 50 mg tablet TAKE ONE TABLET po tid prn pain 12 tablet 0    traZODone (DESYREL) 50 MG tablet Take 1 tablet (50 mg total) by mouth nightly. 90 tablet 1      ?   SOCIAL HISTORY:?   Social History     Tobacco Use    Smoking status: Never Smoker    Smokeless tobacco: Never Used   Substance Use Topics    Alcohol use: Yes     Alcohol/week: 0.0 standard drinks     Comment: weekends.         ?      ?   FAMILY HISTORY:   family history includes Diabetes in her mother; Hypertension in her  brother, brother, brother, father, mother, sister, sister, sister, and sister.   ?        Objective:      Physical Exam      ?   Vitals:    06/09/22 0906   BP: (!) 150/74   Pulse: 60   Resp: 18      ?   ECOG:?0   General appearance: Generally well appearing, in no acute distress.   Head, eyes, ears, nose, and throat: Pupils round and equally reactive to light. Oropharynx clear with moist mucous membranes.   Breast:  No mass/nodule, skin change nipple abnormality or lymphadenopathy in axilla bilaterally  Abdomen:  Obese, nontender, nondistended.   Extremities: Warm, without edema.   Neurologic: Alert and oriented. Grossly normal strength, coordination, and gait.   Skin: No rashes, ecchymoses or petechial lesion.   ?    ?   Laboratory:  ?   No visits with results within 1 Day(s) from this visit.   Latest known visit with results is:   Lab Visit on 06/07/2022   Component Date Value Ref Range Status    Sodium 06/07/2022 136  136 - 145 mmol/L Final    Potassium 06/07/2022 3.0 (A) 3.5 - 5.1 mmol/L Final    Chloride 06/07/2022 98  95 - 110 mmol/L Final    CO2 06/07/2022 25  23 - 29 mmol/L Final    Glucose 06/07/2022 93  70 - 110 mg/dL Final    BUN 06/07/2022 18  8 - 23 mg/dL Final    Creatinine 06/07/2022 0.9  0.5 - 1.4 mg/dL Final    Calcium 06/07/2022 10.1  8.7 - 10.5 mg/dL Final    Total Protein 06/07/2022 8.1  6.0 - 8.4 g/dL Final    Albumin 06/07/2022 4.1  3.5 - 5.2 g/dL Final    Total Bilirubin 06/07/2022 0.5  0.1 - 1.0 mg/dL Final    Alkaline Phosphatase 06/07/2022 75  55 - 135 U/L Final    AST 06/07/2022 28  10 - 40 U/L Final    ALT 06/07/2022 22  10 - 44 U/L Final    Anion Gap 06/07/2022 13  8 - 16 mmol/L Final    eGFR if African American 06/07/2022 >60.0  >60 mL/min/1.73 m^2 Final    eGFR if non African American 06/07/2022 >60.0  >60 mL/min/1.73 m^2 Final    Hemoglobin A1C 06/07/2022 6.7 (A) 4.0 - 5.6 % Final    Estimated Avg Glucose 06/07/2022 146 (A) 68 - 131 mg/dL Final    Microalbumin,  Urine 06/07/2022 59.0  ug/mL Final    Creatinine, Urine 06/07/2022 186.0  15.0 - 325.0 mg/dL Final    Microalb/Creat Ratio 06/07/2022 31.7 (A) 0.0 - 30.0 ug/mg Final      Lab Results   Component Value Date    WBC 5.95 05/13/2021    HGB 13.0 05/13/2021    HCT 38.0 05/13/2021    MCV 90 05/13/2021     05/13/2021           ?   Imaging:  ?  5/2/22  Result:   Mammo Digital Diagnostic Left with Frankie  US Breast Left Limited     History:  Patient is 66 y.o. and is seen for diagnostic imaging.     Films Compared:  Compared to: 04/27/2022 Mammo Digital Screening Bilat w/ Frankie, 02/05/2021 Mammo Digital Screening Bilat w/ Frankie, and 01/25/2019 Mammo Digital Screening Bilat w/ Frankie     Findings:  This procedure was performed using tomosynthesis. Computer-aided detection was utilized in the interpretation of this examination.  The left breast is heterogeneously dense, which may obscure small masses.      Mammo Digital Diagnostic Left with Frankie  Left  Focal Asymmetry: There is a 25 mm focal asymmetry seen in the lower outer quadrant of the left breast in the middle depth.      US Breast Left Limited  Left  Mass: There are 2 similar oval, hypoechoic masses with indistinct margins with no posterior features seen in the left breast at 4 o'clock, 6 cm from the nipple. The masses measure 6 x 5 x 6 mm and 8 x 3 x 6 mm respectively.  Masses are  by distance of approximately 1.3 cm and may both potentially be within a duct. Findings appear to correlate with the mammographic asymmetry. Both lesions could be sampled with a single biopsy procedure given their close proximity.      No suspicious adenopathy demonstrated in the left axilla.      Impression:  Left  Focal Asymmetry: Left breast 25 mm focal asymmetry at the lower outer middle position. Assessment: 4 - Suspicious finding. Biopsy is recommended.      BI-RADS Category:   Overall: 4 - Suspicious     Recommendation:  Ultrasound-guided Biopsy is recommended.     Your  estimated lifetime risk of breast cancer (to age 85) based on Tyrer-Cuzick risk assessment model is Tyrer-Cuzick: 4.4 %. According to the American Cancer Society, patients with a lifetime breast cancer risk of 20% or higher might benefit from supplemental screening tests.     No results found for this or any previous visit (from the past 2160 hour(s)).  No results found for this or any previous visit (from the past 2160 hour(s)).  No results found for this or any previous visit (from the past 2160 hour(s)).      Pathology:    Final Pathologic Diagnosis Left breast, stereotactic core biopsy:   Invasive ductal carcinoma with mucinous features   Arnold grade 2:  Tubule formation-3, nuclear pleomorphism -2 and mitotic   count -1   Invasive carcinoma measures 10 mm in greatest dimension   Ductal carcinoma in Situ, intermediate nuclear grade, solid and cribriform   patterns with associated microcalcifications   Calcifications are also associated with the invasive component   No lymphovascular invasion is identified   Tumor biomarkers   Estrogen receptor (ER):  Positive, greater than 95%, strong   Progesterone receptor (PGR):  Positive, 40-45%, intermediate to strong   HER2 (IHC):  Equivocal, 2+   Ki-67:  25-30%   Additional IHC:   P63:  Highlights loss of myoepithelial cells, confirming invasive component   while focal retention of myoepithelial cells in the area of carcinoma in Situ.   All immunostains were performed with appropriate controls.   This case was reviewed by Dr. JUAN Lan who concurs with the above diagnosis.   Due to equivocal HER2 IHC, HER2 FISH will be performed with results provided   in a supplemental report.   HER2, Breast Tumor, FISH, Tissue   Result Summary   Negative   Interpretation   There is no evidence of HER2 (ERBB2) gene amplification in this tumor sample.   Jackie Saldivar M.D.   Report attached   Performing location:   [address] [address]  [place] [address]    Comment: Interp By Amee Mae M.D., Signed on 05/27/2022 at 12:25         ?   Assessment/Plan:   Malignant neoplasm of lower-outer quadrant of left breast of female, estrogen receptor positive    Malignant neoplasm of upper-outer quadrant of left breast in female, estrogen receptor positive  -     Ambulatory referral/consult to Hematology / Oncology    History of colon cancer, stage I       1. Malignant neoplasm of lower-outer quadrant of left breast of female, estrogen receptor positive    2. Malignant neoplasm of upper-outer quadrant of left breast in female, estrogen receptor positive    3. History of colon cancer, stage I          Plan:     Problem List Items Addressed This Visit        Oncology    History of colon cancer, stage I (Chronic)    Malignant neoplasm of lower-outer quadrant of left breast of female, estrogen receptor positive - Primary      Other Visit Diagnoses     Malignant neoplasm of upper-outer quadrant of left breast in female, estrogen receptor positive              Stage IA, cT1b, cN0, cM0 ER/NM positive, HER2 IHC equivocal, FISH negative, left lower outer breast invasive ductal carcinoma grade 2:  Two areas noted of abnormality on imaging maximal span 1.3cm primary lesion biopsy proven invasive ductal carcinoma. Mammo/US without axillary concerns on imaging or physical exam today. No notable family history of breast or other malignancy aside from personal history of colon cancer.  We discussed consideration of germline genetic testing which she prefers to defer at this time and can reassess especially of high risk features on final pathology. I discussed paradigm for treatment of invasive ductal carcinoma.  No risk features to necessitate neoadjuvant chemotherapy and will likely proceed with breast conserving therapy.  She does understand recommendation for adjuvant radiation; final pathology will help guide recommendation for/against adjuvant  chemotherapy.  Given hormone positive disease discussed is very important that she discontinue any exogenous estrogen; hormone lowering therapy with aromatase inhibitor is recommended following radiation therapy.       She expressed good understanding and agree with this plan.  She will meet with rest of treatment team in surgery and radiation oncology appointment was prior to our visit today.    We have discussed her case in multidisciplinary tumor board and will coordinate her treatment moving forward along with assistance of nurse navigation.  I will see her next after final pathology from surgery with genomic testing as indicated.       Follow-Up:   Patient Instructions   Follow-up with surgery recommended  Follow-up with us in Medical Oncology after final pathology/Oncotype p.r.n.

## 2022-06-09 NOTE — PATIENT INSTRUCTIONS
Follow-up with surgery recommended  Follow-up with us in Medical Oncology after final pathology/Oncotype p.r.n.

## 2022-06-15 ENCOUNTER — OFFICE VISIT (OUTPATIENT)
Dept: RHEUMATOLOGY | Facility: CLINIC | Age: 67
End: 2022-06-15
Payer: MEDICARE

## 2022-06-15 VITALS
DIASTOLIC BLOOD PRESSURE: 70 MMHG | BODY MASS INDEX: 28.34 KG/M2 | HEART RATE: 52 BPM | SYSTOLIC BLOOD PRESSURE: 135 MMHG | WEIGHT: 176.38 LBS | HEIGHT: 66 IN

## 2022-06-15 DIAGNOSIS — D86.1 SARCOIDOSIS OF LYMPH NODES: Primary | ICD-10-CM

## 2022-06-15 DIAGNOSIS — Z71.89 COUNSELING ON HEALTH PROMOTION AND DISEASE PREVENTION: ICD-10-CM

## 2022-06-15 DIAGNOSIS — M17.11 PRIMARY OSTEOARTHRITIS OF RIGHT KNEE: ICD-10-CM

## 2022-06-15 PROCEDURE — 3060F POS MICROALBUMINURIA REV: CPT | Mod: CPTII,S$GLB,, | Performed by: INTERNAL MEDICINE

## 2022-06-15 PROCEDURE — 20610 DRAIN/INJ JOINT/BURSA W/O US: CPT | Mod: RT,S$GLB,, | Performed by: INTERNAL MEDICINE

## 2022-06-15 PROCEDURE — 99214 OFFICE O/P EST MOD 30 MIN: CPT | Mod: 25,S$GLB,, | Performed by: INTERNAL MEDICINE

## 2022-06-15 PROCEDURE — 1159F MED LIST DOCD IN RCRD: CPT | Mod: CPTII,S$GLB,, | Performed by: INTERNAL MEDICINE

## 2022-06-15 PROCEDURE — 20610 LARGE JOINT ASPIRATION/INJECTION: R KNEE: ICD-10-PCS | Mod: RT,S$GLB,, | Performed by: INTERNAL MEDICINE

## 2022-06-15 PROCEDURE — 1101F PT FALLS ASSESS-DOCD LE1/YR: CPT | Mod: CPTII,S$GLB,, | Performed by: INTERNAL MEDICINE

## 2022-06-15 PROCEDURE — 99999 PR PBB SHADOW E&M-EST. PATIENT-LVL IV: CPT | Mod: PBBFAC,,, | Performed by: INTERNAL MEDICINE

## 2022-06-15 PROCEDURE — 3066F NEPHROPATHY DOC TX: CPT | Mod: CPTII,S$GLB,, | Performed by: INTERNAL MEDICINE

## 2022-06-15 PROCEDURE — 1125F PR PAIN SEVERITY QUANTIFIED, PAIN PRESENT: ICD-10-PCS | Mod: CPTII,S$GLB,, | Performed by: INTERNAL MEDICINE

## 2022-06-15 PROCEDURE — 99999 PR PBB SHADOW E&M-EST. PATIENT-LVL IV: ICD-10-PCS | Mod: PBBFAC,,, | Performed by: INTERNAL MEDICINE

## 2022-06-15 PROCEDURE — 3288F FALL RISK ASSESSMENT DOCD: CPT | Mod: CPTII,S$GLB,, | Performed by: INTERNAL MEDICINE

## 2022-06-15 PROCEDURE — 3008F PR BODY MASS INDEX (BMI) DOCUMENTED: ICD-10-PCS | Mod: CPTII,S$GLB,, | Performed by: INTERNAL MEDICINE

## 2022-06-15 PROCEDURE — 3044F HG A1C LEVEL LT 7.0%: CPT | Mod: CPTII,S$GLB,, | Performed by: INTERNAL MEDICINE

## 2022-06-15 PROCEDURE — 1125F AMNT PAIN NOTED PAIN PRSNT: CPT | Mod: CPTII,S$GLB,, | Performed by: INTERNAL MEDICINE

## 2022-06-15 PROCEDURE — 99214 PR OFFICE/OUTPT VISIT, EST, LEVL IV, 30-39 MIN: ICD-10-PCS | Mod: 25,S$GLB,, | Performed by: INTERNAL MEDICINE

## 2022-06-15 PROCEDURE — 3008F BODY MASS INDEX DOCD: CPT | Mod: CPTII,S$GLB,, | Performed by: INTERNAL MEDICINE

## 2022-06-15 PROCEDURE — 3066F PR DOCUMENTATION OF TREATMENT FOR NEPHROPATHY: ICD-10-PCS | Mod: CPTII,S$GLB,, | Performed by: INTERNAL MEDICINE

## 2022-06-15 PROCEDURE — 3060F PR POS MICROALBUMINURIA RESULT DOCUMENTED/REVIEW: ICD-10-PCS | Mod: CPTII,S$GLB,, | Performed by: INTERNAL MEDICINE

## 2022-06-15 PROCEDURE — 1159F PR MEDICATION LIST DOCUMENTED IN MEDICAL RECORD: ICD-10-PCS | Mod: CPTII,S$GLB,, | Performed by: INTERNAL MEDICINE

## 2022-06-15 PROCEDURE — 1101F PR PT FALLS ASSESS DOC 0-1 FALLS W/OUT INJ PAST YR: ICD-10-PCS | Mod: CPTII,S$GLB,, | Performed by: INTERNAL MEDICINE

## 2022-06-15 PROCEDURE — 3288F PR FALLS RISK ASSESSMENT DOCUMENTED: ICD-10-PCS | Mod: CPTII,S$GLB,, | Performed by: INTERNAL MEDICINE

## 2022-06-15 PROCEDURE — 3072F PR LOW RISK FOR RETINOPATHY: ICD-10-PCS | Mod: CPTII,S$GLB,, | Performed by: INTERNAL MEDICINE

## 2022-06-15 PROCEDURE — 3072F LOW RISK FOR RETINOPATHY: CPT | Mod: CPTII,S$GLB,, | Performed by: INTERNAL MEDICINE

## 2022-06-15 PROCEDURE — 3044F PR MOST RECENT HEMOGLOBIN A1C LEVEL <7.0%: ICD-10-PCS | Mod: CPTII,S$GLB,, | Performed by: INTERNAL MEDICINE

## 2022-06-15 RX ORDER — TRIAMCINOLONE ACETONIDE 40 MG/ML
40 INJECTION, SUSPENSION INTRA-ARTICULAR; INTRAMUSCULAR
Status: DISCONTINUED | OUTPATIENT
Start: 2022-06-15 | End: 2022-06-15 | Stop reason: HOSPADM

## 2022-06-15 RX ADMIN — TRIAMCINOLONE ACETONIDE 40 MG: 40 INJECTION, SUSPENSION INTRA-ARTICULAR; INTRAMUSCULAR at 03:06

## 2022-06-15 NOTE — PROGRESS NOTES
RHEUMATOLOGY OUTPATIENT CLINIC NOTE    6/15/2022    Attending Rheumatologist: Leon Suh  Primary Care Provider/Physician Requesting Consultation: Chiquita Talley MD   Chief Complaint/Reason For Consultation:  Sarcoidosis and Osteoarthritis      Subjective:     Anne Farmer is a 66 y.o. Black or  female with with lung and lymph node involvement for follow-up visit.    Main concern of right knee pain with prominent mechanical pattern.  No association with trauma or recent fall.      Review of Systems   Constitutional: Negative for fever.   Eyes: Negative for blurred vision, photophobia and pain.   Respiratory: Negative for cough and shortness of breath.    Gastrointestinal: Negative for blood in stool and melena.   Musculoskeletal: Positive for joint pain.   Skin: Negative for rash.   Neurological: Negative for tingling and focal weakness.       Chronic comorbid conditions affecting medical decision making today:  Past Medical History:   Diagnosis Date    Allergy     Arthritis     Cancer 2016    colon    CHF (congestive heart failure)     Colon cancer 2004    Colon cancer screening 2015    Diabetes mellitus type 2 in nonobese 3/9/2016    borderline    Diverticulosis     DM (diabetes mellitus) 2016    BS didn't check 2019    DM (diabetes mellitus) 2016    BS didn't check 2020    Hyperlipidemia 10/25/2016    Hypertension     Insomnia     Malignant neoplasm of lower-outer quadrant of left breast of female, estrogen receptor positive 2022     Past Surgical History:   Procedure Laterality Date     SECTION      COLON SURGERY      COLONOSCOPY N/A 2015    Procedure: COLONOSCOPY;  Surgeon: Adela Sam MD;  Location: [facility];  Service: Endoscopy;  Laterality: N/A;    COLONOSCOPY N/A 2017    Procedure: COLONOSCOPY;  Surgeon: Chintan Flores MD;  Location: [facility];  Service: Endoscopy;  Laterality: N/A;    COLONOSCOPY N/A  6/30/2020    Procedure: COLONOSCOPY;  Surgeon: Grisel Atkins MD;  Location: [facility];  Service: Endoscopy;  Laterality: N/A;     Family History   Problem Relation Age of Onset    Hypertension Mother     Diabetes Mother     Hypertension Father     Hypertension Sister     Hypertension Brother     Hypertension Sister     Hypertension Sister     Hypertension Sister     Hypertension Brother     Hypertension Brother      Social History     Substance and Sexual Activity   Alcohol Use Yes    Alcohol/week: 0.0 standard drinks    Comment: weekends.        Social History     Tobacco Use   Smoking Status Never Smoker   Smokeless Tobacco Never Used     Social History     Substance and Sexual Activity   Drug Use No       Current Outpatient Medications:     albuterol (PROVENTIL/VENTOLIN HFA) 90 mcg/actuation inhaler, Inhale 1-2 puffs into the lungs every 6 (six) hours as needed for Wheezing or Shortness of Breath., Disp: 18 g, Rfl: 3    amLODIPine (NORVASC) 10 MG tablet, TAKE 1 TABLET BY MOUTH EVERY DAY, Disp: 90 tablet, Rfl: 0    blood sugar diagnostic Strp, 1 strip by Misc.(Non-Drug; Combo Route) route once daily. One Touch Ultra, Disp: 180 each, Rfl: 0    blood-glucose meter kit, Use to check blood sugar once daily. One Touch Ultra, Disp: 1 each, Rfl: 0    cyclobenzaprine (FLEXERIL) 10 MG tablet, TAKE 1 TABLET BY MOUTH THREE TIMES A DAY AS NEEDED, Disp: 30 tablet, Rfl: 0    diclofenac sodium (VOLTAREN) 1 % Gel, APPLY 2 GRAMS TOPICALLY 2 (TWO) TIMES DAILY AS NEEDED., Disp: 100 g, Rfl: 2    fluticasone propionate (FLONASE) 50 mcg/actuation nasal spray, 2 sprays (100 mcg total) by Each Nostril route once daily., Disp: 48 mL, Rfl: 3    FLUZONE HIGHDOSE QUAD 20-21  mcg/0.7 mL Syrg, PHARMACY ADMINISTERED, Disp: , Rfl:     glycopyrrolate-formoteroL (BEVESPI AEROSPHERE) 9-4.8 mcg HFAA, Inhale 2 puffs into the lungs 2 (two) times daily. Controller, Disp: 10.9 g, Rfl: 11    hydroCHLOROthiazide  (HYDRODIURIL) 25 MG tablet, TAKE 1 TABLET BY MOUTH EVERY DAY, Disp: 90 tablet, Rfl: 3    HYDROcodone-acetaminophen (NORCO) 7.5-325 mg per tablet, Take 1 tablet by mouth every 6 (six) hours as needed for Pain., Disp: 12 tablet, Rfl: 0    hydrOXYchloroQUINE (PLAQUENIL) 200 mg tablet, Take 1 tablet (200 mg total) by mouth 2 (two) times daily., Disp: 60 tablet, Rfl: 3    ketorolac 0.5% (ACULAR) 0.5 % Drop, PLACE 1 DROP INTO THE RIGHT EYE 4 (FOUR) TIMES DAILY. EYEDROPS TO START ONE DAY BEFORE SURGERY, Disp: 5 mL, Rfl: 2    lancets Misc, 1 lancet by Misc.(Non-Drug; Combo Route) route once daily. One Touch Delica, Disp: 180 each, Rfl: 0    levocetirizine (XYZAL) 5 MG tablet, Take 1 tablet (5 mg total) by mouth every evening., Disp: 90 tablet, Rfl: 0    losartan (COZAAR) 100 MG tablet, Take 1 tablet (100 mg total) by mouth once daily., Disp: 90 tablet, Rfl: 2    moxifloxacin (VIGAMOX) 0.5 % ophthalmic solution, Place 1 drop into the right eye every 12 (twelve) hours. Start eyedrops one day before surgery, Disp: 5 mL, Rfl: 2    multivitamin (ONE DAILY MULTIVITAMIN) per tablet, Take 1 tablet by mouth once daily., Disp: , Rfl:     omeprazole (PRILOSEC) 20 MG capsule, TAKE 1 CAPSULE BY MOUTH EVERY DAY, Disp: 90 capsule, Rfl: 3    pravastatin (PRAVACHOL) 20 MG tablet, TAKE 1 TABLET EVERY DAY, Disp: 90 tablet, Rfl: 0    prednisoLONE acetate (PRED FORTE) 1 % DrpS, PLACE 1 DROP INTO THE RIGHT EYE 4 (FOUR) TIMES DAILY. START ONE DAY BEFORE SURGERY, Disp: 5 mL, Rfl: 2    traMADoL (ULTRAM) 50 mg tablet, TAKE ONE TABLET po tid prn pain, Disp: 12 tablet, Rfl: 0    traZODone (DESYREL) 50 MG tablet, Take 1 tablet (50 mg total) by mouth nightly., Disp: 90 tablet, Rfl: 1    metFORMIN (GLUCOPHAGE-XR) 750 MG ER 24hr tablet, TAKE 1 TABLET (750 MG TOTAL) BY MOUTH ONCE DAILY., Disp: 7 tablet, Rfl: 0     Objective:     Vitals:    06/15/22 1452   BP: 135/70   Pulse: (!) 52     Physical Exam   Eyes: Conjunctivae are normal.    Pulmonary/Chest: Effort normal. No respiratory distress.   Musculoskeletal:         General: No swelling or tenderness.   Neurological: She displays no weakness.   Skin: No rash noted.       Reviewed available old and all outside pertinent medical records available.    All lab results personally reviewed and interpreted by me.  Lab Results   Component Value Date    WBC 5.95 05/13/2021    HGB 13.0 05/13/2021    HCT 38.0 05/13/2021    MCV 90 05/13/2021    RDW 14.5 05/13/2021     05/13/2021       Lab Results   Component Value Date     06/07/2022    K 3.0 (L) 06/07/2022    CL 98 06/07/2022    CO2 25 06/07/2022    GLU 93 06/07/2022    BUN 18 06/07/2022    CALCIUM 10.1 06/07/2022    PROT 8.1 06/07/2022    ALBUMIN 4.1 06/07/2022    BILITOT 0.5 06/07/2022    AST 28 06/07/2022    ALKPHOS 75 06/07/2022    ALT 22 06/07/2022       Lab Results   Component Value Date    COLORU Yellow 04/22/2015    APPEARANCEUA Clear 04/22/2015    SPECGRAV 1.020 04/22/2015    PHUR 6.0 04/22/2015    PROTEINUA Negative 04/22/2015    KETONESU Negative 04/22/2015    LEUKOCYTESUR Negative 04/22/2015    NITRITE Negative 04/22/2015       No results found for: PTH    Lab Results   Component Value Date    URICACID 4.6 11/05/2012       Lab Results   Component Value Date    CRP 20.5 (H) 03/17/2021       No results found for: ANATITER    No components found for: TSPOTTB,  QUANTIFERON     ASSESSMENT:     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 Main concern today of right knee pain with mechanical pattern from OA.  Repeat Ace level within normal range, calcium remained stable.  Denies respiratory symptoms, last chest x-ray on file stable.  Interval diagnosis of breast Ca (5/2022).  Currently without manifestations suggestive of sarcoidosis relapse, may discontinue Plaquenil.  Will provide local steroid injection on right knee for symptomatic relief and consider  viscosupplementation.    PLAN:     1. Sarcoidosis    2. Osteoarthritis    3. Other specified counseling      Disclaimer: This note is prepared using voice recognition software and as such is likely to have errors and has not been proof read. Please contact me for questions.       Leon Suh M.D.

## 2022-06-15 NOTE — PROCEDURES
Large Joint Aspiration/Injection: R knee    Date/Time: 6/15/2022 3:30 PM  Performed by: Leon Suh MD  Authorized by: Leon Suh MD     Consent Done?:  Yes (Verbal)  Indications:  Pain  Site marked: the procedure site was marked    Timeout: prior to procedure the correct patient, procedure, and site was verified    Prep: patient was prepped and draped in usual sterile fashion    Local anesthetic:  Lidocaine 2% without epinephrine    Details:  Needle Size:  25 G  Ultrasonic Guidance for needle placement?: No    Approach:  Anterolateral  Location:  Knee  Site:  R knee  Medications:  40 mg triamcinolone acetonide 40 mg/mL  Patient tolerance:  Patient tolerated the procedure well with no immediate complications

## 2022-06-16 ENCOUNTER — TELEPHONE (OUTPATIENT)
Dept: SURGERY | Facility: CLINIC | Age: 67
End: 2022-06-16

## 2022-06-16 ENCOUNTER — OFFICE VISIT (OUTPATIENT)
Dept: SURGERY | Facility: CLINIC | Age: 67
End: 2022-06-16
Payer: MEDICARE

## 2022-06-16 ENCOUNTER — HOSPITAL ENCOUNTER (OUTPATIENT)
Dept: CARDIOLOGY | Facility: HOSPITAL | Age: 67
Discharge: HOME | End: 2022-06-16
Attending: SURGERY
Payer: MEDICARE

## 2022-06-16 VITALS
SYSTOLIC BLOOD PRESSURE: 137 MMHG | TEMPERATURE: 98 F | WEIGHT: 176.13 LBS | BODY MASS INDEX: 28.43 KG/M2 | HEART RATE: 54 BPM | DIASTOLIC BLOOD PRESSURE: 68 MMHG

## 2022-06-16 DIAGNOSIS — C50.512 MALIGNANT NEOPLASM OF LOWER-OUTER QUADRANT OF LEFT BREAST OF FEMALE, ESTROGEN RECEPTOR POSITIVE: Primary | ICD-10-CM

## 2022-06-16 DIAGNOSIS — Z17.0 MALIGNANT NEOPLASM OF LOWER-OUTER QUADRANT OF LEFT BREAST OF FEMALE, ESTROGEN RECEPTOR POSITIVE: Primary | ICD-10-CM

## 2022-06-16 DIAGNOSIS — Z01.818 PRE-OP TESTING: ICD-10-CM

## 2022-06-16 DIAGNOSIS — Z17.0 MALIGNANT NEOPLASM OF LOWER-OUTER QUADRANT OF LEFT BREAST OF FEMALE, ESTROGEN RECEPTOR POSITIVE: ICD-10-CM

## 2022-06-16 DIAGNOSIS — C50.512 MALIGNANT NEOPLASM OF LOWER-OUTER QUADRANT OF LEFT BREAST OF FEMALE, ESTROGEN RECEPTOR POSITIVE: ICD-10-CM

## 2022-06-16 PROCEDURE — 99999 PR PBB SHADOW E&M-EST. PATIENT-LVL V: CPT | Mod: PBBFAC,,, | Performed by: SURGERY

## 2022-06-16 PROCEDURE — 3066F NEPHROPATHY DOC TX: CPT | Mod: CPTII,S$GLB,, | Performed by: SURGERY

## 2022-06-16 PROCEDURE — 3078F PR MOST RECENT DIASTOLIC BLOOD PRESSURE < 80 MM HG: ICD-10-PCS | Mod: CPTII,S$GLB,, | Performed by: SURGERY

## 2022-06-16 PROCEDURE — 1126F AMNT PAIN NOTED NONE PRSNT: CPT | Mod: CPTII,S$GLB,, | Performed by: SURGERY

## 2022-06-16 PROCEDURE — 3044F HG A1C LEVEL LT 7.0%: CPT | Mod: CPTII,S$GLB,, | Performed by: SURGERY

## 2022-06-16 PROCEDURE — 1160F RVW MEDS BY RX/DR IN RCRD: CPT | Mod: CPTII,S$GLB,, | Performed by: SURGERY

## 2022-06-16 PROCEDURE — 99999 PR PBB SHADOW E&M-EST. PATIENT-LVL V: ICD-10-PCS | Mod: PBBFAC,,, | Performed by: SURGERY

## 2022-06-16 PROCEDURE — 1159F MED LIST DOCD IN RCRD: CPT | Mod: CPTII,S$GLB,, | Performed by: SURGERY

## 2022-06-16 PROCEDURE — 3066F PR DOCUMENTATION OF TREATMENT FOR NEPHROPATHY: ICD-10-PCS | Mod: CPTII,S$GLB,, | Performed by: SURGERY

## 2022-06-16 PROCEDURE — 3008F BODY MASS INDEX DOCD: CPT | Mod: CPTII,S$GLB,, | Performed by: SURGERY

## 2022-06-16 PROCEDURE — 3075F PR MOST RECENT SYSTOLIC BLOOD PRESS GE 130-139MM HG: ICD-10-PCS | Mod: CPTII,S$GLB,, | Performed by: SURGERY

## 2022-06-16 PROCEDURE — 3060F PR POS MICROALBUMINURIA RESULT DOCUMENTED/REVIEW: ICD-10-PCS | Mod: CPTII,S$GLB,, | Performed by: SURGERY

## 2022-06-16 PROCEDURE — 3060F POS MICROALBUMINURIA REV: CPT | Mod: CPTII,S$GLB,, | Performed by: SURGERY

## 2022-06-16 PROCEDURE — 93010 EKG 12-LEAD: ICD-10-PCS | Mod: ,,, | Performed by: INTERNAL MEDICINE

## 2022-06-16 PROCEDURE — 99214 PR OFFICE/OUTPT VISIT, EST, LEVL IV, 30-39 MIN: ICD-10-PCS | Mod: S$GLB,,, | Performed by: SURGERY

## 2022-06-16 PROCEDURE — 3044F PR MOST RECENT HEMOGLOBIN A1C LEVEL <7.0%: ICD-10-PCS | Mod: CPTII,S$GLB,, | Performed by: SURGERY

## 2022-06-16 PROCEDURE — 99214 OFFICE O/P EST MOD 30 MIN: CPT | Mod: S$GLB,,, | Performed by: SURGERY

## 2022-06-16 PROCEDURE — 3072F LOW RISK FOR RETINOPATHY: CPT | Mod: CPTII,S$GLB,, | Performed by: SURGERY

## 2022-06-16 PROCEDURE — 3072F PR LOW RISK FOR RETINOPATHY: ICD-10-PCS | Mod: CPTII,S$GLB,, | Performed by: SURGERY

## 2022-06-16 PROCEDURE — 1159F PR MEDICATION LIST DOCUMENTED IN MEDICAL RECORD: ICD-10-PCS | Mod: CPTII,S$GLB,, | Performed by: SURGERY

## 2022-06-16 PROCEDURE — 3075F SYST BP GE 130 - 139MM HG: CPT | Mod: CPTII,S$GLB,, | Performed by: SURGERY

## 2022-06-16 PROCEDURE — 93005 ELECTROCARDIOGRAM TRACING: CPT

## 2022-06-16 PROCEDURE — 3008F PR BODY MASS INDEX (BMI) DOCUMENTED: ICD-10-PCS | Mod: CPTII,S$GLB,, | Performed by: SURGERY

## 2022-06-16 PROCEDURE — 1160F PR REVIEW ALL MEDS BY PRESCRIBER/CLIN PHARMACIST DOCUMENTED: ICD-10-PCS | Mod: CPTII,S$GLB,, | Performed by: SURGERY

## 2022-06-16 PROCEDURE — 1126F PR PAIN SEVERITY QUANTIFIED, NO PAIN PRESENT: ICD-10-PCS | Mod: CPTII,S$GLB,, | Performed by: SURGERY

## 2022-06-16 PROCEDURE — 93010 ELECTROCARDIOGRAM REPORT: CPT | Mod: ,,, | Performed by: INTERNAL MEDICINE

## 2022-06-16 PROCEDURE — 3078F DIAST BP <80 MM HG: CPT | Mod: CPTII,S$GLB,, | Performed by: SURGERY

## 2022-06-16 RX ORDER — ONDANSETRON 4 MG/1
8 TABLET, ORALLY DISINTEGRATING ORAL EVERY 8 HOURS PRN
Status: CANCELLED | OUTPATIENT
Start: 2022-06-16

## 2022-06-16 RX ORDER — LIDOCAINE HYDROCHLORIDE 10 MG/ML
1 INJECTION, SOLUTION EPIDURAL; INFILTRATION; INTRACAUDAL; PERINEURAL ONCE
Status: DISCONTINUED | OUTPATIENT
Start: 2022-06-16 | End: 2022-07-05 | Stop reason: HOSPADM

## 2022-06-16 NOTE — PROGRESS NOTES
Patient ID: Anne Farmer is a 66 y.o. female.    Left breast cancer    Chief Complaint: Pre-op Exam (Discuss breast surgery)      HPI:  Patient had an abnormal mammogram.  She was found to have a left breast cancer.  She was seen by Oncology and Radiation Oncology.  At this point the plan is to proceed with surgery and final recommendations for chemotherapy will be pending final pathology results.  She would need radiation likely if she undergoes breast conservation therapy.  The patient desires to undergo breast conservation therapy.    She does not complain of any chest pain shortness of breath.  Previous breast exam showed no masses      Review of Systems   Constitutional: Negative for appetite change, chills, fatigue, fever and unexpected weight change.   HENT: Negative for hearing loss and rhinorrhea.    Eyes: Negative for visual disturbance.   Respiratory: Negative for apnea, cough, shortness of breath and wheezing.    Cardiovascular: Negative for chest pain and palpitations.   Gastrointestinal: Negative for abdominal distention, abdominal pain, blood in stool, constipation, diarrhea, nausea and vomiting.   Genitourinary: Negative for dysuria, frequency and urgency.   Musculoskeletal: Negative for arthralgias and neck pain.   Skin: Negative for rash.   Neurological: Negative for seizures, weakness, numbness and headaches.   Hematological: Negative for adenopathy. Does not bruise/bleed easily.   Psychiatric/Behavioral: Negative for hallucinations. The patient is not nervous/anxious.        Current Outpatient Medications   Medication Sig Dispense Refill    albuterol (PROVENTIL/VENTOLIN HFA) 90 mcg/actuation inhaler Inhale 1-2 puffs into the lungs every 6 (six) hours as needed for Wheezing or Shortness of Breath. 18 g 3    amLODIPine (NORVASC) 10 MG tablet TAKE 1 TABLET BY MOUTH EVERY DAY 90 tablet 0    cyclobenzaprine (FLEXERIL) 10 MG tablet TAKE 1 TABLET BY MOUTH THREE TIMES A DAY AS NEEDED 30 tablet  0    diclofenac sodium (VOLTAREN) 1 % Gel APPLY 2 GRAMS TOPICALLY 2 (TWO) TIMES DAILY AS NEEDED. 100 g 2    fluticasone propionate (FLONASE) 50 mcg/actuation nasal spray 2 sprays (100 mcg total) by Each Nostril route once daily. 48 mL 3    FLUZONE HIGHDOSE QUAD 20-21  mcg/0.7 mL Syrg PHARMACY ADMINISTERED      glycopyrrolate-formoteroL (BEVESPI AEROSPHERE) 9-4.8 mcg HFAA Inhale 2 puffs into the lungs 2 (two) times daily. Controller 10.9 g 11    hydroCHLOROthiazide (HYDRODIURIL) 25 MG tablet TAKE 1 TABLET BY MOUTH EVERY DAY 90 tablet 3    ketorolac 0.5% (ACULAR) 0.5 % Drop PLACE 1 DROP INTO THE RIGHT EYE 4 (FOUR) TIMES DAILY. EYEDROPS TO START ONE DAY BEFORE SURGERY 5 mL 2    levocetirizine (XYZAL) 5 MG tablet Take 1 tablet (5 mg total) by mouth every evening. 90 tablet 0    losartan (COZAAR) 100 MG tablet Take 1 tablet (100 mg total) by mouth once daily. 90 tablet 2    multivitamin (ONE DAILY MULTIVITAMIN) per tablet Take 1 tablet by mouth once daily.      omeprazole (PRILOSEC) 20 MG capsule TAKE 1 CAPSULE BY MOUTH EVERY DAY 90 capsule 3    pravastatin (PRAVACHOL) 20 MG tablet TAKE 1 TABLET EVERY DAY 90 tablet 0    prednisoLONE acetate (PRED FORTE) 1 % DrpS PLACE 1 DROP INTO THE RIGHT EYE 4 (FOUR) TIMES DAILY. START ONE DAY BEFORE SURGERY 5 mL 2    traMADoL (ULTRAM) 50 mg tablet TAKE ONE TABLET po tid prn pain 12 tablet 0    traZODone (DESYREL) 50 MG tablet Take 1 tablet (50 mg total) by mouth nightly. 90 tablet 1    HYDROcodone-acetaminophen (NORCO) 7.5-325 mg per tablet Take 1 tablet by mouth every 6 (six) hours as needed for Pain. (Patient not taking: Reported on 6/16/2022) 12 tablet 0     No current facility-administered medications for this visit.       Review of patient's allergies indicates:  No Known Allergies    Past Medical History:   Diagnosis Date    Allergy     Arthritis     Cancer 2016    colon    CHF (congestive heart failure)     Colon cancer 2004    Colon cancer screening  2015    Diabetes mellitus type 2 in nonobese 3/9/2016    borderline    Diverticulosis     DM (diabetes mellitus) 2016    BS didn't check 2019    DM (diabetes mellitus) 2016    BS didn't check 2020    Hyperlipidemia 10/25/2016    Hypertension     Insomnia     Malignant neoplasm of lower-outer quadrant of left breast of female, estrogen receptor positive 2022       Past Surgical History:   Procedure Laterality Date     SECTION      COLON SURGERY      COLONOSCOPY N/A 2015    Procedure: COLONOSCOPY;  Surgeon: Adela Sam MD;  Location: [facility];  Service: Endoscopy;  Laterality: N/A;    COLONOSCOPY N/A 2017    Procedure: COLONOSCOPY;  Surgeon: Chintan Flores MD;  Location: [facility];  Service: Endoscopy;  Laterality: N/A;    COLONOSCOPY N/A 2020    Procedure: COLONOSCOPY;  Surgeon: Grisel Atkins MD;  Location: [facility];  Service: Endoscopy;  Laterality: N/A;       Family History   Problem Relation Age of Onset    Hypertension Mother     Diabetes Mother     Hypertension Father     Hypertension Sister     Hypertension Brother     Hypertension Sister     Hypertension Sister     Hypertension Sister     Hypertension Brother     Hypertension Brother        Social History     Socioeconomic History    Marital status: Single   Occupational History     Employer: [employer]   Tobacco Use    Smoking status: Never Smoker    Smokeless tobacco: Never Used   Substance and Sexual Activity    Alcohol use: Yes     Alcohol/week: 0.0 standard drinks     Comment: weekends.       Drug use: No       Vitals:    22 1123   BP: 137/68   Pulse: (!) 54   Temp: 98.2 °F (36.8 °C)       Physical Exam  Constitutional:       Appearance: Normal appearance. She is well-developed.   HENT:      Head: Normocephalic.   Eyes:      Pupils: Pupils are equal, round, and reactive to light.   Neck:      Thyroid: No thyromegaly.      Vascular: No JVD.      Trachea:  No tracheal deviation.   Cardiovascular:      Rate and Rhythm: Normal rate and regular rhythm.      Heart sounds: Normal heart sounds.   Pulmonary:      Breath sounds: Normal breath sounds. No wheezing.   Abdominal:      General: Bowel sounds are normal. There is no distension.      Palpations: Abdomen is soft. Abdomen is not rigid. There is no mass.      Tenderness: There is no abdominal tenderness. There is no guarding or rebound.   Musculoskeletal:         General: Normal range of motion.   Lymphadenopathy:      Cervical: No cervical adenopathy.   Skin:     General: Skin is warm and dry.      Findings: No erythema or rash.      Comments: Previous left breast exam showed no abnormalities   Neurological:      Mental Status: She is alert and oriented to person, place, and time.       Final Pathologic Diagnosis Left breast, stereotactic core biopsy:   Invasive ductal carcinoma with mucinous features   [place] grade 2:  Tubule formation-3, nuclear pleomorphism -2 and mitotic   count -1   Invasive carcinoma measures 10 mm in greatest dimension   Ductal carcinoma in Situ, intermediate nuclear grade, solid and cribriform   patterns with associated microcalcifications   Calcifications are also associated with the invasive component   No lymphovascular invasion is identified   Tumor biomarkers   Estrogen receptor (ER):  Positive, greater than 95%, strong   Progesterone receptor (PGR):  Positive, 40-45%, intermediate to strong   HER2 (IHC):  Equivocal, 2+   Ki-67:  25-30%   Additional IHC:   P63:  Highlights loss of myoepithelial cells, confirming invasive component   while focal retention of myoepithelial cells in the area of carcinoma in Situ.   All immunostains were performed with appropriate controls.   This case was reviewed by Dr. JUAN Lan who concurs with the above diagnosis.   Due to equivocal HER2 IHC, HER2 FISH will be performed with results provided   in a supplemental report.   HER2, Breast Tumor, FISH,  Tissue   Result Summary   Negative   Interpretation   There is no evidence of HER2 (ERBB2) gene amplification in this tumor sample.   Jackie Saldivar M.D.   Report attached   Performing location:   [place] [address]    Comment: Interp By Amee Mae M.D., Signed on 05/27/2022 at 12:25         Assessment & Plan:      Left breast cancer.    I discussed lumpectomy and sentinel node biopsy with they possible need for axillary dissection versus mastectomy with sentinel node biopsy and possible need for axillary dissection.  I discussed the role of radiation with lumpectomy and mastectomy.    The patient would like to undergo breast conservation therapy.    She is a good candidate for a left lumpectomy, sentinel node biopsy and axillary dissection only if there are 3 positive lymph nodes or if extracapsular extension is found.      She will need left breast lymphoscintigraphy preoperatively.  She will need a reflector placed which will be done on June 29th.    The risks benefits and complications of surgery were discussed.  This includes infection, bleeding, hematoma, seroma, injury to the great vessels in the axilla, difficulty moving the shoulder, and lymphedema.  The need for additional surgery was discussed with her pending the results of the final pathology.    She will need a CBC, CMP, EKG scheduled preoperatively preoperative

## 2022-06-16 NOTE — PATIENT INSTRUCTIONS
"We will schedule your surgery for for July 5th at 9:00 a.m..    There are no medications you have to stop.    No solid food after midnight on July 4th but you may have clear liquids up to 3 hours before your arrival time at the hospital.    If any abnormalities of labs are noted we will let you know    [facility]  Instructions for Patients and Families    You are invited to share your experience with me and my staff.  If you receive a survey in the mail, please return it at your convenience, or complete a brief survey on [employer].  We value your opinion!        Did you know that [facility] can be used to make appointments?  Just select "Schedule Appointment" under the "Visits" menu.    Notify you if any of your preop laboratories show abnormalities.  I    Before surgery:  The pre-op nurse from the hospital will call you before the day of your surgery to confirm your arrival time.  The time of your surgery may change due to emergencies or other unforeseen events.  Do not eat or drink anything after midnight the night before your procedure, except for clear liquids up to three hours before your surgery time, and sips of water with medication.  If you are not diabetic, it is recommended that you drink a glass of clear fruit juice (apple, grape, cranberry, not orange) three hours before your surgery time, but nothing after that.  If you are diabetic, you may have water or sugar-free clear liquids such as Crystal Light up to three hours before your surgery time.    Day of Surgery:  You will go to a pre-op area where an IV will be started and you will speak to the anesthesiologist and surgeon.  Your family will be updated throughout the operation.  After surgery, your family member may be taken to a private room for consultation with the surgeon.  This is for the privacy of your medical information and does not necessarily mean there is anything wrong.    If your incisions have:  Glue:  You " may take a bath or shower immediately and wash your skin as you normally do.  The glue will eventually crumble or peel off. Do not let your incisions soak under water.  Strips: Leave them on, but it is OK if they fall off on their own. It is OK to get them wet 48 hours after surgery.  Bandage: You may remove it 2 days after your surgery, and then you may leave the incision open and take a shower or bath, unless otherwise instructed. If your bandage has clear plastic, you may shower with it on and then remove it 2 days after surgery.    Activities  Walking is recommended after surgery; bed rest is not recommended unless specifically ordered.  If you have had abdominal surgery, do not lift over 20 pounds for 4 weeks after surgery.  If you have had hernia surgery, do not lift over 20 pounds for 6 weeks after surgery.  You may drive when you are off your post-operative pain medication.  Do not smoke after surgery, it decreases your ability to heal and increases the risk of infection and pneumonia.    Diet:  Drink lots of fluids after surgery.  You might not have much of an appetite at first, you may eat regular food when you feel ready, unless you are given special diet instructions.    Post-operative symptoms and medications  It is safe to take over-the-counter medications for constipation, heartburn, sleep, or itching if needed.  Prescription pain medication may contain acetaminophen (Tylenol), so you should not take additional acetaminophen (Tylenol) at the same time as your pain medication.  You may experience nausea, low fever/chills, and clear drainage from your incision, sometimes up to a month after surgery.  Notify our office if you have fever over 101 degrees, worsening redness around your incision, thick cloudy drainage, or inability to drink any liquids.  You will experience some level of pain after surgery.  Your pain medication should help with the pain, but may not be able to eliminate it entirely.  Pain  "will decrease with time, and most pain will be gone by 4 to 6 weeks after surgery.  We are not able to call in prescriptions for pain medication after hours or on weekends.  If your pain medication is ineffective or you will run out soon and need a refill, please call our office at [phone].  We are not able to replace pain medication that has been lost or stolen.    After surgery, you will either be discharged home or admitted to the hospital.  If you are admitted to the hospital, one of the surgeons or a physician assistant will see you once a day.  Due to scheduled surgery, we may see you in the afternoon or at night; however, your nurse is able to page us at any time.  If you feel there is a situation that is not being addressed properly, please dial [phone] from the phone in your room.    Follow-up appointment  You will see your surgeon or a physician assistant in clinic for a follow-up appointment at either our [facility] (off [facility]) or [facility] (off [facility]) locations, usually between one and four weeks after your surgery.  The hospital nurses can make your follow up appointment, or you can make it online at [email or web address] or call [phone].  If you have a smartphone with the [employer] krystin, please let us know if you would like to do a phone visit instead of a post-op office visit.    If you are signed up for [facility], install the "SNOBSWAP" krystin to access your test results, send messages to your doctors, and schedule appointments from your smartphone!    "

## 2022-06-16 NOTE — H&P (VIEW-ONLY)
Patient ID: Anne Farmer is a 66 y.o. female.    Left breast cancer    Chief Complaint: Pre-op Exam (Discuss breast surgery)      HPI:  Patient had an abnormal mammogram.  She was found to have a left breast cancer.  She was seen by Oncology and Radiation Oncology.  At this point the plan is to proceed with surgery and final recommendations for chemotherapy will be pending final pathology results.  She would need radiation likely if she undergoes breast conservation therapy.  The patient desires to undergo breast conservation therapy.    She does not complain of any chest pain shortness of breath.  Previous breast exam showed no masses      Review of Systems   Constitutional: Negative for appetite change, chills, fatigue, fever and unexpected weight change.   HENT: Negative for hearing loss and rhinorrhea.    Eyes: Negative for visual disturbance.   Respiratory: Negative for apnea, cough, shortness of breath and wheezing.    Cardiovascular: Negative for chest pain and palpitations.   Gastrointestinal: Negative for abdominal distention, abdominal pain, blood in stool, constipation, diarrhea, nausea and vomiting.   Genitourinary: Negative for dysuria, frequency and urgency.   Musculoskeletal: Negative for arthralgias and neck pain.   Skin: Negative for rash.   Neurological: Negative for seizures, weakness, numbness and headaches.   Hematological: Negative for adenopathy. Does not bruise/bleed easily.   Psychiatric/Behavioral: Negative for hallucinations. The patient is not nervous/anxious.        Current Outpatient Medications   Medication Sig Dispense Refill    albuterol (PROVENTIL/VENTOLIN HFA) 90 mcg/actuation inhaler Inhale 1-2 puffs into the lungs every 6 (six) hours as needed for Wheezing or Shortness of Breath. 18 g 3    amLODIPine (NORVASC) 10 MG tablet TAKE 1 TABLET BY MOUTH EVERY DAY 90 tablet 0    cyclobenzaprine (FLEXERIL) 10 MG tablet TAKE 1 TABLET BY MOUTH THREE TIMES A DAY AS NEEDED 30 tablet  0    diclofenac sodium (VOLTAREN) 1 % Gel APPLY 2 GRAMS TOPICALLY 2 (TWO) TIMES DAILY AS NEEDED. 100 g 2    fluticasone propionate (FLONASE) 50 mcg/actuation nasal spray 2 sprays (100 mcg total) by Each Nostril route once daily. 48 mL 3    FLUZONE HIGHDOSE QUAD 20-21  mcg/0.7 mL Syrg PHARMACY ADMINISTERED      glycopyrrolate-formoteroL (BEVESPI AEROSPHERE) 9-4.8 mcg HFAA Inhale 2 puffs into the lungs 2 (two) times daily. Controller 10.9 g 11    hydroCHLOROthiazide (HYDRODIURIL) 25 MG tablet TAKE 1 TABLET BY MOUTH EVERY DAY 90 tablet 3    ketorolac 0.5% (ACULAR) 0.5 % Drop PLACE 1 DROP INTO THE RIGHT EYE 4 (FOUR) TIMES DAILY. EYEDROPS TO START ONE DAY BEFORE SURGERY 5 mL 2    levocetirizine (XYZAL) 5 MG tablet Take 1 tablet (5 mg total) by mouth every evening. 90 tablet 0    losartan (COZAAR) 100 MG tablet Take 1 tablet (100 mg total) by mouth once daily. 90 tablet 2    multivitamin (ONE DAILY MULTIVITAMIN) per tablet Take 1 tablet by mouth once daily.      omeprazole (PRILOSEC) 20 MG capsule TAKE 1 CAPSULE BY MOUTH EVERY DAY 90 capsule 3    pravastatin (PRAVACHOL) 20 MG tablet TAKE 1 TABLET EVERY DAY 90 tablet 0    prednisoLONE acetate (PRED FORTE) 1 % DrpS PLACE 1 DROP INTO THE RIGHT EYE 4 (FOUR) TIMES DAILY. START ONE DAY BEFORE SURGERY 5 mL 2    traMADoL (ULTRAM) 50 mg tablet TAKE ONE TABLET po tid prn pain 12 tablet 0    traZODone (DESYREL) 50 MG tablet Take 1 tablet (50 mg total) by mouth nightly. 90 tablet 1    HYDROcodone-acetaminophen (NORCO) 7.5-325 mg per tablet Take 1 tablet by mouth every 6 (six) hours as needed for Pain. (Patient not taking: Reported on 6/16/2022) 12 tablet 0     No current facility-administered medications for this visit.       Review of patient's allergies indicates:  No Known Allergies    Past Medical History:   Diagnosis Date    Allergy     Arthritis     Cancer 2016    colon    CHF (congestive heart failure)     Colon cancer 2004    Colon cancer screening  2015    Diabetes mellitus type 2 in nonobese 3/9/2016    borderline    Diverticulosis     DM (diabetes mellitus) 2016    BS didn't check 2019    DM (diabetes mellitus) 2016    BS didn't check 2020    Hyperlipidemia 10/25/2016    Hypertension     Insomnia     Malignant neoplasm of lower-outer quadrant of left breast of female, estrogen receptor positive 2022       Past Surgical History:   Procedure Laterality Date     SECTION      COLON SURGERY      COLONOSCOPY N/A 2015    Procedure: COLONOSCOPY;  Surgeon: Adela Sam MD;  Location: [facility] ENDO;  Service: Endoscopy;  Laterality: N/A;    COLONOSCOPY N/A 2017    Procedure: COLONOSCOPY;  Surgeon: Chintan Flores MD;  Location: [facility] ENDO;  Service: Endoscopy;  Laterality: N/A;    COLONOSCOPY N/A 2020    Procedure: COLONOSCOPY;  Surgeon: Grisel Atkins MD;  Location: [facility] ENDO;  Service: Endoscopy;  Laterality: N/A;       Family History   Problem Relation Age of Onset    Hypertension Mother     Diabetes Mother     Hypertension Father     Hypertension Sister     Hypertension Brother     Hypertension Sister     Hypertension Sister     Hypertension Sister     Hypertension Brother     Hypertension Brother        Social History     Socioeconomic History    Marital status: Single   Occupational History     Employer: [employer]   Tobacco Use    Smoking status: Never Smoker    Smokeless tobacco: Never Used   Substance and Sexual Activity    Alcohol use: Yes     Alcohol/week: 0.0 standard drinks     Comment: weekends.       Drug use: No       Vitals:    22 1123   BP: 137/68   Pulse: (!) 54   Temp: 98.2 °F (36.8 °C)       Physical Exam  Constitutional:       Appearance: Normal appearance. She is well-developed.   HENT:      Head: Normocephalic.   Eyes:      Pupils: Pupils are equal, round, and reactive to light.   Neck:      Thyroid: No thyromegaly.      Vascular: No JVD.      Trachea:  No tracheal deviation.   Cardiovascular:      Rate and Rhythm: Normal rate and regular rhythm.      Heart sounds: Normal heart sounds.   Pulmonary:      Breath sounds: Normal breath sounds. No wheezing.   Abdominal:      General: Bowel sounds are normal. There is no distension.      Palpations: Abdomen is soft. Abdomen is not rigid. There is no mass.      Tenderness: There is no abdominal tenderness. There is no guarding or rebound.   Musculoskeletal:         General: Normal range of motion.   Lymphadenopathy:      Cervical: No cervical adenopathy.   Skin:     General: Skin is warm and dry.      Findings: No erythema or rash.      Comments: Previous left breast exam showed no abnormalities   Neurological:      Mental Status: She is alert and oriented to person, place, and time.       Final Pathologic Diagnosis Left breast, stereotactic core biopsy:   Invasive ductal carcinoma with mucinous features   Hanover grade 2:  Tubule formation-3, nuclear pleomorphism -2 and mitotic   count -1   Invasive carcinoma measures 10 mm in greatest dimension   Ductal carcinoma in Situ, intermediate nuclear grade, solid and cribriform   patterns with associated microcalcifications   Calcifications are also associated with the invasive component   No lymphovascular invasion is identified   Tumor biomarkers   Estrogen receptor (ER):  Positive, greater than 95%, strong   Progesterone receptor (PGR):  Positive, 40-45%, intermediate to strong   HER2 (IHC):  Equivocal, 2+   Ki-67:  25-30%   Additional IHC:   P63:  Highlights loss of myoepithelial cells, confirming invasive component   while focal retention of myoepithelial cells in the area of carcinoma in Situ.   All immunostains were performed with appropriate controls.   This case was reviewed by Dr. JUAN Lan who concurs with the above diagnosis.   Due to equivocal HER2 IHC, HER2 FISH will be performed with results provided   in a supplemental report.   HER2, Breast Tumor, FISH,  Tissue   Result Summary   Negative   Interpretation   There is no evidence of HER2 (ERBB2) gene amplification in this tumor sample.   Jackie Saldivar M.D.   Report attached   Performing location:   [place] [address]    Comment: Interp By Amee Mae M.D., Signed on 05/27/2022 at 12:25         Assessment & Plan:      Left breast cancer.    I discussed lumpectomy and sentinel node biopsy with they possible need for axillary dissection versus mastectomy with sentinel node biopsy and possible need for axillary dissection.  I discussed the role of radiation with lumpectomy and mastectomy.    The patient would like to undergo breast conservation therapy.    She is a good candidate for a left lumpectomy, sentinel node biopsy and axillary dissection only if there are 3 positive lymph nodes or if extracapsular extension is found.      She will need left breast lymphoscintigraphy preoperatively.  She will need a reflector placed which will be done on June 29th.    The risks benefits and complications of surgery were discussed.  This includes infection, bleeding, hematoma, seroma, injury to the great vessels in the axilla, difficulty moving the shoulder, and lymphedema.  The need for additional surgery was discussed with her pending the results of the final pathology.    She will need a CBC, CMP, EKG scheduled preoperatively preoperative

## 2022-06-17 NOTE — TELEPHONE ENCOUNTER
Expand All Collapse All      Patient ID: Anne Farmer is a 66 y.o. female.     Left breast cancer     Chief Complaint: Pre-op Exam (Discuss breast surgery)        HPI:  Patient had an abnormal mammogram.  She was found to have a left breast cancer.  She was seen by Oncology and Radiation Oncology.  At this point the plan is to proceed with surgery and final recommendations for chemotherapy will be pending final pathology results.  She would need radiation likely if she undergoes breast conservation therapy.  The patient desires to undergo breast conservation therapy.     She does not complain of any chest pain shortness of breath.  Previous breast exam showed no masses        Review of Systems   Constitutional: Negative for appetite change, chills, fatigue, fever and unexpected weight change.   HENT: Negative for hearing loss and rhinorrhea.    Eyes: Negative for visual disturbance.   Respiratory: Negative for apnea, cough, shortness of breath and wheezing.    Cardiovascular: Negative for chest pain and palpitations.   Gastrointestinal: Negative for abdominal distention, abdominal pain, blood in stool, constipation, diarrhea, nausea and vomiting.   Genitourinary: Negative for dysuria, frequency and urgency.   Musculoskeletal: Negative for arthralgias and neck pain.   Skin: Negative for rash.   Neurological: Negative for seizures, weakness, numbness and headaches.   Hematological: Negative for adenopathy. Does not bruise/bleed easily.   Psychiatric/Behavioral: Negative for hallucinations. The patient is not nervous/anxious.          Current Medications   Current Outpatient Medications   Medication Sig Dispense Refill    albuterol (PROVENTIL/VENTOLIN HFA) 90 mcg/actuation inhaler Inhale 1-2 puffs into the lungs every 6 (six) hours as needed for Wheezing or Shortness of Breath. 18 g 3    amLODIPine (NORVASC) 10 MG tablet TAKE 1 TABLET BY MOUTH EVERY DAY 90 tablet 0    cyclobenzaprine (FLEXERIL) 10 MG tablet  TAKE 1 TABLET BY MOUTH THREE TIMES A DAY AS NEEDED 30 tablet 0    diclofenac sodium (VOLTAREN) 1 % Gel APPLY 2 GRAMS TOPICALLY 2 (TWO) TIMES DAILY AS NEEDED. 100 g 2    fluticasone propionate (FLONASE) 50 mcg/actuation nasal spray 2 sprays (100 mcg total) by Each Nostril route once daily. 48 mL 3    FLUZONE HIGHDOSE QUAD 20-21  mcg/0.7 mL Syrg PHARMACY ADMINISTERED        glycopyrrolate-formoteroL (BEVESPI AEROSPHERE) 9-4.8 mcg HFAA Inhale 2 puffs into the lungs 2 (two) times daily. Controller 10.9 g 11    hydroCHLOROthiazide (HYDRODIURIL) 25 MG tablet TAKE 1 TABLET BY MOUTH EVERY DAY 90 tablet 3    ketorolac 0.5% (ACULAR) 0.5 % Drop PLACE 1 DROP INTO THE RIGHT EYE 4 (FOUR) TIMES DAILY. EYEDROPS TO START ONE DAY BEFORE SURGERY 5 mL 2    levocetirizine (XYZAL) 5 MG tablet Take 1 tablet (5 mg total) by mouth every evening. 90 tablet 0    losartan (COZAAR) 100 MG tablet Take 1 tablet (100 mg total) by mouth once daily. 90 tablet 2    multivitamin (ONE DAILY MULTIVITAMIN) per tablet Take 1 tablet by mouth once daily.        omeprazole (PRILOSEC) 20 MG capsule TAKE 1 CAPSULE BY MOUTH EVERY DAY 90 capsule 3    pravastatin (PRAVACHOL) 20 MG tablet TAKE 1 TABLET EVERY DAY 90 tablet 0    prednisoLONE acetate (PRED FORTE) 1 % DrpS PLACE 1 DROP INTO THE RIGHT EYE 4 (FOUR) TIMES DAILY. START ONE DAY BEFORE SURGERY 5 mL 2    traMADoL (ULTRAM) 50 mg tablet TAKE ONE TABLET po tid prn pain 12 tablet 0    traZODone (DESYREL) 50 MG tablet Take 1 tablet (50 mg total) by mouth nightly. 90 tablet 1    HYDROcodone-acetaminophen (NORCO) 7.5-325 mg per tablet Take 1 tablet by mouth every 6 (six) hours as needed for Pain. (Patient not taking: Reported on 6/16/2022) 12 tablet 0      No current facility-administered medications for this visit.            Review of patient's allergies indicates:  No Known Allergies          Past Medical History:   Diagnosis Date    Allergy      Arthritis      Cancer 2016     colon     CHF (congestive heart failure)      Colon cancer 2004    Colon cancer screening 2015    Diabetes mellitus type 2 in nonobese 3/9/2016     borderline    Diverticulosis      DM (diabetes mellitus) 2016     BS didn't check 2019    DM (diabetes mellitus) 2016     BS didn't check 2020    Hyperlipidemia 10/25/2016    Hypertension      Insomnia      Malignant neoplasm of lower-outer quadrant of left breast of female, estrogen receptor positive 2022               Past Surgical History:   Procedure Laterality Date     SECTION        COLON SURGERY        COLONOSCOPY N/A 2015     Procedure: COLONOSCOPY;  Surgeon: Adela Sam MD;  Location: [facility] ENDO;  Service: Endoscopy;  Laterality: N/A;    COLONOSCOPY N/A 2017     Procedure: COLONOSCOPY;  Surgeon: Chintan Flores MD;  Location: [facility] ENDO;  Service: Endoscopy;  Laterality: N/A;    COLONOSCOPY N/A 2020     Procedure: COLONOSCOPY;  Surgeon: Grisel Atkins MD;  Location: [facility] ENDO;  Service: Endoscopy;  Laterality: N/A;               Family History   Problem Relation Age of Onset    Hypertension Mother      Diabetes Mother      Hypertension Father      Hypertension Sister      Hypertension Brother      Hypertension Sister      Hypertension Sister      Hypertension Sister      Hypertension Brother      Hypertension Brother           Social History               Socioeconomic History    Marital status: Single   Occupational History       Employer: [employer]   Tobacco Use    Smoking status: Never Smoker    Smokeless tobacco: Never Used   Substance and Sexual Activity    Alcohol use: Yes       Alcohol/week: 0.0 standard drinks       Comment: weekends.       Drug use: No            Vitals:     22 1123   BP: 137/68   Pulse: (!) 54   Temp: 98.2 °F (36.8 °C)         Physical Exam  Constitutional:       Appearance: Normal appearance. She is well-developed.   HENT:      Head:  Normocephalic.   Eyes:      Pupils: Pupils are equal, round, and reactive to light.   Neck:      Thyroid: No thyromegaly.      Vascular: No JVD.      Trachea: No tracheal deviation.   Cardiovascular:      Rate and Rhythm: Normal rate and regular rhythm.      Heart sounds: Normal heart sounds.   Pulmonary:      Breath sounds: Normal breath sounds. No wheezing.   Abdominal:      General: Bowel sounds are normal. There is no distension.      Palpations: Abdomen is soft. Abdomen is not rigid. There is no mass.      Tenderness: There is no abdominal tenderness. There is no guarding or rebound.   Musculoskeletal:         General: Normal range of motion.   Lymphadenopathy:      Cervical: No cervical adenopathy.   Skin:     General: Skin is warm and dry.      Findings: No erythema or rash.      Comments: Previous left breast exam showed no abnormalities   Neurological:      Mental Status: She is alert and oriented to person, place, and time.       Final Pathologic Diagnosis Left breast, stereotactic core biopsy:   Invasive ductal carcinoma with mucinous features   Martha grade 2:  Tubule formation-3, nuclear pleomorphism -2 and mitotic   count -1   Invasive carcinoma measures 10 mm in greatest dimension   Ductal carcinoma in Situ, intermediate nuclear grade, solid and cribriform   patterns with associated microcalcifications   Calcifications are also associated with the invasive component   No lymphovascular invasion is identified   Tumor biomarkers   Estrogen receptor (ER):  Positive, greater than 95%, strong   Progesterone receptor (PGR):  Positive, 40-45%, intermediate to strong   HER2 (IHC):  Equivocal, 2+   Ki-67:  25-30%   Additional IHC:   P63:  Highlights loss of myoepithelial cells, confirming invasive component   while focal retention of myoepithelial cells in the area of carcinoma in Situ.   All immunostains were performed with appropriate controls.   This case was reviewed by Dr. JUAN Lan who concurs with  the above diagnosis.   Due to equivocal HER2 IHC, HER2 FISH will be performed with results provided   in a supplemental report.   HER2, Breast Tumor, FISH, Tissue   Result Summary   Negative   Interpretation   There is no evidence of HER2 (ERBB2) gene amplification in this tumor sample.   Jackie Saldivar M.D.   Report attached   Performing location:   [place] [address]    Comment: Interp By Amee Mae M.D., Signed on 05/27/2022 at 12:25            Assessment & Plan:      Left breast cancer.     I discussed lumpectomy and sentinel node biopsy with they possible need for axillary dissection versus mastectomy with sentinel node biopsy and possible need for axillary dissection.  I discussed the role of radiation with lumpectomy and mastectomy.     The patient would like to undergo breast conservation therapy.     She is a good candidate for a left lumpectomy, sentinel node biopsy and axillary dissection only if there are 3 positive lymph nodes or if extracapsular extension is found.        She will need left breast lymphoscintigraphy preoperatively.  She will need a reflector placed which will be done on June 29th.     The risks benefits and complications of surgery were discussed.  This includes infection, bleeding, hematoma, seroma, injury to the great vessels in the axilla, difficulty moving the shoulder, and lymphedema.  The need for additional surgery was discussed with her pending the results of the final pathology.     She will need a CBC, CMP, EKG scheduled preoperatively preoperative        Electronically signed by Arvin Hernandez MD at 6/16/2022  5:12 PM     Office Visit on 6/16/2022     Office Visit on 6/16/2022        Detailed Report      Note shared with patient        Additional Documentation    Vitals:  /68   Pulse 54 Important     Temp 98.2 °F (36.8 °C) (Temporal)   Wt 79.9 kg (176 lb 2.4 oz)   BMI 28.43  kg/m²   BSA 1.93 m²   Pain Sc 0-No pain   Flowsheets:  Anthropometrics      Encounter Info:  Billing Info,   History,   Allergies,   Detailed Report                            Orders Placed       CBC Auto Differential     Comprehensive Metabolic Panel     COVID-19 Routine Screening     Mammo Breast RADAR REFLECTOR Loc with Mammo Guidance 1st, Left     NM Lymphatics And Lymph Node Imaging     EKG 12-lead     Case Request Operating Room: LUMPECTOMY, BREAST, BIOPSY, LYMPH NODE, SENTINEL     All Encounter Results       Medication Changes       lidocaine HCl/PF 10 mg Intradermal Once, For IV start           Protocol Details           Medication List           Visit Diagnoses       Malignant neoplasm of lower-outer quadrant of left breast of female, estrogen receptor positive     Pre-op testing     Problem List

## 2022-06-27 NOTE — PRE ADMISSION SCREENING
Pre op instructions reviewed with Pt per phone: Spoke about pre op process and surgery instructions, verbalized understanding.    Surgery is scheduled on 7/05/22. Please arrive at 8:10am. We will call you the afternoon prior to surgery to confirm arrival time, as it is subject to change due to cancellations & emergencies.    Please report to the [facility] (1st Floor) at [facility] located off of [facility] (2nd building on the left, in front of the flag pole).  Address: [address] [place] [address] [place] [address]       INSTRUCTIONS IMPORTANT!!!  Do Not Eat, Drink, or Smoke after 12 midnight! NO WATER after midnight! OK to brush teeth, no gum, candy or mints!      *Take only these medicines with a small swallow of water-morning of surgery.  Albuterol inhaler  Amlodipine  Flonase Nasal Spray  Omeprazole  Pravastatin    ____  NO Acrylic/fake nails or nail polish worn day of surgery (specifically hand/arm & foot surgeries).  ____  NO powder, lotions, deodorants, oils or creams on body.  ____  Please Remove All jewelry & piercings prior to surgery.  ____  Please Remove Dentures, Hearing Aids & Contact Lens prior to the start of surgery.  ____  Please bring photo ID and insurance information to hospital (Leave Valuables at Home).  ____  If going home the same day, arrange for a ride home. You will not be able to drive 24 hrs if Anesthesia was used.   ____  Females (ages 11-60) may need to give a urine sample the morning of surgery; please see Pre op Nurse prior to voiding.  ____  Wear clean, loose fitting clothing. Allow for dressings, bandages.  ____  Stop all Aspirin products, Ibuprofen, Advil, Motrin & Aleve at least 5-7 days before surgery, unless otherwise instructed by your doctor, or the nurse.   ____  Blood Thinners are stopped based on your Provider's recommendation; Call Surgeon's Office to inquire when to stop/hold.  ____  Stop taking any Fish Oil supplements or Vitamins at least 5 days prior to surgery,  unless instructed otherwise by your Doctor.            Diabetic Patients: If you take diabetic medication, do NOT take morning of surgery unless instructed by             Doctor. Metformin to be stopped 24 hrs prior to surgery time. DO NOT take long-acting insulin the evening before surgery. Blood sugars will be checked in pre-op morning of.    Bathing Instructions:    -Do not shave your face or body the day before or the day of surgery.  -Do not shave pubic hair 7 days prior to surgery (gyn pt's).   -Shower & Rinse your body as usual with anti-bacterial Soap (Dial, Lever 2000, or Hibiclens)   -Do not use Hibiclens on your head, face, or genitals.   -Do not wash with anti-bacterial soap after you use the Hibiclens.   -Rinse your body thoroughly.      [facility] Visitor/Ride Policy:  Only 2 adults allowed (over the age of 18) to accompany you to the Hospital. You Must have a ride home from a responsible adult that you know and trust. Medical Transport, Uber or Lyft can only be used if patient has a responsible adult to accompany them during ride home.    Post-Op Instructions: You will receive Post-op/Discharge instructions by your Discharge Nurse prior to going home. Please call your Surgeon's office with any post-surgery questions/concerns.    *Call [facility] Pre-Admissions Department with surgery instruction questions @ [phone] or [phone] (Mon-Fri 7:30a to 3:45p)  *If you are running late or have questions the morning of surgery, please call the Surgery Dept @ [phone]  *Insurance/ Financial Questions, please call [phone].

## 2022-06-29 ENCOUNTER — HOSPITAL ENCOUNTER (OUTPATIENT)
Dept: RADIOLOGY | Facility: HOSPITAL | Age: 67
Discharge: HOME | End: 2022-06-29
Attending: SURGERY
Payer: MEDICARE

## 2022-06-29 DIAGNOSIS — Z17.0 MALIGNANT NEOPLASM OF LOWER-OUTER QUADRANT OF LEFT BREAST OF FEMALE, ESTROGEN RECEPTOR POSITIVE: ICD-10-CM

## 2022-06-29 DIAGNOSIS — C50.512 MALIGNANT NEOPLASM OF LOWER-OUTER QUADRANT OF LEFT BREAST OF FEMALE, ESTROGEN RECEPTOR POSITIVE: ICD-10-CM

## 2022-06-29 PROCEDURE — 19281 MAMMO BREAST RADAR REFLECTOR LOC W/MAMMO GUIDANCE, 1ST LESION, LEFT: ICD-10-PCS | Mod: LT,,, | Performed by: RADIOLOGY

## 2022-06-29 PROCEDURE — 19281 PERQ DEVICE BREAST 1ST IMAG: CPT | Mod: LT,,, | Performed by: RADIOLOGY

## 2022-06-29 PROCEDURE — A4648 IMPLANTABLE TISSUE MARKER: HCPCS

## 2022-06-29 PROCEDURE — 19281 PERQ DEVICE BREAST 1ST IMAG: CPT | Mod: LT

## 2022-06-29 NOTE — NURSING
Pressure held on left breast radar site for 10 mins, hemostasis was achieved, steri strips were applied.  Dressing clean, dry and intact with no drainage noted.  Discharge instructions given verbally , patient voiced understandings.  Patient discharged and accompanied by family member.

## 2022-07-01 ENCOUNTER — TELEPHONE (OUTPATIENT)
Dept: PREADMISSION TESTING | Facility: HOSPITAL | Age: 67
End: 2022-07-01
Payer: MEDICARE

## 2022-07-01 NOTE — TELEPHONE ENCOUNTER
Called and spoke with pt about the following: pt verbalized understanding    Please arrive to [facility] main lobby at 8am on 7/5/22 for your scheduled procedure. NOTHING to eat or drink after midnight, except medications instructed by the Pre-admit Nurse.     Thank you,  -Pre Admit Testing Dept.  M-F 7:30 am - 4 pm [phone]

## 2022-07-04 ENCOUNTER — ANESTHESIA EVENT (OUTPATIENT)
Dept: SURGERY | Facility: HOSPITAL | Age: 67
End: 2022-07-04
Payer: MEDICARE

## 2022-07-04 DIAGNOSIS — T88.4XXA HARD TO INTUBATE, INITIAL ENCOUNTER: Primary | ICD-10-CM

## 2022-07-05 ENCOUNTER — ANESTHESIA (OUTPATIENT)
Dept: SURGERY | Facility: HOSPITAL | Age: 67
End: 2022-07-05
Payer: MEDICARE

## 2022-07-05 ENCOUNTER — HOSPITAL ENCOUNTER (OUTPATIENT)
Dept: RADIOLOGY | Facility: HOSPITAL | Age: 67
Discharge: HOME | End: 2022-07-05
Attending: SURGERY | Admitting: SURGERY
Payer: MEDICARE

## 2022-07-05 ENCOUNTER — HOSPITAL ENCOUNTER (OUTPATIENT)
Facility: HOSPITAL | Age: 67
Discharge: HOME | End: 2022-07-05
Attending: SURGERY | Admitting: SURGERY
Payer: MEDICARE

## 2022-07-05 VITALS
RESPIRATION RATE: 13 BRPM | WEIGHT: 181.13 LBS | HEART RATE: 57 BPM | SYSTOLIC BLOOD PRESSURE: 129 MMHG | DIASTOLIC BLOOD PRESSURE: 61 MMHG | TEMPERATURE: 98 F | OXYGEN SATURATION: 96 % | BODY MASS INDEX: 29.11 KG/M2 | HEIGHT: 66 IN

## 2022-07-05 DIAGNOSIS — C50.512 MALIGNANT NEOPLASM OF LOWER-OUTER QUADRANT OF LEFT BREAST OF FEMALE, ESTROGEN RECEPTOR POSITIVE: ICD-10-CM

## 2022-07-05 DIAGNOSIS — Z17.0 MALIGNANT NEOPLASM OF LOWER-OUTER QUADRANT OF LEFT BREAST OF FEMALE, ESTROGEN RECEPTOR POSITIVE: ICD-10-CM

## 2022-07-05 PROBLEM — T88.4XXA HARD TO INTUBATE: Status: ACTIVE | Noted: 2022-07-05

## 2022-07-05 LAB
POCT GLUCOSE: 132 MG/DL (ref 70–110)
POCT GLUCOSE: 154 MG/DL (ref 70–110)

## 2022-07-05 PROCEDURE — 19301 PR MASTECTOMY, PARTIAL: ICD-10-PCS | Mod: AS,LT,,

## 2022-07-05 PROCEDURE — 88341 PR IHC OR ICC EACH ADD'L SINGLE ANTIBODY  STAINPR: ICD-10-PCS | Mod: 26,,, | Performed by: PATHOLOGY

## 2022-07-05 PROCEDURE — 88341 IMHCHEM/IMCYTCHM EA ADD ANTB: CPT | Mod: 59 | Performed by: PATHOLOGY

## 2022-07-05 PROCEDURE — 25000003 PHARM REV CODE 250: Performed by: NURSE ANESTHETIST, CERTIFIED REGISTERED

## 2022-07-05 PROCEDURE — 27201423 OPTIME MED/SURG SUP & DEVICES STERILE SUPPLY: Performed by: SURGERY

## 2022-07-05 PROCEDURE — 19301 PR MASTECTOMY, PARTIAL: ICD-10-PCS | Mod: LT,,, | Performed by: SURGERY

## 2022-07-05 PROCEDURE — 63600175 PHARM REV CODE 636 W HCPCS: Performed by: SURGERY

## 2022-07-05 PROCEDURE — 63600175 PHARM REV CODE 636 W HCPCS: Mod: JW,JG | Performed by: SURGERY

## 2022-07-05 PROCEDURE — 88305 TISSUE EXAM BY PATHOLOGIST: ICD-10-PCS | Mod: 26,,, | Performed by: PATHOLOGY

## 2022-07-05 PROCEDURE — 38900 IO MAP OF SENT LYMPH NODE: CPT | Mod: LT,,, | Performed by: SURGERY

## 2022-07-05 PROCEDURE — 88307 TISSUE EXAM BY PATHOLOGIST: CPT | Mod: 26,,, | Performed by: PATHOLOGY

## 2022-07-05 PROCEDURE — 71000015 HC POSTOP RECOV 1ST HR: Performed by: SURGERY

## 2022-07-05 PROCEDURE — 88309 PR  SURG PATH,LEVEL VI: ICD-10-PCS | Mod: 26,,, | Performed by: PATHOLOGY

## 2022-07-05 PROCEDURE — 63600175 PHARM REV CODE 636 W HCPCS: Performed by: ANESTHESIOLOGY

## 2022-07-05 PROCEDURE — 38900 PR INTRAOPERATIVE SENTINEL LYMPH NODE ID W DYE INJECTION: ICD-10-PCS | Mod: LT,,, | Performed by: SURGERY

## 2022-07-05 PROCEDURE — 36000706: Performed by: SURGERY

## 2022-07-05 PROCEDURE — A9520 TC99 TILMANOCEPT DIAG 0.5MCI: HCPCS

## 2022-07-05 PROCEDURE — 19301 PARTIAL MASTECTOMY: CPT | Mod: AS,LT,,

## 2022-07-05 PROCEDURE — 88307 PR  SURG PATH,LEVEL V: ICD-10-PCS | Mod: 26,,, | Performed by: PATHOLOGY

## 2022-07-05 PROCEDURE — 88341 IMHCHEM/IMCYTCHM EA ADD ANTB: CPT | Mod: 26,,, | Performed by: PATHOLOGY

## 2022-07-05 PROCEDURE — 88342 CHG IMMUNOCYTOCHEMISTRY: ICD-10-PCS | Mod: 26,,, | Performed by: PATHOLOGY

## 2022-07-05 PROCEDURE — 88307 TISSUE EXAM BY PATHOLOGIST: CPT | Mod: 59 | Performed by: PATHOLOGY

## 2022-07-05 PROCEDURE — 71000033 HC RECOVERY, INTIAL HOUR: Performed by: SURGERY

## 2022-07-05 PROCEDURE — 38525 BIOPSY/REMOVAL LYMPH NODES: CPT | Mod: 51,LT,, | Performed by: SURGERY

## 2022-07-05 PROCEDURE — 36000707: Performed by: SURGERY

## 2022-07-05 PROCEDURE — 25000003 PHARM REV CODE 250: Performed by: ANESTHESIOLOGY

## 2022-07-05 PROCEDURE — 88342 IMHCHEM/IMCYTCHM 1ST ANTB: CPT | Performed by: PATHOLOGY

## 2022-07-05 PROCEDURE — 37000009 HC ANESTHESIA EA ADD 15 MINS: Performed by: SURGERY

## 2022-07-05 PROCEDURE — 37000008 HC ANESTHESIA 1ST 15 MINUTES: Performed by: SURGERY

## 2022-07-05 PROCEDURE — 25000003 PHARM REV CODE 250: Performed by: SURGERY

## 2022-07-05 PROCEDURE — 19301 PARTIAL MASTECTOMY: CPT | Mod: LT,,, | Performed by: SURGERY

## 2022-07-05 PROCEDURE — 88305 TISSUE EXAM BY PATHOLOGIST: CPT | Mod: 26,,, | Performed by: PATHOLOGY

## 2022-07-05 PROCEDURE — 88342 IMHCHEM/IMCYTCHM 1ST ANTB: CPT | Mod: 26,,, | Performed by: PATHOLOGY

## 2022-07-05 PROCEDURE — 88309 TISSUE EXAM BY PATHOLOGIST: CPT | Mod: 26,,, | Performed by: PATHOLOGY

## 2022-07-05 PROCEDURE — 38525 PR BIOPSY/REM LYMPH NODES, AXILLARY: ICD-10-PCS | Mod: 51,LT,, | Performed by: SURGERY

## 2022-07-05 PROCEDURE — 63600175 PHARM REV CODE 636 W HCPCS: Performed by: NURSE ANESTHETIST, CERTIFIED REGISTERED

## 2022-07-05 RX ORDER — BUPIVACAINE HYDROCHLORIDE 2.5 MG/ML
INJECTION, SOLUTION EPIDURAL; INFILTRATION; INTRACAUDAL
Status: DISCONTINUED | OUTPATIENT
Start: 2022-07-05 | End: 2022-07-05 | Stop reason: HOSPADM

## 2022-07-05 RX ORDER — LIDOCAINE HYDROCHLORIDE 10 MG/ML
INJECTION, SOLUTION EPIDURAL; INFILTRATION; INTRACAUDAL; PERINEURAL
Status: DISCONTINUED | OUTPATIENT
Start: 2022-07-05 | End: 2022-07-05

## 2022-07-05 RX ORDER — PROPOFOL 10 MG/ML
VIAL (ML) INTRAVENOUS
Status: DISCONTINUED | OUTPATIENT
Start: 2022-07-05 | End: 2022-07-05

## 2022-07-05 RX ORDER — ALBUTEROL SULFATE 0.83 MG/ML
2.5 SOLUTION RESPIRATORY (INHALATION) EVERY 4 HOURS PRN
Status: DISCONTINUED | OUTPATIENT
Start: 2022-07-05 | End: 2022-07-05 | Stop reason: HOSPADM

## 2022-07-05 RX ORDER — MEPERIDINE HYDROCHLORIDE 25 MG/ML
12.5 INJECTION INTRAMUSCULAR; INTRAVENOUS; SUBCUTANEOUS ONCE
Status: DISCONTINUED | OUTPATIENT
Start: 2022-07-05 | End: 2022-07-05 | Stop reason: HOSPADM

## 2022-07-05 RX ORDER — ONDANSETRON 8 MG/1
8 TABLET, ORALLY DISINTEGRATING ORAL EVERY 8 HOURS PRN
Status: DISCONTINUED | OUTPATIENT
Start: 2022-07-05 | End: 2022-07-05 | Stop reason: HOSPADM

## 2022-07-05 RX ORDER — KETOROLAC TROMETHAMINE 30 MG/ML
15 INJECTION, SOLUTION INTRAMUSCULAR; INTRAVENOUS EVERY 8 HOURS PRN
Status: DISCONTINUED | OUTPATIENT
Start: 2022-07-05 | End: 2022-07-05 | Stop reason: HOSPADM

## 2022-07-05 RX ORDER — PROCHLORPERAZINE EDISYLATE 5 MG/ML
5 INJECTION INTRAMUSCULAR; INTRAVENOUS EVERY 30 MIN PRN
Status: DISCONTINUED | OUTPATIENT
Start: 2022-07-05 | End: 2022-07-05 | Stop reason: HOSPADM

## 2022-07-05 RX ORDER — DIPHENHYDRAMINE HYDROCHLORIDE 50 MG/ML
25 INJECTION INTRAMUSCULAR; INTRAVENOUS EVERY 6 HOURS PRN
Status: DISCONTINUED | OUTPATIENT
Start: 2022-07-05 | End: 2022-07-05 | Stop reason: HOSPADM

## 2022-07-05 RX ORDER — ISOSULFAN BLUE 50 MG/5ML
INJECTION, SOLUTION SUBCUTANEOUS
Status: DISCONTINUED | OUTPATIENT
Start: 2022-07-05 | End: 2022-07-05 | Stop reason: HOSPADM

## 2022-07-05 RX ORDER — SODIUM CHLORIDE 0.9 % (FLUSH) 0.9 %
3 SYRINGE (ML) INJECTION
Status: DISCONTINUED | OUTPATIENT
Start: 2022-07-05 | End: 2022-07-05 | Stop reason: HOSPADM

## 2022-07-05 RX ORDER — HYDROMORPHONE HYDROCHLORIDE 2 MG/ML
0.2 INJECTION, SOLUTION INTRAMUSCULAR; INTRAVENOUS; SUBCUTANEOUS EVERY 5 MIN PRN
Status: DISCONTINUED | OUTPATIENT
Start: 2022-07-05 | End: 2022-07-05 | Stop reason: HOSPADM

## 2022-07-05 RX ORDER — FENTANYL CITRATE 50 UG/ML
INJECTION, SOLUTION INTRAMUSCULAR; INTRAVENOUS
Status: DISCONTINUED | OUTPATIENT
Start: 2022-07-05 | End: 2022-07-05

## 2022-07-05 RX ORDER — SCOLOPAMINE TRANSDERMAL SYSTEM 1 MG/1
1 PATCH, EXTENDED RELEASE TRANSDERMAL
Status: DISCONTINUED | OUTPATIENT
Start: 2022-07-05 | End: 2022-07-05 | Stop reason: HOSPADM

## 2022-07-05 RX ORDER — HYDROCODONE BITARTRATE AND ACETAMINOPHEN 10; 325 MG/1; MG/1
1 TABLET ORAL EVERY 6 HOURS PRN
Qty: 15 TABLET | Refills: 0 | Status: ON HOLD | OUTPATIENT
Start: 2022-07-05 | End: 2022-08-10 | Stop reason: SDUPTHER

## 2022-07-05 RX ORDER — MIDAZOLAM HYDROCHLORIDE 1 MG/ML
INJECTION, SOLUTION INTRAMUSCULAR; INTRAVENOUS
Status: DISCONTINUED | OUTPATIENT
Start: 2022-07-05 | End: 2022-07-05

## 2022-07-05 RX ORDER — ONDANSETRON 2 MG/ML
4 INJECTION INTRAMUSCULAR; INTRAVENOUS DAILY PRN
Status: DISCONTINUED | OUTPATIENT
Start: 2022-07-05 | End: 2022-07-05 | Stop reason: HOSPADM

## 2022-07-05 RX ORDER — HYDROCODONE BITARTRATE AND ACETAMINOPHEN 10; 325 MG/1; MG/1
1 TABLET ORAL EVERY 6 HOURS PRN
Status: DISCONTINUED | OUTPATIENT
Start: 2022-07-05 | End: 2022-07-05 | Stop reason: HOSPADM

## 2022-07-05 RX ORDER — OXYCODONE HYDROCHLORIDE 5 MG/1
5 TABLET ORAL
Status: DISCONTINUED | OUTPATIENT
Start: 2022-07-05 | End: 2022-07-05 | Stop reason: HOSPADM

## 2022-07-05 RX ORDER — EPHEDRINE SULFATE 50 MG/ML
INJECTION, SOLUTION INTRAVENOUS
Status: DISCONTINUED | OUTPATIENT
Start: 2022-07-05 | End: 2022-07-05

## 2022-07-05 RX ORDER — ONDANSETRON 2 MG/ML
INJECTION INTRAMUSCULAR; INTRAVENOUS
Status: DISCONTINUED | OUTPATIENT
Start: 2022-07-05 | End: 2022-07-05

## 2022-07-05 RX ORDER — CEFAZOLIN SODIUM 2 G/50ML
2 SOLUTION INTRAVENOUS
Status: COMPLETED | OUTPATIENT
Start: 2022-07-05 | End: 2022-07-05

## 2022-07-05 RX ORDER — HYDROMORPHONE HYDROCHLORIDE 2 MG/ML
1 INJECTION, SOLUTION INTRAMUSCULAR; INTRAVENOUS; SUBCUTANEOUS EVERY 4 HOURS PRN
Status: DISCONTINUED | OUTPATIENT
Start: 2022-07-05 | End: 2022-07-05 | Stop reason: HOSPADM

## 2022-07-05 RX ORDER — ROCURONIUM BROMIDE 10 MG/ML
INJECTION, SOLUTION INTRAVENOUS
Status: DISCONTINUED | OUTPATIENT
Start: 2022-07-05 | End: 2022-07-05

## 2022-07-05 RX ADMIN — PROPOFOL 100 MG: 10 INJECTION, EMULSION INTRAVENOUS at 10:07

## 2022-07-05 RX ADMIN — FENTANYL CITRATE 100 MCG: 50 INJECTION, SOLUTION INTRAMUSCULAR; INTRAVENOUS at 10:07

## 2022-07-05 RX ADMIN — SCOPOLAMINE 1 PATCH: 1.5 PATCH, EXTENDED RELEASE TRANSDERMAL at 07:07

## 2022-07-05 RX ADMIN — HYDROMORPHONE HYDROCHLORIDE 0.2 MG: 2 INJECTION INTRAMUSCULAR; INTRAVENOUS; SUBCUTANEOUS at 01:07

## 2022-07-05 RX ADMIN — EPHEDRINE SULFATE 20 MG: 50 INJECTION INTRAVENOUS at 11:07

## 2022-07-05 RX ADMIN — ROCURONIUM BROMIDE 50 MG: 10 INJECTION, SOLUTION INTRAVENOUS at 10:07

## 2022-07-05 RX ADMIN — SUGAMMADEX 200 MG: 100 INJECTION, SOLUTION INTRAVENOUS at 12:07

## 2022-07-05 RX ADMIN — MIDAZOLAM 2 MG: 1 INJECTION INTRAMUSCULAR; INTRAVENOUS at 10:07

## 2022-07-05 RX ADMIN — LIDOCAINE HYDROCHLORIDE 50 MG: 10 INJECTION, SOLUTION EPIDURAL; INFILTRATION; INTRACAUDAL; PERINEURAL at 10:07

## 2022-07-05 RX ADMIN — EPHEDRINE SULFATE 30 MG: 50 INJECTION INTRAVENOUS at 11:07

## 2022-07-05 RX ADMIN — SODIUM CHLORIDE, SODIUM LACTATE, POTASSIUM CHLORIDE, AND CALCIUM CHLORIDE: .6; .31; .03; .02 INJECTION, SOLUTION INTRAVENOUS at 10:07

## 2022-07-05 RX ADMIN — SODIUM CHLORIDE, SODIUM LACTATE, POTASSIUM CHLORIDE, AND CALCIUM CHLORIDE: .6; .31; .03; .02 INJECTION, SOLUTION INTRAVENOUS at 12:07

## 2022-07-05 RX ADMIN — GLYCOPYRROLATE 0.4 MG: 0.2 INJECTION, SOLUTION INTRAMUSCULAR; INTRAVENOUS at 11:07

## 2022-07-05 RX ADMIN — HYDROCODONE BITARTRATE AND ACETAMINOPHEN 1 TABLET: 10; 325 TABLET ORAL at 01:07

## 2022-07-05 RX ADMIN — CEFAZOLIN SODIUM 2 G: 2 SOLUTION INTRAVENOUS at 10:07

## 2022-07-05 RX ADMIN — ONDANSETRON 4 MG: 2 INJECTION, SOLUTION INTRAMUSCULAR; INTRAVENOUS at 11:07

## 2022-07-05 NOTE — INTERVAL H&P NOTE
The patient has been examined and the H&P has been reviewed:    I concur with the findings and no changes have occurred since H&P was written.    Surgery risks, benefits and alternative options discussed and understood by patient/family.      Lymphoscintigraphy has been completed     There are no hospital problems to display for this patient.

## 2022-07-05 NOTE — TRANSFER OF CARE
"Anesthesia Transfer of Care Note    Patient: Anne Farmer    Procedure(s) Performed: Procedure(s) (LRB):  LUMPECTOMY, BREAST (Left)  BIOPSY, LYMPH NODE, SENTINEL (Left)    Patient location: PACU    Anesthesia Type: general    Transport from OR: Transported from OR on room air with adequate spontaneous ventilation    Post pain: adequate analgesia    Post assessment: no apparent anesthetic complications    Post vital signs: stable    Level of consciousness: awake    Nausea/Vomiting: no nausea/vomiting    Complications: none    Transfer of care protocol was followed      Last vitals:   Visit Vitals  BP (!) 174/72   Pulse (!) 57   Temp 36.4 °C (97.5 °F) (Temporal)   Resp 18   Ht 5' 6" (1.676 m)   Wt 82.2 kg (181 lb 1.7 oz)   SpO2 100%   Breastfeeding No   BMI 29.23 kg/m²     "

## 2022-07-05 NOTE — PATIENT INSTRUCTIONS
Please call for any fever, increase in pain, nausea or vomiting or redness or drainage from incision(s).    No restrictions on your activity    We have prescribed UA slightly stronger version of the hydrocodone which will need for the next few days and then you can go back to her baseline pain medicine    You will urinate green for the next 24 hours    Please where the mastectomy bra at all times for the next week except when showering    May shower     If you become constipated from the pain medication you can use over the counter laxatives,  Miralax or Glycolax, or Magnesium Citrate for severe constipation.

## 2022-07-05 NOTE — ANESTHESIA PROCEDURE NOTES
Intubation    Date/Time: 7/5/2022 11:00 AM  Performed by: Alfonso Agee CRNA  Authorized by: Javid Winchester MD     Intubation:     Induction:  Intravenous    Intubated:  Postinduction    Mask Ventilation:  Easy mask    Attempts:  1    Attempted By:  CRNA    Method of Intubation:  Direct    Blade:  Vic 3    Laryngeal View Grade: Grade IIb - only the arytenoids and epiglottis seen      Difficult Airway Encountered?: Yes      Future Airway Recommendations:  Glidescope    Complications:  None    Airway Device:  Oral endotracheal tube    Airway Device Size:  7.5    Style/Cuff Inflation:  Cuffed (inflated to minimal occlusive pressure)    Inflation Amount (mL):  8    Tube secured:  21    Secured at:  The lips    Placement Verified By:  Capnometry    Complicating Factors:  Anterior larynx    Findings Post-Intubation:  BS equal bilateral

## 2022-07-05 NOTE — ANESTHESIA RELEASE NOTE
"Anesthesia Release from PACU Note    Patient: Anne Farmer    Procedure(s) Performed: Procedure(s) (LRB):  LUMPECTOMY, BREAST (Left)  BIOPSY, LYMPH NODE, SENTINEL (Left)    Anesthesia type: general    Post pain: Adequate analgesia    Post assessment: no apparent anesthetic complications    Last Vitals:   Visit Vitals  BP (!) 174/72   Pulse (!) 57   Temp 36.4 °C (97.5 °F) (Temporal)   Resp 18   Ht 5' 6" (1.676 m)   Wt 82.2 kg (181 lb 1.7 oz)   SpO2 100%   Breastfeeding No   BMI 29.23 kg/m²       Post vital signs: stable    Level of consciousness: awake    Nausea/Vomiting: no nausea/no vomiting    Complications: none    Airway Patency: patent    Respiratory: unassisted    Cardiovascular: stable and blood pressure at baseline    Hydration: euvolemic  "

## 2022-07-05 NOTE — ANESTHESIA POSTPROCEDURE EVALUATION
Anesthesia Post Evaluation    Patient: Anne Farmer    Procedure(s) Performed: Procedure(s) (LRB):  LUMPECTOMY, BREAST (Left)  BIOPSY, LYMPH NODE, SENTINEL (Left)    Final Anesthesia Type: general      Patient location during evaluation: PACU  Patient participation: Yes- Able to Participate  Level of consciousness: awake and alert  Post-procedure vital signs: reviewed and stable  Pain management: adequate  Airway patency: patent  ESCOBAR mitigation strategies: Verification of full reversal of neuromuscular block  PONV status at discharge: No PONV  Anesthetic complications: no      Cardiovascular status: hemodynamically stable  Respiratory status: spontaneous ventilation  Hydration status: euvolemic  Follow-up not needed.          Vitals Value Taken Time   /61 07/05/22 1345   Temp 36.8 °C (98.2 °F) 07/05/22 1345   Pulse 58 07/05/22 1347   Resp 29 07/05/22 1346   SpO2 94 % 07/05/22 1346   Vitals shown include unvalidated device data.      Event Time   Out of Recovery 13:48:20         Pain/Bernadette Score: Pain Rating Prior to Med Admin: 5 (7/5/2022  1:45 PM)  Bernadette Score: 10 (7/5/2022  1:45 PM)

## 2022-07-05 NOTE — OP NOTE
'[place] - Surgery ([facility])  Operative Note      Date of Procedure: 7/5/2022     Procedure: Procedure(s) (LRB):  LUMPECTOMY, BREAST (Left)  BIOPSY, LYMPH NODE, SENTINEL (Left)     Surgeon(s) and Role:     * Arvin Hernandez MD - Primary     * Alyson Rocha PA-C Assistant      Assisting Surgeon: None    Pre-Operative Diagnosis: Malignant neoplasm of lower-outer quadrant of left breast of female, estrogen receptor positive [C50.512, Z17.0]    Post-Operative Diagnosis: Post-Op Diagnosis Codes:     * Malignant neoplasm of lower-outer quadrant of left breast of female, estrogen receptor positive [C50.512, Z17.0]    Anesthesia: General    Operative Findings (including complications, if any):     Specimen radiograph included the marker and reflector.  Gross margins negative    Description of Technical Procedures:     Patient underwent left breast lymphoscintigraphy.  She was then taken to the operating room placed on the operative table in the supine position.  General endotracheal anesthesia was induced.  IV antibiotics were administered.  Pneumatic compression devices on the lower extremity.    A brief time-out was performed.    5 mL of Lymphazurin blue were infiltrated into the left breast the breast was massaged for 5 minutes.  We localize the reflector.    The left breast was prepped and draped in the standard fashion to include the left arm to the elbow.    A formal time-out was performed.    A curved incision was made over the area of increased signal from the reflector.  Superior, inferior, medial and lateral skin flaps were raised.  The breast tissue surrounding the reflector for approximately 1.5 cm in all direction was excised.  The specimen was tagged with short suture superior, long suture laterally and double suture deep.    A specimen radiograph was obtained.  The specimen was then sent for gross margins.    He curvilinear axillary incision was made.  Subcutaneous tissues were dissected using  electrocautery.  The axillary for was identified in green lymphatics were traced to green and radioactive lymph nodes.    A total of 4 sentinel nodes and 2, clumped, non sentinel nodes were identified these none nodes were then sent for permanent pathologic analysis.  The minimal 12nd count was 184. The residual count was 17    At this point the specimen radiograph showed that the clip and reflector were contained.  Gross margins showed a close posterior margin.    Additional posterior margins were obtained with the suture towards the tumor    The wounds were irrigated.  Marcaine was infiltrated.  The deep layers of both axilla in the breast were closed with 2 0 Vicryl in an interrupted fashion.  The subcutaneous tissue was closed with 3 0 Vicryl interrupted fashion.  The skin was closed with 4-0 Monocryl in a subcuticular manner.  Dermabond and an mastectomy bra was placed.  Patient tolerated procedure well final sponge and instrument counts were correct                Significant Surgical Tasks Conducted by the Assistant(s), if Applicable:  Assistance with retraction and closure    Estimated Blood Loss (EBL):  15 mL  IV fluids 1200 mL  Marcaine 0.2% 30 mL *           Implants: * No implants in log *    Specimens:   Specimen (24h ago, onward)             Start     Ordered    07/05/22 1229  Specimen to Pathology, Surgery Breast  Once        Comments: Pre-op Diagnosis: Malignant neoplasm of lower-outer quadrant of left breast of female, estrogen receptor positive [C50.512, Z17.0]Procedure(s):LUMPECTOMY, BREASTBIOPSY, LYMPH NODE, SENTINEL Number of specimens:  4Name of specimens:  1) [place] nodes--perm.;  2) Non-sentinel nodes--perm.;  3) Left breast lump--short stitch=superior, long stitch=lateral, double stitch-deep margins--perm.; 4)  Additional deep margins--perm.     References:    Click here for ordering Quick Tip   Question Answer Comment   Procedure Type: Breast    Which provider would you like to cc?  NIK GUZMAN    Release to patient Immediate        07/05/22 1231    07/05/22 1212  Specimen to Pathology, Surgery Breast  Once        Comments: Pre-op Diagnosis: Malignant neoplasm of lower-outer quadrant of left breast of female, estrogen receptor positive [C50.512, Z17.0]Procedure(s):LUMPECTOMY, BREASTBIOPSY, LYMPH NODE, SENTINEL Number of specimens: 3Name of specimens:  1) Left breast Lump--perm.;  2) [place] nodes and non-sentinel nodes--perm.;  3) Additional deep margins--suture towards cancer--perm.     References:    Click here for ordering Quick Tip   Question Answer Comment   Procedure Type: Breast    Which provider would you like to cc? NIK GUZMAN    Release to patient Immediate        07/05/22 1214    07/05/22 1144  Specimen to Pathology, Surgery Breast  Once        Comments: Pre-op Diagnosis: Malignant neoplasm of lower-outer quadrant of left breast of female, estrogen receptor positive [C50.512, Z17.0]Procedure(s):LUMPECTOMY, BREASTBIOPSY, LYMPH NODE, SENTINEL Number of specimens:   1Name of specimens:   1) Left breast lump--perm.--Short stitch=superior, Long stitch=lateral, Double stitch=deep.     References:    Click here for ordering Quick Tip   Question Answer Comment   Procedure Type: Breast    Which provider would you like to cc? THOMASNIK BENAVIDEZ    Release to patient Immediate        07/05/22 1146                        Condition: Stable    Disposition: PACU - hemodynamically stable.    Attestation: I performed the procedure.    Discharge Note    OUTCOME: Patient tolerated treatment/procedure well without complication and is now ready for discharge.     Left lumpectomy and sentinel node biopsy    DISPOSITION: Home or Self Care    FINAL DIAGNOSIS:  Left breast cancer    FOLLOWUP: In clinic    DISCHARGE INSTRUCTIONS:    Discharge Procedure Orders   Diet general     Call MD for:  temperature >100.4     Call MD for:  persistent nausea and vomiting     Call MD for:  severe uncontrolled  pain     Call MD for:  difficulty breathing, headache or visual disturbances     Call MD for:  redness, tenderness, or signs of infection (pain, swelling, redness, odor or green/yellow discharge around incision site)     No dressing needed     Activity as tolerated

## 2022-07-05 NOTE — ANESTHESIA PREPROCEDURE EVALUATION
07/05/2022  Anne Farmer is a 66 y.o., female.      Pre-op Assessment    I have reviewed the Patient Summary Reports.    I have reviewed the NPO Status.   I have reviewed the Medications.     Review of Systems  Anesthesia Hx:  No problems with previous Anesthesia  Denies Family Hx of Anesthesia complications.   Denies Personal Hx of Anesthesia complications.   Hematology/Oncology:  Hematology Normal      Current/Recent Cancer. Breast left   EENT/Dental:EENT/Dental Normal   Cardiovascular:   Hypertension CHF ECG has been reviewed. 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  2016 NMST negative  2014 ECHO  Concentric hypertrophy  Normal biventricular function  Moderate TR and pulmonary HTN with estimated PA systolic pressure 41   Pulmonary:   Chronic Restrictive Lung Ds, moderate   Renal/:  Renal/ Normal     Hepatic/GI:   Colectomy for cancer   Musculoskeletal:  Spine Disorders: Disc disease    Neurological:  Neurology Normal    Endocrine:   Diabetes    Dermatological:  Skin Normal    Psych:  Psychiatric Normal           Physical Exam  General: Alert and Oriented    Airway:  Mallampati: II   Mouth Opening: Normal  TM Distance: Normal  Tongue: Normal  Neck ROM: Normal ROM        Anesthesia Plan  Type of Anesthesia, risks & benefits discussed:    Anesthesia Type: Gen ETT, Gen Supraglottic Airway  Intra-op Monitoring Plan: Standard ASA Monitors  Induction:  IV  Informed Consent: Informed consent signed with the Patient and all parties understand the risks and agree with anesthesia plan.  All questions answered.   ASA Score: 3  Day of Surgery Review of History & Physical: I have interviewed and examined the patient. I have reviewed the patient's H&P dated:     Ready For Surgery From Anesthesia  Perspective.     .

## 2022-07-15 LAB
FINAL PATHOLOGIC DIAGNOSIS: NORMAL
GROSS: NORMAL
Lab: NORMAL

## 2022-07-18 ENCOUNTER — OFFICE VISIT (OUTPATIENT)
Dept: SURGERY | Facility: CLINIC | Age: 67
End: 2022-07-18
Payer: MEDICARE

## 2022-07-18 VITALS
BODY MASS INDEX: 29.18 KG/M2 | TEMPERATURE: 98 F | SYSTOLIC BLOOD PRESSURE: 143 MMHG | HEART RATE: 61 BPM | DIASTOLIC BLOOD PRESSURE: 66 MMHG | WEIGHT: 180.75 LBS

## 2022-07-18 DIAGNOSIS — Z17.0 MALIGNANT NEOPLASM OF LOWER-OUTER QUADRANT OF LEFT BREAST OF FEMALE, ESTROGEN RECEPTOR POSITIVE: Primary | ICD-10-CM

## 2022-07-18 DIAGNOSIS — C50.512 MALIGNANT NEOPLASM OF LOWER-OUTER QUADRANT OF LEFT BREAST OF FEMALE, ESTROGEN RECEPTOR POSITIVE: Primary | ICD-10-CM

## 2022-07-18 DIAGNOSIS — C50.912 BREAST CANCER, LEFT: ICD-10-CM

## 2022-07-18 PROCEDURE — 3078F DIAST BP <80 MM HG: CPT | Mod: CPTII,S$GLB,, | Performed by: SURGERY

## 2022-07-18 PROCEDURE — 1160F RVW MEDS BY RX/DR IN RCRD: CPT | Mod: CPTII,S$GLB,, | Performed by: SURGERY

## 2022-07-18 PROCEDURE — 3060F PR POS MICROALBUMINURIA RESULT DOCUMENTED/REVIEW: ICD-10-PCS | Mod: CPTII,S$GLB,, | Performed by: SURGERY

## 2022-07-18 PROCEDURE — 3044F PR MOST RECENT HEMOGLOBIN A1C LEVEL <7.0%: ICD-10-PCS | Mod: CPTII,S$GLB,, | Performed by: SURGERY

## 2022-07-18 PROCEDURE — 3066F PR DOCUMENTATION OF TREATMENT FOR NEPHROPATHY: ICD-10-PCS | Mod: CPTII,S$GLB,, | Performed by: SURGERY

## 2022-07-18 PROCEDURE — 99024 POSTOP FOLLOW-UP VISIT: CPT | Mod: S$GLB,,, | Performed by: SURGERY

## 2022-07-18 PROCEDURE — 1159F PR MEDICATION LIST DOCUMENTED IN MEDICAL RECORD: ICD-10-PCS | Mod: CPTII,S$GLB,, | Performed by: SURGERY

## 2022-07-18 PROCEDURE — 1125F PR PAIN SEVERITY QUANTIFIED, PAIN PRESENT: ICD-10-PCS | Mod: CPTII,S$GLB,, | Performed by: SURGERY

## 2022-07-18 PROCEDURE — 3008F PR BODY MASS INDEX (BMI) DOCUMENTED: ICD-10-PCS | Mod: CPTII,S$GLB,, | Performed by: SURGERY

## 2022-07-18 PROCEDURE — 99024 PR POST-OP FOLLOW-UP VISIT: ICD-10-PCS | Mod: S$GLB,,, | Performed by: SURGERY

## 2022-07-18 PROCEDURE — 3066F NEPHROPATHY DOC TX: CPT | Mod: CPTII,S$GLB,, | Performed by: SURGERY

## 2022-07-18 PROCEDURE — 3072F PR LOW RISK FOR RETINOPATHY: ICD-10-PCS | Mod: CPTII,S$GLB,, | Performed by: SURGERY

## 2022-07-18 PROCEDURE — 3077F PR MOST RECENT SYSTOLIC BLOOD PRESSURE >= 140 MM HG: ICD-10-PCS | Mod: CPTII,S$GLB,, | Performed by: SURGERY

## 2022-07-18 PROCEDURE — 3078F PR MOST RECENT DIASTOLIC BLOOD PRESSURE < 80 MM HG: ICD-10-PCS | Mod: CPTII,S$GLB,, | Performed by: SURGERY

## 2022-07-18 PROCEDURE — 1159F MED LIST DOCD IN RCRD: CPT | Mod: CPTII,S$GLB,, | Performed by: SURGERY

## 2022-07-18 PROCEDURE — 1160F PR REVIEW ALL MEDS BY PRESCRIBER/CLIN PHARMACIST DOCUMENTED: ICD-10-PCS | Mod: CPTII,S$GLB,, | Performed by: SURGERY

## 2022-07-18 PROCEDURE — 4010F ACE/ARB THERAPY RXD/TAKEN: CPT | Mod: CPTII,S$GLB,, | Performed by: SURGERY

## 2022-07-18 PROCEDURE — 3044F HG A1C LEVEL LT 7.0%: CPT | Mod: CPTII,S$GLB,, | Performed by: SURGERY

## 2022-07-18 PROCEDURE — 1125F AMNT PAIN NOTED PAIN PRSNT: CPT | Mod: CPTII,S$GLB,, | Performed by: SURGERY

## 2022-07-18 PROCEDURE — 4010F PR ACE/ARB THEARPY RXD/TAKEN: ICD-10-PCS | Mod: CPTII,S$GLB,, | Performed by: SURGERY

## 2022-07-18 PROCEDURE — 99999 PR PBB SHADOW E&M-EST. PATIENT-LVL V: ICD-10-PCS | Mod: PBBFAC,,, | Performed by: SURGERY

## 2022-07-18 PROCEDURE — 3072F LOW RISK FOR RETINOPATHY: CPT | Mod: CPTII,S$GLB,, | Performed by: SURGERY

## 2022-07-18 PROCEDURE — 3008F BODY MASS INDEX DOCD: CPT | Mod: CPTII,S$GLB,, | Performed by: SURGERY

## 2022-07-18 PROCEDURE — 3077F SYST BP >= 140 MM HG: CPT | Mod: CPTII,S$GLB,, | Performed by: SURGERY

## 2022-07-18 PROCEDURE — 99999 PR PBB SHADOW E&M-EST. PATIENT-LVL V: CPT | Mod: PBBFAC,,, | Performed by: SURGERY

## 2022-07-18 PROCEDURE — 3060F POS MICROALBUMINURIA REV: CPT | Mod: CPTII,S$GLB,, | Performed by: SURGERY

## 2022-07-18 RX ORDER — LIDOCAINE HYDROCHLORIDE 10 MG/ML
1 INJECTION, SOLUTION EPIDURAL; INFILTRATION; INTRACAUDAL; PERINEURAL ONCE
Status: DISCONTINUED | OUTPATIENT
Start: 2022-07-18 | End: 2022-08-10 | Stop reason: HOSPADM

## 2022-07-18 RX ORDER — ONDANSETRON 4 MG/1
8 TABLET, ORALLY DISINTEGRATING ORAL EVERY 8 HOURS PRN
Status: CANCELLED | OUTPATIENT
Start: 2022-07-18

## 2022-07-18 NOTE — H&P (VIEW-ONLY)
Patient ID: Anne Farmer is a 66 y.o. female.    Left breast infiltrating ductal carcinoma    Chief Complaint: Post-op Evaluation (Lumpectomy left breast )      HPI:  Patient underwent a left lumpectomy and sentinel node biopsy.  She presents now for follow-up visit.  She states is healing well with no pain.    The patient was informed that the pathology showed 2 positive margins at the lumpectomy site.  One of the margins is positive for infiltrating ductal carcinoma and the other is for ductal carcinoma in Situ.    I explained that she will need re-excision.    The patient expressed an understanding      Review of Systems   Constitutional: Negative for appetite change, chills, fatigue, fever and unexpected weight change.   HENT: Negative for hearing loss and rhinorrhea.    Eyes: Negative for visual disturbance.   Respiratory: Negative for apnea, cough, shortness of breath and wheezing.    Cardiovascular: Negative for chest pain and palpitations.   Gastrointestinal: Negative for abdominal distention, abdominal pain, blood in stool, constipation, diarrhea, nausea and vomiting.   Genitourinary: Negative for dysuria, frequency and urgency.   Musculoskeletal: Negative for arthralgias and neck pain.   Skin: Negative for rash.        Postop breast pain is resolving   Neurological: Negative for seizures, weakness, numbness and headaches.   Hematological: Negative for adenopathy. Does not bruise/bleed easily.   Psychiatric/Behavioral: Negative for hallucinations. The patient is not nervous/anxious.        Current Outpatient Medications   Medication Sig Dispense Refill    albuterol (PROVENTIL/VENTOLIN HFA) 90 mcg/actuation inhaler Inhale 1-2 puffs into the lungs every 6 (six) hours as needed for Wheezing or Shortness of Breath. 18 g 3    amLODIPine (NORVASC) 10 MG tablet TAKE 1 TABLET BY MOUTH EVERY DAY 90 tablet 0    cyclobenzaprine (FLEXERIL) 10 MG tablet TAKE 1 TABLET BY MOUTH THREE TIMES A DAY AS NEEDED 30  tablet 0    diclofenac sodium (VOLTAREN) 1 % Gel APPLY 2 GRAMS TOPICALLY 2 (TWO) TIMES DAILY AS NEEDED. 100 g 2    fluticasone propionate (FLONASE) 50 mcg/actuation nasal spray 2 sprays (100 mcg total) by Each Nostril route once daily. 48 mL 3    FLUZONE HIGHDOSE QUAD 20-21  mcg/0.7 mL Syrg PHARMACY ADMINISTERED      glycopyrrolate-formoteroL (BEVESPI AEROSPHERE) 9-4.8 mcg HFAA Inhale 2 puffs into the lungs 2 (two) times daily. Controller 10.9 g 11    hydroCHLOROthiazide (HYDRODIURIL) 25 MG tablet TAKE 1 TABLET BY MOUTH EVERY DAY 90 tablet 3    HYDROcodone-acetaminophen (NORCO)  mg per tablet Take 1 tablet by mouth every 6 (six) hours as needed for Pain. 15 tablet 0    HYDROcodone-acetaminophen (NORCO) 7.5-325 mg per tablet Take 1 tablet by mouth every 6 (six) hours as needed for Pain. 12 tablet 0    ketorolac 0.5% (ACULAR) 0.5 % Drop PLACE 1 DROP INTO THE RIGHT EYE 4 (FOUR) TIMES DAILY. EYEDROPS TO START ONE DAY BEFORE SURGERY 5 mL 2    levocetirizine (XYZAL) 5 MG tablet Take 1 tablet (5 mg total) by mouth every evening. 90 tablet 0    losartan (COZAAR) 100 MG tablet TAKE 1 TABLET BY MOUTH EVERY DAY 90 tablet 2    multivitamin (ONE DAILY MULTIVITAMIN) per tablet Take 1 tablet by mouth once daily.      omeprazole (PRILOSEC) 20 MG capsule TAKE 1 CAPSULE BY MOUTH EVERY DAY 90 capsule 3    pravastatin (PRAVACHOL) 20 MG tablet TAKE 1 TABLET EVERY DAY 90 tablet 0    prednisoLONE acetate (PRED FORTE) 1 % DrpS PLACE 1 DROP INTO THE RIGHT EYE 4 (FOUR) TIMES DAILY. START ONE DAY BEFORE SURGERY 5 mL 2    traMADoL (ULTRAM) 50 mg tablet TAKE ONE TABLET po tid prn pain 12 tablet 0    traZODone (DESYREL) 50 MG tablet Take 1 tablet (50 mg total) by mouth nightly. 90 tablet 1     Current Facility-Administered Medications   Medication Dose Route Frequency Provider Last Rate Last Admin    LIDOcaine (PF) 10 mg/ml (1%) injection 10 mg  1 mL Intradermal Once Arvin Hernandez MD           Review of patient's  allergies indicates:  No Known Allergies    Past Medical History:   Diagnosis Date    Allergy     Arthritis     Cancer 2016    colon    CHF (congestive heart failure)     Colon cancer 2004    Colon cancer screening 2015    Diabetes mellitus type 2 in nonobese 3/9/2016    borderline    Diverticulosis     DM (diabetes mellitus) 2016    BS didn't check 2019    DM (diabetes mellitus) 2016    BS didn't check 2020    Hyperlipidemia 10/25/2016    Hypertension     Insomnia     Malignant neoplasm of lower-outer quadrant of left breast of female, estrogen receptor positive 2022       Past Surgical History:   Procedure Laterality Date     SECTION      COLON SURGERY      COLONOSCOPY N/A 2015    Procedure: COLONOSCOPY;  Surgeon: Adela Sam MD;  Location: [facility] ENDO;  Service: Endoscopy;  Laterality: N/A;    COLONOSCOPY N/A 2017    Procedure: COLONOSCOPY;  Surgeon: Chintan Flores MD;  Location: [facility] ENDO;  Service: Endoscopy;  Laterality: N/A;    COLONOSCOPY N/A 2020    Procedure: COLONOSCOPY;  Surgeon: Grisel Atkins MD;  Location: [facility] ENDO;  Service: Endoscopy;  Laterality: N/A;    SENTINEL LYMPH NODE BIOPSY Left 2022    Procedure: BIOPSY, LYMPH NODE, SENTINEL;  Surgeon: Arvin Hernandez MD;  Location: [facility] OR;  Service: General;  Laterality: Left;       Family History   Problem Relation Age of Onset    Hypertension Mother     Diabetes Mother     Hypertension Father     Hypertension Sister     Hypertension Brother     Hypertension Sister     Hypertension Sister     Hypertension Sister     Hypertension Brother     Hypertension Brother        Social History     Socioeconomic History    Marital status: Single   Occupational History     Employer: [employer]   Tobacco Use    Smoking status: Never Smoker    Smokeless tobacco: Never Used   Substance and Sexual Activity    Alcohol use: Yes     Alcohol/week: 0.0 standard drinks      Comment: weekends.       Drug use: No       Vitals:    07/18/22 1656   BP: (!) 143/66   Pulse: 61   Temp: 98.3 °F (36.8 °C)       Physical Exam  Vitals reviewed. Exam conducted with a chaperone present.   Constitutional:       Appearance: She is well-developed.   HENT:      Head: Normocephalic.   Eyes:      Pupils: Pupils are equal, round, and reactive to light.   Neck:      Thyroid: No thyromegaly.      Vascular: No JVD.      Trachea: No tracheal deviation.   Cardiovascular:      Rate and Rhythm: Normal rate and regular rhythm.      Heart sounds: Normal heart sounds.   Pulmonary:      Breath sounds: Normal breath sounds. No wheezing.   Abdominal:      General: Bowel sounds are normal. There is no distension.      Palpations: Abdomen is soft. Abdomen is not rigid. There is no mass.      Tenderness: There is no abdominal tenderness. There is no guarding or rebound.   Musculoskeletal:         General: Normal range of motion.   Lymphadenopathy:      Cervical: No cervical adenopathy.   Skin:     General: Skin is warm and dry.      Findings: No erythema or rash.      Comments: The left axilla is healing well.    The left breast incision is healing well there is some resolving ecchymosis surrounding the incision.  There is no erythema or drainage   Neurological:      Mental Status: She is oriented to person, place, and time.     FINAL PATHOLOGIC DIAGNOSIS     1. [place] nodes   2. Non-sentinel nodes--perm  3. Left breast lump--short stitch=superior,long stitch=lateral,double stitch-deep margins   4. Additional deep margins    1. [place] lymph nodes, excision: 2 lymph nodes with no evidence of metastatic carcinoma     :     2 lymph nodes with no evidence of metastatic carcinoma      3. Left breast, lumpectomy: Invasive ductal carcinoma with mucinous features, Grade 2 (tubules = 3, nuclear pleomorphism = 2, mitoses = 1), measuring 21 mm (2.1 cm) Ductal carcinoma in situ, intermediate nuclear grade, solid and cribriform  patterns Calcifications associated with invasive carcinoma and DCIS   The posterior margin is positive for invasive carcinoma (unifocal, approximately 1 mm extent)     4. Additional deep margin     The medial margin is positive for DCIS   (unifocal, approximately 1 mm extent         Assessment & Plan:     Patient is recovering well from a lumpectomy and sentinel node biopsy.    The patient unfortunately has a positive posterior or deep margin and medial margin.  He has margins are positive for invasive carcinoma and ductal carcinoma in Situ respectively.    I recommended a re-excision of the lumpectomy site.    I discussed the risks benefits and complications of the surgery.  I discussed the rationale for surgery.    Questions were answered consent was obtained.    Risks include infection, bleeding, hematoma, seroma, possible need for additional surgery, and a longer incision and possible unsightly scar.    The patient has agreed to proceed.    CBC CMP were ordered.  Surgery scheduled for August 10th

## 2022-07-18 NOTE — PROGRESS NOTES
Patient ID: Anne Farmer is a 66 y.o. female.    Left breast infiltrating ductal carcinoma    Chief Complaint: Post-op Evaluation (Lumpectomy left breast )      HPI:  Patient underwent a left lumpectomy and sentinel node biopsy.  She presents now for follow-up visit.  She states is healing well with no pain.    The patient was informed that the pathology showed 2 positive margins at the lumpectomy site.  One of the margins is positive for infiltrating ductal carcinoma and the other is for ductal carcinoma in Situ.    I explained that she will need re-excision.    The patient expressed an understanding      Review of Systems   Constitutional: Negative for appetite change, chills, fatigue, fever and unexpected weight change.   HENT: Negative for hearing loss and rhinorrhea.    Eyes: Negative for visual disturbance.   Respiratory: Negative for apnea, cough, shortness of breath and wheezing.    Cardiovascular: Negative for chest pain and palpitations.   Gastrointestinal: Negative for abdominal distention, abdominal pain, blood in stool, constipation, diarrhea, nausea and vomiting.   Genitourinary: Negative for dysuria, frequency and urgency.   Musculoskeletal: Negative for arthralgias and neck pain.   Skin: Negative for rash.        Postop breast pain is resolving   Neurological: Negative for seizures, weakness, numbness and headaches.   Hematological: Negative for adenopathy. Does not bruise/bleed easily.   Psychiatric/Behavioral: Negative for hallucinations. The patient is not nervous/anxious.        Current Outpatient Medications   Medication Sig Dispense Refill    albuterol (PROVENTIL/VENTOLIN HFA) 90 mcg/actuation inhaler Inhale 1-2 puffs into the lungs every 6 (six) hours as needed for Wheezing or Shortness of Breath. 18 g 3    amLODIPine (NORVASC) 10 MG tablet TAKE 1 TABLET BY MOUTH EVERY DAY 90 tablet 0    cyclobenzaprine (FLEXERIL) 10 MG tablet TAKE 1 TABLET BY MOUTH THREE TIMES A DAY AS NEEDED 30  tablet 0    diclofenac sodium (VOLTAREN) 1 % Gel APPLY 2 GRAMS TOPICALLY 2 (TWO) TIMES DAILY AS NEEDED. 100 g 2    fluticasone propionate (FLONASE) 50 mcg/actuation nasal spray 2 sprays (100 mcg total) by Each Nostril route once daily. 48 mL 3    FLUZONE HIGHDOSE QUAD 20-21  mcg/0.7 mL Syrg PHARMACY ADMINISTERED      glycopyrrolate-formoteroL (BEVESPI AEROSPHERE) 9-4.8 mcg HFAA Inhale 2 puffs into the lungs 2 (two) times daily. Controller 10.9 g 11    hydroCHLOROthiazide (HYDRODIURIL) 25 MG tablet TAKE 1 TABLET BY MOUTH EVERY DAY 90 tablet 3    HYDROcodone-acetaminophen (NORCO)  mg per tablet Take 1 tablet by mouth every 6 (six) hours as needed for Pain. 15 tablet 0    HYDROcodone-acetaminophen (NORCO) 7.5-325 mg per tablet Take 1 tablet by mouth every 6 (six) hours as needed for Pain. 12 tablet 0    ketorolac 0.5% (ACULAR) 0.5 % Drop PLACE 1 DROP INTO THE RIGHT EYE 4 (FOUR) TIMES DAILY. EYEDROPS TO START ONE DAY BEFORE SURGERY 5 mL 2    levocetirizine (XYZAL) 5 MG tablet Take 1 tablet (5 mg total) by mouth every evening. 90 tablet 0    losartan (COZAAR) 100 MG tablet TAKE 1 TABLET BY MOUTH EVERY DAY 90 tablet 2    multivitamin (ONE DAILY MULTIVITAMIN) per tablet Take 1 tablet by mouth once daily.      omeprazole (PRILOSEC) 20 MG capsule TAKE 1 CAPSULE BY MOUTH EVERY DAY 90 capsule 3    pravastatin (PRAVACHOL) 20 MG tablet TAKE 1 TABLET EVERY DAY 90 tablet 0    prednisoLONE acetate (PRED FORTE) 1 % DrpS PLACE 1 DROP INTO THE RIGHT EYE 4 (FOUR) TIMES DAILY. START ONE DAY BEFORE SURGERY 5 mL 2    traMADoL (ULTRAM) 50 mg tablet TAKE ONE TABLET po tid prn pain 12 tablet 0    traZODone (DESYREL) 50 MG tablet Take 1 tablet (50 mg total) by mouth nightly. 90 tablet 1     Current Facility-Administered Medications   Medication Dose Route Frequency Provider Last Rate Last Admin    LIDOcaine (PF) 10 mg/ml (1%) injection 10 mg  1 mL Intradermal Once Arvin Hernandez MD           Review of patient's  allergies indicates:  No Known Allergies    Past Medical History:   Diagnosis Date    Allergy     Arthritis     Cancer 2016    colon    CHF (congestive heart failure)     Colon cancer 2004    Colon cancer screening 2015    Diabetes mellitus type 2 in nonobese 3/9/2016    borderline    Diverticulosis     DM (diabetes mellitus) 2016    BS didn't check 2019    DM (diabetes mellitus) 2016    BS didn't check 2020    Hyperlipidemia 10/25/2016    Hypertension     Insomnia     Malignant neoplasm of lower-outer quadrant of left breast of female, estrogen receptor positive 2022       Past Surgical History:   Procedure Laterality Date     SECTION      COLON SURGERY      COLONOSCOPY N/A 2015    Procedure: COLONOSCOPY;  Surgeon: Adela Sam MD;  Location: [facility] ENDO;  Service: Endoscopy;  Laterality: N/A;    COLONOSCOPY N/A 2017    Procedure: COLONOSCOPY;  Surgeon: Chintan Flores MD;  Location: [facility] ENDO;  Service: Endoscopy;  Laterality: N/A;    COLONOSCOPY N/A 2020    Procedure: COLONOSCOPY;  Surgeon: Grisel Atkins MD;  Location: [facility] ENDO;  Service: Endoscopy;  Laterality: N/A;    SENTINEL LYMPH NODE BIOPSY Left 2022    Procedure: BIOPSY, LYMPH NODE, SENTINEL;  Surgeon: Arvin Hernandez MD;  Location: [facility] OR;  Service: General;  Laterality: Left;       Family History   Problem Relation Age of Onset    Hypertension Mother     Diabetes Mother     Hypertension Father     Hypertension Sister     Hypertension Brother     Hypertension Sister     Hypertension Sister     Hypertension Sister     Hypertension Brother     Hypertension Brother        Social History     Socioeconomic History    Marital status: Single   Occupational History     Employer: [employer]   Tobacco Use    Smoking status: Never Smoker    Smokeless tobacco: Never Used   Substance and Sexual Activity    Alcohol use: Yes     Alcohol/week: 0.0 standard drinks      Comment: weekends.       Drug use: No       Vitals:    07/18/22 1656   BP: (!) 143/66   Pulse: 61   Temp: 98.3 °F (36.8 °C)       Physical Exam  Vitals reviewed. Exam conducted with a chaperone present.   Constitutional:       Appearance: She is well-developed.   HENT:      Head: Normocephalic.   Eyes:      Pupils: Pupils are equal, round, and reactive to light.   Neck:      Thyroid: No thyromegaly.      Vascular: No JVD.      Trachea: No tracheal deviation.   Cardiovascular:      Rate and Rhythm: Normal rate and regular rhythm.      Heart sounds: Normal heart sounds.   Pulmonary:      Breath sounds: Normal breath sounds. No wheezing.   Abdominal:      General: Bowel sounds are normal. There is no distension.      Palpations: Abdomen is soft. Abdomen is not rigid. There is no mass.      Tenderness: There is no abdominal tenderness. There is no guarding or rebound.   Musculoskeletal:         General: Normal range of motion.   Lymphadenopathy:      Cervical: No cervical adenopathy.   Skin:     General: Skin is warm and dry.      Findings: No erythema or rash.      Comments: The left axilla is healing well.    The left breast incision is healing well there is some resolving ecchymosis surrounding the incision.  There is no erythema or drainage   Neurological:      Mental Status: She is oriented to person, place, and time.     FINAL PATHOLOGIC DIAGNOSIS     1. [place] nodes   2. Non-sentinel nodes--perm  3. Left breast lump--short stitch=superior,long stitch=lateral,double stitch-deep margins   4. Additional deep margins    1. [place] lymph nodes, excision: 2 lymph nodes with no evidence of metastatic carcinoma     :     2 lymph nodes with no evidence of metastatic carcinoma      3. Left breast, lumpectomy: Invasive ductal carcinoma with mucinous features, Grade 2 (tubules = 3, nuclear pleomorphism = 2, mitoses = 1), measuring 21 mm (2.1 cm) Ductal carcinoma in situ, intermediate nuclear grade, solid and cribriform  patterns Calcifications associated with invasive carcinoma and DCIS   The posterior margin is positive for invasive carcinoma (unifocal, approximately 1 mm extent)     4. Additional deep margin     The medial margin is positive for DCIS   (unifocal, approximately 1 mm extent         Assessment & Plan:     Patient is recovering well from a lumpectomy and sentinel node biopsy.    The patient unfortunately has a positive posterior or deep margin and medial margin.  He has margins are positive for invasive carcinoma and ductal carcinoma in Situ respectively.    I recommended a re-excision of the lumpectomy site.    I discussed the risks benefits and complications of the surgery.  I discussed the rationale for surgery.    Questions were answered consent was obtained.    Risks include infection, bleeding, hematoma, seroma, possible need for additional surgery, and a longer incision and possible unsightly scar.    The patient has agreed to proceed.    CBC CMP were ordered.  Surgery scheduled for August 10th

## 2022-08-01 NOTE — PRE ADMISSION SCREENING
Pre op instructions reviewed with Pt per phone: Spoke about pre op process and surgery instructions, verbalized understanding.    Surgery is scheduled on 8/10/22. Please arrive at 7:30am. We will call you the afternoon prior to surgery to confirm arrival time, as it is subject to change due to cancellations & emergencies.    Please report to the [facility] (1st Floor) at [facility] located off of [facility] (2nd building on the left, in front of the flag pole).  Address: [address] [place] [address] [place] [address]       INSTRUCTIONS IMPORTANT!!!  Do Not Eat, Drink, or Smoke after 12 midnight! NO WATER after midnight! OK to brush teeth, no gum, candy or mints!      *Take only these medicines with a small swallow of water-morning of surgery.  Albuterol inhaler  Amlodipine  Flonase  Omeprazole  Pravastatin    ____  NO Acrylic/fake nails or nail polish worn day of surgery (specifically hand/arm & foot surgeries).  ____  NO powder, lotions, deodorants, oils or creams on body.  ____  Please Remove All jewelry & piercings prior to surgery.  ____  Please Remove Dentures, Hearing Aids & Contact Lens prior to the start of surgery.  ____  Please bring photo ID and insurance information to hospital (Leave Valuables at Home).  ____  If going home the same day, arrange for a ride home. You will not be able to drive 24 hrs if Anesthesia was used.   ____  Females (ages 11-60) may need to give a urine sample the morning of surgery; please see Pre op Nurse prior to voiding.  ____  Wear clean, loose fitting clothing. Allow for dressings, bandages.  ____  Stop all Aspirin products, Ibuprofen, Advil, Motrin & Aleve at least 5-7 days before surgery, unless otherwise instructed by your doctor, or the nurse.   ____  Blood Thinners are stopped based on your Provider's recommendation; Call Surgeon's Office to inquire when to stop/hold.  ____  Stop taking any Fish Oil supplements or Vitamins at least 5 days prior to surgery, unless  instructed otherwise by your Doctor.            Diabetic Patients: If you take diabetic medication, do NOT take morning of surgery unless instructed by             Doctor. Metformin to be stopped 24 hrs prior to surgery time. DO NOT take long-acting insulin the evening before surgery. Blood sugars will be checked in pre-op morning of.    Bathing Instructions:    -Do not shave your face or body the day before or the day of surgery.  -Do not shave pubic hair 7 days prior to surgery (gyn pt's).   -Shower & Rinse your body as usual with anti-bacterial Soap (Dial, Lever 2000, or Hibiclens)   -Do not use Hibiclens on your head, face, or genitals.   -Do not wash with anti-bacterial soap after you use the Hibiclens.   -Rinse your body thoroughly.      [facility] Visitor/Ride Policy:  Only 2 adults allowed (over the age of 18) to accompany you to the Hospital. You Must have a ride home from a responsible adult that you know and trust. Medical Transport, Uber or Lyft can only be used if patient has a responsible adult to accompany them during ride home.    Post-Op Instructions: You will receive Post-op/Discharge instructions by your Discharge Nurse prior to going home. Please call your Surgeon's office with any post-surgery questions/concerns.    *Call [facility] Pre-Admissions Department with surgery instruction questions @ [phone] or [phone] (Mon-Fri 8 am to 4 pm)  *If you are running late or have questions the morning of surgery, please call the Surgery Dept @ [phone]  *Insurance/ Financial Questions, please call [phone].

## 2022-08-03 ENCOUNTER — TELEPHONE (OUTPATIENT)
Dept: SURGERY | Facility: CLINIC | Age: 67
End: 2022-08-03
Payer: MEDICARE

## 2022-08-03 NOTE — TELEPHONE ENCOUNTER
Spoke to patient she was advised that her paperwork will be completed, signed and faxed back to her on 08/04/2002. The patient voiced understanding.

## 2022-08-08 ENCOUNTER — LAB VISIT (OUTPATIENT)
Dept: LAB | Facility: HOSPITAL | Age: 67
End: 2022-08-08
Attending: SURGERY
Payer: MEDICARE

## 2022-08-08 DIAGNOSIS — Z17.0 MALIGNANT NEOPLASM OF LOWER-OUTER QUADRANT OF LEFT BREAST OF FEMALE, ESTROGEN RECEPTOR POSITIVE: ICD-10-CM

## 2022-08-08 DIAGNOSIS — C50.512 MALIGNANT NEOPLASM OF LOWER-OUTER QUADRANT OF LEFT BREAST OF FEMALE, ESTROGEN RECEPTOR POSITIVE: ICD-10-CM

## 2022-08-08 LAB
ALBUMIN SERPL BCP-MCNC: 4.1 G/DL (ref 3.5–5.2)
ALP SERPL-CCNC: 73 U/L (ref 55–135)
ALT SERPL W/O P-5'-P-CCNC: 16 U/L (ref 10–44)
ANION GAP SERPL CALC-SCNC: 12 MMOL/L (ref 8–16)
AST SERPL-CCNC: 18 U/L (ref 10–40)
BASOPHILS # BLD AUTO: 0.05 K/UL (ref 0–0.2)
BASOPHILS NFR BLD: 1 % (ref 0–1.9)
BILIRUB SERPL-MCNC: 0.5 MG/DL (ref 0.1–1)
BUN SERPL-MCNC: 15 MG/DL (ref 8–23)
CALCIUM SERPL-MCNC: 9.9 MG/DL (ref 8.7–10.5)
CHLORIDE SERPL-SCNC: 100 MMOL/L (ref 95–110)
CO2 SERPL-SCNC: 26 MMOL/L (ref 23–29)
CREAT SERPL-MCNC: 0.8 MG/DL (ref 0.5–1.4)
DIFFERENTIAL METHOD: ABNORMAL
EOSINOPHIL # BLD AUTO: 0.3 K/UL (ref 0–0.5)
EOSINOPHIL NFR BLD: 6.8 % (ref 0–8)
ERYTHROCYTE [DISTWIDTH] IN BLOOD BY AUTOMATED COUNT: 13.8 % (ref 11.5–14.5)
EST. GFR  (NO RACE VARIABLE): >60 ML/MIN/1.73 M^2
GLUCOSE SERPL-MCNC: 123 MG/DL (ref 70–110)
HCT VFR BLD AUTO: 36.6 % (ref 37–48.5)
HGB BLD-MCNC: 11.9 G/DL (ref 12–16)
IMM GRANULOCYTES # BLD AUTO: 0.01 K/UL (ref 0–0.04)
IMM GRANULOCYTES NFR BLD AUTO: 0.2 % (ref 0–0.5)
LYMPHOCYTES # BLD AUTO: 1 K/UL (ref 1–4.8)
LYMPHOCYTES NFR BLD: 20.8 % (ref 18–48)
MCH RBC QN AUTO: 31 PG (ref 27–31)
MCHC RBC AUTO-ENTMCNC: 32.5 G/DL (ref 32–36)
MCV RBC AUTO: 95 FL (ref 82–98)
MONOCYTES # BLD AUTO: 0.4 K/UL (ref 0.3–1)
MONOCYTES NFR BLD: 9.1 % (ref 4–15)
NEUTROPHILS # BLD AUTO: 3 K/UL (ref 1.8–7.7)
NEUTROPHILS NFR BLD: 62.1 % (ref 38–73)
NRBC BLD-RTO: 0 /100 WBC
PLATELET # BLD AUTO: 305 K/UL (ref 150–450)
PMV BLD AUTO: 10.2 FL (ref 9.2–12.9)
POTASSIUM SERPL-SCNC: 4.4 MMOL/L (ref 3.5–5.1)
PROT SERPL-MCNC: 7.7 G/DL (ref 6–8.4)
RBC # BLD AUTO: 3.84 M/UL (ref 4–5.4)
SODIUM SERPL-SCNC: 138 MMOL/L (ref 136–145)
WBC # BLD AUTO: 4.86 K/UL (ref 3.9–12.7)

## 2022-08-08 PROCEDURE — 36415 COLL VENOUS BLD VENIPUNCTURE: CPT | Performed by: SURGERY

## 2022-08-08 PROCEDURE — 80053 COMPREHEN METABOLIC PANEL: CPT | Performed by: SURGERY

## 2022-08-08 PROCEDURE — 85025 COMPLETE CBC W/AUTO DIFF WBC: CPT | Performed by: SURGERY

## 2022-08-09 ENCOUNTER — TELEPHONE (OUTPATIENT)
Dept: PREADMISSION TESTING | Facility: HOSPITAL | Age: 67
End: 2022-08-09
Payer: MEDICARE

## 2022-08-09 NOTE — TELEPHONE ENCOUNTER
Called and spoke with Pt about the following; verbalized understanding.    Please arrive to [facility] main lobby at 7:30am on 8/10/22 for your scheduled procedure. NOTHING to eat or drink after midnight, except medications instructed by the Pre-admit Nurse.

## 2022-08-10 ENCOUNTER — HOSPITAL ENCOUNTER (OUTPATIENT)
Facility: HOSPITAL | Age: 67
Discharge: HOME | End: 2022-08-10
Attending: SURGERY | Admitting: SURGERY
Payer: MEDICARE

## 2022-08-10 ENCOUNTER — ANESTHESIA EVENT (OUTPATIENT)
Dept: SURGERY | Facility: HOSPITAL | Age: 67
End: 2022-08-10
Payer: MEDICARE

## 2022-08-10 ENCOUNTER — ANESTHESIA (OUTPATIENT)
Dept: SURGERY | Facility: HOSPITAL | Age: 67
End: 2022-08-10
Payer: MEDICARE

## 2022-08-10 DIAGNOSIS — C50.512 MALIGNANT NEOPLASM OF LOWER-OUTER QUADRANT OF LEFT BREAST OF FEMALE, ESTROGEN RECEPTOR POSITIVE: ICD-10-CM

## 2022-08-10 DIAGNOSIS — C50.912 BREAST CANCER, LEFT: ICD-10-CM

## 2022-08-10 DIAGNOSIS — Z17.0 MALIGNANT NEOPLASM OF LOWER-OUTER QUADRANT OF LEFT BREAST OF FEMALE, ESTROGEN RECEPTOR POSITIVE: ICD-10-CM

## 2022-08-10 LAB
POCT GLUCOSE: 128 MG/DL (ref 70–110)
POCT GLUCOSE: 151 MG/DL (ref 70–110)

## 2022-08-10 PROCEDURE — 37000008 HC ANESTHESIA 1ST 15 MINUTES: Performed by: SURGERY

## 2022-08-10 PROCEDURE — 37000009 HC ANESTHESIA EA ADD 15 MINS: Performed by: SURGERY

## 2022-08-10 PROCEDURE — 71000033 HC RECOVERY, INTIAL HOUR: Performed by: SURGERY

## 2022-08-10 PROCEDURE — 25000003 PHARM REV CODE 250: Performed by: NURSE ANESTHETIST, CERTIFIED REGISTERED

## 2022-08-10 PROCEDURE — 27201423 OPTIME MED/SURG SUP & DEVICES STERILE SUPPLY: Performed by: SURGERY

## 2022-08-10 PROCEDURE — 63600175 PHARM REV CODE 636 W HCPCS: Performed by: SURGERY

## 2022-08-10 PROCEDURE — 19301 PR MASTECTOMY, PARTIAL: ICD-10-PCS | Mod: 58,LT,, | Performed by: SURGERY

## 2022-08-10 PROCEDURE — 36000706: Performed by: SURGERY

## 2022-08-10 PROCEDURE — 25000003 PHARM REV CODE 250: Performed by: SURGERY

## 2022-08-10 PROCEDURE — 88307 PR  SURG PATH,LEVEL V: ICD-10-PCS | Mod: 26,,, | Performed by: STUDENT IN AN ORGANIZED HEALTH CARE EDUCATION/TRAINING PROGRAM

## 2022-08-10 PROCEDURE — 71000015 HC POSTOP RECOV 1ST HR: Performed by: SURGERY

## 2022-08-10 PROCEDURE — 36000707: Performed by: SURGERY

## 2022-08-10 PROCEDURE — 88307 TISSUE EXAM BY PATHOLOGIST: CPT | Mod: 26,,, | Performed by: STUDENT IN AN ORGANIZED HEALTH CARE EDUCATION/TRAINING PROGRAM

## 2022-08-10 PROCEDURE — 88307 TISSUE EXAM BY PATHOLOGIST: CPT | Performed by: STUDENT IN AN ORGANIZED HEALTH CARE EDUCATION/TRAINING PROGRAM

## 2022-08-10 PROCEDURE — 63600175 PHARM REV CODE 636 W HCPCS: Performed by: NURSE ANESTHETIST, CERTIFIED REGISTERED

## 2022-08-10 PROCEDURE — 19301 PARTIAL MASTECTOMY: CPT | Mod: 58,LT,, | Performed by: SURGERY

## 2022-08-10 RX ORDER — HYDROMORPHONE HYDROCHLORIDE 2 MG/ML
0.2 INJECTION, SOLUTION INTRAMUSCULAR; INTRAVENOUS; SUBCUTANEOUS EVERY 5 MIN PRN
Status: DISCONTINUED | OUTPATIENT
Start: 2022-08-10 | End: 2022-08-10 | Stop reason: HOSPADM

## 2022-08-10 RX ORDER — SUCCINYLCHOLINE CHLORIDE 20 MG/ML
INJECTION INTRAMUSCULAR; INTRAVENOUS
Status: DISCONTINUED | OUTPATIENT
Start: 2022-08-10 | End: 2022-08-10

## 2022-08-10 RX ORDER — CEFAZOLIN SODIUM 2 G/50ML
2 SOLUTION INTRAVENOUS
Status: COMPLETED | OUTPATIENT
Start: 2022-08-10 | End: 2022-08-10

## 2022-08-10 RX ORDER — ONDANSETRON 2 MG/ML
4 INJECTION INTRAMUSCULAR; INTRAVENOUS DAILY PRN
Status: DISCONTINUED | OUTPATIENT
Start: 2022-08-10 | End: 2022-08-10 | Stop reason: HOSPADM

## 2022-08-10 RX ORDER — BUPIVACAINE HYDROCHLORIDE 2.5 MG/ML
INJECTION, SOLUTION EPIDURAL; INFILTRATION; INTRACAUDAL
Status: DISCONTINUED | OUTPATIENT
Start: 2022-08-10 | End: 2022-08-10 | Stop reason: HOSPADM

## 2022-08-10 RX ORDER — LIDOCAINE HCL/PF 100 MG/5ML
SYRINGE (ML) INTRAVENOUS
Status: DISCONTINUED | OUTPATIENT
Start: 2022-08-10 | End: 2022-08-10

## 2022-08-10 RX ORDER — SODIUM CHLORIDE, SODIUM LACTATE, POTASSIUM CHLORIDE, CALCIUM CHLORIDE 600; 310; 30; 20 MG/100ML; MG/100ML; MG/100ML; MG/100ML
INJECTION, SOLUTION INTRAVENOUS CONTINUOUS PRN
Status: DISCONTINUED | OUTPATIENT
Start: 2022-08-10 | End: 2022-08-10

## 2022-08-10 RX ORDER — ONDANSETRON 8 MG/1
8 TABLET, ORALLY DISINTEGRATING ORAL EVERY 8 HOURS PRN
Status: DISCONTINUED | OUTPATIENT
Start: 2022-08-10 | End: 2022-08-10 | Stop reason: HOSPADM

## 2022-08-10 RX ORDER — HYDROCODONE BITARTRATE AND ACETAMINOPHEN 10; 325 MG/1; MG/1
1 TABLET ORAL EVERY 6 HOURS PRN
Qty: 20 TABLET | Refills: 0 | Status: SHIPPED | OUTPATIENT
Start: 2022-08-10 | End: 2023-09-28 | Stop reason: DRUGHIGH

## 2022-08-10 RX ORDER — ROCURONIUM BROMIDE 10 MG/ML
INJECTION, SOLUTION INTRAVENOUS
Status: DISCONTINUED | OUTPATIENT
Start: 2022-08-10 | End: 2022-08-10

## 2022-08-10 RX ORDER — ONDANSETRON HYDROCHLORIDE 2 MG/ML
INJECTION, SOLUTION INTRAMUSCULAR; INTRAVENOUS
Status: DISCONTINUED | OUTPATIENT
Start: 2022-08-10 | End: 2022-08-10

## 2022-08-10 RX ORDER — ONDANSETRON 8 MG/1
8 TABLET, ORALLY DISINTEGRATING ORAL EVERY 8 HOURS PRN
Status: CANCELLED | OUTPATIENT
Start: 2022-08-10

## 2022-08-10 RX ORDER — KETOROLAC TROMETHAMINE 30 MG/ML
15 INJECTION, SOLUTION INTRAMUSCULAR; INTRAVENOUS EVERY 8 HOURS PRN
Status: DISCONTINUED | OUTPATIENT
Start: 2022-08-10 | End: 2022-08-10 | Stop reason: HOSPADM

## 2022-08-10 RX ORDER — FENTANYL CITRATE 50 UG/ML
INJECTION, SOLUTION INTRAMUSCULAR; INTRAVENOUS
Status: DISCONTINUED | OUTPATIENT
Start: 2022-08-10 | End: 2022-08-10

## 2022-08-10 RX ORDER — OXYCODONE AND ACETAMINOPHEN 5; 325 MG/1; MG/1
1 TABLET ORAL
Status: DISCONTINUED | OUTPATIENT
Start: 2022-08-10 | End: 2022-08-10 | Stop reason: HOSPADM

## 2022-08-10 RX ORDER — MIDAZOLAM HYDROCHLORIDE 1 MG/ML
INJECTION INTRAMUSCULAR; INTRAVENOUS
Status: DISCONTINUED | OUTPATIENT
Start: 2022-08-10 | End: 2022-08-10

## 2022-08-10 RX ORDER — HYDROCODONE BITARTRATE AND ACETAMINOPHEN 10; 325 MG/1; MG/1
1 TABLET ORAL EVERY 6 HOURS PRN
Status: DISCONTINUED | OUTPATIENT
Start: 2022-08-10 | End: 2022-08-10 | Stop reason: HOSPADM

## 2022-08-10 RX ORDER — HYDROMORPHONE HYDROCHLORIDE 2 MG/ML
1 INJECTION, SOLUTION INTRAMUSCULAR; INTRAVENOUS; SUBCUTANEOUS EVERY 4 HOURS PRN
Status: CANCELLED | OUTPATIENT
Start: 2022-08-10

## 2022-08-10 RX ORDER — DEXAMETHASONE SODIUM PHOSPHATE 4 MG/ML
INJECTION, SOLUTION INTRA-ARTICULAR; INTRALESIONAL; INTRAMUSCULAR; INTRAVENOUS; SOFT TISSUE
Status: DISCONTINUED | OUTPATIENT
Start: 2022-08-10 | End: 2022-08-10

## 2022-08-10 RX ORDER — PROPOFOL 10 MG/ML
VIAL (ML) INTRAVENOUS
Status: DISCONTINUED | OUTPATIENT
Start: 2022-08-10 | End: 2022-08-10

## 2022-08-10 RX ADMIN — SUCCINYLCHOLINE CHLORIDE 120 MG: 20 INJECTION, SOLUTION INTRAMUSCULAR; INTRAVENOUS at 09:08

## 2022-08-10 RX ADMIN — ROCURONIUM BROMIDE 5 MG: 10 INJECTION, SOLUTION INTRAVENOUS at 09:08

## 2022-08-10 RX ADMIN — DEXAMETHASONE SODIUM PHOSPHATE 4 MG: 4 INJECTION, SOLUTION INTRAMUSCULAR; INTRAVENOUS at 09:08

## 2022-08-10 RX ADMIN — MIDAZOLAM HYDROCHLORIDE 2 MG: 1 INJECTION, SOLUTION INTRAMUSCULAR; INTRAVENOUS at 09:08

## 2022-08-10 RX ADMIN — CEFAZOLIN SODIUM 2 G: 2 SOLUTION INTRAVENOUS at 09:08

## 2022-08-10 RX ADMIN — FENTANYL CITRATE 100 MCG: 50 INJECTION, SOLUTION INTRAMUSCULAR; INTRAVENOUS at 09:08

## 2022-08-10 RX ADMIN — ONDANSETRON 8 MG: 2 INJECTION, SOLUTION INTRAMUSCULAR; INTRAVENOUS at 09:08

## 2022-08-10 RX ADMIN — LIDOCAINE HYDROCHLORIDE 50 MG: 20 INJECTION, SOLUTION INTRAVENOUS at 09:08

## 2022-08-10 RX ADMIN — SODIUM CHLORIDE, SODIUM LACTATE, POTASSIUM CHLORIDE, AND CALCIUM CHLORIDE: 600; 310; 30; 20 INJECTION, SOLUTION INTRAVENOUS at 09:08

## 2022-08-10 RX ADMIN — PROPOFOL 60 MG: 10 INJECTION, EMULSION INTRAVENOUS at 09:08

## 2022-08-10 RX ADMIN — GLYCOPYRROLATE 0.2 MG: 0.2 INJECTION, SOLUTION INTRAMUSCULAR; INTRAVENOUS at 09:08

## 2022-08-10 RX ADMIN — PROPOFOL 140 MG: 10 INJECTION, EMULSION INTRAVENOUS at 09:08

## 2022-08-10 NOTE — ANESTHESIA POSTPROCEDURE EVALUATION
Anesthesia Post Evaluation    Patient: Anne Farmer    Procedure(s) Performed: Procedure(s) (LRB):  LUMPECTOMY, BREAST (Left)    Final Anesthesia Type: general      Patient location during evaluation: PACU  Patient participation: Yes- Able to Participate  Level of consciousness: awake and alert  Post-procedure vital signs: reviewed and stable  Pain management: adequate  Airway patency: patent  ESCOBAR mitigation strategies: Verification of full reversal of neuromuscular block  PONV status at discharge: No PONV  Anesthetic complications: no      Cardiovascular status: hemodynamically stable  Respiratory status: spontaneous ventilation  Hydration status: euvolemic  Follow-up not needed.          Vitals Value Taken Time   /60 08/10/22 1025   Temp 36.5 °C (97.7 °F) 08/10/22 1015   Pulse 61 08/10/22 1028   Resp 17 08/10/22 1028   SpO2 98 % 08/10/22 1028   Vitals shown include unvalidated device data.      No case tracking events are documented in the log.      Pain/Bernadette Score: No data recorded

## 2022-08-10 NOTE — INTERVAL H&P NOTE
The patient has been examined and the H&P has been reviewed:    I concur with the findings and no changes have occurred since H&P was written.    Surgery risks, benefits and alternative options discussed and understood by patient/family.      Positive deep/posterior margin    There are no hospital problems to display for this patient.

## 2022-08-10 NOTE — ANESTHESIA PREPROCEDURE EVALUATION
08/10/2022  Anne Farmer is a 66 y.o., female.    Patient Active Problem List   Diagnosis    HTN (hypertension)    Arthritis    Hypertensive cardiomyopathy    Diastolic dysfunction    Allergic rhinitis    Vitamin D deficiency    Hepatitis C antibody test positive    Diabetes mellitus type 2 in nonobese    Hyperlipidemia    History of colon cancer, stage I    Sarcoidosis of lymph nodes    Personal history of colonic polyps    Diverticulosis of large intestine without hemorrhage    Chronic restrictive lung disease    Abnormal diffusion capacity determined by pulmonary function test    DDD (degenerative disc disease), lumbar    Malignant neoplasm of colon    Symptom of wheezing    Malignant neoplasm of lower-outer quadrant of left breast of female, estrogen receptor positive    Hard to intubate     Past Surgical History:   Procedure Laterality Date     SECTION      COLON SURGERY      COLONOSCOPY N/A 2015    Procedure: COLONOSCOPY;  Surgeon: Adela Sam MD;  Location: [facility] ENDO;  Service: Endoscopy;  Laterality: N/A;    COLONOSCOPY N/A 2017    Procedure: COLONOSCOPY;  Surgeon: Chintan Flores MD;  Location: [facility] ENDO;  Service: Endoscopy;  Laterality: N/A;    COLONOSCOPY N/A 2020    Procedure: COLONOSCOPY;  Surgeon: Grisel Atkins MD;  Location: [facility];  Service: Endoscopy;  Laterality: N/A;    SENTINEL LYMPH NODE BIOPSY Left 2022    Procedure: BIOPSY, LYMPH NODE, SENTINEL;  Surgeon: Arvin Hernandez MD;  Location: [facility];  Service: General;  Laterality: Left;       Pre-op Assessment    I have reviewed the Patient Summary Reports.    I have reviewed the NPO Status.   I have reviewed the Medications.     Review of Systems  Anesthesia Hx:  No problems with previous Anesthesia    Social:  Non-Smoker    Hematology/Oncology:  Hematology Normal     "    Cardiovascular:   Hypertension CHF ECG has been reviewed.    Pulmonary:  Pulmonary Normal    Renal/:  Renal/ Normal     Hepatic/GI:  Hepatic/GI Normal    Musculoskeletal:   Arthritis     Neurological:  Neurology Normal    Endocrine:   Diabetes        Physical Exam  General: Well nourished    Airway:  Mallampati: II   Mouth Opening: Normal  TM Distance: Normal  Neck ROM: Normal ROM    Dental:  Intact, Partial Dentures        Anesthesia Plan  Type of Anesthesia, risks & benefits discussed:    Anesthesia Type: Gen ETT  Intra-op Monitoring Plan: Standard ASA Monitors  Post Op Pain Control Plan: multimodal analgesia  Induction:  IV  Airway Plan: , Post-Induction  Informed Consent: Informed consent signed with the Patient and all parties understand the risks and agree with anesthesia plan.  All questions answered.   ASA Score: 2  Anesthesia Plan Notes: Pt noted to be "anterior" on last intubation.    Ready For Surgery From Anesthesia Perspective.     .      Chemistry        Component Value Date/Time     08/08/2022 0934    K 4.4 08/08/2022 0934     08/08/2022 0934    CO2 26 08/08/2022 0934    BUN 15 08/08/2022 0934    CREATININE 0.8 08/08/2022 0934     (H) 08/08/2022 0934        Component Value Date/Time    CALCIUM 9.9 08/08/2022 0934    ALKPHOS 73 08/08/2022 0934    AST 18 08/08/2022 0934    ALT 16 08/08/2022 0934    BILITOT 0.5 08/08/2022 0934    ESTGFRAFRICA >60.0 06/16/2022 1259    EGFRNONAA >60.0 06/16/2022 1259        Lab Results   Component Value Date    WBC 4.86 08/08/2022    HGB 11.9 (L) 08/08/2022    HCT 36.6 (L) 08/08/2022    MCV 95 08/08/2022     08/08/2022       Sinus bradycardia   Left axis deviation   Nonspecific intraventricular block   Nonspecific T wave abnormality   Abnormal ECG   When compared with ECG of 29-AUG-2019 15:34,   QRS duration has increased   QT has shortened   Confirmed by VIJAY MANCINI MD ([id number]) on 6/16/2022 4:31:11 PM     Echo 4/25/14:      CONCLUSIONS "     1 - Concentric hypertrophy.     2 - Normal left ventricular systolic function (EF 60-65%).     3 - Normal left ventricular diastolic function.     4 - Right atrial enlargement.     5 - Normal right ventricular systolic function .     6 - Pulmonary hypertension.     7 - Mild aortic regurgitation.     8 - Mild mitral regurgitation.     9 - Moderate tricuspid regurgitation.     10 - Mild to moderate pulmonic regurgitation.     Stress 11/3/16:    Nuclear Quantitative Functional Analysis:   LVEF: 64 %     Impression: NORMAL MYOCARDIAL PERFUSION   1. The perfusion scan is free of evidence for myocardial ischemia or injury.   2. Resting wall motion is physiologic.   3. Resting LV function is normal.   4. The ventricular volumes are normal at rest and stress.   5. The extracardiac distribution of radioactivity is normal.

## 2022-08-10 NOTE — PATIENT INSTRUCTIONS
Please call for any fever, increase in pain, nausea or vomiting or redness or drainage from incision(s).    There are no limits on your activity.    It is okay for you to shower      If you become constipated from the pain medication you can use over the counter laxatives,  Miralax or Glycolax, or Magnesium Citrate for severe constipation.

## 2022-08-10 NOTE — TRANSFER OF CARE
"Anesthesia Transfer of Care Note    Patient: Anne Farmer    Procedure(s) Performed: Procedure(s) (LRB):  LUMPECTOMY, BREAST (Left)    Patient location: PACU    Anesthesia Type: general    Transport from OR: Transported from OR on room air with adequate spontaneous ventilation    Post pain: adequate analgesia    Post assessment: no apparent anesthetic complications    Post vital signs: stable    Level of consciousness: responds to stimulation    Nausea/Vomiting: no nausea/vomiting    Complications: none    Transfer of care protocol was followed      Last vitals:   Visit Vitals  BP (!) 169/74 (BP Location: Right arm, Patient Position: Sitting)   Pulse (!) 50   Temp 36.5 °C (97.7 °F) (Temporal)   Resp 18   Ht 5' 6" (1.676 m)   Wt 81.7 kg (180 lb 1.9 oz)   SpO2 97%   Breastfeeding No   BMI 29.07 kg/m²     "

## 2022-08-10 NOTE — ANESTHESIA PROCEDURE NOTES
Intubation    Date/Time: 8/10/2022 9:21 AM  Performed by: Reji Templeton CRNA  Authorized by: Wesley Adkins II, MD     Intubation:     Induction:  Intravenous    Intubated:  Postinduction    Mask Ventilation:  Easy mask    Attempts:  1    Attempted By:  Student and CRNA    Method of Intubation:  Video laryngoscopy    Blade:  Glidescope 3    Laryngeal View Grade: Grade I - full view of cords      Difficult Airway Encountered?: No      Complications:  None    Airway Device:  Oral endotracheal tube    Airway Device Size:  7.0    Style/Cuff Inflation:  Cuffed (inflated to minimal occlusive pressure)    Tube secured:  22    Secured at:  The lips    Placement Verified By:  Capnometry    Complicating Factors:  None    Findings Post-Intubation:  BS equal bilateral  Notes:      Performed by CHRISTINE Casillas

## 2022-08-10 NOTE — OP NOTE
[facility] (Hospital)  Operative Note      Date of Procedure: 8/10/2022     Procedure: Procedure(s) (LRB):  LUMPECTOMY, BREAST (Left)     Surgeon(s) and Role:     * Arvin Hernandez MD - Primary    Assisting Surgeon: None    Pre-Operative Diagnosis: Malignant neoplasm of lower-outer quadrant of left breast of female, estrogen receptor positive [C50.512, Z17.0]    Post-Operative Diagnosis: Post-Op Diagnosis Codes:     * Malignant neoplasm of lower-outer quadrant of left breast of female, estrogen receptor positive [C50.512, Z17.0]    Anesthesia: General    Operative Findings (including complications, if any):     Firm breast tissue in the area of previous lumpectomy    Description of Technical Procedures:     Patient was brought into the operative room placed on the operative table in the supine position.  General endotracheal anesthesia was induced.  IV antibiotics were administered.  Pneumatic compressions on lower extremities.  The left breast was prepped and draped in the standard fashion.    A time-out was performed.    I elliptical incision was made around the previous scar.  Superior and inferior breast flaps were made.  The breast tissue surrounding the previous lumpectomy site was sharply excised using electrocautery.  Hemostasis was achieved using electrocautery.    The previous lumpectomy site was not entered.    Once the breast tissue was removed it was tagged with short suture superiorly, long suture laterally and double suture deep.    The wound was irrigated.  Hemostasis was ensured.  Ligaclips were placed superiorly inferiorly medially and laterally in the lumpectomy cavity.    Marcaine was infiltrated.    The deep layers were closed with 2 0 Vicryl interrupted fashion.  The deep dermis with 3 0 Vicryl in a running fashion.  The skin was closed with 4-0 Monocryl in a subcuticular manner.    Dermabond was placed.    Patient tolerated procedure well.    Significant Surgical Tasks Conducted by the  Assistant(s), if Applicable:  None    Estimated Blood Loss (EBL):  15 mL  IV fluids 800 mL  Marcaine 0.25% 30 mL           Implants: * No implants in log *    Specimens:   Specimen (24h ago, onward)             Start     Ordered    08/10/22 0944  Specimen to Pathology, Surgery General Surgery  Once        Comments: Pre-op Diagnosis: Malignant neoplasm of lower-outer quadrant of left breast of female, estrogen receptor positive [C50.512, Z17.0]Procedure(s):LUMPECTOMY, BREAST Number of specimens:   1Name of specimens:   1) Left breast lump --perm. ( Short stitch--superior;  Long stitch--lateral;  Double stitch--deep)     References:    Click here for ordering Quick Tip   Question Answer Comment   Procedure Type: General Surgery    Which provider would you like to cc? NIK GUZMAN    Release to patient Immediate        08/10/22 0945                        Condition: Stable    Disposition: PACU - hemodynamically stable.    Attestation: I performed the procedure.    Discharge Note    OUTCOME: Patient tolerated treatment/procedure well without complication and is now ready for discharge.     Re-excision of left breast lumpectomy site    DISPOSITION: Home or Self Care    FINAL DIAGNOSIS:  Left breast cancer with positive deep margin at the initial lumpectomy    FOLLOWUP: In clinic    DISCHARGE INSTRUCTIONS:    Discharge Procedure Orders   Diet general     Call MD for:  temperature >100.4     Call MD for:  persistent nausea and vomiting     Call MD for:  severe uncontrolled pain     Call MD for:  difficulty breathing, headache or visual disturbances     Call MD for:  redness, tenderness, or signs of infection (pain, swelling, redness, odor or green/yellow discharge around incision site)     No dressing needed     Activity as tolerated

## 2022-08-12 ENCOUNTER — PATIENT OUTREACH (OUTPATIENT)
Dept: ADMINISTRATIVE | Facility: HOSPITAL | Age: 67
End: 2022-08-12
Payer: MEDICARE

## 2022-08-12 VITALS
SYSTOLIC BLOOD PRESSURE: 138 MMHG | DIASTOLIC BLOOD PRESSURE: 78 MMHG | TEMPERATURE: 98 F | WEIGHT: 180.13 LBS | OXYGEN SATURATION: 98 % | BODY MASS INDEX: 28.95 KG/M2 | RESPIRATION RATE: 18 BRPM | HEIGHT: 66 IN | HEART RATE: 62 BPM

## 2022-08-12 LAB
FINAL PATHOLOGIC DIAGNOSIS: NORMAL
GROSS: NORMAL
Lab: NORMAL

## 2022-08-16 ENCOUNTER — TELEPHONE (OUTPATIENT)
Dept: SURGERY | Facility: HOSPITAL | Age: 67
End: 2022-08-16
Payer: MEDICARE

## 2022-08-16 NOTE — TELEPHONE ENCOUNTER
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

## 2022-08-22 ENCOUNTER — OFFICE VISIT (OUTPATIENT)
Dept: SURGERY | Facility: CLINIC | Age: 67
End: 2022-08-22
Payer: MEDICARE

## 2022-08-22 ENCOUNTER — TELEPHONE (OUTPATIENT)
Dept: RADIATION ONCOLOGY | Facility: CLINIC | Age: 67
End: 2022-08-22
Payer: MEDICARE

## 2022-08-22 VITALS — HEIGHT: 66 IN | WEIGHT: 180 LBS | BODY MASS INDEX: 28.93 KG/M2

## 2022-08-22 DIAGNOSIS — Z17.0 MALIGNANT NEOPLASM OF LOWER-OUTER QUADRANT OF LEFT BREAST OF FEMALE, ESTROGEN RECEPTOR POSITIVE: Primary | ICD-10-CM

## 2022-08-22 DIAGNOSIS — C50.512 MALIGNANT NEOPLASM OF LOWER-OUTER QUADRANT OF LEFT BREAST OF FEMALE, ESTROGEN RECEPTOR POSITIVE: Primary | ICD-10-CM

## 2022-08-22 PROBLEM — C18.9 MALIGNANT NEOPLASM OF COLON: Status: RESOLVED | Noted: 2021-05-13 | Resolved: 2022-08-22

## 2022-08-22 PROCEDURE — 3060F POS MICROALBUMINURIA REV: CPT | Mod: CPTII,S$GLB,, | Performed by: SURGERY

## 2022-08-22 PROCEDURE — 99999 PR PBB SHADOW E&M-EST. PATIENT-LVL III: CPT | Mod: PBBFAC,,, | Performed by: SURGERY

## 2022-08-22 PROCEDURE — 99024 POSTOP FOLLOW-UP VISIT: CPT | Mod: S$GLB,,, | Performed by: SURGERY

## 2022-08-22 PROCEDURE — 1159F PR MEDICATION LIST DOCUMENTED IN MEDICAL RECORD: ICD-10-PCS | Mod: CPTII,S$GLB,, | Performed by: SURGERY

## 2022-08-22 PROCEDURE — 3066F NEPHROPATHY DOC TX: CPT | Mod: CPTII,S$GLB,, | Performed by: SURGERY

## 2022-08-22 PROCEDURE — 3044F PR MOST RECENT HEMOGLOBIN A1C LEVEL <7.0%: ICD-10-PCS | Mod: CPTII,S$GLB,, | Performed by: SURGERY

## 2022-08-22 PROCEDURE — 3072F PR LOW RISK FOR RETINOPATHY: ICD-10-PCS | Mod: CPTII,S$GLB,, | Performed by: SURGERY

## 2022-08-22 PROCEDURE — 1126F PR PAIN SEVERITY QUANTIFIED, NO PAIN PRESENT: ICD-10-PCS | Mod: CPTII,S$GLB,, | Performed by: SURGERY

## 2022-08-22 PROCEDURE — 99024 PR POST-OP FOLLOW-UP VISIT: ICD-10-PCS | Mod: S$GLB,,, | Performed by: SURGERY

## 2022-08-22 PROCEDURE — 3008F PR BODY MASS INDEX (BMI) DOCUMENTED: ICD-10-PCS | Mod: CPTII,S$GLB,, | Performed by: SURGERY

## 2022-08-22 PROCEDURE — 4010F ACE/ARB THERAPY RXD/TAKEN: CPT | Mod: CPTII,S$GLB,, | Performed by: SURGERY

## 2022-08-22 PROCEDURE — 4010F PR ACE/ARB THEARPY RXD/TAKEN: ICD-10-PCS | Mod: CPTII,S$GLB,, | Performed by: SURGERY

## 2022-08-22 PROCEDURE — 3072F LOW RISK FOR RETINOPATHY: CPT | Mod: CPTII,S$GLB,, | Performed by: SURGERY

## 2022-08-22 PROCEDURE — 1126F AMNT PAIN NOTED NONE PRSNT: CPT | Mod: CPTII,S$GLB,, | Performed by: SURGERY

## 2022-08-22 PROCEDURE — 3060F PR POS MICROALBUMINURIA RESULT DOCUMENTED/REVIEW: ICD-10-PCS | Mod: CPTII,S$GLB,, | Performed by: SURGERY

## 2022-08-22 PROCEDURE — 3044F HG A1C LEVEL LT 7.0%: CPT | Mod: CPTII,S$GLB,, | Performed by: SURGERY

## 2022-08-22 PROCEDURE — 99999 PR PBB SHADOW E&M-EST. PATIENT-LVL III: ICD-10-PCS | Mod: PBBFAC,,, | Performed by: SURGERY

## 2022-08-22 PROCEDURE — 3008F BODY MASS INDEX DOCD: CPT | Mod: CPTII,S$GLB,, | Performed by: SURGERY

## 2022-08-22 PROCEDURE — 3066F PR DOCUMENTATION OF TREATMENT FOR NEPHROPATHY: ICD-10-PCS | Mod: CPTII,S$GLB,, | Performed by: SURGERY

## 2022-08-22 PROCEDURE — 1159F MED LIST DOCD IN RCRD: CPT | Mod: CPTII,S$GLB,, | Performed by: SURGERY

## 2022-08-22 NOTE — PROGRESS NOTES
Subjective:       Patient ID: Anne Farmer is a 66 y.o. female.     re-excision of a lumpectomy site    Chief Complaint: Post-op Evaluation     Patient underwent re-excision of a lumpectomy site for focally positive margins.  She was found have residual ductal carcinoma in Situ.  All margins were negative however the margins are less than 2 mm.  This was discussed with her.  She offers no complaints    Review of Systems   Skin:        Breast incision is healing       Objective:      Physical Exam  Skin:     Comments: Left breast incision is healing well.  There is no hematoma or seroma.  There is no erythema.  There is some downward and lateral deviation of the nipple areolar complex   Neurological:      Mental Status: She is alert.         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  All margins are negative for invasive carcinoma.  The ductal carcinoma in Situ margins are less than 2 mm, but negative     Plan:        I would recommend that the patient proceed with radiation.  Further evaluation need for chemotherapy per her oncologist.      Follow up with surgery in 4 weeks

## 2022-08-22 NOTE — PATIENT INSTRUCTIONS
Please removed the rest of the glue as it becomes loose.      I would like to see you back in 4 weeks

## 2022-08-24 ENCOUNTER — DOCUMENTATION ONLY (OUTPATIENT)
Dept: HEMATOLOGY/ONCOLOGY | Facility: CLINIC | Age: 67
End: 2022-08-24
Payer: MEDICARE

## 2022-08-24 DIAGNOSIS — Z78.0 MENOPAUSE: ICD-10-CM

## 2022-08-24 DIAGNOSIS — C50.412 MALIGNANT NEOPLASM OF UPPER-OUTER QUADRANT OF LEFT BREAST IN FEMALE, ESTROGEN RECEPTOR POSITIVE: Primary | ICD-10-CM

## 2022-08-24 DIAGNOSIS — Z17.0 MALIGNANT NEOPLASM OF UPPER-OUTER QUADRANT OF LEFT BREAST IN FEMALE, ESTROGEN RECEPTOR POSITIVE: Primary | ICD-10-CM

## 2022-08-24 NOTE — NURSING
Oncotype requested per MD request. Path 20 request submitted and oncotype breast cancer assay signed by provider and faxed back.   Oncology Navigation   Intake  Date of Diagnosis: 5/12/2022  Cancer Type: Breast  Internal / External Referral: Internal  Referral Source: Work queue  Date of Referral: 5/31/2022  Initial Nurse Navigator Contact: 5/31/2022  Referral to Initial Contact Timeline (days): 0  Date Worked: 8/24/2022  Appointment Date: 6/9/2022  Reason if booked > 7 days after scheduling: Specific provider / access     Treatment  Current Status: Staging work-up  Date Presented to Tumor Board: 6/6/2022       Medical Oncologist: Dr. Soraya Murray  Consult Date: 6/9/2022             ER: Positive  KS: Positive  Her2: Negative (by FISH)           Acuity      Follow Up  Follow up in about 2 weeks (around 9/7/2022) for Review oncotype.

## 2022-08-26 ENCOUNTER — TELEPHONE (OUTPATIENT)
Dept: RADIATION ONCOLOGY | Facility: CLINIC | Age: 67
End: 2022-08-26
Payer: MEDICARE

## 2022-08-26 NOTE — TELEPHONE ENCOUNTER
Attempted to call patient to schedule f/u appt with Dr Pope & radiation simulation appt. There was no answer, left a detailed message along with our direct number to call back to schedule appts.

## 2022-08-29 ENCOUNTER — TELEPHONE (OUTPATIENT)
Dept: RADIATION ONCOLOGY | Facility: CLINIC | Age: 67
End: 2022-08-29
Payer: MEDICARE

## 2022-08-29 ENCOUNTER — PES CALL (OUTPATIENT)
Dept: ADMINISTRATIVE | Facility: CLINIC | Age: 67
End: 2022-08-29
Payer: MEDICARE

## 2022-08-29 NOTE — TELEPHONE ENCOUNTER
Attempted to call patient to schedule patient a f/u appt with Dr Pope and a radiation simulation appt but there was no answer, left a detailed message along with our call back number.

## 2022-09-01 ENCOUNTER — HOSPITAL ENCOUNTER (OUTPATIENT)
Dept: RADIATION THERAPY | Facility: HOSPITAL | Age: 67
Discharge: HOME | End: 2022-09-01
Attending: RADIOLOGY
Payer: MEDICARE

## 2022-09-02 ENCOUNTER — DOCUMENTATION ONLY (OUTPATIENT)
Dept: HEMATOLOGY/ONCOLOGY | Facility: CLINIC | Age: 67
End: 2022-09-02
Payer: MEDICARE

## 2022-09-02 NOTE — PROGRESS NOTES
Oncotype scanned in to media. Notified Tori Murray, Niko and David.   Oncology Navigation   Intake  Date of Diagnosis: 05/12/22  Cancer Type: Breast  Internal / External Referral: Internal  Referral Source: Work queue  Date of Referral: 05/31/22  Initial Nurse Navigator Contact: 05/31/22  Referral to Initial Contact Timeline (days): 0  Date Worked: 09/02/22  Appointment Date: 06/09/22  Reason if booked > 7 days after scheduling: Specific provider / access     Treatment  Current Status: Staging work-up  Date Presented to Tumor Board: 06/06/22       Medical Oncologist: Dr. Soraya Murray  Consult Date: 06/09/22             ER: Positive  LA: Positive  Her2: Negative (by FISH)           Acuity      Follow Up  No follow-ups on file.

## 2022-09-08 ENCOUNTER — OFFICE VISIT (OUTPATIENT)
Dept: FAMILY MEDICINE | Facility: CLINIC | Age: 67
End: 2022-09-08
Attending: FAMILY MEDICINE
Payer: MEDICARE

## 2022-09-08 ENCOUNTER — LAB VISIT (OUTPATIENT)
Dept: LAB | Facility: HOSPITAL | Age: 67
End: 2022-09-08
Attending: FAMILY MEDICINE
Payer: MEDICARE

## 2022-09-08 VITALS
HEIGHT: 66 IN | HEART RATE: 62 BPM | SYSTOLIC BLOOD PRESSURE: 130 MMHG | BODY MASS INDEX: 28.4 KG/M2 | TEMPERATURE: 98 F | DIASTOLIC BLOOD PRESSURE: 72 MMHG | WEIGHT: 176.69 LBS | OXYGEN SATURATION: 97 %

## 2022-09-08 DIAGNOSIS — Z79.899 DRUG-INDUCED IMMUNODEFICIENCY: Primary | ICD-10-CM

## 2022-09-08 DIAGNOSIS — J30.9 ALLERGIC RHINITIS, UNSPECIFIED SEASONALITY, UNSPECIFIED TRIGGER: ICD-10-CM

## 2022-09-08 DIAGNOSIS — E78.5 DM TYPE 2 WITH DIABETIC DYSLIPIDEMIA: ICD-10-CM

## 2022-09-08 DIAGNOSIS — M19.90 ARTHRITIS: ICD-10-CM

## 2022-09-08 DIAGNOSIS — M25.50 MULTIPLE JOINT PAIN: ICD-10-CM

## 2022-09-08 DIAGNOSIS — I43 CARDIOMYOPATHY DUE TO HYPERTENSION, WITHOUT HEART FAILURE: ICD-10-CM

## 2022-09-08 DIAGNOSIS — C50.912 MALIGNANT NEOPLASM OF LEFT FEMALE BREAST, UNSPECIFIED ESTROGEN RECEPTOR STATUS, UNSPECIFIED SITE OF BREAST: ICD-10-CM

## 2022-09-08 DIAGNOSIS — E11.69 DM TYPE 2 WITH DIABETIC DYSLIPIDEMIA: ICD-10-CM

## 2022-09-08 DIAGNOSIS — D84.821 DRUG-INDUCED IMMUNODEFICIENCY: Primary | ICD-10-CM

## 2022-09-08 DIAGNOSIS — I11.9 CARDIOMYOPATHY DUE TO HYPERTENSION, WITHOUT HEART FAILURE: ICD-10-CM

## 2022-09-08 PROBLEM — R06.2 SYMPTOM OF WHEEZING: Status: RESOLVED | Noted: 2021-11-01 | Resolved: 2022-09-08

## 2022-09-08 LAB
ALBUMIN SERPL BCP-MCNC: 4.6 G/DL (ref 3.5–5.2)
ALP SERPL-CCNC: 78 U/L (ref 55–135)
ALT SERPL W/O P-5'-P-CCNC: 21 U/L (ref 10–44)
ANION GAP SERPL CALC-SCNC: 13 MMOL/L (ref 8–16)
AST SERPL-CCNC: 34 U/L (ref 10–40)
BILIRUB SERPL-MCNC: 0.5 MG/DL (ref 0.1–1)
BUN SERPL-MCNC: 26 MG/DL (ref 8–23)
CALCIUM SERPL-MCNC: 10.4 MG/DL (ref 8.7–10.5)
CHLORIDE SERPL-SCNC: 103 MMOL/L (ref 95–110)
CHOLEST SERPL-MCNC: 199 MG/DL (ref 120–199)
CHOLEST/HDLC SERPL: 2.1 {RATIO} (ref 2–5)
CO2 SERPL-SCNC: 25 MMOL/L (ref 23–29)
CREAT SERPL-MCNC: 0.9 MG/DL (ref 0.5–1.4)
EST. GFR  (NO RACE VARIABLE): >60 ML/MIN/1.73 M^2
GLUCOSE SERPL-MCNC: 117 MG/DL (ref 70–110)
HDLC SERPL-MCNC: 97 MG/DL (ref 40–75)
HDLC SERPL: 48.7 % (ref 20–50)
LDLC SERPL CALC-MCNC: 91.4 MG/DL (ref 63–159)
NONHDLC SERPL-MCNC: 102 MG/DL
POTASSIUM SERPL-SCNC: 3.4 MMOL/L (ref 3.5–5.1)
PROT SERPL-MCNC: 8.7 G/DL (ref 6–8.4)
SODIUM SERPL-SCNC: 141 MMOL/L (ref 136–145)
TRIGL SERPL-MCNC: 53 MG/DL (ref 30–150)

## 2022-09-08 PROCEDURE — 99999 PR PBB SHADOW E&M-EST. PATIENT-LVL III: CPT | Mod: PBBFAC,,, | Performed by: FAMILY MEDICINE

## 2022-09-08 PROCEDURE — 4010F PR ACE/ARB THEARPY RXD/TAKEN: ICD-10-PCS | Mod: CPTII,S$GLB,, | Performed by: FAMILY MEDICINE

## 2022-09-08 PROCEDURE — 3078F DIAST BP <80 MM HG: CPT | Mod: CPTII,S$GLB,, | Performed by: FAMILY MEDICINE

## 2022-09-08 PROCEDURE — 82043 UR ALBUMIN QUANTITATIVE: CPT | Performed by: FAMILY MEDICINE

## 2022-09-08 PROCEDURE — 3008F PR BODY MASS INDEX (BMI) DOCUMENTED: ICD-10-PCS | Mod: CPTII,S$GLB,, | Performed by: FAMILY MEDICINE

## 2022-09-08 PROCEDURE — 99214 PR OFFICE/OUTPT VISIT, EST, LEVL IV, 30-39 MIN: ICD-10-PCS | Mod: S$GLB,,, | Performed by: FAMILY MEDICINE

## 2022-09-08 PROCEDURE — 80061 LIPID PANEL: CPT | Performed by: FAMILY MEDICINE

## 2022-09-08 PROCEDURE — 99999 PR PBB SHADOW E&M-EST. PATIENT-LVL III: ICD-10-PCS | Mod: PBBFAC,,, | Performed by: FAMILY MEDICINE

## 2022-09-08 PROCEDURE — 3044F HG A1C LEVEL LT 7.0%: CPT | Mod: CPTII,S$GLB,, | Performed by: FAMILY MEDICINE

## 2022-09-08 PROCEDURE — 3072F PR LOW RISK FOR RETINOPATHY: ICD-10-PCS | Mod: CPTII,S$GLB,, | Performed by: FAMILY MEDICINE

## 2022-09-08 PROCEDURE — 82570 ASSAY OF URINE CREATININE: CPT | Performed by: FAMILY MEDICINE

## 2022-09-08 PROCEDURE — 3288F FALL RISK ASSESSMENT DOCD: CPT | Mod: CPTII,S$GLB,, | Performed by: FAMILY MEDICINE

## 2022-09-08 PROCEDURE — 3072F LOW RISK FOR RETINOPATHY: CPT | Mod: CPTII,S$GLB,, | Performed by: FAMILY MEDICINE

## 2022-09-08 PROCEDURE — 1101F PR PT FALLS ASSESS DOC 0-1 FALLS W/OUT INJ PAST YR: ICD-10-PCS | Mod: CPTII,S$GLB,, | Performed by: FAMILY MEDICINE

## 2022-09-08 PROCEDURE — 3060F PR POS MICROALBUMINURIA RESULT DOCUMENTED/REVIEW: ICD-10-PCS | Mod: CPTII,S$GLB,, | Performed by: FAMILY MEDICINE

## 2022-09-08 PROCEDURE — 3075F PR MOST RECENT SYSTOLIC BLOOD PRESS GE 130-139MM HG: ICD-10-PCS | Mod: CPTII,S$GLB,, | Performed by: FAMILY MEDICINE

## 2022-09-08 PROCEDURE — 85025 COMPLETE CBC W/AUTO DIFF WBC: CPT | Performed by: FAMILY MEDICINE

## 2022-09-08 PROCEDURE — 99214 OFFICE O/P EST MOD 30 MIN: CPT | Mod: S$GLB,,, | Performed by: FAMILY MEDICINE

## 2022-09-08 PROCEDURE — 3066F NEPHROPATHY DOC TX: CPT | Mod: CPTII,S$GLB,, | Performed by: FAMILY MEDICINE

## 2022-09-08 PROCEDURE — 1126F PR PAIN SEVERITY QUANTIFIED, NO PAIN PRESENT: ICD-10-PCS | Mod: CPTII,S$GLB,, | Performed by: FAMILY MEDICINE

## 2022-09-08 PROCEDURE — 83036 HEMOGLOBIN GLYCOSYLATED A1C: CPT | Performed by: FAMILY MEDICINE

## 2022-09-08 PROCEDURE — 99499 UNLISTED E&M SERVICE: CPT | Mod: S$GLB,,, | Performed by: FAMILY MEDICINE

## 2022-09-08 PROCEDURE — 3075F SYST BP GE 130 - 139MM HG: CPT | Mod: CPTII,S$GLB,, | Performed by: FAMILY MEDICINE

## 2022-09-08 PROCEDURE — 80053 COMPREHEN METABOLIC PANEL: CPT | Performed by: FAMILY MEDICINE

## 2022-09-08 PROCEDURE — 3288F PR FALLS RISK ASSESSMENT DOCUMENTED: ICD-10-PCS | Mod: CPTII,S$GLB,, | Performed by: FAMILY MEDICINE

## 2022-09-08 PROCEDURE — 1101F PT FALLS ASSESS-DOCD LE1/YR: CPT | Mod: CPTII,S$GLB,, | Performed by: FAMILY MEDICINE

## 2022-09-08 PROCEDURE — 1160F RVW MEDS BY RX/DR IN RCRD: CPT | Mod: CPTII,S$GLB,, | Performed by: FAMILY MEDICINE

## 2022-09-08 PROCEDURE — 3008F BODY MASS INDEX DOCD: CPT | Mod: CPTII,S$GLB,, | Performed by: FAMILY MEDICINE

## 2022-09-08 PROCEDURE — 1160F PR REVIEW ALL MEDS BY PRESCRIBER/CLIN PHARMACIST DOCUMENTED: ICD-10-PCS | Mod: CPTII,S$GLB,, | Performed by: FAMILY MEDICINE

## 2022-09-08 PROCEDURE — 3066F PR DOCUMENTATION OF TREATMENT FOR NEPHROPATHY: ICD-10-PCS | Mod: CPTII,S$GLB,, | Performed by: FAMILY MEDICINE

## 2022-09-08 PROCEDURE — 3078F PR MOST RECENT DIASTOLIC BLOOD PRESSURE < 80 MM HG: ICD-10-PCS | Mod: CPTII,S$GLB,, | Performed by: FAMILY MEDICINE

## 2022-09-08 PROCEDURE — 1126F AMNT PAIN NOTED NONE PRSNT: CPT | Mod: CPTII,S$GLB,, | Performed by: FAMILY MEDICINE

## 2022-09-08 PROCEDURE — 36415 COLL VENOUS BLD VENIPUNCTURE: CPT | Mod: PO | Performed by: FAMILY MEDICINE

## 2022-09-08 PROCEDURE — 4010F ACE/ARB THERAPY RXD/TAKEN: CPT | Mod: CPTII,S$GLB,, | Performed by: FAMILY MEDICINE

## 2022-09-08 PROCEDURE — 1159F MED LIST DOCD IN RCRD: CPT | Mod: CPTII,S$GLB,, | Performed by: FAMILY MEDICINE

## 2022-09-08 PROCEDURE — 3044F PR MOST RECENT HEMOGLOBIN A1C LEVEL <7.0%: ICD-10-PCS | Mod: CPTII,S$GLB,, | Performed by: FAMILY MEDICINE

## 2022-09-08 PROCEDURE — 3060F POS MICROALBUMINURIA REV: CPT | Mod: CPTII,S$GLB,, | Performed by: FAMILY MEDICINE

## 2022-09-08 PROCEDURE — 1159F PR MEDICATION LIST DOCUMENTED IN MEDICAL RECORD: ICD-10-PCS | Mod: CPTII,S$GLB,, | Performed by: FAMILY MEDICINE

## 2022-09-08 RX ORDER — LEVOCETIRIZINE DIHYDROCHLORIDE 5 MG/1
5 TABLET, FILM COATED ORAL NIGHTLY
Qty: 90 TABLET | Refills: 0 | Status: SHIPPED | OUTPATIENT
Start: 2022-09-08 | End: 2022-12-16

## 2022-09-08 RX ORDER — LOSARTAN POTASSIUM 100 MG/1
100 TABLET ORAL DAILY
Qty: 90 TABLET | Refills: 2 | Status: SHIPPED | OUTPATIENT
Start: 2022-09-08 | End: 2023-04-13

## 2022-09-08 RX ORDER — AMLODIPINE BESYLATE 10 MG/1
10 TABLET ORAL DAILY
Qty: 90 TABLET | Refills: 4 | Status: SHIPPED | OUTPATIENT
Start: 2022-09-08 | End: 2023-10-20

## 2022-09-08 RX ORDER — OMEPRAZOLE 20 MG/1
20 CAPSULE, DELAYED RELEASE ORAL DAILY
Qty: 90 CAPSULE | Refills: 3 | Status: SHIPPED | OUTPATIENT
Start: 2022-09-08 | End: 2023-10-20

## 2022-09-08 RX ORDER — CYCLOBENZAPRINE HCL 10 MG
TABLET ORAL
Qty: 30 TABLET | Refills: 0 | Status: SHIPPED | OUTPATIENT
Start: 2022-09-08 | End: 2022-09-08 | Stop reason: SDUPTHER

## 2022-09-08 RX ORDER — TRAZODONE HYDROCHLORIDE 50 MG/1
50 TABLET ORAL NIGHTLY
Qty: 90 TABLET | Refills: 1 | Status: SHIPPED | OUTPATIENT
Start: 2022-09-08 | End: 2023-01-10

## 2022-09-08 RX ORDER — CYCLOBENZAPRINE HCL 10 MG
TABLET ORAL
Qty: 30 TABLET | Refills: 0 | Status: SHIPPED | OUTPATIENT
Start: 2022-09-08 | End: 2022-12-01

## 2022-09-08 RX ORDER — PRAVASTATIN SODIUM 20 MG/1
20 TABLET ORAL DAILY
Qty: 90 TABLET | Refills: 0 | Status: SHIPPED | OUTPATIENT
Start: 2022-09-08 | End: 2023-01-10

## 2022-09-08 NOTE — PROGRESS NOTES
Subjective:       Patient ID: Anne Farmer is a 66 y.o. female.    Chief Complaint: Follow-up    66 y old female here for f.u . Underwent L lumpectomy . Diagnose with ductal ca in situ . Will soon start adjuvant radiotherapy . In good spirits. Not monitoring glucose. No sob , cp ,palpitations . On plaquenil for sarcoidosis . Past due for opth exam     Review of Systems   Constitutional: Negative.    HENT: Negative.     Eyes: Negative.    Respiratory: Negative.     Cardiovascular: Negative.    Gastrointestinal: Negative.    Genitourinary: Negative.    Musculoskeletal: Negative.    Skin: Negative.    Hematological: Negative.      Objective:      Physical Exam  Constitutional:       General: She is not in acute distress.     Appearance: She is well-developed. She is not diaphoretic.   HENT:      Head: Normocephalic and atraumatic.      Right Ear: External ear normal.      Left Ear: External ear normal.      Mouth/Throat:      Pharynx: No oropharyngeal exudate.   Eyes:      General: No scleral icterus.        Right eye: No discharge.         Left eye: No discharge.      Conjunctiva/sclera: Conjunctivae normal.      Pupils: Pupils are equal, round, and reactive to light.   Neck:      Thyroid: No thyromegaly.      Vascular: No JVD.      Trachea: No tracheal deviation.   Cardiovascular:      Rate and Rhythm: Normal rate and regular rhythm.      Pulses:           Dorsalis pedis pulses are 2+ on the right side and 2+ on the left side.        Posterior tibial pulses are 2+ on the right side and 2+ on the left side.      Heart sounds: Normal heart sounds. No murmur heard.    No friction rub. No gallop.   Pulmonary:      Effort: Pulmonary effort is normal. No respiratory distress.      Breath sounds: Normal breath sounds. No stridor. No wheezing or rales.   Chest:      Chest wall: No tenderness.   Abdominal:      General: Bowel sounds are normal. There is no distension.      Palpations: Abdomen is soft.      Tenderness:  There is no abdominal tenderness. There is no guarding or rebound.   Musculoskeletal:         General: Normal range of motion.      Cervical back: Normal range of motion and neck supple.      Right foot: Normal range of motion. No deformity, bunion, Charcot foot, foot drop or prominent metatarsal heads.      Left foot: Normal range of motion. No deformity, bunion, foot drop or prominent metatarsal heads.   Feet:      Right foot:      Protective Sensation: 10 sites tested.   1 site sensed.     Skin integrity: Dry skin present.      Toenail Condition: Right toenails are abnormally thick.      Left foot:      Protective Sensation: 10 sites tested.   1 site sensed.     Skin integrity: Dry skin present.      Toenail Condition: Left toenails are abnormally thick.   Lymphadenopathy:      Cervical: No cervical adenopathy.   Skin:     General: Skin is warm and dry.   Neurological:      Mental Status: She is alert and oriented to person, place, and time.   Psychiatric:         Behavior: Behavior normal.         Thought Content: Thought content normal.         Judgment: Judgment normal.       Assessment:           Anne was seen today for follow-up.    Diagnoses and all orders for this visit:    Drug-induced immunodeficiency    Cardiomyopathy due to hypertension, without heart failure    DM type 2 with diabetic dyslipidemia  -     CBC Auto Differential; Future  -     Comprehensive Metabolic Panel; Future  -     Hemoglobin A1C; Future  -     Lipid Panel; Future  -     Microalbumin/Creatinine Ratio, Urine    Malignant neoplasm of left female breast, unspecified estrogen receptor status, unspecified site of breast    Arthritis  -     cyclobenzaprine (FLEXERIL) 10 MG tablet; TAKE 1 TABLET BY MOUTH THREE TIMES A DAY AS NEEDED  Strength: 10 mg    Multiple joint pain  -     cyclobenzaprine (FLEXERIL) 10 MG tablet; TAKE 1 TABLET BY MOUTH THREE TIMES A DAY AS NEEDED  Strength: 10 mg    Allergic rhinitis, unspecified seasonality,  unspecified trigger  -     levocetirizine (XYZAL) 5 MG tablet; Take 1 tablet (5 mg total) by mouth every evening.    Other orders  -     amLODIPine (NORVASC) 10 MG tablet; Take 1 tablet (10 mg total) by mouth once daily.  -     losartan (COZAAR) 100 MG tablet; Take 1 tablet (100 mg total) by mouth once daily.  -     omeprazole (PRILOSEC) 20 MG capsule; Take 1 capsule (20 mg total) by mouth once daily.  -     pravastatin (PRAVACHOL) 20 MG tablet; Take 1 tablet (20 mg total) by mouth once daily.  -     traZODone (DESYREL) 50 MG tablet; Take 1 tablet (50 mg total) by mouth nightly.     Plan:     Anne was seen today for follow-up.    Diagnoses and all orders for this visit:    Drug-induced immunodeficiency    Cardiomyopathy due to hypertension, without heart failure    DM type 2 with diabetic dyslipidemia  -     CBC Auto Differential; Future  -     Comprehensive Metabolic Panel; Future  -     Hemoglobin A1C; Future  -     Lipid Panel; Future  -     Microalbumin/Creatinine Ratio, Urine     F.u with rheum  Stable . F.u with card   Diet and exercise  F.u with hem , onc , Gen surg and Radiation oncology

## 2022-09-09 ENCOUNTER — TELEPHONE (OUTPATIENT)
Dept: FAMILY MEDICINE | Facility: CLINIC | Age: 67
End: 2022-09-09
Payer: MEDICAID

## 2022-09-09 DIAGNOSIS — E87.6 HYPOKALEMIA: Primary | ICD-10-CM

## 2022-09-09 LAB
ALBUMIN/CREAT UR: 29.3 UG/MG (ref 0–30)
BASOPHILS # BLD AUTO: 0.04 K/UL (ref 0–0.2)
BASOPHILS NFR BLD: 0.7 % (ref 0–1.9)
CREAT UR-MCNC: 191 MG/DL (ref 15–325)
DIFFERENTIAL METHOD: ABNORMAL
EOSINOPHIL # BLD AUTO: 0.3 K/UL (ref 0–0.5)
EOSINOPHIL NFR BLD: 5.6 % (ref 0–8)
ERYTHROCYTE [DISTWIDTH] IN BLOOD BY AUTOMATED COUNT: 13.6 % (ref 11.5–14.5)
ESTIMATED AVG GLUCOSE: 143 MG/DL (ref 68–131)
HBA1C MFR BLD: 6.6 % (ref 4–5.6)
HCT VFR BLD AUTO: 35.5 % (ref 37–48.5)
HGB BLD-MCNC: 12.1 G/DL (ref 12–16)
IMM GRANULOCYTES # BLD AUTO: 0.02 K/UL (ref 0–0.04)
IMM GRANULOCYTES NFR BLD AUTO: 0.3 % (ref 0–0.5)
LYMPHOCYTES # BLD AUTO: 1.2 K/UL (ref 1–4.8)
LYMPHOCYTES NFR BLD: 19.8 % (ref 18–48)
MCH RBC QN AUTO: 31.3 PG (ref 27–31)
MCHC RBC AUTO-ENTMCNC: 34.1 G/DL (ref 32–36)
MCV RBC AUTO: 92 FL (ref 82–98)
MICROALBUMIN UR DL<=1MG/L-MCNC: 56 UG/ML
MONOCYTES # BLD AUTO: 0.5 K/UL (ref 0.3–1)
MONOCYTES NFR BLD: 8 % (ref 4–15)
NEUTROPHILS # BLD AUTO: 3.9 K/UL (ref 1.8–7.7)
NEUTROPHILS NFR BLD: 65.6 % (ref 38–73)
NRBC BLD-RTO: 0 /100 WBC
PLATELET # BLD AUTO: 319 K/UL (ref 150–450)
PMV BLD AUTO: 10.2 FL (ref 9.2–12.9)
RBC # BLD AUTO: 3.87 M/UL (ref 4–5.4)
WBC # BLD AUTO: 5.87 K/UL (ref 3.9–12.7)

## 2022-09-12 ENCOUNTER — OFFICE VISIT (OUTPATIENT)
Dept: HEMATOLOGY/ONCOLOGY | Facility: CLINIC | Age: 67
End: 2022-09-12
Payer: MEDICARE

## 2022-09-12 ENCOUNTER — HOSPITAL ENCOUNTER (OUTPATIENT)
Dept: RADIATION THERAPY | Facility: HOSPITAL | Age: 67
Discharge: HOME | End: 2022-09-12
Attending: INTERNAL MEDICINE
Payer: MEDICARE

## 2022-09-12 ENCOUNTER — HOSPITAL ENCOUNTER (OUTPATIENT)
Dept: RADIOLOGY | Facility: HOSPITAL | Age: 67
Discharge: HOME | End: 2022-09-12
Attending: INTERNAL MEDICINE
Payer: MEDICAID

## 2022-09-12 ENCOUNTER — OFFICE VISIT (OUTPATIENT)
Dept: RADIATION ONCOLOGY | Facility: CLINIC | Age: 67
End: 2022-09-12
Payer: MEDICARE

## 2022-09-12 VITALS
TEMPERATURE: 97 F | HEART RATE: 55 BPM | DIASTOLIC BLOOD PRESSURE: 73 MMHG | HEIGHT: 66 IN | WEIGHT: 179.88 LBS | SYSTOLIC BLOOD PRESSURE: 155 MMHG | BODY MASS INDEX: 28.91 KG/M2 | OXYGEN SATURATION: 97 %

## 2022-09-12 VITALS
DIASTOLIC BLOOD PRESSURE: 69 MMHG | TEMPERATURE: 97 F | SYSTOLIC BLOOD PRESSURE: 156 MMHG | RESPIRATION RATE: 18 BRPM | BODY MASS INDEX: 29.03 KG/M2 | WEIGHT: 180.63 LBS | HEART RATE: 65 BPM | HEIGHT: 66 IN

## 2022-09-12 DIAGNOSIS — Z85.038 HISTORY OF COLON CANCER, STAGE I: ICD-10-CM

## 2022-09-12 DIAGNOSIS — Z17.0 MALIGNANT NEOPLASM OF UPPER-OUTER QUADRANT OF LEFT BREAST IN FEMALE, ESTROGEN RECEPTOR POSITIVE: Primary | ICD-10-CM

## 2022-09-12 DIAGNOSIS — C50.412 MALIGNANT NEOPLASM OF UPPER-OUTER QUADRANT OF LEFT BREAST IN FEMALE, ESTROGEN RECEPTOR POSITIVE: Primary | ICD-10-CM

## 2022-09-12 PROCEDURE — 1160F RVW MEDS BY RX/DR IN RCRD: CPT | Mod: CPTII,S$GLB,, | Performed by: INTERNAL MEDICINE

## 2022-09-12 PROCEDURE — 99215 PR OFFICE/OUTPT VISIT, EST, LEVL V, 40-54 MIN: ICD-10-PCS | Mod: S$GLB,,, | Performed by: INTERNAL MEDICINE

## 2022-09-12 PROCEDURE — 1159F PR MEDICATION LIST DOCUMENTED IN MEDICAL RECORD: ICD-10-PCS | Mod: CPTII,S$GLB,, | Performed by: RADIOLOGY

## 2022-09-12 PROCEDURE — 4010F PR ACE/ARB THEARPY RXD/TAKEN: ICD-10-PCS | Mod: CPTII,S$GLB,, | Performed by: INTERNAL MEDICINE

## 2022-09-12 PROCEDURE — 1101F PR PT FALLS ASSESS DOC 0-1 FALLS W/OUT INJ PAST YR: ICD-10-PCS | Mod: CPTII,S$GLB,, | Performed by: INTERNAL MEDICINE

## 2022-09-12 PROCEDURE — 3008F BODY MASS INDEX DOCD: CPT | Mod: CPTII,S$GLB,, | Performed by: INTERNAL MEDICINE

## 2022-09-12 PROCEDURE — 1159F MED LIST DOCD IN RCRD: CPT | Mod: CPTII,S$GLB,, | Performed by: RADIOLOGY

## 2022-09-12 PROCEDURE — 3288F PR FALLS RISK ASSESSMENT DOCUMENTED: ICD-10-PCS | Mod: CPTII,S$GLB,, | Performed by: RADIOLOGY

## 2022-09-12 PROCEDURE — 3072F PR LOW RISK FOR RETINOPATHY: ICD-10-PCS | Mod: CPTII,S$GLB,, | Performed by: INTERNAL MEDICINE

## 2022-09-12 PROCEDURE — 3078F PR MOST RECENT DIASTOLIC BLOOD PRESSURE < 80 MM HG: ICD-10-PCS | Mod: CPTII,S$GLB,, | Performed by: RADIOLOGY

## 2022-09-12 PROCEDURE — 77334 RADIATION TREATMENT AID(S): CPT | Mod: TC | Performed by: RADIOLOGY

## 2022-09-12 PROCEDURE — 3066F PR DOCUMENTATION OF TREATMENT FOR NEPHROPATHY: ICD-10-PCS | Mod: CPTII,S$GLB,, | Performed by: INTERNAL MEDICINE

## 2022-09-12 PROCEDURE — 1126F PR PAIN SEVERITY QUANTIFIED, NO PAIN PRESENT: ICD-10-PCS | Mod: CPTII,S$GLB,, | Performed by: INTERNAL MEDICINE

## 2022-09-12 PROCEDURE — 99999 PR PBB SHADOW E&M-EST. PATIENT-LVL III: CPT | Mod: PBBFAC,,, | Performed by: RADIOLOGY

## 2022-09-12 PROCEDURE — 99999 PR PBB SHADOW E&M-EST. PATIENT-LVL III: ICD-10-PCS | Mod: PBBFAC,,, | Performed by: RADIOLOGY

## 2022-09-12 PROCEDURE — 77014 PR  CT GUIDANCE PLACEMENT RAD THERAPY FIELDS: ICD-10-PCS | Mod: 26,,, | Performed by: RADIOLOGY

## 2022-09-12 PROCEDURE — 3072F LOW RISK FOR RETINOPATHY: CPT | Mod: CPTII,S$GLB,, | Performed by: INTERNAL MEDICINE

## 2022-09-12 PROCEDURE — 1126F PR PAIN SEVERITY QUANTIFIED, NO PAIN PRESENT: ICD-10-PCS | Mod: CPTII,S$GLB,, | Performed by: RADIOLOGY

## 2022-09-12 PROCEDURE — 3077F SYST BP >= 140 MM HG: CPT | Mod: CPTII,S$GLB,, | Performed by: INTERNAL MEDICINE

## 2022-09-12 PROCEDURE — 99214 OFFICE O/P EST MOD 30 MIN: CPT | Mod: S$GLB,,, | Performed by: RADIOLOGY

## 2022-09-12 PROCEDURE — 4010F ACE/ARB THERAPY RXD/TAKEN: CPT | Mod: CPTII,S$GLB,, | Performed by: RADIOLOGY

## 2022-09-12 PROCEDURE — 3077F PR MOST RECENT SYSTOLIC BLOOD PRESSURE >= 140 MM HG: ICD-10-PCS | Mod: CPTII,S$GLB,, | Performed by: RADIOLOGY

## 2022-09-12 PROCEDURE — 1126F AMNT PAIN NOTED NONE PRSNT: CPT | Mod: CPTII,S$GLB,, | Performed by: RADIOLOGY

## 2022-09-12 PROCEDURE — 3078F DIAST BP <80 MM HG: CPT | Mod: CPTII,S$GLB,, | Performed by: INTERNAL MEDICINE

## 2022-09-12 PROCEDURE — 4010F PR ACE/ARB THEARPY RXD/TAKEN: ICD-10-PCS | Mod: CPTII,S$GLB,, | Performed by: RADIOLOGY

## 2022-09-12 PROCEDURE — 3066F NEPHROPATHY DOC TX: CPT | Mod: CPTII,S$GLB,, | Performed by: RADIOLOGY

## 2022-09-12 PROCEDURE — 3008F PR BODY MASS INDEX (BMI) DOCUMENTED: ICD-10-PCS | Mod: CPTII,S$GLB,, | Performed by: INTERNAL MEDICINE

## 2022-09-12 PROCEDURE — 3077F PR MOST RECENT SYSTOLIC BLOOD PRESSURE >= 140 MM HG: ICD-10-PCS | Mod: CPTII,S$GLB,, | Performed by: INTERNAL MEDICINE

## 2022-09-12 PROCEDURE — 77290 PR  SET RADN THERAPY FIELD COMPLEX: ICD-10-PCS | Mod: 26,,, | Performed by: RADIOLOGY

## 2022-09-12 PROCEDURE — 3044F PR MOST RECENT HEMOGLOBIN A1C LEVEL <7.0%: ICD-10-PCS | Mod: CPTII,S$GLB,, | Performed by: INTERNAL MEDICINE

## 2022-09-12 PROCEDURE — 3008F PR BODY MASS INDEX (BMI) DOCUMENTED: ICD-10-PCS | Mod: CPTII,S$GLB,, | Performed by: RADIOLOGY

## 2022-09-12 PROCEDURE — 1159F MED LIST DOCD IN RCRD: CPT | Mod: CPTII,S$GLB,, | Performed by: INTERNAL MEDICINE

## 2022-09-12 PROCEDURE — 3072F PR LOW RISK FOR RETINOPATHY: ICD-10-PCS | Mod: CPTII,S$GLB,, | Performed by: RADIOLOGY

## 2022-09-12 PROCEDURE — 3288F FALL RISK ASSESSMENT DOCD: CPT | Mod: CPTII,S$GLB,, | Performed by: INTERNAL MEDICINE

## 2022-09-12 PROCEDURE — 3061F PR NEG MICROALBUMINURIA RESULT DOCUMENTED/REVIEW: ICD-10-PCS | Mod: CPTII,S$GLB,, | Performed by: INTERNAL MEDICINE

## 2022-09-12 PROCEDURE — 1101F PT FALLS ASSESS-DOCD LE1/YR: CPT | Mod: CPTII,S$GLB,, | Performed by: INTERNAL MEDICINE

## 2022-09-12 PROCEDURE — 77263 PR  RADIATION THERAPY PLAN COMPLEX: ICD-10-PCS | Mod: ,,, | Performed by: RADIOLOGY

## 2022-09-12 PROCEDURE — 77263 THER RADIOLOGY TX PLNG CPLX: CPT | Mod: ,,, | Performed by: RADIOLOGY

## 2022-09-12 PROCEDURE — 3078F DIAST BP <80 MM HG: CPT | Mod: CPTII,S$GLB,, | Performed by: RADIOLOGY

## 2022-09-12 PROCEDURE — 1159F PR MEDICATION LIST DOCUMENTED IN MEDICAL RECORD: ICD-10-PCS | Mod: CPTII,S$GLB,, | Performed by: INTERNAL MEDICINE

## 2022-09-12 PROCEDURE — 3061F NEG MICROALBUMINURIA REV: CPT | Mod: CPTII,S$GLB,, | Performed by: INTERNAL MEDICINE

## 2022-09-12 PROCEDURE — 77334 RADIATION TREATMENT AID(S): CPT | Mod: 26,,, | Performed by: RADIOLOGY

## 2022-09-12 PROCEDURE — 77290 THER RAD SIMULAJ FIELD CPLX: CPT | Mod: TC | Performed by: RADIOLOGY

## 2022-09-12 PROCEDURE — 77014 HC CT GUIDANCE RADIATION THERAPY FLDS PLACEMENT: CPT | Mod: TC | Performed by: RADIOLOGY

## 2022-09-12 PROCEDURE — 99214 PR OFFICE/OUTPT VISIT, EST, LEVL IV, 30-39 MIN: ICD-10-PCS | Mod: S$GLB,,, | Performed by: RADIOLOGY

## 2022-09-12 PROCEDURE — 99999 PR PBB SHADOW E&M-EST. PATIENT-LVL IV: CPT | Mod: PBBFAC,,, | Performed by: INTERNAL MEDICINE

## 2022-09-12 PROCEDURE — 3044F PR MOST RECENT HEMOGLOBIN A1C LEVEL <7.0%: ICD-10-PCS | Mod: CPTII,S$GLB,, | Performed by: RADIOLOGY

## 2022-09-12 PROCEDURE — 1101F PR PT FALLS ASSESS DOC 0-1 FALLS W/OUT INJ PAST YR: ICD-10-PCS | Mod: CPTII,S$GLB,, | Performed by: RADIOLOGY

## 2022-09-12 PROCEDURE — 3288F PR FALLS RISK ASSESSMENT DOCUMENTED: ICD-10-PCS | Mod: CPTII,S$GLB,, | Performed by: INTERNAL MEDICINE

## 2022-09-12 PROCEDURE — 3078F PR MOST RECENT DIASTOLIC BLOOD PRESSURE < 80 MM HG: ICD-10-PCS | Mod: CPTII,S$GLB,, | Performed by: INTERNAL MEDICINE

## 2022-09-12 PROCEDURE — 3044F HG A1C LEVEL LT 7.0%: CPT | Mod: CPTII,S$GLB,, | Performed by: RADIOLOGY

## 2022-09-12 PROCEDURE — 3008F BODY MASS INDEX DOCD: CPT | Mod: CPTII,S$GLB,, | Performed by: RADIOLOGY

## 2022-09-12 PROCEDURE — 1126F AMNT PAIN NOTED NONE PRSNT: CPT | Mod: CPTII,S$GLB,, | Performed by: INTERNAL MEDICINE

## 2022-09-12 PROCEDURE — 1101F PT FALLS ASSESS-DOCD LE1/YR: CPT | Mod: CPTII,S$GLB,, | Performed by: RADIOLOGY

## 2022-09-12 PROCEDURE — 1160F PR REVIEW ALL MEDS BY PRESCRIBER/CLIN PHARMACIST DOCUMENTED: ICD-10-PCS | Mod: CPTII,S$GLB,, | Performed by: INTERNAL MEDICINE

## 2022-09-12 PROCEDURE — 3044F HG A1C LEVEL LT 7.0%: CPT | Mod: CPTII,S$GLB,, | Performed by: INTERNAL MEDICINE

## 2022-09-12 PROCEDURE — 3061F NEG MICROALBUMINURIA REV: CPT | Mod: CPTII,S$GLB,, | Performed by: RADIOLOGY

## 2022-09-12 PROCEDURE — 77014 PR  CT GUIDANCE PLACEMENT RAD THERAPY FIELDS: CPT | Mod: 26,,, | Performed by: RADIOLOGY

## 2022-09-12 PROCEDURE — 3066F PR DOCUMENTATION OF TREATMENT FOR NEPHROPATHY: ICD-10-PCS | Mod: CPTII,S$GLB,, | Performed by: RADIOLOGY

## 2022-09-12 PROCEDURE — 77334 PR  RADN TREATMENT AID(S) COMPLX: ICD-10-PCS | Mod: 26,,, | Performed by: RADIOLOGY

## 2022-09-12 PROCEDURE — 3061F PR NEG MICROALBUMINURIA RESULT DOCUMENTED/REVIEW: ICD-10-PCS | Mod: CPTII,S$GLB,, | Performed by: RADIOLOGY

## 2022-09-12 PROCEDURE — 3072F LOW RISK FOR RETINOPATHY: CPT | Mod: CPTII,S$GLB,, | Performed by: RADIOLOGY

## 2022-09-12 PROCEDURE — 99499 RISK ADDL DX/OHS AUDIT: ICD-10-PCS | Mod: HCNC,S$GLB,, | Performed by: RADIOLOGY

## 2022-09-12 PROCEDURE — 77290 THER RAD SIMULAJ FIELD CPLX: CPT | Mod: 26,,, | Performed by: RADIOLOGY

## 2022-09-12 PROCEDURE — 3077F SYST BP >= 140 MM HG: CPT | Mod: CPTII,S$GLB,, | Performed by: RADIOLOGY

## 2022-09-12 PROCEDURE — 99499 UNLISTED E&M SERVICE: CPT | Mod: HCNC,S$GLB,, | Performed by: RADIOLOGY

## 2022-09-12 PROCEDURE — 3066F NEPHROPATHY DOC TX: CPT | Mod: CPTII,S$GLB,, | Performed by: INTERNAL MEDICINE

## 2022-09-12 PROCEDURE — 3288F FALL RISK ASSESSMENT DOCD: CPT | Mod: CPTII,S$GLB,, | Performed by: RADIOLOGY

## 2022-09-12 PROCEDURE — 4010F ACE/ARB THERAPY RXD/TAKEN: CPT | Mod: CPTII,S$GLB,, | Performed by: INTERNAL MEDICINE

## 2022-09-12 PROCEDURE — 99215 OFFICE O/P EST HI 40 MIN: CPT | Mod: S$GLB,,, | Performed by: INTERNAL MEDICINE

## 2022-09-12 PROCEDURE — 99999 PR PBB SHADOW E&M-EST. PATIENT-LVL IV: ICD-10-PCS | Mod: PBBFAC,,, | Performed by: INTERNAL MEDICINE

## 2022-09-12 NOTE — PROGRESS NOTES
Subjective:      DATE OF VISIT: 22     ?  Patient ID:?nAne Farmer is a 66 y.o. female.?? MR#: [id number]     PRIMARY ONCOLOGIST: Dr. Murray    ? Primary Care Providers:  Chiquita Talley MD, MD (General)     CHIEF COMPLAINT: ? Follow-up postoperatively    ONCOLOGIC DIAGNOSIS: Stage IA, pT2, pN0, Mx ER/VA positive, HER2 IHC equivocal, FISH negative, left lower outer breast invasive ductal carcinoma grade 2, Octotype DX RS 8  ?   CURRENT TREATMENT:  Plan for adjuvant radiation, hormone lowering therapy TBD pending DEXA    PAST TREATMENT:  Biopsy only  ?   ONCOLOGIC HISTORY:   Ms. Framer follows up postoperatively has been doing well.  She did have initial surgery with positive margin medial and posterior with re-excision 08/10/2022.  She saw Radiation Oncology to discuss adjuvant treatment before visit today.    Review of Systems    ?   A comprehensive 14-point review of systems was reviewed with patient and was negative other than as specified above.   ?   PAST MEDICAL HISTORY:   Past Medical History:   Diagnosis Date    Allergy     Arthritis     Cancer 2016    colon    CHF (congestive heart failure)     Colon cancer 2004    Colon cancer screening 2015    Diabetes mellitus type 2 in nonobese 3/9/2016    borderline    Diverticulosis     DM (diabetes mellitus) 2016    BS didn't check 2019    DM (diabetes mellitus) 2016    BS didn't check 2020    Hyperlipidemia 10/25/2016    Hypertension     Insomnia     Malignant neoplasm of colon 2021    Malignant neoplasm of lower-outer quadrant of left breast of female, estrogen receptor positive 2022    ?     PAST SURGICAL HISTORY:   Past Surgical History:   Procedure Laterality Date     SECTION      COLON SURGERY      COLONOSCOPY N/A 2015    Procedure: COLONOSCOPY;  Surgeon: Adela Sam MD;  Location: [facility];  Service: Endoscopy;  Laterality: N/A;    COLONOSCOPY N/A 2017    Procedure: COLONOSCOPY;   Surgeon: Chintan Flores MD;  Location: [facility] ENDO;  Service: Endoscopy;  Laterality: N/A;    COLONOSCOPY N/A 6/30/2020    Procedure: COLONOSCOPY;  Surgeon: Grisel Atkins MD;  Location: [facility] ENDO;  Service: Endoscopy;  Laterality: N/A;    SENTINEL LYMPH NODE BIOPSY Left 7/5/2022    Procedure: BIOPSY, LYMPH NODE, SENTINEL;  Surgeon: Arvin Hernandez MD;  Location: [facility] OR;  Service: General;  Laterality: Left;      ?   ALLERGIES:   Allergies as of 09/12/2022    (No Known Allergies)      ?   MEDICATIONS:?   Outpatient Medications Marked as Taking for the 9/12/22 encounter (Office Visit) with Soraya Murray MD   Medication Sig Dispense Refill    albuterol (PROVENTIL/VENTOLIN HFA) 90 mcg/actuation inhaler Inhale 1-2 puffs into the lungs every 6 (six) hours as needed for Wheezing or Shortness of Breath. 18 g 3    amLODIPine (NORVASC) 10 MG tablet Take 1 tablet (10 mg total) by mouth once daily. 90 tablet 4    cyclobenzaprine (FLEXERIL) 10 MG tablet TAKE 1 TABLET BY MOUTH THREE TIMES A DAY AS NEEDED 30 tablet 0    fluticasone propionate (FLONASE) 50 mcg/actuation nasal spray 2 sprays (100 mcg total) by Each Nostril route once daily. 48 mL 3    glycopyrrolate-formoteroL (BEVESPI AEROSPHERE) 9-4.8 mcg HFAA Inhale 2 puffs into the lungs 2 (two) times daily. Controller 10.9 g 11    hydroCHLOROthiazide (HYDRODIURIL) 25 MG tablet TAKE 1 TABLET BY MOUTH EVERY DAY 90 tablet 3    HYDROcodone-acetaminophen (NORCO)  mg per tablet Take 1 tablet by mouth every 6 (six) hours as needed for Pain. 20 tablet 0    hydrOXYchloroQUINE (PLAQUENIL) 200 mg tablet TAKE 1 TABLET BY MOUTH TWICE A DAY 60 tablet 3    levocetirizine (XYZAL) 5 MG tablet Take 1 tablet (5 mg total) by mouth every evening. 90 tablet 0    losartan (COZAAR) 100 MG tablet Take 1 tablet (100 mg total) by mouth once daily. 90 tablet 2    multivitamin (ONE DAILY MULTIVITAMIN) per tablet Take 1 tablet by mouth once daily.      omeprazole (PRILOSEC) 20 MG capsule  Take 1 capsule (20 mg total) by mouth once daily. 90 capsule 3    pravastatin (PRAVACHOL) 20 MG tablet Take 1 tablet (20 mg total) by mouth once daily. 90 tablet 0    traZODone (DESYREL) 50 MG tablet Take 1 tablet (50 mg total) by mouth nightly. 90 tablet 1      ?   SOCIAL HISTORY:?   Social History     Tobacco Use    Smoking status: Never    Smokeless tobacco: Never   Substance Use Topics    Alcohol use: Yes     Alcohol/week: 0.0 standard drinks     Comment: weekends.         ?      ?   FAMILY HISTORY:   family history includes Diabetes in her mother; Hypertension in her brother, brother, brother, father, mother, sister, sister, sister, and sister.   ?        Objective:      Physical Exam      ?   Vitals:    09/12/22 1039   BP: (!) 155/73   Pulse: (!) 55   Temp: 97.2 °F (36.2 °C)      ?   ECOG:?0   General appearance: Generally well appearing, in no acute distress.   Head, eyes, ears, nose, and throat: Pupils round and equally reactive to light. Oropharynx clear with moist mucous membranes.   Breast:  No mass/nodule, skin change nipple abnormality or lymphadenopathy in axilla bilaterally  Abdomen:  Obese, nontender, nondistended.   Extremities: Warm, without edema.   Neurologic: Alert and oriented. Grossly normal strength, coordination, and gait.   Skin: No rashes, ecchymoses or petechial lesion.   ?    ?   Laboratory:  ?   No visits with results within 1 Day(s) from this visit.   Latest known visit with results is:   Lab Visit on 09/08/2022   Component Date Value Ref Range Status    WBC 09/08/2022 5.87  3.90 - 12.70 K/uL Final    RBC 09/08/2022 3.87 (L)  4.00 - 5.40 M/uL Final    Hemoglobin 09/08/2022 12.1  12.0 - 16.0 g/dL Final    Hematocrit 09/08/2022 35.5 (L)  37.0 - 48.5 % Final    MCV 09/08/2022 92  82 - 98 fL Final    MCH 09/08/2022 31.3 (H)  27.0 - 31.0 pg Final    MCHC 09/08/2022 34.1  32.0 - 36.0 g/dL Final    RDW 09/08/2022 13.6  11.5 - 14.5 % Final    Platelets 09/08/2022 319  150 - 450 K/uL Final     MPV 09/08/2022 10.2  9.2 - 12.9 fL Final    Immature Granulocytes 09/08/2022 0.3  0.0 - 0.5 % Final    Gran # (ANC) 09/08/2022 3.9  1.8 - 7.7 K/uL Final    Immature Grans (Abs) 09/08/2022 0.02  0.00 - 0.04 K/uL Final    Lymph # 09/08/2022 1.2  1.0 - 4.8 K/uL Final    Mono # 09/08/2022 0.5  0.3 - 1.0 K/uL Final    Eos # 09/08/2022 0.3  0.0 - 0.5 K/uL Final    Baso # 09/08/2022 0.04  0.00 - 0.20 K/uL Final    nRBC 09/08/2022 0  0 /100 WBC Final    Gran % 09/08/2022 65.6  38.0 - 73.0 % Final    Lymph % 09/08/2022 19.8  18.0 - 48.0 % Final    Mono % 09/08/2022 8.0  4.0 - 15.0 % Final    Eosinophil % 09/08/2022 5.6  0.0 - 8.0 % Final    Basophil % 09/08/2022 0.7  0.0 - 1.9 % Final    Differential Method 09/08/2022 Automated   Final    Sodium 09/08/2022 141  136 - 145 mmol/L Final    Potassium 09/08/2022 3.4 (L)  3.5 - 5.1 mmol/L Final    Chloride 09/08/2022 103  95 - 110 mmol/L Final    CO2 09/08/2022 25  23 - 29 mmol/L Final    Glucose 09/08/2022 117 (H)  70 - 110 mg/dL Final    BUN 09/08/2022 26 (H)  8 - 23 mg/dL Final    Creatinine 09/08/2022 0.9  0.5 - 1.4 mg/dL Final    Calcium 09/08/2022 10.4  8.7 - 10.5 mg/dL Final    Total Protein 09/08/2022 8.7 (H)  6.0 - 8.4 g/dL Final    Albumin 09/08/2022 4.6  3.5 - 5.2 g/dL Final    Total Bilirubin 09/08/2022 0.5  0.1 - 1.0 mg/dL Final    Alkaline Phosphatase 09/08/2022 78  55 - 135 U/L Final    AST 09/08/2022 34  10 - 40 U/L Final    ALT 09/08/2022 21  10 - 44 U/L Final    Anion Gap 09/08/2022 13  8 - 16 mmol/L Final    eGFR 09/08/2022 >60.0  >60 mL/min/1.73 m^2 Final    Hemoglobin A1C 09/08/2022 6.6 (H)  4.0 - 5.6 % Final    Estimated Avg Glucose 09/08/2022 143 (H)  68 - 131 mg/dL Final    Cholesterol 09/08/2022 199  120 - 199 mg/dL Final    Triglycerides 09/08/2022 53  30 - 150 mg/dL Final    HDL 09/08/2022 97 (H)  40 - 75 mg/dL Final    LDL Cholesterol 09/08/2022 91.4  63.0 - 159.0 mg/dL Final    HDL/Cholesterol Ratio 09/08/2022 48.7  20.0 - 50.0 % Final    Total  Cholesterol/HDL Ratio 09/08/2022 2.1  2.0 - 5.0 Final    Non-HDL Cholesterol 09/08/2022 102  mg/dL Final      Lab Results   Component Value Date    WBC 5.87 09/08/2022    HGB 12.1 09/08/2022    HCT 35.5 (L) 09/08/2022    MCV 92 09/08/2022     09/08/2022           ?   Imaging:  ?  5/2/22  Result:   Mammo Digital Diagnostic Left with Frankie  US Breast Left Limited     History:  Patient is 66 y.o. and is seen for diagnostic imaging.     Films Compared:  Compared to: 04/27/2022 Mammo Digital Screening Bilat w/ Frankie, 02/05/2021 Mammo Digital Screening Bilat w/ Frankie, and 01/25/2019 Mammo Digital Screening Bilat w/ Frankie     Findings:  This procedure was performed using tomosynthesis. Computer-aided detection was utilized in the interpretation of this examination.  The left breast is heterogeneously dense, which may obscure small masses.      Mammo Digital Diagnostic Left with Frankie  Left  Focal Asymmetry: There is a 25 mm focal asymmetry seen in the lower outer quadrant of the left breast in the middle depth.      US Breast Left Limited  Left  Mass: There are 2 similar oval, hypoechoic masses with indistinct margins with no posterior features seen in the left breast at 4 o'clock, 6 cm from the nipple. The masses measure 6 x 5 x 6 mm and 8 x 3 x 6 mm respectively.  Masses are  by distance of approximately 1.3 cm and may both potentially be within a duct. Findings appear to correlate with the mammographic asymmetry. Both lesions could be sampled with a single biopsy procedure given their close proximity.      No suspicious adenopathy demonstrated in the left axilla.      Impression:  Left  Focal Asymmetry: Left breast 25 mm focal asymmetry at the lower outer middle position. Assessment: 4 - Suspicious finding. Biopsy is recommended.      BI-RADS Category:   Overall: 4 - Suspicious     Recommendation:  Ultrasound-guided Biopsy is recommended.     Your estimated lifetime risk of breast cancer (to age 85) based on  Tyrer-Cuzick risk assessment model is Tyrer-Cuzick: 4.4 %. According to the American Cancer Society, patients with a lifetime breast cancer risk of 20% or higher might benefit from supplemental screening tests.     No results found for this or any previous visit (from the past 2160 hour(s)).  No results found for this or any previous visit (from the past 2160 hour(s)).  No results found for this or any previous visit (from the past 2160 hour(s)).      Pathology:    Final Pathologic Diagnosis Left breast, stereotactic core biopsy:   Invasive ductal carcinoma with mucinous features   Martha grade 2:  Tubule formation-3, nuclear pleomorphism -2 and mitotic   count -1   Invasive carcinoma measures 10 mm in greatest dimension   Ductal carcinoma in Situ, intermediate nuclear grade, solid and cribriform   patterns with associated microcalcifications   Calcifications are also associated with the invasive component   No lymphovascular invasion is identified   Tumor biomarkers   Estrogen receptor (ER):  Positive, greater than 95%, strong   Progesterone receptor (PGR):  Positive, 40-45%, intermediate to strong   HER2 (IHC):  Equivocal, 2+   Ki-67:  25-30%   Additional IHC:   P63:  Highlights loss of myoepithelial cells, confirming invasive component   while focal retention of myoepithelial cells in the area of carcinoma in Situ.   All immunostains were performed with appropriate controls.   This case was reviewed by Dr. JUAN Lan who concurs with the above diagnosis.   Due to equivocal HER2 IHC, HER2 FISH will be performed with results provided   in a supplemental report.   HER2, Breast Tumor, FISH, Tissue   Result Summary   Negative   Interpretation   There is no evidence of HER2 (ERBB2) gene amplification in this tumor sample.   Jackie Saldivar M.D.   Report attached   Performing location:   [address] [address]    Comment: Interp By Amee  HERBERT Mae, Signed on 05/27/2022 at 12:25         ?   Assessment/Plan:   Malignant neoplasm of upper-outer quadrant of left breast in female, estrogen receptor positive    History of colon cancer, stage I       1. Malignant neoplasm of upper-outer quadrant of left breast in female, estrogen receptor positive    2. History of colon cancer, stage I            Plan:     Problem List Items Addressed This Visit          Oncology    History of colon cancer, stage I (Chronic)     Other Visit Diagnoses       Malignant neoplasm of upper-outer quadrant of left breast in female, estrogen receptor positive    -  Primary          Stage IA, pT2, pN0, Mx ER/NV positive, HER2 IHC equivocal, FISH negative, left lower outer breast invasive ductal carcinoma grade 2, Octotype DX RS 8    I have written to her surgeon, Dr. Hernandez who clarified that re-excision successfully performed of initial positive margins for invasive disease and DCIS at medial and posterior margins, and pathology 08/10/2022 noting margins clear.  Have today reviewed in detail pathology report as well as Oncotype DX recurrence score low risk 8 indicating no benefit adjuvant chemotherapy.  She does understand recommendation for adjuvant radiation therapy in his revisited with Radiation Oncology before visit today.  We reviewed recommendations for hormonal therapy lowering with aromatase inhibitor as she is postmenopausal pending DEXA scan, ordered by primary care and will work on scheduling this to guide adjuvant hormone lowering therapy.  We reviewed potential side effects of aromatase inhibitor including hot flashes mood changes, decreased libido, hair nail changes, myalgias/arthralgias, bone loss.  We discussed importance of mammogram surveillance and should have 1st 6 months following completion of radiation around April 2022.    Will order hormone lowering therapy following review of DEXA scan.  She will follow-up with Radiation Oncology and us after about 1  month on hormone lowering therapy to assess for tolerance/compliance.          Follow-Up:   Patient Instructions   please schedule DEXA  RV at end of radiation after scan to order hormonal tx

## 2022-09-12 NOTE — PROGRESS NOTES
"[facility] [place]  RADIATION ONCOLOGY FOLLOW UP    Name: Anne Farmer  : 1955      DIAGNOSIS: L breast invasive ductal carcinoma, grade 2, pT2N0(sn)cM0, ER 95%, NC 40-45%, Her2 negative    TREATMENT HISTORY:   L lumpectomy and sentinel node biopsy 22  Re-excision of margins 8/10/22  Planned adjuvant radiation    INTERVAL HISTORY:  Anne Farmer is a 66 y.o. female who presents for follow up for the above diagnosis.   Since consultation she has undergone L lumpectomy and sentinel node biopsy on 22 by Dr. Hernandez.  Pathology showed 4 benign lymph nodes, IDC with mucinous features 3.0cm, grade 2, DCIS present, posterior margin positive for invasive carcinoma.  Re-excision on 8/10/22 had DCIS residual but cleared.    Oncotype returned 8    Today, she is doing well overall. Good ROM of LUE.  She denies breast pain, skin changes, nipple changes, new axillary/supraclavicular masses, discharge, induration, or erythema.     PHYSICAL EXAMINATION:  Constitutional: well appearing, no acute distress, ECOG 0 - Fully Active  Vitals:    BP (!) 156/69 (BP Location: Left arm, Patient Position: Sitting, BP Method: Large (Automatic))   Pulse 65   Temp 97.4 °F (36.3 °C)   Resp 18   Ht 5' 6" (1.676 m)   Wt 81.9 kg (180 lb 9.6 oz)   BMI 29.15 kg/m²   Eyes: sclera anicteric, EOMI, pupils equal, round and reactive to light  ENT: oral cavity without lesions, moist mucous membranes  Neck: trachea midline, neck supple  Lymphatic: no cervical, supraclavicular or axillary adenopathy  Breast: no masses, skin changes or retractions, non-tender, Seroma and well-healed incision in L IM fold  Cardiovascular: regular rate, no edema of the upper or lower extremities, radial pulse 2+  Respiratory: unlabored effort, clear to auscultation, no wheezes  Abdomen: soft, non-tender, no rigidity, no masses, no hepatomegaly    IMAGING AND LABORATORY FINDINGS: As per HPI; images and pathology reviewed " personally.    ASSESSMENT: 66 y.o. female with L IDC pT2N0 s/p lumpectomy    PLAN: After reviewing pathology, I again discussed the indications, techniques, risks/benefits, and potential acute/late toxicities of radiation with Ms. Farmer.   I will give 40Gy/15fx to L whole breast with DIBH technique to reduce lung/heart dose.  Given size of tumor and margin re-excision will also try for tumor bed boost to 10Gy/5fx.  Tumor bed in infra-mammary fold, will have to re-sim in different position for boost volume planning and may not be able to give boost without excess skin toxicity.    She will undergo CT simulation and start treatment shortly.  We discussed the techniques, toxicities and indications of radiation and I answered the patient's questions to their apparent satisfaction. Informed consent was obtained.    I spent approximately 30 minutes reviewing the available records and evaluating the patient, out of which over 50% of the time was spent face to face with the patient in counseling and coordinating this patient's care.    Martina Pope III, M.D.  Radiation Oncology  [facility] [address] [place] [address]  Ph: [phone]  [email or web address]

## 2022-09-15 PROCEDURE — 77334 PR  RADN TREATMENT AID(S) COMPLX: ICD-10-PCS | Mod: 26,,, | Performed by: RADIOLOGY

## 2022-09-15 PROCEDURE — 77300 PR RADIATION THERAPY,DOSIMETRY PLAN: ICD-10-PCS | Mod: 26,,, | Performed by: RADIOLOGY

## 2022-09-15 PROCEDURE — 77293 RESPIRATOR MOTION MGMT SIMUL: CPT | Mod: 26,,, | Performed by: RADIOLOGY

## 2022-09-15 PROCEDURE — 77334 RADIATION TREATMENT AID(S): CPT | Mod: TC | Performed by: RADIOLOGY

## 2022-09-15 PROCEDURE — 77293 PR RESPIRATORY MOTION MGMT SIMULATION: ICD-10-PCS | Mod: 26,,, | Performed by: RADIOLOGY

## 2022-09-15 PROCEDURE — 77293 RESPIRATOR MOTION MGMT SIMUL: CPT | Mod: TC | Performed by: RADIOLOGY

## 2022-09-15 PROCEDURE — 77295 3-D RADIOTHERAPY PLAN: CPT | Mod: 26,,, | Performed by: RADIOLOGY

## 2022-09-15 PROCEDURE — 77300 RADIATION THERAPY DOSE PLAN: CPT | Mod: TC | Performed by: RADIOLOGY

## 2022-09-15 PROCEDURE — 77295 3-D RADIOTHERAPY PLAN: CPT | Mod: TC | Performed by: RADIOLOGY

## 2022-09-15 PROCEDURE — 77334 RADIATION TREATMENT AID(S): CPT | Mod: 26,,, | Performed by: RADIOLOGY

## 2022-09-15 PROCEDURE — 77300 RADIATION THERAPY DOSE PLAN: CPT | Mod: 26,,, | Performed by: RADIOLOGY

## 2022-09-15 PROCEDURE — 77295 PR 3D RADIOTHERAPY PLAN: ICD-10-PCS | Mod: 26,,, | Performed by: RADIOLOGY

## 2022-09-19 ENCOUNTER — LAB VISIT (OUTPATIENT)
Dept: LAB | Facility: HOSPITAL | Age: 67
End: 2022-09-19
Attending: FAMILY MEDICINE
Payer: MEDICAID

## 2022-09-19 ENCOUNTER — TELEPHONE (OUTPATIENT)
Dept: HEMATOLOGY/ONCOLOGY | Facility: CLINIC | Age: 67
End: 2022-09-19
Payer: MEDICAID

## 2022-09-19 ENCOUNTER — OFFICE VISIT (OUTPATIENT)
Dept: SURGERY | Facility: CLINIC | Age: 67
End: 2022-09-19
Payer: MEDICARE

## 2022-09-19 VITALS
WEIGHT: 176.38 LBS | SYSTOLIC BLOOD PRESSURE: 140 MMHG | BODY MASS INDEX: 28.34 KG/M2 | TEMPERATURE: 98 F | HEIGHT: 66 IN | DIASTOLIC BLOOD PRESSURE: 76 MMHG | HEART RATE: 58 BPM

## 2022-09-19 DIAGNOSIS — E87.6 HYPOKALEMIA: ICD-10-CM

## 2022-09-19 DIAGNOSIS — Z17.0 MALIGNANT NEOPLASM OF LOWER-OUTER QUADRANT OF LEFT BREAST OF FEMALE, ESTROGEN RECEPTOR POSITIVE: Primary | ICD-10-CM

## 2022-09-19 DIAGNOSIS — C50.512 MALIGNANT NEOPLASM OF LOWER-OUTER QUADRANT OF LEFT BREAST OF FEMALE, ESTROGEN RECEPTOR POSITIVE: Primary | ICD-10-CM

## 2022-09-19 LAB
ANION GAP SERPL CALC-SCNC: 14 MMOL/L (ref 8–16)
BUN SERPL-MCNC: 20 MG/DL (ref 8–23)
CALCIUM SERPL-MCNC: 10.3 MG/DL (ref 8.7–10.5)
CHLORIDE SERPL-SCNC: 98 MMOL/L (ref 95–110)
CO2 SERPL-SCNC: 25 MMOL/L (ref 23–29)
CREAT SERPL-MCNC: 1 MG/DL (ref 0.5–1.4)
EST. GFR  (NO RACE VARIABLE): >60 ML/MIN/1.73 M^2
GLUCOSE SERPL-MCNC: 113 MG/DL (ref 70–110)
POTASSIUM SERPL-SCNC: 3.5 MMOL/L (ref 3.5–5.1)
SODIUM SERPL-SCNC: 137 MMOL/L (ref 136–145)

## 2022-09-19 PROCEDURE — 3008F BODY MASS INDEX DOCD: CPT | Mod: CPTII,S$GLB,, | Performed by: SURGERY

## 2022-09-19 PROCEDURE — 3044F HG A1C LEVEL LT 7.0%: CPT | Mod: CPTII,S$GLB,, | Performed by: SURGERY

## 2022-09-19 PROCEDURE — 99024 PR POST-OP FOLLOW-UP VISIT: ICD-10-PCS | Mod: S$GLB,,, | Performed by: SURGERY

## 2022-09-19 PROCEDURE — 3066F NEPHROPATHY DOC TX: CPT | Mod: CPTII,S$GLB,, | Performed by: SURGERY

## 2022-09-19 PROCEDURE — 3072F LOW RISK FOR RETINOPATHY: CPT | Mod: CPTII,S$GLB,, | Performed by: SURGERY

## 2022-09-19 PROCEDURE — 3044F PR MOST RECENT HEMOGLOBIN A1C LEVEL <7.0%: ICD-10-PCS | Mod: CPTII,S$GLB,, | Performed by: SURGERY

## 2022-09-19 PROCEDURE — 80048 BASIC METABOLIC PNL TOTAL CA: CPT | Performed by: FAMILY MEDICINE

## 2022-09-19 PROCEDURE — 3288F PR FALLS RISK ASSESSMENT DOCUMENTED: ICD-10-PCS | Mod: CPTII,S$GLB,, | Performed by: SURGERY

## 2022-09-19 PROCEDURE — 99024 POSTOP FOLLOW-UP VISIT: CPT | Mod: S$GLB,,, | Performed by: SURGERY

## 2022-09-19 PROCEDURE — 99999 PR PBB SHADOW E&M-EST. PATIENT-LVL III: ICD-10-PCS | Mod: PBBFAC,,, | Performed by: SURGERY

## 2022-09-19 PROCEDURE — 4010F PR ACE/ARB THEARPY RXD/TAKEN: ICD-10-PCS | Mod: CPTII,S$GLB,, | Performed by: SURGERY

## 2022-09-19 PROCEDURE — 3077F SYST BP >= 140 MM HG: CPT | Mod: CPTII,S$GLB,, | Performed by: SURGERY

## 2022-09-19 PROCEDURE — 36415 COLL VENOUS BLD VENIPUNCTURE: CPT | Mod: PO | Performed by: FAMILY MEDICINE

## 2022-09-19 PROCEDURE — 3288F FALL RISK ASSESSMENT DOCD: CPT | Mod: CPTII,S$GLB,, | Performed by: SURGERY

## 2022-09-19 PROCEDURE — 3078F DIAST BP <80 MM HG: CPT | Mod: CPTII,S$GLB,, | Performed by: SURGERY

## 2022-09-19 PROCEDURE — 99999 PR PBB SHADOW E&M-EST. PATIENT-LVL III: CPT | Mod: PBBFAC,,, | Performed by: SURGERY

## 2022-09-19 PROCEDURE — 1126F PR PAIN SEVERITY QUANTIFIED, NO PAIN PRESENT: ICD-10-PCS | Mod: CPTII,S$GLB,, | Performed by: SURGERY

## 2022-09-19 PROCEDURE — 1126F AMNT PAIN NOTED NONE PRSNT: CPT | Mod: CPTII,S$GLB,, | Performed by: SURGERY

## 2022-09-19 PROCEDURE — 4010F ACE/ARB THERAPY RXD/TAKEN: CPT | Mod: CPTII,S$GLB,, | Performed by: SURGERY

## 2022-09-19 PROCEDURE — 1160F PR REVIEW ALL MEDS BY PRESCRIBER/CLIN PHARMACIST DOCUMENTED: ICD-10-PCS | Mod: CPTII,S$GLB,, | Performed by: SURGERY

## 2022-09-19 PROCEDURE — 1101F PR PT FALLS ASSESS DOC 0-1 FALLS W/OUT INJ PAST YR: ICD-10-PCS | Mod: CPTII,S$GLB,, | Performed by: SURGERY

## 2022-09-19 PROCEDURE — 3008F PR BODY MASS INDEX (BMI) DOCUMENTED: ICD-10-PCS | Mod: CPTII,S$GLB,, | Performed by: SURGERY

## 2022-09-19 PROCEDURE — 3077F PR MOST RECENT SYSTOLIC BLOOD PRESSURE >= 140 MM HG: ICD-10-PCS | Mod: CPTII,S$GLB,, | Performed by: SURGERY

## 2022-09-19 PROCEDURE — 3078F PR MOST RECENT DIASTOLIC BLOOD PRESSURE < 80 MM HG: ICD-10-PCS | Mod: CPTII,S$GLB,, | Performed by: SURGERY

## 2022-09-19 PROCEDURE — 1159F MED LIST DOCD IN RCRD: CPT | Mod: CPTII,S$GLB,, | Performed by: SURGERY

## 2022-09-19 PROCEDURE — 3066F PR DOCUMENTATION OF TREATMENT FOR NEPHROPATHY: ICD-10-PCS | Mod: CPTII,S$GLB,, | Performed by: SURGERY

## 2022-09-19 PROCEDURE — 1159F PR MEDICATION LIST DOCUMENTED IN MEDICAL RECORD: ICD-10-PCS | Mod: CPTII,S$GLB,, | Performed by: SURGERY

## 2022-09-19 PROCEDURE — 1101F PT FALLS ASSESS-DOCD LE1/YR: CPT | Mod: CPTII,S$GLB,, | Performed by: SURGERY

## 2022-09-19 PROCEDURE — 3072F PR LOW RISK FOR RETINOPATHY: ICD-10-PCS | Mod: CPTII,S$GLB,, | Performed by: SURGERY

## 2022-09-19 PROCEDURE — 3061F NEG MICROALBUMINURIA REV: CPT | Mod: CPTII,S$GLB,, | Performed by: SURGERY

## 2022-09-19 PROCEDURE — 3061F PR NEG MICROALBUMINURIA RESULT DOCUMENTED/REVIEW: ICD-10-PCS | Mod: CPTII,S$GLB,, | Performed by: SURGERY

## 2022-09-19 PROCEDURE — 1160F RVW MEDS BY RX/DR IN RCRD: CPT | Mod: CPTII,S$GLB,, | Performed by: SURGERY

## 2022-09-19 NOTE — PATIENT INSTRUCTIONS
The incision will continue to fade over time.      I would like to see you back when you finish radiation.      Please call the office to make an appointment    Our office phone numbers are  [phone] and

## 2022-09-19 NOTE — PROGRESS NOTES
Subjective:       Patient ID: Anne Farmer is a 66 y.o. female.    Post left breast lumpectomy and re-excision    Chief Complaint: Post-op Evaluation    Patient follows up after re-excision of a lumpectomy site for positive margins.  The margins are now negative for DCIS and cancer however the DCIS margins are less than 1 mm, she is to undergo radiation.  She is currently undergoing Oncotype DX testing to determine if chemotherapy is needed  Review of Systems   Skin:         Left breast incision is healed     Objective:      Physical Exam  Vitals reviewed.   Skin:     Comments: Left breast incision is healing well   Neurological:      Mental Status: She is alert.       Assessment:     Status post left breast lumpectomy for invasive carcinoma with re-excision for positive margins.      All margins are negative for cancer and negative but close for ductal carcinoma in Situ    Plan:       Agree with Oncotype DX testing and chemotherapy per Oncology.      Follow-up with Radiation Oncology for radiation treatment.      Recommend that the patient follow up with surgery after she is completed radiation

## 2022-09-19 NOTE — TELEPHONE ENCOUNTER
MA called pt to inform about apt reschedule due to MD being out. Pt confirmed understanding of new apt information.

## 2022-09-20 PROCEDURE — 77387 GUIDANCE FOR RADJ TX DLVR: CPT | Mod: TC | Performed by: RADIOLOGY

## 2022-09-20 PROCEDURE — 77417 THER RADIOLOGY PORT IMAGE(S): CPT | Performed by: RADIOLOGY

## 2022-09-20 PROCEDURE — 77412 RADIATION TX DELIVERY LVL 3: CPT | Performed by: RADIOLOGY

## 2022-09-20 PROCEDURE — G6002 STEREOSCOPIC X-RAY GUIDANCE: HCPCS | Mod: 26,,, | Performed by: RADIOLOGY

## 2022-09-20 PROCEDURE — G6002 PR STEREOSCOPIC XRAY GUIDE FOR RADIATION TX DELIV: ICD-10-PCS | Mod: 26,,, | Performed by: RADIOLOGY

## 2022-09-21 ENCOUNTER — DOCUMENTATION ONLY (OUTPATIENT)
Dept: RADIATION ONCOLOGY | Facility: CLINIC | Age: 67
End: 2022-09-21
Payer: MEDICAID

## 2022-09-21 PROCEDURE — G6002 PR STEREOSCOPIC XRAY GUIDE FOR RADIATION TX DELIV: ICD-10-PCS | Mod: 26,,, | Performed by: RADIOLOGY

## 2022-09-21 PROCEDURE — G6002 STEREOSCOPIC X-RAY GUIDANCE: HCPCS | Mod: 26,,, | Performed by: RADIOLOGY

## 2022-09-21 PROCEDURE — 77387 GUIDANCE FOR RADJ TX DLVR: CPT | Mod: TC | Performed by: RADIOLOGY

## 2022-09-21 PROCEDURE — 77412 RADIATION TX DELIVERY LVL 3: CPT | Performed by: RADIOLOGY

## 2022-09-21 NOTE — PLAN OF CARE
Day 2 of outpatient xrt to the breast. New start this week; skin care & side effects have been reviewed. Will continue to monitor.

## 2022-09-21 NOTE — PLAN OF CARE
Day 2 outpatient xrt to breast. Breast handout and verbal instructions given. skin care and side effects reviewed. contact info and miaderm cream provided. Patient verbalized understanding.

## 2022-09-22 PROCEDURE — 77387 GUIDANCE FOR RADJ TX DLVR: CPT | Mod: TC | Performed by: RADIOLOGY

## 2022-09-22 PROCEDURE — G6002 PR STEREOSCOPIC XRAY GUIDE FOR RADIATION TX DELIV: ICD-10-PCS | Mod: 26,,, | Performed by: RADIOLOGY

## 2022-09-22 PROCEDURE — G6002 STEREOSCOPIC X-RAY GUIDANCE: HCPCS | Mod: 26,,, | Performed by: RADIOLOGY

## 2022-09-22 PROCEDURE — 77412 RADIATION TX DELIVERY LVL 3: CPT | Performed by: RADIOLOGY

## 2022-09-23 PROCEDURE — 77412 RADIATION TX DELIVERY LVL 3: CPT | Performed by: RADIOLOGY

## 2022-09-23 PROCEDURE — G6002 PR STEREOSCOPIC XRAY GUIDE FOR RADIATION TX DELIV: ICD-10-PCS | Mod: 26,,, | Performed by: RADIOLOGY

## 2022-09-23 PROCEDURE — 77387 GUIDANCE FOR RADJ TX DLVR: CPT | Mod: TC | Performed by: RADIOLOGY

## 2022-09-23 PROCEDURE — G6002 STEREOSCOPIC X-RAY GUIDANCE: HCPCS | Mod: 26,,, | Performed by: RADIOLOGY

## 2022-09-26 PROCEDURE — 77387 GUIDANCE FOR RADJ TX DLVR: CPT | Mod: TC | Performed by: RADIOLOGY

## 2022-09-26 PROCEDURE — 77336 RADIATION PHYSICS CONSULT: CPT | Performed by: RADIOLOGY

## 2022-09-26 PROCEDURE — 77412 RADIATION TX DELIVERY LVL 3: CPT | Performed by: RADIOLOGY

## 2022-09-26 PROCEDURE — G6002 STEREOSCOPIC X-RAY GUIDANCE: HCPCS | Mod: 26,,, | Performed by: RADIOLOGY

## 2022-09-26 PROCEDURE — G6002 PR STEREOSCOPIC XRAY GUIDE FOR RADIATION TX DELIV: ICD-10-PCS | Mod: 26,,, | Performed by: RADIOLOGY

## 2022-09-27 PROCEDURE — G6002 PR STEREOSCOPIC XRAY GUIDE FOR RADIATION TX DELIV: ICD-10-PCS | Mod: 26,,, | Performed by: RADIOLOGY

## 2022-09-27 PROCEDURE — 77387 GUIDANCE FOR RADJ TX DLVR: CPT | Mod: TC | Performed by: RADIOLOGY

## 2022-09-27 PROCEDURE — 77417 THER RADIOLOGY PORT IMAGE(S): CPT | Performed by: RADIOLOGY

## 2022-09-27 PROCEDURE — 77412 RADIATION TX DELIVERY LVL 3: CPT | Performed by: RADIOLOGY

## 2022-09-27 PROCEDURE — G6002 STEREOSCOPIC X-RAY GUIDANCE: HCPCS | Mod: 26,,, | Performed by: RADIOLOGY

## 2022-09-28 ENCOUNTER — DOCUMENTATION ONLY (OUTPATIENT)
Dept: RADIATION ONCOLOGY | Facility: CLINIC | Age: 67
End: 2022-09-28
Payer: MEDICAID

## 2022-09-28 PROCEDURE — G6002 STEREOSCOPIC X-RAY GUIDANCE: HCPCS | Mod: 26,,, | Performed by: RADIOLOGY

## 2022-09-28 PROCEDURE — 77387 GUIDANCE FOR RADJ TX DLVR: CPT | Mod: TC | Performed by: RADIOLOGY

## 2022-09-28 PROCEDURE — 77412 RADIATION TX DELIVERY LVL 3: CPT | Performed by: RADIOLOGY

## 2022-09-28 PROCEDURE — G6002 PR STEREOSCOPIC XRAY GUIDE FOR RADIATION TX DELIV: ICD-10-PCS | Mod: 26,,, | Performed by: RADIOLOGY

## 2022-09-28 NOTE — PLAN OF CARE
Day 7 of outpatient xrt to the breast. Doing ok; denies any skin issues; using miaderm cream. Will continue to monitor.

## 2022-09-29 ENCOUNTER — APPOINTMENT (OUTPATIENT)
Dept: RADIOLOGY | Facility: HOSPITAL | Age: 67
End: 2022-09-29
Attending: FAMILY MEDICINE
Payer: MEDICARE

## 2022-09-29 ENCOUNTER — TELEPHONE (OUTPATIENT)
Dept: FAMILY MEDICINE | Facility: CLINIC | Age: 67
End: 2022-09-29
Payer: MEDICAID

## 2022-09-29 DIAGNOSIS — M85.80 OSTEOPENIA, UNSPECIFIED LOCATION: ICD-10-CM

## 2022-09-29 DIAGNOSIS — Z78.0 MENOPAUSE: ICD-10-CM

## 2022-09-29 PROCEDURE — 77080 DXA BONE DENSITY AXIAL: CPT | Mod: 26,,, | Performed by: RADIOLOGY

## 2022-09-29 PROCEDURE — G6002 PR STEREOSCOPIC XRAY GUIDE FOR RADIATION TX DELIV: ICD-10-PCS | Mod: 26,,, | Performed by: RADIOLOGY

## 2022-09-29 PROCEDURE — 77080 DXA BONE DENSITY AXIAL: CPT | Mod: TC

## 2022-09-29 PROCEDURE — 77387 GUIDANCE FOR RADJ TX DLVR: CPT | Mod: TC | Performed by: RADIOLOGY

## 2022-09-29 PROCEDURE — 77080 DEXA BONE DENSITY SPINE HIP: ICD-10-PCS | Mod: 26,,, | Performed by: RADIOLOGY

## 2022-09-29 PROCEDURE — G6002 STEREOSCOPIC X-RAY GUIDANCE: HCPCS | Mod: 26,,, | Performed by: RADIOLOGY

## 2022-09-29 PROCEDURE — 77412 RADIATION TX DELIVERY LVL 3: CPT | Performed by: RADIOLOGY

## 2022-09-30 PROCEDURE — 77387 GUIDANCE FOR RADJ TX DLVR: CPT | Mod: TC | Performed by: RADIOLOGY

## 2022-09-30 PROCEDURE — 77412 RADIATION TX DELIVERY LVL 3: CPT | Performed by: RADIOLOGY

## 2022-09-30 PROCEDURE — G6002 STEREOSCOPIC X-RAY GUIDANCE: HCPCS | Mod: 26,,, | Performed by: RADIOLOGY

## 2022-09-30 PROCEDURE — G6002 PR STEREOSCOPIC XRAY GUIDE FOR RADIATION TX DELIV: ICD-10-PCS | Mod: 26,,, | Performed by: RADIOLOGY

## 2022-10-03 ENCOUNTER — HOSPITAL ENCOUNTER (OUTPATIENT)
Dept: RADIATION THERAPY | Facility: HOSPITAL | Age: 67
Discharge: HOME | End: 2022-10-03
Attending: RADIOLOGY
Payer: MEDICARE

## 2022-10-03 PROCEDURE — 77387 GUIDANCE FOR RADJ TX DLVR: CPT | Mod: TC | Performed by: RADIOLOGY

## 2022-10-03 PROCEDURE — 77412 RADIATION TX DELIVERY LVL 3: CPT | Performed by: RADIOLOGY

## 2022-10-03 PROCEDURE — G6002 STEREOSCOPIC X-RAY GUIDANCE: HCPCS | Mod: 26,,, | Performed by: RADIOLOGY

## 2022-10-03 PROCEDURE — 77336 RADIATION PHYSICS CONSULT: CPT | Performed by: RADIOLOGY

## 2022-10-03 PROCEDURE — G6002 PR STEREOSCOPIC XRAY GUIDE FOR RADIATION TX DELIV: ICD-10-PCS | Mod: 26,,, | Performed by: RADIOLOGY

## 2022-10-04 PROCEDURE — 77412 RADIATION TX DELIVERY LVL 3: CPT | Performed by: RADIOLOGY

## 2022-10-04 PROCEDURE — G6002 PR STEREOSCOPIC XRAY GUIDE FOR RADIATION TX DELIV: ICD-10-PCS | Mod: 26,,, | Performed by: RADIOLOGY

## 2022-10-04 PROCEDURE — 77387 GUIDANCE FOR RADJ TX DLVR: CPT | Mod: TC | Performed by: RADIOLOGY

## 2022-10-04 PROCEDURE — G6002 STEREOSCOPIC X-RAY GUIDANCE: HCPCS | Mod: 26,,, | Performed by: RADIOLOGY

## 2022-10-04 PROCEDURE — 77417 THER RADIOLOGY PORT IMAGE(S): CPT | Performed by: RADIOLOGY

## 2022-10-05 ENCOUNTER — DOCUMENTATION ONLY (OUTPATIENT)
Dept: RADIATION ONCOLOGY | Facility: CLINIC | Age: 67
End: 2022-10-05
Payer: MEDICAID

## 2022-10-05 ENCOUNTER — OFFICE VISIT (OUTPATIENT)
Dept: RHEUMATOLOGY | Facility: CLINIC | Age: 67
End: 2022-10-05
Payer: MEDICARE

## 2022-10-05 VITALS
WEIGHT: 182.31 LBS | HEART RATE: 57 BPM | DIASTOLIC BLOOD PRESSURE: 68 MMHG | HEIGHT: 66 IN | SYSTOLIC BLOOD PRESSURE: 134 MMHG | BODY MASS INDEX: 29.3 KG/M2

## 2022-10-05 DIAGNOSIS — M17.11 PRIMARY OSTEOARTHRITIS OF RIGHT KNEE: Primary | ICD-10-CM

## 2022-10-05 PROCEDURE — 3066F NEPHROPATHY DOC TX: CPT | Mod: CPTII,S$GLB,, | Performed by: INTERNAL MEDICINE

## 2022-10-05 PROCEDURE — 3066F PR DOCUMENTATION OF TREATMENT FOR NEPHROPATHY: ICD-10-PCS | Mod: CPTII,S$GLB,, | Performed by: INTERNAL MEDICINE

## 2022-10-05 PROCEDURE — 3072F LOW RISK FOR RETINOPATHY: CPT | Mod: CPTII,S$GLB,, | Performed by: INTERNAL MEDICINE

## 2022-10-05 PROCEDURE — 99999 PR PBB SHADOW E&M-EST. PATIENT-LVL III: CPT | Mod: PBBFAC,,, | Performed by: INTERNAL MEDICINE

## 2022-10-05 PROCEDURE — 99214 PR OFFICE/OUTPT VISIT, EST, LEVL IV, 30-39 MIN: ICD-10-PCS | Mod: 25,S$GLB,, | Performed by: INTERNAL MEDICINE

## 2022-10-05 PROCEDURE — 99214 OFFICE O/P EST MOD 30 MIN: CPT | Mod: 25,S$GLB,, | Performed by: INTERNAL MEDICINE

## 2022-10-05 PROCEDURE — 20610 DRAIN/INJ JOINT/BURSA W/O US: CPT | Mod: RT,S$GLB,, | Performed by: INTERNAL MEDICINE

## 2022-10-05 PROCEDURE — 99999 PR PBB SHADOW E&M-EST. PATIENT-LVL III: ICD-10-PCS | Mod: PBBFAC,,, | Performed by: INTERNAL MEDICINE

## 2022-10-05 PROCEDURE — 77412 RADIATION TX DELIVERY LVL 3: CPT | Performed by: RADIOLOGY

## 2022-10-05 PROCEDURE — 1101F PT FALLS ASSESS-DOCD LE1/YR: CPT | Mod: CPTII,S$GLB,, | Performed by: INTERNAL MEDICINE

## 2022-10-05 PROCEDURE — G6002 PR STEREOSCOPIC XRAY GUIDE FOR RADIATION TX DELIV: ICD-10-PCS | Mod: 26,,, | Performed by: RADIOLOGY

## 2022-10-05 PROCEDURE — 3044F PR MOST RECENT HEMOGLOBIN A1C LEVEL <7.0%: ICD-10-PCS | Mod: CPTII,S$GLB,, | Performed by: INTERNAL MEDICINE

## 2022-10-05 PROCEDURE — 77387 GUIDANCE FOR RADJ TX DLVR: CPT | Mod: TC | Performed by: RADIOLOGY

## 2022-10-05 PROCEDURE — 1125F AMNT PAIN NOTED PAIN PRSNT: CPT | Mod: CPTII,S$GLB,, | Performed by: INTERNAL MEDICINE

## 2022-10-05 PROCEDURE — 3288F FALL RISK ASSESSMENT DOCD: CPT | Mod: CPTII,S$GLB,, | Performed by: INTERNAL MEDICINE

## 2022-10-05 PROCEDURE — 4010F ACE/ARB THERAPY RXD/TAKEN: CPT | Mod: CPTII,S$GLB,, | Performed by: INTERNAL MEDICINE

## 2022-10-05 PROCEDURE — 20610 LARGE JOINT ASPIRATION/INJECTION: R KNEE: ICD-10-PCS | Mod: RT,S$GLB,, | Performed by: INTERNAL MEDICINE

## 2022-10-05 PROCEDURE — 3008F BODY MASS INDEX DOCD: CPT | Mod: CPTII,S$GLB,, | Performed by: INTERNAL MEDICINE

## 2022-10-05 PROCEDURE — 1125F PR PAIN SEVERITY QUANTIFIED, PAIN PRESENT: ICD-10-PCS | Mod: CPTII,S$GLB,, | Performed by: INTERNAL MEDICINE

## 2022-10-05 PROCEDURE — G6002 STEREOSCOPIC X-RAY GUIDANCE: HCPCS | Mod: 26,,, | Performed by: RADIOLOGY

## 2022-10-05 PROCEDURE — 3008F PR BODY MASS INDEX (BMI) DOCUMENTED: ICD-10-PCS | Mod: CPTII,S$GLB,, | Performed by: INTERNAL MEDICINE

## 2022-10-05 PROCEDURE — 3288F PR FALLS RISK ASSESSMENT DOCUMENTED: ICD-10-PCS | Mod: CPTII,S$GLB,, | Performed by: INTERNAL MEDICINE

## 2022-10-05 PROCEDURE — 4010F PR ACE/ARB THEARPY RXD/TAKEN: ICD-10-PCS | Mod: CPTII,S$GLB,, | Performed by: INTERNAL MEDICINE

## 2022-10-05 PROCEDURE — 1101F PR PT FALLS ASSESS DOC 0-1 FALLS W/OUT INJ PAST YR: ICD-10-PCS | Mod: CPTII,S$GLB,, | Performed by: INTERNAL MEDICINE

## 2022-10-05 PROCEDURE — 1159F MED LIST DOCD IN RCRD: CPT | Mod: CPTII,S$GLB,, | Performed by: INTERNAL MEDICINE

## 2022-10-05 PROCEDURE — 3061F PR NEG MICROALBUMINURIA RESULT DOCUMENTED/REVIEW: ICD-10-PCS | Mod: CPTII,S$GLB,, | Performed by: INTERNAL MEDICINE

## 2022-10-05 PROCEDURE — 1159F PR MEDICATION LIST DOCUMENTED IN MEDICAL RECORD: ICD-10-PCS | Mod: CPTII,S$GLB,, | Performed by: INTERNAL MEDICINE

## 2022-10-05 PROCEDURE — 3061F NEG MICROALBUMINURIA REV: CPT | Mod: CPTII,S$GLB,, | Performed by: INTERNAL MEDICINE

## 2022-10-05 PROCEDURE — 3072F PR LOW RISK FOR RETINOPATHY: ICD-10-PCS | Mod: CPTII,S$GLB,, | Performed by: INTERNAL MEDICINE

## 2022-10-05 PROCEDURE — 3044F HG A1C LEVEL LT 7.0%: CPT | Mod: CPTII,S$GLB,, | Performed by: INTERNAL MEDICINE

## 2022-10-05 NOTE — PROCEDURES
Large Joint Aspiration/Injection: R knee    Date/Time: 10/5/2022 2:00 PM  Performed by: Leon Suh MD  Authorized by: Leon Suh MD     Consent Done?:  Yes (Verbal)  Indications:  Pain  Site marked: the procedure site was marked    Timeout: prior to procedure the correct patient, procedure, and site was verified    Prep: patient was prepped and draped in usual sterile fashion    Local anesthetic:  Lidocaine 2% without epinephrine    Details:  Needle Size:  25 G  Ultrasonic Guidance for needle placement?: No    Approach:  Anterolateral  Location:  Knee  Site:  R knee  Medications:  48 mg hylan g-f 20 48 mg/6 mL  Patient tolerance:  Patient tolerated the procedure well with no immediate complications

## 2022-10-05 NOTE — PLAN OF CARE
Day 12 of outpatient xrt to the breast. Doing well; no skin breakdown; very mild erythema. Will continue to monitor.

## 2022-10-05 NOTE — PROGRESS NOTES
RHEUMATOLOGY OUTPATIENT CLINIC NOTE    10/5/2022    Attending Rheumatologist: Leon Suh  Primary Care Provider/Physician Requesting Consultation: Chiquita Talley MD   Chief Complaint/Reason For Consultation:  Osteoarthritis      Subjective:     Anne Farmer is a 66 y.o. Black or  female with osteoarthritis for follow up.    No acute complaints.    Review of Systems   Constitutional:  Negative for fever.   Eyes:  Negative for pain.   Respiratory:  Negative for shortness of breath.    Musculoskeletal:  Positive for joint pain.   Skin:  Negative for rash.   Neurological:  Negative for focal weakness.     Chronic comorbid conditions affecting medical decision making today:  Past Medical History:   Diagnosis Date    Allergy     Arthritis     Cancer 2016    colon    CHF (congestive heart failure)     Colon cancer 2004    Colon cancer screening 2015    Diabetes mellitus type 2 in nonobese 3/9/2016    borderline    Diverticulosis     DM (diabetes mellitus) 2016    BS didn't check 2019    DM (diabetes mellitus) 2016    BS didn't check 2020    Hyperlipidemia 10/25/2016    Hypertension     Insomnia     Malignant neoplasm of colon 2021    Malignant neoplasm of lower-outer quadrant of left breast of female, estrogen receptor positive 2022     Past Surgical History:   Procedure Laterality Date     SECTION      COLON SURGERY      COLONOSCOPY N/A 2015    Procedure: COLONOSCOPY;  Surgeon: Adela Sam MD;  Location: [facility];  Service: Endoscopy;  Laterality: N/A;    COLONOSCOPY N/A 2017    Procedure: COLONOSCOPY;  Surgeon: Chintan Flores MD;  Location: [facility];  Service: Endoscopy;  Laterality: N/A;    COLONOSCOPY N/A 2020    Procedure: COLONOSCOPY;  Surgeon: Grisel Atkins MD;  Location: [facility];  Service: Endoscopy;  Laterality: N/A;    SENTINEL LYMPH NODE BIOPSY Left 2022    Procedure: BIOPSY, LYMPH NODE, SENTINEL;   Surgeon: Arvin Hernandez MD;  Location: [facility] OR;  Service: General;  Laterality: Left;     Family History   Problem Relation Age of Onset    Hypertension Mother     Diabetes Mother     Hypertension Father     Hypertension Sister     Hypertension Brother     Hypertension Sister     Hypertension Sister     Hypertension Sister     Hypertension Brother     Hypertension Brother      Social History     Tobacco Use   Smoking Status Never   Smokeless Tobacco Never       Current Outpatient Medications:     albuterol (PROVENTIL/VENTOLIN HFA) 90 mcg/actuation inhaler, Inhale 1-2 puffs into the lungs every 6 (six) hours as needed for Wheezing or Shortness of Breath., Disp: 18 g, Rfl: 3    amLODIPine (NORVASC) 10 MG tablet, Take 1 tablet (10 mg total) by mouth once daily., Disp: 90 tablet, Rfl: 4    cyclobenzaprine (FLEXERIL) 10 MG tablet, TAKE 1 TABLET BY MOUTH THREE TIMES A DAY AS NEEDED, Disp: 30 tablet, Rfl: 0    fluticasone propionate (FLONASE) 50 mcg/actuation nasal spray, 2 sprays (100 mcg total) by Each Nostril route once daily., Disp: 48 mL, Rfl: 3    hydroCHLOROthiazide (HYDRODIURIL) 25 MG tablet, TAKE 1 TABLET BY MOUTH EVERY DAY, Disp: 90 tablet, Rfl: 3    HYDROcodone-acetaminophen (NORCO)  mg per tablet, Take 1 tablet by mouth every 6 (six) hours as needed for Pain., Disp: 20 tablet, Rfl: 0    hydrOXYchloroQUINE (PLAQUENIL) 200 mg tablet, TAKE 1 TABLET BY MOUTH TWICE A DAY, Disp: 60 tablet, Rfl: 3    levocetirizine (XYZAL) 5 MG tablet, Take 1 tablet (5 mg total) by mouth every evening., Disp: 90 tablet, Rfl: 0    losartan (COZAAR) 100 MG tablet, Take 1 tablet (100 mg total) by mouth once daily., Disp: 90 tablet, Rfl: 2    multivitamin (ONE DAILY MULTIVITAMIN) per tablet, Take 1 tablet by mouth once daily., Disp: , Rfl:     omeprazole (PRILOSEC) 20 MG capsule, Take 1 capsule (20 mg total) by mouth once daily., Disp: 90 capsule, Rfl: 3    pravastatin (PRAVACHOL) 20 MG tablet, Take 1 tablet (20 mg total) by  mouth once daily., Disp: 90 tablet, Rfl: 0    traZODone (DESYREL) 50 MG tablet, Take 1 tablet (50 mg total) by mouth nightly., Disp: 90 tablet, Rfl: 1    glycopyrrolate-formoteroL (BEVESPI AEROSPHERE) 9-4.8 mcg HFAA, Inhale 2 puffs into the lungs 2 (two) times daily. Controller, Disp: 10.9 g, Rfl: 11     Objective:     Vitals:    10/05/22 1348   BP: 134/68   Pulse: (!) 57     Physical Exam   Eyes: Conjunctivae are normal.   Pulmonary/Chest: Effort normal. No respiratory distress.   Musculoskeletal:         General: Normal range of motion.   Neurological: She displays no weakness.   Skin: No rash noted.     Reviewed available old and all outside pertinent medical records available.    All lab results personally reviewed and interpreted by me.       ASSESSMENT:     Osteoarthritis    Chronic knee pain    History of Sarcoidosis with lung and LN involvement    PLAN:     Follow-up encounter for chronic knee pain.  No evidence of sarcoidosis relapse.  Good results with prior local corticosteroid injection of right knee given on last encounter.  May get good results with viscosupplementation, will administer today on right knee.  Continue range of motion, flexibility, and strengthening exercises.      Disclaimer: This note is prepared using voice recognition software and as such is likely to have errors and has not been proof read. Please contact me for questions.     Large Joint Aspiration/Injection: R knee    Date/Time: 10/5/2022 2:00 PM  Performed by: Leon Suh MD  Authorized by: Leon Suh MD     Consent Done?:  Yes (Verbal)  Indications:  Pain  Site marked: the procedure site was marked    Timeout: prior to procedure the correct patient, procedure, and site was verified    Prep: patient was prepped and draped in usual sterile fashion    Local anesthetic:  Lidocaine 2% without epinephrine    Details:  Needle Size:  25 G  Ultrasonic Guidance for needle placement?: No    Approach:  Anterolateral  Location:   Knee  Site:  R knee  Medications:  48 mg hylan g-f 20 48 mg/6 mL  Patient tolerance:  Patient tolerated the procedure well with no immediate complications      Leon Suh M.D.

## 2022-10-06 PROCEDURE — G6002 STEREOSCOPIC X-RAY GUIDANCE: HCPCS | Mod: 26,,, | Performed by: RADIOLOGY

## 2022-10-06 PROCEDURE — 77412 RADIATION TX DELIVERY LVL 3: CPT | Performed by: RADIOLOGY

## 2022-10-06 PROCEDURE — 77387 GUIDANCE FOR RADJ TX DLVR: CPT | Mod: TC | Performed by: RADIOLOGY

## 2022-10-06 PROCEDURE — G6002 PR STEREOSCOPIC XRAY GUIDE FOR RADIATION TX DELIV: ICD-10-PCS | Mod: 26,,, | Performed by: RADIOLOGY

## 2022-10-07 PROCEDURE — 77412 RADIATION TX DELIVERY LVL 3: CPT | Performed by: RADIOLOGY

## 2022-10-07 PROCEDURE — 77387 GUIDANCE FOR RADJ TX DLVR: CPT | Mod: TC | Performed by: RADIOLOGY

## 2022-10-07 PROCEDURE — G6002 STEREOSCOPIC X-RAY GUIDANCE: HCPCS | Mod: 26,,, | Performed by: RADIOLOGY

## 2022-10-07 PROCEDURE — G6002 PR STEREOSCOPIC XRAY GUIDE FOR RADIATION TX DELIV: ICD-10-PCS | Mod: 26,,, | Performed by: RADIOLOGY

## 2022-10-10 ENCOUNTER — DOCUMENTATION ONLY (OUTPATIENT)
Dept: RADIATION ONCOLOGY | Facility: CLINIC | Age: 67
End: 2022-10-10
Payer: MEDICAID

## 2022-10-10 PROCEDURE — 77336 RADIATION PHYSICS CONSULT: CPT | Performed by: RADIOLOGY

## 2022-10-10 PROCEDURE — 77387 GUIDANCE FOR RADJ TX DLVR: CPT | Mod: TC | Performed by: RADIOLOGY

## 2022-10-10 PROCEDURE — 77412 RADIATION TX DELIVERY LVL 3: CPT | Performed by: RADIOLOGY

## 2022-10-10 PROCEDURE — G6002 PR STEREOSCOPIC XRAY GUIDE FOR RADIATION TX DELIV: ICD-10-PCS | Mod: 26,,, | Performed by: RADIOLOGY

## 2022-10-10 PROCEDURE — G6002 STEREOSCOPIC X-RAY GUIDANCE: HCPCS | Mod: 26,,, | Performed by: RADIOLOGY

## 2022-10-17 ENCOUNTER — TELEPHONE (OUTPATIENT)
Dept: RADIATION ONCOLOGY | Facility: CLINIC | Age: 67
End: 2022-10-17
Payer: MEDICAID

## 2022-10-17 NOTE — TELEPHONE ENCOUNTER
Made call to see how patient was doing since completing xrt to breast last week. Patient stated she was doing good, had no skin issues and still using miaderm cream to treated area. Informed patient to call back if she any issues or concerns. Patient verbalized understanding.

## 2022-10-31 ENCOUNTER — OFFICE VISIT (OUTPATIENT)
Dept: HEMATOLOGY/ONCOLOGY | Facility: CLINIC | Age: 67
End: 2022-10-31
Payer: MEDICARE

## 2022-10-31 ENCOUNTER — LAB VISIT (OUTPATIENT)
Dept: LAB | Facility: HOSPITAL | Age: 67
End: 2022-10-31
Attending: INTERNAL MEDICINE
Payer: MEDICAID

## 2022-10-31 VITALS
BODY MASS INDEX: 29.05 KG/M2 | OXYGEN SATURATION: 96 % | SYSTOLIC BLOOD PRESSURE: 154 MMHG | HEART RATE: 54 BPM | HEIGHT: 66 IN | WEIGHT: 180.75 LBS | DIASTOLIC BLOOD PRESSURE: 66 MMHG

## 2022-10-31 DIAGNOSIS — M85.80 OSTEOPENIA AFTER MENOPAUSE: ICD-10-CM

## 2022-10-31 DIAGNOSIS — Z78.0 OSTEOPENIA AFTER MENOPAUSE: ICD-10-CM

## 2022-10-31 DIAGNOSIS — C50.412 MALIGNANT NEOPLASM OF UPPER-OUTER QUADRANT OF LEFT BREAST IN FEMALE, ESTROGEN RECEPTOR POSITIVE: ICD-10-CM

## 2022-10-31 DIAGNOSIS — Z17.0 MALIGNANT NEOPLASM OF UPPER-OUTER QUADRANT OF LEFT BREAST IN FEMALE, ESTROGEN RECEPTOR POSITIVE: Primary | ICD-10-CM

## 2022-10-31 DIAGNOSIS — C50.412 MALIGNANT NEOPLASM OF UPPER-OUTER QUADRANT OF LEFT BREAST IN FEMALE, ESTROGEN RECEPTOR POSITIVE: Primary | ICD-10-CM

## 2022-10-31 DIAGNOSIS — Z17.0 MALIGNANT NEOPLASM OF UPPER-OUTER QUADRANT OF LEFT BREAST IN FEMALE, ESTROGEN RECEPTOR POSITIVE: ICD-10-CM

## 2022-10-31 LAB
ALBUMIN SERPL BCP-MCNC: 4 G/DL (ref 3.5–5.2)
ANION GAP SERPL CALC-SCNC: 8 MMOL/L (ref 8–16)
BUN SERPL-MCNC: 20 MG/DL (ref 8–23)
CALCIUM SERPL-MCNC: 9.7 MG/DL (ref 8.7–10.5)
CHLORIDE SERPL-SCNC: 103 MMOL/L (ref 95–110)
CO2 SERPL-SCNC: 28 MMOL/L (ref 23–29)
CREAT SERPL-MCNC: 0.8 MG/DL (ref 0.5–1.4)
EST. GFR  (NO RACE VARIABLE): >60 ML/MIN/1.73 M^2
GLUCOSE SERPL-MCNC: 107 MG/DL (ref 70–110)
PHOSPHATE SERPL-MCNC: 3.2 MG/DL (ref 2.7–4.5)
POTASSIUM SERPL-SCNC: 3.4 MMOL/L (ref 3.5–5.1)
SODIUM SERPL-SCNC: 139 MMOL/L (ref 136–145)

## 2022-10-31 PROCEDURE — 3066F NEPHROPATHY DOC TX: CPT | Mod: CPTII,S$GLB,, | Performed by: INTERNAL MEDICINE

## 2022-10-31 PROCEDURE — 3072F LOW RISK FOR RETINOPATHY: CPT | Mod: CPTII,S$GLB,, | Performed by: INTERNAL MEDICINE

## 2022-10-31 PROCEDURE — 3288F PR FALLS RISK ASSESSMENT DOCUMENTED: ICD-10-PCS | Mod: CPTII,S$GLB,, | Performed by: INTERNAL MEDICINE

## 2022-10-31 PROCEDURE — 1101F PR PT FALLS ASSESS DOC 0-1 FALLS W/OUT INJ PAST YR: ICD-10-PCS | Mod: CPTII,S$GLB,, | Performed by: INTERNAL MEDICINE

## 2022-10-31 PROCEDURE — 99215 OFFICE O/P EST HI 40 MIN: CPT | Mod: S$GLB,,, | Performed by: INTERNAL MEDICINE

## 2022-10-31 PROCEDURE — 36415 COLL VENOUS BLD VENIPUNCTURE: CPT | Performed by: INTERNAL MEDICINE

## 2022-10-31 PROCEDURE — 3077F SYST BP >= 140 MM HG: CPT | Mod: CPTII,S$GLB,, | Performed by: INTERNAL MEDICINE

## 2022-10-31 PROCEDURE — 1126F PR PAIN SEVERITY QUANTIFIED, NO PAIN PRESENT: ICD-10-PCS | Mod: CPTII,S$GLB,, | Performed by: INTERNAL MEDICINE

## 2022-10-31 PROCEDURE — 3077F PR MOST RECENT SYSTOLIC BLOOD PRESSURE >= 140 MM HG: ICD-10-PCS | Mod: CPTII,S$GLB,, | Performed by: INTERNAL MEDICINE

## 2022-10-31 PROCEDURE — 99999 PR PBB SHADOW E&M-EST. PATIENT-LVL IV: CPT | Mod: PBBFAC,,, | Performed by: INTERNAL MEDICINE

## 2022-10-31 PROCEDURE — 1159F MED LIST DOCD IN RCRD: CPT | Mod: CPTII,S$GLB,, | Performed by: INTERNAL MEDICINE

## 2022-10-31 PROCEDURE — 3044F PR MOST RECENT HEMOGLOBIN A1C LEVEL <7.0%: ICD-10-PCS | Mod: CPTII,S$GLB,, | Performed by: INTERNAL MEDICINE

## 2022-10-31 PROCEDURE — 3078F PR MOST RECENT DIASTOLIC BLOOD PRESSURE < 80 MM HG: ICD-10-PCS | Mod: CPTII,S$GLB,, | Performed by: INTERNAL MEDICINE

## 2022-10-31 PROCEDURE — 1160F RVW MEDS BY RX/DR IN RCRD: CPT | Mod: CPTII,S$GLB,, | Performed by: INTERNAL MEDICINE

## 2022-10-31 PROCEDURE — 3078F DIAST BP <80 MM HG: CPT | Mod: CPTII,S$GLB,, | Performed by: INTERNAL MEDICINE

## 2022-10-31 PROCEDURE — 3061F NEG MICROALBUMINURIA REV: CPT | Mod: CPTII,S$GLB,, | Performed by: INTERNAL MEDICINE

## 2022-10-31 PROCEDURE — 1101F PT FALLS ASSESS-DOCD LE1/YR: CPT | Mod: CPTII,S$GLB,, | Performed by: INTERNAL MEDICINE

## 2022-10-31 PROCEDURE — 3072F PR LOW RISK FOR RETINOPATHY: ICD-10-PCS | Mod: CPTII,S$GLB,, | Performed by: INTERNAL MEDICINE

## 2022-10-31 PROCEDURE — 99999 PR PBB SHADOW E&M-EST. PATIENT-LVL IV: ICD-10-PCS | Mod: PBBFAC,,, | Performed by: INTERNAL MEDICINE

## 2022-10-31 PROCEDURE — 4010F PR ACE/ARB THEARPY RXD/TAKEN: ICD-10-PCS | Mod: CPTII,S$GLB,, | Performed by: INTERNAL MEDICINE

## 2022-10-31 PROCEDURE — 3061F PR NEG MICROALBUMINURIA RESULT DOCUMENTED/REVIEW: ICD-10-PCS | Mod: CPTII,S$GLB,, | Performed by: INTERNAL MEDICINE

## 2022-10-31 PROCEDURE — 3066F PR DOCUMENTATION OF TREATMENT FOR NEPHROPATHY: ICD-10-PCS | Mod: CPTII,S$GLB,, | Performed by: INTERNAL MEDICINE

## 2022-10-31 PROCEDURE — 1160F PR REVIEW ALL MEDS BY PRESCRIBER/CLIN PHARMACIST DOCUMENTED: ICD-10-PCS | Mod: CPTII,S$GLB,, | Performed by: INTERNAL MEDICINE

## 2022-10-31 PROCEDURE — 1159F PR MEDICATION LIST DOCUMENTED IN MEDICAL RECORD: ICD-10-PCS | Mod: CPTII,S$GLB,, | Performed by: INTERNAL MEDICINE

## 2022-10-31 PROCEDURE — 80069 RENAL FUNCTION PANEL: CPT | Performed by: INTERNAL MEDICINE

## 2022-10-31 PROCEDURE — 1126F AMNT PAIN NOTED NONE PRSNT: CPT | Mod: CPTII,S$GLB,, | Performed by: INTERNAL MEDICINE

## 2022-10-31 PROCEDURE — 3288F FALL RISK ASSESSMENT DOCD: CPT | Mod: CPTII,S$GLB,, | Performed by: INTERNAL MEDICINE

## 2022-10-31 PROCEDURE — 99215 PR OFFICE/OUTPT VISIT, EST, LEVL V, 40-54 MIN: ICD-10-PCS | Mod: S$GLB,,, | Performed by: INTERNAL MEDICINE

## 2022-10-31 PROCEDURE — 3044F HG A1C LEVEL LT 7.0%: CPT | Mod: CPTII,S$GLB,, | Performed by: INTERNAL MEDICINE

## 2022-10-31 PROCEDURE — 4010F ACE/ARB THERAPY RXD/TAKEN: CPT | Mod: CPTII,S$GLB,, | Performed by: INTERNAL MEDICINE

## 2022-10-31 RX ORDER — ANASTROZOLE 1 MG/1
1 TABLET ORAL DAILY
Qty: 30 TABLET | Refills: 2 | Status: SHIPPED | OUTPATIENT
Start: 2022-10-31 | End: 2023-01-11

## 2022-10-31 NOTE — PROGRESS NOTES
Subjective:      DATE OF VISIT: 10/31/22     ?  Patient ID:?Anne Farmer is a 66 y.o. female.?? MR#: [id number]     PRIMARY ONCOLOGIST: Dr. Murray    ? Primary Care Providers:  Chiquita Talley MD, MD (General)     CHIEF COMPLAINT: ? Follow-up on AI    ONCOLOGIC DIAGNOSIS: Stage IA, pT2, pN0, Mx ER/VA positive, HER2 IHC equivocal, FISH negative, left lower outer breast invasive ductal carcinoma grade 2, Octotype DX RS 8  ?   CURRENT TREATMENT:   anastrazole 1mg daily, planned  Zometa planned      PAST TREATMENT:  Radiation, completed 10/10/22  ?   ONCOLOGIC HISTORY:   Ms. Farmer follows up after completion of radiation 10/10/2022 tolerated well some darkening of skin.  She is been well in stable health no new symptoms or concerns or breast tenderness.    Review of Systems    ?   A comprehensive 14-point review of systems was reviewed with patient and was negative other than as specified above.   ?   PAST MEDICAL HISTORY:   Past Medical History:   Diagnosis Date    Allergy     Arthritis     Cancer 2016    colon    CHF (congestive heart failure)     Colon cancer 2004    Colon cancer screening 2015    Diabetes mellitus type 2 in nonobese 3/9/2016    borderline    Diverticulosis     DM (diabetes mellitus) 2016    BS didn't check 2019    DM (diabetes mellitus) 2016    BS didn't check 2020    Hyperlipidemia 10/25/2016    Hypertension     Insomnia     Malignant neoplasm of colon 2021    Malignant neoplasm of lower-outer quadrant of left breast of female, estrogen receptor positive 2022    ?     PAST SURGICAL HISTORY:   Past Surgical History:   Procedure Laterality Date     SECTION      COLON SURGERY      COLONOSCOPY N/A 2015    Procedure: COLONOSCOPY;  Surgeon: Adela Sam MD;  Location: [facility];  Service: Endoscopy;  Laterality: N/A;    COLONOSCOPY N/A 2017    Procedure: COLONOSCOPY;  Surgeon: Chintan Flores MD;  Location: [facility];  Service:  Endoscopy;  Laterality: N/A;    COLONOSCOPY N/A 6/30/2020    Procedure: COLONOSCOPY;  Surgeon: Grisel Atkins MD;  Location: [facility] ENDO;  Service: Endoscopy;  Laterality: N/A;    SENTINEL LYMPH NODE BIOPSY Left 7/5/2022    Procedure: BIOPSY, LYMPH NODE, SENTINEL;  Surgeon: Arvin Hernandez MD;  Location: [facility] OR;  Service: General;  Laterality: Left;      ?   ALLERGIES:   Allergies as of 10/31/2022    (No Known Allergies)      ?   MEDICATIONS:?   Outpatient Medications Marked as Taking for the 10/31/22 encounter (Office Visit) with Soraya Murray MD   Medication Sig Dispense Refill    albuterol (PROVENTIL/VENTOLIN HFA) 90 mcg/actuation inhaler Inhale 1-2 puffs into the lungs every 6 (six) hours as needed for Wheezing or Shortness of Breath. 18 g 3    amLODIPine (NORVASC) 10 MG tablet Take 1 tablet (10 mg total) by mouth once daily. 90 tablet 4    cyclobenzaprine (FLEXERIL) 10 MG tablet TAKE 1 TABLET BY MOUTH THREE TIMES A DAY AS NEEDED 30 tablet 0    fluticasone propionate (FLONASE) 50 mcg/actuation nasal spray 2 sprays (100 mcg total) by Each Nostril route once daily. 48 mL 3    glycopyrrolate-formoteroL (BEVESPI AEROSPHERE) 9-4.8 mcg HFAA Inhale 2 puffs into the lungs 2 (two) times daily. Controller 10.9 g 11    hydroCHLOROthiazide (HYDRODIURIL) 25 MG tablet TAKE 1 TABLET BY MOUTH EVERY DAY 90 tablet 3    HYDROcodone-acetaminophen (NORCO)  mg per tablet Take 1 tablet by mouth every 6 (six) hours as needed for Pain. 20 tablet 0    hydrOXYchloroQUINE (PLAQUENIL) 200 mg tablet TAKE 1 TABLET BY MOUTH TWICE A DAY 60 tablet 3    levocetirizine (XYZAL) 5 MG tablet Take 1 tablet (5 mg total) by mouth every evening. 90 tablet 0    losartan (COZAAR) 100 MG tablet Take 1 tablet (100 mg total) by mouth once daily. 90 tablet 2    multivitamin (ONE DAILY MULTIVITAMIN) per tablet Take 1 tablet by mouth once daily.      omeprazole (PRILOSEC) 20 MG capsule Take 1 capsule (20 mg total) by mouth once daily. 90 capsule  3    pravastatin (PRAVACHOL) 20 MG tablet Take 1 tablet (20 mg total) by mouth once daily. 90 tablet 0    traZODone (DESYREL) 50 MG tablet Take 1 tablet (50 mg total) by mouth nightly. 90 tablet 1      ?   SOCIAL HISTORY:?   Social History     Tobacco Use    Smoking status: Never    Smokeless tobacco: Never   Substance Use Topics    Alcohol use: Yes     Alcohol/week: 0.0 standard drinks     Comment: weekends.         ?      ?   FAMILY HISTORY:   family history includes Diabetes in her mother; Hypertension in her brother, brother, brother, father, mother, sister, sister, sister, and sister.   ?        Objective:      Physical Exam      ?   Vitals:    10/31/22 1305   BP: (!) 154/66   Pulse: (!) 54        ?   ECOG:?0   General appearance: Generally well appearing, in no acute distress.   Head, eyes, ears, nose, and throat: Pupils round and equally reactive to light. Oropharynx clear with moist mucous membranes.   Abdomen:  Obese, nontender, nondistended.   Extremities: Warm, without edema.   Neurologic: Alert and oriented. Grossly normal strength, coordination, and gait.   Skin: No rashes, ecchymoses or petechial lesion.   ?    ?   Laboratory:  ?   Lab Visit on 10/31/2022   Component Date Value Ref Range Status    Glucose 10/31/2022 107  70 - 110 mg/dL Final    Sodium 10/31/2022 139  136 - 145 mmol/L Final    Potassium 10/31/2022 3.4 (L)  3.5 - 5.1 mmol/L Final    Chloride 10/31/2022 103  95 - 110 mmol/L Final    CO2 10/31/2022 28  23 - 29 mmol/L Final    BUN 10/31/2022 20  8 - 23 mg/dL Final    Calcium 10/31/2022 9.7  8.7 - 10.5 mg/dL Final    Creatinine 10/31/2022 0.8  0.5 - 1.4 mg/dL Final    Albumin 10/31/2022 4.0  3.5 - 5.2 g/dL Final    Phosphorus 10/31/2022 3.2  2.7 - 4.5 mg/dL Final    eGFR 10/31/2022 >60  >60 mL/min/1.73 m^2 Final    Anion Gap 10/31/2022 8  8 - 16 mmol/L Final      Lab Results   Component Value Date    WBC 5.87 09/08/2022    HGB 12.1 09/08/2022    HCT 35.5 (L) 09/08/2022    MCV 92 09/08/2022      09/08/2022           ?   Imaging:  ?  5/2/22  Result:   Mammo Digital Diagnostic Left with Frankie  US Breast Left Limited     History:  Patient is 66 y.o. and is seen for diagnostic imaging.     Films Compared:  Compared to: 04/27/2022 Mammo Digital Screening Bilat w/ Frankie, 02/05/2021 Mammo Digital Screening Bilat w/ Frankie, and 01/25/2019 Mammo Digital Screening Bilat w/ Frankie     Findings:  This procedure was performed using tomosynthesis. Computer-aided detection was utilized in the interpretation of this examination.  The left breast is heterogeneously dense, which may obscure small masses.      Mammo Digital Diagnostic Left with Frankie  Left  Focal Asymmetry: There is a 25 mm focal asymmetry seen in the lower outer quadrant of the left breast in the middle depth.      US Breast Left Limited  Left  Mass: There are 2 similar oval, hypoechoic masses with indistinct margins with no posterior features seen in the left breast at 4 o'clock, 6 cm from the nipple. The masses measure 6 x 5 x 6 mm and 8 x 3 x 6 mm respectively.  Masses are  by distance of approximately 1.3 cm and may both potentially be within a duct. Findings appear to correlate with the mammographic asymmetry. Both lesions could be sampled with a single biopsy procedure given their close proximity.      No suspicious adenopathy demonstrated in the left axilla.      Impression:  Left  Focal Asymmetry: Left breast 25 mm focal asymmetry at the lower outer middle position. Assessment: 4 - Suspicious finding. Biopsy is recommended.      BI-RADS Category:   Overall: 4 - Suspicious     Recommendation:  Ultrasound-guided Biopsy is recommended.     Your estimated lifetime risk of breast cancer (to age 85) based on Tyrer-Cuzick risk assessment model is Tyrer-Cuzick: 4.4 %. According to the American Cancer Society, patients with a lifetime breast cancer risk of 20% or higher might benefit from supplemental screening tests.     No results found for  this or any previous visit (from the past 2160 hour(s)).  No results found for this or any previous visit (from the past 2160 hour(s)).  No results found for this or any previous visit (from the past 2160 hour(s)).      Pathology:    Final Pathologic Diagnosis Left breast, stereotactic core biopsy:   Invasive ductal carcinoma with mucinous features   Sanderson grade 2:  Tubule formation-3, nuclear pleomorphism -2 and mitotic   count -1   Invasive carcinoma measures 10 mm in greatest dimension   Ductal carcinoma in Situ, intermediate nuclear grade, solid and cribriform   patterns with associated microcalcifications   Calcifications are also associated with the invasive component   No lymphovascular invasion is identified   Tumor biomarkers   Estrogen receptor (ER):  Positive, greater than 95%, strong   Progesterone receptor (PGR):  Positive, 40-45%, intermediate to strong   HER2 (IHC):  Equivocal, 2+   Ki-67:  25-30%   Additional IHC:   P63:  Highlights loss of myoepithelial cells, confirming invasive component   while focal retention of myoepithelial cells in the area of carcinoma in Situ.   All immunostains were performed with appropriate controls.   This case was reviewed by Dr. JUAN Lan who concurs with the above diagnosis.   Due to equivocal HER2 IHC, HER2 FISH will be performed with results provided   in a supplemental report.   HER2, Breast Tumor, FISH, Tissue   Result Summary   Negative   Interpretation   There is no evidence of HER2 (ERBB2) gene amplification in this tumor sample.   Jackie Saldivar M.D.   Report attached   Performing location:   [place] [address]    Comment: Interp By Amee Mae M.D., Signed on 05/27/2022 at 12:25         ?   Assessment/Plan:   Malignant neoplasm of upper-outer quadrant of left breast in female, estrogen receptor positive  -     RENAL FUNCTION PANEL; Standing  -     anastrozole (ARIMIDEX) 1  mg Tab; Take 1 tablet (1 mg total) by mouth once daily.  Dispense: 30 tablet; Refill: 2    Osteopenia after menopause  -     RENAL FUNCTION PANEL; Standing  -     anastrozole (ARIMIDEX) 1 mg Tab; Take 1 tablet (1 mg total) by mouth once daily.  Dispense: 30 tablet; Refill: 2         1. Malignant neoplasm of upper-outer quadrant of left breast in female, estrogen receptor positive    2. Osteopenia after menopause              Plan:     Problem List Items Addressed This Visit    None  Visit Diagnoses       Malignant neoplasm of upper-outer quadrant of left breast in female, estrogen receptor positive    -  Primary    Osteopenia after menopause              Stage IA, pT2, pN0, Mx ER/CO positive, HER2 IHC equivocal, FISH negative, left lower outer breast invasive ductal carcinoma grade 2, Octotype DX RS 8    I have written to her surgeon, Dr. Hernandez who clarified that re-excision successfully performed of initial positive margins for invasive disease and DCIS at medial and posterior margins, and pathology 08/10/2022 noting margins clear.  Have today reviewed in detail pathology report as well as Oncotype DX recurrence score low risk 8 indicating no benefit adjuvant chemotherapy. Adjuvant radiation completed 10/10/22    Completed radiation therapy 10/10/22.    Today we discussed plan for extended adjuvant hormonal therapy with aromatase inhibitor for planned 5 years (she is postmenopausal) which has demonstrated improved outcomes in decreasing recurrence. I discussed options of aromatase inhibitor anastrozole, exemestane and letrozole which have similar side effect profile including potential side effects of hot flashes, mood changes, myalgias/arthralgia, edema, bone loss, hypertension.    I have prescribed anastrozole 1 mg daily and will follow-up in 1 month to assess for tolerance.     Bone ppx:  09/29/2022 DEXA scan with osteopenia.  Discussed use of bisphosphonate therapy potential side effects and need for  monitoring no dental issues calcium and renal function.    - Will plan for zoledronic acid q.6 months prophylaxis     I discussed surveillance:  History and Physical Exam: q3-6 months years 1-3, q6 months years 4-5  Mammogram: Yearly, starting 6 months post completion of radiation:   DEXA scan: q2 years     Follow-up 1 month opn emdication    I have prescribed anastrozole 1 mg daily and will follow-up in 1 month to assess for tolerance.     Bone ppx: 01/20/2019 DEXA with osteopenia  .  - Will plan for zoledronic acid q.6 months prophylaxis along with ca and vitamin D    I discussed surveillance:  History and Physical Exam: q3-6 months years 1-3, q6 months years 4-5  Mammogram: Yearly, starting 6 months post completion of radiation:  Around May 2023  DEXA scan: q2 years       Follow-Up:   Patient Instructions   Lab renal function panel today  RV in 1 mo with zometa planned, alex

## 2022-11-15 ENCOUNTER — OFFICE VISIT (OUTPATIENT)
Dept: RADIATION ONCOLOGY | Facility: CLINIC | Age: 67
End: 2022-11-15
Payer: MEDICARE

## 2022-11-15 VITALS
SYSTOLIC BLOOD PRESSURE: 151 MMHG | OXYGEN SATURATION: 95 % | HEIGHT: 66 IN | HEART RATE: 69 BPM | DIASTOLIC BLOOD PRESSURE: 60 MMHG | WEIGHT: 180.63 LBS | BODY MASS INDEX: 29.03 KG/M2 | RESPIRATION RATE: 18 BRPM | TEMPERATURE: 98 F

## 2022-11-15 DIAGNOSIS — C50.412 MALIGNANT NEOPLASM OF UPPER-OUTER QUADRANT OF LEFT BREAST IN FEMALE, ESTROGEN RECEPTOR POSITIVE: Primary | ICD-10-CM

## 2022-11-15 DIAGNOSIS — Z17.0 MALIGNANT NEOPLASM OF UPPER-OUTER QUADRANT OF LEFT BREAST IN FEMALE, ESTROGEN RECEPTOR POSITIVE: Primary | ICD-10-CM

## 2022-11-15 PROCEDURE — 3066F PR DOCUMENTATION OF TREATMENT FOR NEPHROPATHY: ICD-10-PCS | Mod: CPTII,S$GLB,, | Performed by: RADIOLOGY

## 2022-11-15 PROCEDURE — 1101F PR PT FALLS ASSESS DOC 0-1 FALLS W/OUT INJ PAST YR: ICD-10-PCS | Mod: CPTII,S$GLB,, | Performed by: RADIOLOGY

## 2022-11-15 PROCEDURE — 3061F PR NEG MICROALBUMINURIA RESULT DOCUMENTED/REVIEW: ICD-10-PCS | Mod: CPTII,S$GLB,, | Performed by: RADIOLOGY

## 2022-11-15 PROCEDURE — 3008F BODY MASS INDEX DOCD: CPT | Mod: CPTII,S$GLB,, | Performed by: RADIOLOGY

## 2022-11-15 PROCEDURE — 3072F LOW RISK FOR RETINOPATHY: CPT | Mod: CPTII,S$GLB,, | Performed by: RADIOLOGY

## 2022-11-15 PROCEDURE — 99999 PR PBB SHADOW E&M-EST. PATIENT-LVL IV: ICD-10-PCS | Mod: PBBFAC,,, | Performed by: RADIOLOGY

## 2022-11-15 PROCEDURE — 3044F PR MOST RECENT HEMOGLOBIN A1C LEVEL <7.0%: ICD-10-PCS | Mod: CPTII,S$GLB,, | Performed by: RADIOLOGY

## 2022-11-15 PROCEDURE — 99999 PR PBB SHADOW E&M-EST. PATIENT-LVL IV: CPT | Mod: PBBFAC,,, | Performed by: RADIOLOGY

## 2022-11-15 PROCEDURE — 4010F PR ACE/ARB THEARPY RXD/TAKEN: ICD-10-PCS | Mod: CPTII,S$GLB,, | Performed by: RADIOLOGY

## 2022-11-15 PROCEDURE — 99212 PR OFFICE/OUTPT VISIT, EST, LEVL II, 10-19 MIN: ICD-10-PCS | Mod: S$GLB,,, | Performed by: RADIOLOGY

## 2022-11-15 PROCEDURE — 1126F AMNT PAIN NOTED NONE PRSNT: CPT | Mod: CPTII,S$GLB,, | Performed by: RADIOLOGY

## 2022-11-15 PROCEDURE — 3077F PR MOST RECENT SYSTOLIC BLOOD PRESSURE >= 140 MM HG: ICD-10-PCS | Mod: CPTII,S$GLB,, | Performed by: RADIOLOGY

## 2022-11-15 PROCEDURE — 1126F PR PAIN SEVERITY QUANTIFIED, NO PAIN PRESENT: ICD-10-PCS | Mod: CPTII,S$GLB,, | Performed by: RADIOLOGY

## 2022-11-15 PROCEDURE — 4010F ACE/ARB THERAPY RXD/TAKEN: CPT | Mod: CPTII,S$GLB,, | Performed by: RADIOLOGY

## 2022-11-15 PROCEDURE — 3288F PR FALLS RISK ASSESSMENT DOCUMENTED: ICD-10-PCS | Mod: CPTII,S$GLB,, | Performed by: RADIOLOGY

## 2022-11-15 PROCEDURE — 3078F DIAST BP <80 MM HG: CPT | Mod: CPTII,S$GLB,, | Performed by: RADIOLOGY

## 2022-11-15 PROCEDURE — 3077F SYST BP >= 140 MM HG: CPT | Mod: CPTII,S$GLB,, | Performed by: RADIOLOGY

## 2022-11-15 PROCEDURE — 3008F PR BODY MASS INDEX (BMI) DOCUMENTED: ICD-10-PCS | Mod: CPTII,S$GLB,, | Performed by: RADIOLOGY

## 2022-11-15 PROCEDURE — 1159F MED LIST DOCD IN RCRD: CPT | Mod: CPTII,S$GLB,, | Performed by: RADIOLOGY

## 2022-11-15 PROCEDURE — 3044F HG A1C LEVEL LT 7.0%: CPT | Mod: CPTII,S$GLB,, | Performed by: RADIOLOGY

## 2022-11-15 PROCEDURE — 1101F PT FALLS ASSESS-DOCD LE1/YR: CPT | Mod: CPTII,S$GLB,, | Performed by: RADIOLOGY

## 2022-11-15 PROCEDURE — 99212 OFFICE O/P EST SF 10 MIN: CPT | Mod: S$GLB,,, | Performed by: RADIOLOGY

## 2022-11-15 PROCEDURE — 3288F FALL RISK ASSESSMENT DOCD: CPT | Mod: CPTII,S$GLB,, | Performed by: RADIOLOGY

## 2022-11-15 PROCEDURE — 3066F NEPHROPATHY DOC TX: CPT | Mod: CPTII,S$GLB,, | Performed by: RADIOLOGY

## 2022-11-15 PROCEDURE — 3078F PR MOST RECENT DIASTOLIC BLOOD PRESSURE < 80 MM HG: ICD-10-PCS | Mod: CPTII,S$GLB,, | Performed by: RADIOLOGY

## 2022-11-15 PROCEDURE — 3061F NEG MICROALBUMINURIA REV: CPT | Mod: CPTII,S$GLB,, | Performed by: RADIOLOGY

## 2022-11-15 PROCEDURE — 1159F PR MEDICATION LIST DOCUMENTED IN MEDICAL RECORD: ICD-10-PCS | Mod: CPTII,S$GLB,, | Performed by: RADIOLOGY

## 2022-11-15 PROCEDURE — 3072F PR LOW RISK FOR RETINOPATHY: ICD-10-PCS | Mod: CPTII,S$GLB,, | Performed by: RADIOLOGY

## 2022-11-15 NOTE — PROGRESS NOTES
"[facility] [place]  RADIATION ONCOLOGY FOLLOW UP    Name: Anne Farmer : 1955     DIAGNOSIS: L breast invasive ductal carcinoma, grade 2, pT2N0(sn)cM0, ER 95%, TX 40-45%, Her2 negative    TREATMENT HISTORY:   L lumpectomy and sentinel node biopsy 22  Re-excision of margins 8/10/22  40Gy/15fx L breast completed 10/10/22  Arimidex ongoing    INTERVAL HISTORY: Anne Farmer is a 66 y.o. female who presents today for follow-up.  This is her first follow up since completing treatment. During treatment, she    completed 40Gy/15fx to L breast with DIBH without interruption. Had some skin irritation by completion managed with Miaderm. Good setup daily.   Decided to not use boost given clear re-excision and difficulty location/localization/targeting of tumor bed.     Since then, started Arimidex.  Today, she is doing very well. Breast skin peeled but now has fully healed.  No LUE or breast edema.    PHYSICAL EXAM:   Constitutional: well appearing, no acute distress, ECOG 0 - Fully Active  Vitals:    BP (!) 151/60   Pulse 69   Temp 97.6 °F (36.4 °C)   Resp 18   Ht 5' 6" (1.676 m)   Wt 81.9 kg (180 lb 9.6 oz)   SpO2 95%   BMI 29.15 kg/m²   Eyes: sclera anicteric, EOMI, pupils equal, round and reactive to light  ENT: oral cavity without lesions, moist mucous membranes  Neck: trachea midline, neck supple  Lymphatic: no cervical, supraclavicular or axillary adenopathy  Breast: L breast no masses, skin changes or retractions, non-tender, patchy hyperpigmented along anterior breast  Cardiovascular: regular rate, no edema of the upper or lower extremities, radial pulse 2+  Respiratory: unlabored effort, clear to auscultation, no wheezes  Abdomen: soft, non-tender, no rigidity, no masses, no hepatomegaly    Laboratory & X-Ray Findings: None new.      ASSESSMENT: recovering well from acute toxicities of radiation    PLAN: Ms. Farmer tolerated radiation well.   She can use any type " of non-scented moisturizer on skin going forward.  She will continue on endocrine therapy and mammogram surveillance with medical oncology.     Follow up with me in 6 months w Mammo.    I spent approximately 14 minutes reviewing the available records and evaluating the patient, out of which over 50% of the time was spent face to face with the patient in counseling and coordinating this patient's care.    Martina Pope III, M.D.  Radiation Oncology  [facility] [address] [place] [address]  Ph: [phone]  [email or web address]

## 2022-11-28 ENCOUNTER — PES CALL (OUTPATIENT)
Dept: ADMINISTRATIVE | Facility: CLINIC | Age: 67
End: 2022-11-28
Payer: MEDICARE

## 2022-11-30 ENCOUNTER — OFFICE VISIT (OUTPATIENT)
Dept: HEMATOLOGY/ONCOLOGY | Facility: CLINIC | Age: 67
End: 2022-11-30
Payer: MEDICARE

## 2022-11-30 ENCOUNTER — LAB VISIT (OUTPATIENT)
Dept: LAB | Facility: HOSPITAL | Age: 67
End: 2022-11-30
Attending: INTERNAL MEDICINE
Payer: MEDICARE

## 2022-11-30 VITALS
SYSTOLIC BLOOD PRESSURE: 153 MMHG | OXYGEN SATURATION: 97 % | WEIGHT: 179.25 LBS | BODY MASS INDEX: 28.81 KG/M2 | HEIGHT: 66 IN | HEART RATE: 69 BPM | DIASTOLIC BLOOD PRESSURE: 68 MMHG | TEMPERATURE: 97 F

## 2022-11-30 DIAGNOSIS — C50.512 MALIGNANT NEOPLASM OF LOWER-OUTER QUADRANT OF LEFT BREAST OF FEMALE, ESTROGEN RECEPTOR POSITIVE: ICD-10-CM

## 2022-11-30 DIAGNOSIS — C50.412 MALIGNANT NEOPLASM OF UPPER-OUTER QUADRANT OF LEFT BREAST IN FEMALE, ESTROGEN RECEPTOR POSITIVE: ICD-10-CM

## 2022-11-30 DIAGNOSIS — E87.6 HYPOKALEMIA: ICD-10-CM

## 2022-11-30 DIAGNOSIS — Z17.0 MALIGNANT NEOPLASM OF LOWER-OUTER QUADRANT OF LEFT BREAST OF FEMALE, ESTROGEN RECEPTOR POSITIVE: ICD-10-CM

## 2022-11-30 DIAGNOSIS — M85.80 OSTEOPENIA AFTER MENOPAUSE: ICD-10-CM

## 2022-11-30 DIAGNOSIS — Z78.0 OSTEOPENIA AFTER MENOPAUSE: ICD-10-CM

## 2022-11-30 DIAGNOSIS — Z17.0 MALIGNANT NEOPLASM OF UPPER-OUTER QUADRANT OF LEFT BREAST IN FEMALE, ESTROGEN RECEPTOR POSITIVE: ICD-10-CM

## 2022-11-30 LAB
ALBUMIN SERPL BCP-MCNC: 3.6 G/DL (ref 3.5–5.2)
ANION GAP SERPL CALC-SCNC: 12 MMOL/L (ref 8–16)
BUN SERPL-MCNC: 19 MG/DL (ref 8–23)
CALCIUM SERPL-MCNC: 10 MG/DL (ref 8.7–10.5)
CHLORIDE SERPL-SCNC: 102 MMOL/L (ref 95–110)
CO2 SERPL-SCNC: 24 MMOL/L (ref 23–29)
CREAT SERPL-MCNC: 0.9 MG/DL (ref 0.5–1.4)
EST. GFR  (NO RACE VARIABLE): >60 ML/MIN/1.73 M^2
GLUCOSE SERPL-MCNC: 202 MG/DL (ref 70–110)
PHOSPHATE SERPL-MCNC: 2.6 MG/DL (ref 2.7–4.5)
POTASSIUM SERPL-SCNC: 3.3 MMOL/L (ref 3.5–5.1)
SODIUM SERPL-SCNC: 138 MMOL/L (ref 136–145)

## 2022-11-30 PROCEDURE — 3066F NEPHROPATHY DOC TX: CPT | Mod: CPTII,S$GLB,,

## 2022-11-30 PROCEDURE — 99215 PR OFFICE/OUTPT VISIT, EST, LEVL V, 40-54 MIN: ICD-10-PCS | Mod: S$GLB,,,

## 2022-11-30 PROCEDURE — 3288F FALL RISK ASSESSMENT DOCD: CPT | Mod: CPTII,S$GLB,,

## 2022-11-30 PROCEDURE — 4010F PR ACE/ARB THEARPY RXD/TAKEN: ICD-10-PCS | Mod: CPTII,S$GLB,,

## 2022-11-30 PROCEDURE — 3008F PR BODY MASS INDEX (BMI) DOCUMENTED: ICD-10-PCS | Mod: CPTII,S$GLB,,

## 2022-11-30 PROCEDURE — 3078F PR MOST RECENT DIASTOLIC BLOOD PRESSURE < 80 MM HG: ICD-10-PCS | Mod: CPTII,S$GLB,,

## 2022-11-30 PROCEDURE — 1101F PR PT FALLS ASSESS DOC 0-1 FALLS W/OUT INJ PAST YR: ICD-10-PCS | Mod: CPTII,S$GLB,,

## 2022-11-30 PROCEDURE — 4010F ACE/ARB THERAPY RXD/TAKEN: CPT | Mod: CPTII,S$GLB,,

## 2022-11-30 PROCEDURE — 1126F AMNT PAIN NOTED NONE PRSNT: CPT | Mod: CPTII,S$GLB,,

## 2022-11-30 PROCEDURE — 1160F RVW MEDS BY RX/DR IN RCRD: CPT | Mod: CPTII,S$GLB,,

## 2022-11-30 PROCEDURE — 1126F PR PAIN SEVERITY QUANTIFIED, NO PAIN PRESENT: ICD-10-PCS | Mod: CPTII,S$GLB,,

## 2022-11-30 PROCEDURE — 3066F PR DOCUMENTATION OF TREATMENT FOR NEPHROPATHY: ICD-10-PCS | Mod: CPTII,S$GLB,,

## 2022-11-30 PROCEDURE — 3044F HG A1C LEVEL LT 7.0%: CPT | Mod: CPTII,S$GLB,,

## 2022-11-30 PROCEDURE — 3072F LOW RISK FOR RETINOPATHY: CPT | Mod: CPTII,S$GLB,,

## 2022-11-30 PROCEDURE — 36415 COLL VENOUS BLD VENIPUNCTURE: CPT | Performed by: INTERNAL MEDICINE

## 2022-11-30 PROCEDURE — 1159F PR MEDICATION LIST DOCUMENTED IN MEDICAL RECORD: ICD-10-PCS | Mod: CPTII,S$GLB,,

## 2022-11-30 PROCEDURE — 3061F PR NEG MICROALBUMINURIA RESULT DOCUMENTED/REVIEW: ICD-10-PCS | Mod: CPTII,S$GLB,,

## 2022-11-30 PROCEDURE — 3077F PR MOST RECENT SYSTOLIC BLOOD PRESSURE >= 140 MM HG: ICD-10-PCS | Mod: CPTII,S$GLB,,

## 2022-11-30 PROCEDURE — 99999 PR PBB SHADOW E&M-EST. PATIENT-LVL III: ICD-10-PCS | Mod: PBBFAC,,,

## 2022-11-30 PROCEDURE — 99215 OFFICE O/P EST HI 40 MIN: CPT | Mod: S$GLB,,,

## 2022-11-30 PROCEDURE — 3008F BODY MASS INDEX DOCD: CPT | Mod: CPTII,S$GLB,,

## 2022-11-30 PROCEDURE — 1159F MED LIST DOCD IN RCRD: CPT | Mod: CPTII,S$GLB,,

## 2022-11-30 PROCEDURE — 3072F PR LOW RISK FOR RETINOPATHY: ICD-10-PCS | Mod: CPTII,S$GLB,,

## 2022-11-30 PROCEDURE — 3078F DIAST BP <80 MM HG: CPT | Mod: CPTII,S$GLB,,

## 2022-11-30 PROCEDURE — 1101F PT FALLS ASSESS-DOCD LE1/YR: CPT | Mod: CPTII,S$GLB,,

## 2022-11-30 PROCEDURE — 99999 PR PBB SHADOW E&M-EST. PATIENT-LVL III: CPT | Mod: PBBFAC,,,

## 2022-11-30 PROCEDURE — 3044F PR MOST RECENT HEMOGLOBIN A1C LEVEL <7.0%: ICD-10-PCS | Mod: CPTII,S$GLB,,

## 2022-11-30 PROCEDURE — 1160F PR REVIEW ALL MEDS BY PRESCRIBER/CLIN PHARMACIST DOCUMENTED: ICD-10-PCS | Mod: CPTII,S$GLB,,

## 2022-11-30 PROCEDURE — 3061F NEG MICROALBUMINURIA REV: CPT | Mod: CPTII,S$GLB,,

## 2022-11-30 PROCEDURE — 3288F PR FALLS RISK ASSESSMENT DOCUMENTED: ICD-10-PCS | Mod: CPTII,S$GLB,,

## 2022-11-30 PROCEDURE — 3077F SYST BP >= 140 MM HG: CPT | Mod: CPTII,S$GLB,,

## 2022-11-30 PROCEDURE — 80069 RENAL FUNCTION PANEL: CPT | Performed by: INTERNAL MEDICINE

## 2022-12-13 DIAGNOSIS — Z17.0 MALIGNANT NEOPLASM OF LOWER-OUTER QUADRANT OF LEFT BREAST OF FEMALE, ESTROGEN RECEPTOR POSITIVE: ICD-10-CM

## 2022-12-13 DIAGNOSIS — M85.80 OSTEOPENIA AFTER MENOPAUSE: Primary | ICD-10-CM

## 2022-12-13 DIAGNOSIS — C50.512 MALIGNANT NEOPLASM OF LOWER-OUTER QUADRANT OF LEFT BREAST OF FEMALE, ESTROGEN RECEPTOR POSITIVE: ICD-10-CM

## 2022-12-13 DIAGNOSIS — Z78.0 OSTEOPENIA AFTER MENOPAUSE: Primary | ICD-10-CM

## 2022-12-13 PROBLEM — E87.6 HYPOKALEMIA: Status: ACTIVE | Noted: 2022-12-13

## 2022-12-13 NOTE — ASSESSMENT & PLAN NOTE
Lab Results   Component Value Date    K 3.3 (L) 11/30/2022   Mild hypokalemia  Pt states she is on oral K supplement--not on med list  Advised to continue taking utd and f/u with PCP

## 2022-12-13 NOTE — PROGRESS NOTES
Subjective:     Patient ID:?Anne Farmer is a 66 y.o. female.?? MR#: [id number]   ?   PRIMARY ONCOLOGIST: Dr. Murray  ?   CHIEF COMPLAINT: Follow-up ?????   ?   ONCOLOGIC DIAGNOSIS: Stage IA, pT2, pN0, Mx ER/TX positive, HER2 IHC equivocal, FISH negative, left lower outer breast invasive ductal carcinoma grade 2, Octotype DX RS 8  ?   CURRENT TREATMENT:  anastrazole 1mg daily  Zometa planned      PAST TREATMENT:  Radiation, completed 10/10/22    HPI  Mrs. Farmer is a pleasant 66 year old female with left breast cancer who presents today for follow-up after starting Arimidex. After routine screening mammogram 4/2022 , an abnormality was found in left upper outer quadrant breast. She denies any personal history of prior breast biopsy.  No family history of malignancy including breast/ovarian that she is aware of.  She denies any breast concerns including no pain, skin change/distortion or nipple discharge.  She has never used hormone replacement therapy.     Follow-up diagnostic mammogram on 05/02/2022 showed to areas of abnormality in close proximity 6 mm and 8 mm area spanning 1.3 cm per report.  No lymphadenopathy appreciated.  05/12/2022 biopsy showed invasive ductal carcinoma with mucinous features grade 2, no lymphovascular invasion, ER 95%, TX 40-45%, HER2 2+ IHC, fish negative     She is in good health follows with primary care for chronic medical issues including type 2 diabetes hypertension hyperlipidemia.  She has history of colon cancer stage I 2016 status post resection in surveillance most recent colonoscopy in June 2020 recommended 5 year follow-up per report.    Interval History: Pt states overall she is feeling well and voices no c/o today. Denies n/v/d/c, fever, chills, sob, cp, hot flashes, rash, swelling.       Oncology History   Malignant neoplasm of lower-outer quadrant of left breast of female, estrogen receptor positive   6/9/2022 Initial Diagnosis    Malignant neoplasm of lower-outer  quadrant of left breast of female, estrogen receptor positive     2022 Cancer Staged    Staging form: Breast, AJCC 8th Edition  - Clinical stage from 2022: Stage IA (cT1c, cN0, cM0, G2, ER+, MO+, HER2-)       2022 Cancer Staged    Staging form: Breast, AJCC 8th Edition  - Pathologic stage from 2022: Stage IA (pT2, pN0, cM0, G2, ER+, MO+, HER2-, Oncotype DX score: 8)       2022 - 10/10/2022 Radiation Therapy    Treating physician: iNko  Total Dose: 40 Gy  Fractions: 15        Social History     Socioeconomic History    Marital status: Single   Occupational History     Employer: [employer]   Tobacco Use    Smoking status: Never    Smokeless tobacco: Never   Substance and Sexual Activity    Alcohol use: Yes     Alcohol/week: 0.0 standard drinks     Comment: weekends.       Drug use: No      Family History   Problem Relation Age of Onset    Hypertension Mother     Diabetes Mother     Hypertension Father     Hypertension Sister     Hypertension Brother     Hypertension Sister     Hypertension Sister     Hypertension Sister     Hypertension Brother     Hypertension Brother       Past Surgical History:   Procedure Laterality Date     SECTION      COLON SURGERY      COLONOSCOPY N/A 2015    Procedure: COLONOSCOPY;  Surgeon: Adela Sam MD;  Location: [facility] ENDO;  Service: Endoscopy;  Laterality: N/A;    COLONOSCOPY N/A 2017    Procedure: COLONOSCOPY;  Surgeon: Chintan Flores MD;  Location: [facility] ENDO;  Service: Endoscopy;  Laterality: N/A;    COLONOSCOPY N/A 2020    Procedure: COLONOSCOPY;  Surgeon: Grisel Atkins MD;  Location: [facility] ENDO;  Service: Endoscopy;  Laterality: N/A;    SENTINEL LYMPH NODE BIOPSY Left 2022    Procedure: BIOPSY, LYMPH NODE, SENTINEL;  Surgeon: Arvin Hernandez MD;  Location: [facility] OR;  Service: General;  Laterality: Left;        Review of Systems   Constitutional:  Negative for activity change, appetite change, chills, fatigue, fever  and unexpected weight change.   HENT:  Negative for congestion, dental problem, mouth sores and nosebleeds.    Eyes:  Negative for visual disturbance.   Respiratory:  Negative for cough, choking and chest tightness.    Cardiovascular:  Negative for chest pain, palpitations and leg swelling.   Gastrointestinal:  Negative for abdominal distention, abdominal pain, anal bleeding, blood in stool, constipation, diarrhea, nausea and vomiting.   Endocrine: Negative.    Genitourinary:  Negative for dysuria, frequency, hematuria and urgency.   Musculoskeletal:  Negative for arthralgias, back pain, gait problem, joint swelling and myalgias.   Skin:  Negative for wound.   Allergic/Immunologic: Negative for immunocompromised state.   Neurological:  Negative for dizziness, light-headedness, numbness and headaches.   Hematological:  Negative for adenopathy. Does not bruise/bleed easily.   Psychiatric/Behavioral:  The patient is not nervous/anxious.      ?   A comprehensive 14-point review of systems was reviewed with patient and was negative other than as specified above.   ?     Objective:      Physical Exam  Vitals reviewed.   Constitutional:       Appearance: Normal appearance. She is not ill-appearing.   HENT:      Head: Normocephalic and atraumatic.      Right Ear: External ear normal.      Left Ear: External ear normal.      Nose: Nose normal.      Mouth/Throat:      Mouth: Mucous membranes are moist.      Pharynx: Oropharynx is clear.   Eyes:      Conjunctiva/sclera: Conjunctivae normal.   Cardiovascular:      Rate and Rhythm: Normal rate and regular rhythm.      Heart sounds: Normal heart sounds.   Pulmonary:      Effort: Pulmonary effort is normal.      Breath sounds: Normal breath sounds.   Abdominal:      General: Abdomen is flat.   Genitourinary:     Comments: deferred  Musculoskeletal:         General: Normal range of motion.      Cervical back: Normal range of motion.      Right lower leg: No edema.      Left lower  leg: No edema.   Skin:     General: Skin is warm and dry.      Capillary Refill: Capillary refill takes less than 2 seconds.   Neurological:      Mental Status: She is alert and oriented to person, place, and time.      Motor: No weakness.   Psychiatric:         Mood and Affect: Mood normal.         Behavior: Behavior normal.         Thought Content: Thought content normal.         Judgment: Judgment normal.         ?   Vitals:    11/30/22 1417   BP: (!) 153/68   Pulse: 69   Temp: 97.4 °F (36.3 °C)      ?       ?   Laboratory:  ?   Lab Visit on 11/30/2022   Component Date Value Ref Range Status    Glucose 11/30/2022 202 (H)  70 - 110 mg/dL Final    Sodium 11/30/2022 138  136 - 145 mmol/L Final    Potassium 11/30/2022 3.3 (L)  3.5 - 5.1 mmol/L Final    Chloride 11/30/2022 102  95 - 110 mmol/L Final    CO2 11/30/2022 24  23 - 29 mmol/L Final    BUN 11/30/2022 19  8 - 23 mg/dL Final    Calcium 11/30/2022 10.0  8.7 - 10.5 mg/dL Final    Creatinine 11/30/2022 0.9  0.5 - 1.4 mg/dL Final    Albumin 11/30/2022 3.6  3.5 - 5.2 g/dL Final    Phosphorus 11/30/2022 2.6 (L)  2.7 - 4.5 mg/dL Final    eGFR 11/30/2022 >60  >60 mL/min/1.73 m^2 Final    Anion Gap 11/30/2022 12  8 - 16 mmol/L Final      ?   Imaging:    No results found for this or any previous visit (from the past 2160 hour(s)).     No results found for this or any previous visit (from the past 2160 hour(s)).     ?   Assessment/Plan:     Problem List Items Addressed This Visit          Renal/    Hypokalemia     Lab Results   Component Value Date    K 3.3 (L) 11/30/2022   Mild hypokalemia  Pt states she is on oral K supplement--not on med list  Advised to continue taking utd and f/u with PCP              Oncology    Malignant neoplasm of lower-outer quadrant of left breast of female, estrogen receptor positive      Cancer Staging   Malignant neoplasm of lower-outer quadrant of left breast of female, estrogen receptor positive  Staging form: Breast, AJCC 8th Edition  -  Clinical stage from 6/9/2022: Stage IA (cT1c, cN0, cM0, G2, ER+, MD+, HER2-) - Signed by Soraya Murray MD on 6/9/2022  - Pathologic stage from 9/12/2022: Stage IA (pT2, pN0, cM0, G2, ER+, MD+, HER2-, Oncotype DX score: 8) - Signed by Soraya Murray MD on 9/12/2022    Completed XRT 10/10/22  Initiated on Arimidex 10/31/2022  Last DEXA 09/2022 found osteopenia  Plan for Zometa q six months  --Not scheduled for today--> start 12/07/22  Start on Ca and Vitamin D supplement    Plan for   History and Physical Exam: q3-6 months years 1-3, q6 months years 4-5  Mammogram: Yearly, starting 6 months post completion of radiation:  Around May 2023  DEXA scan: q2 years            Follow-up in 3 months with cbc, cmp prior        DIANA GastonP-C  Hematology/Oncology

## 2022-12-13 NOTE — ASSESSMENT & PLAN NOTE
Cancer Staging   Malignant neoplasm of lower-outer quadrant of left breast of female, estrogen receptor positive  Staging form: Breast, AJCC 8th Edition  - Clinical stage from 6/9/2022: Stage IA (cT1c, cN0, cM0, G2, ER+, WI+, HER2-) - Signed by Soraya Murray MD on 6/9/2022  - Pathologic stage from 9/12/2022: Stage IA (pT2, pN0, cM0, G2, ER+, WI+, HER2-, Oncotype DX score: 8) - Signed by Soraya Murray MD on 9/12/2022    Completed XRT 10/10/22  Initiated on Arimidex 10/31/2022  Last DEXA 09/2022 found osteopenia  Plan for Zometa q six months  --Not scheduled for today--> start 12/07/22  Start on Ca and Vitamin D supplement    Plan for   History and Physical Exam: q3-6 months years 1-3, q6 months years 4-5  Mammogram: Yearly, starting 6 months post completion of radiation:  Around May 2023  DEXA scan: q2 years

## 2023-01-04 ENCOUNTER — INFUSION (OUTPATIENT)
Dept: INFUSION THERAPY | Facility: HOSPITAL | Age: 68
End: 2023-01-04
Payer: MEDICARE

## 2023-01-04 VITALS
TEMPERATURE: 97 F | WEIGHT: 175.69 LBS | OXYGEN SATURATION: 97 % | DIASTOLIC BLOOD PRESSURE: 68 MMHG | RESPIRATION RATE: 18 BRPM | SYSTOLIC BLOOD PRESSURE: 140 MMHG | HEIGHT: 66 IN | HEART RATE: 48 BPM | BODY MASS INDEX: 28.23 KG/M2

## 2023-01-04 DIAGNOSIS — Z17.0 MALIGNANT NEOPLASM OF LOWER-OUTER QUADRANT OF LEFT BREAST OF FEMALE, ESTROGEN RECEPTOR POSITIVE: ICD-10-CM

## 2023-01-04 DIAGNOSIS — M85.80 OSTEOPENIA, UNSPECIFIED LOCATION: Primary | ICD-10-CM

## 2023-01-04 DIAGNOSIS — C50.512 MALIGNANT NEOPLASM OF LOWER-OUTER QUADRANT OF LEFT BREAST OF FEMALE, ESTROGEN RECEPTOR POSITIVE: ICD-10-CM

## 2023-01-04 PROCEDURE — 96365 THER/PROPH/DIAG IV INF INIT: CPT | Mod: HCNC

## 2023-01-04 PROCEDURE — 63600175 PHARM REV CODE 636 W HCPCS: Mod: HCNC

## 2023-01-04 RX ORDER — SODIUM CHLORIDE 0.9 % (FLUSH) 0.9 %
10 SYRINGE (ML) INJECTION
Status: CANCELLED | OUTPATIENT
Start: 2023-01-04

## 2023-01-04 RX ORDER — HEPARIN 100 UNIT/ML
500 SYRINGE INTRAVENOUS
Status: CANCELLED | OUTPATIENT
Start: 2023-01-04

## 2023-01-04 RX ORDER — ZOLEDRONIC ACID 0.04 MG/ML
4 INJECTION, SOLUTION INTRAVENOUS
Status: COMPLETED | OUTPATIENT
Start: 2023-01-04 | End: 2023-01-04

## 2023-01-04 RX ADMIN — ZOLEDRONIC ACID 4 MG: 0.04 INJECTION, SOLUTION INTRAVENOUS at 02:01

## 2023-01-04 NOTE — PLAN OF CARE
Patient reports no complaints at this time. Tolerated infusion well with no adverse reactions. Patient to return March for labs and follow-up visit.

## 2023-01-04 NOTE — DISCHARGE INSTRUCTIONS
.[facility]  [address] [address]  [address]  [phone] phone     [phone] fax  Hours of Operation: Monday- Friday 8:00am- 5:00pm  After hours phone  [phone]  Hematology / Oncology Physicians on call    Dr. Damian Wang      Nurse Practitioners:    Linda Watters, JHON Tsang, JHON Ho, JHON Montez, JHON Peck, EVELYN Tan      Please don't hesitate to call if you have any concerns.

## 2023-03-05 PROBLEM — Z78.0 OSTEOPENIA AFTER MENOPAUSE: Status: ACTIVE | Noted: 2022-09-29

## 2023-03-06 ENCOUNTER — OFFICE VISIT (OUTPATIENT)
Dept: HEMATOLOGY/ONCOLOGY | Facility: CLINIC | Age: 68
End: 2023-03-06
Payer: MEDICAID

## 2023-03-06 ENCOUNTER — LAB VISIT (OUTPATIENT)
Dept: LAB | Facility: HOSPITAL | Age: 68
End: 2023-03-06
Attending: INTERNAL MEDICINE
Payer: MEDICARE

## 2023-03-06 VITALS
TEMPERATURE: 97 F | BODY MASS INDEX: 28.56 KG/M2 | HEIGHT: 66 IN | SYSTOLIC BLOOD PRESSURE: 152 MMHG | WEIGHT: 177.69 LBS | OXYGEN SATURATION: 98 % | DIASTOLIC BLOOD PRESSURE: 67 MMHG | HEART RATE: 59 BPM

## 2023-03-06 DIAGNOSIS — C50.412 MALIGNANT NEOPLASM OF UPPER-OUTER QUADRANT OF LEFT BREAST IN FEMALE, ESTROGEN RECEPTOR POSITIVE: ICD-10-CM

## 2023-03-06 DIAGNOSIS — Z78.0 OSTEOPENIA AFTER MENOPAUSE: ICD-10-CM

## 2023-03-06 DIAGNOSIS — Z17.0 MALIGNANT NEOPLASM OF UPPER-OUTER QUADRANT OF LEFT BREAST IN FEMALE, ESTROGEN RECEPTOR POSITIVE: ICD-10-CM

## 2023-03-06 DIAGNOSIS — Z17.0 MALIGNANT NEOPLASM OF LOWER-OUTER QUADRANT OF LEFT BREAST OF FEMALE, ESTROGEN RECEPTOR POSITIVE: Primary | ICD-10-CM

## 2023-03-06 DIAGNOSIS — Z85.038 HISTORY OF COLON CANCER, STAGE I: ICD-10-CM

## 2023-03-06 DIAGNOSIS — C50.512 MALIGNANT NEOPLASM OF LOWER-OUTER QUADRANT OF LEFT BREAST OF FEMALE, ESTROGEN RECEPTOR POSITIVE: Primary | ICD-10-CM

## 2023-03-06 DIAGNOSIS — M85.80 OSTEOPENIA AFTER MENOPAUSE: ICD-10-CM

## 2023-03-06 LAB
ALBUMIN SERPL BCP-MCNC: 3.9 G/DL (ref 3.5–5.2)
ANION GAP SERPL CALC-SCNC: 15 MMOL/L (ref 8–16)
BUN SERPL-MCNC: 29 MG/DL (ref 8–23)
CALCIUM SERPL-MCNC: 9.7 MG/DL (ref 8.7–10.5)
CHLORIDE SERPL-SCNC: 104 MMOL/L (ref 95–110)
CO2 SERPL-SCNC: 20 MMOL/L (ref 23–29)
CREAT SERPL-MCNC: 1 MG/DL (ref 0.5–1.4)
EST. GFR  (NO RACE VARIABLE): >60 ML/MIN/1.73 M^2
GLUCOSE SERPL-MCNC: 125 MG/DL (ref 70–110)
PHOSPHATE SERPL-MCNC: 3.6 MG/DL (ref 2.7–4.5)
POTASSIUM SERPL-SCNC: 3.7 MMOL/L (ref 3.5–5.1)
SODIUM SERPL-SCNC: 139 MMOL/L (ref 136–145)

## 2023-03-06 PROCEDURE — 99215 PR OFFICE/OUTPT VISIT, EST, LEVL V, 40-54 MIN: ICD-10-PCS | Mod: S$GLB,,, | Performed by: NURSE PRACTITIONER

## 2023-03-06 PROCEDURE — 1160F PR REVIEW ALL MEDS BY PRESCRIBER/CLIN PHARMACIST DOCUMENTED: ICD-10-PCS | Mod: CPTII,S$GLB,, | Performed by: NURSE PRACTITIONER

## 2023-03-06 PROCEDURE — 1101F PR PT FALLS ASSESS DOC 0-1 FALLS W/OUT INJ PAST YR: ICD-10-PCS | Mod: CPTII,S$GLB,, | Performed by: NURSE PRACTITIONER

## 2023-03-06 PROCEDURE — 36415 COLL VENOUS BLD VENIPUNCTURE: CPT | Performed by: INTERNAL MEDICINE

## 2023-03-06 PROCEDURE — 4010F ACE/ARB THERAPY RXD/TAKEN: CPT | Mod: CPTII,S$GLB,, | Performed by: NURSE PRACTITIONER

## 2023-03-06 PROCEDURE — 3288F FALL RISK ASSESSMENT DOCD: CPT | Mod: CPTII,S$GLB,, | Performed by: NURSE PRACTITIONER

## 2023-03-06 PROCEDURE — 99999 PR PBB SHADOW E&M-EST. PATIENT-LVL IV: ICD-10-PCS | Mod: PBBFAC,,, | Performed by: NURSE PRACTITIONER

## 2023-03-06 PROCEDURE — 3008F BODY MASS INDEX DOCD: CPT | Mod: CPTII,S$GLB,, | Performed by: NURSE PRACTITIONER

## 2023-03-06 PROCEDURE — 1126F AMNT PAIN NOTED NONE PRSNT: CPT | Mod: CPTII,S$GLB,, | Performed by: NURSE PRACTITIONER

## 2023-03-06 PROCEDURE — 1101F PT FALLS ASSESS-DOCD LE1/YR: CPT | Mod: CPTII,S$GLB,, | Performed by: NURSE PRACTITIONER

## 2023-03-06 PROCEDURE — 3288F PR FALLS RISK ASSESSMENT DOCUMENTED: ICD-10-PCS | Mod: CPTII,S$GLB,, | Performed by: NURSE PRACTITIONER

## 2023-03-06 PROCEDURE — 3008F PR BODY MASS INDEX (BMI) DOCUMENTED: ICD-10-PCS | Mod: CPTII,S$GLB,, | Performed by: NURSE PRACTITIONER

## 2023-03-06 PROCEDURE — 3078F DIAST BP <80 MM HG: CPT | Mod: CPTII,S$GLB,, | Performed by: NURSE PRACTITIONER

## 2023-03-06 PROCEDURE — 3077F SYST BP >= 140 MM HG: CPT | Mod: CPTII,S$GLB,, | Performed by: NURSE PRACTITIONER

## 2023-03-06 PROCEDURE — 1159F PR MEDICATION LIST DOCUMENTED IN MEDICAL RECORD: ICD-10-PCS | Mod: CPTII,S$GLB,, | Performed by: NURSE PRACTITIONER

## 2023-03-06 PROCEDURE — 3078F PR MOST RECENT DIASTOLIC BLOOD PRESSURE < 80 MM HG: ICD-10-PCS | Mod: CPTII,S$GLB,, | Performed by: NURSE PRACTITIONER

## 2023-03-06 PROCEDURE — 99215 OFFICE O/P EST HI 40 MIN: CPT | Mod: S$GLB,,, | Performed by: NURSE PRACTITIONER

## 2023-03-06 PROCEDURE — 99999 PR PBB SHADOW E&M-EST. PATIENT-LVL IV: CPT | Mod: PBBFAC,,, | Performed by: NURSE PRACTITIONER

## 2023-03-06 PROCEDURE — 4010F PR ACE/ARB THEARPY RXD/TAKEN: ICD-10-PCS | Mod: CPTII,S$GLB,, | Performed by: NURSE PRACTITIONER

## 2023-03-06 PROCEDURE — 1126F PR PAIN SEVERITY QUANTIFIED, NO PAIN PRESENT: ICD-10-PCS | Mod: CPTII,S$GLB,, | Performed by: NURSE PRACTITIONER

## 2023-03-06 PROCEDURE — 1159F MED LIST DOCD IN RCRD: CPT | Mod: CPTII,S$GLB,, | Performed by: NURSE PRACTITIONER

## 2023-03-06 PROCEDURE — 3077F PR MOST RECENT SYSTOLIC BLOOD PRESSURE >= 140 MM HG: ICD-10-PCS | Mod: CPTII,S$GLB,, | Performed by: NURSE PRACTITIONER

## 2023-03-06 PROCEDURE — 1160F RVW MEDS BY RX/DR IN RCRD: CPT | Mod: CPTII,S$GLB,, | Performed by: NURSE PRACTITIONER

## 2023-03-06 PROCEDURE — 80069 RENAL FUNCTION PANEL: CPT | Performed by: INTERNAL MEDICINE

## 2023-03-06 RX ORDER — ANASTROZOLE 1 MG/1
1 TABLET ORAL DAILY
Qty: 90 TABLET | Refills: 1 | Status: SHIPPED | OUTPATIENT
Start: 2023-03-06 | End: 2023-10-25

## 2023-03-06 NOTE — PROGRESS NOTES
Subjective:       Patient ID: Anne Farmer is a 67 y.o. female.    Chief Complaint:   1. Malignant neoplasm of lower-outer quadrant of left breast of female, estrogen receptor positive  Stage IA (pT2, pN0, cM0, G2, ER+, HI+, HER2-, Oncotype DX score: 8)      2. History of colon cancer, stage I        3. Osteopenia after menopause          Current Treatment:  Arimidex 1mg po daily    Surveillance      OP ZOLEDRONIC ACID (ZOMETA) Q4W    Treatment History:  XRT completed 10/10/2022    S/p resection    HPI: This is a 67 year old female with medical history significant for type 2 DM, hyperlipidemia, CHF, insomnia, HTN, diverticulosis, allergies, and arthritis who is seen in Hem/Onc for left breast cancer. After routine screening mammogram in 4/2022, an abnormality was found in left upper outer quadrant breast. She denies any personal history of prior breast biopsy.  No family history of malignancy including breast/ovarian that she is aware of.  She denies any breast concerns including no pain, skin change/distortion or nipple discharge.  She has never used hormone replacement therapy.     Follow-up diagnostic mammogram on 5/2/2022 showed 2 areas of abnormality in close proximity measuring 6mm and 8mm area spanning 1.3 cm per report.  No lymphadenopathy appreciated. On 5/12/2022, biopsy showed invasive ductal carcinoma with mucinous features grade 2, no lymphovascular invasion, ER 95%, HI 40-45%, HER2 2+ IHC, fish negative.     She has history of colon cancer stage I diagnosed in 2016 status post resection in surveillance. Most recent colonoscopy in June 2020 recommended 5 year follow-up per report.    Her primary Hematologist/Oncologist is Dr. Murray.    Interval History: Patient presents for follow up on Arimidex. She presents alone and reports adherence to Arimidex and calcium + vitamin D. She denies hot flashes and joint pain and reports a good appetite and normal Bms. She has no complaints today. No CBC to  review. Informed her of upcoming MMG and Zometa appointments. She prefers to have her next appointment with her Zometa injection; will schedule accordingly.     Reviewed labs with patient:   CBC:   Recent Labs   Lab 09/08/22  1013   WBC 5.87   RBC 3.87 L   Hemoglobin 12.1   Hematocrit 35.5 L   Platelets 319   MCV 92   MCH 31.3 H   MCHC 34.1     CMP:  Recent Labs   Lab 09/08/22  1013 09/19/22  2054 03/06/23  1428   Glucose 117 H   < > 125 H   Calcium 10.4   < > 9.7   Albumin 4.6   < > 3.9   Total Protein 8.7 H  --   --    Sodium 141   < > 139   Potassium 3.4 L   < > 3.7   CO2 25   < > 20 L   Chloride 103   < > 104   BUN 26 H   < > 29 H   Creatinine 0.9   < > 1.0   Alkaline Phosphatase 78  --   --    ALT 21  --   --    AST 34  --   --    Total Bilirubin 0.5  --   --     < > = values in this interval not displayed.     Social History     Socioeconomic History    Marital status: Single   Occupational History     Employer: [employer]   Tobacco Use    Smoking status: Never    Smokeless tobacco: Never   Substance and Sexual Activity    Alcohol use: Yes     Alcohol/week: 0.0 standard drinks     Comment: weekends.       Drug use: No     Past Medical History:   Diagnosis Date    Allergy     Arthritis     Cancer 2016    colon    CHF (congestive heart failure)     Colon cancer 2004    Colon cancer screening 9/21/2015    Diabetes mellitus type 2 in nonobese 3/9/2016    borderline    Diverticulosis     DM (diabetes mellitus) 03/2016    BS didn't check 02/13/2019    DM (diabetes mellitus) 03/2016    BS didn't check 03/04/2020    Hyperlipidemia 10/25/2016    Hypertension     Insomnia     Malignant neoplasm of colon 5/13/2021    Malignant neoplasm of lower-outer quadrant of left breast of female, estrogen receptor positive 6/9/2022     Family History   Problem Relation Age of Onset    Hypertension Mother     Diabetes Mother     Hypertension Father     Hypertension Sister     Hypertension Brother      Hypertension Sister     Hypertension Sister     Hypertension Sister     Hypertension Brother     Hypertension Brother      Past Surgical History:   Procedure Laterality Date     SECTION      COLON SURGERY      COLONOSCOPY N/A 2015    Procedure: COLONOSCOPY;  Surgeon: Adela Sam MD;  Location: [facility] ENDO;  Service: Endoscopy;  Laterality: N/A;    COLONOSCOPY N/A 2017    Procedure: COLONOSCOPY;  Surgeon: Chintan Flores MD;  Location: [facility] ENDO;  Service: Endoscopy;  Laterality: N/A;    COLONOSCOPY N/A 2020    Procedure: COLONOSCOPY;  Surgeon: Grisel Atkins MD;  Location: [facility] ENDO;  Service: Endoscopy;  Laterality: N/A;    SENTINEL LYMPH NODE BIOPSY Left 2022    Procedure: BIOPSY, LYMPH NODE, SENTINEL;  Surgeon: Arvin Hernandez MD;  Location: [facility] OR;  Service: General;  Laterality: Left;     Review of Systems   Constitutional:  Negative for appetite change and fatigue.   HENT:  Negative for mouth sores, rhinorrhea and sore throat.    Eyes: Negative.    Respiratory: Negative.     Cardiovascular: Negative.    Gastrointestinal:  Negative for constipation, diarrhea, nausea and vomiting.   Endocrine: Negative.    Genitourinary: Negative.  Negative for hot flashes.   Musculoskeletal: Negative.  Negative for arthralgias.   Integumentary:  Negative.   Allergic/Immunologic: Negative.    Neurological:  Negative for weakness and numbness.   Hematological: Negative.    Psychiatric/Behavioral: Negative.         Medication List with Changes/Refills   Current Medications    ALBUTEROL (PROVENTIL/VENTOLIN HFA) 90 MCG/ACTUATION INHALER    Inhale 1-2 puffs into the lungs every 6 (six) hours as needed for Wheezing or Shortness of Breath.    AMLODIPINE (NORVASC) 10 MG TABLET    Take 1 tablet (10 mg total) by mouth once daily.    ANASTROZOLE (ARIMIDEX) 1 MG TAB    TAKE 1 TABLET BY MOUTH EVERY DAY    CYCLOBENZAPRINE (FLEXERIL) 10 MG TABLET    TAKE 1 TABLET BY MOUTH THREE TIMES A DAY AS  NEEDED    FLUTICASONE PROPIONATE (FLONASE) 50 MCG/ACTUATION NASAL SPRAY    2 sprays (100 mcg total) by Each Nostril route once daily.    GLYCOPYRROLATE-FORMOTEROL (BEVESPI AEROSPHERE) 9-4.8 MCG HFAA    Inhale 2 puffs into the lungs 2 (two) times daily. Controller    HYDROCHLOROTHIAZIDE (HYDRODIURIL) 25 MG TABLET    TAKE 1 TABLET BY MOUTH EVERY DAY    HYDROCODONE-ACETAMINOPHEN (NORCO)  MG PER TABLET    Take 1 tablet by mouth every 6 (six) hours as needed for Pain.    HYDROXYCHLOROQUINE (PLAQUENIL) 200 MG TABLET    TAKE 1 TABLET BY MOUTH TWICE A DAY    LEVOCETIRIZINE (XYZAL) 5 MG TABLET    Take 1 tablet (5 mg total) by mouth every evening.    LOSARTAN (COZAAR) 100 MG TABLET    Take 1 tablet (100 mg total) by mouth once daily.    MULTIVITAMIN (ONE DAILY MULTIVITAMIN) PER TABLET    Take 1 tablet by mouth once daily.    OMEPRAZOLE (PRILOSEC) 20 MG CAPSULE    Take 1 capsule (20 mg total) by mouth once daily.    PRAVASTATIN (PRAVACHOL) 20 MG TABLET    TAKE 1 TABLET BY MOUTH EVERY DAY    TRAZODONE (DESYREL) 50 MG TABLET    TAKE 1 TABLET BY MOUTH EVERY DAY AT NIGHT     Objective:     Vitals:    03/06/23 1518   BP: (!) 152/67   Pulse: (!) 59   Temp: 97.3 °F (36.3 °C)     Physical Exam  Vitals reviewed.   Constitutional:       Appearance: Normal appearance.   HENT:      Head: Normocephalic.      Mouth/Throat:      Comments: Wearing mask    Eyes:      Extraocular Movements: Extraocular movements intact.      Pupils: Pupils are equal, round, and reactive to light.   Cardiovascular:      Rate and Rhythm: Normal rate and regular rhythm.      Heart sounds: Normal heart sounds.      Comments: Soft-blowing murmur  Pulmonary:      Effort: Pulmonary effort is normal.      Breath sounds: Normal breath sounds.   Abdominal:      General: Bowel sounds are normal.      Palpations: Abdomen is soft.      Comments: rounded     Genitourinary:     Comments: deferred    Musculoskeletal:         General: Normal range of motion.      Cervical  back: Normal range of motion and neck supple.   Skin:     General: Skin is warm and dry.   Neurological:      Mental Status: She is alert and oriented to person, place, and time.   Psychiatric:         Behavior: Behavior normal.         Thought Content: Thought content normal.        (0) Fully active, able to carry on all predisease performance without restriction  Assessment:     Problem List Items Addressed This Visit          Oncology    History of colon cancer, stage I (Chronic)     Diagnosed in 2016 with Stage I colon cancer s/p resection. Currently on surveillance.          Malignant neoplasm of lower-outer quadrant of left breast of female, estrogen receptor positive - Primary     Stage IA, pT2, pN0, Mx ER/AR positive, HER2 IHC equivocal, FISH negative, left lower outer breast invasive ductal carcinoma grade 2, Oncotype DX RS 8. Completed XRT 10/10/2022. Initiated Arimidex on 10/31/2022. Last DEXA 09/2022 found osteopenia. Currently on Zometa q six months.             Orthopedic    Osteopenia after menopause     Noted on DEXA in 9/2022. Currently receiving Zometa every 6 months.           Plan:     Malignant neoplasm of lower-outer quadrant of left breast of female, estrogen receptor positive    History of colon cancer, stage I    Osteopenia after menopause    Labs reviewed.   Continue Arimidex and calcium + vitamin D supplementation daily.   Continue Zometa every 6 months; due 7/2023.   Surveillance MMG due 5/2023; scheduled for 5/9/2023.  Follow up in  3-4 months  with  MMG results, CBC, and Comprehensive Metabolic Panel prior to Zometa.  Continue surveillance for colon cancer.     Route Chart for Scheduling    Med Onc Chart Routing      Follow up with physician    Follow up with JEFF 4 months.   Infusion scheduling note    Injection scheduling note 4 months for Zometa   Labs CBC and CMP   Lab interval:  4 months   Imaging Mammogram   already scheduled   Pharmacy appointment No pharmacy appointment needed       Other referrals No additional referrals needed         I will review assessment/plan with collaborating physician.    Plan for   History and Physical Exam: q3-6 months years 1-3, q6 months years 4-5  Mammogram: Yearly, starting 6 months post completion of radiation:  Around May 2023  DEXA scan: q2 years       ELVIRA Garay

## 2023-03-06 NOTE — ASSESSMENT & PLAN NOTE
Stage IA, pT2, pN0, Mx ER/AZ positive, HER2 IHC equivocal, FISH negative, left lower outer breast invasive ductal carcinoma grade 2, Oncotype DX RS 8. Completed XRT 10/10/2022. Initiated Arimidex on 10/31/2022. Last DEXA 09/2022 found osteopenia. Currently on Zometa q six months.

## 2023-03-29 DIAGNOSIS — E11.9 TYPE 2 DIABETES MELLITUS WITHOUT COMPLICATION: ICD-10-CM

## 2023-04-06 ENCOUNTER — OFFICE VISIT (OUTPATIENT)
Dept: RHEUMATOLOGY | Facility: CLINIC | Age: 68
End: 2023-04-06
Payer: MEDICARE

## 2023-04-06 VITALS
HEART RATE: 62 BPM | HEIGHT: 66 IN | WEIGHT: 177.69 LBS | DIASTOLIC BLOOD PRESSURE: 78 MMHG | SYSTOLIC BLOOD PRESSURE: 146 MMHG | BODY MASS INDEX: 28.56 KG/M2

## 2023-04-06 DIAGNOSIS — R94.2 ABNORMAL DIFFUSION CAPACITY DETERMINED BY PULMONARY FUNCTION TEST: ICD-10-CM

## 2023-04-06 DIAGNOSIS — M17.0 PRIMARY OSTEOARTHRITIS OF BOTH KNEES: ICD-10-CM

## 2023-04-06 DIAGNOSIS — M85.89 OSTEOPENIA OF MULTIPLE SITES: ICD-10-CM

## 2023-04-06 DIAGNOSIS — M15.9 PRIMARY OSTEOARTHRITIS INVOLVING MULTIPLE JOINTS: ICD-10-CM

## 2023-04-06 DIAGNOSIS — M51.36 DDD (DEGENERATIVE DISC DISEASE), LUMBAR: ICD-10-CM

## 2023-04-06 DIAGNOSIS — D86.1 SARCOIDOSIS OF LYMPH NODES: Primary | ICD-10-CM

## 2023-04-06 DIAGNOSIS — Z71.89 COUNSELING ON HEALTH PROMOTION AND DISEASE PREVENTION: ICD-10-CM

## 2023-04-06 PROCEDURE — 99999 PR PBB SHADOW E&M-EST. PATIENT-LVL III: ICD-10-PCS | Mod: PBBFAC,,, | Performed by: INTERNAL MEDICINE

## 2023-04-06 PROCEDURE — 99999 PR PBB SHADOW E&M-EST. PATIENT-LVL III: CPT | Mod: PBBFAC,,, | Performed by: INTERNAL MEDICINE

## 2023-04-06 PROCEDURE — 3008F PR BODY MASS INDEX (BMI) DOCUMENTED: ICD-10-PCS | Mod: CPTII,S$GLB,, | Performed by: INTERNAL MEDICINE

## 2023-04-06 PROCEDURE — 4010F PR ACE/ARB THEARPY RXD/TAKEN: ICD-10-PCS | Mod: CPTII,S$GLB,, | Performed by: INTERNAL MEDICINE

## 2023-04-06 PROCEDURE — 99214 PR OFFICE/OUTPT VISIT, EST, LEVL IV, 30-39 MIN: ICD-10-PCS | Mod: S$GLB,,, | Performed by: INTERNAL MEDICINE

## 2023-04-06 PROCEDURE — 3078F DIAST BP <80 MM HG: CPT | Mod: CPTII,S$GLB,, | Performed by: INTERNAL MEDICINE

## 2023-04-06 PROCEDURE — 99214 OFFICE O/P EST MOD 30 MIN: CPT | Mod: S$GLB,,, | Performed by: INTERNAL MEDICINE

## 2023-04-06 PROCEDURE — 4010F ACE/ARB THERAPY RXD/TAKEN: CPT | Mod: CPTII,S$GLB,, | Performed by: INTERNAL MEDICINE

## 2023-04-06 PROCEDURE — 3077F PR MOST RECENT SYSTOLIC BLOOD PRESSURE >= 140 MM HG: ICD-10-PCS | Mod: CPTII,S$GLB,, | Performed by: INTERNAL MEDICINE

## 2023-04-06 PROCEDURE — 3008F BODY MASS INDEX DOCD: CPT | Mod: CPTII,S$GLB,, | Performed by: INTERNAL MEDICINE

## 2023-04-06 PROCEDURE — 3077F SYST BP >= 140 MM HG: CPT | Mod: CPTII,S$GLB,, | Performed by: INTERNAL MEDICINE

## 2023-04-06 PROCEDURE — 3078F PR MOST RECENT DIASTOLIC BLOOD PRESSURE < 80 MM HG: ICD-10-PCS | Mod: CPTII,S$GLB,, | Performed by: INTERNAL MEDICINE

## 2023-04-06 RX ORDER — ZOLEDRONIC ACID 0.04 MG/ML
INJECTION, SOLUTION INTRAVENOUS
COMMUNITY
Start: 2023-01-04 | End: 2023-09-28

## 2023-04-06 NOTE — PROGRESS NOTES
RHEUMATOLOGY OUTPATIENT CLINIC NOTE    2023    Attending Rheumatologist: Leon Suh  Primary Care Provider/Physician Requesting Consultation: Chiquita Talley MD   Chief Complaint/Reason For Consultation:  Osteoarthritis      Subjective:     Anne Farmer is a 67 y.o. Black or  female with Sarcoidosis for f/u    Main concern of refractory knee pain w/ mechanical pattern.    Review of Systems   Constitutional:  Negative for fever.   Eyes:  Negative for blurred vision, photophobia and pain.   Respiratory:  Negative for shortness of breath.    Cardiovascular:  Negative for palpitations.   Gastrointestinal:  Negative for blood in stool.   Genitourinary:  Negative for hematuria.   Musculoskeletal:  Positive for joint pain.   Skin:  Negative for rash.   Neurological:  Negative for focal weakness, weakness and headaches.     Chronic comorbid conditions affecting medical decision making today:  Past Medical History:   Diagnosis Date    Allergy     Arthritis     Cancer 2016    colon    CHF (congestive heart failure)     Colon cancer 2004    Colon cancer screening 2015    Diabetes mellitus type 2 in nonobese 3/9/2016    borderline    Diverticulosis     DM (diabetes mellitus) 2016    BS didn't check 2019    DM (diabetes mellitus) 2016    BS didn't check 2020    Hyperlipidemia 10/25/2016    Hypertension     Insomnia     Malignant neoplasm of colon 2021    Malignant neoplasm of lower-outer quadrant of left breast of female, estrogen receptor positive 2022     Past Surgical History:   Procedure Laterality Date     SECTION      COLON SURGERY      COLONOSCOPY N/A 2015    Procedure: COLONOSCOPY;  Surgeon: Adela Sam MD;  Location: [facility];  Service: Endoscopy;  Laterality: N/A;    COLONOSCOPY N/A 2017    Procedure: COLONOSCOPY;  Surgeon: Chintan Flores MD;  Location: [facility];  Service: Endoscopy;  Laterality: N/A;    COLONOSCOPY N/A  6/30/2020    Procedure: COLONOSCOPY;  Surgeon: Grisel Atkins MD;  Location: [facility] ENDO;  Service: Endoscopy;  Laterality: N/A;    SENTINEL LYMPH NODE BIOPSY Left 7/5/2022    Procedure: BIOPSY, LYMPH NODE, SENTINEL;  Surgeon: Arvin Hernandez MD;  Location: [facility] OR;  Service: General;  Laterality: Left;     Family History   Problem Relation Age of Onset    Hypertension Mother     Diabetes Mother     Hypertension Father     Hypertension Sister     Hypertension Brother     Hypertension Sister     Hypertension Sister     Hypertension Sister     Hypertension Brother     Hypertension Brother      Social History     Tobacco Use   Smoking Status Never   Smokeless Tobacco Never       Current Outpatient Medications:     albuterol (PROVENTIL/VENTOLIN HFA) 90 mcg/actuation inhaler, Inhale 1-2 puffs into the lungs every 6 (six) hours as needed for Wheezing or Shortness of Breath., Disp: 18 g, Rfl: 3    amLODIPine (NORVASC) 10 MG tablet, Take 1 tablet (10 mg total) by mouth once daily., Disp: 90 tablet, Rfl: 4    anastrozole (ARIMIDEX) 1 mg Tab, Take 1 tablet (1 mg total) by mouth once daily., Disp: 90 tablet, Rfl: 1    cyclobenzaprine (FLEXERIL) 10 MG tablet, TAKE 1 TABLET BY MOUTH THREE TIMES A DAY AS NEEDED, Disp: 30 tablet, Rfl: 0    fluticasone propionate (FLONASE) 50 mcg/actuation nasal spray, 2 sprays (100 mcg total) by Each Nostril route once daily., Disp: 48 mL, Rfl: 3    glycopyrrolate-formoteroL (BEVESPI AEROSPHERE) 9-4.8 mcg HFAA, Inhale 2 puffs into the lungs 2 (two) times daily. Controller, Disp: 10.9 g, Rfl: 11    hydroCHLOROthiazide (HYDRODIURIL) 25 MG tablet, TAKE 1 TABLET BY MOUTH EVERY DAY, Disp: 90 tablet, Rfl: 3    HYDROcodone-acetaminophen (NORCO)  mg per tablet, Take 1 tablet by mouth every 6 (six) hours as needed for Pain., Disp: 20 tablet, Rfl: 0    hydrOXYchloroQUINE (PLAQUENIL) 200 mg tablet, TAKE 1 TABLET BY MOUTH TWICE A DAY, Disp: 60 tablet, Rfl: 3    levocetirizine (XYZAL) 5 MG tablet,  Take 1 tablet (5 mg total) by mouth every evening., Disp: 90 tablet, Rfl: 2    losartan (COZAAR) 100 MG tablet, Take 1 tablet (100 mg total) by mouth once daily., Disp: 90 tablet, Rfl: 2    multivitamin (ONE DAILY MULTIVITAMIN) per tablet, Take 1 tablet by mouth once daily., Disp: , Rfl:     omeprazole (PRILOSEC) 20 MG capsule, Take 1 capsule (20 mg total) by mouth once daily., Disp: 90 capsule, Rfl: 3    pravastatin (PRAVACHOL) 20 MG tablet, TAKE 1 TABLET BY MOUTH EVERY DAY, Disp: 90 tablet, Rfl: 2    traZODone (DESYREL) 50 MG tablet, TAKE 1 TABLET BY MOUTH EVERY DAY AT NIGHT, Disp: 90 tablet, Rfl: 2    zoledronic 4 mg/100 mL PgBk infusion, , Disp: , Rfl:      Objective:     Vitals:    04/06/23 1326   BP: (!) 146/78   Pulse: 62     Physical Exam   Constitutional: She appears obese.   Eyes: Conjunctivae are normal.   Pulmonary/Chest: Effort normal. No respiratory distress.   Musculoskeletal:         General: No swelling or tenderness. Normal range of motion.   Neurological: She displays no weakness.   Skin: No rash noted.     Reviewed available old and all outside pertinent medical records available.    All lab results personally reviewed and interpreted by me.       ASSESSMENT      Encounter Diagnoses   Name Primary?    Primary osteoarthritis of both knees     Sarcoidosis of lymph nodes Yes    Primary osteoarthritis involving multiple joints     Counseling on health promotion and disease prevention     DDD (degenerative disc disease), lumbar     Abnormal diffusion capacity determined by pulmonary function test     Osteopenia of multiple sites       PLAN     Intermittent knee pain from OA.  No evidence of sarcoidosis relapse.  Exam w/o reproducible weakness or active synovitis.  Labs w/o concerning cytopenias.  Imaging w/o marginal erosive changes.  Sarcoid serologic markers WNR.  REceived IV bisphosphonate per hem/onc of opnia and AI therapy for breast Ca.  Topical therapy w/ voltaren gel if recurrent knee pain.   Referral to orthopedics provided.  Labs few days close to f/u visit.  C/w adequate ca/vit D dietary intake.  Caution advised w/ vit D supp. In context of hx of sarcoidosis.      Leon Suh M.D.

## 2023-04-13 DIAGNOSIS — I43 CARDIOMYOPATHY DUE TO HYPERTENSION, WITHOUT HEART FAILURE: Primary | ICD-10-CM

## 2023-04-13 DIAGNOSIS — I11.9 CARDIOMYOPATHY DUE TO HYPERTENSION, WITHOUT HEART FAILURE: Primary | ICD-10-CM

## 2023-04-13 RX ORDER — HYDROCHLOROTHIAZIDE 25 MG/1
TABLET ORAL
Qty: 90 TABLET | Refills: 0 | Status: SHIPPED | OUTPATIENT
Start: 2023-04-13 | End: 2023-08-29

## 2023-05-04 ENCOUNTER — HOSPITAL ENCOUNTER (EMERGENCY)
Facility: HOSPITAL | Age: 68
Discharge: HOME | End: 2023-05-04
Attending: EMERGENCY MEDICINE
Payer: MEDICARE

## 2023-05-04 VITALS
OXYGEN SATURATION: 98 % | SYSTOLIC BLOOD PRESSURE: 176 MMHG | HEART RATE: 80 BPM | RESPIRATION RATE: 20 BRPM | TEMPERATURE: 99 F | DIASTOLIC BLOOD PRESSURE: 76 MMHG | WEIGHT: 178 LBS | BODY MASS INDEX: 28.73 KG/M2

## 2023-05-04 DIAGNOSIS — M79.605 LEFT LEG PAIN: ICD-10-CM

## 2023-05-04 DIAGNOSIS — M25.569 KNEE PAIN: ICD-10-CM

## 2023-05-04 PROCEDURE — 99284 EMERGENCY DEPT VISIT MOD MDM: CPT | Mod: 25

## 2023-05-04 RX ORDER — HYDROCODONE BITARTRATE AND ACETAMINOPHEN 5; 325 MG/1; MG/1
1 TABLET ORAL EVERY 6 HOURS PRN
Qty: 12 TABLET | Refills: 0 | Status: SHIPPED | OUTPATIENT
Start: 2023-05-04

## 2023-05-04 NOTE — ED PROVIDER NOTES
HISTORY     Chief Complaint   Patient presents with    Knee Pain     L knee pain onset a few days ago. Pain to front and sides of knee. Denies injury.     Review of patient's allergies indicates:  No Known Allergies     HPI   The history is provided by the patient. No  was used.   Leg Pain   There was no injury mechanism. The incident occurred several days ago. Pain location: Left knee and left calf. The pain is at a severity of 4/10. The pain has been Constant since onset. Pertinent negatives include no numbness, no inability to bear weight, no loss of motion, no muscle weakness, no loss of sensation and no tingling. She reports no foreign bodies present. The symptoms are aggravated by activity, bearing weight and palpation. She has tried nothing for the symptoms. The treatment provided no relief.      PCP: Chiquita Talley MD     Past Medical History:  Past Medical History:   Diagnosis Date    Allergy     Arthritis     Cancer 2016    colon    CHF (congestive heart failure)     Colon cancer 2004    Colon cancer screening 2015    Diabetes mellitus type 2 in nonobese 3/9/2016    borderline    Diverticulosis     DM (diabetes mellitus) 2016    BS didn't check 2019    DM (diabetes mellitus) 2016    BS didn't check 2020    Hyperlipidemia 10/25/2016    Hypertension     Insomnia     Malignant neoplasm of colon 2021    Malignant neoplasm of lower-outer quadrant of left breast of female, estrogen receptor positive 2022        Past Surgical History:  Past Surgical History:   Procedure Laterality Date     SECTION      COLON SURGERY      COLONOSCOPY N/A 2015    Procedure: COLONOSCOPY;  Surgeon: Adela Sam MD;  Location: [facility] ENDO;  Service: Endoscopy;  Laterality: N/A;    COLONOSCOPY N/A 2017    Procedure: COLONOSCOPY;  Surgeon: Chintan Flores MD;  Location: [facility] ENDO;  Service: Endoscopy;  Laterality: N/A;    COLONOSCOPY N/A 2020     Procedure: COLONOSCOPY;  Surgeon: Grsiel Atkins MD;  Location: [facility] ENDO;  Service: Endoscopy;  Laterality: N/A;    SENTINEL LYMPH NODE BIOPSY Left 7/5/2022    Procedure: BIOPSY, LYMPH NODE, SENTINEL;  Surgeon: Arvin Hernandez MD;  Location: [facility] OR;  Service: General;  Laterality: Left;        Family History:  Family History   Problem Relation Age of Onset    Hypertension Mother     Diabetes Mother     Hypertension Father     Hypertension Sister     Hypertension Brother     Hypertension Sister     Hypertension Sister     Hypertension Sister     Hypertension Brother     Hypertension Brother         Social History:  Social History     Tobacco Use    Smoking status: Never    Smokeless tobacco: Never   Substance and Sexual Activity    Alcohol use: Yes     Alcohol/week: 0.0 standard drinks     Comment: weekends.       Drug use: No    Sexual activity: Not on file         ROS   Review of Systems   Constitutional:  Negative for fever.   HENT:  Negative for sore throat.    Respiratory:  Negative for shortness of breath.    Cardiovascular:  Negative for chest pain.   Gastrointestinal:  Negative for abdominal pain, nausea and vomiting.   Genitourinary:  Negative for dysuria.   Musculoskeletal:  Positive for arthralgias. Negative for back pain.   Skin:  Negative for rash.   Neurological:  Negative for tingling, weakness and numbness.   Hematological:  Does not bruise/bleed easily.     PHYSICAL EXAM     Initial Vitals [05/04/23 1255]   BP Pulse Resp Temp SpO2   (!) 178/77 79 20 98.5 °F (36.9 °C) 97 %      MAP       --           Physical Exam    Nursing note and vitals reviewed.  Constitutional: She appears well-developed and well-nourished. She is not diaphoretic. No distress.   HENT:   Head: Normocephalic and atraumatic.   Eyes: Right eye exhibits no discharge. Left eye exhibits no discharge.   Neck: Neck supple.   Normal range of motion.  Cardiovascular:  Normal rate.           Pulmonary/Chest: No respiratory distress.    Abdominal: She exhibits no distension.   Musculoskeletal:         General: Normal range of motion.      Cervical back: Normal range of motion and neck supple.      Comments: Left knee without any obvious deformity.  Distal pulses 2+.  NVI.  Patient is able to ambulate without difficulty.  Patient has arrived with a knee wrap.     Neurological: She is alert and oriented to person, place, and time. She has normal strength.   Skin: Skin is warm and dry.   Psychiatric: She has a normal mood and affect. Her behavior is normal. Thought content normal.        ED COURSE   Procedures  ED ONGOING VITALS:  Vitals:    05/04/23 1255 05/04/23 1430   BP: (!) 178/77 (!) 176/76   Pulse: 79 80   Resp: 20 20   Temp: 98.5 °F (36.9 °C)    TempSrc: Oral    SpO2: 97% 98%   Weight: 80.7 kg (178 lb 0.3 oz)          ABNORMAL LAB VALUES:  Labs Reviewed - No data to display      ALL LAB VALUES:  none      RADIOLOGY STUDIES:  Imaging Results              X-Ray Knee Complete 4 or More Views Left (Final result)  Result time 05/04/23 14:07:56      Final result by Marc Sen MD (05/04/23 14:07:56)                   Impression:      Mild degenerative changes of the left knee.      Electronically signed by: Marc Sen  Date:    05/04/2023  Time:    14:07               Narrative:    EXAMINATION:  XR KNEE COMP 4 OR MORE VIEWS LEFT    CLINICAL HISTORY:  Pain in unspecified knee    TECHNIQUE:  Four views of the left knee.    COMPARISON:  None    FINDINGS:  No acute fracture or dislocation.  Mild medial and lateral compartment joint space narrowing.  Mild tricompartmental osteophytosis.  No joint effusion.                                       US Lower Extremity Veins Left (Final result)  Result time 05/04/23 13:47:20      Final result by Alfonso Young MD (05/04/23 13:47:20)                   Impression:      No evidence of deep venous thrombosis in the left lower extremity.      Electronically signed by: Alfonso  Hannah  Date:    05/04/2023  Time:    13:47               Narrative:    EXAMINATION:  US LOWER EXTREMITY VEINS LEFT    CLINICAL HISTORY:  Pain in left leg    TECHNIQUE:  Duplex and color flow Doppler evaluation and graded compression of the left lower extremity veins was performed.    COMPARISON:  Bilateral lower extremity venous ultrasound 01/21/2015.    FINDINGS:  Left thigh veins: The common femoral, femoral, popliteal, upper greater saphenous, and deep femoral veins are patent and free of thrombus. The veins are normally compressible and have normal phasic flow and augmentation response.    Left calf veins: The visualized calf veins are patent.    Contralateral CFV: The contralateral (right) common femoral vein is patent and free of thrombus.    Miscellaneous: None                                                The above vital signs and test results have been reviewed by the emergency provider.     ED Medications:  Medications - No data to display    Discharge Medication List as of 5/4/2023  2:26 PM        START taking these medications    Details   HYDROcodone-acetaminophen (NORCO) 5-325 mg per tablet Take 1 tablet by mouth every 6 (six) hours as needed for Pain., Starting Thu 5/4/2023, Print           Discharge Medications:  Discharge Medication List as of 5/4/2023  2:26 PM        START taking these medications    Details   HYDROcodone-acetaminophen (NORCO) 5-325 mg per tablet Take 1 tablet by mouth every 6 (six) hours as needed for Pain., Starting Thu 5/4/2023, Print             Follow-up Information       [facility].    Why: As needed  Contact information:  [id number] [place] [address] Dr Chris [address] [place] [id number]  [phone]               [facility] - Emergency Dept..    Specialty: Emergency Medicine  Why: If symptoms worsen  Contact information:  [address] [address] [address]  [facility] [address] [phone]                          I discussed with patient and/or family/caretaker  that evaluation in the ED does not suggest any emergent or life threatening medical conditions requiring immediate intervention beyond what was provided in the ED, and I believe patient is safe for discharge. Regardless, an unremarkable evaluation in the ED does not preclude the development or presence of a serious or life threatening condition. As such, patient was instructed to return immediately for any worsening or change in current symptoms.    Medical Decision Making:   Initial Assessment:   Patient presents to the ER for left knee pain.  Differential Diagnosis:   The fracture, dislocation, ligament injury, DVT  Clinical Tests:   Radiological Study: Ordered and Reviewed  ED Management:  Patient is to follow up with her PCP and return to the ER for any worsening or concerns.             CLINICAL IMPRESSION       ICD-10-CM ICD-9-CM   1. Knee pain  M25.569 719.46   2. Left leg pain  M79.605 729.5       Disposition:   Disposition: Discharged  Condition: Stable       Devonte Hernandez NP  05/04/23 9024

## 2023-05-09 ENCOUNTER — HOSPITAL ENCOUNTER (OUTPATIENT)
Dept: RADIOLOGY | Facility: HOSPITAL | Age: 68
Discharge: HOME | End: 2023-05-09
Attending: RADIOLOGY
Payer: MEDICARE

## 2023-05-09 VITALS — BODY MASS INDEX: 28.34 KG/M2 | WEIGHT: 176.38 LBS | HEIGHT: 66 IN

## 2023-05-09 DIAGNOSIS — Z17.0 MALIGNANT NEOPLASM OF UPPER-OUTER QUADRANT OF LEFT BREAST IN FEMALE, ESTROGEN RECEPTOR POSITIVE: ICD-10-CM

## 2023-05-09 DIAGNOSIS — C50.412 MALIGNANT NEOPLASM OF UPPER-OUTER QUADRANT OF LEFT BREAST IN FEMALE, ESTROGEN RECEPTOR POSITIVE: ICD-10-CM

## 2023-05-09 PROCEDURE — 77066 MAMMO DIGITAL DIAGNOSTIC BILAT WITH TOMO: ICD-10-PCS | Mod: 26,,, | Performed by: RADIOLOGY

## 2023-05-09 PROCEDURE — 77066 DX MAMMO INCL CAD BI: CPT | Mod: 26,,, | Performed by: RADIOLOGY

## 2023-05-09 PROCEDURE — 77062 BREAST TOMOSYNTHESIS BI: CPT | Mod: TC

## 2023-05-09 PROCEDURE — 77062 BREAST TOMOSYNTHESIS BI: CPT | Mod: 26,,, | Performed by: RADIOLOGY

## 2023-05-09 PROCEDURE — 77062 MAMMO DIGITAL DIAGNOSTIC BILAT WITH TOMO: ICD-10-PCS | Mod: 26,,, | Performed by: RADIOLOGY

## 2023-07-03 NOTE — ASSESSMENT & PLAN NOTE
Stage IA, pT2, pN0, Mx ER/GA positive, HER2 IHC equivocal, FISH negative, left lower outer breast invasive ductal carcinoma grade 2, Oncotype DX RS 8. Completed XRT 10/10/2022. Initiated Arimidex on 10/31/2022. Last DEXA 09/2022 found osteopenia. Currently on Zometa q six months.

## 2023-07-03 NOTE — PROGRESS NOTES
Subjective:       Patient ID: Anne Farmer is a 67 y.o. female.    Chief Complaint:   1. Malignant neoplasm of lower-outer quadrant of left breast of female, estrogen receptor positive  Stage IA (pT2, pN0, cM0, G2, ER+, CO+, HER2-, Oncotype DX score: 8)      2. History of colon cancer, stage I        3. Osteopenia after menopause          Current Treatment:  Arimidex 1mg po daily    Surveillance      OP ZOLEDRONIC ACID (ZOMETA) Q4W    Treatment History:  XRT completed 10/10/2022    S/p resection    HPI: This is a 67 year old female with medical history significant for type 2 DM, hyperlipidemia, CHF, insomnia, HTN, diverticulosis, allergies, and arthritis who is seen in Hem/Onc for left breast cancer. After routine screening mammogram in 4/2022, an abnormality was found in left upper outer quadrant breast. She denies any personal history of prior breast biopsy.  No family history of malignancy including breast/ovarian that she is aware of.  She denies any breast concerns including no pain, skin change/distortion or nipple discharge.  She has never used hormone replacement therapy.     Follow-up diagnostic mammogram on 5/2/2022 showed 2 areas of abnormality in close proximity measuring 6mm and 8mm area spanning 1.3 cm per report.  No lymphadenopathy appreciated. On 5/12/2022, biopsy showed invasive ductal carcinoma with mucinous features grade 2, no lymphovascular invasion, ER 95%, CO 40-45%, HER2 2+ IHC, fish negative.     She has history of colon cancer stage I diagnosed in 2016 status post resection in surveillance. Most recent colonoscopy in June 2020 revealed diverticulosis, hemorrhoids, and anastomosis and recommended 5 year follow-up per report.    Her primary Hematologist/Oncologist is Dr. Murray.    Interval History: Patient presents for follow up on Arimidex & Zometa; she is scheduled to receive C2D1 of Zometa today. She presents alone and has no complaints. She denies fatigue, weakness, headaches,  lightheadedness, dizziness, SOB, and being cold. She reports adherence to Arimidex and Ca + D with no hot flashes. She does report joint pain for which she takes Tylenol arthritis. Also advised her she can use Voltaren gel. CBC reveals low RBC and Hct; Hgb WNL. Discussed potential causes. Will check iron studies on blood in lab. Ca 10.5; ok to proceed with Zometa today. She denies blood in stool, black tarry stools, and change in bowel habits. Surveillance colonoscopy is due in 2 years.     Reviewed labs with patient:   CBC:   Recent Labs   Lab 07/05/23  0936   WBC 5.64   RBC 3.94 L   Hemoglobin 12.0   Hematocrit 35.8 L   Platelets 282   MCV 91   MCH 30.5   MCHC 33.5     CMP:  Recent Labs   Lab 07/05/23  0936   Glucose 127 H   Calcium 10.5   Albumin 4.0   Total Protein 8.1   Sodium 137   Potassium 3.7   CO2 28   Chloride 99   BUN 20   Creatinine 0.8   Alkaline Phosphatase 82   ALT 22   AST 28   Total Bilirubin 0.4   Mammo Digital Diagnostic Bilat with Frankie  Narrative: Result:   Mammo Digital Diagnostic Bilat with Frankie     History:  Patient is 67 y.o. and is seen for diagnostic imaging.    Films Compared:  Prior images (if available) were compared.     Findings:  This procedure was performed using tomosynthesis. Computer-aided detection   was utilized in the interpretation of this examination.  The breasts are heterogeneously dense, which may obscure small masses.     Left  There are post-surgical findings from a previous lumpectomy with radiation   seen in the lower outer quadrant of the left breast. There has been no   interval development of a suspicious mass, microcalcification, or   architectural distortion.     There is no evidence of suspicious masses, calcifications, or other   abnormal findings in the left breast.    Right  There is no evidence of suspicious masses, calcifications, or other   abnormal findings in the right breast.  Impression: Bilateral  There is no mammographic evidence of  malignancy.    BI-RADS Category:   Overall: 2 - Benign       Recommendation:  Diagnostic Mammogram in 1 year is recommended.    Social History     Socioeconomic History    Marital status: Single   Occupational History     Employer: [employer]   Tobacco Use    Smoking status: Never    Smokeless tobacco: Never   Substance and Sexual Activity    Alcohol use: Yes     Alcohol/week: 0.0 standard drinks     Comment: weekends.       Drug use: No     Past Medical History:   Diagnosis Date    Allergy     Arthritis     Cancer 2016    colon    CHF (congestive heart failure)     Colon cancer 2004    Colon cancer screening 2015    Diabetes mellitus type 2 in nonobese 3/9/2016    borderline    Diverticulosis     DM (diabetes mellitus) 2016    BS didn't check 2019    DM (diabetes mellitus) 2016    BS didn't check 2020    Hyperlipidemia 10/25/2016    Hypertension     Insomnia     Malignant neoplasm of colon 2021    Malignant neoplasm of lower-outer quadrant of left breast of female, estrogen receptor positive 2022     Family History   Problem Relation Age of Onset    Hypertension Mother     Diabetes Mother     Hypertension Father     Hypertension Sister     Hypertension Brother     Hypertension Sister     Hypertension Sister     Hypertension Sister     Hypertension Brother     Hypertension Brother      Past Surgical History:   Procedure Laterality Date    BREAST BIOPSY Left     BREAST LUMPECTOMY Left      SECTION      COLON SURGERY      COLONOSCOPY N/A 2015    Procedure: COLONOSCOPY;  Surgeon: Adela Sam MD;  Location: [facility];  Service: Endoscopy;  Laterality: N/A;    COLONOSCOPY N/A 2017    Procedure: COLONOSCOPY;  Surgeon: Chintan Flores MD;  Location: [facility];  Service: Endoscopy;  Laterality: N/A;    COLONOSCOPY N/A 2020    Procedure: COLONOSCOPY;  Surgeon: Grisel Atkins MD;  Location: [facility];  Service: Endoscopy;  Laterality: N/A;     SENTINEL LYMPH NODE BIOPSY Left 07/05/2022    Procedure: BIOPSY, LYMPH NODE, SENTINEL;  Surgeon: Arvin Hernandez MD;  Location: [facility];  Service: General;  Laterality: Left;     Review of Systems   Constitutional:  Negative for appetite change and fatigue.   HENT:  Negative for mouth sores, rhinorrhea and sore throat.    Eyes: Negative.    Respiratory: Negative.     Cardiovascular: Negative.    Gastrointestinal:  Negative for anal bleeding, blood in stool, change in bowel habit, constipation, diarrhea, nausea, vomiting, fecal incontinence and change in bowel habit.   Endocrine: Negative.    Genitourinary: Negative.  Negative for hot flashes.   Musculoskeletal:  Positive for arthralgias.   Integumentary:  Negative.   Allergic/Immunologic: Negative.    Neurological:  Negative for weakness and numbness.   Hematological: Negative.    Psychiatric/Behavioral: Negative.         Medication List with Changes/Refills   Current Medications    ALBUTEROL (PROVENTIL/VENTOLIN HFA) 90 MCG/ACTUATION INHALER    Inhale 1-2 puffs into the lungs every 6 (six) hours as needed for Wheezing or Shortness of Breath.    AMLODIPINE (NORVASC) 10 MG TABLET    Take 1 tablet (10 mg total) by mouth once daily.    ANASTROZOLE (ARIMIDEX) 1 MG TAB    Take 1 tablet (1 mg total) by mouth once daily.    CYCLOBENZAPRINE (FLEXERIL) 10 MG TABLET    TAKE 1 TABLET BY MOUTH THREE TIMES A DAY AS NEEDED    FLUTICASONE PROPIONATE (FLONASE) 50 MCG/ACTUATION NASAL SPRAY    2 sprays (100 mcg total) by Each Nostril route once daily.    GLYCOPYRROLATE-FORMOTEROL (BEVESPI AEROSPHERE) 9-4.8 MCG HFAA    Inhale 2 puffs into the lungs 2 (two) times daily. Controller    HYDROCHLOROTHIAZIDE (HYDRODIURIL) 25 MG TABLET    TAKE 1 TABLET BY MOUTH EVERY DAY    HYDROCODONE-ACETAMINOPHEN (NORCO)  MG PER TABLET    Take 1 tablet by mouth every 6 (six) hours as needed for Pain.    HYDROCODONE-ACETAMINOPHEN (NORCO) 5-325 MG PER TABLET    Take 1 tablet by mouth every 6 (six)  hours as needed for Pain.    HYDROXYCHLOROQUINE (PLAQUENIL) 200 MG TABLET    TAKE 1 TABLET BY MOUTH TWICE A DAY    LEVOCETIRIZINE (XYZAL) 5 MG TABLET    Take 1 tablet (5 mg total) by mouth every evening.    LOSARTAN (COZAAR) 100 MG TABLET    TAKE 1 TABLET BY MOUTH EVERY DAY    MULTIVITAMIN (ONE DAILY MULTIVITAMIN) PER TABLET    Take 1 tablet by mouth once daily.    OMEPRAZOLE (PRILOSEC) 20 MG CAPSULE    Take 1 capsule (20 mg total) by mouth once daily.    PRAVASTATIN (PRAVACHOL) 20 MG TABLET    TAKE 1 TABLET BY MOUTH EVERY DAY    TRAZODONE (DESYREL) 50 MG TABLET    TAKE 1 TABLET BY MOUTH EVERY DAY AT NIGHT    ZOLEDRONIC 4 MG/100 ML PGBK INFUSION         Objective:     Vitals:    07/05/23 0951   BP: (!) 164/73   Pulse: (!) 53   Temp: 97.4 °F (36.3 °C)     Physical Exam  Vitals reviewed.   Constitutional:       Appearance: Normal appearance.   HENT:      Head: Normocephalic.      Mouth/Throat:      Comments: Wearing mask    Eyes:      Extraocular Movements: Extraocular movements intact.      Pupils: Pupils are equal, round, and reactive to light.   Cardiovascular:      Rate and Rhythm: Normal rate and regular rhythm.      Heart sounds: Normal heart sounds.      Comments: Soft-blowing murmur  Pulmonary:      Effort: Pulmonary effort is normal.      Breath sounds: Normal breath sounds.   Abdominal:      General: Bowel sounds are normal.      Palpations: Abdomen is soft.      Comments: rounded     Genitourinary:     Comments: deferred    Musculoskeletal:         General: Normal range of motion.      Cervical back: Normal range of motion and neck supple.   Skin:     General: Skin is warm and dry.   Neurological:      Mental Status: She is alert and oriented to person, place, and time.   Psychiatric:         Behavior: Behavior normal.         Thought Content: Thought content normal.        (0) Fully active, able to carry on all predisease performance without restriction  Assessment:     Problem List Items Addressed This  Visit          Oncology    History of colon cancer, stage I (Chronic)     Diagnosed in 2016 with Stage I colon cancer s/p resection. Currently on surveillance.          Malignant neoplasm of lower-outer quadrant of left breast of female, estrogen receptor positive - Primary     Stage IA, pT2, pN0, Mx ER/KS positive, HER2 IHC equivocal, FISH negative, left lower outer breast invasive ductal carcinoma grade 2, Oncotype DX RS 8. Completed XRT 10/10/2022. Initiated Arimidex on 10/31/2022. Last DEXA 09/2022 found osteopenia. Currently on Zometa q six months.             Orthopedic    Osteopenia after menopause     Noted on DEXA in 9/2022. Currently receiving Zometa every 6 months.           Plan:     Malignant neoplasm of lower-outer quadrant of left breast of female, estrogen receptor positive    History of colon cancer, stage I    Osteopenia after menopause    Labs reviewed; RBC & Hct mildly low.   Check iron studies on blood in lab; will call patient with recommendations for treatment.   Continue Arimidex and calcium + vitamin D supplementation daily.   Ok to proceed with C2D1 of Zometa today; Ca++ 10.5.   Continue Zometa every 6 months; due next in 1/2024.   Follow up in  3 months  with  results of DEXA, CBC, and Comprehensive Metabolic Panel.  Surveillance MMG due 5/2024.  DEXA scan due 9/2023; ordered today.   Continue surveillance for colon cancer.   Surveillance colonoscopy due 6/2025.     Route Chart for Scheduling    Med Onc Chart Routing      Follow up with physician    Follow up with JEFF 3 months. [facility]   Infusion scheduling note    Injection scheduling note    Labs CBC and CMP   Scheduling:  Preferred lab:  Lab interval:  in 3 months at [facility]   Imaging None      Pharmacy appointment No pharmacy appointment needed      Other referrals     No additional referrals needed         I will review assessment/plan with collaborating physician.    Per NCCN Guidelines BINV-17:  H&P 1-4 times per year as clinically  appropriate for 5 years then annually  Periodic screening for changes in family history and genetic testing indications and referral to genetic counseling as indicated  Educate, monitor, and refer for lymphedema management  MMG every 12 months  Routine imaging of reconstructed breast is not indicated  For patients receiving anthracycline-based therapy, see NCCN Guidelines for Survivorship for echocardiogram recommendations  In the absence of clinical signs and symptoms suggestive of recurrent disease, there is no indication for lab or imaging studies for metastases screening      ELVIRA Garay

## 2023-07-05 ENCOUNTER — INFUSION (OUTPATIENT)
Dept: INFUSION THERAPY | Facility: HOSPITAL | Age: 68
End: 2023-07-05
Attending: INTERNAL MEDICINE
Payer: MEDICARE

## 2023-07-05 ENCOUNTER — OFFICE VISIT (OUTPATIENT)
Dept: HEMATOLOGY/ONCOLOGY | Facility: CLINIC | Age: 68
End: 2023-07-05
Payer: MEDICARE

## 2023-07-05 VITALS
DIASTOLIC BLOOD PRESSURE: 66 MMHG | RESPIRATION RATE: 18 BRPM | TEMPERATURE: 98 F | OXYGEN SATURATION: 98 % | HEART RATE: 58 BPM | SYSTOLIC BLOOD PRESSURE: 158 MMHG

## 2023-07-05 VITALS
TEMPERATURE: 97 F | DIASTOLIC BLOOD PRESSURE: 73 MMHG | SYSTOLIC BLOOD PRESSURE: 164 MMHG | WEIGHT: 176.38 LBS | HEIGHT: 66 IN | BODY MASS INDEX: 28.34 KG/M2 | HEART RATE: 53 BPM

## 2023-07-05 DIAGNOSIS — Z17.0 MALIGNANT NEOPLASM OF LOWER-OUTER QUADRANT OF LEFT BREAST OF FEMALE, ESTROGEN RECEPTOR POSITIVE: ICD-10-CM

## 2023-07-05 DIAGNOSIS — D53.9 NUTRITIONAL ANEMIA, UNSPECIFIED: ICD-10-CM

## 2023-07-05 DIAGNOSIS — Z17.0 MALIGNANT NEOPLASM OF LOWER-OUTER QUADRANT OF LEFT BREAST OF FEMALE, ESTROGEN RECEPTOR POSITIVE: Primary | ICD-10-CM

## 2023-07-05 DIAGNOSIS — Z78.0 OSTEOPENIA AFTER MENOPAUSE: ICD-10-CM

## 2023-07-05 DIAGNOSIS — C50.512 MALIGNANT NEOPLASM OF LOWER-OUTER QUADRANT OF LEFT BREAST OF FEMALE, ESTROGEN RECEPTOR POSITIVE: Primary | ICD-10-CM

## 2023-07-05 DIAGNOSIS — M85.80 OSTEOPENIA AFTER MENOPAUSE: ICD-10-CM

## 2023-07-05 DIAGNOSIS — Z85.038 HISTORY OF COLON CANCER, STAGE I: ICD-10-CM

## 2023-07-05 DIAGNOSIS — C50.512 MALIGNANT NEOPLASM OF LOWER-OUTER QUADRANT OF LEFT BREAST OF FEMALE, ESTROGEN RECEPTOR POSITIVE: ICD-10-CM

## 2023-07-05 DIAGNOSIS — M85.80 OSTEOPENIA AFTER MENOPAUSE: Primary | ICD-10-CM

## 2023-07-05 DIAGNOSIS — Z78.0 OSTEOPENIA AFTER MENOPAUSE: Primary | ICD-10-CM

## 2023-07-05 PROCEDURE — 1160F RVW MEDS BY RX/DR IN RCRD: CPT | Mod: CPTII,S$GLB,, | Performed by: NURSE PRACTITIONER

## 2023-07-05 PROCEDURE — 1159F MED LIST DOCD IN RCRD: CPT | Mod: CPTII,S$GLB,, | Performed by: NURSE PRACTITIONER

## 2023-07-05 PROCEDURE — 3008F BODY MASS INDEX DOCD: CPT | Mod: CPTII,S$GLB,, | Performed by: NURSE PRACTITIONER

## 2023-07-05 PROCEDURE — 1126F PR PAIN SEVERITY QUANTIFIED, NO PAIN PRESENT: ICD-10-PCS | Mod: CPTII,S$GLB,, | Performed by: NURSE PRACTITIONER

## 2023-07-05 PROCEDURE — 4010F PR ACE/ARB THEARPY RXD/TAKEN: ICD-10-PCS | Mod: CPTII,S$GLB,, | Performed by: NURSE PRACTITIONER

## 2023-07-05 PROCEDURE — 99215 OFFICE O/P EST HI 40 MIN: CPT | Mod: 25,S$GLB,, | Performed by: NURSE PRACTITIONER

## 2023-07-05 PROCEDURE — 3078F DIAST BP <80 MM HG: CPT | Mod: CPTII,S$GLB,, | Performed by: NURSE PRACTITIONER

## 2023-07-05 PROCEDURE — 1126F AMNT PAIN NOTED NONE PRSNT: CPT | Mod: CPTII,S$GLB,, | Performed by: NURSE PRACTITIONER

## 2023-07-05 PROCEDURE — 99999 PR PBB SHADOW E&M-EST. PATIENT-LVL IV: ICD-10-PCS | Mod: PBBFAC,,, | Performed by: NURSE PRACTITIONER

## 2023-07-05 PROCEDURE — 3078F PR MOST RECENT DIASTOLIC BLOOD PRESSURE < 80 MM HG: ICD-10-PCS | Mod: CPTII,S$GLB,, | Performed by: NURSE PRACTITIONER

## 2023-07-05 PROCEDURE — 1159F PR MEDICATION LIST DOCUMENTED IN MEDICAL RECORD: ICD-10-PCS | Mod: CPTII,S$GLB,, | Performed by: NURSE PRACTITIONER

## 2023-07-05 PROCEDURE — 1160F PR REVIEW ALL MEDS BY PRESCRIBER/CLIN PHARMACIST DOCUMENTED: ICD-10-PCS | Mod: CPTII,S$GLB,, | Performed by: NURSE PRACTITIONER

## 2023-07-05 PROCEDURE — 1101F PT FALLS ASSESS-DOCD LE1/YR: CPT | Mod: CPTII,S$GLB,, | Performed by: NURSE PRACTITIONER

## 2023-07-05 PROCEDURE — 3288F PR FALLS RISK ASSESSMENT DOCUMENTED: ICD-10-PCS | Mod: CPTII,S$GLB,, | Performed by: NURSE PRACTITIONER

## 2023-07-05 PROCEDURE — 99215 PR OFFICE/OUTPT VISIT, EST, LEVL V, 40-54 MIN: ICD-10-PCS | Mod: 25,S$GLB,, | Performed by: NURSE PRACTITIONER

## 2023-07-05 PROCEDURE — 99999 PR PBB SHADOW E&M-EST. PATIENT-LVL IV: CPT | Mod: PBBFAC,,, | Performed by: NURSE PRACTITIONER

## 2023-07-05 PROCEDURE — 96374 THER/PROPH/DIAG INJ IV PUSH: CPT

## 2023-07-05 PROCEDURE — 4010F ACE/ARB THERAPY RXD/TAKEN: CPT | Mod: CPTII,S$GLB,, | Performed by: NURSE PRACTITIONER

## 2023-07-05 PROCEDURE — 3288F FALL RISK ASSESSMENT DOCD: CPT | Mod: CPTII,S$GLB,, | Performed by: NURSE PRACTITIONER

## 2023-07-05 PROCEDURE — 3008F PR BODY MASS INDEX (BMI) DOCUMENTED: ICD-10-PCS | Mod: CPTII,S$GLB,, | Performed by: NURSE PRACTITIONER

## 2023-07-05 PROCEDURE — 63600175 PHARM REV CODE 636 W HCPCS: Performed by: NURSE PRACTITIONER

## 2023-07-05 PROCEDURE — 3077F PR MOST RECENT SYSTOLIC BLOOD PRESSURE >= 140 MM HG: ICD-10-PCS | Mod: CPTII,S$GLB,, | Performed by: NURSE PRACTITIONER

## 2023-07-05 PROCEDURE — 3077F SYST BP >= 140 MM HG: CPT | Mod: CPTII,S$GLB,, | Performed by: NURSE PRACTITIONER

## 2023-07-05 PROCEDURE — 1101F PR PT FALLS ASSESS DOC 0-1 FALLS W/OUT INJ PAST YR: ICD-10-PCS | Mod: CPTII,S$GLB,, | Performed by: NURSE PRACTITIONER

## 2023-07-05 RX ORDER — ZOLEDRONIC ACID 0.04 MG/ML
4 INJECTION, SOLUTION INTRAVENOUS
Status: COMPLETED | OUTPATIENT
Start: 2023-07-05 | End: 2023-07-05

## 2023-07-05 RX ORDER — HEPARIN 100 UNIT/ML
500 SYRINGE INTRAVENOUS
Status: CANCELLED | OUTPATIENT
Start: 2023-07-05

## 2023-07-05 RX ORDER — SODIUM CHLORIDE 0.9 % (FLUSH) 0.9 %
10 SYRINGE (ML) INJECTION
Status: CANCELLED | OUTPATIENT
Start: 2023-07-05

## 2023-07-05 RX ADMIN — ZOLEDRONIC ACID 4 MG: 0.04 INJECTION, SOLUTION INTRAVENOUS at 11:07

## 2023-07-05 NOTE — PLAN OF CARE
Problem: Adult Inpatient Plan of Care  Goal: Plan of Care Review  Outcome: Ongoing, Progressing  Flowsheets (Taken 7/5/2023 1130)  Plan of Care Reviewed With: patient  Goal: Patient-Specific Goal (Individualized)  Outcome: Ongoing, Progressing  Flowsheets (Taken 7/5/2023 1130)  Anxieties, Fears or Concerns: none verbalized  Individualized Care Needs: pillow, reclining position     Problem: Infection  Goal: Absence of Infection Signs and Symptoms  Outcome: Ongoing, Progressing  Intervention: Prevent or Manage Infection  Flowsheets (Taken 7/5/2023 1130)  Infection Management: aseptic technique maintained

## 2023-07-14 ENCOUNTER — HOSPITAL ENCOUNTER (EMERGENCY)
Facility: HOSPITAL | Age: 68
Discharge: HOME | End: 2023-07-14
Attending: EMERGENCY MEDICINE
Payer: MEDICARE

## 2023-07-14 VITALS
HEIGHT: 66 IN | HEART RATE: 60 BPM | DIASTOLIC BLOOD PRESSURE: 78 MMHG | OXYGEN SATURATION: 96 % | SYSTOLIC BLOOD PRESSURE: 152 MMHG | BODY MASS INDEX: 28.57 KG/M2 | WEIGHT: 177.81 LBS | TEMPERATURE: 98 F | RESPIRATION RATE: 16 BRPM

## 2023-07-14 DIAGNOSIS — S39.012A STRAIN OF LUMBAR REGION, INITIAL ENCOUNTER: Primary | ICD-10-CM

## 2023-07-14 DIAGNOSIS — M25.569 KNEE PAIN: ICD-10-CM

## 2023-07-14 PROCEDURE — 99284 EMERGENCY DEPT VISIT MOD MDM: CPT

## 2023-07-14 RX ORDER — TRAMADOL HYDROCHLORIDE 50 MG/1
50 TABLET ORAL EVERY 6 HOURS PRN
Qty: 12 TABLET | Refills: 0 | Status: SHIPPED | OUTPATIENT
Start: 2023-07-14
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
## 2023-08-29 DIAGNOSIS — I11.9 CARDIOMYOPATHY DUE TO HYPERTENSION, WITHOUT HEART FAILURE: ICD-10-CM

## 2023-08-29 DIAGNOSIS — I43 CARDIOMYOPATHY DUE TO HYPERTENSION, WITHOUT HEART FAILURE: ICD-10-CM

## 2023-08-29 RX ORDER — MELOXICAM 15 MG/1
15 TABLET ORAL DAILY PRN
COMMUNITY
Start: 2023-08-21

## 2023-08-29 RX ORDER — HYDROCHLOROTHIAZIDE 25 MG/1
TABLET ORAL
Qty: 7 TABLET | Refills: 0 | Status: SHIPPED | OUTPATIENT
Start: 2023-08-29

## 2023-08-29 NOTE — TELEPHONE ENCOUNTER
Care Due:                  Date            Visit Type   Department     Provider  --------------------------------------------------------------------------------                                EP -                              PRIMARY      [facility] INTERNAL  Chiquita FLORES  Last Visit: 09-      CARE ([facility])   MEDICINE       Mayank  Next Visit: None Scheduled  None         None Found                                                            Last  Test          Frequency    Reason                     Performed    Due Date  --------------------------------------------------------------------------------    Office Visit  12 months..  amLODIPine,                09- 09-                             hydroCHLOROthiazide,                             levocetirizine, losartan,                             omeprazole, pravastatin,                             traZODone................    Lipid Panel.  12 months..  pravastatin..............  09- 09-    [facility] Embedded Care Due Messages. Reference number: [id number].   8/29/2023 12:16:10 AM CDT

## 2023-09-08 DIAGNOSIS — J30.9 ALLERGIC RHINITIS, UNSPECIFIED SEASONALITY, UNSPECIFIED TRIGGER: ICD-10-CM

## 2023-09-08 RX ORDER — LEVOCETIRIZINE DIHYDROCHLORIDE 5 MG/1
5 TABLET, FILM COATED ORAL NIGHTLY
Qty: 90 TABLET | Refills: 2 | Status: SHIPPED | OUTPATIENT
Start: 2023-09-08 | End: 2024-01-17 | Stop reason: SDUPTHER

## 2023-09-08 NOTE — TELEPHONE ENCOUNTER
No care due was identified.  [facility] Embedded Care Due Messages. Reference number: [id number].   9/08/2023 1:41:48 AM CDT

## 2023-09-22 DIAGNOSIS — M19.90 ARTHRITIS: ICD-10-CM

## 2023-09-22 DIAGNOSIS — M25.50 MULTIPLE JOINT PAIN: ICD-10-CM

## 2023-09-22 RX ORDER — CYCLOBENZAPRINE HCL 10 MG
TABLET ORAL
Qty: 30 TABLET | Refills: 0 | Status: SHIPPED | OUTPATIENT
Start: 2023-09-22 | End: 2024-03-24 | Stop reason: ALTCHOICE

## 2023-09-22 NOTE — TELEPHONE ENCOUNTER
No care due was identified.  [facility] Embedded Care Due Messages. Reference number: [id number].   9/22/2023 10:42:55 AM CDT

## 2023-09-28 ENCOUNTER — OFFICE VISIT (OUTPATIENT)
Dept: FAMILY MEDICINE | Facility: CLINIC | Age: 68
End: 2023-09-28
Attending: FAMILY MEDICINE
Payer: MEDICARE

## 2023-09-28 VITALS
SYSTOLIC BLOOD PRESSURE: 130 MMHG | HEIGHT: 66 IN | TEMPERATURE: 99 F | BODY MASS INDEX: 28.75 KG/M2 | DIASTOLIC BLOOD PRESSURE: 70 MMHG | HEART RATE: 50 BPM | WEIGHT: 178.88 LBS | OXYGEN SATURATION: 97 %

## 2023-09-28 DIAGNOSIS — I11.9 CARDIOMYOPATHY DUE TO HYPERTENSION, WITHOUT HEART FAILURE: ICD-10-CM

## 2023-09-28 DIAGNOSIS — J98.4 CHRONIC RESTRICTIVE LUNG DISEASE: ICD-10-CM

## 2023-09-28 DIAGNOSIS — D84.821 DRUG-INDUCED IMMUNODEFICIENCY: ICD-10-CM

## 2023-09-28 DIAGNOSIS — E11.69 DM TYPE 2 WITH DIABETIC DYSLIPIDEMIA: ICD-10-CM

## 2023-09-28 DIAGNOSIS — Z79.899 DRUG-INDUCED IMMUNODEFICIENCY: ICD-10-CM

## 2023-09-28 DIAGNOSIS — E78.5 DM TYPE 2 WITH DIABETIC DYSLIPIDEMIA: ICD-10-CM

## 2023-09-28 DIAGNOSIS — I43 CARDIOMYOPATHY DUE TO HYPERTENSION, WITHOUT HEART FAILURE: ICD-10-CM

## 2023-09-28 DIAGNOSIS — I70.0 AORTIC ATHEROSCLEROSIS: Primary | ICD-10-CM

## 2023-09-28 DIAGNOSIS — D86.1 SARCOIDOSIS OF LYMPH NODES: ICD-10-CM

## 2023-09-28 PROBLEM — E87.6 HYPOKALEMIA: Status: RESOLVED | Noted: 2022-12-13 | Resolved: 2023-09-28

## 2023-09-28 PROCEDURE — 1101F PR PT FALLS ASSESS DOC 0-1 FALLS W/OUT INJ PAST YR: ICD-10-PCS | Mod: CPTII,S$GLB,, | Performed by: FAMILY MEDICINE

## 2023-09-28 PROCEDURE — G0009 PNEUMOCOCCAL CONJUGATE VACCINE 20-VALENT: ICD-10-PCS | Mod: S$GLB,,, | Performed by: FAMILY MEDICINE

## 2023-09-28 PROCEDURE — 99214 PR OFFICE/OUTPT VISIT, EST, LEVL IV, 30-39 MIN: ICD-10-PCS | Mod: 25,S$GLB,, | Performed by: FAMILY MEDICINE

## 2023-09-28 PROCEDURE — 3075F PR MOST RECENT SYSTOLIC BLOOD PRESS GE 130-139MM HG: ICD-10-PCS | Mod: CPTII,S$GLB,, | Performed by: FAMILY MEDICINE

## 2023-09-28 PROCEDURE — 1160F PR REVIEW ALL MEDS BY PRESCRIBER/CLIN PHARMACIST DOCUMENTED: ICD-10-PCS | Mod: CPTII,S$GLB,, | Performed by: FAMILY MEDICINE

## 2023-09-28 PROCEDURE — 99999 PR PBB SHADOW E&M-EST. PATIENT-LVL V: ICD-10-PCS | Mod: PBBFAC,,, | Performed by: FAMILY MEDICINE

## 2023-09-28 PROCEDURE — 99214 OFFICE O/P EST MOD 30 MIN: CPT | Mod: 25,S$GLB,, | Performed by: FAMILY MEDICINE

## 2023-09-28 PROCEDURE — 90677 PCV20 VACCINE IM: CPT | Mod: S$GLB,,, | Performed by: FAMILY MEDICINE

## 2023-09-28 PROCEDURE — G0009 ADMIN PNEUMOCOCCAL VACCINE: HCPCS | Mod: S$GLB,,, | Performed by: FAMILY MEDICINE

## 2023-09-28 PROCEDURE — 3078F PR MOST RECENT DIASTOLIC BLOOD PRESSURE < 80 MM HG: ICD-10-PCS | Mod: CPTII,S$GLB,, | Performed by: FAMILY MEDICINE

## 2023-09-28 PROCEDURE — 99999 PR PBB SHADOW E&M-EST. PATIENT-LVL V: CPT | Mod: PBBFAC,,, | Performed by: FAMILY MEDICINE

## 2023-09-28 PROCEDURE — 3288F PR FALLS RISK ASSESSMENT DOCUMENTED: ICD-10-PCS | Mod: CPTII,S$GLB,, | Performed by: FAMILY MEDICINE

## 2023-09-28 PROCEDURE — 82043 UR ALBUMIN QUANTITATIVE: CPT | Performed by: FAMILY MEDICINE

## 2023-09-28 PROCEDURE — 1159F PR MEDICATION LIST DOCUMENTED IN MEDICAL RECORD: ICD-10-PCS | Mod: CPTII,S$GLB,, | Performed by: FAMILY MEDICINE

## 2023-09-28 PROCEDURE — 4010F PR ACE/ARB THEARPY RXD/TAKEN: ICD-10-PCS | Mod: CPTII,S$GLB,, | Performed by: FAMILY MEDICINE

## 2023-09-28 PROCEDURE — 3078F DIAST BP <80 MM HG: CPT | Mod: CPTII,S$GLB,, | Performed by: FAMILY MEDICINE

## 2023-09-28 PROCEDURE — 90677 PNEUMOCOCCAL CONJUGATE VACCINE 20-VALENT: ICD-10-PCS | Mod: S$GLB,,, | Performed by: FAMILY MEDICINE

## 2023-09-28 PROCEDURE — 1126F PR PAIN SEVERITY QUANTIFIED, NO PAIN PRESENT: ICD-10-PCS | Mod: CPTII,S$GLB,, | Performed by: FAMILY MEDICINE

## 2023-09-28 PROCEDURE — 1126F AMNT PAIN NOTED NONE PRSNT: CPT | Mod: CPTII,S$GLB,, | Performed by: FAMILY MEDICINE

## 2023-09-28 PROCEDURE — 3008F PR BODY MASS INDEX (BMI) DOCUMENTED: ICD-10-PCS | Mod: CPTII,S$GLB,, | Performed by: FAMILY MEDICINE

## 2023-09-28 PROCEDURE — 3288F FALL RISK ASSESSMENT DOCD: CPT | Mod: CPTII,S$GLB,, | Performed by: FAMILY MEDICINE

## 2023-09-28 PROCEDURE — 4010F ACE/ARB THERAPY RXD/TAKEN: CPT | Mod: CPTII,S$GLB,, | Performed by: FAMILY MEDICINE

## 2023-09-28 PROCEDURE — 1159F MED LIST DOCD IN RCRD: CPT | Mod: CPTII,S$GLB,, | Performed by: FAMILY MEDICINE

## 2023-09-28 PROCEDURE — 3075F SYST BP GE 130 - 139MM HG: CPT | Mod: CPTII,S$GLB,, | Performed by: FAMILY MEDICINE

## 2023-09-28 PROCEDURE — 1101F PT FALLS ASSESS-DOCD LE1/YR: CPT | Mod: CPTII,S$GLB,, | Performed by: FAMILY MEDICINE

## 2023-09-28 PROCEDURE — 1160F RVW MEDS BY RX/DR IN RCRD: CPT | Mod: CPTII,S$GLB,, | Performed by: FAMILY MEDICINE

## 2023-09-28 PROCEDURE — 3008F BODY MASS INDEX DOCD: CPT | Mod: CPTII,S$GLB,, | Performed by: FAMILY MEDICINE

## 2023-09-28 RX ORDER — ALBUTEROL SULFATE 90 UG/1
1-2 AEROSOL, METERED RESPIRATORY (INHALATION) EVERY 6 HOURS PRN
Qty: 18 G | Refills: 3 | Status: SHIPPED | OUTPATIENT
Start: 2023-09-28 | End: 2024-01-17

## 2023-09-28 NOTE — PROGRESS NOTES
Subjective:       Patient ID: Anne Farmer is a 67 y.o. female.    Chief Complaint: Annual Exam    67 y old female here for f.u . Not monitoring glucose. No sob , cp ,palpitations . On plaquenil for sarcoidosis . Past due for opth exam . She will like to update her vaccinations , no sob , cp , ezekiel.        Review of Systems   Constitutional: Negative.    HENT: Negative.     Eyes: Negative.    Respiratory: Negative.     Cardiovascular: Negative.    Gastrointestinal: Negative.    Genitourinary: Negative.    Musculoskeletal: Negative.    Skin: Negative.    Hematological: Negative.        Objective:      Physical Exam  Constitutional:       General: She is not in acute distress.     Appearance: She is well-developed. She is not diaphoretic.   HENT:      Head: Normocephalic and atraumatic.      Right Ear: External ear normal.      Left Ear: External ear normal.      Mouth/Throat:      Pharynx: No oropharyngeal exudate.   Eyes:      General: No scleral icterus.        Right eye: No discharge.         Left eye: No discharge.      Conjunctiva/sclera: Conjunctivae normal.      Pupils: Pupils are equal, round, and reactive to light.   Neck:      Thyroid: No thyromegaly.      Vascular: No JVD.      Trachea: No tracheal deviation.   Cardiovascular:      Rate and Rhythm: Normal rate and regular rhythm.      Pulses:           Dorsalis pedis pulses are 2+ on the right side and 2+ on the left side.        Posterior tibial pulses are 2+ on the right side and 2+ on the left side.      Heart sounds: Normal heart sounds. No murmur heard.     No friction rub. No gallop.   Pulmonary:      Effort: Pulmonary effort is normal. No respiratory distress.      Breath sounds: Normal breath sounds. No stridor. No wheezing or rales.   Chest:      Chest wall: No tenderness.   Abdominal:      General: Bowel sounds are normal. There is no distension.      Palpations: Abdomen is soft.      Tenderness: There is no abdominal tenderness. There is  no guarding or rebound.   Musculoskeletal:         General: Normal range of motion.      Cervical back: Normal range of motion and neck supple.      Right foot: Normal range of motion. No deformity, bunion, Charcot foot, foot drop or prominent metatarsal heads.      Left foot: Normal range of motion. No deformity, bunion, Charcot foot, foot drop or prominent metatarsal heads.   Feet:      Right foot:      Protective Sensation: 10 sites tested.  10 sites sensed.      Skin integrity: Callus and dry skin present.      Toenail Condition: Right toenails are abnormally thick.      Left foot:      Protective Sensation: 10 sites tested.  10 sites sensed.      Skin integrity: Callus and dry skin present.      Toenail Condition: Left toenails are abnormally thick.   Lymphadenopathy:      Cervical: No cervical adenopathy.   Skin:     General: Skin is warm and dry.   Neurological:      Mental Status: She is alert and oriented to person, place, and time.   Psychiatric:         Mood and Affect: Mood normal.         Behavior: Behavior normal.         Thought Content: Thought content normal.         Judgment: Judgment normal.         Assessment:         Anne was seen today for annual exam.    Diagnoses and all orders for this visit:    Aortic atherosclerosis    DM type 2 with diabetic dyslipidemia  -     Hemoglobin A1C; Future  -     Lipid Panel; Future  -     Microalbumin/Creatinine Ratio, Urine  -     Ambulatory referral/consult to Podiatry; Future    Drug-induced immunodeficiency    Cardiomyopathy due to hypertension, without heart failure    Sarcoidosis of lymph nodes  -     albuterol (PROVENTIL/VENTOLIN HFA) 90 mcg/actuation inhaler; Inhale 1-2 puffs into the lungs every 6 (six) hours as needed for Wheezing or Shortness of Breath.    Chronic restrictive lung disease  -     albuterol (PROVENTIL/VENTOLIN HFA) 90 mcg/actuation inhaler; Inhale 1-2 puffs into the lungs every 6 (six) hours as needed for Wheezing or Shortness of  Breath.    Other orders  -     (In Office Administered) Pneumococcal Conjugate Vaccine (20 Valent) (IM)       Plan:     Anne was seen today for annual exam.    Diagnoses and all orders for this visit:    Aortic atherosclerosis    DM type 2 with diabetic dyslipidemia    Drug-induced immunodeficiency    Cardiomyopathy due to hypertension, without heart failure     Statin   Diet and exercise   F.u with Pulm

## 2023-09-29 LAB
ALBUMIN/CREAT UR: 20.8 UG/MG (ref 0–30)
CREAT UR-MCNC: 101 MG/DL (ref 15–325)
MICROALBUMIN UR DL<=1MG/L-MCNC: 21 UG/ML

## 2023-10-02 ENCOUNTER — OFFICE VISIT (OUTPATIENT)
Dept: PODIATRY | Facility: CLINIC | Age: 68
End: 2023-10-02
Attending: FAMILY MEDICINE
Payer: MEDICARE

## 2023-10-02 DIAGNOSIS — E78.5 DM TYPE 2 WITH DIABETIC DYSLIPIDEMIA: Primary | ICD-10-CM

## 2023-10-02 DIAGNOSIS — L60.3 ONYCHODYSTROPHY: ICD-10-CM

## 2023-10-02 DIAGNOSIS — E11.69 DM TYPE 2 WITH DIABETIC DYSLIPIDEMIA: Primary | ICD-10-CM

## 2023-10-02 PROCEDURE — 99999 PR PBB SHADOW E&M-EST. PATIENT-LVL II: CPT | Mod: PBBFAC,,, | Performed by: PODIATRIST

## 2023-10-02 PROCEDURE — 1126F AMNT PAIN NOTED NONE PRSNT: CPT | Mod: CPTII,S$GLB,, | Performed by: PODIATRIST

## 2023-10-02 PROCEDURE — 99203 OFFICE O/P NEW LOW 30 MIN: CPT | Mod: 25,S$GLB,, | Performed by: PODIATRIST

## 2023-10-02 PROCEDURE — 3061F NEG MICROALBUMINURIA REV: CPT | Mod: CPTII,S$GLB,, | Performed by: PODIATRIST

## 2023-10-02 PROCEDURE — 3288F PR FALLS RISK ASSESSMENT DOCUMENTED: ICD-10-PCS | Mod: CPTII,S$GLB,, | Performed by: PODIATRIST

## 2023-10-02 PROCEDURE — 3066F PR DOCUMENTATION OF TREATMENT FOR NEPHROPATHY: ICD-10-PCS | Mod: CPTII,S$GLB,, | Performed by: PODIATRIST

## 2023-10-02 PROCEDURE — 1160F RVW MEDS BY RX/DR IN RCRD: CPT | Mod: CPTII,S$GLB,, | Performed by: PODIATRIST

## 2023-10-02 PROCEDURE — 11720 PR DEBRIDEMENT OF NAIL(S), 1-5: ICD-10-PCS | Mod: Q9,S$GLB,, | Performed by: PODIATRIST

## 2023-10-02 PROCEDURE — 1101F PR PT FALLS ASSESS DOC 0-1 FALLS W/OUT INJ PAST YR: ICD-10-PCS | Mod: CPTII,S$GLB,, | Performed by: PODIATRIST

## 2023-10-02 PROCEDURE — 1101F PT FALLS ASSESS-DOCD LE1/YR: CPT | Mod: CPTII,S$GLB,, | Performed by: PODIATRIST

## 2023-10-02 PROCEDURE — 4010F ACE/ARB THERAPY RXD/TAKEN: CPT | Mod: CPTII,S$GLB,, | Performed by: PODIATRIST

## 2023-10-02 PROCEDURE — 99203 PR OFFICE/OUTPT VISIT, NEW, LEVL III, 30-44 MIN: ICD-10-PCS | Mod: 25,S$GLB,, | Performed by: PODIATRIST

## 2023-10-02 PROCEDURE — 1126F PR PAIN SEVERITY QUANTIFIED, NO PAIN PRESENT: ICD-10-PCS | Mod: CPTII,S$GLB,, | Performed by: PODIATRIST

## 2023-10-02 PROCEDURE — 3061F PR NEG MICROALBUMINURIA RESULT DOCUMENTED/REVIEW: ICD-10-PCS | Mod: CPTII,S$GLB,, | Performed by: PODIATRIST

## 2023-10-02 PROCEDURE — 1159F PR MEDICATION LIST DOCUMENTED IN MEDICAL RECORD: ICD-10-PCS | Mod: CPTII,S$GLB,, | Performed by: PODIATRIST

## 2023-10-02 PROCEDURE — 3288F FALL RISK ASSESSMENT DOCD: CPT | Mod: CPTII,S$GLB,, | Performed by: PODIATRIST

## 2023-10-02 PROCEDURE — 1159F MED LIST DOCD IN RCRD: CPT | Mod: CPTII,S$GLB,, | Performed by: PODIATRIST

## 2023-10-02 PROCEDURE — 1160F PR REVIEW ALL MEDS BY PRESCRIBER/CLIN PHARMACIST DOCUMENTED: ICD-10-PCS | Mod: CPTII,S$GLB,, | Performed by: PODIATRIST

## 2023-10-02 PROCEDURE — 4010F PR ACE/ARB THEARPY RXD/TAKEN: ICD-10-PCS | Mod: CPTII,S$GLB,, | Performed by: PODIATRIST

## 2023-10-02 PROCEDURE — 11720 DEBRIDE NAIL 1-5: CPT | Mod: Q9,S$GLB,, | Performed by: PODIATRIST

## 2023-10-02 PROCEDURE — 3066F NEPHROPATHY DOC TX: CPT | Mod: CPTII,S$GLB,, | Performed by: PODIATRIST

## 2023-10-02 PROCEDURE — 99999 PR PBB SHADOW E&M-EST. PATIENT-LVL II: ICD-10-PCS | Mod: PBBFAC,,, | Performed by: PODIATRIST

## 2023-10-02 RX ORDER — PRAVASTATIN SODIUM 20 MG/1
TABLET ORAL
Qty: 90 TABLET | Refills: 0 | Status: SHIPPED | OUTPATIENT
Start: 2023-10-02 | End: 2023-10-23

## 2023-10-02 NOTE — TELEPHONE ENCOUNTER
Refill Routing Note     Refill Routing Note   Medication(s) are not appropriate for processing by [facility] for the following reason(s):      Required labs outdated    [facility] action(s):  Defer Care Due:  None identified            Appointments  past 12m or future 3m with PCP    Date Provider   Last Visit   9/28/2023 Chiquita Talley MD   Next Visit   Visit date not found Chiquita Talley MD   ED visits in past 90 days: 1        Note composed:5:35 AM 10/02/2023

## 2023-10-02 NOTE — TELEPHONE ENCOUNTER
No care due was identified.  [facility] Embedded Care Due Messages. Reference number: [id number].   10/02/2023 12:17:34 AM CDT

## 2023-10-02 NOTE — PROGRESS NOTES
Subjective:       Patient ID: Anne Farmer is a 67 y.o. female.    Chief Complaint: Diabetic Foot Exam (Patient is a diabetic and was last seen  on 9.28.23 by Danay leos. She denies pain at present and is wearing socks and shoes. )      HPI: Anne Farmer presents to the office today, under referral by , Chiquita Talley MD, for her annual diabetic foot assessment and risk evaluation.  Patient is a DMII.  Concerned about the thickening of the right and left hallux nail plate.  Denies any recent trauma or injury.. This patient last saw his/her internal/family medicine physician on 09/28/2023.     Hemoglobin A1C   Date Value Ref Range Status   09/08/2022 6.6 (H) 4.0 - 5.6 % Final     Comment:     ADA Screening Guidelines:  5.7-6.4%  Consistent with prediabetes  >or=6.5%  Consistent with diabetes    High levels of fetal hemoglobin interfere with the HbA1C  assay. Heterozygous hemoglobin variants (HbS, HgC, etc)do  not significantly interfere with this assay.   However, presence of multiple variants may affect accuracy.     06/07/2022 6.7 (H) 4.0 - 5.6 % Final     Comment:     ADA Screening Guidelines:  5.7-6.4%  Consistent with prediabetes  >or=6.5%  Consistent with diabetes    High levels of fetal hemoglobin interfere with the HbA1C  assay. Heterozygous hemoglobin variants (HbS, HgC, etc)do  not significantly interfere with this assay.   However, presence of multiple variants may affect accuracy.     05/13/2021 6.5 (H) 4.0 - 5.6 % Final     Comment:     ADA Screening Guidelines:  5.7-6.4%  Consistent with prediabetes  >or=6.5%  Consistent with diabetes    High levels of fetal hemoglobin interfere with the HbA1C  assay. Heterozygous hemoglobin variants (HbS, HgC, etc)do  not significantly interfere with this assay.   However, presence of multiple variants may affect accuracy.     .    Review of patient's allergies indicates:  No Known Allergies    Past Medical History:    Diagnosis Date    Allergy     Arthritis     Cancer 2016    colon    CHF (congestive heart failure)     Colon cancer 2004    Colon cancer screening 2015    Diabetes mellitus type 2 in nonobese 3/9/2016    borderline    Diverticulosis     DM (diabetes mellitus) 2016    BS didn't check 2019    DM (diabetes mellitus) 2016    BS didn't check 2020    Hyperlipidemia 10/25/2016    Hypertension     Insomnia     Malignant neoplasm of colon 2021    Malignant neoplasm of lower-outer quadrant of left breast of female, estrogen receptor positive 2022       Family History   Problem Relation Age of Onset    Hypertension Mother     Diabetes Mother     Hypertension Father     Hypertension Sister     Hypertension Brother     Hypertension Sister     Hypertension Sister     Hypertension Sister     Hypertension Brother     Hypertension Brother        Social History     Socioeconomic History    Marital status: Single   Occupational History     Employer: [employer]   Tobacco Use    Smoking status: Never    Smokeless tobacco: Never   Substance and Sexual Activity    Alcohol use: Yes     Alcohol/week: 0.0 standard drinks of alcohol     Comment: weekends.       Drug use: No       Past Surgical History:   Procedure Laterality Date    BREAST BIOPSY Left     BREAST LUMPECTOMY Left      SECTION      COLON SURGERY      COLONOSCOPY N/A 2015    Procedure: COLONOSCOPY;  Surgeon: Adela Sam MD;  Location: [facility] ENDO;  Service: Endoscopy;  Laterality: N/A;    COLONOSCOPY N/A 2017    Procedure: COLONOSCOPY;  Surgeon: Chintan lFores MD;  Location: [facility] ENDO;  Service: Endoscopy;  Laterality: N/A;    COLONOSCOPY N/A 2020    Procedure: COLONOSCOPY;  Surgeon: Grisel Atkins MD;  Location: [facility] ENDO;  Service: Endoscopy;  Laterality: N/A;    SENTINEL LYMPH NODE BIOPSY Left 2022    Procedure: BIOPSY, LYMPH NODE, SENTINEL;  Surgeon: Arvin Hernandez MD;  Location: [facility]  OR;  Service: General;  Laterality: Left;       Review of Systems        Objective:   There were no vitals taken for this visit.    Physical Exam  LOWER EXTREMITY PHYSICAL EXAMINATION    ORTHOPEDIC:  No pain on palpation of the foot or ankle.   Range of motion within normal limits of the ankle joint, rearfoot, and forefoot.  Manual muscle strength testing is 5/5 with dorsiflexion, plantar flexion, abduction and abduction of the lower extremity.  No pain with or without resistance.  The patient is a full to ambulate without pain or discomfort.  The patient's gait is non antalgic.  The patient does not utilize any assistive device for ambulation.    VASCULAR:  The right dorsalis pedis pulse 2/4 and the right posterior tibial pulse 2/4.  The left dorsalis pedis pulse 2/4 and posterior tibial pulse on the left is 2/4.  Capillary refill is intact.  Pedal hair growth intact    NEUROLOGY:  Sharp/dull, light touch, proprioception all intact and equal bilaterally.  Vibratory sensation is intact.  Protective sensation intact    DERMATOLOGY:  Skin is supple, moist, intact.  There is no signs of callusing, ulcerations, other lesions identified to the dorsal or plantar aspect of the right or left foot.  The right hallux and left hallux nails are thickened, discolored dystrophic.  There is subungual debris.  Nail plates have area of dark discoloration.  The remaining nails 2-5 on the right foot and the left foot are elongated but of normal color, thickness, and texture.   There is no signs of ingrowing into the medial or lateral borders.  There is no evidence of wounds or skin breakdown.  No edema or erythema.  No obvious lacerations or fissuring.  Interdigital spaces are clean, dry, intact.  No rashes or scars appreciated.    Assessment:     1. DM type 2 with diabetic dyslipidemia    2. Onychodystrophy        Plan:     DM type 2 with diabetic dyslipidemia  -     Ambulatory referral/consult to Podiatry    Onychodystrophy      I  counseled the patient on his/her Diabetic Mellitus regarding today's clinical examination and objection findings. We did also discuss recent medication changes, pertinent labs and imaging evaluations and other medical consultation notes and progress notes. Greater than 50% of this visit was spent on counseling and coordination of care. Greater than 20 minutes of this appt. was spent on education about the diabetic foot, in relation to PVD and/or neuropathy, and the prevention of limb loss.     Shoe gear is inspected and wear and proper fit/type. Patient is reminded of the importance of good nutrition and blood sugar control to help prevent podiatric complications of diabetes. Patient instructed on proper foot hygeine. We discussed wearing proper shoe gear, daily foot inspections, never walking without protective shoe gear, never putting sharp instruments to feet.  Patient  will continue to monitor the areas daily, inspect feet, wear protective shoe gear when ambulatory, moisturizer to maintain skin integrity.     Patient's DMI/DMII is managed by Internal/Family Medicine Physician and/or Endocrinology Advanced Practice Provider.    Dystrophic nail plates, as outlined above (R#1  ; L#1 ), are sharply debrided with double action nail nipper, and/or with the assistance of a mechanical rotary fredy, with removal of all offending nail and nail border(s), for reduction of pains. Nails are reduced in terms of length, width and girth with removal of subungual debris to facilitate pain free weight bearing and ambulation. The elongated nails as outlined in the objective portion of this note, were trimmed to appropriate length, with a double action nail nipper, for alleviation/reduction of pains as well. Follow up in approx. 3-4 months.

## 2023-10-16 ENCOUNTER — PATIENT OUTREACH (OUTPATIENT)
Dept: ADMINISTRATIVE | Facility: HOSPITAL | Age: 68
End: 2023-10-16
Payer: MEDICARE

## 2023-10-20 DIAGNOSIS — C50.412 MALIGNANT NEOPLASM OF UPPER-OUTER QUADRANT OF LEFT BREAST IN FEMALE, ESTROGEN RECEPTOR POSITIVE: ICD-10-CM

## 2023-10-20 DIAGNOSIS — Z17.0 MALIGNANT NEOPLASM OF UPPER-OUTER QUADRANT OF LEFT BREAST IN FEMALE, ESTROGEN RECEPTOR POSITIVE: ICD-10-CM

## 2023-10-20 DIAGNOSIS — Z78.0 OSTEOPENIA AFTER MENOPAUSE: ICD-10-CM

## 2023-10-20 DIAGNOSIS — M85.80 OSTEOPENIA AFTER MENOPAUSE: ICD-10-CM

## 2023-10-20 RX ORDER — AMLODIPINE BESYLATE 10 MG/1
10 TABLET ORAL
Qty: 90 TABLET | Refills: 3 | Status: SHIPPED | OUTPATIENT
Start: 2023-10-20

## 2023-10-20 RX ORDER — OMEPRAZOLE 20 MG/1
20 CAPSULE, DELAYED RELEASE ORAL
Qty: 90 CAPSULE | Refills: 3 | Status: SHIPPED | OUTPATIENT
Start: 2023-10-20

## 2023-10-20 RX ORDER — LOSARTAN POTASSIUM 100 MG/1
TABLET ORAL
Qty: 90 TABLET | Refills: 2 | Status: SHIPPED | OUTPATIENT
Start: 2023-10-20

## 2023-10-20 RX ORDER — TRAZODONE HYDROCHLORIDE 50 MG/1
TABLET ORAL
Qty: 90 TABLET | Refills: 3 | Status: SHIPPED | OUTPATIENT
Start: 2023-10-20

## 2023-10-20 NOTE — TELEPHONE ENCOUNTER
No care due was identified.  [facility] Embedded Care Due Messages. Reference number: [id number].   10/20/2023 12:33:13 PM CDT

## 2023-10-20 NOTE — TELEPHONE ENCOUNTER
Refill Routing Note   Medication(s) are not appropriate for processing by [facility] for the following reason(s):      Required labs outdated    [facility] action(s):  Defer  Approve Care Due:  None identified            Appointments  past 12m or future 3m with PCP    Date Provider   Last Visit   9/28/2023 Chiquita Talley MD   Next Visit   Visit date not found Chiquita Talley MD   ED visits in past 90 days: 0        Note composed:2:23 PM 10/20/2023

## 2023-10-23 ENCOUNTER — LAB VISIT (OUTPATIENT)
Dept: LAB | Facility: HOSPITAL | Age: 68
End: 2023-10-23
Attending: INTERNAL MEDICINE
Payer: MEDICARE

## 2023-10-23 DIAGNOSIS — Z17.0 MALIGNANT NEOPLASM OF LOWER-OUTER QUADRANT OF LEFT BREAST OF FEMALE, ESTROGEN RECEPTOR POSITIVE: ICD-10-CM

## 2023-10-23 DIAGNOSIS — E11.69 DM TYPE 2 WITH DIABETIC DYSLIPIDEMIA: ICD-10-CM

## 2023-10-23 DIAGNOSIS — M85.80 OSTEOPENIA AFTER MENOPAUSE: ICD-10-CM

## 2023-10-23 DIAGNOSIS — C50.512 MALIGNANT NEOPLASM OF LOWER-OUTER QUADRANT OF LEFT BREAST OF FEMALE, ESTROGEN RECEPTOR POSITIVE: ICD-10-CM

## 2023-10-23 DIAGNOSIS — E78.5 DM TYPE 2 WITH DIABETIC DYSLIPIDEMIA: ICD-10-CM

## 2023-10-23 DIAGNOSIS — Z85.038 HISTORY OF COLON CANCER, STAGE I: ICD-10-CM

## 2023-10-23 DIAGNOSIS — Z78.0 OSTEOPENIA AFTER MENOPAUSE: ICD-10-CM

## 2023-10-23 LAB
ALBUMIN SERPL BCP-MCNC: 4 G/DL (ref 3.5–5.2)
ALP SERPL-CCNC: 80 U/L (ref 55–135)
ALT SERPL W/O P-5'-P-CCNC: 23 U/L (ref 10–44)
ANION GAP SERPL CALC-SCNC: 12 MMOL/L (ref 8–16)
AST SERPL-CCNC: 24 U/L (ref 10–40)
BASOPHILS # BLD AUTO: 0.05 K/UL (ref 0–0.2)
BASOPHILS NFR BLD: 0.9 % (ref 0–1.9)
BILIRUB SERPL-MCNC: 0.3 MG/DL (ref 0.1–1)
BUN SERPL-MCNC: 19 MG/DL (ref 8–23)
CALCIUM SERPL-MCNC: 9.7 MG/DL (ref 8.7–10.5)
CHLORIDE SERPL-SCNC: 101 MMOL/L (ref 95–110)
CO2 SERPL-SCNC: 25 MMOL/L (ref 23–29)
CREAT SERPL-MCNC: 0.9 MG/DL (ref 0.5–1.4)
DIFFERENTIAL METHOD: ABNORMAL
EOSINOPHIL # BLD AUTO: 0.4 K/UL (ref 0–0.5)
EOSINOPHIL NFR BLD: 7.4 % (ref 0–8)
ERYTHROCYTE [DISTWIDTH] IN BLOOD BY AUTOMATED COUNT: 13.3 % (ref 11.5–14.5)
EST. GFR  (NO RACE VARIABLE): >60 ML/MIN/1.73 M^2
GLUCOSE SERPL-MCNC: 135 MG/DL (ref 70–110)
HCT VFR BLD AUTO: 35.7 % (ref 37–48.5)
HGB BLD-MCNC: 11.6 G/DL (ref 12–16)
IMM GRANULOCYTES # BLD AUTO: 0.04 K/UL (ref 0–0.04)
IMM GRANULOCYTES NFR BLD AUTO: 0.7 % (ref 0–0.5)
LYMPHOCYTES # BLD AUTO: 1 K/UL (ref 1–4.8)
LYMPHOCYTES NFR BLD: 19 % (ref 18–48)
MCH RBC QN AUTO: 30.7 PG (ref 27–31)
MCHC RBC AUTO-ENTMCNC: 32.5 G/DL (ref 32–36)
MCV RBC AUTO: 94 FL (ref 82–98)
MONOCYTES # BLD AUTO: 0.6 K/UL (ref 0.3–1)
MONOCYTES NFR BLD: 10.4 % (ref 4–15)
NEUTROPHILS # BLD AUTO: 3.3 K/UL (ref 1.8–7.7)
NEUTROPHILS NFR BLD: 61.6 % (ref 38–73)
NRBC BLD-RTO: 0 /100 WBC
PLATELET # BLD AUTO: 292 K/UL (ref 150–450)
PMV BLD AUTO: 8.4 FL (ref 9.2–12.9)
POTASSIUM SERPL-SCNC: 4.2 MMOL/L (ref 3.5–5.1)
PROT SERPL-MCNC: 8.3 G/DL (ref 6–8.4)
RBC # BLD AUTO: 3.78 M/UL (ref 4–5.4)
SODIUM SERPL-SCNC: 138 MMOL/L (ref 136–145)
WBC # BLD AUTO: 5.37 K/UL (ref 3.9–12.7)

## 2023-10-23 PROCEDURE — 80053 COMPREHEN METABOLIC PANEL: CPT | Performed by: NURSE PRACTITIONER

## 2023-10-23 PROCEDURE — 83036 HEMOGLOBIN GLYCOSYLATED A1C: CPT | Performed by: FAMILY MEDICINE

## 2023-10-23 PROCEDURE — 85025 COMPLETE CBC W/AUTO DIFF WBC: CPT | Performed by: NURSE PRACTITIONER

## 2023-10-23 PROCEDURE — 36415 COLL VENOUS BLD VENIPUNCTURE: CPT | Performed by: NURSE PRACTITIONER

## 2023-10-23 PROCEDURE — 80061 LIPID PANEL: CPT | Performed by: FAMILY MEDICINE

## 2023-10-23 RX ORDER — PRAVASTATIN SODIUM 20 MG/1
TABLET ORAL
Qty: 90 TABLET | Refills: 0 | Status: SHIPPED | OUTPATIENT
Start: 2023-10-23 | End: 2024-01-17

## 2023-10-24 LAB
CHOLEST SERPL-MCNC: 183 MG/DL (ref 120–199)
CHOLEST/HDLC SERPL: 2.2 {RATIO} (ref 2–5)
ESTIMATED AVG GLUCOSE: 148 MG/DL (ref 68–131)
HBA1C MFR BLD: 6.8 % (ref 4–5.6)
HDLC SERPL-MCNC: 84 MG/DL (ref 40–75)
HDLC SERPL: 45.9 % (ref 20–50)
LDLC SERPL CALC-MCNC: 86.8 MG/DL (ref 63–159)
NONHDLC SERPL-MCNC: 99 MG/DL
TRIGL SERPL-MCNC: 61 MG/DL (ref 30–150)

## 2023-10-25 RX ORDER — ANASTROZOLE 1 MG/1
1 TABLET ORAL
Qty: 90 TABLET | Refills: 1 | Status: SHIPPED | OUTPATIENT
Start: 2023-10-25 | End: 2024-01-26

## 2023-11-21 DIAGNOSIS — J30.9 ALLERGIC RHINITIS, UNSPECIFIED SEASONALITY, UNSPECIFIED TRIGGER: ICD-10-CM

## 2023-11-21 RX ORDER — FLUTICASONE PROPIONATE 50 MCG
SPRAY, SUSPENSION (ML) NASAL
Qty: 48 ML | Refills: 3 | OUTPATIENT
Start: 2023-11-21

## 2023-11-22 ENCOUNTER — APPOINTMENT (OUTPATIENT)
Dept: RADIOLOGY | Facility: HOSPITAL | Age: 68
End: 2023-11-22
Attending: NURSE PRACTITIONER
Payer: MEDICARE

## 2023-11-22 DIAGNOSIS — Z78.0 OSTEOPENIA AFTER MENOPAUSE: ICD-10-CM

## 2023-11-22 DIAGNOSIS — M85.80 OSTEOPENIA AFTER MENOPAUSE: ICD-10-CM

## 2023-11-22 PROCEDURE — 77080 DXA BONE DENSITY AXIAL: CPT | Mod: 26,,, | Performed by: RADIOLOGY

## 2023-11-22 PROCEDURE — 77080 DXA BONE DENSITY AXIAL SKELETON 1 OR MORE SITES: ICD-10-PCS | Mod: 26,,, | Performed by: RADIOLOGY

## 2023-11-22 PROCEDURE — 77080 DXA BONE DENSITY AXIAL: CPT | Mod: TC

## 2023-11-22 NOTE — PROGRESS NOTES
Subjective:       Patient ID: Anne Farmer is a 67 y.o. female.    Chief Complaint:   1. Malignant neoplasm of lower-outer quadrant of left breast of female, estrogen receptor positive  Stage IA (pT2, pN0, cM0, G2, ER+, MT+, HER2-, Oncotype DX score: 8)      2. History of colon cancer, stage I        3. Osteopenia after menopause          Current Treatment:  Arimidex 1mg po daily    Surveillance      OP ZOLEDRONIC ACID (ZOMETA) every 6 months; due 1/2024    Treatment History:  XRT completed 10/10/2022    S/p resection    HPI: This is a 67 year old female with type 2 DM, hyperlipidemia, CHF, insomnia, HTN, diverticulosis, allergies, and arthritis who is seen in Hem/Onc for left breast cancer. After routine screening mammogram in 4/2022, an abnormality was found in left upper outer quadrant breast. She denies any personal history of prior breast biopsy.  No family history of malignancy including breast/ovarian that she is aware of.  She denies any breast concerns including no pain, skin change/distortion or nipple discharge.  She has never used hormone replacement therapy.     Follow-up diagnostic mammogram on 5/2/2022 showed 2 areas of abnormality in close proximity measuring 6mm and 8mm area spanning 1.3 cm per report.  No lymphadenopathy appreciated. On 5/12/2022, biopsy showed invasive ductal carcinoma with mucinous features grade 2, no lymphovascular invasion, ER 95%, MT 40-45%, HER2 2+ IHC, fish negative.     She has history of colon cancer stage I diagnosed in 2016 status post resection in surveillance. Most recent colonoscopy in June 2020 revealed diverticulosis, hemorrhoids, and anastomosis and recommended 5 year follow-up per report.    Her primary Hematologist/Oncologist was Dr. Murray.    Interval History: Patient presents for follow up on Arimidex; she is seen today to review DEXA scan. She presents alone and reports adherence to Arimidex and calcium + vitamin D with no hot flashes or joint pains.  She reports a good appetite and normal Bms and has no complaints today. DEXA reveals normal bone density. Advised her to continue calcium + vitamin D supplementation daily as well as Zometa every 6 months. Discussed taking Arimidex for a total of 10 years; she is amenable to this. Also discussed that she would need to choose another oncologist; she asks for recommendation as she has no preference.     Reviewed labs with patient:   CBC:   Recent Labs   Lab 10/23/23  0900   WBC 5.37   RBC 3.78 L   Hemoglobin 11.6 L   Hematocrit 35.7 L   Platelets 292   MCV 94   MCH 30.7   MCHC 32.5     CMP:  Recent Labs   Lab 10/23/23  0900   Glucose 135 H   Calcium 9.7   Albumin 4.0   Total Protein 8.3   Sodium 138   Potassium 4.2   CO2 25   Chloride 101   BUN 19   Creatinine 0.9   Alkaline Phosphatase 80   ALT 23   AST 24   Total Bilirubin 0.3     DXA Bone Density Axial Skeleton 1 or more sites  Narrative: EXAMINATION:  DXA BONE DENSITY AXIAL SKELETON 1 OR MORE SITES    CLINICAL HISTORY:  starting aromatase inhibitor therapy from breast cancer; Other specified disorders of bone density and structure, unspecified site    TECHNIQUE:  DXA scanning was performed over the left hip and lumbar spine.  Review of the images confirms satisfactory positioning and technique.    COMPARISON:  Comparison cannot be made to prior study from 09/29/2022 as it was performed on a different machine.    FINDINGS:  The L1 to L4 vertebral bone mineral density is equal to 1.067 g/cm squared with a T score of 0.2.    The left femoral neck bone mineral density is equal to 0.877 g/cm squared with a T score of 0.2.  Impression: No evidence of significant bone density loss    Consider FDA approved medical therapies in postmenopausal women and men aged 50 years and older, based on the following:    *A hip or vertebral (clinical or morphometric) fracture  *T score less than or equal to -2.5 at the femoral neck or spine after appropriate evaluation to exclude  secondary causes.  *Low bone mass -- also known as osteopenia (T score between -1.0 and -2.5 at the femoral neck or spine) and a 10 year probability of hip fracture greater than or equal to 3% or a 10 year probability of major osteoporosis-related fracture greater than or equal to 20% based on the [place]-adapted WHO algorithm.  *Clinicians judgment and/or patient preference may indicate treatment for people with 10 year fracture probabilities is above or below these levels.    Electronically signed by: Keegan Menezes DO  Date:    2023  Time:    12:56    Social History     Socioeconomic History    Marital status: Single   Occupational History     Employer: [employer]   Tobacco Use    Smoking status: Never    Smokeless tobacco: Never   Substance and Sexual Activity    Alcohol use: Yes     Alcohol/week: 0.0 standard drinks of alcohol     Comment: weekends.       Drug use: No     Past Medical History:   Diagnosis Date    Allergy     Arthritis     Cancer 2016    colon    CHF (congestive heart failure)     Colon cancer 2004    Colon cancer screening 2015    Diabetes mellitus type 2 in nonobese 3/9/2016    borderline    Diverticulosis     DM (diabetes mellitus) 2016    BS didn't check 2019    DM (diabetes mellitus) 2016    BS didn't check 2020    Hyperlipidemia 10/25/2016    Hypertension     Insomnia     Malignant neoplasm of colon 2021    Malignant neoplasm of lower-outer quadrant of left breast of female, estrogen receptor positive 2022     Family History   Problem Relation Age of Onset    Hypertension Mother     Diabetes Mother     Hypertension Father     Hypertension Sister     Hypertension Brother     Hypertension Sister     Hypertension Sister     Hypertension Sister     Hypertension Brother     Hypertension Brother      Past Surgical History:   Procedure Laterality Date    BREAST BIOPSY Left     BREAST LUMPECTOMY Left      SECTION      COLON SURGERY       COLONOSCOPY N/A 09/21/2015    Procedure: COLONOSCOPY;  Surgeon: Adela Sam MD;  Location: [facility] ENDO;  Service: Endoscopy;  Laterality: N/A;    COLONOSCOPY N/A 08/24/2017    Procedure: COLONOSCOPY;  Surgeon: Chintan Flores MD;  Location: [facility] ENDO;  Service: Endoscopy;  Laterality: N/A;    COLONOSCOPY N/A 06/30/2020    Procedure: COLONOSCOPY;  Surgeon: Grisel Atkins MD;  Location: [facility] ENDO;  Service: Endoscopy;  Laterality: N/A;    SENTINEL LYMPH NODE BIOPSY Left 07/05/2022    Procedure: BIOPSY, LYMPH NODE, SENTINEL;  Surgeon: Arvin Hernandez MD;  Location: [facility] OR;  Service: General;  Laterality: Left;     Review of Systems   Constitutional:  Negative for appetite change and fatigue.   HENT:  Negative for mouth sores, rhinorrhea and sore throat.    Eyes: Negative.    Respiratory: Negative.     Cardiovascular: Negative.    Gastrointestinal:  Negative for anal bleeding, blood in stool, change in bowel habit, constipation, diarrhea, nausea, vomiting and fecal incontinence.   Endocrine: Negative.    Genitourinary: Negative.  Negative for hot flashes.   Musculoskeletal:  Negative for arthralgias.   Integumentary:  Negative.   Allergic/Immunologic: Negative.    Neurological:  Negative for weakness and numbness.   Hematological: Negative.    Psychiatric/Behavioral: Negative.         Medication List with Changes/Refills   Current Medications    ALBUTEROL (PROVENTIL/VENTOLIN HFA) 90 MCG/ACTUATION INHALER    Inhale 1-2 puffs into the lungs every 6 (six) hours as needed for Wheezing or Shortness of Breath.    AMLODIPINE (NORVASC) 10 MG TABLET    TAKE 1 TABLET BY MOUTH EVERY DAY    ANASTROZOLE (ARIMIDEX) 1 MG TAB    TAKE 1 TABLET BY MOUTH EVERY DAY    CYCLOBENZAPRINE (FLEXERIL) 10 MG TABLET    TAKE 1 TABLET BY MOUTH THREE TIMES A DAY AS NEEDED    FLUTICASONE PROPIONATE (FLONASE) 50 MCG/ACTUATION NASAL SPRAY    2 sprays (100 mcg total) by Each Nostril route once daily.    HYDROCHLOROTHIAZIDE (HYDRODIURIL) 25 MG TABLET     TAKE 1 TABLET BY MOUTH EVERY DAY    HYDROCODONE-ACETAMINOPHEN (NORCO) 5-325 MG PER TABLET    Take 1 tablet by mouth every 6 (six) hours as needed for Pain.    HYDROXYCHLOROQUINE (PLAQUENIL) 200 MG TABLET    TAKE 1 TABLET BY MOUTH TWICE A DAY    LEVOCETIRIZINE (XYZAL) 5 MG TABLET    TAKE 1 TABLET BY MOUTH EVERY DAY IN THE EVENING    LOSARTAN (COZAAR) 100 MG TABLET    TAKE 1 TABLET BY MOUTH EVERY DAY    MELOXICAM (MOBIC) 15 MG TABLET    Take 15 mg by mouth daily as needed.    MULTIVITAMIN (ONE DAILY MULTIVITAMIN) PER TABLET    Take 1 tablet by mouth once daily.    OMEPRAZOLE (PRILOSEC) 20 MG CAPSULE    TAKE 1 CAPSULE BY MOUTH EVERY DAY    PRAVASTATIN (PRAVACHOL) 20 MG TABLET    TAKE 1 TABLET BY MOUTH EVERY DAY    TRAMADOL (ULTRAM) 50 MG TABLET    Take 1 tablet (50 mg total) by mouth every 6 (six) hours as needed for Pain.    TRAZODONE (DESYREL) 50 MG TABLET    TAKE 1 TABLET BY MOUTH EVERY DAY AT NIGHT     Objective:     Vitals:    11/24/23 1253   BP: (!) 150/61   Pulse:    Temp:      Physical Exam  Vitals reviewed.   Constitutional:       Appearance: Normal appearance.   HENT:      Head: Normocephalic.      Mouth/Throat:      Comments:     Eyes:      Extraocular Movements: Extraocular movements intact.      Pupils: Pupils are equal, round, and reactive to light.   Cardiovascular:      Rate and Rhythm: Normal rate and regular rhythm.      Heart sounds: Normal heart sounds.      Comments: Soft-blowing murmur  Pulmonary:      Effort: Pulmonary effort is normal.      Breath sounds: Normal breath sounds.   Abdominal:      General: Bowel sounds are normal.      Palpations: Abdomen is soft.      Comments: rounded     Genitourinary:     Comments: deferred    Musculoskeletal:         General: Normal range of motion.      Cervical back: Normal range of motion and neck supple.   Skin:     General: Skin is warm and dry.   Neurological:      Mental Status: She is alert and oriented to person, place, and time.   Psychiatric:          Behavior: Behavior normal.         Thought Content: Thought content normal.          (0) Fully active, able to carry on all predisease performance without restriction  Assessment:     Problem List Items Addressed This Visit          Oncology    History of colon cancer, stage I (Chronic)     Diagnosed in 2016 with Stage I colon cancer s/p resection. Currently on surveillance.          Malignant neoplasm of lower-outer quadrant of left breast of female, estrogen receptor positive - Primary     Stage IA, pT2, pN0, Mx ER/RI positive, HER2 IHC equivocal, FISH negative, left lower outer breast invasive ductal carcinoma grade 2, Oncotype DX RS 8. Completed XRT 10/10/2022. Initiated Arimidex on 10/31/2022. Last DEXA 09/2022 found osteopenia. Currently on Zometa q six months.             Orthopedic    Osteopenia after menopause     Noted on DEXA in 9/2022. Currently receiving Zometa every 6 months.           Plan:     Malignant neoplasm of lower-outer quadrant of left breast of female, estrogen receptor positive    History of colon cancer, stage I    Osteopenia after menopause    No labs to review.   Continue Arimidex and calcium + vitamin D supplementation daily.   Continue Zometa every 6 months; due next in 1/2024.   Follow up in  2 months with Dr. Dewitt with CBC and Comprehensive Metabolic Panel prior to Zometa to establish new oncologist.  Surveillance MMG due 5/2024.  Repeat DEXA due 11/2025.  Continue surveillance for colon cancer.   Surveillance colonoscopy due 6/2025.     Route Chart for Scheduling    Med Onc Chart Routing      Follow up with physician 2 months. Dr. Dewitt to establish new oncologist   Follow up with JEFF    Infusion scheduling note    Injection scheduling note in 2 months for Zometa   Labs CBC and CMP   Scheduling:  Preferred lab:  Lab interval:  in 2 months   Imaging None      Pharmacy appointment No pharmacy appointment needed      Other referrals       No additional referrals needed              I will review assessment/plan with collaborating physician.    Per NCCN Guidelines BINV-17:  H&P 1-4 times per year as clinically appropriate for 5 years then annually  Periodic screening for changes in family history and genetic testing indications and referral to genetic counseling as indicated  Educate, monitor, and refer for lymphedema management  MMG every 12 months  Routine imaging of reconstructed breast is not indicated  For patients receiving anthracycline-based therapy, see NCCN Guidelines for Survivorship for echocardiogram recommendations  In the absence of clinical signs and symptoms suggestive of recurrent disease, there is no indication for lab or imaging studies for metastases screening      ELVIRA Garay

## 2023-11-24 ENCOUNTER — OFFICE VISIT (OUTPATIENT)
Dept: HEMATOLOGY/ONCOLOGY | Facility: CLINIC | Age: 68
End: 2023-11-24
Payer: MEDICARE

## 2023-11-24 VITALS
OXYGEN SATURATION: 95 % | BODY MASS INDEX: 28.45 KG/M2 | TEMPERATURE: 97 F | HEIGHT: 66 IN | HEART RATE: 62 BPM | SYSTOLIC BLOOD PRESSURE: 150 MMHG | WEIGHT: 177 LBS | DIASTOLIC BLOOD PRESSURE: 61 MMHG

## 2023-11-24 DIAGNOSIS — Z17.0 MALIGNANT NEOPLASM OF LOWER-OUTER QUADRANT OF LEFT BREAST OF FEMALE, ESTROGEN RECEPTOR POSITIVE: Primary | ICD-10-CM

## 2023-11-24 DIAGNOSIS — C50.512 MALIGNANT NEOPLASM OF LOWER-OUTER QUADRANT OF LEFT BREAST OF FEMALE, ESTROGEN RECEPTOR POSITIVE: Primary | ICD-10-CM

## 2023-11-24 DIAGNOSIS — Z78.0 OSTEOPENIA AFTER MENOPAUSE: ICD-10-CM

## 2023-11-24 DIAGNOSIS — Z85.038 HISTORY OF COLON CANCER, STAGE I: ICD-10-CM

## 2023-11-24 DIAGNOSIS — M85.80 OSTEOPENIA AFTER MENOPAUSE: ICD-10-CM

## 2023-11-24 PROCEDURE — 3066F PR DOCUMENTATION OF TREATMENT FOR NEPHROPATHY: ICD-10-PCS | Mod: CPTII,S$GLB,, | Performed by: NURSE PRACTITIONER

## 2023-11-24 PROCEDURE — 3044F HG A1C LEVEL LT 7.0%: CPT | Mod: CPTII,S$GLB,, | Performed by: NURSE PRACTITIONER

## 2023-11-24 PROCEDURE — 1126F AMNT PAIN NOTED NONE PRSNT: CPT | Mod: CPTII,S$GLB,, | Performed by: NURSE PRACTITIONER

## 2023-11-24 PROCEDURE — 99215 PR OFFICE/OUTPT VISIT, EST, LEVL V, 40-54 MIN: ICD-10-PCS | Mod: S$GLB,,, | Performed by: NURSE PRACTITIONER

## 2023-11-24 PROCEDURE — 3288F FALL RISK ASSESSMENT DOCD: CPT | Mod: CPTII,S$GLB,, | Performed by: NURSE PRACTITIONER

## 2023-11-24 PROCEDURE — 3061F PR NEG MICROALBUMINURIA RESULT DOCUMENTED/REVIEW: ICD-10-PCS | Mod: CPTII,S$GLB,, | Performed by: NURSE PRACTITIONER

## 2023-11-24 PROCEDURE — 4010F ACE/ARB THERAPY RXD/TAKEN: CPT | Mod: CPTII,S$GLB,, | Performed by: NURSE PRACTITIONER

## 2023-11-24 PROCEDURE — 3008F BODY MASS INDEX DOCD: CPT | Mod: CPTII,S$GLB,, | Performed by: NURSE PRACTITIONER

## 2023-11-24 PROCEDURE — 1159F MED LIST DOCD IN RCRD: CPT | Mod: CPTII,S$GLB,, | Performed by: NURSE PRACTITIONER

## 2023-11-24 PROCEDURE — 3078F PR MOST RECENT DIASTOLIC BLOOD PRESSURE < 80 MM HG: ICD-10-PCS | Mod: CPTII,S$GLB,, | Performed by: NURSE PRACTITIONER

## 2023-11-24 PROCEDURE — 1101F PR PT FALLS ASSESS DOC 0-1 FALLS W/OUT INJ PAST YR: ICD-10-PCS | Mod: CPTII,S$GLB,, | Performed by: NURSE PRACTITIONER

## 2023-11-24 PROCEDURE — 1101F PT FALLS ASSESS-DOCD LE1/YR: CPT | Mod: CPTII,S$GLB,, | Performed by: NURSE PRACTITIONER

## 2023-11-24 PROCEDURE — 99215 OFFICE O/P EST HI 40 MIN: CPT | Mod: S$GLB,,, | Performed by: NURSE PRACTITIONER

## 2023-11-24 PROCEDURE — 3077F SYST BP >= 140 MM HG: CPT | Mod: CPTII,S$GLB,, | Performed by: NURSE PRACTITIONER

## 2023-11-24 PROCEDURE — 3044F PR MOST RECENT HEMOGLOBIN A1C LEVEL <7.0%: ICD-10-PCS | Mod: CPTII,S$GLB,, | Performed by: NURSE PRACTITIONER

## 2023-11-24 PROCEDURE — 3078F DIAST BP <80 MM HG: CPT | Mod: CPTII,S$GLB,, | Performed by: NURSE PRACTITIONER

## 2023-11-24 PROCEDURE — 1126F PR PAIN SEVERITY QUANTIFIED, NO PAIN PRESENT: ICD-10-PCS | Mod: CPTII,S$GLB,, | Performed by: NURSE PRACTITIONER

## 2023-11-24 PROCEDURE — 3288F PR FALLS RISK ASSESSMENT DOCUMENTED: ICD-10-PCS | Mod: CPTII,S$GLB,, | Performed by: NURSE PRACTITIONER

## 2023-11-24 PROCEDURE — 3061F NEG MICROALBUMINURIA REV: CPT | Mod: CPTII,S$GLB,, | Performed by: NURSE PRACTITIONER

## 2023-11-24 PROCEDURE — 3077F PR MOST RECENT SYSTOLIC BLOOD PRESSURE >= 140 MM HG: ICD-10-PCS | Mod: CPTII,S$GLB,, | Performed by: NURSE PRACTITIONER

## 2023-11-24 PROCEDURE — 1160F RVW MEDS BY RX/DR IN RCRD: CPT | Mod: CPTII,S$GLB,, | Performed by: NURSE PRACTITIONER

## 2023-11-24 PROCEDURE — 4010F PR ACE/ARB THEARPY RXD/TAKEN: ICD-10-PCS | Mod: CPTII,S$GLB,, | Performed by: NURSE PRACTITIONER

## 2023-11-24 PROCEDURE — 3066F NEPHROPATHY DOC TX: CPT | Mod: CPTII,S$GLB,, | Performed by: NURSE PRACTITIONER

## 2023-11-24 PROCEDURE — 99999 PR PBB SHADOW E&M-EST. PATIENT-LVL IV: CPT | Mod: PBBFAC,,, | Performed by: NURSE PRACTITIONER

## 2023-11-24 PROCEDURE — 99999 PR PBB SHADOW E&M-EST. PATIENT-LVL IV: ICD-10-PCS | Mod: PBBFAC,,, | Performed by: NURSE PRACTITIONER

## 2023-11-24 PROCEDURE — 3008F PR BODY MASS INDEX (BMI) DOCUMENTED: ICD-10-PCS | Mod: CPTII,S$GLB,, | Performed by: NURSE PRACTITIONER

## 2023-11-24 PROCEDURE — 1160F PR REVIEW ALL MEDS BY PRESCRIBER/CLIN PHARMACIST DOCUMENTED: ICD-10-PCS | Mod: CPTII,S$GLB,, | Performed by: NURSE PRACTITIONER

## 2023-11-24 PROCEDURE — 1159F PR MEDICATION LIST DOCUMENTED IN MEDICAL RECORD: ICD-10-PCS | Mod: CPTII,S$GLB,, | Performed by: NURSE PRACTITIONER

## 2023-11-24 NOTE — ASSESSMENT & PLAN NOTE
Stage IA, pT2, pN0, Mx ER/MS positive, HER2 IHC equivocal, FISH negative, left lower outer breast invasive ductal carcinoma grade 2, Oncotype DX RS 8. Completed XRT 10/10/2022. Initiated Arimidex on 10/31/2022. Last DEXA 09/2022 found osteopenia. Currently on Zometa q six months.

## 2024-01-11 ENCOUNTER — HOSPITAL ENCOUNTER (EMERGENCY)
Facility: HOSPITAL | Age: 69
Discharge: HOME | End: 2024-01-11
Attending: EMERGENCY MEDICINE
Payer: MEDICARE

## 2024-01-11 VITALS
DIASTOLIC BLOOD PRESSURE: 74 MMHG | WEIGHT: 176.69 LBS | TEMPERATURE: 99 F | BODY MASS INDEX: 28.52 KG/M2 | HEART RATE: 53 BPM | SYSTOLIC BLOOD PRESSURE: 149 MMHG | OXYGEN SATURATION: 95 % | RESPIRATION RATE: 20 BRPM

## 2024-01-11 DIAGNOSIS — J06.9 UPPER RESPIRATORY TRACT INFECTION, UNSPECIFIED TYPE: Primary | ICD-10-CM

## 2024-01-11 DIAGNOSIS — R09.81 NASAL CONGESTION: ICD-10-CM

## 2024-01-11 LAB
HCV AB SERPL QL IA: NEGATIVE
HEP C VIRUS HOLD SPECIMEN: NORMAL
HIV 1+2 AB+HIV1 P24 AG SERPL QL IA: NEGATIVE
INFLUENZA A, MOLECULAR: NEGATIVE
INFLUENZA B, MOLECULAR: NEGATIVE
SARS-COV-2 RDRP RESP QL NAA+PROBE: NEGATIVE
SPECIMEN SOURCE: NORMAL

## 2024-01-11 PROCEDURE — 87502 INFLUENZA DNA AMP PROBE: CPT | Performed by: EMERGENCY MEDICINE

## 2024-01-11 PROCEDURE — 87389 HIV-1 AG W/HIV-1&-2 AB AG IA: CPT | Performed by: EMERGENCY MEDICINE

## 2024-01-11 PROCEDURE — 86803 HEPATITIS C AB TEST: CPT | Performed by: EMERGENCY MEDICINE

## 2024-01-11 PROCEDURE — U0002 COVID-19 LAB TEST NON-CDC: HCPCS | Performed by: EMERGENCY MEDICINE

## 2024-01-11 PROCEDURE — 99283 EMERGENCY DEPT VISIT LOW MDM: CPT

## 2024-01-11 RX ORDER — METHYLPREDNISOLONE 4 MG/1
TABLET ORAL
Qty: 1 EACH | Refills: 0 | Status: SHIPPED | OUTPATIENT
Start: 2024-01-11 | End: 2024-02-01

## 2024-01-11 NOTE — ED PROVIDER NOTES
SCRIBE #1 NOTE: I, Ramy Aparicio, am scribing for, and in the presence of, Nicky Mccormick MD. I have scribed the entire note.       History     Chief Complaint   Patient presents with    Congestion    Generalized Body Aches    Cough     Pt reports that symptoms started two days ago, states that she feels so congested that it has been difficult to breathe.      Review of patient's allergies indicates:  No Known Allergies      History of Present Illness     HPI    1/11/2024, 8:32 AM  History obtained from the patient      History of Present Illness: Anne Farmer is a 68 y.o. female patient with a PMHx of CHF, colon cancer, malignant neoplasm of left breast, diverticulosis, HLD, and HTN who presents to the Emergency Department for evaluation of congestion which onset gradually within the past two days. Pt denies having DM. Symptoms are constant and moderate in severity. No mitigating or exacerbating factors reported. Associated sxs include non productive cough, decreased sleep, and generalized myalgias. Patient denies any dysuria, weakness, CP, abd pain, and all other sxs at this time. Prior Tx includes Nyquil and other OTC medication. Pt takes Flonase non consistently with no improvement. No further complaints or concerns at this time.       Arrival mode: Personal vehicle     PCP: Chiquita Talley MD        Past Medical History:  Past Medical History:   Diagnosis Date    Allergy     Arthritis     Cancer 2016    colon    CHF (congestive heart failure)     Colon cancer 2004    Colon cancer screening 9/21/2015    Diabetes mellitus type 2 in nonobese 3/9/2016    borderline    Diverticulosis     DM (diabetes mellitus) 03/2016    BS didn't check 02/13/2019    DM (diabetes mellitus) 03/2016    BS didn't check 03/04/2020    Hyperlipidemia 10/25/2016    Hypertension     Insomnia     Malignant neoplasm of colon 5/13/2021    Malignant neoplasm of lower-outer quadrant of left breast of female, estrogen  receptor positive 2022       Past Surgical History:  Past Surgical History:   Procedure Laterality Date    BREAST BIOPSY Left     BREAST LUMPECTOMY Left      SECTION      COLON SURGERY      COLONOSCOPY N/A 2015    Procedure: COLONOSCOPY;  Surgeon: Adela Sam MD;  Location: [facility] ENDO;  Service: Endoscopy;  Laterality: N/A;    COLONOSCOPY N/A 2017    Procedure: COLONOSCOPY;  Surgeon: Chintan Flores MD;  Location: [facility] ENDO;  Service: Endoscopy;  Laterality: N/A;    COLONOSCOPY N/A 2020    Procedure: COLONOSCOPY;  Surgeon: Grisel Atkins MD;  Location: [facility] ENDO;  Service: Endoscopy;  Laterality: N/A;    SENTINEL LYMPH NODE BIOPSY Left 2022    Procedure: BIOPSY, LYMPH NODE, SENTINEL;  Surgeon: Arvin Hernandez MD;  Location: [facility] OR;  Service: General;  Laterality: Left;         Family History:  Family History   Problem Relation Age of Onset    Hypertension Mother     Diabetes Mother     Hypertension Father     Hypertension Sister     Hypertension Brother     Hypertension Sister     Hypertension Sister     Hypertension Sister     Hypertension Brother     Hypertension Brother        Social History:  Social History     Tobacco Use    Smoking status: Never    Smokeless tobacco: Never   Substance and Sexual Activity    Alcohol use: Yes     Alcohol/week: 0.0 standard drinks of alcohol     Comment: weekends.       Drug use: No    Sexual activity: Not on file        Review of Systems     Review of Systems   Constitutional:  Negative for chills and fever.   HENT:  Positive for congestion. Negative for sore throat.    Respiratory:  Positive for cough (non productive). Negative for shortness of breath.    Cardiovascular:  Negative for chest pain.   Gastrointestinal:  Negative for abdominal pain, nausea and vomiting.   Genitourinary:  Negative for dysuria.   Musculoskeletal:  Negative for back pain.   Skin:  Negative for rash.   Neurological:  Negative for weakness.    Hematological:  Does not bruise/bleed easily.   Psychiatric/Behavioral:  Positive for sleep disturbance (decreased).    All other systems reviewed and are negative.       Physical Exam     Initial Vitals [01/11/24 0646]   BP Pulse Resp Temp SpO2   (!) 149/74 (!) 53 20 98.8 °F (37.1 °C) 95 %      MAP       --          Physical Exam  Nursing Notes and Vital Signs Reviewed.  Constitutional: Patient is in no acute distress. Well-developed and well-nourished.  Head: Atraumatic. Normocephalic.  Eyes: PERRL. EOM intact. Conjunctivae are not pale. No scleral icterus.  ENT: Mucous membranes are moist. Oropharynx is clear and symmetric. Nasal congestion.  Neck: Supple. Full ROM. No lymphadenopathy.  Cardiovascular: Regular rate. Regular rhythm. No murmurs, rubs, or gallops. Distal pulses are 2+ and symmetric.  Pulmonary/Chest: No respiratory distress. Clear to auscultation bilaterally. No wheezing or rales.  Abdominal: Soft and non-distended.  There is no tenderness.  No rebound, guarding, or rigidity. Good bowel sounds.  Genitourinary: No CVA tenderness  Musculoskeletal: Moves all extremities. No obvious deformities. No edema. No calf tenderness.  Skin: Warm and dry.  Neurological:  Alert, awake, and appropriate.  Normal speech.  No acute focal neurological deficits are appreciated.  Psychiatric: Normal affect. Good eye contact. Appropriate in content.     ED Course   Procedures  ED Vital Signs:  Vitals:    01/11/24 0646   BP: (!) 149/74   Pulse: (!) 53   Resp: 20   Temp: 98.8 °F (37.1 °C)   TempSrc: Oral   SpO2: 95%   Weight: 80.2 kg (176 lb 11.2 oz)       Abnormal Lab Results:  Labs Reviewed   INFLUENZA A & B BY MOLECULAR   HIV 1 / 2 ANTIBODY    Narrative:     Release to patient->Immediate   HEPATITIS C ANTIBODY    Narrative:     Release to patient->Immediate   HEP C VIRUS HOLD SPECIMEN    Narrative:     Release to patient->Immediate   SARS-COV-2 RNA AMPLIFICATION, QUAL        All Lab Results:  Results for orders placed  or performed during the hospital encounter of 01/11/24   Influenza A & B by Molecular    Specimen: Nasopharyngeal Swab   Result Value Ref Range    Influenza A, Molecular Negative Negative    Influenza B, Molecular Negative Negative    Flu A & B Source Nasal swab    HIV 1/2 Ag/Ab (4th Gen)   Result Value Ref Range    HIV 1/2 Ag/Ab Negative Negative   Hepatitis C Antibody   Result Value Ref Range    Hepatitis C Ab Negative Negative   HCV Virus Hold Specimen   Result Value Ref Range    HEP C Virus Hold Specimen Hold for HCV sendout    COVID-19 Rapid Screening   Result Value Ref Range    SARS-CoV-2 RNA, Amplification, Qual Negative Negative         Imaging Results:  Imaging Results    None                   The Emergency Provider reviewed the vital signs and test results, which are outlined above.     ED Discussion       9:11 AM: Reassessed pt at this time.   Discussed with pt all pertinent ED information and results. Discussed pt dx and plan of tx. Gave pt all f/u and return to the ED instructions. All questions and concerns were addressed at this time. Pt expresses understanding of information and instructions, and is comfortable with plan to discharge. Pt is stable for discharge.    I discussed with patient and/or family/caretaker that evaluation in the ED does not suggest any emergent or life threatening medical conditions requiring immediate intervention beyond what was provided in the ED, and I believe patient is safe for discharge.  Regardless, an unremarkable evaluation in the ED does not preclude the development or presence of a serious of life threatening condition. As such, patient was instructed to return immediately for any worsening or change in current symptoms.        Medical Decision Making  DDX:  1. Viral syndrome  2. URI  3. Nasal congestion    Patient presents with nasal congestion and uri symptoms, no fever, no chills, no sore throat, no dyspnea, dry cough.  Vital signs reviewed and stable, normal  actually.  Clinically appears congested, URI symptoms noted, lungs are clear, non-ill appearing, flu and covid are negative. CXR considered but not indicated with normal lung exam and normal vital signs. Will tx as URI.     Amount and/or Complexity of Data Reviewed  Labs: ordered. Decision-making details documented in ED Course.    Risk  Prescription drug management.                ED Medication(s):  Medications - No data to display    Discharge Medication List as of 1/11/2024  8:31 AM        START taking these medications    Details   methylPREDNISolone (MEDROL DOSEPACK) 4 mg tablet Take as directed, Normal              Follow-up Information       Chiquita Talley MD. Schedule an appointment as soon as possible for a visit in 2 days.    Specialty: Family Medicine  Why: Return to the Emergency Room, If symptoms worsen  Contact information:  [address] [place] [id number]  [phone]                                 Scribe Attestation:   Scribe #1: I performed the above scribed service and the documentation accurately describes the services I performed. I attest to the accuracy of the note.     Attending:   Physician Attestation Statement for Scribe #1: I, Nicky Mccormick MD, personally performed the services described in this documentation, as scribed by Ramy Aparicio, in my presence, and it is both accurate and complete.           Clinical Impression       ICD-10-CM ICD-9-CM   1. Upper respiratory tract infection, unspecified type  J06.9 465.9   2. Nasal congestion  R09.81 478.19       Disposition:   Disposition: Discharged  Condition: Stable        Nicky Mccormick MD  01/12/24 1576

## 2024-01-17 ENCOUNTER — OFFICE VISIT (OUTPATIENT)
Dept: FAMILY MEDICINE | Facility: CLINIC | Age: 69
End: 2024-01-17
Payer: MEDICARE

## 2024-01-17 VITALS
BODY MASS INDEX: 28.03 KG/M2 | WEIGHT: 174.38 LBS | DIASTOLIC BLOOD PRESSURE: 80 MMHG | HEIGHT: 66 IN | TEMPERATURE: 99 F | SYSTOLIC BLOOD PRESSURE: 130 MMHG | OXYGEN SATURATION: 97 % | HEART RATE: 64 BPM

## 2024-01-17 DIAGNOSIS — D86.1 SARCOIDOSIS OF LYMPH NODES: ICD-10-CM

## 2024-01-17 DIAGNOSIS — J01.40 ACUTE NON-RECURRENT PANSINUSITIS: Primary | ICD-10-CM

## 2024-01-17 DIAGNOSIS — J98.4 CHRONIC RESTRICTIVE LUNG DISEASE: ICD-10-CM

## 2024-01-17 DIAGNOSIS — D84.9 IMMUNOCOMPROMISED: ICD-10-CM

## 2024-01-17 PROCEDURE — 3008F BODY MASS INDEX DOCD: CPT | Mod: CPTII,S$GLB,, | Performed by: NURSE PRACTITIONER

## 2024-01-17 PROCEDURE — 99999 PR PBB SHADOW E&M-EST. PATIENT-LVL IV: CPT | Mod: PBBFAC,,, | Performed by: NURSE PRACTITIONER

## 2024-01-17 PROCEDURE — 1126F AMNT PAIN NOTED NONE PRSNT: CPT | Mod: CPTII,S$GLB,, | Performed by: NURSE PRACTITIONER

## 2024-01-17 PROCEDURE — 3075F SYST BP GE 130 - 139MM HG: CPT | Mod: CPTII,S$GLB,, | Performed by: NURSE PRACTITIONER

## 2024-01-17 PROCEDURE — 3079F DIAST BP 80-89 MM HG: CPT | Mod: CPTII,S$GLB,, | Performed by: NURSE PRACTITIONER

## 2024-01-17 PROCEDURE — 3288F FALL RISK ASSESSMENT DOCD: CPT | Mod: CPTII,S$GLB,, | Performed by: NURSE PRACTITIONER

## 2024-01-17 PROCEDURE — 1101F PT FALLS ASSESS-DOCD LE1/YR: CPT | Mod: CPTII,S$GLB,, | Performed by: NURSE PRACTITIONER

## 2024-01-17 PROCEDURE — 1159F MED LIST DOCD IN RCRD: CPT | Mod: CPTII,S$GLB,, | Performed by: NURSE PRACTITIONER

## 2024-01-17 PROCEDURE — 1160F RVW MEDS BY RX/DR IN RCRD: CPT | Mod: CPTII,S$GLB,, | Performed by: NURSE PRACTITIONER

## 2024-01-17 PROCEDURE — 99213 OFFICE O/P EST LOW 20 MIN: CPT | Mod: S$GLB,,, | Performed by: NURSE PRACTITIONER

## 2024-01-17 RX ORDER — ALBUTEROL SULFATE 90 UG/1
AEROSOL, METERED RESPIRATORY (INHALATION)
Qty: 18 G | Refills: 3 | Status: SHIPPED | OUTPATIENT
Start: 2024-01-17

## 2024-01-17 RX ORDER — LEVOCETIRIZINE DIHYDROCHLORIDE 5 MG/1
5 TABLET, FILM COATED ORAL NIGHTLY
Qty: 90 TABLET | Refills: 2 | Status: SHIPPED | OUTPATIENT
Start: 2024-01-17

## 2024-01-17 RX ORDER — AMOXICILLIN AND CLAVULANATE POTASSIUM 875; 125 MG/1; MG/1
1 TABLET, FILM COATED ORAL EVERY 12 HOURS
Qty: 20 TABLET | Refills: 0 | Status: SHIPPED | OUTPATIENT
Start: 2024-01-17 | End: 2024-04-29

## 2024-01-17 RX ORDER — AZELASTINE 1 MG/ML
2 SPRAY, METERED NASAL 2 TIMES DAILY
Qty: 30 ML | Refills: 0 | Status: SHIPPED | OUTPATIENT
Start: 2024-01-17 | End: 2025-01-16

## 2024-01-17 RX ORDER — PRAVASTATIN SODIUM 20 MG/1
TABLET ORAL
Qty: 90 TABLET | Refills: 0 | Status: SHIPPED | OUTPATIENT
Start: 2024-01-17 | End: 2024-01-22

## 2024-01-17 NOTE — TELEPHONE ENCOUNTER
No care due was identified.  [facility] Embedded Care Due Messages. Reference number: [id number].   1/17/2024 10:54:33 AM CST

## 2024-01-17 NOTE — PROGRESS NOTES
CC:   Chief Complaint   Patient presents with    URI     HPI: This is a new problem.   Anne Farmer is a 68 y.o. female with a complaint of URI.  The current episode started in the past 12 days.   The problem has been gradually worsening.   Associated symptoms included nasal congestion, facial pain, headache.    Pertinent negatives include fever, chills, dyspnea, wheezing   Treatments tried: seen in ER negative covid and flu has been used and this has provided no relief.     [unfilled]  Outpatient Medications Prior to Visit   Medication Sig Dispense Refill    albuterol (PROVENTIL/VENTOLIN HFA) 90 mcg/actuation inhaler INHALE 1-2 PUFFS BY MOUTH EVERY 6 HOURS AS NEEDED FOR WHEEZE OR SHORTNESS OF BREATH 18 g 3    amLODIPine (NORVASC) 10 MG tablet TAKE 1 TABLET BY MOUTH EVERY DAY 90 tablet 3    anastrozole (ARIMIDEX) 1 mg Tab TAKE 1 TABLET BY MOUTH EVERY DAY 90 tablet 1    cyclobenzaprine (FLEXERIL) 10 MG tablet TAKE 1 TABLET BY MOUTH THREE TIMES A DAY AS NEEDED 30 tablet 0    fluticasone propionate (FLONASE) 50 mcg/actuation nasal spray 2 sprays (100 mcg total) by Each Nostril route once daily. 48 mL 3    hydroCHLOROthiazide (HYDRODIURIL) 25 MG tablet TAKE 1 TABLET BY MOUTH EVERY DAY 7 tablet 0    HYDROcodone-acetaminophen (NORCO) 5-325 mg per tablet Take 1 tablet by mouth every 6 (six) hours as needed for Pain. 12 tablet 0    hydrOXYchloroQUINE (PLAQUENIL) 200 mg tablet TAKE 1 TABLET BY MOUTH TWICE A DAY 60 tablet 3    losartan (COZAAR) 100 MG tablet TAKE 1 TABLET BY MOUTH EVERY DAY 90 tablet 2    meloxicam (MOBIC) 15 MG tablet Take 15 mg by mouth daily as needed.      methylPREDNISolone (MEDROL DOSEPACK) 4 mg tablet Take as directed 1 each 0    multivitamin (ONE DAILY MULTIVITAMIN) per tablet Take 1 tablet by mouth once daily.      omeprazole (PRILOSEC) 20 MG capsule TAKE 1 CAPSULE BY MOUTH EVERY DAY 90 capsule 3    pravastatin (PRAVACHOL) 20 MG tablet TAKE 1 TABLET BY MOUTH EVERY DAY 90 tablet 0     "traMADoL (ULTRAM) 50 mg tablet Take 1 tablet (50 mg total) by mouth every 6 (six) hours as needed for Pain. 12 tablet 0    traZODone (DESYREL) 50 MG tablet TAKE 1 TABLET BY MOUTH EVERY DAY AT NIGHT 90 tablet 3    levocetirizine (XYZAL) 5 MG tablet TAKE 1 TABLET BY MOUTH EVERY DAY IN THE EVENING 90 tablet 2     No facility-administered medications prior to visit.        Physical Exam   /80   Pulse 64   Temp 99.4 °F (37.4 °C)   Ht 5' 6" (1.676 m)   Wt 79.1 kg (174 lb 6.1 oz)   SpO2 97%   BMI 28.15 kg/m²   Constitutional: The patient appears well-developed and well-nourished.   Head: Normocephalic and atraumatic.   Right Ear: Tympanic membrane and ear canal normal. No drainage, swelling or tenderness. Tympanic membrane is not injected, not erythematous and not bulging.   Left Ear: Ear canal normal. No drainage, swelling or tenderness. Tympanic membrane is not injected, not erythematous and not bulging.   Nose: Mucosal edema and rhinorrhea present.   Mouth/Throat: Uvula is midline. Posterior oropharyngeal erythema present. No oropharyngeal exudate.        THE MUCOSA IS BOGGY AND ERYTHEMATOUS.     Eyes: Conjunctivae normal and lids are normal. Pupils are equal, round, and reactive to light. Right eye exhibits no discharge. Left eye exhibits no discharge. Right eye exhibits normal extraocular motion. Left eye exhibits normal extraocular motion.   Neck: Trachea normal and normal range of motion. Neck supple. No tracheal tenderness present. No mass and no thyromegaly present.   Cardiovascular: Normal rate, regular rhythm, S1 normal, S2 normal and normal heart sounds.  Exam reveals no gallop, no S3, no S4 and no friction rub.    No murmur heard.  Pulmonary/Chest: Effort normal and breath sounds normal. No stridor. Not tachypneic. No respiratory distress. The patient has no wheezes. The patient has no rhonchi. The patient has no rales.   Skin: The patient is not diaphoretic.     Encounter Diagnoses   Name Primary? "    Acute non-recurrent pansinusitis Yes    Immunocompromised        PLAN:    Anne was seen today for uri.    Diagnoses and all orders for this visit:    Acute non-recurrent pansinusitis  -     azelastine (ASTELIN) 137 mcg (0.1 %) nasal spray; 2 sprays (274 mcg total) by Nasal route 2 (two) times daily.  -     levocetirizine (XYZAL) 5 MG tablet; Take 1 tablet (5 mg total) by mouth every evening.    Immunocompromised  -     amoxicillin-clavulanate 875-125mg (AUGMENTIN) 875-125 mg per tablet; Take 1 tablet by mouth every 12 (twelve) hours.      Medications Ordered This Encounter   Medications    amoxicillin-clavulanate 875-125mg (AUGMENTIN) 875-125 mg per tablet     Sig: Take 1 tablet by mouth every 12 (twelve) hours.     Dispense:  20 tablet     Refill:  0    azelastine (ASTELIN) 137 mcg (0.1 %) nasal spray     Si sprays (274 mcg total) by Nasal route 2 (two) times daily.     Dispense:  30 mL     Refill:  0    levocetirizine (XYZAL) 5 MG tablet     Sig: Take 1 tablet (5 mg total) by mouth every evening.     Dispense:  90 tablet     Refill:  2     No orders of the defined types were placed in this encounter.    RTC if symptoms are worsening or changing significantly or if not improved by the end of therapy.

## 2024-01-19 ENCOUNTER — INFUSION (OUTPATIENT)
Dept: INFUSION THERAPY | Facility: HOSPITAL | Age: 69
End: 2024-01-19
Attending: INTERNAL MEDICINE
Payer: MEDICARE

## 2024-01-19 ENCOUNTER — OFFICE VISIT (OUTPATIENT)
Dept: HEMATOLOGY/ONCOLOGY | Facility: CLINIC | Age: 69
End: 2024-01-19
Payer: MEDICARE

## 2024-01-19 VITALS
OXYGEN SATURATION: 95 % | SYSTOLIC BLOOD PRESSURE: 169 MMHG | HEART RATE: 56 BPM | WEIGHT: 173.06 LBS | HEIGHT: 66 IN | BODY MASS INDEX: 27.81 KG/M2 | DIASTOLIC BLOOD PRESSURE: 73 MMHG | TEMPERATURE: 98 F

## 2024-01-19 VITALS
RESPIRATION RATE: 18 BRPM | SYSTOLIC BLOOD PRESSURE: 148 MMHG | OXYGEN SATURATION: 95 % | HEART RATE: 60 BPM | DIASTOLIC BLOOD PRESSURE: 70 MMHG | TEMPERATURE: 98 F

## 2024-01-19 DIAGNOSIS — Z79.811 AROMATASE INHIBITOR USE: ICD-10-CM

## 2024-01-19 DIAGNOSIS — C50.512 MALIGNANT NEOPLASM OF LOWER-OUTER QUADRANT OF LEFT BREAST OF FEMALE, ESTROGEN RECEPTOR POSITIVE: ICD-10-CM

## 2024-01-19 DIAGNOSIS — M85.80 OSTEOPENIA AFTER MENOPAUSE: ICD-10-CM

## 2024-01-19 DIAGNOSIS — Z17.0 MALIGNANT NEOPLASM OF LOWER-OUTER QUADRANT OF LEFT BREAST OF FEMALE, ESTROGEN RECEPTOR POSITIVE: Primary | ICD-10-CM

## 2024-01-19 DIAGNOSIS — C50.512 MALIGNANT NEOPLASM OF LOWER-OUTER QUADRANT OF LEFT BREAST OF FEMALE, ESTROGEN RECEPTOR POSITIVE: Primary | ICD-10-CM

## 2024-01-19 DIAGNOSIS — Z85.038 HISTORY OF COLON CANCER, STAGE I: Primary | Chronic | ICD-10-CM

## 2024-01-19 DIAGNOSIS — Z78.0 OSTEOPENIA AFTER MENOPAUSE: ICD-10-CM

## 2024-01-19 DIAGNOSIS — Z17.0 MALIGNANT NEOPLASM OF LOWER-OUTER QUADRANT OF LEFT BREAST OF FEMALE, ESTROGEN RECEPTOR POSITIVE: ICD-10-CM

## 2024-01-19 PROCEDURE — 3078F DIAST BP <80 MM HG: CPT | Mod: CPTII,S$GLB,, | Performed by: INTERNAL MEDICINE

## 2024-01-19 PROCEDURE — 3077F SYST BP >= 140 MM HG: CPT | Mod: CPTII,S$GLB,, | Performed by: INTERNAL MEDICINE

## 2024-01-19 PROCEDURE — 63600175 PHARM REV CODE 636 W HCPCS: Performed by: INTERNAL MEDICINE

## 2024-01-19 PROCEDURE — 1101F PT FALLS ASSESS-DOCD LE1/YR: CPT | Mod: CPTII,S$GLB,, | Performed by: INTERNAL MEDICINE

## 2024-01-19 PROCEDURE — 96374 THER/PROPH/DIAG INJ IV PUSH: CPT

## 2024-01-19 PROCEDURE — 3008F BODY MASS INDEX DOCD: CPT | Mod: CPTII,S$GLB,, | Performed by: INTERNAL MEDICINE

## 2024-01-19 PROCEDURE — 99999 PR PBB SHADOW E&M-EST. PATIENT-LVL III: CPT | Mod: PBBFAC,,, | Performed by: INTERNAL MEDICINE

## 2024-01-19 PROCEDURE — 3288F FALL RISK ASSESSMENT DOCD: CPT | Mod: CPTII,S$GLB,, | Performed by: INTERNAL MEDICINE

## 2024-01-19 PROCEDURE — 1126F AMNT PAIN NOTED NONE PRSNT: CPT | Mod: CPTII,S$GLB,, | Performed by: INTERNAL MEDICINE

## 2024-01-19 PROCEDURE — 99215 OFFICE O/P EST HI 40 MIN: CPT | Mod: S$GLB,,, | Performed by: INTERNAL MEDICINE

## 2024-01-19 RX ORDER — SODIUM CHLORIDE 0.9 % (FLUSH) 0.9 %
10 SYRINGE (ML) INJECTION
Status: CANCELLED | OUTPATIENT
Start: 2024-01-19

## 2024-01-19 RX ORDER — HEPARIN 100 UNIT/ML
500 SYRINGE INTRAVENOUS
OUTPATIENT
Start: 2024-01-19

## 2024-01-19 RX ORDER — SODIUM CHLORIDE 0.9 % (FLUSH) 0.9 %
10 SYRINGE (ML) INJECTION
Status: DISCONTINUED | OUTPATIENT
Start: 2024-01-19 | End: 2024-01-19 | Stop reason: HOSPADM

## 2024-01-19 RX ORDER — ZOLEDRONIC ACID 0.04 MG/ML
4 INJECTION, SOLUTION INTRAVENOUS
Status: COMPLETED | OUTPATIENT
Start: 2024-01-19 | End: 2024-01-19

## 2024-01-19 RX ADMIN — ZOLEDRONIC ACID 4 MG: 0.04 INJECTION, SOLUTION INTRAVENOUS at 11:01

## 2024-01-19 NOTE — PROGRESS NOTES
[facility]  [address] [address] [facility] [address]  Phone: [phone];  Fax: [phone]    Patient ID: Anne Farmer   Chief Complaint: Follow-up (History of breast cancer on AI)  MRN:  [id number]     Oncologic Diagnoses:    - History of Breast Cancer - Stage IA (pT2, pN0, cM0, G2, ER+, MN+, HER2-, Oncotype DX score: 8   - History of Colon Cancer, Stage I - 2004  Previous Treatment:  Lumpectomy 8/16/2022 and XRT completed 10/10/2022   Current Treatment:    - Arimidex  - Zometa  - Surveillance  Subjective   Anne Farmer is a 68 y.o. female who presents to clinic for follow up.    She reports good compliance with Arimidex.  She has no side effects. She does not perform self breast exams bur recommend she do this monthly.  She has no acute complaints and feels well and at her baseline.      Review of Systems:  Review of Systems   Constitutional:  Negative for activity change, appetite change, chills, diaphoresis, fatigue, fever and unexpected weight change.   HENT:  Negative for nosebleeds.    Respiratory:  Negative for shortness of breath.    Cardiovascular:  Negative for chest pain.   Gastrointestinal:  Negative for abdominal distention, abdominal pain, anal bleeding, blood in stool, constipation, diarrhea, nausea and vomiting.   Genitourinary:  Negative for difficulty urinating and hematuria.   Musculoskeletal:  Negative for arthralgias, back pain and myalgias.   Skin:  Negative for rash.   Neurological:  Negative for dizziness, weakness, light-headedness and headaches.   Hematological:  Does not bruise/bleed easily.   Psychiatric/Behavioral:  The patient is not nervous/anxious.      History     Oncology History   Malignant neoplasm of lower-outer quadrant of left breast of female, estrogen receptor positive   6/9/2022 Initial Diagnosis    Malignant neoplasm of lower-outer quadrant of left breast of female, estrogen receptor positive     6/9/2022 Cancer Staged     Staging form: Breast, AJCC 8th Edition  - Clinical stage from 2022: Stage IA (cT1c, cN0, cM0, G2, ER+, LA+, HER2-)     2022 Cancer Staged    Staging form: Breast, AJCC 8th Edition  - Pathologic stage from 2022: Stage IA (pT2, pN0, cM0, G2, ER+, LA+, HER2-, Oncotype DX score: 8)     2022 - 10/10/2022 Radiation Therapy    Treating physician: Niko  Total Dose: 40 Gy  Fractions: 15         Past Medical History:   Diagnosis Date    Allergy     Arthritis     Cancer 2016    colon    CHF (congestive heart failure)     Colon cancer 2004    Colon cancer screening 2015    Diabetes mellitus type 2 in nonobese 3/9/2016    borderline    Diverticulosis     DM (diabetes mellitus) 2016    BS didn't check 2019    DM (diabetes mellitus) 2016    BS didn't check 2020    Hyperlipidemia 10/25/2016    Hypertension     Insomnia     Malignant neoplasm of colon 2021    Malignant neoplasm of lower-outer quadrant of left breast of female, estrogen receptor positive 2022       Past Surgical History:   Procedure Laterality Date    BREAST BIOPSY Left     BREAST LUMPECTOMY Left      SECTION      COLON SURGERY      COLONOSCOPY N/A 2015    Procedure: COLONOSCOPY;  Surgeon: Adela Sam MD;  Location: [facility];  Service: Endoscopy;  Laterality: N/A;    COLONOSCOPY N/A 2017    Procedure: COLONOSCOPY;  Surgeon: Chintan Flores MD;  Location: [facility] ENDO;  Service: Endoscopy;  Laterality: N/A;    COLONOSCOPY N/A 2020    Procedure: COLONOSCOPY;  Surgeon: Grisel Atkins MD;  Location: [facility] ENDO;  Service: Endoscopy;  Laterality: N/A;    SENTINEL LYMPH NODE BIOPSY Left 2022    Procedure: BIOPSY, LYMPH NODE, SENTINEL;  Surgeon: Arvin Hernandez MD;  Location: [facility];  Service: General;  Laterality: Left;       Family History   Problem Relation Age of Onset    Hypertension Mother     Diabetes Mother     Hypertension Father     Hypertension Sister      Hypertension Brother     Hypertension Sister     Hypertension Sister     Hypertension Sister     Hypertension Brother     Hypertension Brother        Review of patient's allergies indicates:  No Known Allergies    Social History     Tobacco Use    Smoking status: Never     Passive exposure: Never    Smokeless tobacco: Never   Substance Use Topics    Alcohol use: Yes     Alcohol/week: 0.0 standard drinks of alcohol     Comment: weekends.       Drug use: No       Physical Exam   ECOG:   ECOG SCORE    0 - Fully active-able to carry on all pre-disease performance without restriction          Vitals:  There were no vitals taken for this visit.    Physical Exam:  Physical Exam  Constitutional:       General: She is not in acute distress.     Appearance: Normal appearance. She is not ill-appearing.   HENT:      Head: Normocephalic and atraumatic.   Eyes:      Extraocular Movements: Extraocular movements intact.      Conjunctiva/sclera: Conjunctivae normal.   Cardiovascular:      Rate and Rhythm: Normal rate.   Pulmonary:      Effort: Pulmonary effort is normal. No respiratory distress.   Chest:   Breasts:     Right: No swelling, bleeding, inverted nipple, mass, nipple discharge, skin change or tenderness.      Left: No swelling, bleeding, inverted nipple, mass, nipple discharge, skin change or tenderness.   Abdominal:      Palpations: There is no hepatomegaly or splenomegaly.   Musculoskeletal:      Cervical back: Neck supple. No tenderness.   Lymphadenopathy:      Upper Body:      Right upper body: No supraclavicular or axillary adenopathy.      Left upper body: No supraclavicular or axillary adenopathy.   Skin:     Findings: No rash.   Neurological:      General: No focal deficit present.      Mental Status: She is alert and oriented to person, place, and time.   Psychiatric:         Mood and Affect: Mood normal.         Behavior: Behavior normal.         Thought Content: Thought content normal.        Labs   Labs:  Lab  Visit on 01/19/2024   Component Date Value Ref Range Status    WBC 01/19/2024 6.21  3.90 - 12.70 K/uL Final    RBC 01/19/2024 3.88 (L)  4.00 - 5.40 M/uL Final    Hemoglobin 01/19/2024 11.8 (L)  12.0 - 16.0 g/dL Final    Hematocrit 01/19/2024 35.4 (L)  37.0 - 48.5 % Final    MCV 01/19/2024 91  82 - 98 fL Final    MCH 01/19/2024 30.4  27.0 - 31.0 pg Final    MCHC 01/19/2024 33.3  32.0 - 36.0 g/dL Final    RDW 01/19/2024 14.0  11.5 - 14.5 % Final    Platelets 01/19/2024 334  150 - 450 K/uL Final    MPV 01/19/2024 8.3 (L)  9.2 - 12.9 fL Final    Immature Granulocytes 01/19/2024 1.0 (H)  0.0 - 0.5 % Final    Gran # (ANC) 01/19/2024 3.7  1.8 - 7.7 K/uL Final    Immature Grans (Abs) 01/19/2024 0.06 (H)  0.00 - 0.04 K/uL Final    Comment: Mild elevation in immature granulocytes is non specific and   can be seen in a variety of conditions including stress response,   acute inflammation, trauma and pregnancy. Correlation with other   laboratory and clinical findings is essential.      Lymph # 01/19/2024 1.3  1.0 - 4.8 K/uL Final    Mono # 01/19/2024 0.7  0.3 - 1.0 K/uL Final    Eos # 01/19/2024 0.5  0.0 - 0.5 K/uL Final    Baso # 01/19/2024 0.06  0.00 - 0.20 K/uL Final    nRBC 01/19/2024 0  0 /100 WBC Final    Gran % 01/19/2024 59.5  38.0 - 73.0 % Final    Lymph % 01/19/2024 20.5  18.0 - 48.0 % Final    Mono % 01/19/2024 10.8  4.0 - 15.0 % Final    Eosinophil % 01/19/2024 7.2  0.0 - 8.0 % Final    Basophil % 01/19/2024 1.0  0.0 - 1.9 % Final    Differential Method 01/19/2024 Automated   Final        Assessment and Plan   History of Breast Cancer - Stage IA (pT2, pN0, cM0, G2, ER+, MI+, HER2-, Oncotype DX score: 8    Last MMG 05/2023: BIRADS-2  Last DXA showed no evidence of significant bone density loss   Continue with Aromatase Inhibitor daily (pt notes no side effects) and prophylactic Zometa q6m    History of Colon Cancer, Stage I   Laparoscopic verus open right hemicolectomy 11/5/15   Next surveillance colonoscopy due in  06/2025  Recommend she consider genetic counseling given history of two malignancies      Cancer Screening  PAP Smear: Last in 2010? and normal  Colonoscopy 06/30/20: no specimens collected; repeat in 5 years (06/2025)      Chronic Medical Conditions  HTN  DM II  Hypertensive cardiomyopathy  Sarcoidosis  DDD  Restrictive Lung Disease  Vitamin D Deficiency        Med Onc Chart Routing      Follow up with physician 3 months.   Follow up with JEFF    Infusion scheduling note   Zometa today and in 6 months   Injection scheduling note    Labs CBC, CMP, magnesium, phosphorus and CEA   Scheduling:  Preferred lab:  Lab interval:     Imaging    Pharmacy appointment    Other referrals       Additional referrals needed  genetic counseling                 The patient was seen, interviewed and examined. Pertinent lab and radiologic studies were reviewed. Pt instructed to call should they develop concerning signs/symptoms or have further questions.        Portions of the record may have been created with voice recognition software. Occasional wrong-word or sound-a-like substitutions may have occurred due to the inherent limitations of voice recognition software. Read the chart carefully and recognize, using context, where substitutions have occurred.      Nakia Lay MD    Hematology/Oncology

## 2024-01-19 NOTE — PLAN OF CARE
Discussed plan of care with pt. Addressed any and ongoing concerns. Pt denies   Problem: Adult Inpatient Plan of Care  Goal: Plan of Care Review  Outcome: Ongoing, Progressing  Goal: Patient-Specific Goal (Individualized)  Outcome: Ongoing, Progressing  Flowsheets (Taken 1/19/2024 1103)  Anxieties, Fears or Concerns: none  Individualized Care Needs: Reclined position with warm blanket  Goal: Absence of Hospital-Acquired Illness or Injury  Outcome: Ongoing, Progressing  Intervention: Identify and Manage Fall Risk  Flowsheets (Taken 1/19/2024 1103)  Safety Promotion/Fall Prevention: in recliner, wheels locked  Intervention: Prevent Infection  Flowsheets (Taken 1/19/2024 1103)  Infection Prevention:   hand hygiene promoted   personal protective equipment utilized   equipment surfaces disinfected  Goal: Optimal Comfort and Wellbeing  Outcome: Ongoing, Progressing  Intervention: Provide Person-Centered Care  Flowsheets (Taken 1/19/2024 1103)  Trust Relationship/Rapport:   care explained   choices provided   emotional support provided   empathic listening provided   questions answered   questions encouraged   reassurance provided   thoughts/feelings acknowledged

## 2024-01-19 NOTE — TELEPHONE ENCOUNTER
No care due was identified.  [facility] Embedded Care Due Messages. Reference number: [id number].   1/19/2024 5:53:53 PM CST

## 2024-01-22 RX ORDER — PRAVASTATIN SODIUM 20 MG/1
TABLET ORAL
Qty: 90 TABLET | Refills: 0 | Status: SHIPPED | OUTPATIENT
Start: 2024-01-22

## 2024-01-26 DIAGNOSIS — C50.412 MALIGNANT NEOPLASM OF UPPER-OUTER QUADRANT OF LEFT BREAST IN FEMALE, ESTROGEN RECEPTOR POSITIVE: ICD-10-CM

## 2024-01-26 DIAGNOSIS — Z78.0 OSTEOPENIA AFTER MENOPAUSE: ICD-10-CM

## 2024-01-26 DIAGNOSIS — Z17.0 MALIGNANT NEOPLASM OF UPPER-OUTER QUADRANT OF LEFT BREAST IN FEMALE, ESTROGEN RECEPTOR POSITIVE: ICD-10-CM

## 2024-01-26 DIAGNOSIS — M85.80 OSTEOPENIA AFTER MENOPAUSE: ICD-10-CM

## 2024-01-26 RX ORDER — ANASTROZOLE 1 MG/1
1 TABLET ORAL
Qty: 90 TABLET | Refills: 1 | Status: SHIPPED | OUTPATIENT
Start: 2024-01-26

## 2024-03-24 ENCOUNTER — HOSPITAL ENCOUNTER (EMERGENCY)
Facility: HOSPITAL | Age: 69
Discharge: HOME | End: 2024-03-24
Attending: EMERGENCY MEDICINE
Payer: MEDICARE

## 2024-03-24 VITALS
BODY MASS INDEX: 28.54 KG/M2 | SYSTOLIC BLOOD PRESSURE: 212 MMHG | OXYGEN SATURATION: 95 % | WEIGHT: 176.81 LBS | HEART RATE: 54 BPM | RESPIRATION RATE: 18 BRPM | DIASTOLIC BLOOD PRESSURE: 90 MMHG | TEMPERATURE: 99 F

## 2024-03-24 DIAGNOSIS — M25.569 KNEE PAIN: Primary | ICD-10-CM

## 2024-03-24 DIAGNOSIS — R60.0 LEG EDEMA: ICD-10-CM

## 2024-03-24 LAB
ALBUMIN SERPL BCP-MCNC: 3.8 G/DL (ref 3.5–5.2)
ALP SERPL-CCNC: 80 U/L (ref 55–135)
ALT SERPL W/O P-5'-P-CCNC: 22 U/L (ref 10–44)
ANION GAP SERPL CALC-SCNC: 14 MMOL/L (ref 8–16)
AST SERPL-CCNC: 28 U/L (ref 10–40)
BACTERIA #/AREA URNS HPF: NORMAL /HPF
BASOPHILS # BLD AUTO: 0.07 K/UL (ref 0–0.2)
BASOPHILS NFR BLD: 1.2 % (ref 0–1.9)
BILIRUB SERPL-MCNC: 0.4 MG/DL (ref 0.1–1)
BILIRUB UR QL STRIP: NEGATIVE
BNP SERPL-MCNC: 197 PG/ML (ref 0–99)
BUN SERPL-MCNC: 14 MG/DL (ref 8–23)
CALCIUM SERPL-MCNC: 10 MG/DL (ref 8.7–10.5)
CHLORIDE SERPL-SCNC: 102 MMOL/L (ref 95–110)
CLARITY UR: CLEAR
CO2 SERPL-SCNC: 22 MMOL/L (ref 23–29)
COLOR UR: COLORLESS
CREAT SERPL-MCNC: 0.7 MG/DL (ref 0.5–1.4)
DIFFERENTIAL METHOD BLD: ABNORMAL
EOSINOPHIL # BLD AUTO: 0.4 K/UL (ref 0–0.5)
EOSINOPHIL NFR BLD: 7.1 % (ref 0–8)
ERYTHROCYTE [DISTWIDTH] IN BLOOD BY AUTOMATED COUNT: 14.1 % (ref 11.5–14.5)
EST. GFR  (NO RACE VARIABLE): >60 ML/MIN/1.73 M^2
GLUCOSE SERPL-MCNC: 88 MG/DL (ref 70–110)
GLUCOSE UR QL STRIP: NEGATIVE
HCT VFR BLD AUTO: 35.8 % (ref 37–48.5)
HGB BLD-MCNC: 12 G/DL (ref 12–16)
HGB UR QL STRIP: NEGATIVE
HYALINE CASTS #/AREA URNS LPF: 0 /LPF
IMM GRANULOCYTES # BLD AUTO: 0.05 K/UL (ref 0–0.04)
IMM GRANULOCYTES NFR BLD AUTO: 0.9 % (ref 0–0.5)
KETONES UR QL STRIP: NEGATIVE
LEUKOCYTE ESTERASE UR QL STRIP: NEGATIVE
LYMPHOCYTES # BLD AUTO: 1.4 K/UL (ref 1–4.8)
LYMPHOCYTES NFR BLD: 24.2 % (ref 18–48)
MCH RBC QN AUTO: 30.9 PG (ref 27–31)
MCHC RBC AUTO-ENTMCNC: 33.5 G/DL (ref 32–36)
MCV RBC AUTO: 92 FL (ref 82–98)
MICROSCOPIC COMMENT: NORMAL
MONOCYTES # BLD AUTO: 0.7 K/UL (ref 0.3–1)
MONOCYTES NFR BLD: 13.1 % (ref 4–15)
NEUTROPHILS # BLD AUTO: 3 K/UL (ref 1.8–7.7)
NEUTROPHILS NFR BLD: 53.5 % (ref 38–73)
NITRITE UR QL STRIP: NEGATIVE
NRBC BLD-RTO: 0 /100 WBC
PH UR STRIP: 8 [PH] (ref 5–8)
PLATELET # BLD AUTO: 303 K/UL (ref 150–450)
PLATELET BLD QL SMEAR: ABNORMAL
PMV BLD AUTO: 8.8 FL (ref 9.2–12.9)
POTASSIUM SERPL-SCNC: 3.3 MMOL/L (ref 3.5–5.1)
PROT SERPL-MCNC: 8.5 G/DL (ref 6–8.4)
PROT UR QL STRIP: ABNORMAL
RBC # BLD AUTO: 3.88 M/UL (ref 4–5.4)
RBC #/AREA URNS HPF: 0 /HPF (ref 0–4)
SODIUM SERPL-SCNC: 138 MMOL/L (ref 136–145)
SP GR UR STRIP: 1.01 (ref 1–1.03)
URN SPEC COLLECT METH UR: ABNORMAL
UROBILINOGEN UR STRIP-ACNC: NEGATIVE EU/DL
WBC # BLD AUTO: 5.63 K/UL (ref 3.9–12.7)
WBC #/AREA URNS HPF: 1 /HPF (ref 0–5)

## 2024-03-24 PROCEDURE — 83880 ASSAY OF NATRIURETIC PEPTIDE: CPT | Performed by: NURSE PRACTITIONER

## 2024-03-24 PROCEDURE — 80053 COMPREHEN METABOLIC PANEL: CPT | Performed by: NURSE PRACTITIONER

## 2024-03-24 PROCEDURE — 99285 EMERGENCY DEPT VISIT HI MDM: CPT | Mod: 25

## 2024-03-24 PROCEDURE — 81000 URINALYSIS NONAUTO W/SCOPE: CPT | Performed by: NURSE PRACTITIONER

## 2024-03-24 PROCEDURE — 85025 COMPLETE CBC W/AUTO DIFF WBC: CPT | Performed by: NURSE PRACTITIONER

## 2024-03-24 RX ORDER — ORPHENADRINE CITRATE 100 MG/1
100 TABLET, EXTENDED RELEASE ORAL 2 TIMES DAILY
Qty: 10 TABLET | Refills: 0 | Status: SHIPPED | OUTPATIENT
Start: 2024-03-24 | End: 2024-06-13 | Stop reason: SDUPTHER

## 2024-03-24 RX ORDER — METHOCARBAMOL 500 MG/1
500 TABLET, FILM COATED ORAL 3 TIMES DAILY
COMMUNITY
Start: 2024-02-06 | End: 2024-03-24 | Stop reason: ALTCHOICE

## 2024-03-24 NOTE — ED NOTES
Physician aware of elevated BP and ok'd pt for discharge with instruction to take home medications.

## 2024-03-24 NOTE — FIRST PROVIDER EVALUATION
Medical screening examination initiated.  I have conducted a focused provider triage encounter, findings are as follows:    Brief history of present illness:  Pt. C/o bilateral leg swelling and left knee pain     There were no vitals filed for this visit.    Pertinent physical exam:  bilateral lower ankle edema    Brief workup plan:  labs    Preliminary workup initiated; this workup will be continued and followed by the physician or advanced practice provider that is assigned to the patient when roomed.

## 2024-03-24 NOTE — ED PROVIDER NOTES
SCRIBE #1 NOTE: I, Ramón Mars, am scribing for, and in the presence of, Boris Stewart DO. I have scribed the entire note.       History     Chief Complaint   Patient presents with    Leg Pain     Left knee pain with swelling both legs. Hx of CHF     Review of patient's allergies indicates:  No Known Allergies      History of Present Illness     HPI    3/24/2024, 4:31 PM  History obtained from the patient      History of Present Illness: Anne Farmer is a 68 y.o. female patient with a PMHx of CHF, HTN, diverticulitis, HLD, colon cancer, and DMII who presents to the Emergency Department for evaluation of leg swelling which onset gradually yesterday. Symptoms are constant and moderate in severity. Pt reports pain is exacerbated when she walks. Associated sxs include left knee pain and generalized leg pain. Patient denies any and all other sxs at this time. No prior Tx reported. No further complaints or concerns at this time.       Arrival mode: Personal vehicle    PCP: Chiquita Talley MD        Past Medical History:  Past Medical History:   Diagnosis Date    Allergy     Arthritis     Cancer 2016    colon    CHF (congestive heart failure)     Colon cancer 2004    Colon cancer screening 2015    Diabetes mellitus type 2 in nonobese 3/9/2016    borderline    Diverticulosis     DM (diabetes mellitus) 2016    BS didn't check 2019    DM (diabetes mellitus) 2016    BS didn't check 2020    Hyperlipidemia 10/25/2016    Hypertension     Insomnia     Malignant neoplasm of colon 2021    Malignant neoplasm of lower-outer quadrant of left breast of female, estrogen receptor positive 2022       Past Surgical History:  Past Surgical History:   Procedure Laterality Date    BREAST BIOPSY Left     BREAST LUMPECTOMY Left      SECTION      COLON SURGERY      COLONOSCOPY N/A 2015    Procedure: COLONOSCOPY;  Surgeon: Adela Sam MD;  Location: [facility];   Service: Endoscopy;  Laterality: N/A;    COLONOSCOPY N/A 08/24/2017    Procedure: COLONOSCOPY;  Surgeon: Chintan Flores MD;  Location: [facility] ENDO;  Service: Endoscopy;  Laterality: N/A;    COLONOSCOPY N/A 06/30/2020    Procedure: COLONOSCOPY;  Surgeon: Grisel Atkins MD;  Location: [facility] ENDO;  Service: Endoscopy;  Laterality: N/A;    SENTINEL LYMPH NODE BIOPSY Left 07/05/2022    Procedure: BIOPSY, LYMPH NODE, SENTINEL;  Surgeon: Arvin Hernandez MD;  Location: [facility] OR;  Service: General;  Laterality: Left;         Family History:  Family History   Problem Relation Age of Onset    Hypertension Mother     Diabetes Mother     Hypertension Father     Hypertension Sister     Hypertension Brother     Hypertension Sister     Hypertension Sister     Hypertension Sister     Hypertension Brother     Hypertension Brother        Social History:  Social History     Tobacco Use    Smoking status: Never     Passive exposure: Never    Smokeless tobacco: Never   Substance and Sexual Activity    Alcohol use: Yes     Alcohol/week: 0.0 standard drinks of alcohol     Comment: weekends.       Drug use: No    Sexual activity: Not on file        Review of Systems     Review of Systems   Constitutional:  Negative for fever.   HENT:  Negative for sore throat.    Respiratory:  Negative for shortness of breath.    Cardiovascular:  Positive for leg swelling (BLE). Negative for chest pain.   Gastrointestinal:  Negative for nausea.   Genitourinary:  Negative for dysuria.   Musculoskeletal:  Positive for arthralgias (L knee) and myalgias (BLE). Negative for back pain.   Skin:  Negative for rash.   Neurological:  Negative for weakness.   Hematological:  Does not bruise/bleed easily.   All other systems reviewed and are negative.     Physical Exam     Initial Vitals [03/24/24 1521]   BP Pulse Resp Temp SpO2   132/83 72 18 98.6 °F (37 °C) 96 %      MAP       --          Physical Exam  Vitals reviewed.   Musculoskeletal:      Comments: +2  nonpitting edema noted bilateral lower extremity, there is tenderness to palpation to the left knee with no obvious signs of deformity.  Pulses are 2+ to the bilateral lower extremities both DP and PT.   Skin:     General: Skin is warm and dry.      Findings: No rash.   Neurological:      General: No focal deficit present.      Mental Status: She is oriented to person, place, and time.       Nursing Notes and Vital Signs Reviewed.     ED Course   Procedures  ED Vital Signs:  Vitals:    03/24/24 1521 03/24/24 1611 03/24/24 1803   BP: 132/83 (!) 212/90 (!) 212/90   Pulse: 72 65 (!) 54   Resp: 18 (!) 25 18   Temp: 98.6 °F (37 °C)     TempSrc: Oral     SpO2: 96% 96% 95%   Weight: 80.2 kg (176 lb 12.9 oz)         Abnormal Lab Results:  Labs Reviewed   CBC W/ AUTO DIFFERENTIAL - Abnormal; Notable for the following components:       Result Value    RBC 3.88 (*)     Hematocrit 35.8 (*)     MPV 8.8 (*)     Immature Granulocytes 0.9 (*)     Immature Grans (Abs) 0.05 (*)     All other components within normal limits   B-TYPE NATRIURETIC PEPTIDE - Abnormal; Notable for the following components:     (*)     All other components within normal limits   COMPREHENSIVE METABOLIC PANEL - Abnormal; Notable for the following components:    Potassium 3.3 (*)     CO2 22 (*)     Total Protein 8.5 (*)     All other components within normal limits   URINALYSIS, REFLEX TO URINE CULTURE - Abnormal; Notable for the following components:    Color, UA Colorless (*)     Protein, UA 2+ (*)     All other components within normal limits    Narrative:     Specimen Source->Urine   URINALYSIS MICROSCOPIC    Narrative:     Specimen Source->Urine        All Lab Results:  Results for orders placed or performed during the hospital encounter of 03/24/24   CBC auto differential   Result Value Ref Range    WBC 5.63 3.90 - 12.70 K/uL    RBC 3.88 (L) 4.00 - 5.40 M/uL    Hemoglobin 12.0 12.0 - 16.0 g/dL    Hematocrit 35.8 (L) 37.0 - 48.5 %    MCV 92 82 -  98 fL    MCH 30.9 27.0 - 31.0 pg    MCHC 33.5 32.0 - 36.0 g/dL    RDW 14.1 11.5 - 14.5 %    Platelets 303 150 - 450 K/uL    MPV 8.8 (L) 9.2 - 12.9 fL    Immature Granulocytes 0.9 (H) 0.0 - 0.5 %    Gran # (ANC) 3.0 1.8 - 7.7 K/uL    Immature Grans (Abs) 0.05 (H) 0.00 - 0.04 K/uL    Lymph # 1.4 1.0 - 4.8 K/uL    Mono # 0.7 0.3 - 1.0 K/uL    Eos # 0.4 0.0 - 0.5 K/uL    Baso # 0.07 0.00 - 0.20 K/uL    nRBC 0 0 /100 WBC    Gran % 53.5 38.0 - 73.0 %    Lymph % 24.2 18.0 - 48.0 %    Mono % 13.1 4.0 - 15.0 %    Eosinophil % 7.1 0.0 - 8.0 %    Basophil % 1.2 0.0 - 1.9 %    Platelet Estimate Appears normal     Differential Method Automated    Brain natriuretic peptide   Result Value Ref Range     (H) 0 - 99 pg/mL   Comprehensive metabolic panel   Result Value Ref Range    Sodium 138 136 - 145 mmol/L    Potassium 3.3 (L) 3.5 - 5.1 mmol/L    Chloride 102 95 - 110 mmol/L    CO2 22 (L) 23 - 29 mmol/L    Glucose 88 70 - 110 mg/dL    BUN 14 8 - 23 mg/dL    Creatinine 0.7 0.5 - 1.4 mg/dL    Calcium 10.0 8.7 - 10.5 mg/dL    Total Protein 8.5 (H) 6.0 - 8.4 g/dL    Albumin 3.8 3.5 - 5.2 g/dL    Total Bilirubin 0.4 0.1 - 1.0 mg/dL    Alkaline Phosphatase 80 55 - 135 U/L    AST 28 10 - 40 U/L    ALT 22 10 - 44 U/L    eGFR >60 >60 mL/min/1.73 m^2    Anion Gap 14 8 - 16 mmol/L   Urinalysis, Reflex to Urine Culture Urine, Clean Catch    Specimen: Urine, Clean Catch   Result Value Ref Range    Specimen UA Urine, Clean Catch     Color, UA Colorless (A) Yellow, Straw, [place]    Appearance, UA Clear Clear    pH, UA 8.0 5.0 - 8.0    Specific Gravity, UA 1.010 1.005 - 1.030    Protein, UA 2+ (A) Negative    Glucose, UA Negative Negative    Ketones, UA Negative Negative    Bilirubin (UA) Negative Negative    Occult Blood UA Negative Negative    Nitrite, UA Negative Negative    Urobilinogen, UA Negative <2.0 EU/dL    Leukocytes, UA Negative Negative   Urinalysis Microscopic   Result Value Ref Range    RBC, UA 0 0 - 4 /hpf    WBC, UA 1 0 - 5  /hpf    Bacteria None None-Occ /hpf    Hyaline Casts, UA 0 0-1/lpf /lpf    Microscopic Comment SEE COMMENT          Imaging Results:  Imaging Results              US Lower Extremity Veins Left (Final result)  Result time 03/24/24 18:12:02      Final result by Simeon Cutler MD (03/24/24 18:12:02)                   Impression:      No evidence of deep venous thrombosis in the left lower extremity.      Electronically signed by: Simeon uCtler  Date:    03/24/2024  Time:    18:12               Narrative:    EXAMINATION:  US LOWER EXTREMITY VEINS LEFT    CLINICAL HISTORY:  Pain in leg, unspecified    TECHNIQUE:  Duplex and color flow Doppler evaluation and graded compression of the left lower extremity veins was performed.    COMPARISON:  None    FINDINGS:  Left thigh veins: The common femoral, femoral, popliteal, upper greater saphenous, and deep femoral veins are patent and free of thrombus. The veins are normally compressible and have normal phasic flow and augmentation response.    Left calf veins: The visualized calf veins are patent.    Contralateral CFV: The contralateral (right) common femoral vein is patent and free of thrombus.    Miscellaneous: None                                       X-Ray Knee 3 View Left (Final result)  Result time 03/24/24 16:16:02      Final result by Simeon Cutler MD (03/24/24 16:16:02)                   Impression:      As above      Electronically signed by: Simeon Cutler  Date:    03/24/2024  Time:    16:16               Narrative:    EXAMINATION:  XR KNEE 3 VIEW LEFT    CLINICAL HISTORY:  Pain in unspecified knee    TECHNIQUE:  AP, lateral, and Merchant views of the left knee were performed.    COMPARISON:  None    FINDINGS:  No acute fracture or dislocation.  Soft tissues are mildly prominent may be related to body habitus..  Moderate tricompartmental degenerative joint disease.  Atherosclerotic disease.                                       X-Ray Chest 1 View (Final result)   Result time 03/24/24 16:13:10      Final result by Simeon Cutler MD (03/24/24 16:13:10)                   Impression:      As above      Electronically signed by: Simeon Cutler  Date:    03/24/2024  Time:    16:13               Narrative:    EXAMINATION:  XR CHEST 1 VIEW    CLINICAL HISTORY:  Localized edema    TECHNIQUE:  Single frontal view of the chest was performed.    COMPARISON:  None    FINDINGS:  Cardiomegaly with perihilar interstitial opacities suggestive of pulmonary edema.  Correlate clinically to CHF.  Atherosclerotic changes.    Bones are intact.                                            The Emergency Provider reviewed the vital signs and test results, which are outlined above.     ED Discussion       6:41 PM: Reassessed pt at this time. Discussed with pt all pertinent ED information and results. Discussed pt dx and plan of tx. Gave pt all f/u and return to the ED instructions. All questions and concerns were addressed at this time. Pt expresses understanding of information and instructions, and is comfortable with plan to discharge. Pt is stable for discharge.    I discussed with patient and/or family/caretaker that evaluation in the ED does not suggest any emergent or life threatening medical conditions requiring immediate intervention beyond what was provided in the ED, and I believe patient is safe for discharge.  Regardless, an unremarkable evaluation in the ED does not preclude the development or presence of a serious of life threatening condition. As such, patient was instructed to return immediately for any worsening or change in current symptoms.      ED Course as of 03/27/24 1054   Sun Mar 24, 2024   1707 X-Ray Chest 1 View  Cardiomegaly [CD]   1708 Brain natriuretic peptide(!)  Elevated [CD]   1708 Urinalysis, Reflex to Urine Culture Urine, Clean Catch(!)  No UTI [CD]   1708 Comprehensive metabolic panel(!)  Mild hypokalemia other nonspecific findings [CD]   1827 US Lower Extremity Veins  Left  No DVT [CD]      ED Course User Index  [CD] Boris Stewart DO     Medical Decision Making  Findings concerning for osteoarthritis.  The patient is able to ambulate in the emergency department with no difficulty in his able to tolerate fluids.  I have placed an ambulatory referral to orthopedics to evaluate for the need for further workup and treatment.  Instructed patient to return immediately for any new or worsening symptoms and she verbalized understanding.    Amount and/or Complexity of Data Reviewed  Labs: ordered. Decision-making details documented in ED Course.     Details: CBC reviewed in his nonspecific  Radiology: ordered. Decision-making details documented in ED Course.     Details: X-ray of the knee reviewed shows evidence of osteoarthritis with no fracture dislocation noted    Risk  Prescription drug management.  Risk Details: Differential diagnosis includes but is not limited to:  Fracture, dislocation, DVT, arterial occlusion, CHF exacerbation, cellulitis, abscess                ED Medication(s):  Medications - No data to display    Discharge Medication List as of 3/24/2024  6:39 PM        START taking these medications    Details   orphenadrine (NORFLEX) 100 mg tablet Take 1 tablet (100 mg total) by mouth 2 (two) times daily., Starting Sun 3/24/2024, Normal              Follow-up Information       Schedule an appointment as soon as possible for a visit  with Chiquita Talley MD.    Specialty: Family Medicine  Contact information:  [address] [place] [id number] [phone]                                 Scribe Attestation:   Scribe #1: I performed the above scribed service and the documentation accurately describes the services I performed. I attest to the accuracy of the note.     Attending:   Physician Attestation Statement for Scribe #1: I, Boris Stewart DO, personally performed the services described in this documentation, as scribed by Ramón  Jerad, in my presence, and it is both accurate and complete.           Clinical Impression       ICD-10-CM ICD-9-CM   1. Knee pain  M25.569 719.46   2. Leg edema  R60.0 782.3       Disposition:   Disposition: Discharged  Condition: Stable         Boris Stewart,   03/27/24 1100

## 2024-03-26 DIAGNOSIS — M25.562 LEFT KNEE PAIN, UNSPECIFIED CHRONICITY: Primary | ICD-10-CM

## 2024-03-28 ENCOUNTER — OFFICE VISIT (OUTPATIENT)
Dept: SPORTS MEDICINE | Facility: CLINIC | Age: 69
End: 2024-03-28
Payer: MEDICARE

## 2024-03-28 ENCOUNTER — HOSPITAL ENCOUNTER (OUTPATIENT)
Dept: RADIOLOGY | Facility: HOSPITAL | Age: 69
Discharge: HOME | End: 2024-03-28
Attending: STUDENT IN AN ORGANIZED HEALTH CARE EDUCATION/TRAINING PROGRAM
Payer: MEDICARE

## 2024-03-28 DIAGNOSIS — M25.562 CHRONIC PAIN OF LEFT KNEE: ICD-10-CM

## 2024-03-28 DIAGNOSIS — M25.562 LEFT KNEE PAIN, UNSPECIFIED CHRONICITY: ICD-10-CM

## 2024-03-28 DIAGNOSIS — M25.462 EFFUSION OF LEFT KNEE: ICD-10-CM

## 2024-03-28 DIAGNOSIS — M17.12 PRIMARY OSTEOARTHRITIS OF LEFT KNEE: Primary | ICD-10-CM

## 2024-03-28 DIAGNOSIS — G89.29 CHRONIC PAIN OF LEFT KNEE: ICD-10-CM

## 2024-03-28 PROCEDURE — 20611 DRAIN/INJ JOINT/BURSA W/US: CPT | Mod: LT,S$GLB,, | Performed by: STUDENT IN AN ORGANIZED HEALTH CARE EDUCATION/TRAINING PROGRAM

## 2024-03-28 PROCEDURE — 4010F ACE/ARB THERAPY RXD/TAKEN: CPT | Mod: CPTII,S$GLB,, | Performed by: STUDENT IN AN ORGANIZED HEALTH CARE EDUCATION/TRAINING PROGRAM

## 2024-03-28 PROCEDURE — 99999 PR PBB SHADOW E&M-EST. PATIENT-LVL II: CPT | Mod: PBBFAC,,, | Performed by: STUDENT IN AN ORGANIZED HEALTH CARE EDUCATION/TRAINING PROGRAM

## 2024-03-28 PROCEDURE — 99204 OFFICE O/P NEW MOD 45 MIN: CPT | Mod: 25,S$GLB,, | Performed by: STUDENT IN AN ORGANIZED HEALTH CARE EDUCATION/TRAINING PROGRAM

## 2024-03-28 PROCEDURE — 73560 X-RAY EXAM OF KNEE 1 OR 2: CPT | Mod: TC,LT

## 2024-03-28 PROCEDURE — 73560 X-RAY EXAM OF KNEE 1 OR 2: CPT | Mod: 26,LT,, | Performed by: RADIOLOGY

## 2024-03-28 PROCEDURE — 1159F MED LIST DOCD IN RCRD: CPT | Mod: CPTII,S$GLB,, | Performed by: STUDENT IN AN ORGANIZED HEALTH CARE EDUCATION/TRAINING PROGRAM

## 2024-03-28 PROCEDURE — 1160F RVW MEDS BY RX/DR IN RCRD: CPT | Mod: CPTII,S$GLB,, | Performed by: STUDENT IN AN ORGANIZED HEALTH CARE EDUCATION/TRAINING PROGRAM

## 2024-03-28 RX ORDER — TRIAMCINOLONE ACETONIDE 40 MG/ML
40 INJECTION, SUSPENSION INTRA-ARTICULAR; INTRAMUSCULAR
Status: DISCONTINUED | OUTPATIENT
Start: 2024-03-28 | End: 2024-03-28 | Stop reason: HOSPADM

## 2024-03-28 RX ADMIN — TRIAMCINOLONE ACETONIDE 40 MG: 40 INJECTION, SUSPENSION INTRA-ARTICULAR; INTRAMUSCULAR at 03:03

## 2024-03-28 NOTE — PROCEDURES
Large Joint Aspiration/Injection: L supra patellar bursa    Date/Time: 3/28/2024 3:20 PM    Performed by: Wesley Nascimento MD  Authorized by: Wesley Nascimento MD    Consent Done?:  Yes (Verbal)  Indications:  Arthritis, pain and joint swelling  Site marked: the procedure site was marked    Timeout: prior to procedure the correct patient, procedure, and site was verified    Prep: patient was prepped and draped in usual sterile fashion    Local anesthetic:  Bupivacaine 0.5% without epinephrine and lidocaine 1% without epinephrine    Details:  Needle Size:  18 G  Ultrasonic Guidance for needle placement?: Yes    Images are saved and documented.  Approach:  Anterolateral  Location:  Knee  Site:  L supra patellar bursa  Medications:  40 mg triamcinolone acetonide 40 mg/mL  Aspirate amount (mL):  22  Aspirate:  Yellow  Patient tolerance:  Patient tolerated the procedure well with no immediate complications     Ultrasound guidance was used for needle localization. Images were saved and stored for documentation. The appropriate structures were visualized. Dynamic visualization of the needle was continuous throughout the procedures and maintained good position.

## 2024-03-28 NOTE — PATIENT INSTRUCTIONS
Assessment:  Anne Farmer is a 68 y.o. female No chief complaint on file.      Encounter Diagnoses   Name Primary?    Knee pain     Left knee pain, unspecified chronicity Yes        Plan:  Ultrasound guided left knee cortisone injection with possible aspiration   We discussed the proper protocols after the injection such as no submerging pools, baths tubs, or hot tubs for 24 hr.  Showering is okay today.  We also discussed that blood sugars can be elevated after an injection and asked patient to properly checked her sugars over the next few days and contact their PCP if there are any concerns.  We discussed red flags such as fevers, chills, red, warm, tender joint at the area of injection to please seek medical care immediately.    Apply topical diclofenac (Voltaren) up to 4 times a day to the affected area.  It can be bought over the counter at any local pharmacy.    Patient may ice every 2 hours for 15 minutes as needed to control pain and swelling.   Follow up as needed        General Arthritis info:    -shiny white stuff at end of a chicken bones is cartilage    -arthritis is wearing away of the cartilage that lines the end of your bones    -osteoarthritis is thought to be a wear and tear phenomenon    -symptoms are due to inflammation of joint causing stiffness, aching, and sometimes swelling    -occasionally sharp pain will occur causing a give way sensation    -Risk factors: genetic, weight, female > male, age    Treatment options:    -maintain healthy weight (every pound is 4 pounds of pressure on the knee)    -daily moderate exercise (walk, bike, swim 30 minutes per day) to keep joints moving    -daily strengthening exercises (through therapy or on own) to keep muscles supporting joint healthy and strong    -glucosamine 1500mg daily (look for USP label on bottle)    -tylenol as needed for pain (follow directions on the bottle)    -anti-inflammatory medication such as alleve may be helpful- take 1-2  tabs twice daily for 7 days. If it helps your pain, continue. If you do not feel any change, you may stop and then take it as needed.    (you may be given a once daily anti-inflammatory such as MOBIC. If given, avoid other anti-inflammatory medications such as advil, ibuprofen, motrin, naprosyn, alleve, etc)    -if swollen and painful, ice, decrease activity, and take anti-inflammatory daily for 5-7 days and if no relief call your doctor for further options    -consider cortisone injection (every 3-4 months at most)- anti- inflammatory steroid medication that can be injected directly into the joint to reduce inflammation    -consider hyaluronic acid injections (eufflexxa, hyalgan, synvisc, supartz) (every 6 months at most)- protein injection that helps decrease pain and irritability in the joint. It is best used to help prolong intervals between cortisone injections to minimize steroid injections. These are currently approved for knee injections. Discuss with your doctor if other joint involved. Call to seek approval prior to the injections.    -long-term treatment may include a total joint replacement (keep diary of good days and bad days, then evaluate as to when you are ready)      Follow-up: as needed.    Thank you for choosing [facility] and Dr. Wesley Nascimento for your orthopedic & sports medicine care. It is our goal to provide you with exceptional care that will help keep you healthy, active, and get you back in the game.    Please do not hesitate to reach out to us via email, phone, or MyChart with any questions, concerns, or feedback.    If you felt that you received exemplary care today, please consider leaving us feedback on [email or web address] at:  [email or web address]    If you are experiencing pain/discomfort ,or have questions after 5pm and would like to be connected to the [facility] on-call team, please call  this number and specify which Sports Medicine provider is treating you: [phone]

## 2024-03-28 NOTE — PROGRESS NOTES
Patient ID: Anne Farmer  YOB: 1955  MRN: [id number]    Chief Complaint: left knee/ leg pain      Referred By: self    History of Present Illness: Anne Farmer is a right-hand dominant 68 y.o. female who presents today with left knee pain. . The pain is at a severity of 9/10. The pain has been Constant since onset. Pertinent negatives include no numbness, no inability to bear weight, no loss of motion, no muscle weakness, no loss of sensation and no tingling.  Pt reports pain is exacerbated when she walks. Associated sxs include left knee pain and generalized leg pain. Patient denies any and all other sxs at this time. The symptoms are aggravated by activity, bearing weight and palpation. She has tried norflex, tylenol, and has had CSI in the past for the symptoms. The treatment provided no relief.      The patient is active in none.  Occupation: laundry      Past Medical History:   Past Medical History:   Diagnosis Date    Allergy     Arthritis     Cancer 2016    colon    CHF (congestive heart failure)     Colon cancer 2004    Colon cancer screening 2015    Diabetes mellitus type 2 in nonobese 3/9/2016    borderline    Diverticulosis     DM (diabetes mellitus) 2016    BS didn't check 2019    DM (diabetes mellitus) 2016    BS didn't check 2020    Hyperlipidemia 10/25/2016    Hypertension     Insomnia     Malignant neoplasm of colon 2021    Malignant neoplasm of lower-outer quadrant of left breast of female, estrogen receptor positive 2022     Past Surgical History:   Procedure Laterality Date    BREAST BIOPSY Left     BREAST LUMPECTOMY Left      SECTION      COLON SURGERY      COLONOSCOPY N/A 2015    Procedure: COLONOSCOPY;  Surgeon: Adela Sam MD;  Location: [facility];  Service: Endoscopy;  Laterality: N/A;    COLONOSCOPY N/A 2017    Procedure: COLONOSCOPY;  Surgeon: Chintan Flores MD;  Location: [facility];   Service: Endoscopy;  Laterality: N/A;    COLONOSCOPY N/A 06/30/2020    Procedure: COLONOSCOPY;  Surgeon: Grisel Atkins MD;  Location: [facility] ENDO;  Service: Endoscopy;  Laterality: N/A;    SENTINEL LYMPH NODE BIOPSY Left 07/05/2022    Procedure: BIOPSY, LYMPH NODE, SENTINEL;  Surgeon: Arvin Hernandez MD;  Location: [facility] OR;  Service: General;  Laterality: Left;     Family History   Problem Relation Age of Onset    Hypertension Mother     Diabetes Mother     Hypertension Father     Hypertension Sister     Hypertension Brother     Hypertension Sister     Hypertension Sister     Hypertension Sister     Hypertension Brother     Hypertension Brother      Social History     Socioeconomic History    Marital status: Single   Occupational History     Employer: [employer]   Tobacco Use    Smoking status: Never     Passive exposure: Never    Smokeless tobacco: Never   Substance and Sexual Activity    Alcohol use: Yes     Alcohol/week: 0.0 standard drinks of alcohol     Comment: weekends.       Drug use: No     Medication List with Changes/Refills   Current Medications    ALBUTEROL (PROVENTIL/VENTOLIN HFA) 90 MCG/ACTUATION INHALER    INHALE 1-2 PUFFS BY MOUTH EVERY 6 HOURS AS NEEDED FOR WHEEZE OR SHORTNESS OF BREATH    AMLODIPINE (NORVASC) 10 MG TABLET    TAKE 1 TABLET BY MOUTH EVERY DAY    AMOXICILLIN-CLAVULANATE 875-125MG (AUGMENTIN) 875-125 MG PER TABLET    Take 1 tablet by mouth every 12 (twelve) hours.    ANASTROZOLE (ARIMIDEX) 1 MG TAB    TAKE 1 TABLET BY MOUTH EVERY DAY    AZELASTINE (ASTELIN) 137 MCG (0.1 %) NASAL SPRAY    2 sprays (274 mcg total) by Nasal route 2 (two) times daily.    FLUTICASONE PROPIONATE (FLONASE) 50 MCG/ACTUATION NASAL SPRAY    2 sprays (100 mcg total) by Each Nostril route once daily.    HYDROCHLOROTHIAZIDE (HYDRODIURIL) 25 MG TABLET    TAKE 1 TABLET BY MOUTH EVERY DAY    HYDROCODONE-ACETAMINOPHEN (NORCO) 5-325 MG PER TABLET    Take 1 tablet by mouth every 6 (six) hours as needed  for Pain.    HYDROXYCHLOROQUINE (PLAQUENIL) 200 MG TABLET    TAKE 1 TABLET BY MOUTH TWICE A DAY    LEVOCETIRIZINE (XYZAL) 5 MG TABLET    Take 1 tablet (5 mg total) by mouth every evening.    LOSARTAN (COZAAR) 100 MG TABLET    TAKE 1 TABLET BY MOUTH EVERY DAY    MELOXICAM (MOBIC) 15 MG TABLET    Take 15 mg by mouth daily as needed.    MULTIVITAMIN (ONE DAILY MULTIVITAMIN) PER TABLET    Take 1 tablet by mouth once daily.    OMEPRAZOLE (PRILOSEC) 20 MG CAPSULE    TAKE 1 CAPSULE BY MOUTH EVERY DAY    ORPHENADRINE (NORFLEX) 100 MG TABLET    Take 1 tablet (100 mg total) by mouth 2 (two) times daily.    PRAVASTATIN (PRAVACHOL) 20 MG TABLET    TAKE 1 TABLET BY MOUTH EVERY DAY    TRAMADOL (ULTRAM) 50 MG TABLET    Take 1 tablet (50 mg total) by mouth every 6 (six) hours as needed for Pain.    TRAZODONE (DESYREL) 50 MG TABLET    TAKE 1 TABLET BY MOUTH EVERY DAY AT NIGHT     Review of patient's allergies indicates:  No Known Allergies    Physical Exam:   There is no height or weight on file to calculate BMI.    GENERAL: Well appearing, in no acute distress.  HEAD: Normocephalic and atraumatic.  ENT: External ears and nose grossly normal.  EYES: EOMI bilaterally  PULMONARY: Respirations are grossly even and non-labored.  NEURO: Awake, alert, and oriented x 3.  SKIN: No obvious rashes appreciated.  PSYCH: Mood & affect are appropriate.    Detailed MSK exam:     Left knee exam:   -ROM: extension 0, flexion 120  -TTP: Medial joint line, Lateral joint line, and Popliteal fossa  -effusion: moderate  -Patellar apprehension negative  -Monika test positive -  lateral  -stable to varus and valgus stress tests  -Lachman test negative, anterior drawer test negative, posterior drawer test negative        Imaging:  X-Ray Knee 1 or 2 View Left  Narrative: EXAM:  XR KNEE 1 OR 2 VIEW LEFT    CLINICAL HISTORY: Left knee pain.    FINDINGS: Crayne views bilateral knees.     No fracture is identified.  Joint alignment is anatomic.       The  patellofemoral joint spaces appear relatively well maintained.  Impression:   No significant abnormality.    Finalized on: 3/28/2024 3:29 PM By:  Cesar Whiteside MD  BRR# [id number]      2024-03-28 15:31:[id number]    [facility]        Relevant imaging results were reviewed and interpreted by me and per my read shows moderate arthritic changes left knee.  This was discussed with the patient and / or family today.     Assessment:  Anne Farmer is a 68 y.o. female presenting with chronic left knee pain.   History, physical and radiographs are consistent with a likely diagnosis of moderate OA, possible LMT.   Plan: Steroid injection given today (see separate procedure note for details). We discussed the proper protocols after the injection such as no submerging pools, baths tubs, or hot tubs for 24 hr.  Showering is okay today.  We also discussed that blood sugars can be elevated after an injection and asked patient to properly checked her sugars over the next few days and contact their PCP if there are any concerns.  We discussed red flags such as fevers, chills, red, warm, tender joint at the area of injection to please seek medical care immediately.   22 cc aspirated without difficulty. Consider gel injection if not improving. Continue conservative management for pain.   Follow up as needed. All questions answered.      Primary osteoarthritis of left knee  -     Large Joint Aspiration/Injection: L supra patellar bursa    Effusion of left knee  -     Large Joint Aspiration/Injection: L supra patellar bursa    Chronic pain of left knee  -     Ambulatory referral/consult to Orthopedics    Left knee pain, unspecified chronicity  -     Sports Medicine US - Guidance for Needle Placement         Ultrasound guidance was used for needle localization. Images were saved and stored for documentation. The appropriate structures were visualized. Dynamic visualization of the needle was continuous throughout the procedures and  maintained good position.      MEDICAL NECESSITY FOR VISCOSUPPLEMENTATION: After thorough evaluation of the patient, I have determined that visco-supplementation is medically necessary. The patient has painful degenerative changes of the knee with failure of conservative treatments including lifestyle modifications and rehabilitation exercises.  Oral analgesis/NSAIDs have not adequately controlled symptoms and there is radiographic evidence of Kellgren Bryant grade 2 or greater osteoarthritic changes, or in lack of radiographic evidence, there is arthroscopic or other evidence of chondrosis.     A copy of today's visit note has been sent to the referring provider.     Electronically signed:  Wesley Nascimento MD, MPH  03/28/2024  3:30 PM

## 2024-03-28 NOTE — PROCEDURES
Sports Medicine US - Guidance for Needle Placement    Date/Time: 3/28/2024 3:20 PM    Performed by: Wesley Nascimento MD  Authorized by: Wesley Nascimento MD  Preparation: Patient was prepped and draped in the usual sterile fashion.  Local anesthesia used: no    Anesthesia:  Local anesthesia used: no    Sedation:  Patient sedated: no    Patient tolerance: patient tolerated the procedure well with no immediate complications  Comments: Ultrasound guidance was used for needle localization. Images were saved and stored for documentation. The appropriate structures were visualized. Dynamic visualization of the needle was continuous throughout the procedures and maintained good position.

## 2024-04-26 ENCOUNTER — LAB VISIT (OUTPATIENT)
Dept: LAB | Facility: HOSPITAL | Age: 69
End: 2024-04-26
Attending: INTERNAL MEDICINE
Payer: MEDICARE

## 2024-04-26 ENCOUNTER — OFFICE VISIT (OUTPATIENT)
Dept: HEMATOLOGY/ONCOLOGY | Facility: CLINIC | Age: 69
End: 2024-04-26
Payer: MEDICARE

## 2024-04-26 VITALS
SYSTOLIC BLOOD PRESSURE: 159 MMHG | OXYGEN SATURATION: 96 % | BODY MASS INDEX: 27.02 KG/M2 | WEIGHT: 168.13 LBS | TEMPERATURE: 97 F | HEIGHT: 66 IN | HEART RATE: 56 BPM | DIASTOLIC BLOOD PRESSURE: 73 MMHG

## 2024-04-26 DIAGNOSIS — Z85.038 HISTORY OF COLON CANCER, STAGE I: Chronic | ICD-10-CM

## 2024-04-26 DIAGNOSIS — C50.512 MALIGNANT NEOPLASM OF LOWER-OUTER QUADRANT OF LEFT BREAST OF FEMALE, ESTROGEN RECEPTOR POSITIVE: ICD-10-CM

## 2024-04-26 DIAGNOSIS — Z17.0 MALIGNANT NEOPLASM OF LOWER-OUTER QUADRANT OF LEFT BREAST OF FEMALE, ESTROGEN RECEPTOR POSITIVE: ICD-10-CM

## 2024-04-26 DIAGNOSIS — Z85.038 HISTORY OF COLON CANCER, STAGE I: Primary | Chronic | ICD-10-CM

## 2024-04-26 LAB
ALBUMIN SERPL BCP-MCNC: 3.9 G/DL (ref 3.5–5.2)
ALP SERPL-CCNC: 61 U/L (ref 55–135)
ALT SERPL W/O P-5'-P-CCNC: 19 U/L (ref 10–44)
ANION GAP SERPL CALC-SCNC: 11 MMOL/L (ref 8–16)
AST SERPL-CCNC: 26 U/L (ref 10–40)
BASOPHILS # BLD AUTO: 0.05 K/UL (ref 0–0.2)
BASOPHILS NFR BLD: 0.9 % (ref 0–1.9)
BILIRUB SERPL-MCNC: 0.6 MG/DL (ref 0.1–1)
BUN SERPL-MCNC: 14 MG/DL (ref 8–23)
CALCIUM SERPL-MCNC: 10.1 MG/DL (ref 8.7–10.5)
CEA SERPL-MCNC: 4.4 NG/ML (ref 0–5)
CHLORIDE SERPL-SCNC: 103 MMOL/L (ref 95–110)
CO2 SERPL-SCNC: 20 MMOL/L (ref 23–29)
CREAT SERPL-MCNC: 0.8 MG/DL (ref 0.5–1.4)
DIFFERENTIAL METHOD BLD: ABNORMAL
EOSINOPHIL # BLD AUTO: 0.4 K/UL (ref 0–0.5)
EOSINOPHIL NFR BLD: 6.5 % (ref 0–8)
ERYTHROCYTE [DISTWIDTH] IN BLOOD BY AUTOMATED COUNT: 13.2 % (ref 11.5–14.5)
EST. GFR  (NO RACE VARIABLE): >60 ML/MIN/1.73 M^2
GLUCOSE SERPL-MCNC: 96 MG/DL (ref 70–110)
HCT VFR BLD AUTO: 34.2 % (ref 37–48.5)
HGB BLD-MCNC: 11.9 G/DL (ref 12–16)
IMM GRANULOCYTES # BLD AUTO: 0.02 K/UL (ref 0–0.04)
IMM GRANULOCYTES NFR BLD AUTO: 0.4 % (ref 0–0.5)
LYMPHOCYTES # BLD AUTO: 1.1 K/UL (ref 1–4.8)
LYMPHOCYTES NFR BLD: 19.5 % (ref 18–48)
MAGNESIUM SERPL-MCNC: 2 MG/DL (ref 1.6–2.6)
MCH RBC QN AUTO: 32.3 PG (ref 27–31)
MCHC RBC AUTO-ENTMCNC: 34.8 G/DL (ref 32–36)
MCV RBC AUTO: 93 FL (ref 82–98)
MONOCYTES # BLD AUTO: 0.4 K/UL (ref 0.3–1)
MONOCYTES NFR BLD: 8 % (ref 4–15)
NEUTROPHILS # BLD AUTO: 3.6 K/UL (ref 1.8–7.7)
NEUTROPHILS NFR BLD: 64.7 % (ref 38–73)
NRBC BLD-RTO: 0 /100 WBC
PHOSPHATE SERPL-MCNC: 3 MG/DL (ref 2.7–4.5)
PLATELET # BLD AUTO: 354 K/UL (ref 150–450)
PMV BLD AUTO: 8.3 FL (ref 9.2–12.9)
POTASSIUM SERPL-SCNC: 4 MMOL/L (ref 3.5–5.1)
PROT SERPL-MCNC: 8.3 G/DL (ref 6–8.4)
RBC # BLD AUTO: 3.68 M/UL (ref 4–5.4)
SODIUM SERPL-SCNC: 134 MMOL/L (ref 136–145)
WBC # BLD AUTO: 5.53 K/UL (ref 3.9–12.7)

## 2024-04-26 PROCEDURE — 3008F BODY MASS INDEX DOCD: CPT | Mod: CPTII,S$GLB,, | Performed by: INTERNAL MEDICINE

## 2024-04-26 PROCEDURE — 4010F ACE/ARB THERAPY RXD/TAKEN: CPT | Mod: CPTII,S$GLB,, | Performed by: INTERNAL MEDICINE

## 2024-04-26 PROCEDURE — 3078F DIAST BP <80 MM HG: CPT | Mod: CPTII,S$GLB,, | Performed by: INTERNAL MEDICINE

## 2024-04-26 PROCEDURE — 85025 COMPLETE CBC W/AUTO DIFF WBC: CPT | Performed by: INTERNAL MEDICINE

## 2024-04-26 PROCEDURE — 3077F SYST BP >= 140 MM HG: CPT | Mod: CPTII,S$GLB,, | Performed by: INTERNAL MEDICINE

## 2024-04-26 PROCEDURE — 99213 OFFICE O/P EST LOW 20 MIN: CPT | Mod: S$GLB,,, | Performed by: INTERNAL MEDICINE

## 2024-04-26 PROCEDURE — 82378 CARCINOEMBRYONIC ANTIGEN: CPT | Performed by: INTERNAL MEDICINE

## 2024-04-26 PROCEDURE — 3288F FALL RISK ASSESSMENT DOCD: CPT | Mod: CPTII,S$GLB,, | Performed by: INTERNAL MEDICINE

## 2024-04-26 PROCEDURE — 36415 COLL VENOUS BLD VENIPUNCTURE: CPT | Performed by: INTERNAL MEDICINE

## 2024-04-26 PROCEDURE — 1126F AMNT PAIN NOTED NONE PRSNT: CPT | Mod: CPTII,S$GLB,, | Performed by: INTERNAL MEDICINE

## 2024-04-26 PROCEDURE — 83735 ASSAY OF MAGNESIUM: CPT | Performed by: INTERNAL MEDICINE

## 2024-04-26 PROCEDURE — 1159F MED LIST DOCD IN RCRD: CPT | Mod: CPTII,S$GLB,, | Performed by: INTERNAL MEDICINE

## 2024-04-26 PROCEDURE — 1101F PT FALLS ASSESS-DOCD LE1/YR: CPT | Mod: CPTII,S$GLB,, | Performed by: INTERNAL MEDICINE

## 2024-04-26 PROCEDURE — 99999 PR PBB SHADOW E&M-EST. PATIENT-LVL IV: CPT | Mod: PBBFAC,,, | Performed by: INTERNAL MEDICINE

## 2024-04-26 PROCEDURE — 84100 ASSAY OF PHOSPHORUS: CPT | Performed by: INTERNAL MEDICINE

## 2024-04-26 PROCEDURE — 80053 COMPREHEN METABOLIC PANEL: CPT | Performed by: INTERNAL MEDICINE

## 2024-04-26 NOTE — PROGRESS NOTES
[facility]  [address] [place] [address]  Phone: [phone];  Fax: [phone]    Patient ID: Anne Farmer   Chief Complaint: Follow-up  MRN:  [id number]     Oncologic Diagnoses:    - History of Breast Cancer - Stage IA (pT2, pN0, cM0, G2, ER+, KY+, HER2-, Oncotype DX score: 8   - History of Colon Cancer, Stage I - 2004  Previous Treatment:  Lumpectomy 8/16/2022 and XRT completed 10/10/2022   Current Treatment:    - Arimidex  - Zometa  - Surveillance  Subjective   Anne Farmer is a 68 y.o. female who presents to clinic for follow up.    She reports good compliance with Arimidex.  She has no side effects. She is now doing self breast exams and has not had any lumps, bumps, or nipple discharge.  She denies changes in her bowel habits and BRBPR.  She is due for MMG in a couple of weeks and will get this scheduled.       Review of Systems   Constitutional:  Negative for activity change, appetite change, chills, diaphoresis, fatigue, fever and unexpected weight change.   HENT:  Negative for nosebleeds.    Respiratory:  Negative for shortness of breath.    Cardiovascular:  Negative for chest pain.   Gastrointestinal:  Negative for abdominal distention, abdominal pain, anal bleeding, blood in stool, constipation, diarrhea, nausea and vomiting.   Genitourinary:  Negative for difficulty urinating and hematuria.   Musculoskeletal:  Negative for arthralgias, back pain and myalgias.   Skin:  Negative for rash.   Neurological:  Negative for dizziness, weakness, light-headedness and headaches.   Hematological:  Does not bruise/bleed easily.   Psychiatric/Behavioral:  The patient is not nervous/anxious.      History     Oncology History   Malignant neoplasm of lower-outer quadrant of left breast of female, estrogen receptor positive   6/9/2022 Initial Diagnosis    Malignant neoplasm of lower-outer quadrant of left breast of female, estrogen receptor positive      2022 Cancer Staged    Staging form: Breast, AJCC 8th Edition  - Clinical stage from 2022: Stage IA (cT1c, cN0, cM0, G2, ER+, KY+, HER2-)     2022 Cancer Staged    Staging form: Breast, AJCC 8th Edition  - Pathologic stage from 2022: Stage IA (pT2, pN0, cM0, G2, ER+, KY+, HER2-, Oncotype DX score: 8)     2022 - 10/10/2022 Radiation Therapy    Treating physician: Niko  Total Dose: 40 Gy  Fractions: 15         Past Medical History:   Diagnosis Date    Allergy     Arthritis     Cancer 2016    colon    CHF (congestive heart failure)     Colon cancer 2004    Colon cancer screening 2015    Diabetes mellitus type 2 in nonobese 3/9/2016    borderline    Diverticulosis     DM (diabetes mellitus) 2016    BS didn't check 2019    DM (diabetes mellitus) 2016    BS didn't check 2020    Hyperlipidemia 10/25/2016    Hypertension     Insomnia     Malignant neoplasm of colon 2021    Malignant neoplasm of lower-outer quadrant of left breast of female, estrogen receptor positive 2022       Past Surgical History:   Procedure Laterality Date    BREAST BIOPSY Left     BREAST LUMPECTOMY Left      SECTION      COLON SURGERY      COLONOSCOPY N/A 2015    Procedure: COLONOSCOPY;  Surgeon: Adela Sam MD;  Location: [facility];  Service: Endoscopy;  Laterality: N/A;    COLONOSCOPY N/A 2017    Procedure: COLONOSCOPY;  Surgeon: Chintan Flores MD;  Location: [facility] ENDO;  Service: Endoscopy;  Laterality: N/A;    COLONOSCOPY N/A 2020    Procedure: COLONOSCOPY;  Surgeon: Grisel Atkins MD;  Location: [facility] ENDO;  Service: Endoscopy;  Laterality: N/A;    SENTINEL LYMPH NODE BIOPSY Left 2022    Procedure: BIOPSY, LYMPH NODE, SENTINEL;  Surgeon: Arvin Hernandez MD;  Location: [facility];  Service: General;  Laterality: Left;       Family History   Problem Relation Name Age of Onset    Hypertension Mother      Diabetes Mother      Hypertension Father       "Hypertension Sister      Hypertension Brother      Hypertension Sister      Hypertension Sister      Hypertension Sister      Hypertension Brother      Hypertension Brother         Review of patient's allergies indicates:  No Known Allergies    Social History     Tobacco Use    Smoking status: Never     Passive exposure: Never    Smokeless tobacco: Never   Substance Use Topics    Alcohol use: Yes     Alcohol/week: 0.0 standard drinks of alcohol     Comment: weekends.       Drug use: No       Physical Exam   ECOG:   ECOG SCORE    0 - Fully active-able to carry on all pre-disease performance without restriction          Vitals:  BP (!) 159/73   Pulse (!) 56   Temp 97.4 °F (36.3 °C) (Temporal)   Ht 5' 6" (1.676 m)   Wt 76.2 kg (168 lb 1.6 oz)   SpO2 96%   BMI 27.13 kg/m²     Physical Exam:  Physical Exam  Constitutional:       General: She is not in acute distress.     Appearance: Normal appearance. She is not ill-appearing.   HENT:      Head: Normocephalic and atraumatic.   Eyes:      Extraocular Movements: Extraocular movements intact.      Conjunctiva/sclera: Conjunctivae normal.   Cardiovascular:      Rate and Rhythm: Normal rate.   Pulmonary:      Effort: Pulmonary effort is normal. No respiratory distress.   Chest:   Breasts:     Right: No swelling, bleeding, inverted nipple, mass, nipple discharge, skin change or tenderness.      Left: No swelling, bleeding, inverted nipple, mass, nipple discharge, skin change or tenderness.   Abdominal:      Palpations: There is no hepatomegaly or splenomegaly.   Musculoskeletal:      Cervical back: Neck supple. No tenderness.   Lymphadenopathy:      Upper Body:      Right upper body: No supraclavicular or axillary adenopathy.      Left upper body: No supraclavicular or axillary adenopathy.   Skin:     Findings: No rash.   Neurological:      General: No focal deficit present.      Mental Status: She is alert and oriented to person, place, and time.   Psychiatric:         " Mood and Affect: Mood normal.         Behavior: Behavior normal.         Thought Content: Thought content normal.        Labs   Labs:  Lab Visit on 04/26/2024   Component Date Value Ref Range Status    WBC 04/26/2024 5.53  3.90 - 12.70 K/uL Final    RBC 04/26/2024 3.68 (L)  4.00 - 5.40 M/uL Final    Hemoglobin 04/26/2024 11.9 (L)  12.0 - 16.0 g/dL Final    Hematocrit 04/26/2024 34.2 (L)  37.0 - 48.5 % Final    MCV 04/26/2024 93  82 - 98 fL Final    MCH 04/26/2024 32.3 (H)  27.0 - 31.0 pg Final    MCHC 04/26/2024 34.8  32.0 - 36.0 g/dL Final    RDW 04/26/2024 13.2  11.5 - 14.5 % Final    Platelets 04/26/2024 354  150 - 450 K/uL Final    MPV 04/26/2024 8.3 (L)  9.2 - 12.9 fL Final    Immature Granulocytes 04/26/2024 0.4  0.0 - 0.5 % Final    Gran # (ANC) 04/26/2024 3.6  1.8 - 7.7 K/uL Final    Immature Grans (Abs) 04/26/2024 0.02  0.00 - 0.04 K/uL Final    Comment: Mild elevation in immature granulocytes is non specific and   can be seen in a variety of conditions including stress response,   acute inflammation, trauma and pregnancy. Correlation with other   laboratory and clinical findings is essential.      Lymph # 04/26/2024 1.1  1.0 - 4.8 K/uL Final    Mono # 04/26/2024 0.4  0.3 - 1.0 K/uL Final    Eos # 04/26/2024 0.4  0.0 - 0.5 K/uL Final    Baso # 04/26/2024 0.05  0.00 - 0.20 K/uL Final    nRBC 04/26/2024 0  0 /100 WBC Final    Gran % 04/26/2024 64.7  38.0 - 73.0 % Final    Lymph % 04/26/2024 19.5  18.0 - 48.0 % Final    Mono % 04/26/2024 8.0  4.0 - 15.0 % Final    Eosinophil % 04/26/2024 6.5  0.0 - 8.0 % Final    Basophil % 04/26/2024 0.9  0.0 - 1.9 % Final    Differential Method 04/26/2024 Automated   Final        Assessment and Plan   History of Breast Cancer - Stage IA (pT2, pN0, cM0, G2, ER+, MA+, HER2-, Oncotype DX score: 8    Last MMG 05/2023: BIRADS-2  Last DXA showed no evidence of significant bone density loss   Continue with Aromatase Inhibitor daily (pt notes no side effects) and prophylactic Zometa  q6m  MMG pending    History of Colon Cancer, Stage I   Laparoscopic verus open right hemicolectomy 11/5/15   Next surveillance colonoscopy due in 06/2025  Recommend she consider genetic counseling given history of two malignancies      Cancer Screening  PAP Smear: Last in 2010? and normal  Colonoscopy 06/30/20: no specimens collected; repeat in 5 years (06/2025)      Chronic Medical Conditions  HTN  DM II  Hypertensive cardiomyopathy  Sarcoidosis  DDD  Restrictive Lung Disease  Vitamin D Deficiency        Med Onc Chart Routing      Follow up with physician    Follow up with JEFF 3 months. [place]   Infusion scheduling note   Zometa in 3 months   Injection scheduling note    Labs CBC, CMP, phosphorus and magnesium   Scheduling:  Preferred lab:  Lab interval:     Imaging    Pharmacy appointment    Other referrals                      The patient was seen, interviewed and examined. Pertinent lab and radiologic studies were reviewed. Pt instructed to call should they develop concerning signs/symptoms or have further questions.        Portions of the record may have been created with voice recognition software. Occasional wrong-word or sound-a-like substitutions may have occurred due to the inherent limitations of voice recognition software. Read the chart carefully and recognize, using context, where substitutions have occurred.      Nakia Lay MD    Hematology/Oncology

## 2024-04-29 ENCOUNTER — OFFICE VISIT (OUTPATIENT)
Dept: FAMILY MEDICINE | Facility: CLINIC | Age: 69
End: 2024-04-29
Attending: FAMILY MEDICINE
Payer: MEDICARE

## 2024-04-29 VITALS
BODY MASS INDEX: 27.25 KG/M2 | WEIGHT: 169.56 LBS | HEART RATE: 56 BPM | TEMPERATURE: 100 F | DIASTOLIC BLOOD PRESSURE: 72 MMHG | HEIGHT: 66 IN | SYSTOLIC BLOOD PRESSURE: 142 MMHG | OXYGEN SATURATION: 95 %

## 2024-04-29 DIAGNOSIS — J30.9 ALLERGIC RHINITIS, UNSPECIFIED SEASONALITY, UNSPECIFIED TRIGGER: ICD-10-CM

## 2024-04-29 DIAGNOSIS — J01.00 ACUTE MAXILLARY SINUSITIS, RECURRENCE NOT SPECIFIED: Primary | ICD-10-CM

## 2024-04-29 DIAGNOSIS — I10 PRIMARY HYPERTENSION: ICD-10-CM

## 2024-04-29 PROBLEM — R94.2 ABNORMAL DIFFUSION CAPACITY DETERMINED BY PULMONARY FUNCTION TEST: Status: RESOLVED | Noted: 2019-07-31 | Resolved: 2024-04-29

## 2024-04-29 PROCEDURE — 1159F MED LIST DOCD IN RCRD: CPT | Mod: CPTII,S$GLB,, | Performed by: FAMILY MEDICINE

## 2024-04-29 PROCEDURE — 3078F DIAST BP <80 MM HG: CPT | Mod: CPTII,S$GLB,, | Performed by: FAMILY MEDICINE

## 2024-04-29 PROCEDURE — 3077F SYST BP >= 140 MM HG: CPT | Mod: CPTII,S$GLB,, | Performed by: FAMILY MEDICINE

## 2024-04-29 PROCEDURE — 1126F AMNT PAIN NOTED NONE PRSNT: CPT | Mod: CPTII,S$GLB,, | Performed by: FAMILY MEDICINE

## 2024-04-29 PROCEDURE — 4010F ACE/ARB THERAPY RXD/TAKEN: CPT | Mod: CPTII,S$GLB,, | Performed by: FAMILY MEDICINE

## 2024-04-29 PROCEDURE — 99999 PR PBB SHADOW E&M-EST. PATIENT-LVL IV: CPT | Mod: PBBFAC,,, | Performed by: FAMILY MEDICINE

## 2024-04-29 PROCEDURE — 99214 OFFICE O/P EST MOD 30 MIN: CPT | Mod: S$GLB,,, | Performed by: FAMILY MEDICINE

## 2024-04-29 PROCEDURE — 3008F BODY MASS INDEX DOCD: CPT | Mod: CPTII,S$GLB,, | Performed by: FAMILY MEDICINE

## 2024-04-29 RX ORDER — FLUTICASONE PROPIONATE 50 MCG
2 SPRAY, SUSPENSION (ML) NASAL DAILY
Qty: 48 ML | Refills: 3 | Status: SHIPPED | OUTPATIENT
Start: 2024-04-29

## 2024-04-29 RX ORDER — AMOXICILLIN AND CLAVULANATE POTASSIUM 875; 125 MG/1; MG/1
1 TABLET, FILM COATED ORAL EVERY 12 HOURS
Qty: 20 TABLET | Refills: 0 | Status: ON HOLD | OUTPATIENT
Start: 2024-04-29 | End: 2024-06-12 | Stop reason: HOSPADM

## 2024-04-29 RX ORDER — PROMETHAZINE HYDROCHLORIDE AND DEXTROMETHORPHAN HYDROBROMIDE 6.25; 15 MG/5ML; MG/5ML
5 SYRUP ORAL EVERY 4 HOURS PRN
Qty: 118 ML | Refills: 0 | Status: SHIPPED | OUTPATIENT
Start: 2024-04-29 | End: 2024-05-09

## 2024-04-29 NOTE — PROGRESS NOTES
Subjective:       Patient ID: Anne Farmer is a 68 y.o. female.    Chief Complaint: Sinus Problem (Nasal congestion and cough x 1 week) and Nausea    68 y old female with Nasal congestion and productive cough for 1 w. Using her prescribed medication + OTC decongestant . No sob . No sick contacts     Sinus Problem  Associated symptoms include coughing, sinus pressure and sneezing.   Nausea  Associated symptoms include coughing and nausea.     Review of Systems   Constitutional: Negative.    HENT:  Positive for sinus pressure and sneezing.    Eyes: Negative.    Respiratory:  Positive for cough.    Cardiovascular: Negative.    Gastrointestinal:  Positive for nausea.   Genitourinary: Negative.    Musculoskeletal: Negative.    Skin: Negative.    Hematological: Negative.        Objective:      Physical Exam  Vitals and nursing note reviewed.   Constitutional:       General: She is not in acute distress.     Appearance: She is well-developed. She is not diaphoretic.   HENT:      Head: Normocephalic and atraumatic.      Right Ear: External ear normal.      Left Ear: External ear normal.      Nose: Nose normal.   Eyes:      General: No scleral icterus.        Left eye: No discharge.      Conjunctiva/sclera: Conjunctivae normal.      Pupils: Pupils are equal, round, and reactive to light.   Neck:      Thyroid: No thyromegaly.      Vascular: No JVD.      Trachea: No tracheal deviation.   Cardiovascular:      Rate and Rhythm: Normal rate and regular rhythm.      Heart sounds: Normal heart sounds. No murmur heard.     No friction rub. No gallop.   Pulmonary:      Effort: Pulmonary effort is normal. No respiratory distress.      Breath sounds: Normal breath sounds. No wheezing or rales.   Chest:      Chest wall: No tenderness.   Abdominal:      General: Bowel sounds are normal. There is no distension.      Palpations: Abdomen is soft. There is no mass.      Tenderness: There is no abdominal tenderness. There is no  guarding.   Musculoskeletal:      Cervical back: Normal range of motion and neck supple.   Lymphadenopathy:      Cervical: No cervical adenopathy.         Assessment:     Anne was seen today for sinus problem and nausea.    Diagnoses and all orders for this visit:    Acute maxillary sinusitis, recurrence not specified    Allergic rhinitis, unspecified seasonality, unspecified trigger  -     fluticasone propionate (FLONASE) 50 mcg/actuation nasal spray; 2 sprays (100 mcg total) by Each Nostril route once daily.    Primary hypertension    Other orders  -     promethazine-dextromethorphan (PROMETHAZINE-DM) 6.25-15 mg/5 mL Syrp; Take 5 mLs by mouth every 4 (four) hours as needed.  -     amoxicillin-clavulanate 875-125mg (AUGMENTIN) 875-125 mg per tablet; Take 1 tablet by mouth every 12 (twelve) hours.           Plan:   Call or return to clinic prn if these symptoms worsen or fail to improve as anticipated.   Flonase    Uncontrolled BP . Refrain from taking Decongestant   F.u in 1-2 w for chronic care .

## 2024-05-10 ENCOUNTER — LAB VISIT (OUTPATIENT)
Dept: LAB | Facility: HOSPITAL | Age: 69
End: 2024-05-10
Attending: NURSE PRACTITIONER
Payer: MEDICARE

## 2024-05-10 ENCOUNTER — OFFICE VISIT (OUTPATIENT)
Dept: FAMILY MEDICINE | Facility: CLINIC | Age: 69
End: 2024-05-10
Payer: MEDICARE

## 2024-05-10 VITALS
HEIGHT: 66 IN | OXYGEN SATURATION: 96 % | TEMPERATURE: 99 F | WEIGHT: 166.44 LBS | DIASTOLIC BLOOD PRESSURE: 68 MMHG | HEART RATE: 74 BPM | BODY MASS INDEX: 26.75 KG/M2 | SYSTOLIC BLOOD PRESSURE: 130 MMHG

## 2024-05-10 DIAGNOSIS — R55 SYNCOPE AND COLLAPSE: ICD-10-CM

## 2024-05-10 DIAGNOSIS — R42 DIZZINESS: Primary | ICD-10-CM

## 2024-05-10 DIAGNOSIS — E11.69 DM TYPE 2 WITH DIABETIC DYSLIPIDEMIA: ICD-10-CM

## 2024-05-10 DIAGNOSIS — R42 DIZZINESS: ICD-10-CM

## 2024-05-10 DIAGNOSIS — E78.5 DM TYPE 2 WITH DIABETIC DYSLIPIDEMIA: ICD-10-CM

## 2024-05-10 DIAGNOSIS — Z12.31 ENCOUNTER FOR SCREENING MAMMOGRAM FOR MALIGNANT NEOPLASM OF BREAST: ICD-10-CM

## 2024-05-10 LAB
ALBUMIN SERPL BCP-MCNC: 3.9 G/DL (ref 3.5–5.2)
ALP SERPL-CCNC: 49 U/L (ref 55–135)
ALT SERPL W/O P-5'-P-CCNC: 22 U/L (ref 10–44)
ANION GAP SERPL CALC-SCNC: 12 MMOL/L (ref 8–16)
AST SERPL-CCNC: 30 U/L (ref 10–40)
BACTERIA #/AREA URNS AUTO: ABNORMAL /HPF
BASOPHILS # BLD AUTO: 0.05 K/UL (ref 0–0.2)
BASOPHILS NFR BLD: 0.8 % (ref 0–1.9)
BILIRUB SERPL-MCNC: 0.4 MG/DL (ref 0.1–1)
BILIRUB UR QL STRIP: NEGATIVE
BUN SERPL-MCNC: 12 MG/DL (ref 8–23)
CALCIUM SERPL-MCNC: 10.8 MG/DL (ref 8.7–10.5)
CAOX CRY UR QL COMP ASSIST: ABNORMAL
CHLORIDE SERPL-SCNC: 97 MMOL/L (ref 95–110)
CLARITY UR REFRACT.AUTO: ABNORMAL
CO2 SERPL-SCNC: 25 MMOL/L (ref 23–29)
COLOR UR AUTO: YELLOW
CREAT SERPL-MCNC: 0.9 MG/DL (ref 0.5–1.4)
DIFFERENTIAL METHOD BLD: ABNORMAL
EOSINOPHIL # BLD AUTO: 0.2 K/UL (ref 0–0.5)
EOSINOPHIL NFR BLD: 3.9 % (ref 0–8)
ERYTHROCYTE [DISTWIDTH] IN BLOOD BY AUTOMATED COUNT: 13.2 % (ref 11.5–14.5)
EST. GFR  (NO RACE VARIABLE): >60 ML/MIN/1.73 M^2
ESTIMATED AVG GLUCOSE: 146 MG/DL (ref 68–131)
GLUCOSE SERPL-MCNC: 150 MG/DL (ref 70–110)
GLUCOSE UR QL STRIP: NEGATIVE
HBA1C MFR BLD: 6.7 % (ref 4–5.6)
HCT VFR BLD AUTO: 37.6 % (ref 37–48.5)
HGB BLD-MCNC: 12.5 G/DL (ref 12–16)
HGB UR QL STRIP: NEGATIVE
HYALINE CASTS UR QL AUTO: 47 /LPF
IMM GRANULOCYTES # BLD AUTO: 0.02 K/UL (ref 0–0.04)
IMM GRANULOCYTES NFR BLD AUTO: 0.3 % (ref 0–0.5)
KETONES UR QL STRIP: ABNORMAL
LEUKOCYTE ESTERASE UR QL STRIP: NEGATIVE
LYMPHOCYTES # BLD AUTO: 1 K/UL (ref 1–4.8)
LYMPHOCYTES NFR BLD: 16 % (ref 18–48)
MCH RBC QN AUTO: 31.7 PG (ref 27–31)
MCHC RBC AUTO-ENTMCNC: 33.2 G/DL (ref 32–36)
MCV RBC AUTO: 95 FL (ref 82–98)
MICROSCOPIC COMMENT: ABNORMAL
MONOCYTES # BLD AUTO: 0.7 K/UL (ref 0.3–1)
MONOCYTES NFR BLD: 10.9 % (ref 4–15)
NEUTROPHILS # BLD AUTO: 4.1 K/UL (ref 1.8–7.7)
NEUTROPHILS NFR BLD: 68.1 % (ref 38–73)
NITRITE UR QL STRIP: NEGATIVE
NRBC BLD-RTO: 0 /100 WBC
PH UR STRIP: 6 [PH] (ref 5–8)
PLATELET # BLD AUTO: 344 K/UL (ref 150–450)
PMV BLD AUTO: 8.9 FL (ref 9.2–12.9)
POTASSIUM SERPL-SCNC: 3.3 MMOL/L (ref 3.5–5.1)
PROT SERPL-MCNC: 8.2 G/DL (ref 6–8.4)
PROT UR QL STRIP: ABNORMAL
RBC # BLD AUTO: 3.94 M/UL (ref 4–5.4)
RBC #/AREA URNS AUTO: 23 /HPF (ref 0–4)
SODIUM SERPL-SCNC: 134 MMOL/L (ref 136–145)
SP GR UR STRIP: >1.03 (ref 1–1.03)
SQUAMOUS #/AREA URNS AUTO: 7 /HPF
TSH SERPL DL<=0.005 MIU/L-ACNC: 2.22 UIU/ML (ref 0.4–4)
URN SPEC COLLECT METH UR: ABNORMAL
WBC # BLD AUTO: 6.08 K/UL (ref 3.9–12.7)
WBC #/AREA URNS AUTO: 6 /HPF (ref 0–5)

## 2024-05-10 PROCEDURE — 3288F FALL RISK ASSESSMENT DOCD: CPT | Mod: CPTII,S$GLB,, | Performed by: NURSE PRACTITIONER

## 2024-05-10 PROCEDURE — 93010 ELECTROCARDIOGRAM REPORT: CPT | Mod: S$GLB,,, | Performed by: INTERNAL MEDICINE

## 2024-05-10 PROCEDURE — 93005 ELECTROCARDIOGRAM TRACING: CPT | Mod: S$GLB,,, | Performed by: NURSE PRACTITIONER

## 2024-05-10 PROCEDURE — 3008F BODY MASS INDEX DOCD: CPT | Mod: CPTII,S$GLB,, | Performed by: NURSE PRACTITIONER

## 2024-05-10 PROCEDURE — 85025 COMPLETE CBC W/AUTO DIFF WBC: CPT | Performed by: NURSE PRACTITIONER

## 2024-05-10 PROCEDURE — 83036 HEMOGLOBIN GLYCOSYLATED A1C: CPT | Performed by: NURSE PRACTITIONER

## 2024-05-10 PROCEDURE — 1126F AMNT PAIN NOTED NONE PRSNT: CPT | Mod: CPTII,S$GLB,, | Performed by: NURSE PRACTITIONER

## 2024-05-10 PROCEDURE — 84443 ASSAY THYROID STIM HORMONE: CPT | Performed by: NURSE PRACTITIONER

## 2024-05-10 PROCEDURE — 1160F RVW MEDS BY RX/DR IN RCRD: CPT | Mod: CPTII,S$GLB,, | Performed by: NURSE PRACTITIONER

## 2024-05-10 PROCEDURE — 1101F PT FALLS ASSESS-DOCD LE1/YR: CPT | Mod: CPTII,S$GLB,, | Performed by: NURSE PRACTITIONER

## 2024-05-10 PROCEDURE — 3078F DIAST BP <80 MM HG: CPT | Mod: CPTII,S$GLB,, | Performed by: NURSE PRACTITIONER

## 2024-05-10 PROCEDURE — 80053 COMPREHEN METABOLIC PANEL: CPT | Performed by: NURSE PRACTITIONER

## 2024-05-10 PROCEDURE — 3075F SYST BP GE 130 - 139MM HG: CPT | Mod: CPTII,S$GLB,, | Performed by: NURSE PRACTITIONER

## 2024-05-10 PROCEDURE — 36415 COLL VENOUS BLD VENIPUNCTURE: CPT | Mod: PO | Performed by: NURSE PRACTITIONER

## 2024-05-10 PROCEDURE — 99999 PR PBB SHADOW E&M-EST. PATIENT-LVL V: CPT | Mod: PBBFAC,,, | Performed by: NURSE PRACTITIONER

## 2024-05-10 PROCEDURE — 4010F ACE/ARB THERAPY RXD/TAKEN: CPT | Mod: CPTII,S$GLB,, | Performed by: NURSE PRACTITIONER

## 2024-05-10 PROCEDURE — 99215 OFFICE O/P EST HI 40 MIN: CPT | Mod: S$GLB,,, | Performed by: NURSE PRACTITIONER

## 2024-05-10 PROCEDURE — 1159F MED LIST DOCD IN RCRD: CPT | Mod: CPTII,S$GLB,, | Performed by: NURSE PRACTITIONER

## 2024-05-10 PROCEDURE — 81001 URINALYSIS AUTO W/SCOPE: CPT | Performed by: NURSE PRACTITIONER

## 2024-05-10 NOTE — PROGRESS NOTES
Subjective:       Patient ID: Anne Farmer is a 68 y.o. female.    Chief Complaint: Dizziness  Pt reports to clinic with chief complaint of dizziness and near syncope.  Present for nearly 1 week.  Worsens with standing.  No palpitations, chest pain, headache or vision changes.  Does not resolve with rest.   Denies hypoglycemic episodes.  Mild anemia noted on previous labs    Past Medical History:   Diagnosis Date    Allergy     Arthritis     Cancer 2016    colon    CHF (congestive heart failure)     Colon cancer 2004    Colon cancer screening 9/21/2015    Diabetes mellitus type 2 in nonobese 3/9/2016    borderline    Diverticulosis     DM (diabetes mellitus) 03/2016    BS didn't check 02/13/2019    DM (diabetes mellitus) 03/2016    BS didn't check 03/04/2020    Hyperlipidemia 10/25/2016    Hypertension     Insomnia     Malignant neoplasm of colon 5/13/2021    Malignant neoplasm of lower-outer quadrant of left breast of female, estrogen receptor positive 6/9/2022      Current Outpatient Medications on File Prior to Visit   Medication Sig Dispense Refill    albuterol (PROVENTIL/VENTOLIN HFA) 90 mcg/actuation inhaler INHALE 1-2 PUFFS BY MOUTH EVERY 6 HOURS AS NEEDED FOR WHEEZE OR SHORTNESS OF BREATH 18 g 3    amLODIPine (NORVASC) 10 MG tablet TAKE 1 TABLET BY MOUTH EVERY DAY 90 tablet 3    amoxicillin-clavulanate 875-125mg (AUGMENTIN) 875-125 mg per tablet Take 1 tablet by mouth every 12 (twelve) hours. 20 tablet 0    anastrozole (ARIMIDEX) 1 mg Tab TAKE 1 TABLET BY MOUTH EVERY DAY 90 tablet 1    azelastine (ASTELIN) 137 mcg (0.1 %) nasal spray 2 sprays (274 mcg total) by Nasal route 2 (two) times daily. 30 mL 0    fluticasone propionate (FLONASE) 50 mcg/actuation nasal spray 2 sprays (100 mcg total) by Each Nostril route once daily. 48 mL 3    hydroCHLOROthiazide (HYDRODIURIL) 25 MG tablet TAKE 1 TABLET BY MOUTH EVERY DAY 7 tablet 0    hydrOXYchloroQUINE (PLAQUENIL) 200 mg tablet TAKE 1 TABLET BY MOUTH TWICE A  DAY 60 tablet 3    levocetirizine (XYZAL) 5 MG tablet Take 1 tablet (5 mg total) by mouth every evening. 90 tablet 2    losartan (COZAAR) 100 MG tablet TAKE 1 TABLET BY MOUTH EVERY DAY 90 tablet 2    meloxicam (MOBIC) 15 MG tablet Take 15 mg by mouth daily as needed.      multivitamin (ONE DAILY MULTIVITAMIN) per tablet Take 1 tablet by mouth once daily.      omeprazole (PRILOSEC) 20 MG capsule TAKE 1 CAPSULE BY MOUTH EVERY DAY 90 capsule 3    orphenadrine (NORFLEX) 100 mg tablet Take 1 tablet (100 mg total) by mouth 2 (two) times daily. 10 tablet 0    pravastatin (PRAVACHOL) 20 MG tablet TAKE 1 TABLET BY MOUTH EVERY DAY 90 tablet 0    traZODone (DESYREL) 50 MG tablet TAKE 1 TABLET BY MOUTH EVERY DAY AT NIGHT 90 tablet 3    [] promethazine-dextromethorphan (PROMETHAZINE-DM) 6.25-15 mg/5 mL Syrp Take 5 mLs by mouth every 4 (four) hours as needed. 118 mL 0     No current facility-administered medications on file prior to visit.      Dizziness:   Chronicity:  New  Onset:  In the past 7 days  Progression since onset:  Unchanged  Frequency:  Constantly  Dizziness characteristics:  Lightheaded/impending faint   Associated symptoms: weakness, light-headedness and syncope.no headaches, no tinnitus, no diaphoresis, no palpitations, no numbness in extremities and no chest pain.  Aggravated by:  Getting up    Review of Systems   Constitutional:  Negative for diaphoresis.   HENT:  Negative for tinnitus.    Respiratory: Negative.     Cardiovascular:  Positive for syncope. Negative for chest pain and palpitations.   Gastrointestinal: Negative.    Genitourinary: Negative.    Musculoskeletal: Negative.    Neurological:  Positive for dizziness, weakness and light-headedness. Negative for headaches.   Psychiatric/Behavioral: Negative.         Objective:      Physical Exam  Vitals reviewed.   Constitutional:       Appearance: She is well-developed.   HENT:      Head: Normocephalic.      Mouth/Throat:      Pharynx: No  oropharyngeal exudate.   Eyes:      Pupils: Pupils are equal, round, and reactive to light.   Neck:      Vascular: No JVD.      Trachea: No tracheal deviation.   Cardiovascular:      Rate and Rhythm: Normal rate and regular rhythm.      Heart sounds: Normal heart sounds. No murmur heard.  Pulmonary:      Effort: Pulmonary effort is normal. No respiratory distress.      Breath sounds: Normal breath sounds.   Abdominal:      General: Bowel sounds are normal. There is no distension.      Palpations: Abdomen is soft.      Tenderness: There is no abdominal tenderness.   Musculoskeletal:         General: Normal range of motion.      Cervical back: Normal range of motion.   Skin:     General: Skin is warm and dry.   Neurological:      Mental Status: She is alert and oriented to person, place, and time.      Deep Tendon Reflexes: Reflexes are normal and symmetric.   Psychiatric:         Speech: Speech normal.         Behavior: Behavior normal.         Assessment:       1. Dizziness    2. DM type 2 with diabetic dyslipidemia    3. Encounter for screening mammogram for malignant neoplasm of breast    4. Syncope and collapse        Plan:   Dizziness  -     IN OFFICE EKG 12-LEAD (to Muse)  -     CBC Auto Differential; Future; Expected date: 05/10/2024  -     Comprehensive Metabolic Panel; Future; Expected date: 05/10/2024  -     Urinalysis; Future; Expected date: 05/10/2024  -     US Carotid Bilateral; Future; Expected date: 05/10/2024    DM type 2 with diabetic dyslipidemia  -     TSH; Future; Expected date: 05/10/2024  -     Hemoglobin A1C; Future; Expected date: 05/10/2024  -     Ambulatory referral/consult to Optometry; Future; Expected date: 05/17/2024    Encounter for screening mammogram for malignant neoplasm of breast  -     Mammo Digital Screening Bilat w/ Frankie; Future; Expected date: 05/10/2024    Syncope and collapse  -     US Carotid Bilateral; Future; Expected date: 05/10/2024    Time spent: 45 minutes in face to  face discussion concerning diagnosis, prognosis, review of lab and test results, benefits of treatment as well as management of disease, counseling of patient and coordination of care between various health care providers . Greater than half the time spent was used for coordination of care and counseling of patient.     No follow-ups on file.

## 2024-05-13 ENCOUNTER — APPOINTMENT (OUTPATIENT)
Dept: RADIOLOGY | Facility: HOSPITAL | Age: 69
End: 2024-05-13
Attending: NURSE PRACTITIONER
Payer: MEDICARE

## 2024-05-13 DIAGNOSIS — R55 SYNCOPE AND COLLAPSE: ICD-10-CM

## 2024-05-13 DIAGNOSIS — R42 DIZZINESS: ICD-10-CM

## 2024-05-13 LAB
OHS QRS DURATION: 132 MS
OHS QTC CALCULATION: 358 MS

## 2024-05-13 PROCEDURE — 93880 EXTRACRANIAL BILAT STUDY: CPT | Mod: 26,,, | Performed by: RADIOLOGY

## 2024-05-13 PROCEDURE — 93880 EXTRACRANIAL BILAT STUDY: CPT | Mod: TC,PO

## 2024-05-14 DIAGNOSIS — R42 DIZZINESS: Primary | ICD-10-CM

## 2024-05-15 NOTE — PROGRESS NOTES
Subjective:   Patient ID:  Anne Farmer is a 68 y.o. female who presents for cardiac consult of Shortness of Breath and Dizziness      Referral by: Facundo  [address] [place] [id number]     Reason for consult: dizziness       HPI  The patient came in today for cardiac consult of Shortness of Breath and Dizziness    5/16/24  Anne Farmer is a 68 y.o. female pt with HTN, HLD, HFPEF, aortic atherosc, sarcoidosis, chronic restrictive lung diseaes, DM2 presents for CVD eval of dizziness.     BP stable. HR 50s. BMI 26 - 165   She has been getting more dizzy and lightheaded along with LOZADA.   Last BNP elevated - takes HCTZ but still has more edema, and SOB.     She had prior nuc stress in 2016, will need further CV eval - ECHO, nuc stress once euvolemic.       ECG  Sinus bradycardia with sinus arrhythmia   Right bundle branch block   Left anterior fascicular block    Bifascicular block   LVH with repolarization abnormality   Cannot rule out Septal infarct ,age undetermined   Abnormal ECG   When compared with ECG of 16-JUN-2022 12:33,   Minimal criteria for Septal infarct are now Present   T wave inversion now evident in Lateral leads   Confirmed by REJI LOPEZ MD ([id number]) on 5/13/2024 3:43:46 PM     No cardiac monitor results found for the past 12 months         Past Medical History:   Diagnosis Date    Allergy     Arthritis     Cancer 2016    colon    CHF (congestive heart failure)     Colon cancer 2004    Colon cancer screening 9/21/2015    Diabetes mellitus type 2 in nonobese 3/9/2016    borderline    Diverticulosis     DM (diabetes mellitus) 03/2016    BS didn't check 02/13/2019    DM (diabetes mellitus) 03/2016    BS didn't check 03/04/2020    Hyperlipidemia 10/25/2016    Hypertension     Insomnia     Malignant neoplasm of colon 5/13/2021    Malignant neoplasm of lower-outer quadrant of left breast of female, estrogen receptor positive 6/9/2022       Past Surgical History:   Procedure  Laterality Date    BREAST BIOPSY Left     BREAST LUMPECTOMY Left      SECTION      COLON SURGERY      COLONOSCOPY N/A 2015    Procedure: COLONOSCOPY;  Surgeon: Adela Sam MD;  Location: [facility] ENDO;  Service: Endoscopy;  Laterality: N/A;    COLONOSCOPY N/A 2017    Procedure: COLONOSCOPY;  Surgeon: Chintan Flores MD;  Location: [facility] ENDO;  Service: Endoscopy;  Laterality: N/A;    COLONOSCOPY N/A 2020    Procedure: COLONOSCOPY;  Surgeon: Grisel Atkins MD;  Location: [facility] ENDO;  Service: Endoscopy;  Laterality: N/A;    SENTINEL LYMPH NODE BIOPSY Left 2022    Procedure: BIOPSY, LYMPH NODE, SENTINEL;  Surgeon: Arvin Hernandez MD;  Location: [facility] OR;  Service: General;  Laterality: Left;       Social History     Tobacco Use    Smoking status: Never     Passive exposure: Never    Smokeless tobacco: Never   Substance Use Topics    Alcohol use: Yes     Alcohol/week: 0.0 standard drinks of alcohol     Comment: weekends.       Drug use: No       Family History   Problem Relation Name Age of Onset    Hypertension Mother      Diabetes Mother      Hypertension Father      Hypertension Sister      Hypertension Brother      Hypertension Sister      Hypertension Sister      Hypertension Sister      Hypertension Brother      Hypertension Brother         Patient's Medications   New Prescriptions    FUROSEMIDE (LASIX) 20 MG TABLET    Take 1 tablet (20 mg total) by mouth daily as needed (edema, swelling, fluid build up). Do not take if BP is below 110/70    MAGNESIUM OXIDE (MAG-OX) 400 MG (241.3 MG MAGNESIUM) TABLET    Take 1 tablet (400 mg total) by mouth once daily.    POTASSIUM CHLORIDE (KLOR-CON) 10 MEQ TBSR    Take 2 tablets (20 mEq total) by mouth daily as needed (take with lasix).   Previous Medications    ALBUTEROL (PROVENTIL/VENTOLIN HFA) 90 MCG/ACTUATION INHALER    INHALE 1-2 PUFFS BY MOUTH EVERY 6 HOURS AS NEEDED FOR WHEEZE OR SHORTNESS OF BREATH    AMLODIPINE (NORVASC) 10 MG  TABLET    TAKE 1 TABLET BY MOUTH EVERY DAY    AMOXICILLIN-CLAVULANATE 875-125MG (AUGMENTIN) 875-125 MG PER TABLET    Take 1 tablet by mouth every 12 (twelve) hours.    ANASTROZOLE (ARIMIDEX) 1 MG TAB    TAKE 1 TABLET BY MOUTH EVERY DAY    AZELASTINE (ASTELIN) 137 MCG (0.1 %) NASAL SPRAY    2 sprays (274 mcg total) by Nasal route 2 (two) times daily.    FLUTICASONE PROPIONATE (FLONASE) 50 MCG/ACTUATION NASAL SPRAY    2 sprays (100 mcg total) by Each Nostril route once daily.    HYDROCHLOROTHIAZIDE (HYDRODIURIL) 25 MG TABLET    TAKE 1 TABLET BY MOUTH EVERY DAY    HYDROXYCHLOROQUINE (PLAQUENIL) 200 MG TABLET    TAKE 1 TABLET BY MOUTH TWICE A DAY    LEVOCETIRIZINE (XYZAL) 5 MG TABLET    Take 1 tablet (5 mg total) by mouth every evening.    LOSARTAN (COZAAR) 100 MG TABLET    TAKE 1 TABLET BY MOUTH EVERY DAY    MELOXICAM (MOBIC) 15 MG TABLET    Take 15 mg by mouth daily as needed.    MULTIVITAMIN (ONE DAILY MULTIVITAMIN) PER TABLET    Take 1 tablet by mouth once daily.    OMEPRAZOLE (PRILOSEC) 20 MG CAPSULE    TAKE 1 CAPSULE BY MOUTH EVERY DAY    ORPHENADRINE (NORFLEX) 100 MG TABLET    Take 1 tablet (100 mg total) by mouth 2 (two) times daily.    PRAVASTATIN (PRAVACHOL) 20 MG TABLET    TAKE 1 TABLET BY MOUTH EVERY DAY    TRAZODONE (DESYREL) 50 MG TABLET    TAKE 1 TABLET BY MOUTH EVERY DAY AT NIGHT   Modified Medications    No medications on file   Discontinued Medications    No medications on file       Review of Systems   Constitutional:  Positive for malaise/fatigue.   HENT: Negative.     Eyes: Negative.    Respiratory:  Positive for shortness of breath.    Cardiovascular:  Positive for leg swelling.   Gastrointestinal: Negative.    Genitourinary: Negative.    Musculoskeletal: Negative.    Skin: Negative.    Neurological:  Positive for dizziness.   Endo/Heme/Allergies: Negative.    Psychiatric/Behavioral: Negative.     All 12 systems otherwise negative.      Wt Readings from Last 3 Encounters:   05/16/24 75 kg (165 lb  "5.5 oz)   05/10/24 75.5 kg (166 lb 7.2 oz)   04/29/24 76.9 kg (169 lb 8.5 oz)     Temp Readings from Last 3 Encounters:   05/10/24 99 °F (37.2 °C)   04/29/24 99.6 °F (37.6 °C) (Oral)   04/26/24 97.4 °F (36.3 °C) (Temporal)     BP Readings from Last 3 Encounters:   05/16/24 (!) 142/62   05/10/24 130/68   04/29/24 (!) 142/72     Pulse Readings from Last 3 Encounters:   05/16/24 (!) 56   05/10/24 74   04/29/24 (!) 56       BP (!) 142/62 (BP Location: Left arm, Patient Position: Sitting, BP Method: Medium (Manual))   Pulse (!) 56   Ht 5' 6" (1.676 m)   Wt 75 kg (165 lb 5.5 oz)   SpO2 97%   BMI 26.69 kg/m²     Objective:   Physical Exam  Vitals and nursing note reviewed.   Constitutional:       General: She is not in acute distress.     Appearance: She is well-developed. She is not diaphoretic.   HENT:      Head: Normocephalic and atraumatic.      Nose: Nose normal.   Eyes:      General: No scleral icterus.     Conjunctiva/sclera: Conjunctivae normal.   Neck:      Thyroid: No thyromegaly.      Vascular: No JVD.   Cardiovascular:      Rate and Rhythm: Normal rate and regular rhythm.      Heart sounds: S1 normal and S2 normal. Murmur heard.      No friction rub. No gallop. No S3 or S4 sounds.   Pulmonary:      Effort: Pulmonary effort is normal. No respiratory distress.      Breath sounds: Normal breath sounds. No stridor. No wheezing or rales.   Chest:      Chest wall: No tenderness.   Abdominal:      General: Bowel sounds are normal. There is no distension.      Palpations: Abdomen is soft. There is no mass.      Tenderness: There is no abdominal tenderness. There is no rebound.   Genitourinary:     Comments: Deferred  Musculoskeletal:         General: No tenderness or deformity. Normal range of motion.      Cervical back: Normal range of motion and neck supple.   Lymphadenopathy:      Cervical: No cervical adenopathy.   Skin:     General: Skin is warm and dry.      Coloration: Skin is not pale.      Findings: No " erythema or rash.   Neurological:      Mental Status: She is alert and oriented to person, place, and time.      Motor: No abnormal muscle tone.      Coordination: Coordination normal.   Psychiatric:         Behavior: Behavior normal.         Thought Content: Thought content normal.         Judgment: Judgment normal.         Lab Results   Component Value Date     (L) 05/10/2024    K 3.3 (L) 05/10/2024    CL 97 05/10/2024    CO2 25 05/10/2024    BUN 12 05/10/2024    CREATININE 0.9 05/10/2024     (H) 05/10/2024    HGBA1C 6.7 (H) 05/10/2024    MG 2.0 04/26/2024    AST 30 05/10/2024    ALT 22 05/10/2024    ALBUMIN 3.9 05/10/2024    PROT 8.2 05/10/2024    BILITOT 0.4 05/10/2024    WBC 6.08 05/10/2024    HGB 12.5 05/10/2024    HCT 37.6 05/10/2024    MCV 95 05/10/2024     05/10/2024    INR 0.9 04/03/2012    TSH 2.219 05/10/2024    CHOL 183 10/23/2023    HDL 84 (H) 10/23/2023    LDLCALC 86.8 10/23/2023    TRIG 61 10/23/2023     (H) 03/24/2024         BNP (pg/mL)   Date Value   03/24/2024 197 (H)   02/29/2012 45     INR (no units)   Date Value   04/03/2012 0.9          Assessment:      1. Dizziness    2. Primary hypertension    3. Diastolic dysfunction    4. Vitamin D deficiency    5. DM type 2 with diabetic dyslipidemia    6. Chronic restrictive lung disease    7. Malignant neoplasm of lower-outer quadrant of left breast of female, estrogen receptor positive    8. Aortic atherosclerosis    9. Other hyperlipidemia    10. LOZADA (dyspnea on exertion)        Plan:     1  Dizziness, dyspnea   - order ECHO - eval valves  - likely CHF   - will need ischemic eval - abnormal ECG - once pt is more euvolemic   - needs close follow uip    2. HTN, diastolic dysfunction with LOZADA   - titrate meds  - cont diuretics, low salt diet  - had low K - will add Lasix 20mg and K - take next 4-5 days  - order ECHO   - will need ischemic eval - abnormal ECG - once pt is more euvolemic     3. Chronic restrictive lung disease,  Sarcoid  - cont tx - on inhalers     4. Aortic atherosc, HLD  - cont statin    5. H/O breast CA  - cont tx and f/u heme/onc   - on Arimidex     Visit today included increased complexity associated with the care of the episodic problem dyspnea addressed and managing the longitudinal care of the patient due to the serious and/or complex managed problem(s) .    Thank you for allowing me to participate in this patient's care. Please do not hesitate to contact me with any questions or concerns. Consult note has been forwarded to the referral physician.

## 2024-05-16 ENCOUNTER — OFFICE VISIT (OUTPATIENT)
Dept: CARDIOLOGY | Facility: CLINIC | Age: 69
End: 2024-05-16
Payer: MEDICARE

## 2024-05-16 VITALS
WEIGHT: 165.38 LBS | BODY MASS INDEX: 26.58 KG/M2 | HEIGHT: 66 IN | DIASTOLIC BLOOD PRESSURE: 62 MMHG | OXYGEN SATURATION: 97 % | SYSTOLIC BLOOD PRESSURE: 142 MMHG | HEART RATE: 56 BPM

## 2024-05-16 DIAGNOSIS — R06.09 DOE (DYSPNEA ON EXERTION): ICD-10-CM

## 2024-05-16 DIAGNOSIS — I70.0 AORTIC ATHEROSCLEROSIS: ICD-10-CM

## 2024-05-16 DIAGNOSIS — J98.4 CHRONIC RESTRICTIVE LUNG DISEASE: ICD-10-CM

## 2024-05-16 DIAGNOSIS — C50.512 MALIGNANT NEOPLASM OF LOWER-OUTER QUADRANT OF LEFT BREAST OF FEMALE, ESTROGEN RECEPTOR POSITIVE: ICD-10-CM

## 2024-05-16 DIAGNOSIS — I10 PRIMARY HYPERTENSION: ICD-10-CM

## 2024-05-16 DIAGNOSIS — E11.69 DM TYPE 2 WITH DIABETIC DYSLIPIDEMIA: ICD-10-CM

## 2024-05-16 DIAGNOSIS — R42 DIZZINESS: Primary | ICD-10-CM

## 2024-05-16 DIAGNOSIS — E78.5 DM TYPE 2 WITH DIABETIC DYSLIPIDEMIA: ICD-10-CM

## 2024-05-16 DIAGNOSIS — E78.49 OTHER HYPERLIPIDEMIA: ICD-10-CM

## 2024-05-16 DIAGNOSIS — E55.9 VITAMIN D DEFICIENCY: ICD-10-CM

## 2024-05-16 DIAGNOSIS — I51.89 DIASTOLIC DYSFUNCTION: ICD-10-CM

## 2024-05-16 DIAGNOSIS — Z17.0 MALIGNANT NEOPLASM OF LOWER-OUTER QUADRANT OF LEFT BREAST OF FEMALE, ESTROGEN RECEPTOR POSITIVE: ICD-10-CM

## 2024-05-16 PROCEDURE — G2211 COMPLEX E/M VISIT ADD ON: HCPCS | Mod: S$GLB,,, | Performed by: INTERNAL MEDICINE

## 2024-05-16 PROCEDURE — 1159F MED LIST DOCD IN RCRD: CPT | Mod: CPTII,S$GLB,, | Performed by: INTERNAL MEDICINE

## 2024-05-16 PROCEDURE — 1160F RVW MEDS BY RX/DR IN RCRD: CPT | Mod: CPTII,S$GLB,, | Performed by: INTERNAL MEDICINE

## 2024-05-16 PROCEDURE — 3077F SYST BP >= 140 MM HG: CPT | Mod: CPTII,S$GLB,, | Performed by: INTERNAL MEDICINE

## 2024-05-16 PROCEDURE — 3288F FALL RISK ASSESSMENT DOCD: CPT | Mod: CPTII,S$GLB,, | Performed by: INTERNAL MEDICINE

## 2024-05-16 PROCEDURE — 99999 PR PBB SHADOW E&M-EST. PATIENT-LVL IV: CPT | Mod: PBBFAC,,, | Performed by: INTERNAL MEDICINE

## 2024-05-16 PROCEDURE — 99204 OFFICE O/P NEW MOD 45 MIN: CPT | Mod: S$GLB,,, | Performed by: INTERNAL MEDICINE

## 2024-05-16 PROCEDURE — 3008F BODY MASS INDEX DOCD: CPT | Mod: CPTII,S$GLB,, | Performed by: INTERNAL MEDICINE

## 2024-05-16 PROCEDURE — 3078F DIAST BP <80 MM HG: CPT | Mod: CPTII,S$GLB,, | Performed by: INTERNAL MEDICINE

## 2024-05-16 PROCEDURE — 3044F HG A1C LEVEL LT 7.0%: CPT | Mod: CPTII,S$GLB,, | Performed by: INTERNAL MEDICINE

## 2024-05-16 PROCEDURE — 1101F PT FALLS ASSESS-DOCD LE1/YR: CPT | Mod: CPTII,S$GLB,, | Performed by: INTERNAL MEDICINE

## 2024-05-16 PROCEDURE — 1126F AMNT PAIN NOTED NONE PRSNT: CPT | Mod: CPTII,S$GLB,, | Performed by: INTERNAL MEDICINE

## 2024-05-16 PROCEDURE — 4010F ACE/ARB THERAPY RXD/TAKEN: CPT | Mod: CPTII,S$GLB,, | Performed by: INTERNAL MEDICINE

## 2024-05-16 RX ORDER — POTASSIUM CHLORIDE 750 MG/1
20 TABLET, EXTENDED RELEASE ORAL DAILY PRN
Qty: 90 TABLET | Refills: 1 | Status: ON HOLD | OUTPATIENT
Start: 2024-05-16 | End: 2024-06-12

## 2024-05-16 RX ORDER — POTASSIUM CHLORIDE 750 MG/1
10 TABLET, EXTENDED RELEASE ORAL DAILY PRN
Qty: 90 TABLET | Refills: 1 | Status: SHIPPED | OUTPATIENT
Start: 2024-05-16 | End: 2024-05-16

## 2024-05-16 RX ORDER — LANOLIN ALCOHOL/MO/W.PET/CERES
400 CREAM (GRAM) TOPICAL DAILY
Qty: 90 TABLET | Refills: 1 | Status: SHIPPED | OUTPATIENT
Start: 2024-05-16

## 2024-05-16 RX ORDER — FUROSEMIDE 20 MG/1
20 TABLET ORAL DAILY PRN
Qty: 90 TABLET | Refills: 1 | Status: ON HOLD | OUTPATIENT
Start: 2024-05-16 | End: 2024-06-12 | Stop reason: HOSPADM

## 2024-05-22 ENCOUNTER — HOSPITAL ENCOUNTER (OUTPATIENT)
Dept: CARDIOLOGY | Facility: HOSPITAL | Age: 69
Discharge: HOME | End: 2024-05-22
Attending: INTERNAL MEDICINE
Payer: MEDICARE

## 2024-05-22 VITALS
BODY MASS INDEX: 26.52 KG/M2 | HEIGHT: 66 IN | SYSTOLIC BLOOD PRESSURE: 142 MMHG | WEIGHT: 165 LBS | DIASTOLIC BLOOD PRESSURE: 62 MMHG

## 2024-05-22 DIAGNOSIS — E11.69 DM TYPE 2 WITH DIABETIC DYSLIPIDEMIA: ICD-10-CM

## 2024-05-22 DIAGNOSIS — R42 DIZZINESS: ICD-10-CM

## 2024-05-22 DIAGNOSIS — R06.09 DOE (DYSPNEA ON EXERTION): ICD-10-CM

## 2024-05-22 DIAGNOSIS — E78.5 DM TYPE 2 WITH DIABETIC DYSLIPIDEMIA: ICD-10-CM

## 2024-05-22 DIAGNOSIS — I70.0 AORTIC ATHEROSCLEROSIS: ICD-10-CM

## 2024-05-22 DIAGNOSIS — J98.4 CHRONIC RESTRICTIVE LUNG DISEASE: ICD-10-CM

## 2024-05-22 DIAGNOSIS — Z17.0 MALIGNANT NEOPLASM OF LOWER-OUTER QUADRANT OF LEFT BREAST OF FEMALE, ESTROGEN RECEPTOR POSITIVE: ICD-10-CM

## 2024-05-22 DIAGNOSIS — I51.89 DIASTOLIC DYSFUNCTION: ICD-10-CM

## 2024-05-22 DIAGNOSIS — I10 PRIMARY HYPERTENSION: ICD-10-CM

## 2024-05-22 DIAGNOSIS — C50.512 MALIGNANT NEOPLASM OF LOWER-OUTER QUADRANT OF LEFT BREAST OF FEMALE, ESTROGEN RECEPTOR POSITIVE: ICD-10-CM

## 2024-05-22 PROCEDURE — 93306 TTE W/DOPPLER COMPLETE: CPT

## 2024-05-22 PROCEDURE — 93306 TTE W/DOPPLER COMPLETE: CPT | Mod: 26,,, | Performed by: STUDENT IN AN ORGANIZED HEALTH CARE EDUCATION/TRAINING PROGRAM

## 2024-05-23 LAB
AORTIC ROOT ANNULUS: 2.84 CM
ASCENDING AORTA: 3.08 CM
AV INDEX (PROSTH): 0.72
AV MEAN GRADIENT: 9 MMHG
AV PEAK GRADIENT: 17 MMHG
AV REGURGITATION PRESSURE HALF TIME: 547.39 MS
AV VALVE AREA BY VELOCITY RATIO: 2.17 CM²
AV VALVE AREA: 2.18 CM²
AV VELOCITY RATIO: 0.72
BSA FOR ECHO PROCEDURE: 1.87 M2
CV ECHO LV RWT: 0.49 CM
DOP CALC AO PEAK VEL: 2.04 M/S
DOP CALC AO VTI: 44.9 CM
DOP CALC LVOT AREA: 3 CM2
DOP CALC LVOT DIAMETER: 1.96 CM
DOP CALC LVOT PEAK VEL: 1.47 M/S
DOP CALC LVOT STROKE VOLUME: 97.71 CM3
DOP CALC RVOT PEAK VEL: 0.64 M/S
DOP CALC RVOT VTI: 11.6 CM
DOP CALCLVOT PEAK VEL VTI: 32.4 CM
E WAVE DECELERATION TIME: 272.74 MSEC
E/A RATIO: 0.69
E/E' RATIO: 13.33 M/S
ECHO LV POSTERIOR WALL: 1.09 CM (ref 0.6–1.1)
EJECTION FRACTION: 60 %
FRACTIONAL SHORTENING: 37 % (ref 28–44)
INTERVENTRICULAR SEPTUM: 1.15 CM (ref 0.6–1.1)
IVC DIAMETER: 1.55 CM
IVRT: 97.05 MSEC
LA MAJOR: 5.4 CM
LA MINOR: 5.3 CM
LA WIDTH: 3.4 CM
LEFT ATRIUM SIZE: 3.4 CM
LEFT ATRIUM VOLUME INDEX MOD: 29.3 ML/M2
LEFT ATRIUM VOLUME INDEX: 28.6 ML/M2
LEFT ATRIUM VOLUME MOD: 53.88 CM3
LEFT ATRIUM VOLUME: 52.56 CM3
LEFT INTERNAL DIMENSION IN SYSTOLE: 2.79 CM (ref 2.1–4)
LEFT VENTRICLE DIASTOLIC VOLUME INDEX: 48.53 ML/M2
LEFT VENTRICLE DIASTOLIC VOLUME: 89.3 ML
LEFT VENTRICLE MASS INDEX: 95 G/M2
LEFT VENTRICLE SYSTOLIC VOLUME INDEX: 15.9 ML/M2
LEFT VENTRICLE SYSTOLIC VOLUME: 29.22 ML
LEFT VENTRICULAR INTERNAL DIMENSION IN DIASTOLE: 4.43 CM (ref 3.5–6)
LEFT VENTRICULAR MASS: 175.16 G
LV LATERAL E/E' RATIO: 10 M/S
LV SEPTAL E/E' RATIO: 20 M/S
LVOT MG: 4.77 MMHG
LVOT MV: 1.03 CM/S
MV PEAK A VEL: 1.16 M/S
MV PEAK E VEL: 0.8 M/S
MV STENOSIS PRESSURE HALF TIME: 79.09 MS
MV VALVE AREA P 1/2 METHOD: 2.78 CM2
OHS CV RV/LV RATIO: 0.77 CM
PISA AR MAX VEL: 5.13 M/S
PISA TR MAX VEL: 3.39 M/S
PV MEAN GRADIENT: 1 MMHG
PV PEAK GRADIENT: 5 MMHG
PV PEAK VELOCITY: 1.07 M/S
RA MAJOR: 4.75 CM
RA PRESSURE ESTIMATED: 3 MMHG
RA WIDTH: 3.5 CM
RIGHT VENTRICULAR END-DIASTOLIC DIMENSION: 3.41 CM
RV TB RVSP: 6 MMHG
SINUS: 3.11 CM
STJ: 2.79 CM
TDI LATERAL: 0.08 M/S
TDI SEPTAL: 0.04 M/S
TDI: 0.06 M/S
TR MAX PG: 46 MMHG
TRICUSPID ANNULAR PLANE SYSTOLIC EXCURSION: 3.24 CM
TV REST PULMONARY ARTERY PRESSURE: 49 MMHG
Z-SCORE OF LEFT VENTRICULAR DIMENSION IN END DIASTOLE: -1.42
Z-SCORE OF LEFT VENTRICULAR DIMENSION IN END SYSTOLE: -0.96

## 2024-05-24 ENCOUNTER — TELEPHONE (OUTPATIENT)
Dept: CARDIOLOGY | Facility: CLINIC | Age: 69
End: 2024-05-24
Payer: MEDICARE

## 2024-05-24 NOTE — TELEPHONE ENCOUNTER
Called and spoke to pt regarding echo results. Results verbalized and understood by pt.    ----- Message from Ferdinand Woodson MD sent at 5/24/2024 10:08 AM CDT -----  Please contact the patient and let them know that their results of echo reveals Overall normal heart function and valves with mild stiffness of heart (diastolic dysfunction). Mild to moderate valve leak - will continue to monitor. Continue low salt diet and good blood pressure control.

## 2024-05-30 ENCOUNTER — TELEPHONE (OUTPATIENT)
Dept: CARDIOLOGY | Facility: CLINIC | Age: 69
End: 2024-05-30
Payer: MEDICARE

## 2024-05-30 NOTE — TELEPHONE ENCOUNTER
Patient came in clinic today with niece; Wanted to me to go over her echo results again because she didn't really understand; Reviewed results with niece again and she understood; Sated that patient is still having SOB and Dizziness since the Echo was performed. Stated she hasn't been able to work due to this and she needs a letter for work so she doesn't lose her job. Advised patient that I'll let the provider know and give her a call back.

## 2024-06-02 ENCOUNTER — HOSPITAL ENCOUNTER (INPATIENT)
Facility: HOSPITAL | Age: 69
LOS: 8 days | Discharge: HOME HEALTH | DRG: 097 | End: 2024-06-12
Attending: EMERGENCY MEDICINE | Admitting: HOSPITALIST
Payer: MEDICARE

## 2024-06-02 DIAGNOSIS — E78.5 HYPERLIPIDEMIA, UNSPECIFIED HYPERLIPIDEMIA TYPE: ICD-10-CM

## 2024-06-02 DIAGNOSIS — B45.1 CRYPTOCOCCAL MENINGOENCEPHALITIS: ICD-10-CM

## 2024-06-02 DIAGNOSIS — R29.818 ACUTE FOCAL NEUROLOGICAL DEFICIT: ICD-10-CM

## 2024-06-02 DIAGNOSIS — R00.1 BRADYCARDIA: ICD-10-CM

## 2024-06-02 DIAGNOSIS — E11.69 DM TYPE 2 WITH DIABETIC DYSLIPIDEMIA: ICD-10-CM

## 2024-06-02 DIAGNOSIS — I63.212 ACUTE ISCHEMIC VBA BRAINSTEM STROKE, LEFT: Primary | ICD-10-CM

## 2024-06-02 DIAGNOSIS — I63.22 ACUTE ISCHEMIC VBA BRAINSTEM STROKE, LEFT: Primary | ICD-10-CM

## 2024-06-02 DIAGNOSIS — I10 PRIMARY HYPERTENSION: ICD-10-CM

## 2024-06-02 DIAGNOSIS — H49.02 CN III PALSY, LEFT: ICD-10-CM

## 2024-06-02 DIAGNOSIS — E78.5 DM TYPE 2 WITH DIABETIC DYSLIPIDEMIA: ICD-10-CM

## 2024-06-02 PROBLEM — R42 DIZZINESS AND GIDDINESS: Status: ACTIVE | Noted: 2024-06-02

## 2024-06-02 PROBLEM — I63.9 CVA (CEREBRAL VASCULAR ACCIDENT): Status: ACTIVE | Noted: 2024-06-02

## 2024-06-02 LAB
ALBUMIN SERPL BCP-MCNC: 4.5 G/DL (ref 3.5–5.2)
ALP SERPL-CCNC: 52 U/L (ref 55–135)
ALT SERPL W/O P-5'-P-CCNC: 20 U/L (ref 10–44)
ANION GAP SERPL CALC-SCNC: 14 MMOL/L (ref 8–16)
AST SERPL-CCNC: 26 U/L (ref 10–40)
BACTERIA #/AREA URNS HPF: NORMAL /HPF
BASOPHILS # BLD AUTO: 0.05 K/UL (ref 0–0.2)
BASOPHILS NFR BLD: 1 % (ref 0–1.9)
BILIRUB SERPL-MCNC: 0.6 MG/DL (ref 0.1–1)
BILIRUB UR QL STRIP: NEGATIVE
BNP SERPL-MCNC: 271 PG/ML (ref 0–99)
BUN SERPL-MCNC: 9 MG/DL (ref 8–23)
CALCIUM SERPL-MCNC: 10.2 MG/DL (ref 8.7–10.5)
CHLORIDE SERPL-SCNC: 98 MMOL/L (ref 95–110)
CHOLEST SERPL-MCNC: 208 MG/DL (ref 120–199)
CHOLEST/HDLC SERPL: 2.4 {RATIO} (ref 2–5)
CLARITY UR: CLEAR
CO2 SERPL-SCNC: 22 MMOL/L (ref 23–29)
COLOR UR: COLORLESS
CREAT SERPL-MCNC: 0.7 MG/DL (ref 0.5–1.4)
DIFFERENTIAL METHOD BLD: ABNORMAL
EOSINOPHIL # BLD AUTO: 0.2 K/UL (ref 0–0.5)
EOSINOPHIL NFR BLD: 4.2 % (ref 0–8)
ERYTHROCYTE [DISTWIDTH] IN BLOOD BY AUTOMATED COUNT: 12.6 % (ref 11.5–14.5)
EST. GFR  (NO RACE VARIABLE): >60 ML/MIN/1.73 M^2
GLUCOSE SERPL-MCNC: 115 MG/DL (ref 70–110)
GLUCOSE UR QL STRIP: NEGATIVE
HCT VFR BLD AUTO: 40.1 % (ref 37–48.5)
HDLC SERPL-MCNC: 86 MG/DL (ref 40–75)
HDLC SERPL: 41.3 % (ref 20–50)
HGB BLD-MCNC: 14 G/DL (ref 12–16)
HGB UR QL STRIP: NEGATIVE
HYALINE CASTS #/AREA URNS LPF: 0 /LPF
IMM GRANULOCYTES # BLD AUTO: 0.01 K/UL (ref 0–0.04)
IMM GRANULOCYTES NFR BLD AUTO: 0.2 % (ref 0–0.5)
INR PPP: 1 (ref 0.8–1.2)
KETONES UR QL STRIP: NEGATIVE
LDLC SERPL CALC-MCNC: 106.4 MG/DL (ref 63–159)
LEUKOCYTE ESTERASE UR QL STRIP: NEGATIVE
LYMPHOCYTES # BLD AUTO: 1 K/UL (ref 1–4.8)
LYMPHOCYTES NFR BLD: 20.2 % (ref 18–48)
MCH RBC QN AUTO: 31 PG (ref 27–31)
MCHC RBC AUTO-ENTMCNC: 34.9 G/DL (ref 32–36)
MCV RBC AUTO: 89 FL (ref 82–98)
MICROSCOPIC COMMENT: NORMAL
MONOCYTES # BLD AUTO: 0.5 K/UL (ref 0.3–1)
MONOCYTES NFR BLD: 10.4 % (ref 4–15)
NEUTROPHILS # BLD AUTO: 3.2 K/UL (ref 1.8–7.7)
NEUTROPHILS NFR BLD: 64 % (ref 38–73)
NITRITE UR QL STRIP: NEGATIVE
NONHDLC SERPL-MCNC: 122 MG/DL
NRBC BLD-RTO: 0 /100 WBC
PH UR STRIP: 8 [PH] (ref 5–8)
PLATELET # BLD AUTO: 305 K/UL (ref 150–450)
PMV BLD AUTO: 8.1 FL (ref 9.2–12.9)
POCT GLUCOSE: 103 MG/DL (ref 70–110)
POCT GLUCOSE: 121 MG/DL (ref 70–110)
POCT GLUCOSE: 124 MG/DL (ref 70–110)
POTASSIUM SERPL-SCNC: 3.6 MMOL/L (ref 3.5–5.1)
PROT SERPL-MCNC: 9.3 G/DL (ref 6–8.4)
PROT UR QL STRIP: ABNORMAL
PROTHROMBIN TIME: 11 SEC (ref 9–12.5)
RBC # BLD AUTO: 4.52 M/UL (ref 4–5.4)
RBC #/AREA URNS HPF: 1 /HPF (ref 0–4)
SODIUM SERPL-SCNC: 134 MMOL/L (ref 136–145)
SP GR UR STRIP: 1.03 (ref 1–1.03)
TRIGL SERPL-MCNC: 78 MG/DL (ref 30–150)
TROPONIN I SERPL DL<=0.01 NG/ML-MCNC: 0.01 NG/ML (ref 0–0.03)
TROPONIN I SERPL DL<=0.01 NG/ML-MCNC: 0.02 NG/ML (ref 0–0.03)
TSH SERPL DL<=0.005 MIU/L-ACNC: 2.34 UIU/ML (ref 0.4–4)
URN SPEC COLLECT METH UR: ABNORMAL
UROBILINOGEN UR STRIP-ACNC: NEGATIVE EU/DL
WBC # BLD AUTO: 5 K/UL (ref 3.9–12.7)
WBC #/AREA URNS HPF: 0 /HPF (ref 0–5)

## 2024-06-02 PROCEDURE — 25000003 PHARM REV CODE 250: Performed by: EMERGENCY MEDICINE

## 2024-06-02 PROCEDURE — 36415 COLL VENOUS BLD VENIPUNCTURE: CPT | Performed by: NURSE PRACTITIONER

## 2024-06-02 PROCEDURE — 63600175 PHARM REV CODE 636 W HCPCS: Performed by: EMERGENCY MEDICINE

## 2024-06-02 PROCEDURE — 83880 ASSAY OF NATRIURETIC PEPTIDE: CPT | Performed by: EMERGENCY MEDICINE

## 2024-06-02 PROCEDURE — 82962 GLUCOSE BLOOD TEST: CPT

## 2024-06-02 PROCEDURE — G0425 INPT/ED TELECONSULT30: HCPCS | Mod: 95,,, | Performed by: PSYCHIATRY & NEUROLOGY

## 2024-06-02 PROCEDURE — 25000003 PHARM REV CODE 250: Performed by: NURSE PRACTITIONER

## 2024-06-02 PROCEDURE — 84484 ASSAY OF TROPONIN QUANT: CPT | Mod: 91 | Performed by: NURSE PRACTITIONER

## 2024-06-02 PROCEDURE — G0378 HOSPITAL OBSERVATION PER HR: HCPCS

## 2024-06-02 PROCEDURE — 25500020 PHARM REV CODE 255: Performed by: EMERGENCY MEDICINE

## 2024-06-02 PROCEDURE — 84484 ASSAY OF TROPONIN QUANT: CPT | Performed by: EMERGENCY MEDICINE

## 2024-06-02 PROCEDURE — 80053 COMPREHEN METABOLIC PANEL: CPT | Performed by: EMERGENCY MEDICINE

## 2024-06-02 PROCEDURE — 80061 LIPID PANEL: CPT | Performed by: EMERGENCY MEDICINE

## 2024-06-02 PROCEDURE — 93010 ELECTROCARDIOGRAM REPORT: CPT | Mod: ,,, | Performed by: STUDENT IN AN ORGANIZED HEALTH CARE EDUCATION/TRAINING PROGRAM

## 2024-06-02 PROCEDURE — 81000 URINALYSIS NONAUTO W/SCOPE: CPT | Performed by: NURSE PRACTITIONER

## 2024-06-02 PROCEDURE — 93005 ELECTROCARDIOGRAM TRACING: CPT

## 2024-06-02 PROCEDURE — 99291 CRITICAL CARE FIRST HOUR: CPT

## 2024-06-02 PROCEDURE — 25000003 PHARM REV CODE 250: Performed by: HOSPITALIST

## 2024-06-02 PROCEDURE — 85025 COMPLETE CBC W/AUTO DIFF WBC: CPT | Performed by: EMERGENCY MEDICINE

## 2024-06-02 PROCEDURE — 84443 ASSAY THYROID STIM HORMONE: CPT | Performed by: EMERGENCY MEDICINE

## 2024-06-02 PROCEDURE — 85610 PROTHROMBIN TIME: CPT | Performed by: EMERGENCY MEDICINE

## 2024-06-02 RX ORDER — BISACODYL 10 MG/1
10 SUPPOSITORY RECTAL DAILY PRN
Status: DISCONTINUED | OUTPATIENT
Start: 2024-06-02 | End: 2024-06-12 | Stop reason: HOSPADM

## 2024-06-02 RX ORDER — ACETAMINOPHEN 325 MG/1
650 TABLET ORAL EVERY 6 HOURS PRN
Status: DISCONTINUED | OUTPATIENT
Start: 2024-06-02 | End: 2024-06-12 | Stop reason: HOSPADM

## 2024-06-02 RX ORDER — INSULIN ASPART 100 [IU]/ML
0-5 INJECTION, SOLUTION INTRAVENOUS; SUBCUTANEOUS
Status: DISCONTINUED | OUTPATIENT
Start: 2024-06-02 | End: 2024-06-12 | Stop reason: HOSPADM

## 2024-06-02 RX ORDER — CLOPIDOGREL BISULFATE 75 MG/1
75 TABLET ORAL DAILY
Status: DISCONTINUED | OUTPATIENT
Start: 2024-06-03 | End: 2024-06-03

## 2024-06-02 RX ORDER — TALC
6 POWDER (GRAM) TOPICAL NIGHTLY PRN
Status: DISCONTINUED | OUTPATIENT
Start: 2024-06-02 | End: 2024-06-12 | Stop reason: HOSPADM

## 2024-06-02 RX ORDER — IBUPROFEN 200 MG
24 TABLET ORAL
Status: DISCONTINUED | OUTPATIENT
Start: 2024-06-02 | End: 2024-06-12 | Stop reason: HOSPADM

## 2024-06-02 RX ORDER — ONDANSETRON HYDROCHLORIDE 2 MG/ML
4 INJECTION, SOLUTION INTRAVENOUS EVERY 8 HOURS PRN
Status: DISCONTINUED | OUTPATIENT
Start: 2024-06-02 | End: 2024-06-12 | Stop reason: HOSPADM

## 2024-06-02 RX ORDER — HYDRALAZINE HYDROCHLORIDE 20 MG/ML
5 INJECTION INTRAMUSCULAR; INTRAVENOUS
Status: COMPLETED | OUTPATIENT
Start: 2024-06-02 | End: 2024-06-02

## 2024-06-02 RX ORDER — CLOPIDOGREL BISULFATE 300 MG/1
300 TABLET, FILM COATED ORAL
Status: COMPLETED | OUTPATIENT
Start: 2024-06-02 | End: 2024-06-02

## 2024-06-02 RX ORDER — IBUPROFEN 200 MG
16 TABLET ORAL
Status: DISCONTINUED | OUTPATIENT
Start: 2024-06-02 | End: 2024-06-12 | Stop reason: HOSPADM

## 2024-06-02 RX ORDER — ASPIRIN 81 MG/1
81 TABLET ORAL DAILY
Status: DISCONTINUED | OUTPATIENT
Start: 2024-06-03 | End: 2024-06-12 | Stop reason: HOSPADM

## 2024-06-02 RX ORDER — POLYETHYLENE GLYCOL 3350 17 G/17G
17 POWDER, FOR SOLUTION ORAL DAILY
Status: DISCONTINUED | OUTPATIENT
Start: 2024-06-02 | End: 2024-06-12 | Stop reason: HOSPADM

## 2024-06-02 RX ORDER — GLUCAGON 1 MG
1 KIT INJECTION
Status: DISCONTINUED | OUTPATIENT
Start: 2024-06-02 | End: 2024-06-12 | Stop reason: HOSPADM

## 2024-06-02 RX ORDER — LABETALOL HYDROCHLORIDE 5 MG/ML
10 INJECTION, SOLUTION INTRAVENOUS
Status: DISCONTINUED | OUTPATIENT
Start: 2024-06-02 | End: 2024-06-12

## 2024-06-02 RX ORDER — SODIUM CHLORIDE 0.9 % (FLUSH) 0.9 %
10 SYRINGE (ML) INJECTION
Status: DISCONTINUED | OUTPATIENT
Start: 2024-06-02 | End: 2024-06-12 | Stop reason: HOSPADM

## 2024-06-02 RX ORDER — ASPIRIN 325 MG
325 TABLET ORAL
Status: COMPLETED | OUTPATIENT
Start: 2024-06-02 | End: 2024-06-02

## 2024-06-02 RX ADMIN — HYDRALAZINE HYDROCHLORIDE 5 MG: 20 INJECTION, SOLUTION INTRAMUSCULAR; INTRAVENOUS at 01:06

## 2024-06-02 RX ADMIN — ASPIRIN 325 MG ORAL TABLET 325 MG: 325 PILL ORAL at 12:06

## 2024-06-02 RX ADMIN — Medication 6 MG: at 10:06

## 2024-06-02 RX ADMIN — POLYETHYLENE GLYCOL 3350 17 G: 17 POWDER, FOR SOLUTION ORAL at 03:06

## 2024-06-02 RX ADMIN — ACETAMINOPHEN 650 MG: 325 TABLET ORAL at 03:06

## 2024-06-02 RX ADMIN — IOHEXOL 100 ML: 350 INJECTION, SOLUTION INTRAVENOUS at 12:06

## 2024-06-02 RX ADMIN — CLOPIDOGREL BISULFATE 300 MG: 300 TABLET, FILM COATED ORAL at 12:06

## 2024-06-02 NOTE — TELEMEDICINE CONSULT
"[facility]  Vascular Neurology  Comprehensive Stroke Center  TeleVascular Neurology Acute Consultation Note        Consult Information  Consults    Consulting Provider: REGIS MARI JR   Current Providers  No providers found    Patient Location:  [facility] EMERGENCY DEPARTMENT Emergency Department    Spoke hospital nurse at bedside with patient assisting consultant.  Patient information was obtained from patient, past medical records, and primary team.       Stroke Documentation  Acute Stroke Times   Last Known Normal Date: 06/01/24  Last Known Normal Time: 2100  Symptom Onset Date: 06/02/24  Symptom Onset Time: 0600  Stroke Team Called Date: 06/02/24  Stroke Team Called Time: 1130  Stroke Team Arrival Date: 06/02/24  Stroke Team Arrival Time: 1135  CT Interpretation Time: 1135  Thrombolytic Therapy Recommended: No  Thrombectomy Recommended: No    NIH Scale:         Modified Kim: Score: 0  Yoni Coma Scale:     ABCD2 Score:    [id number] Score:    HAS -BLED Score:    ICH Score:    Hunt & Berry Classification:      Blood pressure (!) 188/86, pulse (!) 53, temperature 98.2 °F (36.8 °C), temperature source Oral, resp. rate 18, height 5' 6" (1.676 m), weight 74.8 kg (165 lb), SpO2 100%.    N/A    Medical Decision Making  HPI:  68 y.o. female with HTN, HLD, DM2, sarcoidosis, restrictive lung disease,presents with acute onset dizziness, unsteady gait, blurred vision L eye, and noted to have abduction of the L eye. Never had similar symptoms before.      Images personally reviewed and interpreted:  Study: Head CT  Study Interpretation: no acute abnormality     Assessment and plan:  Suspect acute brainstem infarct, likely midbrain lacunar infarct.  No a suitable candidate for TNK.  Recommended admission, MRI brain, MRA brain and neck, TTE.  Load with  mg and Plavix 300 mg. High intensity statin.  Neurology, PT/OT and speech therapy consults.    Lytics recommendation: Thrombolytic therapy " not recommended due to Outside of treatment window  and Mild Non-Disabling Symptoms  Thrombectomy recommendation: No; at this time symptoms not suggestive of large vessel occlusion  Placement recommendation: admit to inpatient               ROS  Physical Exam  Past Medical History:   Diagnosis Date    Allergy     Arthritis     Cancer     colon    CHF (congestive heart failure)     Colon cancer 2004    Colon cancer screening 2015    Diabetes mellitus type 2 in nonobese 3/9/2016    borderline    Diverticulosis     DM (diabetes mellitus) 2016    BS didn't check 2019    DM (diabetes mellitus) 2016    BS didn't check 2020    Hyperlipidemia 10/25/2016    Hypertension     Insomnia     Malignant neoplasm of colon 2021    Malignant neoplasm of lower-outer quadrant of left breast of female, estrogen receptor positive 2022     Past Surgical History:   Procedure Laterality Date    BREAST BIOPSY Left     BREAST LUMPECTOMY Left      SECTION      COLON SURGERY      COLONOSCOPY N/A 2015    Procedure: COLONOSCOPY;  Surgeon: Adela Sam MD;  Location: [facility] ENDO;  Service: Endoscopy;  Laterality: N/A;    COLONOSCOPY N/A 2017    Procedure: COLONOSCOPY;  Surgeon: Chintan Flores MD;  Location: [facility] ENDO;  Service: Endoscopy;  Laterality: N/A;    COLONOSCOPY N/A 2020    Procedure: COLONOSCOPY;  Surgeon: Grisel Atkins MD;  Location: [facility] ENDO;  Service: Endoscopy;  Laterality: N/A;    SENTINEL LYMPH NODE BIOPSY Left 2022    Procedure: BIOPSY, LYMPH NODE, SENTINEL;  Surgeon: Arvin Hrenandez MD;  Location: [facility] OR;  Service: General;  Laterality: Left;     Family History   Problem Relation Name Age of Onset    Hypertension Mother      Diabetes Mother      Hypertension Father      Hypertension Sister      Hypertension Brother      Hypertension Sister      Hypertension Sister      Hypertension Sister      Hypertension Brother      Hypertension Brother          Diagnoses  Problem Noted   Dizziness and Giddiness 6/2/2024       Edward Trimble MD      Emergent/Acute neurological consultation requested by spoke provider due to critical concerns for possible cerebrovascular event that could result in permanent loss of neurologic/bodily function, severe disability or death of this patient.  Immediate/timely evaluation by a highly prepared expert is paramount for optimal outcomes  High risk for neurological deterioration if not properly diagnosed  High risk for neurological deterioration if not treated promplty/as soon as possible  Complex diagnostic evaluation may be required (advanced imaging)  High risk treatment options (thrombolytics and/or thrombectomy)    Patient care was coordinated with spoke provider, including but not limted to    Discussing likely diagnosis/etiology of symptoms  Making recommendations for further diagnostic studies  Making recommendations for intravenous thrombolytics or other advanced therapies  Making recommendations for disposition (admission/transfer for higher level of care)

## 2024-06-02 NOTE — ASSESSMENT & PLAN NOTE
Suspicion of acute brainstem CVA vs TIA, continued vision abnormality, denies diplopia  Antithrombotics for secondary stroke prevention: Antiplatelets: Aspirin: 81 mg daily  Clopidogrel: 300 mg loading dose x 1, now  Clopidogrel: 75 mg daily    Statins for secondary stroke prevention and hyperlipidemia, if present:   Statins: Atorvastatin- 40 mg daily    Aggressive risk factor modification: DM, HLD, Exercise     Rehab efforts: The patient has been evaluated by a stroke team provider and the therapy needs have been fully considered based off the presenting complaints and exam findings. The following therapy evaluations are needed: PT evaluate and treat, OT evaluate and treat, SLP evaluate and treat    Diagnostics ordered/pending: CT scan of head without contrast to asses brain parenchyma, CTA Head to assess vasculature , CTA Neck/Arch to assess vasculature, Lipid Profile to assess cholesterol levels, MRA head to assess vasculature, MRI head without contrast to assess brain parenchyma, TTE to assess cardiac function/status , TSH to assess thyroid function    VTE prophylaxis: None: Reason for No Pharmacological VTE Prophylaxis: Currently on anticoagulation    BP parameters: Infarct: No intervention, SBP <220

## 2024-06-02 NOTE — ASSESSMENT & PLAN NOTE
Patient's FSGs are controlled on current medication regimen.  Last A1c reviewed-   Lab Results   Component Value Date    HGBA1C 6.7 (H) 05/10/2024     Most recent fingerstick glucose reviewed-   Recent Labs   Lab 06/02/24  1128   POCTGLUCOSE 124*     Current correctional scale  Low  Maintain anti-hyperglycemic dose as follows-   Antihyperglycemics (From admission, onward)      Start     Stop Route Frequency Ordered    06/02/24 1442  insulin aspart U-100 pen 0-5 Units         -- SubQ Before meals & nightly PRN 06/02/24 1343          Hold Oral hypoglycemics while patient is in the hospital.

## 2024-06-02 NOTE — HPI
68 y.o. female patient, PMHx: HTN, DM2, HLD, Colon Cx, Sarcoidosis, RLD, Breast Cx, Cardiomyopathy. Presented to the Emergency Department for evaluation of a dizziness which onset 3 days PTA with left eye drift, new on day of arrival. Concerns for a potential stroke. Symptoms are constant and moderate in severity. No mitigating or exacerbating factors reported. Associated sxs include weakness, lightheadedness, and blurry vision. Pt denies diplopia and all other symptoms at this time. Pt denies Hx of stroke or MI. Code stroke called in the ED, CT head: no acute finding. CTA Head/Neck: negative for significant narrowing or stenosis, negative LVO. Neurology consulted. Vitals: 190/87, 62, 18, 98.2F, 99% RA. Abnormal Labs: BNP: 271. Patient is a full code. Placed in observation under the care of Hospital Medicine for management of suspicion of CVA.

## 2024-06-02 NOTE — NURSING TRANSFER
Nursing Transfer Note      6/2/2024   2:13 PM    Nurse giving handoff:Faiza  Nurse receiving handoff:Deanna    Reason patient is being transferred: suspicion of CVA    Transfer From: ED    Transfer via bed    Transfer with none    Transported by Faiza    Transfer Vital Signs:  Blood Pressure:201/86  Heart Rate:53  O2:99%  Temperature:97.8  Respirations:16    Telemetry: Box Number [id number], Rate 53, and Rhythm Sinus Krunal  Order for Tele Monitor? Yes    Additional Lines: n/a    4eyes on Skin: yes    Medicines sent: n/a    Any special needs or follow-up needed: no    Patient belongings transferred with patient: Yes    Chart send with patient: Yes    Notified: family at bedside    Patient reassessed at: (6/2, 1430)  Upon arrival to floor: cardiac monitor applied, patient oriented to room, call bell in reach, and bed in lowest position

## 2024-06-02 NOTE — SUBJECTIVE & OBJECTIVE
Past Medical History:   Diagnosis Date    Allergy     Arthritis     Cancer 2016    colon    CHF (congestive heart failure)     Colon cancer 2004    Colon cancer screening 2015    Diabetes mellitus type 2 in nonobese 3/9/2016    borderline    Diverticulosis     DM (diabetes mellitus) 2016    BS didn't check 2019    DM (diabetes mellitus) 2016    BS didn't check 2020    Hyperlipidemia 10/25/2016    Hypertension     Insomnia     Malignant neoplasm of colon 2021    Malignant neoplasm of lower-outer quadrant of left breast of female, estrogen receptor positive 2022       Past Surgical History:   Procedure Laterality Date    BREAST BIOPSY Left     BREAST LUMPECTOMY Left      SECTION      COLON SURGERY      COLONOSCOPY N/A 2015    Procedure: COLONOSCOPY;  Surgeon: Adela Sam MD;  Location: [facility] ENDO;  Service: Endoscopy;  Laterality: N/A;    COLONOSCOPY N/A 2017    Procedure: COLONOSCOPY;  Surgeon: Chintan Flores MD;  Location: [facility] ENDO;  Service: Endoscopy;  Laterality: N/A;    COLONOSCOPY N/A 2020    Procedure: COLONOSCOPY;  Surgeon: Grisel Atkins MD;  Location: [facility] ENDO;  Service: Endoscopy;  Laterality: N/A;    SENTINEL LYMPH NODE BIOPSY Left 2022    Procedure: BIOPSY, LYMPH NODE, SENTINEL;  Surgeon: Arvin Hernandez MD;  Location: [facility] OR;  Service: General;  Laterality: Left;       Review of patient's allergies indicates:  No Known Allergies    No current facility-administered medications on file prior to encounter.     Current Outpatient Medications on File Prior to Encounter   Medication Sig    albuterol (PROVENTIL/VENTOLIN HFA) 90 mcg/actuation inhaler INHALE 1-2 PUFFS BY MOUTH EVERY 6 HOURS AS NEEDED FOR WHEEZE OR SHORTNESS OF BREATH    amLODIPine (NORVASC) 10 MG tablet TAKE 1 TABLET BY MOUTH EVERY DAY    amoxicillin-clavulanate 875-125mg (AUGMENTIN) 875-125 mg per tablet Take 1 tablet by mouth every 12 (twelve) hours. (Patient not  taking: Reported on 5/16/2024)    anastrozole (ARIMIDEX) 1 mg Tab TAKE 1 TABLET BY MOUTH EVERY DAY    azelastine (ASTELIN) 137 mcg (0.1 %) nasal spray 2 sprays (274 mcg total) by Nasal route 2 (two) times daily.    fluticasone propionate (FLONASE) 50 mcg/actuation nasal spray 2 sprays (100 mcg total) by Each Nostril route once daily.    furosemide (LASIX) 20 MG tablet Take 1 tablet (20 mg total) by mouth daily as needed (edema, swelling, fluid build up). Do not take if BP is below 110/70    hydroCHLOROthiazide (HYDRODIURIL) 25 MG tablet TAKE 1 TABLET BY MOUTH EVERY DAY    hydrOXYchloroQUINE (PLAQUENIL) 200 mg tablet TAKE 1 TABLET BY MOUTH TWICE A DAY    levocetirizine (XYZAL) 5 MG tablet Take 1 tablet (5 mg total) by mouth every evening.    losartan (COZAAR) 100 MG tablet TAKE 1 TABLET BY MOUTH EVERY DAY    magnesium oxide (MAG-OX) 400 mg (241.3 mg magnesium) tablet Take 1 tablet (400 mg total) by mouth once daily.    meloxicam (MOBIC) 15 MG tablet Take 15 mg by mouth daily as needed.    multivitamin (ONE DAILY MULTIVITAMIN) per tablet Take 1 tablet by mouth once daily.    omeprazole (PRILOSEC) 20 MG capsule TAKE 1 CAPSULE BY MOUTH EVERY DAY    orphenadrine (NORFLEX) 100 mg tablet Take 1 tablet (100 mg total) by mouth 2 (two) times daily.    potassium chloride (KLOR-CON) 10 MEQ TbSR Take 2 tablets (20 mEq total) by mouth daily as needed (take with lasix).    pravastatin (PRAVACHOL) 20 MG tablet TAKE 1 TABLET BY MOUTH EVERY DAY    traZODone (DESYREL) 50 MG tablet TAKE 1 TABLET BY MOUTH EVERY DAY AT NIGHT     Family History       Problem Relation (Age of Onset)    Diabetes Mother    Hypertension Mother, Father, Sister, Brother, Sister, Sister, Sister, Brother, Brother          Tobacco Use    Smoking status: Never     Passive exposure: Never    Smokeless tobacco: Never   Substance and Sexual Activity    Alcohol use: Yes     Alcohol/week: 0.0 standard drinks of alcohol     Comment: weekends.       Drug use: No    Sexual  activity: Not Currently     Review of Systems   Constitutional:  Positive for activity change and appetite change.   Eyes:  Positive for visual disturbance.   Neurological:  Positive for dizziness.   All other systems reviewed and are negative.    Objective:     Vital Signs (Most Recent):  Temp: 98.2 °F (36.8 °C) (06/02/24 1041)  Pulse: (!) 50 (06/02/24 1348)  Resp: (!) 29 (06/02/24 1346)  BP: (!) 193/84 (06/02/24 1346)  SpO2: 100 % (06/02/24 1346) Vital Signs (24h Range):  Temp:  [98.2 °F (36.8 °C)] 98.2 °F (36.8 °C)  Pulse:  [47-63] 50  Resp:  [13-36] 29  SpO2:  [88 %-100 %] 100 %  BP: (175-210)/(81-91) 193/84     Weight: 74.8 kg (165 lb)  Body mass index is 26.63 kg/m².     Physical Exam  Vitals and nursing note reviewed.   Constitutional:       General: She is not in acute distress.     Appearance: She is well-developed.   HENT:      Head: Normocephalic and atraumatic.      Right Ear: Hearing and external ear normal.      Left Ear: Hearing and external ear normal.      Nose: No rhinorrhea.      Right Sinus: No maxillary sinus tenderness or frontal sinus tenderness.      Left Sinus: No maxillary sinus tenderness or frontal sinus tenderness.      Mouth/Throat:      Mouth: No oral lesions.      Pharynx: Uvula midline.   Eyes:      General:         Right eye: No discharge.         Left eye: No discharge.      Conjunctiva/sclera: Conjunctivae normal.      Pupils: Pupils are equal, round, and reactive to light.      Comments: Left eye deviation to left   Neck:      Thyroid: No thyromegaly.      Vascular: No carotid bruit.      Trachea: No tracheal deviation.   Cardiovascular:      Rate and Rhythm: Normal rate and regular rhythm.      Pulses:           Dorsalis pedis pulses are 2+ on the right side and 2+ on the left side.      Heart sounds: Normal heart sounds, S1 normal and S2 normal. No murmur heard.  Pulmonary:      Effort: Pulmonary effort is normal. No respiratory distress.      Breath sounds: Normal breath  sounds.   Abdominal:      General: Bowel sounds are normal. There is no distension.      Palpations: Abdomen is soft. There is no mass.      Tenderness: There is no abdominal tenderness.   Musculoskeletal:         General: Normal range of motion.      Cervical back: Normal range of motion.   Lymphadenopathy:      Cervical: No cervical adenopathy.      Upper Body:      Right upper body: No supraclavicular adenopathy.      Left upper body: No supraclavicular adenopathy.   Skin:     General: Skin is warm and dry.      Capillary Refill: Capillary refill takes less than 2 seconds.      Findings: No rash.   Neurological:      Mental Status: She is alert and oriented to person, place, and time.   Psychiatric:         Mood and Affect: Mood is not anxious or depressed.         Speech: Speech normal.         Behavior: Behavior normal.         Thought Content: Thought content normal.         Judgment: Judgment normal.              CRANIAL NERVES     CN III, IV, VI   Pupils are equal, round, and reactive to light.       Significant Labs: All pertinent labs within the past 24 hours have been reviewed.  CBC:   Recent Labs   Lab 06/02/24  1156   WBC 5.00   HGB 14.0   HCT 40.1        CMP:   Recent Labs   Lab 06/02/24  1156   *   K 3.6   CL 98   CO2 22*   *   BUN 9   CREATININE 0.7   CALCIUM 10.2   PROT 9.3*   ALBUMIN 4.5   BILITOT 0.6   ALKPHOS 52*   AST 26   ALT 20   ANIONGAP 14     Cardiac Markers:   Recent Labs   Lab 06/02/24  1156   *     Troponin:   Recent Labs   Lab 06/02/24  1156   TROPONINI 0.021       Significant Imaging: I have reviewed all pertinent imaging results/findings within the past 24 hours.

## 2024-06-02 NOTE — ASSESSMENT & PLAN NOTE
Chronic, uncontrolled. Latest blood pressure and vitals reviewed-     Temp:  [98.2 °F (36.8 °C)]   Pulse:  [47-63]   Resp:  [13-36]   BP: (175-210)/(81-91)   SpO2:  [88 %-100 %] .   Home meds for hypertension were reviewed and noted below.   Hypertension Medications               amLODIPine (NORVASC) 10 MG tablet TAKE 1 TABLET BY MOUTH EVERY DAY    furosemide (LASIX) 20 MG tablet Take 1 tablet (20 mg total) by mouth daily as needed (edema, swelling, fluid build up). Do not take if BP is below 110/70    hydroCHLOROthiazide (HYDRODIURIL) 25 MG tablet TAKE 1 TABLET BY MOUTH EVERY DAY    losartan (COZAAR) 100 MG tablet TAKE 1 TABLET BY MOUTH EVERY DAY            While in the hospital, will manage blood pressure as follows; Adjust home antihypertensive regimen as follows- Hold for now, permissive HTN    Will utilize p.r.n. blood pressure medication only if patient's blood pressure greater than 220/110 and she develops symptoms such as worsening chest pain or shortness of breath.

## 2024-06-02 NOTE — PLAN OF CARE
A206/A206 JUAN Farmer is a 68 y.o.female admitted on 6/2/2024 for CVA (cerebral vascular accident)   Code Status: Full Code MRN: [id number]   Review of patient's allergies indicates:  No Known Allergies  Past Medical History:   Diagnosis Date    Allergy     Arthritis     Cancer 2016    colon    CHF (congestive heart failure)     Colon cancer 2004    Colon cancer screening 9/21/2015    Diabetes mellitus type 2 in nonobese 3/9/2016    borderline    Diverticulosis     DM (diabetes mellitus) 03/2016    BS didn't check 02/13/2019    DM (diabetes mellitus) 03/2016    BS didn't check 03/04/2020    Hyperlipidemia 10/25/2016    Hypertension     Insomnia     Malignant neoplasm of colon 5/13/2021    Malignant neoplasm of lower-outer quadrant of left breast of female, estrogen receptor positive 6/9/2022      PRN meds    acetaminophen, 650 mg, Q6H PRN  bisacodyL, 10 mg, Daily PRN  dextrose 10%, 12.5 g, PRN  dextrose 10%, 25 g, PRN  glucagon (human recombinant), 1 mg, PRN  glucose, 16 g, PRN  glucose, 24 g, PRN  insulin aspart U-100, 0-5 Units, QID (AC + HS) PRN  labetalol, 10 mg, Q15 Min PRN  ondansetron, 4 mg, Q8H PRN  sodium chloride 0.9%, 10 mL, PRN      Chart check completed. Will continue plan of care.      Orientation: oriented x 4  Yoni Coma Scale Score: 15     Lead Monitored: Lead II Rhythm: sinus bradycardia    Cardiac/Telemetry Box Number: [id number]  VTE Required Core Measure: (SCDs) Sequential compression device initiated/maintained Last Bowel Movement: 06/02/24  Diet Cardiac  Voiding Characteristics: external catheter  Laurent Score: 20  Fall Risk Score: 8  Accucheck [x]   Freq? achs     Lines/Drains/Airways       Peripheral Intravenous Line  Duration                  Peripheral IV - Single Lumen 06/02/24 1203 20 G Right Antecubital <1 day

## 2024-06-02 NOTE — H&P
[facility]  Hospital Medicine  History & Physical    Patient Name: Anne Farmer  MRN: [id number]  Patient Class: OP- Observation  Admission Date: 6/2/2024  Attending Physician: Josesito Cerrato MD   Primary Care Provider: Chiquita Talley MD         Patient information was obtained from patient, past medical records, and ER records.     Subjective:     Principal Problem:CVA (cerebral vascular accident)    Chief Complaint:   Chief Complaint   Patient presents with    Dizziness     Pt reports dizziness and unsteady gait x 3 days        HPI: 68 y.o. female patient, PMHx: HTN, DM2, HLD, Colon Cx, Sarcoidosis, RLD, Breast Cx, Cardiomyopathy. Presented to the Emergency Department for evaluation of a dizziness which onset 3 days PTA with left eye drift, new on day of arrival. Concerns for a potential stroke. Symptoms are constant and moderate in severity. No mitigating or exacerbating factors reported. Associated sxs include weakness, lightheadedness, and blurry vision. Pt denies diplopia and all other symptoms at this time. Pt denies Hx of stroke or MI. Code stroke called in the ED, CT head: no acute finding. CTA Head/Neck: negative for significant narrowing or stenosis, negative LVO. Neurology consulted. Vitals: 190/87, 62, 18, 98.2F, 99% RA. Abnormal Labs: BNP: 271. Patient is a full code. Placed in observation under the care of Hospital Medicine for management of suspicion of CVA.     Past Medical History:   Diagnosis Date    Allergy     Arthritis     Cancer 2016    colon    CHF (congestive heart failure)     Colon cancer 2004    Colon cancer screening 9/21/2015    Diabetes mellitus type 2 in nonobese 3/9/2016    borderline    Diverticulosis     DM (diabetes mellitus) 03/2016    BS didn't check 02/13/2019    DM (diabetes mellitus) 03/2016    BS didn't check 03/04/2020    Hyperlipidemia 10/25/2016    Hypertension     Insomnia     Malignant neoplasm of colon 5/13/2021    Malignant neoplasm  of lower-outer quadrant of left breast of female, estrogen receptor positive 2022       Past Surgical History:   Procedure Laterality Date    BREAST BIOPSY Left     BREAST LUMPECTOMY Left      SECTION      COLON SURGERY      COLONOSCOPY N/A 2015    Procedure: COLONOSCOPY;  Surgeon: Adela Sam MD;  Location: [facility] ENDO;  Service: Endoscopy;  Laterality: N/A;    COLONOSCOPY N/A 2017    Procedure: COLONOSCOPY;  Surgeon: Chintan Flores MD;  Location: [facility] ENDO;  Service: Endoscopy;  Laterality: N/A;    COLONOSCOPY N/A 2020    Procedure: COLONOSCOPY;  Surgeon: Grisel Atkins MD;  Location: [facility] ENDO;  Service: Endoscopy;  Laterality: N/A;    SENTINEL LYMPH NODE BIOPSY Left 2022    Procedure: BIOPSY, LYMPH NODE, SENTINEL;  Surgeon: Arvin Hernandez MD;  Location: [facility] OR;  Service: General;  Laterality: Left;       Review of patient's allergies indicates:  No Known Allergies    No current facility-administered medications on file prior to encounter.     Current Outpatient Medications on File Prior to Encounter   Medication Sig    albuterol (PROVENTIL/VENTOLIN HFA) 90 mcg/actuation inhaler INHALE 1-2 PUFFS BY MOUTH EVERY 6 HOURS AS NEEDED FOR WHEEZE OR SHORTNESS OF BREATH    amLODIPine (NORVASC) 10 MG tablet TAKE 1 TABLET BY MOUTH EVERY DAY    amoxicillin-clavulanate 875-125mg (AUGMENTIN) 875-125 mg per tablet Take 1 tablet by mouth every 12 (twelve) hours. (Patient not taking: Reported on 2024)    anastrozole (ARIMIDEX) 1 mg Tab TAKE 1 TABLET BY MOUTH EVERY DAY    azelastine (ASTELIN) 137 mcg (0.1 %) nasal spray 2 sprays (274 mcg total) by Nasal route 2 (two) times daily.    fluticasone propionate (FLONASE) 50 mcg/actuation nasal spray 2 sprays (100 mcg total) by Each Nostril route once daily.    furosemide (LASIX) 20 MG tablet Take 1 tablet (20 mg total) by mouth daily as needed (edema, swelling, fluid build up). Do not take if BP is below 110/70     hydroCHLOROthiazide (HYDRODIURIL) 25 MG tablet TAKE 1 TABLET BY MOUTH EVERY DAY    hydrOXYchloroQUINE (PLAQUENIL) 200 mg tablet TAKE 1 TABLET BY MOUTH TWICE A DAY    levocetirizine (XYZAL) 5 MG tablet Take 1 tablet (5 mg total) by mouth every evening.    losartan (COZAAR) 100 MG tablet TAKE 1 TABLET BY MOUTH EVERY DAY    magnesium oxide (MAG-OX) 400 mg (241.3 mg magnesium) tablet Take 1 tablet (400 mg total) by mouth once daily.    meloxicam (MOBIC) 15 MG tablet Take 15 mg by mouth daily as needed.    multivitamin (ONE DAILY MULTIVITAMIN) per tablet Take 1 tablet by mouth once daily.    omeprazole (PRILOSEC) 20 MG capsule TAKE 1 CAPSULE BY MOUTH EVERY DAY    orphenadrine (NORFLEX) 100 mg tablet Take 1 tablet (100 mg total) by mouth 2 (two) times daily.    potassium chloride (KLOR-CON) 10 MEQ TbSR Take 2 tablets (20 mEq total) by mouth daily as needed (take with lasix).    pravastatin (PRAVACHOL) 20 MG tablet TAKE 1 TABLET BY MOUTH EVERY DAY    traZODone (DESYREL) 50 MG tablet TAKE 1 TABLET BY MOUTH EVERY DAY AT NIGHT     Family History       Problem Relation (Age of Onset)    Diabetes Mother    Hypertension Mother, Father, Sister, Brother, Sister, Sister, Sister, Brother, Brother          Tobacco Use    Smoking status: Never     Passive exposure: Never    Smokeless tobacco: Never   Substance and Sexual Activity    Alcohol use: Yes     Alcohol/week: 0.0 standard drinks of alcohol     Comment: weekends.       Drug use: No    Sexual activity: Not Currently     Review of Systems   Constitutional:  Positive for activity change and appetite change.   Eyes:  Positive for visual disturbance.   Neurological:  Positive for dizziness.   All other systems reviewed and are negative.    Objective:     Vital Signs (Most Recent):  Temp: 98.2 °F (36.8 °C) (06/02/24 1041)  Pulse: (!) 50 (06/02/24 1348)  Resp: (!) 29 (06/02/24 1346)  BP: (!) 193/84 (06/02/24 1346)  SpO2: 100 % (06/02/24 1346) Vital Signs (24h Range):  Temp:  [98.2 °F  (36.8 °C)] 98.2 °F (36.8 °C)  Pulse:  [47-63] 50  Resp:  [13-36] 29  SpO2:  [88 %-100 %] 100 %  BP: (175-210)/(81-91) 193/84     Weight: 74.8 kg (165 lb)  Body mass index is 26.63 kg/m².     Physical Exam  Vitals and nursing note reviewed.   Constitutional:       General: She is not in acute distress.     Appearance: She is well-developed.   HENT:      Head: Normocephalic and atraumatic.      Right Ear: Hearing and external ear normal.      Left Ear: Hearing and external ear normal.      Nose: No rhinorrhea.      Right Sinus: No maxillary sinus tenderness or frontal sinus tenderness.      Left Sinus: No maxillary sinus tenderness or frontal sinus tenderness.      Mouth/Throat:      Mouth: No oral lesions.      Pharynx: Uvula midline.   Eyes:      General:         Right eye: No discharge.         Left eye: No discharge.      Conjunctiva/sclera: Conjunctivae normal.      Pupils: Pupils are equal, round, and reactive to light.      Comments: Left eye deviation to left   Neck:      Thyroid: No thyromegaly.      Vascular: No carotid bruit.      Trachea: No tracheal deviation.   Cardiovascular:      Rate and Rhythm: Normal rate and regular rhythm.      Pulses:           Dorsalis pedis pulses are 2+ on the right side and 2+ on the left side.      Heart sounds: Normal heart sounds, S1 normal and S2 normal. No murmur heard.  Pulmonary:      Effort: Pulmonary effort is normal. No respiratory distress.      Breath sounds: Normal breath sounds.   Abdominal:      General: Bowel sounds are normal. There is no distension.      Palpations: Abdomen is soft. There is no mass.      Tenderness: There is no abdominal tenderness.   Musculoskeletal:         General: Normal range of motion.      Cervical back: Normal range of motion.   Lymphadenopathy:      Cervical: No cervical adenopathy.      Upper Body:      Right upper body: No supraclavicular adenopathy.      Left upper body: No supraclavicular adenopathy.   Skin:     General: Skin  is warm and dry.      Capillary Refill: Capillary refill takes less than 2 seconds.      Findings: No rash.   Neurological:      Mental Status: She is alert and oriented to person, place, and time.   Psychiatric:         Mood and Affect: Mood is not anxious or depressed.         Speech: Speech normal.         Behavior: Behavior normal.         Thought Content: Thought content normal.         Judgment: Judgment normal.              CRANIAL NERVES     CN III, IV, VI   Pupils are equal, round, and reactive to light.       Significant Labs: All pertinent labs within the past 24 hours have been reviewed.  CBC:   Recent Labs   Lab 06/02/24  1156   WBC 5.00   HGB 14.0   HCT 40.1        CMP:   Recent Labs   Lab 06/02/24  1156   *   K 3.6   CL 98   CO2 22*   *   BUN 9   CREATININE 0.7   CALCIUM 10.2   PROT 9.3*   ALBUMIN 4.5   BILITOT 0.6   ALKPHOS 52*   AST 26   ALT 20   ANIONGAP 14     Cardiac Markers:   Recent Labs   Lab 06/02/24  1156   *     Troponin:   Recent Labs   Lab 06/02/24  1156   TROPONINI 0.021       Significant Imaging: I have reviewed all pertinent imaging results/findings within the past 24 hours.  Assessment/Plan:     * CVA (cerebral vascular accident)  Suspicion of acute brainstem CVA vs TIA, continued vision abnormality, denies diplopia  Antithrombotics for secondary stroke prevention: Antiplatelets: Aspirin: 81 mg daily  Clopidogrel: 300 mg loading dose x 1, now  Clopidogrel: 75 mg daily    Statins for secondary stroke prevention and hyperlipidemia, if present:   Statins: Atorvastatin- 40 mg daily    Aggressive risk factor modification: DM, HLD, Exercise     Rehab efforts: The patient has been evaluated by a stroke team provider and the therapy needs have been fully considered based off the presenting complaints and exam findings. The following therapy evaluations are needed: PT evaluate and treat, OT evaluate and treat, SLP evaluate and treat    Diagnostics ordered/pending: CT  scan of head without contrast to asses brain parenchyma, CTA Head to assess vasculature , CTA Neck/Arch to assess vasculature, Lipid Profile to assess cholesterol levels, MRA head to assess vasculature, MRI head without contrast to assess brain parenchyma, TTE to assess cardiac function/status , TSH to assess thyroid function    VTE prophylaxis: None: Reason for No Pharmacological VTE Prophylaxis: Currently on anticoagulation    BP parameters: Infarct: No intervention, SBP <220        Dizziness and giddiness  Presented with dizziness, concern for brainstem infarct    --fall precautions      Malignant neoplasm of lower-outer quadrant of left breast of female, estrogen receptor positive  Followed by oncology, managed with Arimidex    --cont home medication      Hyperlipidemia  Managed with statin    --cont statin  --Lipid panel in AM      DM type 2 with diabetic dyslipidemia  Patient's FSGs are controlled on current medication regimen.  Last A1c reviewed-   Lab Results   Component Value Date    HGBA1C 6.7 (H) 05/10/2024     Most recent fingerstick glucose reviewed-   Recent Labs   Lab 06/02/24  1128   POCTGLUCOSE 124*     Current correctional scale  Low  Maintain anti-hyperglycemic dose as follows-   Antihyperglycemics (From admission, onward)      Start     Stop Route Frequency Ordered    06/02/24 1442  insulin aspart U-100 pen 0-5 Units         -- SubQ Before meals & nightly PRN 06/02/24 1343          Hold Oral hypoglycemics while patient is in the hospital.    Diastolic dysfunction    Euvolemic on exam, elevated BNP on arrival, likely secondary to uncontrolled HTN.    --ECHO    HTN (hypertension)  Chronic, uncontrolled. Latest blood pressure and vitals reviewed-     Temp:  [98.2 °F (36.8 °C)]   Pulse:  [47-63]   Resp:  [13-36]   BP: (175-210)/(81-91)   SpO2:  [88 %-100 %] .   Home meds for hypertension were reviewed and noted below.   Hypertension Medications               amLODIPine (NORVASC) 10 MG tablet TAKE 1  TABLET BY MOUTH EVERY DAY    furosemide (LASIX) 20 MG tablet Take 1 tablet (20 mg total) by mouth daily as needed (edema, swelling, fluid build up). Do not take if BP is below 110/70    hydroCHLOROthiazide (HYDRODIURIL) 25 MG tablet TAKE 1 TABLET BY MOUTH EVERY DAY    losartan (COZAAR) 100 MG tablet TAKE 1 TABLET BY MOUTH EVERY DAY            While in the hospital, will manage blood pressure as follows; Adjust home antihypertensive regimen as follows- Hold for now, permissive HTN    Will utilize p.r.n. blood pressure medication only if patient's blood pressure greater than 220/110 and she develops symptoms such as worsening chest pain or shortness of breath.      VTE Risk Mitigation (From admission, onward)           Ordered     Reason for No Pharmacological VTE Prophylaxis  Once        Question:  Reasons:  Answer:  Risk of Bleeding    06/02/24 1342     IP VTE HIGH RISK PATIENT  Once         06/02/24 1342     Place sequential compression device  Until discontinued         06/02/24 1342                         On 06/02/2024, patient should be placed in hospital observation services under my care in collaboration with Josesito Cerrato MD.           Irene Easley NP  Department of Hospital Medicine  [facility] - Emergency Dept.

## 2024-06-02 NOTE — Clinical Note
Diagnosis: Acute focal neurological deficit [828520]   Future Attending Provider: MELINA FREY [[id number]]

## 2024-06-02 NOTE — SUBJECTIVE & OBJECTIVE
HPI:  68 y.o. female with HTN, HLD, DM2, sarcoidosis, restrictive lung disease,presents with acute onset dizziness, unsteady gait, blurred vision L eye, and noted to have abduction of the L eye. Never had similar symptoms before.      Images personally reviewed and interpreted:  Study: Head CT  Study Interpretation: no acute abnormality     Assessment and plan:  Suspect acute brainstem infarct, likely midbrain lacunar infarct.  No a suitable candidate for TNK.  Recommended admission, MRI brain, MRA brain and neck, TTE.  Load with  mg and Plavix 300 mg. High intensity statin.  Neurology, PT/OT and speech therapy consults.    Lytics recommendation: Thrombolytic therapy not recommended due to Outside of treatment window  and Mild Non-Disabling Symptoms  Thrombectomy recommendation: No; at this time symptoms not suggestive of large vessel occlusion  Placement recommendation: admit to inpatient

## 2024-06-02 NOTE — ED PROVIDER NOTES
SCRIBE #1 NOTE: IPramod am scribing for, and in the presence of, Josesito Cerrato MD. I have scribed the entire note.       History     Chief Complaint   Patient presents with    Dizziness     Pt reports dizziness and unsteady gait x 3 days     Review of patient's allergies indicates:  No Known Allergies      History of Present Illness     HPI    2024, 11:23 AM  History obtained from the patient      History of Present Illness: Anne Farmer is a 68 y.o. female patient who presents to the Emergency Department for evaluation of a dizziness which onset 3 days ago. Pt's left eye is drifting and has indicates a potential stroke. Symptoms are constant and moderate in severity. No mitigating or exacerbating factors reported. Associated sxs include weakness, lightheadedness, and blurry vision. Pt denies diplopia and all other symptoms at this time. Pt denies Hx of stroke or MI. No further complaints or concerns at this time.       Arrival mode: Personal vehicle     PCP: Chiquita Talley MD        Past Medical History:  Past Medical History:   Diagnosis Date    Allergy     Arthritis     Cancer 2016    colon    CHF (congestive heart failure)     Colon cancer 2004    Colon cancer screening 2015    Diabetes mellitus type 2 in nonobese 3/9/2016    borderline    Diverticulosis     DM (diabetes mellitus) 2016    BS didn't check 2019    DM (diabetes mellitus) 2016    BS didn't check 2020    Hyperlipidemia 10/25/2016    Hypertension     Insomnia     Malignant neoplasm of colon 2021    Malignant neoplasm of lower-outer quadrant of left breast of female, estrogen receptor positive 2022       Past Surgical History:  Past Surgical History:   Procedure Laterality Date    BREAST BIOPSY Left     BREAST LUMPECTOMY Left      SECTION      COLON SURGERY      COLONOSCOPY N/A 2015    Procedure: COLONOSCOPY;  Surgeon: Adela Sam MD;  Location: [facility];   Service: Endoscopy;  Laterality: N/A;    COLONOSCOPY N/A 08/24/2017    Procedure: COLONOSCOPY;  Surgeon: Chintan Flores MD;  Location: [facility] ENDO;  Service: Endoscopy;  Laterality: N/A;    COLONOSCOPY N/A 06/30/2020    Procedure: COLONOSCOPY;  Surgeon: Grisel Atkins MD;  Location: [facility] ENDO;  Service: Endoscopy;  Laterality: N/A;    SENTINEL LYMPH NODE BIOPSY Left 07/05/2022    Procedure: BIOPSY, LYMPH NODE, SENTINEL;  Surgeon: Arvin Hernandez MD;  Location: [facility] OR;  Service: General;  Laterality: Left;         Family History:  Family History   Problem Relation Name Age of Onset    Hypertension Mother      Diabetes Mother      Hypertension Father      Hypertension Sister      Hypertension Brother      Hypertension Sister      Hypertension Sister      Hypertension Sister      Hypertension Brother      Hypertension Brother         Social History:  Social History     Tobacco Use    Smoking status: Never     Passive exposure: Never    Smokeless tobacco: Never   Substance and Sexual Activity    Alcohol use: Yes     Alcohol/week: 0.0 standard drinks of alcohol     Comment: weekends.       Drug use: No    Sexual activity: Not Currently        Review of Systems     Review of Systems   Constitutional:  Negative for fever.   HENT:  Negative for sore throat.    Eyes:  Positive for visual disturbance (blurry vision).        (-) diplopia   Respiratory:  Negative for shortness of breath.    Cardiovascular:  Negative for chest pain.   Gastrointestinal:  Negative for nausea.   Genitourinary:  Negative for dysuria.   Musculoskeletal:  Negative for back pain.   Skin:  Negative for rash.   Neurological:  Positive for dizziness, weakness and light-headedness.   Hematological:  Does not bruise/bleed easily.   All other systems reviewed and are negative.       Physical Exam     Initial Vitals [06/02/24 1041]   BP Pulse Resp Temp SpO2   (S) (!) 190/87 62 18 98.2 °F (36.8 °C) 99 %      MAP       --          Physical Exam  Nursing  Notes and Vital Signs Reviewed.  Constitutional: Patient is in no acute distress. Well-developed and well-nourished.  Head: Atraumatic. Normocephalic.  Eyes: Left medial gaze palsy to left eye.  ENT: Mucous membranes are moist.   Neck: Supple. Full ROM.   Cardiovascular: Regular rate. Regular rhythm. No murmurs, rubs, or gallops. Distal pulses are 2+ and symmetric.  Pulmonary/Chest: No respiratory distress. Clear to auscultation bilaterally. No wheezing or rales.  Abdominal: Soft and non-distended.  There is no tenderness.  No rebound, guarding, or rigidity.   Genitourinary: No CVA tenderness  Musculoskeletal: Moves all extremities. No obvious deformities. No edema.   Skin: Warm and dry.  Neurological:  Alert, awake, and appropriate.  Normal speech.  No acute focal neurological deficits are appreciated.  Psychiatric: Normal affect. Good eye contact. Appropriate in content.     ED Course   Critical Care    Date/Time: 6/2/2024 1:16 PM    Performed by: Pramod Malave Jr., MD  Authorized by: Pramod Malave Jr., MD  Direct patient critical care time: 25 minutes  Additional history critical care time: 20 minutes  Ordering / reviewing critical care time: 15 minutes  Documentation critical care time: 10 minutes  Consulting other physicians critical care time: 5 minutes  Total critical care time (exclusive of procedural time) : 75 minutes  Critical care time was exclusive of separately billable procedures and treating other patients and teaching time.  Critical care was necessary to treat or prevent imminent or life-threatening deterioration of the following conditions: CNS failure or compromise.  Critical care was time spent personally by me on the following activities: blood draw for specimens, development of treatment plan with patient or surrogate, discussions with consultants, interpretation of cardiac output measurements, evaluation of patient's response to treatment, examination of patient, obtaining history from  patient or surrogate, ordering and review of radiographic studies, ordering and review of laboratory studies, ordering and performing treatments and interventions, pulse oximetry, re-evaluation of patient's condition and review of old charts.        ED Vital Signs:  Vitals:    06/02/24 1637 06/02/24 1927 06/02/24 2000 06/02/24 2102   BP: (!) 185/78 (!) 179/78     Pulse: (!) 46 (!) 52 (!) 56 60   Resp:  20     Temp:  98.4 °F (36.9 °C)     TempSrc:  Oral     SpO2:  97%     Weight:       Height:        06/03/24 0001 06/03/24 0310 06/03/24 0400 06/03/24 0821   BP: (!) 171/95 (!) 178/79     Pulse: (!) 59 (!) 58 (!) 54 (!) 54   Resp: 20 20     Temp: 98.1 °F (36.7 °C) 98.7 °F (37.1 °C)     TempSrc: Oral Oral     SpO2: 99% 100%     Weight:       Height:        06/03/24 0848 06/03/24 1239 06/03/24 1515 06/03/24 1644   BP: (!) 166/71 (!) 179/77  (!) 178/77   Pulse: 70 62 61 64   Resp: 18 17  17   Temp: 98.1 °F (36.7 °C) 98 °F (36.7 °C)  97.5 °F (36.4 °C)   TempSrc: Oral Oral  Oral   SpO2: (!) 94% 99%  99%   Weight:       Height:        06/03/24 1933 06/03/24 2000 06/04/24 0011   BP: (!) 157/70  (!) 166/87   Pulse: (!) 50 (!) 52 (!) 56   Resp: 18  20   Temp: 98.2 °F (36.8 °C)  98.2 °F (36.8 °C)   TempSrc: Oral     SpO2: 97%  97%   Weight:      Height:          Abnormal Lab Results:  Labs Reviewed   CBC W/ AUTO DIFFERENTIAL - Abnormal; Notable for the following components:       Result Value    MPV 8.1 (*)     All other components within normal limits   COMPREHENSIVE METABOLIC PANEL - Abnormal; Notable for the following components:    Sodium 134 (*)     CO2 22 (*)     Glucose 115 (*)     Total Protein 9.3 (*)     Alkaline Phosphatase 52 (*)     All other components within normal limits   B-TYPE NATRIURETIC PEPTIDE - Abnormal; Notable for the following components:     (*)     All other components within normal limits   POCT GLUCOSE - Abnormal; Notable for the following components:    POCT Glucose 124 (*)     All other  components within normal limits   PROTIME-INR   TSH   TROPONIN I   POCT GLUCOSE, HAND-HELD DEVICE   POCT GLUCOSE MONITORING CONTINUOUS        All Lab Results:  Results for orders placed or performed during the hospital encounter of 06/02/24   CBC W/ AUTO DIFFERENTIAL   Result Value Ref Range    WBC 5.00 3.90 - 12.70 K/uL    RBC 4.52 4.00 - 5.40 M/uL    Hemoglobin 14.0 12.0 - 16.0 g/dL    Hematocrit 40.1 37.0 - 48.5 %    MCV 89 82 - 98 fL    MCH 31.0 27.0 - 31.0 pg    MCHC 34.9 32.0 - 36.0 g/dL    RDW 12.6 11.5 - 14.5 %    Platelets 305 150 - 450 K/uL    MPV 8.1 (L) 9.2 - 12.9 fL    Immature Granulocytes 0.2 0.0 - 0.5 %    Gran # (ANC) 3.2 1.8 - 7.7 K/uL    Immature Grans (Abs) 0.01 0.00 - 0.04 K/uL    Lymph # 1.0 1.0 - 4.8 K/uL    Mono # 0.5 0.3 - 1.0 K/uL    Eos # 0.2 0.0 - 0.5 K/uL    Baso # 0.05 0.00 - 0.20 K/uL    nRBC 0 0 /100 WBC    Gran % 64.0 38.0 - 73.0 %    Lymph % 20.2 18.0 - 48.0 %    Mono % 10.4 4.0 - 15.0 %    Eosinophil % 4.2 0.0 - 8.0 %    Basophil % 1.0 0.0 - 1.9 %    Differential Method Automated    Comprehensive metabolic panel   Result Value Ref Range    Sodium 134 (L) 136 - 145 mmol/L    Potassium 3.6 3.5 - 5.1 mmol/L    Chloride 98 95 - 110 mmol/L    CO2 22 (L) 23 - 29 mmol/L    Glucose 115 (H) 70 - 110 mg/dL    BUN 9 8 - 23 mg/dL    Creatinine 0.7 0.5 - 1.4 mg/dL    Calcium 10.2 8.7 - 10.5 mg/dL    Total Protein 9.3 (H) 6.0 - 8.4 g/dL    Albumin 4.5 3.5 - 5.2 g/dL    Total Bilirubin 0.6 0.1 - 1.0 mg/dL    Alkaline Phosphatase 52 (L) 55 - 135 U/L    AST 26 10 - 40 U/L    ALT 20 10 - 44 U/L    eGFR >60 >60 mL/min/1.73 m^2    Anion Gap 14 8 - 16 mmol/L   Protime-INR   Result Value Ref Range    Prothrombin Time 11.0 9.0 - 12.5 sec    INR 1.0 0.8 - 1.2   TSH   Result Value Ref Range    TSH 2.341 0.400 - 4.000 uIU/mL   LDL - Lipid Panel   Result Value Ref Range    Cholesterol 208 (H) 120 - 199 mg/dL    Triglycerides 78 30 - 150 mg/dL    HDL 86 (H) 40 - 75 mg/dL    LDL Cholesterol 106.4 63.0 - 159.0  mg/dL    HDL/Cholesterol Ratio 41.3 20.0 - 50.0 %    Total Cholesterol/HDL Ratio 2.4 2.0 - 5.0    Non-HDL Cholesterol 122 mg/dL   Troponin I   Result Value Ref Range    Troponin I 0.021 0.000 - 0.026 ng/mL   B-Type natriuretic peptide   Result Value Ref Range     (H) 0 - 99 pg/mL   Urinalysis, Reflex to Urine Culture Urine, Clean Catch    Specimen: Urine   Result Value Ref Range    Specimen UA Urine, Clean Catch     Color, UA Colorless (A) Yellow, Straw, [place]    Appearance, UA Clear Clear    pH, UA 8.0 5.0 - 8.0    Specific Gravity, UA 1.030 1.005 - 1.030    Protein, UA 1+ (A) Negative    Glucose, UA Negative Negative    Ketones, UA Negative Negative    Bilirubin (UA) Negative Negative    Occult Blood UA Negative Negative    Nitrite, UA Negative Negative    Urobilinogen, UA Negative <2.0 EU/dL    Leukocytes, UA Negative Negative   Troponin I   Result Value Ref Range    Troponin I 0.018 0.000 - 0.026 ng/mL   Urinalysis Microscopic   Result Value Ref Range    RBC, UA 1 0 - 4 /hpf    WBC, UA 0 0 - 5 /hpf    Bacteria None None-Occ /hpf    Hyaline Casts, UA 0 0-1/lpf /lpf    Microscopic Comment SEE COMMENT    Troponin I   Result Value Ref Range    Troponin I 0.015 0.000 - 0.026 ng/mL   Troponin I   Result Value Ref Range    Troponin I 0.017 0.000 - 0.026 ng/mL   Comprehensive metabolic panel   Result Value Ref Range    Sodium 131 (L) 136 - 145 mmol/L    Potassium 3.3 (L) 3.5 - 5.1 mmol/L    Chloride 95 95 - 110 mmol/L    CO2 22 (L) 23 - 29 mmol/L    Glucose 87 70 - 110 mg/dL    BUN 10 8 - 23 mg/dL    Creatinine 0.7 0.5 - 1.4 mg/dL    Calcium 10.1 8.7 - 10.5 mg/dL    Total Protein 8.4 6.0 - 8.4 g/dL    Albumin 4.1 3.5 - 5.2 g/dL    Total Bilirubin 0.6 0.1 - 1.0 mg/dL    Alkaline Phosphatase 45 (L) 55 - 135 U/L    AST 23 10 - 40 U/L    ALT 20 10 - 44 U/L    eGFR >60 >60 mL/min/1.73 m^2    Anion Gap 14 8 - 16 mmol/L   Magnesium   Result Value Ref Range    Magnesium 1.9 1.6 - 2.6 mg/dL   Phosphorus   Result Value Ref  Range    Phosphorus 3.0 2.7 - 4.5 mg/dL   CBC auto differential   Result Value Ref Range    WBC 5.22 3.90 - 12.70 K/uL    RBC 4.33 4.00 - 5.40 M/uL    Hemoglobin 13.4 12.0 - 16.0 g/dL    Hematocrit 38.4 37.0 - 48.5 %    MCV 89 82 - 98 fL    MCH 30.9 27.0 - 31.0 pg    MCHC 34.9 32.0 - 36.0 g/dL    RDW 12.4 11.5 - 14.5 %    Platelets 316 150 - 450 K/uL    MPV 8.2 (L) 9.2 - 12.9 fL    Immature Granulocytes 0.6 (H) 0.0 - 0.5 %    Gran # (ANC) 3.6 1.8 - 7.7 K/uL    Immature Grans (Abs) 0.03 0.00 - 0.04 K/uL    Lymph # 0.8 (L) 1.0 - 4.8 K/uL    Mono # 0.5 0.3 - 1.0 K/uL    Eos # 0.2 0.0 - 0.5 K/uL    Baso # 0.04 0.00 - 0.20 K/uL    nRBC 0 0 /100 WBC    Gran % 69.1 38.0 - 73.0 %    Lymph % 15.9 (L) 18.0 - 48.0 %    Mono % 10.2 4.0 - 15.0 %    Eosinophil % 3.4 0.0 - 8.0 %    Basophil % 0.8 0.0 - 1.9 %    Differential Method Automated    APTT   Result Value Ref Range    aPTT 28.4 21.0 - 32.0 sec   Protime-INR   Result Value Ref Range    Prothrombin Time 11.0 9.0 - 12.5 sec    INR 0.9 0.8 - 1.2   ECG 12 lead   Result Value Ref Range    QRS Duration 120 ms    [facility] QTC Calculation 415 ms   POCT glucose   Result Value Ref Range    POCT Glucose 124 (H) 70 - 110 mg/dL   POCT glucose   Result Value Ref Range    POCT Glucose 121 (H) 70 - 110 mg/dL   POCT glucose   Result Value Ref Range    POCT Glucose 103 70 - 110 mg/dL   POCT glucose   Result Value Ref Range    POCT Glucose 91 70 - 110 mg/dL   POCT glucose   Result Value Ref Range    POCT Glucose 188 (H) 70 - 110 mg/dL   POCT glucose   Result Value Ref Range    POCT Glucose 108 70 - 110 mg/dL   POCT glucose   Result Value Ref Range    POCT Glucose 119 (H) 70 - 110 mg/dL         Imaging Results:  Imaging Results              MRA Brain without contrast (Final result)  Result time 06/02/24 17:09:50      Final result by Simeon Cutler MD (06/02/24 17:09:50)                   Impression:      Complete opacification of the left maxillary sinus.  Small left ethmoid and frontal sinus  opacification.  Correlate clinically to sinusitis    No acute infarct.  No restricted diffusion    Moderate subcortical and periventricular FLAIR hyperintensities consistent with chronic microvascular ischemic changes    Left maxillary sinus and mild left ethmoid frontal sinus mucosal thickening.    Moderate atherosclerotic changes as evidenced by luminal irregularities of the intracranial vasculature.      Electronically signed by: Simeon Cutler  Date:    06/02/2024  Time:    17:09               Narrative:    EXAMINATION:  MRI BRAIN WITHOUT CONTRAST; MRA BRAIN WITHOUT CONTRAST    CLINICAL HISTORY:  Dizziness, non-specific;Stroke, follow up;; Neuro deficit, acute, stroke suspected;    TECHNIQUE:  Sagittal T1. Axial T1, T2, T2 FLAIR, GRE, DWI. Coronal T2 FLAIR.    COMPARISON:  None available.    FINDINGS:  Complete opacification of the left maxillary sinus.  Left ethmoid and frontal sinus mucosal thickening.  Moderate subcortical and periventricular FLAIR hyperintensities consistent with chronic microvascular ischemic changes.  Normal bilateral flow voids.  Midline structures are grossly unremarkable.  No evidence for susceptibility artifacts.    Mild moderate luminal irregularities of the intracranial vasculature.  No high-grade stenosis is identified.  Findings suggestive of age-related atherosclerotic disease.    No abnormal marrow signal is identified.  Complete opacification of the left maxillary sinus.  No evidence for mastoiditis.  Orbits are grossly unremarkable.  Small left ethmoid and frontal sinus opacification.                                       MRI Brain Without Contrast (Final result)  Result time 06/02/24 17:09:50      Final result by Simeon Cutler MD (06/02/24 17:09:50)                   Impression:      Complete opacification of the left maxillary sinus.  Small left ethmoid and frontal sinus opacification.  Correlate clinically to sinusitis    No acute infarct.  No restricted  diffusion    Moderate subcortical and periventricular FLAIR hyperintensities consistent with chronic microvascular ischemic changes    Left maxillary sinus and mild left ethmoid frontal sinus mucosal thickening.    Moderate atherosclerotic changes as evidenced by luminal irregularities of the intracranial vasculature.      Electronically signed by: Simeon Cutler  Date:    06/02/2024  Time:    17:09               Narrative:    EXAMINATION:  MRI BRAIN WITHOUT CONTRAST; MRA BRAIN WITHOUT CONTRAST    CLINICAL HISTORY:  Dizziness, non-specific;Stroke, follow up;; Neuro deficit, acute, stroke suspected;    TECHNIQUE:  Sagittal T1. Axial T1, T2, T2 FLAIR, GRE, DWI. Coronal T2 FLAIR.    COMPARISON:  None available.    FINDINGS:  Complete opacification of the left maxillary sinus.  Left ethmoid and frontal sinus mucosal thickening.  Moderate subcortical and periventricular FLAIR hyperintensities consistent with chronic microvascular ischemic changes.  Normal bilateral flow voids.  Midline structures are grossly unremarkable.  No evidence for susceptibility artifacts.    Mild moderate luminal irregularities of the intracranial vasculature.  No high-grade stenosis is identified.  Findings suggestive of age-related atherosclerotic disease.    No abnormal marrow signal is identified.  Complete opacification of the left maxillary sinus.  No evidence for mastoiditis.  Orbits are grossly unremarkable.  Small left ethmoid and frontal sinus opacification.                                       CTA Head and Neck (xpd) (Final result)  Result time 06/02/24 13:07:13      Final result by Victor Manuel Russell MD (Timothy) (06/02/24 13:07:13)                   Impression:      Scattered atherosclerotic plaque involving the extracranial carotid system as well as the internal carotid arteries at the level of the cavernous sinus bilaterally.  However no significant narrowing or stenosis.  No vessel occlusions or significant stenosis.    Scattered  atherosclerotic plaque of the vertebrobasilar system without significant narrowing or stenosis.    All CT scans at this facility use dose modulation, iterative reconstruction and/or weight based dosing when appropriate to reduce radiation dose to as low as reasonably achievable.      Electronically signed by: Victor Manuel Russell MD  Date:    06/02/2024  Time:    13:07               Narrative:    EXAMINATION:  CTA HEAD AND NECK (XPD)    CLINICAL HISTORY:  Neuro deficit, acute, stroke suspected;Stroke/TIA, determine embolic source;    TECHNIQUE:  Standard contrast enhanced CTA of brain with 100ml of Omnipaque 350 with 3D MIP reformations. No previous for comparison.    COMPARISON:  None    FINDINGS:  The brain is grossly normal    The anterior circulation is normal.  No intracerebral large vessel occlusions.  There is atherosclerosis involving the cavernous portions of the carotid arteries bilaterally.  No significant narrowing or stenosis.  Telida Perez is intact with bilateral posterior communicating arteries and anterior communicating arteries.  There is mild-to-moderate calcified plaque involving the extracranial carotid system on the right.  No significant narrowing or stenosis.  There is mild-to-moderate calcified plaque involving the extracranial carotid system on the left.  No significant narrowing or stenosis.    The vertebrobasilar arteries are normal.    NASCET criteria are used for carotid artery stenosis measurements.                                       CT Head Without Contrast (Final result)  Result time 06/02/24 11:39:50      Final result by Victor Manuel Russell ([facility]), MD (06/02/24 11:39:50)                   Impression:      No acute intracranial abnormality.  CT aspects score 10.    All CT scans at this facility use dose modulation, iterative reconstructions, and/or weight base dosing when appropriate to reduce radiation dose to as low as reasonably achievable.      Electronically signed by: Victor Manuel  MD Drew  Date:    06/02/2024  Time:    11:39               Narrative:    EXAMINATION:  CT HEAD WITHOUT CONTRAST    CLINICAL HISTORY:  Neuro deficit, acute, stroke suspected;    TECHNIQUE:  Noncontrast images were obtained    COMPARISON:  None    FINDINGS:  No intracranial acute hemorrhage or acute focal brain parenchymal abnormality is identified.  Mild patchy periventricular white matter hypodensity secondary to chronic small vessel ischemic changes.  Calvarium is intact.                                       The EKG was ordered, reviewed, and independently interpreted by the ED provider.  Interpretation time: 11:48 AM  Rate: 50 BPM  Rhythm: sinus bradycardia with sinus arrhythmia  Interpretation: LA FB. LV hypertrophy with QRS widening ST and T WA, consider lateral ischemia. No STEMI.           The Emergency Provider reviewed the vital signs and test results, which are outlined above.     ED Discussion     11:46 AM: Discussed pt's case with Dr. Linder (Neurology) who recommends MRI, CTA, 325 mg ASPIRIN, 300 mg CLAVIX, and stroke workup.    1:24 PM: Discussed case with Dr. Cerrato ([facility]). Dr. Cerrato agrees with current care and management of pt and accepts admission.   Admitting Service: Hospital Medicine  Admitting Physician: Dr. Cerrato  Admit to: Obs/tele    1:30 PM: Re-evaluated pt. I have discussed test results, shared treatment plan, and the need for admission with patient and family at bedside. Pt and family express understanding at this time and agree with all information. All questions answered. Pt and family have no further questions or concerns at this time. Pt is ready for admit.      ED Course as of 06/04/24 0019   Sun Jun 02, 2024   1210 Rhythm strip reviewed. No stemi. Sinus bradycardia.  50 bpm [LV]      ED Course User Index  [LV] Pramod Malave Jr., MD     Medical Decision Making  Amount and/or Complexity of Data Reviewed  Labs: ordered. Decision-making details documented in ED  Course.  Radiology: ordered. Decision-making details documented in ED Course.  ECG/medicine tests: ordered and independent interpretation performed. Decision-making details documented in ED Course.    Risk  OTC drugs.  Prescription drug management.  Parenteral controlled substances.  Decision regarding hospitalization.  Risk Details: OTC drugs, prescription drugs and controlled substances considered.  Due to patient's symptoms improving and pain controlled pain medications ordered appropriately.  Differential diagnosis: Electrolyte abnormality, strains, sprain, fracture, syncope, sepsis, infection, arrhythmia, or dehydration.         Additional MDM:     NIH Stroke Scale:   Interval = baseline (upon arrival/admit)  Level of consciousness = 0 - alert  LOC questions = 0 - answers both correctly  LOC commands = 0 - performs both correctly  Best gaze = 1 - partial gaze palsy  Visual = 0 - no visual loss  Facial palsy = 0 - normal  Motor left arm =  0 - no drift  Motor right arm =  0 - no drift  Motor left leg = 0 - no drift  Motor right leg =  0 - no drift  Limb ataxia = 0 - absent  Sensory = 0 - normal  Best language = 0 - no aphasia  Dysarthria = 0 - normal articulation  Extinction and inattention = 0 - no neglect  NIH Stroke Scale Total = 1              ED Medication(s):  Medications   sodium chloride 0.9% flush 10 mL (has no administration in time range)   labetaloL injection 10 mg (has no administration in time range)   bisacodyL suppository 10 mg (has no administration in time range)   polyethylene glycol packet 17 g (17 g Oral Given 6/3/24 0936)   ondansetron injection 4 mg (has no administration in time range)   aspirin EC tablet 81 mg (81 mg Oral Given 6/3/24 0936)   glucose chewable tablet 16 g (has no administration in time range)   glucose chewable tablet 24 g (has no administration in time range)   glucagon (human recombinant) injection 1 mg (has no administration in time range)   insulin aspart U-100 pen  0-5 Units (has no administration in time range)   dextrose 10% bolus 125 mL 125 mL (has no administration in time range)   dextrose 10% bolus 250 mL 250 mL (has no administration in time range)   acetaminophen tablet 650 mg (650 mg Oral Given 6/2/24 1543)   melatonin tablet 6 mg (6 mg Oral Given 6/3/24 2104)   amLODIPine tablet 10 mg (10 mg Oral Given 6/3/24 1230)   anastrozole tablet 1 mg (1 mg Oral Given 6/3/24 1230)   hydroCHLOROthiazide tablet 25 mg (has no administration in time range)   losartan tablet 100 mg (has no administration in time range)   pantoprazole EC tablet 40 mg (40 mg Oral Given 6/3/24 1230)   atorvastatin tablet 80 mg (80 mg Oral Given 6/3/24 2020)   aspirin tablet 325 mg (325 mg Oral Given 6/2/24 1208)   clopidogreL tablet 300 mg (300 mg Oral Given 6/2/24 1208)   iohexoL (OMNIPAQUE 350) injection 100 mL (100 mLs Intravenous Given 6/2/24 1215)   hydrALAZINE injection 5 mg (5 mg Intravenous Given 6/2/24 1340)   gadobutroL (GADAVIST) injection 10 mL (7 mLs Intravenous Given 6/3/24 1545)       Current Discharge Medication List                  Scribe Attestation:   Scribe #1: I performed the above scribed service and the documentation accurately describes the services I performed. I attest to the accuracy of the note.     Attending:   Physician Attestation Statement for Scribe #1: I, Pramod Malave Jr., MD, personally performed the services described in this documentation, as scribed by Archie Chester, in my presence, and it is both accurate and complete.           Clinical Impression       ICD-10-CM ICD-9-CM   1. Acute ischemic VBA brainstem stroke, left  I63.212 434.91    I63.22    2. Acute focal neurological deficit  R29.818 781.99   3. CN III palsy, left  H49.02 378.51   4. Primary hypertension  I10 401.9   5. Hyperlipidemia, unspecified hyperlipidemia type  E78.5 272.4   6. DM type 2 with diabetic dyslipidemia  E11.69 250.80    E78.5 272.4       Disposition:   Disposition: Placed in  Observation  Condition: Serious         Pramod Malave Jr., MD  06/04/24 0020

## 2024-06-03 PROBLEM — I63.22 ACUTE ISCHEMIC VBA BRAINSTEM STROKE, LEFT: Status: ACTIVE | Noted: 2024-06-02

## 2024-06-03 PROBLEM — H49.02: Status: ACTIVE | Noted: 2024-06-03

## 2024-06-03 PROBLEM — I63.212 ACUTE ISCHEMIC VBA BRAINSTEM STROKE, LEFT: Status: ACTIVE | Noted: 2024-06-02

## 2024-06-03 LAB
ALBUMIN SERPL BCP-MCNC: 4.1 G/DL (ref 3.5–5.2)
ALP SERPL-CCNC: 45 U/L (ref 55–135)
ALT SERPL W/O P-5'-P-CCNC: 20 U/L (ref 10–44)
ANION GAP SERPL CALC-SCNC: 14 MMOL/L (ref 8–16)
APTT PPP: 28.4 SEC (ref 21–32)
AST SERPL-CCNC: 23 U/L (ref 10–40)
BASOPHILS # BLD AUTO: 0.04 K/UL (ref 0–0.2)
BASOPHILS NFR BLD: 0.8 % (ref 0–1.9)
BILIRUB SERPL-MCNC: 0.6 MG/DL (ref 0.1–1)
BUN SERPL-MCNC: 10 MG/DL (ref 8–23)
CALCIUM SERPL-MCNC: 10.1 MG/DL (ref 8.7–10.5)
CHLORIDE SERPL-SCNC: 95 MMOL/L (ref 95–110)
CO2 SERPL-SCNC: 22 MMOL/L (ref 23–29)
CREAT SERPL-MCNC: 0.7 MG/DL (ref 0.5–1.4)
DIFFERENTIAL METHOD BLD: ABNORMAL
EOSINOPHIL # BLD AUTO: 0.2 K/UL (ref 0–0.5)
EOSINOPHIL NFR BLD: 3.4 % (ref 0–8)
ERYTHROCYTE [DISTWIDTH] IN BLOOD BY AUTOMATED COUNT: 12.4 % (ref 11.5–14.5)
EST. GFR  (NO RACE VARIABLE): >60 ML/MIN/1.73 M^2
GLUCOSE SERPL-MCNC: 87 MG/DL (ref 70–110)
HCT VFR BLD AUTO: 38.4 % (ref 37–48.5)
HGB BLD-MCNC: 13.4 G/DL (ref 12–16)
IMM GRANULOCYTES # BLD AUTO: 0.03 K/UL (ref 0–0.04)
IMM GRANULOCYTES NFR BLD AUTO: 0.6 % (ref 0–0.5)
INR PPP: 0.9 (ref 0.8–1.2)
LYMPHOCYTES # BLD AUTO: 0.8 K/UL (ref 1–4.8)
LYMPHOCYTES NFR BLD: 15.9 % (ref 18–48)
MAGNESIUM SERPL-MCNC: 1.9 MG/DL (ref 1.6–2.6)
MCH RBC QN AUTO: 30.9 PG (ref 27–31)
MCHC RBC AUTO-ENTMCNC: 34.9 G/DL (ref 32–36)
MCV RBC AUTO: 89 FL (ref 82–98)
MONOCYTES # BLD AUTO: 0.5 K/UL (ref 0.3–1)
MONOCYTES NFR BLD: 10.2 % (ref 4–15)
NEUTROPHILS # BLD AUTO: 3.6 K/UL (ref 1.8–7.7)
NEUTROPHILS NFR BLD: 69.1 % (ref 38–73)
NRBC BLD-RTO: 0 /100 WBC
OHS QRS DURATION: 120 MS
OHS QTC CALCULATION: 415 MS
PHOSPHATE SERPL-MCNC: 3 MG/DL (ref 2.7–4.5)
PLATELET # BLD AUTO: 316 K/UL (ref 150–450)
PMV BLD AUTO: 8.2 FL (ref 9.2–12.9)
POCT GLUCOSE: 108 MG/DL (ref 70–110)
POCT GLUCOSE: 119 MG/DL (ref 70–110)
POCT GLUCOSE: 188 MG/DL (ref 70–110)
POCT GLUCOSE: 91 MG/DL (ref 70–110)
POTASSIUM SERPL-SCNC: 3.3 MMOL/L (ref 3.5–5.1)
PROT SERPL-MCNC: 8.4 G/DL (ref 6–8.4)
PROTHROMBIN TIME: 11 SEC (ref 9–12.5)
RBC # BLD AUTO: 4.33 M/UL (ref 4–5.4)
SODIUM SERPL-SCNC: 131 MMOL/L (ref 136–145)
WBC # BLD AUTO: 5.22 K/UL (ref 3.9–12.7)

## 2024-06-03 PROCEDURE — 85610 PROTHROMBIN TIME: CPT | Performed by: NURSE PRACTITIONER

## 2024-06-03 PROCEDURE — 97112 NEUROMUSCULAR REEDUCATION: CPT

## 2024-06-03 PROCEDURE — 97166 OT EVAL MOD COMPLEX 45 MIN: CPT

## 2024-06-03 PROCEDURE — A9585 GADOBUTROL INJECTION: HCPCS | Performed by: HOSPITALIST

## 2024-06-03 PROCEDURE — G0378 HOSPITAL OBSERVATION PER HR: HCPCS

## 2024-06-03 PROCEDURE — 25500020 PHARM REV CODE 255: Performed by: HOSPITALIST

## 2024-06-03 PROCEDURE — G0427 INPT/ED TELECONSULT70: HCPCS | Mod: 95,,, | Performed by: NURSE PRACTITIONER

## 2024-06-03 PROCEDURE — 80053 COMPREHEN METABOLIC PANEL: CPT | Performed by: NURSE PRACTITIONER

## 2024-06-03 PROCEDURE — 85730 THROMBOPLASTIN TIME PARTIAL: CPT | Performed by: NURSE PRACTITIONER

## 2024-06-03 PROCEDURE — 85025 COMPLETE CBC W/AUTO DIFF WBC: CPT | Performed by: NURSE PRACTITIONER

## 2024-06-03 PROCEDURE — 92610 EVALUATE SWALLOWING FUNCTION: CPT

## 2024-06-03 PROCEDURE — 97530 THERAPEUTIC ACTIVITIES: CPT

## 2024-06-03 PROCEDURE — 36415 COLL VENOUS BLD VENIPUNCTURE: CPT | Performed by: NURSE PRACTITIONER

## 2024-06-03 PROCEDURE — 25000003 PHARM REV CODE 250: Performed by: HOSPITALIST

## 2024-06-03 PROCEDURE — 25000003 PHARM REV CODE 250: Performed by: NURSE PRACTITIONER

## 2024-06-03 PROCEDURE — 84100 ASSAY OF PHOSPHORUS: CPT | Performed by: NURSE PRACTITIONER

## 2024-06-03 PROCEDURE — 83735 ASSAY OF MAGNESIUM: CPT | Performed by: NURSE PRACTITIONER

## 2024-06-03 PROCEDURE — 92523 SPEECH SOUND LANG COMPREHEN: CPT

## 2024-06-03 PROCEDURE — 97162 PT EVAL MOD COMPLEX 30 MIN: CPT

## 2024-06-03 RX ORDER — GADOBUTROL 604.72 MG/ML
10 INJECTION INTRAVENOUS
Status: COMPLETED | OUTPATIENT
Start: 2024-06-03 | End: 2024-06-03

## 2024-06-03 RX ORDER — PANTOPRAZOLE SODIUM 40 MG/1
40 TABLET, DELAYED RELEASE ORAL DAILY
Status: DISCONTINUED | OUTPATIENT
Start: 2024-06-03 | End: 2024-06-12 | Stop reason: HOSPADM

## 2024-06-03 RX ORDER — LOSARTAN POTASSIUM 50 MG/1
100 TABLET ORAL DAILY
Status: DISCONTINUED | OUTPATIENT
Start: 2024-06-04 | End: 2024-06-12 | Stop reason: HOSPADM

## 2024-06-03 RX ORDER — AMLODIPINE BESYLATE 10 MG/1
10 TABLET ORAL DAILY
Status: DISCONTINUED | OUTPATIENT
Start: 2024-06-03 | End: 2024-06-12 | Stop reason: HOSPADM

## 2024-06-03 RX ORDER — PRAVASTATIN SODIUM 20 MG/1
20 TABLET ORAL DAILY
Status: DISCONTINUED | OUTPATIENT
Start: 2024-06-04 | End: 2024-06-03

## 2024-06-03 RX ORDER — ATORVASTATIN CALCIUM 40 MG/1
80 TABLET, FILM COATED ORAL NIGHTLY
Status: DISCONTINUED | OUTPATIENT
Start: 2024-06-03 | End: 2024-06-12 | Stop reason: HOSPADM

## 2024-06-03 RX ORDER — ANASTROZOLE 1 MG/1
1 TABLET ORAL DAILY
Status: DISCONTINUED | OUTPATIENT
Start: 2024-06-03 | End: 2024-06-12 | Stop reason: HOSPADM

## 2024-06-03 RX ORDER — HYDROCHLOROTHIAZIDE 25 MG/1
25 TABLET ORAL DAILY
Status: DISCONTINUED | OUTPATIENT
Start: 2024-06-04 | End: 2024-06-12 | Stop reason: HOSPADM

## 2024-06-03 RX ADMIN — POLYETHYLENE GLYCOL 3350 17 G: 17 POWDER, FOR SOLUTION ORAL at 09:06

## 2024-06-03 RX ADMIN — ASPIRIN 81 MG: 81 TABLET, COATED ORAL at 09:06

## 2024-06-03 RX ADMIN — ANASTROZOLE 1 MG: 1 TABLET, COATED ORAL at 12:06

## 2024-06-03 RX ADMIN — Medication 6 MG: at 09:06

## 2024-06-03 RX ADMIN — CLOPIDOGREL BISULFATE 75 MG: 75 TABLET ORAL at 09:06

## 2024-06-03 RX ADMIN — GADOBUTROL 7 ML: 604.72 INJECTION INTRAVENOUS at 03:06

## 2024-06-03 RX ADMIN — ATORVASTATIN CALCIUM 80 MG: 40 TABLET, FILM COATED ORAL at 08:06

## 2024-06-03 RX ADMIN — AMLODIPINE BESYLATE 10 MG: 10 TABLET ORAL at 12:06

## 2024-06-03 RX ADMIN — PANTOPRAZOLE SODIUM 40 MG: 40 TABLET, DELAYED RELEASE ORAL at 12:06

## 2024-06-03 NOTE — PLAN OF CARE
OT EVAL COMPLETE.  PATIENT WOULD BENEFIT FROM CONT SKILLED OT INTERVENTION.  OT RECOMMENDS OP OT AT D/C, HOWEVER PATIENT STATED SHE MAY NEED HH IF SHE CAN'T GET TRANSPORTATION TO OP.

## 2024-06-03 NOTE — PLAN OF CARE
A206/A206 JUAN Farmer is a 68 y.o.female admitted on 6/2/2024 for CVA (cerebral vascular accident)   Code Status: Full Code MRN: [id number]   Review of patient's allergies indicates:  No Known Allergies  Past Medical History:   Diagnosis Date    Allergy     Arthritis     Cancer 2016    colon    CHF (congestive heart failure)     Colon cancer 2004    Colon cancer screening 9/21/2015    Diabetes mellitus type 2 in nonobese 3/9/2016    borderline    Diverticulosis     DM (diabetes mellitus) 03/2016    BS didn't check 02/13/2019    DM (diabetes mellitus) 03/2016    BS didn't check 03/04/2020    Hyperlipidemia 10/25/2016    Hypertension     Insomnia     Malignant neoplasm of colon 5/13/2021    Malignant neoplasm of lower-outer quadrant of left breast of female, estrogen receptor positive 6/9/2022      PRN meds    acetaminophen, 650 mg, Q6H PRN  bisacodyL, 10 mg, Daily PRN  dextrose 10%, 12.5 g, PRN  dextrose 10%, 25 g, PRN  glucagon (human recombinant), 1 mg, PRN  glucose, 16 g, PRN  glucose, 24 g, PRN  insulin aspart U-100, 0-5 Units, QID (AC + HS) PRN  labetalol, 10 mg, Q15 Min PRN  melatonin, 6 mg, Nightly PRN  ondansetron, 4 mg, Q8H PRN  sodium chloride 0.9%, 10 mL, PRN      Chart check completed. Will continue plan of care.      Orientation: oriented x 4  Yoni Coma Scale Score: 15     Lead Monitored: Lead II Rhythm: sinus bradycardia    Cardiac/Telemetry Box Number: [id number]  VTE Required Core Measure: (SCDs) Sequential compression device initiated/maintained Last Bowel Movement: 06/02/24  Diet Cardiac  Voiding Characteristics: external catheter  Laurent Score: 19  Fall Risk Score: 8  Accucheck [x]   Freq? AC and HS     Lines/Drains/Airways       Peripheral Intravenous Line  Duration                  Peripheral IV - Single Lumen 06/02/24 1203 20 G Right Antecubital <1 day                    Problem: Adult Inpatient Plan of Care  Goal: Plan of Care Review  Outcome: Progressing  Goal: Patient-Specific Goal  (Individualized)  Outcome: Progressing  Goal: Absence of Hospital-Acquired Illness or Injury  Outcome: Progressing  Goal: Optimal Comfort and Wellbeing  Outcome: Progressing  Goal: Readiness for Transition of Care  Outcome: Progressing     Problem: Infection  Goal: Absence of Infection Signs and Symptoms  Outcome: Progressing     Problem: Stroke, Ischemic (Includes Transient Ischemic Attack)  Goal: Optimal Coping  Outcome: Progressing  Goal: Effective Bowel Elimination  Outcome: Progressing  Goal: Optimal Cerebral Tissue Perfusion  Outcome: Progressing  Goal: Optimal Cognitive Function  Outcome: Progressing  Goal: Improved Communication Skills  Outcome: Progressing  Goal: Optimal Functional Ability  Outcome: Progressing  Goal: Optimal Nutrition Intake  Outcome: Progressing  Goal: Effective Oxygenation and Ventilation  Outcome: Progressing  Goal: Improved Sensorimotor Function  Outcome: Progressing  Goal: Safe and Effective Swallow  Outcome: Progressing  Goal: Effective Urinary Elimination  Outcome: Progressing     Problem: Diabetes Comorbidity  Goal: Blood Glucose Level Within Targeted Range  Outcome: Progressing     Problem: Fall Injury Risk  Goal: Absence of Fall and Fall-Related Injury  Outcome: Progressing

## 2024-06-03 NOTE — HOSPITAL COURSE
"6/3/24  NAEON, patient with left eye drift, reports blurry vision some improved today  MRI/MRA obtained yesterday with chronic microvascular changes  Tele-Neurology consulted, recommended repeat MRI  Repeat MRI with and without contrast with thin slices: "Mild scattered leptomeningeal enhancement including overlying the right frontal lobe as well as along the inter hemispheric fissure, left occipital lobe, and interpeduncular cistern with associated mild T2/FLAIR hyperintense signal the seen along the medial aspect of the cerebral peduncles. Findings at the interpeduncular cistern may be affecting cranial nerve 3 which may explain reported oculomotor nerve palsy. Findings are nonspecific with differential considerations including meningitis, metastatic disease, and paraneoplastic syndrome."  Patient with hx of breast and colon cancer, follows with Dr. Thompson    6/4/24  NAEON, patient reports improvement in vision today  S/p LP this morning, noted to be traumatic tap  CSF studies with elevated protein and WBCs  Discussed with Neurology, further CSF studies ordered  CSF cytology, mengitits/encephalitis, VDRL, paraneoplastic autoantibody studies ordered    6/5/24  NAEON  Mengitits panel +cryptococcus  Consult ID, started on amphotericin  Left eye deviated, reports blurry vision and headache onset 1 week PTA    6/6/24  NAEON, patient denies new issues/complaints  Started on amphotericin, flucytosine  Ceftriaxone for sinusitis  Appreciate ID assistance  RN at bedside, difficulty obtained PIV, will order PICC line    6/7/24  NAEON, left eye deviation some better today  Patient wishes to leave hospital, advised to stay, not medically ready  Cr 1.2 (0.8 yesterday), started on IVF    6/8/24  NAEON, no new issues  Day #4 of 14 IV amphotericin  Discussed with Dr. Robertson, may be possible to arrange outpatient treatment at infusion center  Would also need close monitoring of labs due to nephrotoxicity  Consult case " management    6/9/24  NAEON, day #5 of IV amphotericin  Headache improving    6/10/24  Day #6 of IV amphotericin  K 2.5, replete IV and PO, recheck this afternoon  Social Work consulted to assist with outpatient IV treatment    6/11/24  NAEON, day #7 of amphotericin  K 3.3, continue PO repletion  Home IV infusion arraignments pending per     6/12/24  NAEON  Approved for home IV amphotericin, MILEY 6/19/24  Advised to continue holding plaquenil, meloxicam and lasix on discharge  Increased potassium chloride to 10 meq BID x 10 days  Will have bi-weekly labs with home health  F/U with ID and PCP in 1-2 weeks for further management

## 2024-06-03 NOTE — PLAN OF CARE
[facility] - Telemetry (Hospital)  Discharge Assessment    Primary Care Provider: Chiquita Talley MD     Discharge Assessment (most recent)       BRIEF DISCHARGE ASSESSMENT - 06/03/24 1333          Discharge Planning    Assessment Type Discharge Planning Brief Assessment (P)      Resource/Environmental Concerns none (P)      Support Systems Children (P)      Equipment Currently Used at Home none (P)      Current Living Arrangements home (P)      Patient/Family Anticipates Transition to home with family (P)      Patient/Family Anticipated Services at Transition none (P)      DME Needed Upon Discharge  none (P)      Discharge Plan A Home with family (P)                      Patient lives with son, Víctor who can be her help at home.

## 2024-06-03 NOTE — SUBJECTIVE & OBJECTIVE
Review of Systems   All other systems reviewed and are negative.    Objective:     Vital Signs (Most Recent):  Temp: 97.5 °F (36.4 °C) (06/03/24 1644)  Pulse: 64 (06/03/24 1644)  Resp: 17 (06/03/24 1644)  BP: (!) 178/77 (06/03/24 1644)  SpO2: 99 % (06/03/24 1644) Vital Signs (24h Range):  Temp:  [97.5 °F (36.4 °C)-98.7 °F (37.1 °C)] 97.5 °F (36.4 °C)  Pulse:  [52-70] 64  Resp:  [17-20] 17  SpO2:  [94 %-100 %] 99 %  BP: (166-179)/(71-95) 178/77     Weight: 71.5 kg (157 lb 10.1 oz)  Body mass index is 25.44 kg/m².    Intake/Output Summary (Last 24 hours) at 6/3/2024 1723  Last data filed at 6/3/2024 1015  Gross per 24 hour   Intake 120 ml   Output 200 ml   Net -80 ml         Physical Exam  Vitals and nursing note reviewed.   Constitutional:       General: She is not in acute distress.     Appearance: Normal appearance. She is normal weight.   Cardiovascular:      Rate and Rhythm: Normal rate and regular rhythm.      Heart sounds: No murmur heard.  Pulmonary:      Effort: Pulmonary effort is normal. No respiratory distress.      Breath sounds: No wheezing.   Neurological:      Mental Status: She is alert and oriented to person, place, and time.      Comments: Left eye deviated   Psychiatric:         Mood and Affect: Mood normal.         Behavior: Behavior normal.             Significant Labs: All pertinent labs within the past 24 hours have been reviewed.  Recent Lab Results  (Last 5 results in the past 24 hours)        06/03/24  1710   06/03/24  1119   06/03/24  0621   06/03/24  0525   06/02/24  2207        Albumin       4.1         ALP       45         ALT       20         Anion Gap       14         PTT       28.4  Comment: Refer to local heparin nomogram for intensity/dose specific   therapeutic   range.           AST       23         Baso #       0.04         Basophil %       0.8         BILIRUBIN TOTAL       0.6  Comment: For infants and newborns, interpretation of results should be based  on gestational age,  weight and in agreement with clinical  observations.    Premature Infant recommended reference ranges:  Up to 24 hours.............<8.0 mg/dL  Up to 48 hours............<12.0 mg/dL  3-5 days..................<15.0 mg/dL  6-29 days.................<15.0 mg/dL           BUN       10         Calcium       10.1         Chloride       95         CO2       22         Creatinine       0.7         Differential Method       Automated         eGFR       >60         Eos #       0.2         Eos %       3.4         Glucose       87         Gran # (ANC)       3.6         Gran %       69.1         Hematocrit       38.4         Hemoglobin       13.4         Immature Grans (Abs)       0.03  Comment: Mild elevation in immature granulocytes is non specific and   can be seen in a variety of conditions including stress response,   acute inflammation, trauma and pregnancy. Correlation with other   laboratory and clinical findings is essential.           Immature Granulocytes       0.6         INR       0.9  Comment: Coumadin Therapy:  2.0 - 3.0 for INR for all indicators except mechanical heart valves  and antiphospholipid syndromes which should use 2.5 - 3.5.           Lymph #       0.8         Lymph %       15.9         Magnesium        1.9         MCH       30.9         MCHC       34.9         MCV       89         Mono #       0.5         Mono %       10.2         MPV       8.2         nRBC       0         Phosphorus Level       3.0         Platelet Count       316         POCT Glucose 108   188   91     103       Potassium       3.3         PROTEIN TOTAL       8.4         PT       11.0         RBC       4.33         RDW       12.4         Sodium       131         Troponin I               WBC       5.22                                Significant Imaging: I have reviewed all pertinent imaging results/findings within the past 24 hours.    MRI Brain W WO Contrast   Final Result      Mild scattered leptomeningeal enhancement including  overlying the right frontal lobe as well as along the inter hemispheric fissure, left occipital lobe, and interpeduncular cistern with associated mild T2/FLAIR hyperintense signal the seen along the medial aspect of the cerebral peduncles.  Findings at the interpeduncular cistern may be affecting cranial nerve 3 which may explain reported oculomotor nerve palsy.  Findings are nonspecific with differential considerations including meningitis, metastatic disease, and paraneoplastic syndrome.      Additional nonspecific white matter changes likely related to chronic microvascular ischemia.      Left paranasal sinus disease.         Electronically signed by: Boris Hwang   Date:    06/03/2024   Time:    16:50      MRA Brain without contrast   Final Result      Complete opacification of the left maxillary sinus.  Small left ethmoid and frontal sinus opacification.  Correlate clinically to sinusitis      No acute infarct.  No restricted diffusion      Moderate subcortical and periventricular FLAIR hyperintensities consistent with chronic microvascular ischemic changes      Left maxillary sinus and mild left ethmoid frontal sinus mucosal thickening.      Moderate atherosclerotic changes as evidenced by luminal irregularities of the intracranial vasculature.         Electronically signed by: Simeon Cutler   Date:    06/02/2024   Time:    17:09      MRI Brain Without Contrast   Final Result      Complete opacification of the left maxillary sinus.  Small left ethmoid and frontal sinus opacification.  Correlate clinically to sinusitis      No acute infarct.  No restricted diffusion      Moderate subcortical and periventricular FLAIR hyperintensities consistent with chronic microvascular ischemic changes      Left maxillary sinus and mild left ethmoid frontal sinus mucosal thickening.      Moderate atherosclerotic changes as evidenced by luminal irregularities of the intracranial vasculature.         Electronically signed  by: Simeon Cutler   Date:    06/02/2024   Time:    17:09      CTA Head and Neck (xpd)   Final Result      Scattered atherosclerotic plaque involving the extracranial carotid system as well as the internal carotid arteries at the level of the cavernous sinus bilaterally.  However no significant narrowing or stenosis.  No vessel occlusions or significant stenosis.      Scattered atherosclerotic plaque of the vertebrobasilar system without significant narrowing or stenosis.      All CT scans at this facility use dose modulation, iterative reconstruction and/or weight based dosing when appropriate to reduce radiation dose to as low as reasonably achievable.         Electronically signed by: Victor Manuel Russell MD   Date:    06/02/2024   Time:    13:07      CT Head Without Contrast   Final Result      No acute intracranial abnormality.  CT aspects score 10.      All CT scans at this facility use dose modulation, iterative reconstructions, and/or weight base dosing when appropriate to reduce radiation dose to as low as reasonably achievable.         Electronically signed by: Victor Manuel Russell MD   Date:    06/02/2024   Time:    11:39

## 2024-06-03 NOTE — PT/OT/SLP EVAL
Speech Language Pathology Evaluation  Cognitive/Bedside Swallow    Patient Name:  Anne Farmer   MRN:  [id number]  Admitting Diagnosis: CVA (cerebral vascular accident)    Recommendations:                  General Recommendations:  Follow-up not indicated  Diet recommendations:  Regular Diet - IDDSI Level 7, Thin liquids - IDDSI Level 0   Aspiration Precautions: Frequent oral care, HOB to 90 degrees, and Standard aspiration precautions   General Precautions: Standard, aspiration  Communication strategies:  none    History:     Past Medical History:   Diagnosis Date    Allergy     Arthritis     Cancer     colon    CHF (congestive heart failure)     Colon cancer 2004    Colon cancer screening 2015    Diabetes mellitus type 2 in nonobese 3/9/2016    borderline    Diverticulosis     DM (diabetes mellitus) 2016    BS didn't check 2019    DM (diabetes mellitus) 2016    BS didn't check 2020    Hyperlipidemia 10/25/2016    Hypertension     Insomnia     Malignant neoplasm of colon 2021    Malignant neoplasm of lower-outer quadrant of left breast of female, estrogen receptor positive 2022       Past Surgical History:   Procedure Laterality Date    BREAST BIOPSY Left     BREAST LUMPECTOMY Left      SECTION      COLON SURGERY      COLONOSCOPY N/A 2015    Procedure: COLONOSCOPY;  Surgeon: Adela Sam MD;  Location: [facility] ENDO;  Service: Endoscopy;  Laterality: N/A;    COLONOSCOPY N/A 2017    Procedure: COLONOSCOPY;  Surgeon: Chintan Flores MD;  Location: [facility] ENDO;  Service: Endoscopy;  Laterality: N/A;    COLONOSCOPY N/A 2020    Procedure: COLONOSCOPY;  Surgeon: Grisel Atkins MD;  Location: [facility] ENDO;  Service: Endoscopy;  Laterality: N/A;    SENTINEL LYMPH NODE BIOPSY Left 2022    Procedure: BIOPSY, LYMPH NODE, SENTINEL;  Surgeon: Arvin Hernandez MD;  Location: [facility] OR;  Service: General;  Laterality: Left;       Social History: Patient  lives with her son in [place], [place].    Prior Intubation HX:  NA this admission    Modified Barium Swallow: NA per chart review    MRI BRAIN WITHOUT CONTRAST; MRA BRAIN WITHOUT CONTRAST on 06/02/2024:  FINDINGS:  Complete opacification of the left maxillary sinus.  Left ethmoid and frontal sinus mucosal thickening.  Moderate subcortical and periventricular FLAIR hyperintensities consistent with chronic microvascular ischemic changes.  Normal bilateral flow voids.  Midline structures are grossly unremarkable.  No evidence for susceptibility artifacts.  Mild moderate luminal irregularities of the intracranial vasculature.  No high-grade stenosis is identified.  Findings suggestive of age-related atherosclerotic disease.  No abnormal marrow signal is identified.  Complete opacification of the left maxillary sinus.  No evidence for mastoiditis.  Orbits are grossly unremarkable.  Small left ethmoid and frontal sinus opacification.     Impression:  Complete opacification of the left maxillary sinus.  Small left ethmoid and frontal sinus opacification.  Correlate clinically to sinusitis  No acute infarct.  No restricted diffusion  Moderate subcortical and periventricular FLAIR hyperintensities consistent with chronic microvascular ischemic changes  Left maxillary sinus and mild left ethmoid frontal sinus mucosal thickening.  Moderate atherosclerotic changes as evidenced by luminal irregularities of the intracranial vasculature.     Electronically signed by:Simeon Cutler  Date:                                            06/02/2024    CT HEAD WITHOUT CONTRAST on 06/02/2024:  FINDINGS:  No intracranial acute hemorrhage or acute focal brain parenchymal abnormality is identified.  Mild patchy periventricular white matter hypodensity secondary to chronic small vessel ischemic changes.  Calvarium is intact.     Impression:  No acute intracranial abnormality.  CT aspects score 10.     Electronically signed by:Victor Manuel Russell  MD  Date:                                            06/02/2024    CTA HEAD AND NECK (XPD) on 06/02/2024:  FINDINGS:  The brain is grossly normal  The anterior circulation is normal.  No intracerebral large vessel occlusions.  There is atherosclerosis involving the cavernous portions of the carotid arteries bilaterally.  No significant narrowing or stenosis.  Gulkana Perez is intact with bilateral posterior communicating arteries and anterior communicating arteries.  There is mild-to-moderate calcified plaque involving the extracranial carotid system on the right.  No significant narrowing or stenosis.  There is mild-to-moderate calcified plaque involving the extracranial carotid system on the left.  No significant narrowing or stenosis.  The vertebrobasilar arteries are normal.  NASCET criteria are used for carotid artery stenosis measurements.     Impression:  Scattered atherosclerotic plaque involving the extracranial carotid system as well as the internal carotid arteries at the level of the cavernous sinus bilaterally.  However no significant narrowing or stenosis.  No vessel occlusions or significant stenosis.  Scattered atherosclerotic plaque of the vertebrobasilar system without significant narrowing or stenosis.     Electronically signed by:Victor Manuel Russell MD  Date:                                            06/02/2024    Prior diet: Regular diet.    Occupation/hobbies/homemaking: Pt reports that she drives occasionally, but because of her vision changes had not been driving the past few days. She noted that she works at [employer] and has for the last 15 years. She reports that her son aids in management of medications/meals but she is still involved and he only helps when needed.    Subjective     Pt seen bedside for ST jacinto.  Patient goals: To go home     Pain/Comfort:  Pain Rating 1: 0/10  Pain Rating Post-Intervention 1: 0/10  Pain Rating 2: 0/10  Pain Rating Post-Intervention 2:  0/10    Respiratory Status: Room air    Objective:     Cognitive Status:    Pt oriented with functional memory recall and reasoning.     Receptive Language:   Comprehension:   WFL during conversation    Pragmatics:    WFL    Expressive Language:  Verbal:    WFL during conversation    Motor Speech:  WFL    Voice:   WFL    Oral Musculature Evaluation  Oral Musculature: WFL (L eye weakness appreciated)  Dentition: partials, present and adequate (upper partials)  Secretion Management: adequate  Mucosal Quality: good  Mandibular Strength and Mobility: WFL  Oral Labial Strength and Mobility: WFL  Lingual Strength and Mobility: WFL  Velar Elevation: WFL  Buccal Strength and Mobility: WFL  Volitional Cough: present  Volitional Swallow: present  Voice Prior to PO Intake: clear    Bedside Swallow Eval:     Clinical Swallow Examination:   Of note, patient self-fed throughout evaluation. Patient presented with:     CONSISTENCY  NOTES   THIN (IDDSI 0) Sequential sips, cup/straw sips    No overt s/s of aspiration/dysphagia appreciated   PUREE (IDDSI 4/Extremely Thick)   TSP/TBSP bites of pudding/applesauce  No overt s/s of aspiration/dysphagia appreciated   SOLID (IDDSI 7/Regular) Bite of Alisia Doone cookie   No overt s/s of aspiration/dysphagia appreciated     Thickened liquids were not used in this assessment. Samreenfe (2018) reported that thickened liquids have no sound evidence at reducing the risk of pneumonia in patients with dysphagia and can cause harm by increasing their risk of dehydration. It also presents an increased risk of UTI, electrolyte imbalance, constipation, fecal impaction, cognitive impairment, functional decline and even death (Lia, 2002; Lorena, 2016).  Thickened liquids are associated with risks including dehydration, increased pharyngeal residue, potential interference with medication absorption, and decreased quality of life (Birgit, 2013). Thickened liquids are also more likely to be silently  aspirated than thin liquids (Ricardo et al., 2018). This supports the assertion that we should confirm a patient requires thickened liquids with an instrumental swallow study prior to recommending them.    References:   Birgit MARTINEZ (2013). Thickening agents used for dysphagia management: Effect on bioavailability of water, medication and feelings of satiety. Nutrition Journal, 12, 54. https://doi.org/10.1186/4647-5919-17-54    JUAN Silva, SANKET Elliott, PRICILLA Mendoza, & JUAN Roland (2018). Cough response to aspiration in thin and thick fluids during FEES in hospitalized inpatients. International journal of language & communication disorders, 53(5), 909-918. https://doi.org/10.1111/1154-3987.23744    INTERPRETATION AND RISK ASSESSMENT:  Clinical swallow evaluation (CSE) revealed oral phase characterized by lingual, labial, buccal strength and range of motion functional for lip closure, bolus preparation and propulsion. The patient had no anterior loss of the bolus with complete closure of the lips around the utensils. No residue remained in the oral cavity following the swallow. Patient without overt clinical signs/symptoms of aspiration on any PO trials given.    Assessment:     Anne Farmer is a 68 y.o. female admitted to [facility] acute with dx of CVA with a PMHx significant for HTN, DM2, HLD, Colon Cx, Sarcoidosis, RLD, Breast Cx, Cardiomyopathy. Brain imaging significant for no acute infarct, see full report for MRI/MRA of brain on 06/02/24. OM and communication at functional baseline.  No overt s/s of dysphagia present during bedside CSE, and pt recommended for IDDSI 7-regular solids with IDDSI 0-thin liquids, following standard aspiration precautions.  No further acute SLP intervention indicated at this time. MD to re-consult if needed.    Goals:   Multidisciplinary Problems       SLP Goals       Not on file                    Plan:     Patient to be seen:      Plan of Care expires:     Plan of Care reviewed  with:  patient   SLP Follow-Up:  No       Discharge recommendations:  Therapy Intensity Recommendations at Discharge: No Therapy Indicated   Barriers to Discharge:  None    Time Tracking:     SLP Treatment Date:   06/03/24  Speech Start Time:  1045  Speech Stop Time:  1110     Speech Total Time (min):  25 min    Billable Minutes: Eval 10  and Eval Swallow and Oral Function 15    Tessa Scott MA, PL-SLP, CF-SLP  Speech Language Pathologist  06/03/2024

## 2024-06-03 NOTE — PROGRESS NOTES
MRI with/without completed.  Concern for non-stroke etiology given findings.      Recommendations  -Can discontinue Plavix but would continue ASA and statin therapy given multiple stroke risk factors  -LP   -Consult General Neurology - Dr. Murray to see patient tomorrow      Aury Brewster NP-C  Vascular Neurology  [phone]

## 2024-06-03 NOTE — TELEMEDICINE CONSULT
[facility]  Tele-Vascular Neurology   Consult Note      Consult Information  Inpatient consult to Telemedicine-General Neurology  Consult performed by: Aury Brewster NP  Consult ordered by: Josesito Cerrato MD          Consulting Provider: REGIS MARI JR   Current Providers  No providers found    Patient Location:  [facility] TELEMETRY IP Unit    Spoke hospital nurse at bedside with patient assisting consultant.  Patient information was obtained from patient.       Vascular Neurology Documentation  Acute Stroke Times   Last Known Normal Date: 06/01/24  Last Known Normal Time: 2100  Symptom Onset Date: 06/02/24  Symptom Onset Time: 0600  Stroke Team Called Date: 06/02/24  Stroke Team Called Time: 1130  Stroke Team Arrival Date: 06/02/24  Stroke Team Arrival Time: 1135  CT Interpretation Time: 1135  Thrombolytic Therapy Recommended: No  Thrombectomy Recommended: No    NIH Scale:  1a. Level of Consciousness: 0-->Alert, keenly responsive  1b. LOC Questions: 0-->Answers both questions correctly  1c. LOC Commands: 0-->Performs both tasks correctly  2. Best Gaze: 1-->Partial gaze palsy, gaze is abnormal in one or both eyes, but forced deviation or total gaze paresis is not present  3. Visual: 0-->No visual loss  4. Facial Palsy: 0-->Normal symmetrical movements  5a. Motor Arm, Left: 0-->No drift, limb holds 90 (or 45) degrees for full 10 secs  5b. Motor Arm, Right: 0-->No drift, limb holds 90 (or 45) degrees for full 10 secs  6a. Motor Leg, Left: 0-->No drift, leg holds 30 degree position for full 5 secs  6b. Motor Leg, Right: 0-->No drift, leg holds 30 degree position for full 5 secs  7. Limb Ataxia: 0-->Absent  8. Sensory: 0-->Normal, no sensory loss  9. Best Language: 0-->No aphasia, normal  10. Dysarthria: 0-->Normal  11. Extinction and Inattention (formerly Neglect): 0-->No abnormality  Total (NIH Stroke Scale): 1      Modified [place]: Score: 0  Yoni Coma Scale:     ABCD2 Score:    [id number] Score:    HAS  "-BLED Score:    ICH Score:    Hunt & Berry Classification:      Blood pressure (!) 179/77, pulse 62, temperature 98 °F (36.7 °C), temperature source Oral, resp. rate 17, height 5' 6" (1.676 m), weight 71.5 kg (157 lb 10.1 oz), SpO2 99%.    Van Negative    Medical Decision Making  HPI:  68 y.o. female HTN, HLD DM2, CHF, cardiomyopathy, colon CA, breast CA, presented with dizziness and deviated eye.  Patient states about a week ago she started having episodes of dizziness combination of room spinning and lightheaded feeling as well as imbalance.  Also noticed her speech was a little slower than usual but denies slurred speech or word finding.  Saturday she noticed her left eye was deviated.  She states this was the symptom that made her come to the ER yesterday.  She reports associated double vision when looking to the left.  She denies vision loss, facial weakness, swallowing difficulty, weakness/numbness of the arms/legs.  She has never had these symptoms before.  She has no prior history of stroke or TIA.     Images personally reviewed and interpreted:  Study: Head CT, CTA Head & Neck, and MRI Brain  Study Interpretation: CT head: No early infarct signs; no hemorrhage; no mass effect.  CTA head and neck:  No high-grade stenosis or occlusion; no obvious aneurysm noted.  MRI brain:  No diffusion restriction; no hemorrhage; chronic white matter disease.    Additional studies reviewed:   Study: Cardiac_Neuro: 2D echo  Study Interpretation: EF 60%; no wall motion abnormality noted; no thrombus/vegetation; normal left atrium.      Laboratory studies reviewed:  BMP:   Lab Results   Component Value Date     (L) 06/03/2024    K 3.3 (L) 06/03/2024    CL 95 06/03/2024    CO2 22 (L) 06/03/2024    BUN 10 06/03/2024    CREATININE 0.7 06/03/2024    CALCIUM 10.1 06/03/2024     CBC:   Lab Results   Component Value Date    WBC 5.22 06/03/2024    RBC 4.33 06/03/2024    HGB 13.4 06/03/2024    HCT 38.4 06/03/2024     " 06/03/2024    MCV 89 06/03/2024    MCH 30.9 06/03/2024    MCHC 34.9 06/03/2024     Lipid Panel:   Lab Results   Component Value Date    CHOL 208 (H) 06/02/2024    LDLCALC 106.4 06/02/2024    HDL 86 (H) 06/02/2024    TRIG 78 06/02/2024     Coagulation:   Lab Results   Component Value Date    INR 0.9 06/03/2024    APTT 28.4 06/03/2024     Hgb A1C:   Lab Results   Component Value Date    HGBA1C 6.7 (H) 05/10/2024     TSH:   Lab Results   Component Value Date    TSH 2.341 06/02/2024       Documentation personally reviewed:  Notes: Notes: ED notes, H&P, and Consult notes from: TeleStroke     Assessment and plan:  68 y.o. female HTN, HLD DM2, CHF, cardiomyopathy, colon CA, breast CA now with pupil sparing partial CN III palsy.  No other deficits noted on exam.  MRI with no obvious areas of diffusion restriction.  CTA with no high-grade stenosis, occlusion or obvious aneurysm.  TTE unremarkable.  Concern for midbrain stroke not seen on MRI.  Midbrain mass lesion and ischemic CN III also in differential.    Recommendations  -Continue ASA 81mg and Plavix 75mg x 21 days then monotherapy with ASA  -Change home pravastatin to Atorvastatin 80mg for goal LDL < 70  -MRI brain with/without contrast with thin cuts of the brainstem  -Aggressive risk factor modification: HTN, HLD, DM2, CHF  -Follow therapy recommendations  -TeleVascular Neurology to follow repeat MRI and make additional recommendations as indicated    Post charge discharge plan:  Clinic follow up: Vascular Neurology    Visit Type: in person or virtual  Timeframe: 4 weeks        Additional Physical Exam, History, & ROS  ROS  Physical Exam  Constitutional:       General: She is not in acute distress.  HENT:      Head: Normocephalic.      Right Ear: External ear normal.      Left Ear: External ear normal.      Nose: Nose normal.   Eyes:      Extraocular Movements:      Left eye: Abnormal extraocular motion (Inability to adduct left eye) present.      Pupils: Pupils are  equal, round, and reactive to light.   Pulmonary:      Effort: Pulmonary effort is normal. No respiratory distress.   Abdominal:      General: There is no distension.      Tenderness: There is no guarding.   Musculoskeletal:      Right lower leg: No edema.      Left lower leg: No edema.   Skin:     General: Skin is dry.   Neurological:      Mental Status: She is alert.      Cranial Nerves: Cranial nerve deficit present.   Psychiatric:         Mood and Affect: Mood normal.         Behavior: Behavior normal.       Past Medical History:   Diagnosis Date    Allergy     Arthritis     Cancer 2016    colon    CHF (congestive heart failure)     Colon cancer 2004    Colon cancer screening 2015    Diabetes mellitus type 2 in nonobese 3/9/2016    borderline    Diverticulosis     DM (diabetes mellitus) 2016    BS didn't check 2019    DM (diabetes mellitus) 2016    BS didn't check 2020    Hyperlipidemia 10/25/2016    Hypertension     Insomnia     Malignant neoplasm of colon 2021    Malignant neoplasm of lower-outer quadrant of left breast of female, estrogen receptor positive 2022     Past Surgical History:   Procedure Laterality Date    BREAST BIOPSY Left     BREAST LUMPECTOMY Left      SECTION      COLON SURGERY      COLONOSCOPY N/A 2015    Procedure: COLONOSCOPY;  Surgeon: Adela Sam MD;  Location: [facility] ENDO;  Service: Endoscopy;  Laterality: N/A;    COLONOSCOPY N/A 2017    Procedure: COLONOSCOPY;  Surgeon: Chintan Flores MD;  Location: [facility] ENDO;  Service: Endoscopy;  Laterality: N/A;    COLONOSCOPY N/A 2020    Procedure: COLONOSCOPY;  Surgeon: Grisel Atkins MD;  Location: [facility] ENDO;  Service: Endoscopy;  Laterality: N/A;    SENTINEL LYMPH NODE BIOPSY Left 2022    Procedure: BIOPSY, LYMPH NODE, SENTINEL;  Surgeon: Arvin Hernandez MD;  Location: [facility] OR;  Service: General;  Laterality: Left;     Family History   Problem Relation Name Age of Onset     Hypertension Mother      Diabetes Mother      Hypertension Father      Hypertension Sister      Hypertension Brother      Hypertension Sister      Hypertension Sister      Hypertension Sister      Hypertension Brother      Hypertension Brother         Diagnoses  Problem Noted   Acute Ischemic Vba Brainstem Stroke, Left 6/2/2024   Cn III Palsy, 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 Audiovisual encounter with the patient performed using a secure connection.  Results and impressions from the visit are documented on this note and were communicated to the consulting provider/team via direct communication. The note has been shared for addition to the patients electronic medical record.

## 2024-06-03 NOTE — PT/OT/SLP EVAL
Occupational Therapy   Evaluation    Name: Anne Farmer  MRN: [id number]  Admitting Diagnosis: Acute ischemic VBA brainstem stroke, left  Recent Surgery: * No surgery found *      Recommendations:     Discharge Recommendations: Low Intensity Therapy (OP VS HH)  Discharge Equipment Recommendations:  none  Barriers to discharge:  None    Assessment:     Anne Farmer is a 68 y.o. female with a medical diagnosis of Acute ischemic VBA brainstem stroke, left.  She presents with SELF CARE DEBILITY . Performance deficits affecting function: weakness, impaired endurance, impaired self care skills, impaired functional mobility, gait instability, impaired balance, visual deficits, decreased upper extremity function, decreased lower extremity function, decreased safety awareness, impaired fine motor.      Rehab Prognosis: Good; patient would benefit from acute skilled OT services to address these deficits and reach maximum level of function.       Plan:     Patient to be seen 2 x/week to address the above listed problems via self-care/home management, therapeutic activities, therapeutic exercises, neuromuscular re-education  Plan of Care Expires: 06/17/24  Plan of Care Reviewed with: patient    Subjective     Chief Complaint: NEED TO RETURN TO (I) PLOF.  Patient/Family Comments/goals: TO BE ABLE TO RETURN TO PLOF.    Occupational Profile:  Living Environment: PATIENT LIVES WITH 2 SONS WHO WORK BUT ARE AVAILABLE TO (A) HER IN A 1 STORY HOUSE WITH 3 STEPS WITH (B) RAIL(S).   Previous level of function: PATIENT STATED HE WAS (I) WITH ALL LEVELS OF SELF CARE AND WAS ABLE TO DRIVE.    Roles and Routines: PATIENT STATED SHE IS RETIRED.  Equipment Used at Home: none (PATIENT STATED SHE HAS ALL DME IF NEEDED.)  Assistance upon Discharge: SONS    Pain/Comfort:  Pain Rating 1: 0/10    Patients cultural, spiritual, [employer]ism conflicts given the current situation:      Objective:     Communicated with: NURSE prior to session.  " Patient found up in chair with peripheral IV, chair check, telemetry upon OT entry to room.    General Precautions: Standard, aspiration  Orthopedic Precautions: N/A  Braces: N/A  Respiratory Status: Room air    Occupational Performance:    Bed Mobility:    NT DUE TO PATIENT SEATED IN B/S CHAIR.    Functional Mobility/Transfers:  PATIENT CGA SIT > STAND FROM B/S CHAIR, MIN (A) TO AMBULATE TO/FROM RESTROOM, MIN (A) WITH TOILET T/F WITH RW AND GRAB BAR--ALL T/F'S PERFORMED WITH RW.  Functional Mobility: MIN (A) TO AMBULATE TO/FROM RESTROOM WITH RW.    Activities of Daily Living:  Upper Body Dressing: minimum assistance    Lower Body Dressing: stand by assistance WITH FOOTWEAR MANAGEMENT WITH SOCKS  Toileting: minimum assistance      Cognitive/Visual Perceptual:  COGNITION APPEARS INTACT.  PATIENT PRESENTS WITH DECREASED ABILITY FOR (L) EYE TO CONVERGE WITH (R) eye.    Physical Exam:  Balance:    -       MIN (A) TO AMBULATE WITH RW.  Upper Extremity Range of Motion:     -       Right Upper Extremity: WFL  -       Left Upper Extremity: WFL  Fine Motor Coordination:    -       Impaired  DYSMETRIA NOTED (B)'LY WITH PATIENT STATING COORD MORE DIFFICULTY WITH (L) HAND.  NEEDS TO BE FURTHER ASSESSED IF PATIENT'S (L) EYE DEFICIT CONTRIBUTES TO THE DYSMETRIA.    [facility] 6 Click ADL:  AMPAC Total Score: 19    Treatment & Education:  OT EVAL PERFORMED.  PATIENT EDUCATED RE:  PURPOSE OF OT AND IMPORTANCE OF "CALL--DON'T FALL" TO DECREASE FALL RISK.  PATIENT PARTICIPATED IN [facility] MOBILITY AND SELF CARE TASKS. PATIENT RECEIVED (L) EYE CONVERGENCE HEP.    Patient left up in chair with all lines intact, call button in reach, and chair alarm on    GOALS:   Multidisciplinary Problems       Occupational Therapy Goals          Problem: Occupational Therapy    Goal Priority Disciplines Outcome Interventions   Occupational Therapy Goal     OT, PT/OT     Description: LTG'S TO BE MET IN 14 DAYS (6/17/24)    1)  PATIENT WILL PERFORM UB DRESSING " WITH (I) TO DECREASE (D) ON CAREGIVER.    2)  PATIENT WILL PERFORM LB DRESSING WITH (I) TO DECREASE (D) ON CAREGIVER..    3)  PATIENT WILL PERFORM TOILET T/F WITH (S) TO DECREASE (D) ON CAREGIVER..    4)  PATIENT WILL PERFORM (L) EYE CONVERGENCE HEP WITH (I) AFTER EDUCATION.                       History:     Past Medical History:   Diagnosis Date    Allergy     Arthritis     Cancer 2016    colon    CHF (congestive heart failure)     Colon cancer 2004    Colon cancer screening 2015    Diabetes mellitus type 2 in nonobese 3/9/2016    borderline    Diverticulosis     DM (diabetes mellitus) 2016    BS didn't check 2019    DM (diabetes mellitus) 2016    BS didn't check 2020    Hyperlipidemia 10/25/2016    Hypertension     Insomnia     Malignant neoplasm of colon 2021    Malignant neoplasm of lower-outer quadrant of left breast of female, estrogen receptor positive 2022         Past Surgical History:   Procedure Laterality Date    BREAST BIOPSY Left     BREAST LUMPECTOMY Left      SECTION      COLON SURGERY      COLONOSCOPY N/A 2015    Procedure: COLONOSCOPY;  Surgeon: Adela Sam MD;  Location: [facility] ENDO;  Service: Endoscopy;  Laterality: N/A;    COLONOSCOPY N/A 2017    Procedure: COLONOSCOPY;  Surgeon: Chintan Flores MD;  Location: [facility] ENDO;  Service: Endoscopy;  Laterality: N/A;    COLONOSCOPY N/A 2020    Procedure: COLONOSCOPY;  Surgeon: Grisel Atkins MD;  Location: [facility] ENDO;  Service: Endoscopy;  Laterality: N/A;    SENTINEL LYMPH NODE BIOPSY Left 2022    Procedure: BIOPSY, LYMPH NODE, SENTINEL;  Surgeon: Arvin Hernandez MD;  Location: [facility] OR;  Service: General;  Laterality: Left;       Time Tracking:     OT Date of Treatment: 24  OT Start Time: 1210  OT Stop Time: 1233  OT Total Time (min): 23 min    Billable Minutes:Evaluation 10  Neuromuscular Re-education 13    6/3/2024

## 2024-06-03 NOTE — SUBJECTIVE & OBJECTIVE
HPI:  68 y.o. female HTN, HLD DM2, CHF, cardiomyopathy, colon CA, breast CA, presented with dizziness and deviated eye.  Patient states about a week ago she started having episodes of dizziness combination of room spinning and lightheaded feeling as well as imbalance.  Also noticed her speech was a little slower than usual but denies slurred speech or word finding.  Saturday she noticed her left eye was deviated.  She states this was the symptom that made her come to the ER yesterday.  She reports associated double vision when looking to the left.  She denies vision loss, facial weakness, swallowing difficulty, weakness/numbness of the arms/legs.  She has never had these symptoms before.  She has no prior history of stroke or TIA.     Images personally reviewed and interpreted:  Study: Head CT, CTA Head & Neck, and MRI Brain  Study Interpretation: CT head: No early infarct signs; no hemorrhage; no mass effect.  CTA head and neck:  No high-grade stenosis or occlusion; no obvious aneurysm noted.  MRI brain:  No diffusion restriction; no hemorrhage; chronic white matter disease.    Additional studies reviewed:   Study: Cardiac_Neuro: 2D echo  Study Interpretation: EF 60%; no wall motion abnormality noted; no thrombus/vegetation; normal left atrium.      Laboratory studies reviewed:  BMP:   Lab Results   Component Value Date     (L) 06/03/2024    K 3.3 (L) 06/03/2024    CL 95 06/03/2024    CO2 22 (L) 06/03/2024    BUN 10 06/03/2024    CREATININE 0.7 06/03/2024    CALCIUM 10.1 06/03/2024     CBC:   Lab Results   Component Value Date    WBC 5.22 06/03/2024    RBC 4.33 06/03/2024    HGB 13.4 06/03/2024    HCT 38.4 06/03/2024     06/03/2024    MCV 89 06/03/2024    MCH 30.9 06/03/2024    MCHC 34.9 06/03/2024     Lipid Panel:   Lab Results   Component Value Date    CHOL 208 (H) 06/02/2024    LDLCALC 106.4 06/02/2024    HDL 86 (H) 06/02/2024    TRIG 78 06/02/2024     Coagulation:   Lab Results   Component Value  Date    INR 0.9 06/03/2024    APTT 28.4 06/03/2024     Hgb A1C:   Lab Results   Component Value Date    HGBA1C 6.7 (H) 05/10/2024     TSH:   Lab Results   Component Value Date    TSH 2.341 06/02/2024       Documentation personally reviewed:  Notes: Notes: ED notes, H&P, and Consult notes from: [facility]     Assessment and plan:  68 y.o. 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  No other deficits noted on exam.  MRI with no obvious areas of diffusion restriction.  CTA with no high-grade stenosis, occlusion or obvious aneurysm.  TTE unremarkable.  Concern for midbrain stroke not seen on MRI.  Midbrain mass lesion and ischemic CN III also in differential.    Recommendations  -Continue ASA 81mg and Plavix 75mg x 21 days then monotherapy with ASA  -Change home pravastatin to Atorvastatin 80mg for goal LDL < 70  -MRI brain with/without contrast with thin cuts of the brainstem  -Aggressive risk factor modification: HTN, HLD, DM2, CHF  -Follow therapy recommendations  -[facility] to follow repeat MRI and make additional recommendations as indicated    Post charge discharge plan:  Clinic follow up: Vascular Neurology    Visit Type: in person or virtual  Timeframe: 4 weeks

## 2024-06-03 NOTE — PLAN OF CARE
A206/A206 JUAN Farmer is a 68 y.o.female admitted on 6/2/2024 for Acute ischemic VBA brainstem stroke, left   Code Status: Full Code MRN: [id number]   Review of patient's allergies indicates:  No Known Allergies  Past Medical History:   Diagnosis Date    Allergy     Arthritis     Cancer 2016    colon    CHF (congestive heart failure)     Colon cancer 2004    Colon cancer screening 9/21/2015    Diabetes mellitus type 2 in nonobese 3/9/2016    borderline    Diverticulosis     DM (diabetes mellitus) 03/2016    BS didn't check 02/13/2019    DM (diabetes mellitus) 03/2016    BS didn't check 03/04/2020    Hyperlipidemia 10/25/2016    Hypertension     Insomnia     Malignant neoplasm of colon 5/13/2021    Malignant neoplasm of lower-outer quadrant of left breast of female, estrogen receptor positive 6/9/2022      PRN meds    acetaminophen, 650 mg, Q6H PRN  bisacodyL, 10 mg, Daily PRN  dextrose 10%, 12.5 g, PRN  dextrose 10%, 25 g, PRN  glucagon (human recombinant), 1 mg, PRN  glucose, 16 g, PRN  glucose, 24 g, PRN  insulin aspart U-100, 0-5 Units, QID (AC + HS) PRN  labetalol, 10 mg, Q15 Min PRN  melatonin, 6 mg, Nightly PRN  ondansetron, 4 mg, Q8H PRN  sodium chloride 0.9%, 10 mL, PRN      Chart check completed. Will continue plan of care.      Orientation: oriented x 4  [place] Coma Scale Score: 15     Lead Monitored: Lead II Rhythm: sinus bradycardia    Cardiac/Telemetry Box Number: [id number]  VTE Required Core Measure: (SCDs) Sequential compression device initiated/maintained Last Bowel Movement: 06/02/24  Diet Cardiac  Voiding Characteristics: external catheter  Laurent Score: 19  Fall Risk Score: 9  Accucheck [x]   Freq? achs     Lines/Drains/Airways       Peripheral Intravenous Line  Duration                  Peripheral IV - Single Lumen 06/02/24 1203 20 G Right Antecubital 1 day

## 2024-06-03 NOTE — PLAN OF CARE
Pt evaluation complete. SBA for bed mobility; CGA for transfers; ambulated 120' CGA with RW. PT D/C REC: low intensity therapy

## 2024-06-03 NOTE — PROGRESS NOTES
"[facility] - Tele[facility] ([facility])  [facility] Medicine  Progress Note    Patient Name: Anne Farmer  MRN: [id number]  Patient Class: OP- Observation   Admission Date: 6/2/2024  Length of Stay: 0 days  Attending Physician: Josesito Cerrato MD  Primary Care Provider: Chiquita Talley MD        Subjective:     Principal Problem:Acute ischemic VBA brainstem stroke, left        HPI:  68 y.o. female patient, PMHx: HTN, DM2, HLD, Colon Cx, Sarcoidosis, RLD, Breast Cx, Cardiomyopathy. Presented to the Emergency Department for evaluation of a dizziness which onset 3 days PTA with left eye drift, new on day of arrival. Concerns for a potential stroke. Symptoms are constant and moderate in severity. No mitigating or exacerbating factors reported. Associated sxs include weakness, lightheadedness, and blurry vision. Pt denies diplopia and all other symptoms at this time. Pt denies Hx of stroke or MI. Code stroke called in the ED, CT head: no acute finding. CTA Head/Neck: negative for significant narrowing or stenosis, negative LVO. Neurology consulted. Vitals: 190/87, 62, 18, 98.2F, 99% RA. Abnormal Labs: BNP: 271. Patient is a full code. Placed in observation under the care of Hospital Medicine for management of suspicion of CVA.     Overview/Hospital Course:  6/3/24  NAEON, patient with left eye drift, reports blurry vision some improved today  MRI/MRA obtained yesterday with chronic microvascular changes  Tele-Neurology consulted, recommended repeat MRI  Repeat MRI with and without contrast with thin slices: "Mild scattered leptomeningeal enhancement including overlying the right frontal lobe as well as along the inter hemispheric fissure, left occipital lobe, and interpeduncular cistern with associated mild T2/FLAIR hyperintense signal the seen along the medial aspect of the cerebral peduncles. Findings at the interpeduncular cistern may be affecting cranial nerve 3 which may explain reported oculomotor nerve palsy. " "Findings are nonspecific with differential considerations including meningitis, metastatic disease, and paraneoplastic syndrome."  Patient with hx of breast and colon cancer, follows with Dr. Thompson      Review of Systems   All other systems reviewed and are negative.    Objective:     Vital Signs (Most Recent):  Temp: 97.5 °F (36.4 °C) (06/03/24 1644)  Pulse: 64 (06/03/24 1644)  Resp: 17 (06/03/24 1644)  BP: (!) 178/77 (06/03/24 1644)  SpO2: 99 % (06/03/24 1644) Vital Signs (24h Range):  Temp:  [97.5 °F (36.4 °C)-98.7 °F (37.1 °C)] 97.5 °F (36.4 °C)  Pulse:  [52-70] 64  Resp:  [17-20] 17  SpO2:  [94 %-100 %] 99 %  BP: (166-179)/(71-95) 178/77     Weight: 71.5 kg (157 lb 10.1 oz)  Body mass index is 25.44 kg/m².    Intake/Output Summary (Last 24 hours) at 6/3/2024 1723  Last data filed at 6/3/2024 1015  Gross per 24 hour   Intake 120 ml   Output 200 ml   Net -80 ml         Physical Exam  Vitals and nursing note reviewed.   Constitutional:       General: She is not in acute distress.     Appearance: Normal appearance. She is normal weight.   Cardiovascular:      Rate and Rhythm: Normal rate and regular rhythm.      Heart sounds: No murmur heard.  Pulmonary:      Effort: Pulmonary effort is normal. No respiratory distress.      Breath sounds: No wheezing.   Neurological:      Mental Status: She is alert and oriented to person, place, and time.      Comments: Left eye deviated   Psychiatric:         Mood and Affect: Mood normal.         Behavior: Behavior normal.             Significant Labs: All pertinent labs within the past 24 hours have been reviewed.  Recent Lab Results  (Last 5 results in the past 24 hours)        06/03/24  1710   06/03/24  1119   06/03/24  0621   06/03/24  0525   06/02/24  2207        Albumin       4.1         ALP       45         ALT       20         Anion Gap       14         PTT       28.4  Comment: Refer to local heparin nomogram for intensity/dose specific   therapeutic   range.           " AST       23         Baso #       0.04         Basophil %       0.8         BILIRUBIN TOTAL       0.6  Comment: For infants and newborns, interpretation of results should be based  on gestational age, weight and in agreement with clinical  observations.    Premature Infant recommended reference ranges:  Up to 24 hours.............<8.0 mg/dL  Up to 48 hours............<12.0 mg/dL  3-5 days..................<15.0 mg/dL  6-29 days.................<15.0 mg/dL           BUN       10         Calcium       10.1         Chloride       95         CO2       22         Creatinine       0.7         Differential Method       Automated         eGFR       >60         Eos #       0.2         Eos %       3.4         Glucose       87         Gran # (ANC)       3.6         Gran %       69.1         Hematocrit       38.4         Hemoglobin       13.4         Immature Grans (Abs)       0.03  Comment: Mild elevation in immature granulocytes is non specific and   can be seen in a variety of conditions including stress response,   acute inflammation, trauma and pregnancy. Correlation with other   laboratory and clinical findings is essential.           Immature Granulocytes       0.6         INR       0.9  Comment: Coumadin Therapy:  2.0 - 3.0 for INR for all indicators except mechanical heart valves  and antiphospholipid syndromes which should use 2.5 - 3.5.           Lymph #       0.8         Lymph %       15.9         Magnesium        1.9         MCH       30.9         MCHC       34.9         MCV       89         Mono #       0.5         Mono %       10.2         MPV       8.2         nRBC       0         Phosphorus Level       3.0         Platelet Count       316         POCT Glucose 108   188   91     103       Potassium       3.3         PROTEIN TOTAL       8.4         PT       11.0         RBC       4.33         RDW       12.4         Sodium       131         Troponin I               WBC       5.22                                 Significant Imaging: I have reviewed all pertinent imaging results/findings within the past 24 hours.    MRI Brain W WO Contrast   Final Result      Mild scattered leptomeningeal enhancement including overlying the right frontal lobe as well as along the inter hemispheric fissure, left occipital lobe, and interpeduncular cistern with associated mild T2/FLAIR hyperintense signal the seen along the medial aspect of the cerebral peduncles.  Findings at the interpeduncular cistern may be affecting cranial nerve 3 which may explain reported oculomotor nerve palsy.  Findings are nonspecific with differential considerations including meningitis, metastatic disease, and paraneoplastic syndrome.      Additional nonspecific white matter changes likely related to chronic microvascular ischemia.      Left paranasal sinus disease.         Electronically signed by: Boris Hwang   Date:    06/03/2024   Time:    16:50      MRA Brain without contrast   Final Result      Complete opacification of the left maxillary sinus.  Small left ethmoid and frontal sinus opacification.  Correlate clinically to sinusitis      No acute infarct.  No restricted diffusion      Moderate subcortical and periventricular FLAIR hyperintensities consistent with chronic microvascular ischemic changes      Left maxillary sinus and mild left ethmoid frontal sinus mucosal thickening.      Moderate atherosclerotic changes as evidenced by luminal irregularities of the intracranial vasculature.         Electronically signed by: Simeon Cutler   Date:    06/02/2024   Time:    17:09      MRI Brain Without Contrast   Final Result      Complete opacification of the left maxillary sinus.  Small left ethmoid and frontal sinus opacification.  Correlate clinically to sinusitis      No acute infarct.  No restricted diffusion      Moderate subcortical and periventricular FLAIR hyperintensities consistent with chronic microvascular ischemic changes      Left  maxillary sinus and mild left ethmoid frontal sinus mucosal thickening.      Moderate atherosclerotic changes as evidenced by luminal irregularities of the intracranial vasculature.         Electronically signed by: Simeon Cutler   Date:    06/02/2024   Time:    17:09      CTA Head and Neck (xpd)   Final Result      Scattered atherosclerotic plaque involving the extracranial carotid system as well as the internal carotid arteries at the level of the cavernous sinus bilaterally.  However no significant narrowing or stenosis.  No vessel occlusions or significant stenosis.      Scattered atherosclerotic plaque of the vertebrobasilar system without significant narrowing or stenosis.      All CT scans at this facility use dose modulation, iterative reconstruction and/or weight based dosing when appropriate to reduce radiation dose to as low as reasonably achievable.         Electronically signed by: Victor Manuel Russell MD   Date:    06/02/2024   Time:    13:07      CT Head Without Contrast   Final Result      No acute intracranial abnormality.  CT aspects score 10.      All CT scans at this facility use dose modulation, iterative reconstructions, and/or weight base dosing when appropriate to reduce radiation dose to as low as reasonably achievable.         Electronically signed by: Victor Manuel Russell MD   Date:    06/02/2024   Time:    11:39            Assessment/Plan:      * Acute ischemic VBA brainstem stroke, left  Suspicion of acute brainstem CVA vs TIA, continued vision abnormality, denies diplopia  Antithrombotics for secondary stroke prevention: Antiplatelets: Aspirin: 81 mg daily  Clopidogrel: 300 mg loading dose x 1, now  Clopidogrel: 75 mg daily    Statins for secondary stroke prevention and hyperlipidemia, if present:   Statins: Atorvastatin- 40 mg daily    Aggressive risk factor modification: DM, HLD, Exercise     Rehab efforts: The patient has been evaluated by a stroke team provider and the therapy needs have been  fully considered based off the presenting complaints and exam findings. The following therapy evaluations are needed: PT evaluate and treat, OT evaluate and treat, SLP evaluate and treat    Diagnostics ordered/pending: CT scan of head without contrast to asses brain parenchyma, CTA Head to assess vasculature , CTA Neck/Arch to assess vasculature, Lipid Profile to assess cholesterol levels, MRA head to assess vasculature, MRI head without contrast to assess brain parenchyma, TTE to assess cardiac function/status , TSH to assess thyroid function    VTE prophylaxis: None: Reason for No Pharmacological VTE Prophylaxis: Currently on anticoagulation    BP parameters: Infarct: No intervention, SBP <220        Dizziness and giddiness  Presented with dizziness, concern for brainstem infarct    --fall precautions      Malignant neoplasm of lower-outer quadrant of left breast of female, estrogen receptor positive  Followed by oncology, managed with Arimidex    --cont home medication      Hyperlipidemia  Managed with statin    --cont statin  --Lipid panel in AM      DM type 2 with diabetic dyslipidemia  Patient's FSGs are controlled on current medication regimen.  Last A1c reviewed-   Lab Results   Component Value Date    HGBA1C 6.7 (H) 05/10/2024     Most recent fingerstick glucose reviewed-   Recent Labs   Lab 06/02/24  1128   POCTGLUCOSE 124*     Current correctional scale  Low  Maintain anti-hyperglycemic dose as follows-   Antihyperglycemics (From admission, onward)      Start     Stop Route Frequency Ordered    06/02/24 1442  insulin aspart U-100 pen 0-5 Units         -- SubQ Before meals & nightly PRN 06/02/24 1343          Hold Oral hypoglycemics while patient is in the hospital.    Diastolic dysfunction    Euvolemic on exam, elevated BNP on arrival, likely secondary to uncontrolled HTN.    --ECHO    HTN (hypertension)  Chronic, uncontrolled. Latest blood pressure and vitals reviewed-     Temp:  [98.2 °F (36.8 °C)]    Pulse:  [47-63]   Resp:  [13-36]   BP: (175-210)/(81-91)   SpO2:  [88 %-100 %] .   Home meds for hypertension were reviewed and noted below.   Hypertension Medications               amLODIPine (NORVASC) 10 MG tablet TAKE 1 TABLET BY MOUTH EVERY DAY    furosemide (LASIX) 20 MG tablet Take 1 tablet (20 mg total) by mouth daily as needed (edema, swelling, fluid build up). Do not take if BP is below 110/70    hydroCHLOROthiazide (HYDRODIURIL) 25 MG tablet TAKE 1 TABLET BY MOUTH EVERY DAY    losartan (COZAAR) 100 MG tablet TAKE 1 TABLET BY MOUTH EVERY DAY            While in the hospital, will manage blood pressure as follows; Adjust home antihypertensive regimen as follows- Hold for now, permissive HTN    Will utilize p.r.n. blood pressure medication only if patient's blood pressure greater than 220/110 and she develops symptoms such as worsening chest pain or shortness of breath.      VTE Risk Mitigation (From admission, onward)           Ordered     Reason for No Pharmacological VTE Prophylaxis  Once        Question:  Reasons:  Answer:  Risk of Bleeding    06/02/24 1342     IP VTE HIGH RISK PATIENT  Once         06/02/24 1342     Place sequential compression device  Until discontinued         06/02/24 1342                    Discharge Planning   NATALIE: 6/3/2024     Code Status: Full Code   Is the patient medically ready for discharge?:     Reason for patient still in hospital (select all that apply): Patient new problem, Patient trending condition, Laboratory test, Treatment, Imaging, and Consult recommendations  Discharge Plan A: Home with family   Discharge Delays: None known at this time              Josesito Cerrato MD  Department of Hospital Medicine   [facility])

## 2024-06-03 NOTE — PLAN OF CARE
[facility] - Telemetry (Hospital)  Discharge Final Note    Primary Care Provider: Chiquita Talley MD    Expected Discharge Date: 6/3/2024    Final Discharge Note (most recent)       Final Note - 06/03/24 1337          Final Note    Anticipated Discharge Disposition Home or Self Care (P)      Hospital Resources/Appts/Education Provided Appointments scheduled and added to AVS (P)         Post-Acute Status    Discharge Delays None known at this time (P)                      Important Message from Medicare

## 2024-06-03 NOTE — PT/OT/SLP EVAL
Physical Therapy Evaluation and Treatment    Patient Name: Anne Farmer   MRN: [id number]  Recent Surgery: * No surgery found *      Recommendations:     Discharge Recommendations: Low Intensity Therapy   Discharge Equipment Recommendations: none   Barriers to discharge: None    Assessment:     Anne Farmer is a 68 y.o. female admitted with a medical diagnosis of CVA (cerebral vascular accident). She presents with the following impairments/functional limitations: weakness, impaired endurance, visual deficits, impaired self care skills, impaired balance, impaired functional mobility, gait instability.     Rehab Prognosis: Good; patient would benefit from acute PT services to address these deficits and reach maximum level of function.    Plan:     During this hospitalization, patient to be seen 3 x/week to address the above listed problems via gait training, therapeutic activities, therapeutic exercises    Plan of Care Expires: 06/17/24    Subjective     Chief Complaint: C/O dizziness  Patient Comments/Goals: wants to go back to work  Pain/Comfort:  Pain Rating 1: 0/10    Social History:  Living Environment: Patient lives with their sons  in a single story home with number of outside stair(s): 3 with CHARLEY railings  Prior Level of Function: Prior to admission, patient was independent, driving and working as housekeeping, and ambulated community distances using no AD  Equipment Used at Home: none  DME owned (not currently used): rollator and quad cane  Assistance Upon Discharge: family    Objective:     Communicated with nurse Huerta prior to session. Patient found supine with PureWick, peripheral IV, bed alarm, telemetry, SCD upon PT entry to room.    General Precautions: Standard, fall, vision impaired (pt reports blurry vision new to this visit; pt has a L eye deviation upward and to the L)   Orthopedic Precautions: N/A   Braces: N/A    Respiratory Status: Room air    Exams:  Cognition: Patient is  "oriented to Person, Place, Time, Situation  RLE ROM: WFL  RLE Strength: WFL  LLE ROM: WFL  LLE Strength: WFL  Postural Exam: Patient presented with the following abnormalities:    -       Rounded shoulders  Sensation:    -       Intact  Pt reports no numbness or deficits    Functional Mobility:  Gait belt applied - Yes  Bed Mobility  Scooting: stand by assistance  Supine to Sit: stand by assistance   Transfers  Sit to Stand: contact guard assistance with no AD and with cues for hand placement  Bed to Chair: contact guard assistance with rolling walker and with cues for hand placement using Step Transfer  Gait  Patient ambulated 120' with rolling walker and contact guard assistance. Patient demonstrates occasional unsteady gait. Pt reports slight dizziness throughout and deviates to the left during ambulation trial. Pt had slight LOB but was self corrected; Pt cued to walk straight and proper use of RW. All lines remained intact throughout ambulation trial.  Balance  Sitting: stand by assistance  Standing: contact guard assistance    Therapeutic Activities and Exercises:   Patient educated on role of acute care PT and PT POC and safety while in hospital including calling nurse for mobility  Patient performed 1 set(s) of 10 repetitions of the following seated exercises: ankle pumps, long arc quads, and marches for bilateral LE. Patient required skilled PT for instruction of exercises and appropriate cues to perform exercises safely and appropriately.  Patient educated about importance of OOB mobility and remaining up in chair most of the day.  Pt educated on proper use and safety with a RW.  Pt educated on "call don't fall".    AM-PAC 6 CLICK MOBILITY  Total Score:18    Patient left up in chair with all lines intact, call button in reach, RN notified, and chair alarm on.    GOALS:   Multidisciplinary Problems       Physical Therapy Goals          Problem: Physical Therapy    Goal Priority Disciplines Outcome Goal " Variances Interventions   Physical Therapy Goal     PT, PT/OT      Description: LTG to be met in 14 days: 24  Pt will complete bed mobility INDEP.  Pt will complete sit to stand INDEP.  Pt will ambulate 300' NOAH with RW.  Pt will increase AMPAC score by 2 to progress gross functional mobility.                       History:     Past Medical History:   Diagnosis Date    Allergy     Arthritis     Cancer 2016    colon    CHF (congestive heart failure)     Colon cancer 2004    Colon cancer screening 2015    Diabetes mellitus type 2 in nonobese 3/9/2016    borderline    Diverticulosis     DM (diabetes mellitus) 2016    BS didn't check 2019    DM (diabetes mellitus) 2016    BS didn't check 2020    Hyperlipidemia 10/25/2016    Hypertension     Insomnia     Malignant neoplasm of colon 2021    Malignant neoplasm of lower-outer quadrant of left breast of female, estrogen receptor positive 2022       Past Surgical History:   Procedure Laterality Date    BREAST BIOPSY Left     BREAST LUMPECTOMY Left      SECTION      COLON SURGERY      COLONOSCOPY N/A 2015    Procedure: COLONOSCOPY;  Surgeon: Adela Sam MD;  Location: [facility] ENDO;  Service: Endoscopy;  Laterality: N/A;    COLONOSCOPY N/A 2017    Procedure: COLONOSCOPY;  Surgeon: Chintan Flores MD;  Location: [facility] ENDO;  Service: Endoscopy;  Laterality: N/A;    COLONOSCOPY N/A 2020    Procedure: COLONOSCOPY;  Surgeon: Grisel Atkins MD;  Location: [facility] ENDO;  Service: Endoscopy;  Laterality: N/A;    SENTINEL LYMPH NODE BIOPSY Left 2022    Procedure: BIOPSY, LYMPH NODE, SENTINEL;  Surgeon: Arvin Hernandez MD;  Location: [facility] OR;  Service: General;  Laterality: Left;       Time Tracking:     PT Received On: 24  PT Start Time: 0800  PT Stop Time: 0830  PT Total Time (min): 30 min     Billable Minutes: Evaluation 15 and Therapeutic Activity 15    6/3/2024

## 2024-06-04 LAB
ALBUMIN SERPL BCP-MCNC: 4.1 G/DL (ref 3.5–5.2)
ALP SERPL-CCNC: 44 U/L (ref 55–135)
ALT SERPL W/O P-5'-P-CCNC: 15 U/L (ref 10–44)
ANION GAP SERPL CALC-SCNC: 14 MMOL/L (ref 8–16)
AST SERPL-CCNC: 19 U/L (ref 10–40)
BASOPHILS # BLD AUTO: 0.04 K/UL (ref 0–0.2)
BASOPHILS NFR BLD: 0.8 % (ref 0–1.9)
BILIRUB SERPL-MCNC: 0.7 MG/DL (ref 0.1–1)
BUN SERPL-MCNC: 12 MG/DL (ref 8–23)
C GATTII+NEOFOR DNA CSF QL NAA+NON-PROBE: DETECTED
CALCIUM SERPL-MCNC: 10.5 MG/DL (ref 8.7–10.5)
CHLORIDE SERPL-SCNC: 94 MMOL/L (ref 95–110)
CLARITY CSF: ABNORMAL
CMV DNA CSF QL NAA+NON-PROBE: NOT DETECTED
CO2 SERPL-SCNC: 24 MMOL/L (ref 23–29)
COLOR CSF: COLORLESS
CREAT SERPL-MCNC: 0.7 MG/DL (ref 0.5–1.4)
CSF TUBE NUMBER: 2
CSF TUBE NUMBER: 2
DIFFERENTIAL METHOD BLD: ABNORMAL
E COLI K1 DNA CSF QL NAA+NON-PROBE: NOT DETECTED
EOSINOPHIL # BLD AUTO: 0.2 K/UL (ref 0–0.5)
EOSINOPHIL NFR BLD: 4.9 % (ref 0–8)
ERYTHROCYTE [DISTWIDTH] IN BLOOD BY AUTOMATED COUNT: 12.7 % (ref 11.5–14.5)
EST. GFR  (NO RACE VARIABLE): >60 ML/MIN/1.73 M^2
EV RNA CSF QL NAA+NON-PROBE: NOT DETECTED
GLUCOSE CSF-MCNC: 11 MG/DL (ref 40–70)
GLUCOSE SERPL-MCNC: 111 MG/DL (ref 70–110)
GP B STREP DNA CSF QL NAA+NON-PROBE: NOT DETECTED
HAEM INFLU DNA CSF QL NAA+NON-PROBE: NOT DETECTED
HAEM INFLU DNA CSF QL NAA+NON-PROBE: NOT DETECTED
HCT VFR BLD AUTO: 39.9 % (ref 37–48.5)
HGB BLD-MCNC: 14 G/DL (ref 12–16)
HHV6 DNA CSF QL NAA+NON-PROBE: NOT DETECTED
HSV1 DNA CSF QL NAA+NON-PROBE: NOT DETECTED
HSV2 DNA CSF QL NAA+NON-PROBE: NOT DETECTED
IMM GRANULOCYTES # BLD AUTO: 0.02 K/UL (ref 0–0.04)
IMM GRANULOCYTES NFR BLD AUTO: 0.4 % (ref 0–0.5)
LYMPHOCYTES # BLD AUTO: 1.1 K/UL (ref 1–4.8)
LYMPHOCYTES NFR BLD: 21.5 % (ref 18–48)
LYMPHOCYTES NFR CSF MANUAL: 55 % (ref 40–80)
MCH RBC QN AUTO: 30.6 PG (ref 27–31)
MCHC RBC AUTO-ENTMCNC: 35.1 G/DL (ref 32–36)
MCV RBC AUTO: 87 FL (ref 82–98)
MONOCYTES # BLD AUTO: 0.6 K/UL (ref 0.3–1)
MONOCYTES NFR BLD: 12 % (ref 4–15)
MONOS+MACROS NFR CSF MANUAL: 23 % (ref 15–45)
N MEN DNA CSF QL NAA+NON-PROBE: NOT DETECTED
NEUTROPHILS # BLD AUTO: 3 K/UL (ref 1.8–7.7)
NEUTROPHILS NFR BLD: 60.4 % (ref 38–73)
NEUTROPHILS NFR CSF MANUAL: 22 % (ref 0–6)
NRBC BLD-RTO: 0 /100 WBC
PARECHOVIRUS A RNA CSF QL NAA+NON-PROBE: NOT DETECTED
PLATELET # BLD AUTO: 361 K/UL (ref 150–450)
PMV BLD AUTO: 8.4 FL (ref 9.2–12.9)
POCT GLUCOSE: 106 MG/DL (ref 70–110)
POCT GLUCOSE: 114 MG/DL (ref 70–110)
POCT GLUCOSE: 126 MG/DL (ref 70–110)
POCT GLUCOSE: 133 MG/DL (ref 70–110)
POTASSIUM SERPL-SCNC: 3.5 MMOL/L (ref 3.5–5.1)
PROT CSF-MCNC: 270 MG/DL (ref 15–40)
PROT SERPL-MCNC: 8.3 G/DL (ref 6–8.4)
RBC # BLD AUTO: 4.58 M/UL (ref 4–5.4)
RBC # CSF: 619 /CU MM
S PNEUM DNA CSF QL NAA+NON-PROBE: NOT DETECTED
SODIUM SERPL-SCNC: 132 MMOL/L (ref 136–145)
SPECIMEN VOL CSF: 2 ML
VZV DNA CSF QL NAA+NON-PROBE: NOT DETECTED
WBC # BLD AUTO: 4.92 K/UL (ref 3.9–12.7)
WBC # CSF: 272 /CU MM (ref 0–5)

## 2024-06-04 PROCEDURE — 86255 FLUORESCENT ANTIBODY SCREEN: CPT | Mod: 59 | Performed by: PSYCHIATRY & NEUROLOGY

## 2024-06-04 PROCEDURE — 89051 BODY FLUID CELL COUNT: CPT | Performed by: HOSPITALIST

## 2024-06-04 PROCEDURE — 88108 CYTOPATH CONCENTRATE TECH: CPT | Performed by: STUDENT IN AN ORGANIZED HEALTH CARE EDUCATION/TRAINING PROGRAM

## 2024-06-04 PROCEDURE — 86255 FLUORESCENT ANTIBODY SCREEN: CPT | Mod: 59 | Performed by: HOSPITALIST

## 2024-06-04 PROCEDURE — 25000003 PHARM REV CODE 250: Performed by: HOSPITALIST

## 2024-06-04 PROCEDURE — 87483 CNS DNA AMP PROBE TYPE 12-25: CPT | Performed by: HOSPITALIST

## 2024-06-04 PROCEDURE — 36415 COLL VENOUS BLD VENIPUNCTURE: CPT | Performed by: PSYCHIATRY & NEUROLOGY

## 2024-06-04 PROCEDURE — 86592 SYPHILIS TEST NON-TREP QUAL: CPT | Performed by: HOSPITALIST

## 2024-06-04 PROCEDURE — 86403 PARTICLE AGGLUT ANTBDY SCRN: CPT | Performed by: HOSPITALIST

## 2024-06-04 PROCEDURE — G0426 INPT/ED TELECONSULT50: HCPCS | Mod: 95,,, | Performed by: PSYCHIATRY & NEUROLOGY

## 2024-06-04 PROCEDURE — 87070 CULTURE OTHR SPECIMN AEROBIC: CPT | Performed by: HOSPITALIST

## 2024-06-04 PROCEDURE — 36415 COLL VENOUS BLD VENIPUNCTURE: CPT | Performed by: NURSE PRACTITIONER

## 2024-06-04 PROCEDURE — 25000003 PHARM REV CODE 250: Performed by: NURSE PRACTITIONER

## 2024-06-04 PROCEDURE — 84157 ASSAY OF PROTEIN OTHER: CPT | Performed by: HOSPITALIST

## 2024-06-04 PROCEDURE — 82945 GLUCOSE OTHER FLUID: CPT | Performed by: HOSPITALIST

## 2024-06-04 PROCEDURE — 88108 CYTOPATH CONCENTRATE TECH: CPT | Mod: 26,,, | Performed by: STUDENT IN AN ORGANIZED HEALTH CARE EDUCATION/TRAINING PROGRAM

## 2024-06-04 PROCEDURE — 85025 COMPLETE CBC W/AUTO DIFF WBC: CPT | Performed by: NURSE PRACTITIONER

## 2024-06-04 PROCEDURE — 21400001 HC TELEMETRY ROOM

## 2024-06-04 PROCEDURE — 009U3ZX DRAINAGE OF SPINAL CANAL, PERCUTANEOUS APPROACH, DIAGNOSTIC: ICD-10-PCS | Performed by: RADIOLOGY

## 2024-06-04 PROCEDURE — 87205 SMEAR GRAM STAIN: CPT | Performed by: HOSPITALIST

## 2024-06-04 PROCEDURE — 80053 COMPREHEN METABOLIC PANEL: CPT | Performed by: NURSE PRACTITIONER

## 2024-06-04 PROCEDURE — 82164 ANGIOTENSIN I ENZYME TEST: CPT | Performed by: HOSPITALIST

## 2024-06-04 RX ADMIN — ATORVASTATIN CALCIUM 80 MG: 40 TABLET, FILM COATED ORAL at 08:06

## 2024-06-04 RX ADMIN — POLYETHYLENE GLYCOL 3350 17 G: 17 POWDER, FOR SOLUTION ORAL at 09:06

## 2024-06-04 RX ADMIN — AMLODIPINE BESYLATE 10 MG: 10 TABLET ORAL at 09:06

## 2024-06-04 RX ADMIN — PANTOPRAZOLE SODIUM 40 MG: 40 TABLET, DELAYED RELEASE ORAL at 09:06

## 2024-06-04 RX ADMIN — Medication 6 MG: at 10:06

## 2024-06-04 RX ADMIN — ASPIRIN 81 MG: 81 TABLET, COATED ORAL at 09:06

## 2024-06-04 RX ADMIN — HYDROCHLOROTHIAZIDE 25 MG: 25 TABLET ORAL at 09:06

## 2024-06-04 RX ADMIN — ANASTROZOLE 1 MG: 1 TABLET, COATED ORAL at 09:06

## 2024-06-04 RX ADMIN — LOSARTAN POTASSIUM 100 MG: 50 TABLET, FILM COATED ORAL at 09:06

## 2024-06-04 NOTE — SUBJECTIVE & OBJECTIVE
Review of Systems   All other systems reviewed and are negative.    Objective:     Vital Signs (Most Recent):  Temp: 98.6 °F (37 °C) (06/04/24 1146)  Pulse: (!) 51 (06/04/24 1146)  Resp: 16 (06/04/24 1146)  BP: (!) 164/74 (06/04/24 1146)  SpO2: 98 % (06/04/24 1146) Vital Signs (24h Range):  Temp:  [97.5 °F (36.4 °C)-98.6 °F (37 °C)] 98.6 °F (37 °C)  Pulse:  [50-64] 51  Resp:  [14-20] 16  SpO2:  [96 %-99 %] 98 %  BP: (144-186)/(66-87) 164/74     Weight: 74.2 kg (163 lb 9.3 oz)  Body mass index is 26.4 kg/m².    Intake/Output Summary (Last 24 hours) at 6/4/2024 1538  Last data filed at 6/4/2024 1450  Gross per 24 hour   Intake 500 ml   Output 600 ml   Net -100 ml         Physical Exam  Vitals and nursing note reviewed.   Constitutional:       General: She is not in acute distress.     Appearance: Normal appearance. She is normal weight.   Cardiovascular:      Rate and Rhythm: Normal rate and regular rhythm.      Heart sounds: No murmur heard.  Pulmonary:      Effort: Pulmonary effort is normal. No respiratory distress.      Breath sounds: No wheezing.   Neurological:      Mental Status: She is alert and oriented to person, place, and time.      Comments: Left eye deviated   Psychiatric:         Mood and Affect: Mood normal.         Behavior: Behavior normal.             Significant Labs: All pertinent labs within the past 24 hours have been reviewed.  Recent Lab Results  (Last 5 results in the past 24 hours)        06/04/24  1112   06/04/24  0830   06/04/24  0525   06/04/24  0425   06/03/24 2051        Appearance, CSF   Slightly hazy             Monocytes/Mononuclear,CSF %   23             Diff Segs % CSF   22             Volume, Cell Count CSF   2.0             Wbc, Cell Count CSF   272             RBC, Cell Count CSF   619             Lymphs, CSF   55             CSF Tube Number   2                2             Albumin       4.1         ALP       44         ALT       15         Anion Gap       14         AST        19         Baso #       0.04         Basophil %       0.8         BILIRUBIN TOTAL       0.7  Comment: For infants and newborns, interpretation of results should be based  on gestational age, weight and in agreement with clinical  observations.    Premature Infant recommended reference ranges:  Up to 24 hours.............<8.0 mg/dL  Up to 48 hours............<12.0 mg/dL  3-5 days..................<15.0 mg/dL  6-29 days.................<15.0 mg/dL           BUN       12         Calcium       10.5         Chloride       94         CO2       24         COLOR CSF   Colorless             Creatinine       0.7         Differential Method       Automated         eGFR       >60         Eos #       0.2         Eos %       4.9         Glucose       111         Glucose CSF   11  Comment: Infants: 60 to 80 mg/dL             GRAM STAIN   Cytospin indicates:                Many WBC's                No epithelial cells                No organisms seen             Gran # (ANC)       3.0         Gran %       60.4         Hematocrit       39.9         Hemoglobin       14.0         Immature Grans (Abs)       0.02  Comment: Mild elevation in immature granulocytes is non specific and   can be seen in a variety of conditions including stress response,   acute inflammation, trauma and pregnancy. Correlation with other   laboratory and clinical findings is essential.           Immature Granulocytes       0.4         Lymph #       1.1         Lymph %       21.5         MCH       30.6         MCHC       35.1         MCV       87         Mono #       0.6         Mono %       12.0         MPV       8.4         nRBC       0         Platelet Count       361         POCT Glucose 133     126     119       Potassium       3.5         Protein, CSF   270  Comment: Infants can have higher CSF protein results due to increased  permeability of the blood-brain barrier.               PROTEIN TOTAL       8.3         RBC       4.58         RDW       12.7          Sodium       132         WBC       4.92                                Significant Imaging: I have reviewed all pertinent imaging results/findings within the past 24 hours.    MRI Brain W WO Contrast   Final Result      Mild scattered leptomeningeal enhancement including overlying the right frontal lobe as well as along the inter hemispheric fissure, left occipital lobe, and interpeduncular cistern with associated mild T2/FLAIR hyperintense signal the seen along the medial aspect of the cerebral peduncles.  Findings at the interpeduncular cistern may be affecting cranial nerve 3 which may explain reported oculomotor nerve palsy.  Findings are nonspecific with differential considerations including meningitis, metastatic disease, and paraneoplastic syndrome.      Additional nonspecific white matter changes likely related to chronic microvascular ischemia.      Left paranasal sinus disease.         Electronically signed by: Boris Hwang   Date:    06/03/2024   Time:    16:50      MRA Brain without contrast   Final Result      Complete opacification of the left maxillary sinus.  Small left ethmoid and frontal sinus opacification.  Correlate clinically to sinusitis      No acute infarct.  No restricted diffusion      Moderate subcortical and periventricular FLAIR hyperintensities consistent with chronic microvascular ischemic changes      Left maxillary sinus and mild left ethmoid frontal sinus mucosal thickening.      Moderate atherosclerotic changes as evidenced by luminal irregularities of the intracranial vasculature.         Electronically signed by: Simeon Cutler   Date:    06/02/2024   Time:    17:09      MRI Brain Without Contrast   Final Result      Complete opacification of the left maxillary sinus.  Small left ethmoid and frontal sinus opacification.  Correlate clinically to sinusitis      No acute infarct.  No restricted diffusion      Moderate subcortical and periventricular FLAIR hyperintensities  consistent with chronic microvascular ischemic changes      Left maxillary sinus and mild left ethmoid frontal sinus mucosal thickening.      Moderate atherosclerotic changes as evidenced by luminal irregularities of the intracranial vasculature.         Electronically signed by: Simeon Cutler   Date:    06/02/2024   Time:    17:09      CTA Head and Neck (xpd)   Final Result      Scattered atherosclerotic plaque involving the extracranial carotid system as well as the internal carotid arteries at the level of the cavernous sinus bilaterally.  However no significant narrowing or stenosis.  No vessel occlusions or significant stenosis.      Scattered atherosclerotic plaque of the vertebrobasilar system without significant narrowing or stenosis.      All CT scans at this facility use dose modulation, iterative reconstruction and/or weight based dosing when appropriate to reduce radiation dose to as low as reasonably achievable.         Electronically signed by: Victor Manuel Russell MD   Date:    06/02/2024   Time:    13:07      CT Head Without Contrast   Final Result      No acute intracranial abnormality.  CT aspects score 10.      All CT scans at this facility use dose modulation, iterative reconstructions, and/or weight base dosing when appropriate to reduce radiation dose to as low as reasonably achievable.         Electronically signed by: Victor Manuel Russell MD   Date:    06/02/2024   Time:    11:39      IR Lumbar Puncture Diagnostic    (Results Pending)

## 2024-06-04 NOTE — OP NOTE
Pre Op Diagnosis: abnormal MRI     Post Op Diagnosis: same     Procedure:  LP     Procedure performed by: Francy MEYER, Willem POON     Written Informed Consent Obtained: Yes     Specimen Removed:  yes     Estimated Blood Loss:  minimal     Findings: Local anesthesia     Sedation:  no     The patient tolerated the procedure well and there were no complications.      Disposition:  F/U in clinic or with ordering physician    Discharge instructions:  Light activity for 24 hours.  Remove band aid in 24 hours.  No baths (showers are appropriate).    Risks and benefits were explained to the paitent and SIR guidelines as it states with do not withhold clopidigrel and asa..  Labs within normal limits.    Sterile technique was performed in the lower back, lidocaine was used as a local anesthetic.  Traumatic tap, OP 15 cm of h2o, 11 ccs reddish which cleared fluid.  Pt tolerated the procedure well without immediate complications.  Please see radiologist report for details. F/u with PCP and/or ordering physician.

## 2024-06-04 NOTE — INTERVAL H&P NOTE
The patient has been examined and the H&P has been reviewed:    I concur with the findings and no changes have occurred since H&P was written.  Patient informed of the increased risk of bleeding due to blood thinners and agreed to proceed.        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

## 2024-06-04 NOTE — PLAN OF CARE
06/04/24 1516   Rounds   Attendance Provider;Nurse ;Charge nurse;Physical therapist;Occupational therapist   Discharge Plan A Home with family;Home Health   Why the patient remains in the hospital Requires continued medical care   Transition of Care Barriers None     Remains in hospital for infectious work up.

## 2024-06-04 NOTE — PROGRESS NOTES
"[facility] - Telemetry ([facility])  [facility] Medicine  Progress Note    Patient Name: Anne Farmer  MRN: [id number]  Patient Class: IP- Inpatient   Admission Date: 6/2/2024  Length of Stay: 0 days  Attending Physician: Josesito Cerrato MD  Primary Care Provider: Chiquita Talley MD        Subjective:     Principal Problem:Acute ischemic VBA brainstem stroke, left        HPI:  68 y.o. female patient, PMHx: HTN, DM2, HLD, Colon Cx, Sarcoidosis, RLD, Breast Cx, Cardiomyopathy. Presented to the Emergency Department for evaluation of a dizziness which onset 3 days PTA with left eye drift, new on day of arrival. Concerns for a potential stroke. Symptoms are constant and moderate in severity. No mitigating or exacerbating factors reported. Associated sxs include weakness, lightheadedness, and blurry vision. Pt denies diplopia and all other symptoms at this time. Pt denies Hx of stroke or MI. Code stroke called in the ED, CT head: no acute finding. CTA Head/Neck: negative for significant narrowing or stenosis, negative LVO. Neurology consulted. Vitals: 190/87, 62, 18, 98.2F, 99% RA. Abnormal Labs: BNP: 271. Patient is a full code. Placed in observation under the care of Hospital Medicine for management of suspicion of CVA.     Overview/Hospital Course:  6/3/24  NAEON, patient with left eye drift, reports blurry vision some improved today  MRI/MRA obtained yesterday with chronic microvascular changes  Tele-Neurology consulted, recommended repeat MRI  Repeat MRI with and without contrast with thin slices: "Mild scattered leptomeningeal enhancement including overlying the right frontal lobe as well as along the inter hemispheric fissure, left occipital lobe, and interpeduncular cistern with associated mild T2/FLAIR hyperintense signal the seen along the medial aspect of the cerebral peduncles. Findings at the interpeduncular cistern may be affecting cranial nerve 3 which may explain reported oculomotor nerve palsy. " "Findings are nonspecific with differential considerations including meningitis, metastatic disease, and paraneoplastic syndrome."  Patient with hx of breast and colon cancer, follows with Dr. Thompson    6/4/24  CARLOTA, patient reports improvement in vision today  S/p LP this morning, noted to be traumatic tap  CSF studies with elevated protein and WBCs  Discussed with Neurology, further CSF studies ordered  CSF cytology, mengitits/encephalitis, VDRL, paraneoplastic autoantibody studies ordered      Review of Systems   All other systems reviewed and are negative.    Objective:     Vital Signs (Most Recent):  Temp: 98.6 °F (37 °C) (06/04/24 1146)  Pulse: (!) 51 (06/04/24 1146)  Resp: 16 (06/04/24 1146)  BP: (!) 164/74 (06/04/24 1146)  SpO2: 98 % (06/04/24 1146) Vital Signs (24h Range):  Temp:  [97.5 °F (36.4 °C)-98.6 °F (37 °C)] 98.6 °F (37 °C)  Pulse:  [50-64] 51  Resp:  [14-20] 16  SpO2:  [96 %-99 %] 98 %  BP: (144-186)/(66-87) 164/74     Weight: 74.2 kg (163 lb 9.3 oz)  Body mass index is 26.4 kg/m².    Intake/Output Summary (Last 24 hours) at 6/4/2024 1538  Last data filed at 6/4/2024 1450  Gross per 24 hour   Intake 500 ml   Output 600 ml   Net -100 ml         Physical Exam  Vitals and nursing note reviewed.   Constitutional:       General: She is not in acute distress.     Appearance: Normal appearance. She is normal weight.   Cardiovascular:      Rate and Rhythm: Normal rate and regular rhythm.      Heart sounds: No murmur heard.  Pulmonary:      Effort: Pulmonary effort is normal. No respiratory distress.      Breath sounds: No wheezing.   Neurological:      Mental Status: She is alert and oriented to person, place, and time.      Comments: Left eye deviated   Psychiatric:         Mood and Affect: Mood normal.         Behavior: Behavior normal.             Significant Labs: All pertinent labs within the past 24 hours have been reviewed.  Recent Lab Results  (Last 5 results in the past 24 hours)        " 06/04/24  1112   06/04/24  0830   06/04/24  0525   06/04/24  0425   06/03/24  2051        Appearance, CSF   Slightly hazy             Monocytes/Mononuclear,CSF %   23             Diff Segs % CSF   22             Volume, Cell Count CSF   2.0             Wbc, Cell Count CSF   272             RBC, Cell Count CSF   619             Lymphs, CSF   55             CSF Tube Number   2                2             Albumin       4.1         ALP       44         ALT       15         Anion Gap       14         AST       19         Baso #       0.04         Basophil %       0.8         BILIRUBIN TOTAL       0.7  Comment: For infants and newborns, interpretation of results should be based  on gestational age, weight and in agreement with clinical  observations.    Premature Infant recommended reference ranges:  Up to 24 hours.............<8.0 mg/dL  Up to 48 hours............<12.0 mg/dL  3-5 days..................<15.0 mg/dL  6-29 days.................<15.0 mg/dL           BUN       12         Calcium       10.5         Chloride       94         CO2       24         COLOR CSF   Colorless             Creatinine       0.7         Differential Method       Automated         eGFR       >60         Eos #       0.2         Eos %       4.9         Glucose       111         Glucose CSF   11  Comment: Infants: 60 to 80 mg/dL             GRAM STAIN   Cytospin indicates:                Many WBC's                No epithelial cells                No organisms seen             Gran # (ANC)       3.0         Gran %       60.4         Hematocrit       39.9         Hemoglobin       14.0         Immature Grans (Abs)       0.02  Comment: Mild elevation in immature granulocytes is non specific and   can be seen in a variety of conditions including stress response,   acute inflammation, trauma and pregnancy. Correlation with other   laboratory and clinical findings is essential.           Immature Granulocytes       0.4         Lymph #       1.1          Lymph %       21.5         MCH       30.6         MCHC       35.1         MCV       87         Mono #       0.6         Mono %       12.0         MPV       8.4         nRBC       0         Platelet Count       361         POCT Glucose 133     126     119       Potassium       3.5         Protein, CSF   270  Comment: Infants can have higher CSF protein results due to increased  permeability of the blood-brain barrier.               PROTEIN TOTAL       8.3         RBC       4.58         RDW       12.7         Sodium       132         WBC       4.92                                Significant Imaging: I have reviewed all pertinent imaging results/findings within the past 24 hours.    MRI Brain W WO Contrast   Final Result      Mild scattered leptomeningeal enhancement including overlying the right frontal lobe as well as along the inter hemispheric fissure, left occipital lobe, and interpeduncular cistern with associated mild T2/FLAIR hyperintense signal the seen along the medial aspect of the cerebral peduncles.  Findings at the interpeduncular cistern may be affecting cranial nerve 3 which may explain reported oculomotor nerve palsy.  Findings are nonspecific with differential considerations including meningitis, metastatic disease, and paraneoplastic syndrome.      Additional nonspecific white matter changes likely related to chronic microvascular ischemia.      Left paranasal sinus disease.         Electronically signed by: Boris Hwang   Date:    06/03/2024   Time:    16:50      MRA Brain without contrast   Final Result      Complete opacification of the left maxillary sinus.  Small left ethmoid and frontal sinus opacification.  Correlate clinically to sinusitis      No acute infarct.  No restricted diffusion      Moderate subcortical and periventricular FLAIR hyperintensities consistent with chronic microvascular ischemic changes      Left maxillary sinus and mild left ethmoid frontal sinus mucosal  thickening.      Moderate atherosclerotic changes as evidenced by luminal irregularities of the intracranial vasculature.         Electronically signed by: Simeon Cutler   Date:    06/02/2024   Time:    17:09      MRI Brain Without Contrast   Final Result      Complete opacification of the left maxillary sinus.  Small left ethmoid and frontal sinus opacification.  Correlate clinically to sinusitis      No acute infarct.  No restricted diffusion      Moderate subcortical and periventricular FLAIR hyperintensities consistent with chronic microvascular ischemic changes      Left maxillary sinus and mild left ethmoid frontal sinus mucosal thickening.      Moderate atherosclerotic changes as evidenced by luminal irregularities of the intracranial vasculature.         Electronically signed by: Simeon Cutler   Date:    06/02/2024   Time:    17:09      CTA Head and Neck (xpd)   Final Result      Scattered atherosclerotic plaque involving the extracranial carotid system as well as the internal carotid arteries at the level of the cavernous sinus bilaterally.  However no significant narrowing or stenosis.  No vessel occlusions or significant stenosis.      Scattered atherosclerotic plaque of the vertebrobasilar system without significant narrowing or stenosis.      All CT scans at this facility use dose modulation, iterative reconstruction and/or weight based dosing when appropriate to reduce radiation dose to as low as reasonably achievable.         Electronically signed by: Victor Manuel Russell MD   Date:    06/02/2024   Time:    13:07      CT Head Without Contrast   Final Result      No acute intracranial abnormality.  CT aspects score 10.      All CT scans at this facility use dose modulation, iterative reconstructions, and/or weight base dosing when appropriate to reduce radiation dose to as low as reasonably achievable.         Electronically signed by: Victor Manuel Russell MD   Date:    06/02/2024   Time:    11:39      IR Lumbar  Puncture Diagnostic    (Results Pending)         Assessment/Plan:      * Acute ischemic VBA brainstem stroke, left  Suspicion of acute brainstem CVA vs TIA, continued vision abnormality, denies diplopia  Antithrombotics for secondary stroke prevention: Antiplatelets: Aspirin: 81 mg daily  Clopidogrel: 300 mg loading dose x 1, now  Clopidogrel: 75 mg daily    Statins for secondary stroke prevention and hyperlipidemia, if present:   Statins: Atorvastatin- 40 mg daily    Aggressive risk factor modification: DM, HLD, Exercise     Rehab efforts: The patient has been evaluated by a stroke team provider and the therapy needs have been fully considered based off the presenting complaints and exam findings. The following therapy evaluations are needed: PT evaluate and treat, OT evaluate and treat, SLP evaluate and treat    Diagnostics ordered/pending: CT scan of head without contrast to asses brain parenchyma, CTA Head to assess vasculature , CTA Neck/Arch to assess vasculature, Lipid Profile to assess cholesterol levels, MRA head to assess vasculature, MRI head without contrast to assess brain parenchyma, TTE to assess cardiac function/status , TSH to assess thyroid function    VTE prophylaxis: None: Reason for No Pharmacological VTE Prophylaxis: Currently on anticoagulation    BP parameters: Infarct: No intervention, SBP <220        Dizziness and giddiness  Presented with dizziness, concern for brainstem infarct    --fall precautions      Malignant neoplasm of lower-outer quadrant of left breast of female, estrogen receptor positive  Followed by oncology, managed with Arimidex    --cont home medication      Hyperlipidemia  Managed with statin    --cont statin  --Lipid panel in AM      DM type 2 with diabetic dyslipidemia  Patient's FSGs are controlled on current medication regimen.  Last A1c reviewed-   Lab Results   Component Value Date    HGBA1C 6.7 (H) 05/10/2024     Most recent fingerstick glucose reviewed-   Recent  Labs   Lab 06/02/24  1128   POCTGLUCOSE 124*     Current correctional scale  Low  Maintain anti-hyperglycemic dose as follows-   Antihyperglycemics (From admission, onward)      Start     Stop Route Frequency Ordered    06/02/24 1442  insulin aspart U-100 pen 0-5 Units         -- SubQ Before meals & nightly PRN 06/02/24 1343          Hold Oral hypoglycemics while patient is in the hospital.    Diastolic dysfunction    Euvolemic on exam, elevated BNP on arrival, likely secondary to uncontrolled HTN.    --ECHO    HTN (hypertension)  Chronic, uncontrolled. Latest blood pressure and vitals reviewed-     Temp:  [98.2 °F (36.8 °C)]   Pulse:  [47-63]   Resp:  [13-36]   BP: (175-210)/(81-91)   SpO2:  [88 %-100 %] .   Home meds for hypertension were reviewed and noted below.   Hypertension Medications               amLODIPine (NORVASC) 10 MG tablet TAKE 1 TABLET BY MOUTH EVERY DAY    furosemide (LASIX) 20 MG tablet Take 1 tablet (20 mg total) by mouth daily as needed (edema, swelling, fluid build up). Do not take if BP is below 110/70    hydroCHLOROthiazide (HYDRODIURIL) 25 MG tablet TAKE 1 TABLET BY MOUTH EVERY DAY    losartan (COZAAR) 100 MG tablet TAKE 1 TABLET BY MOUTH EVERY DAY            While in the hospital, will manage blood pressure as follows; Adjust home antihypertensive regimen as follows- Hold for now, permissive HTN    Will utilize p.r.n. blood pressure medication only if patient's blood pressure greater than 220/110 and she develops symptoms such as worsening chest pain or shortness of breath.      VTE Risk Mitigation (From admission, onward)           Ordered     Reason for No Pharmacological VTE Prophylaxis  Once        Question:  Reasons:  Answer:  Risk of Bleeding    06/02/24 1342     IP VTE HIGH RISK PATIENT  Once         06/02/24 1342     Place sequential compression device  Until discontinued         06/02/24 1342                    Discharge Planning   NATALIE: 6/3/2024     Code Status: Full Code   Is the  patient medically ready for discharge?:     Reason for patient still in hospital (select all that apply): Patient trending condition, Laboratory test, Treatment, and Consult recommendations  Discharge Plan A: Home with family, Home Health   Discharge Delays: None known at this time              Josesito Cerrato MD  Department of Hospital Medicine   [facility] ([facility])

## 2024-06-04 NOTE — PLAN OF CARE
A206/A206 JUAN Farmer is a 68 y.o.female admitted on 6/2/2024 for Acute ischemic VBA brainstem stroke, left   Code Status: Full Code MRN: [id number]   Review of patient's allergies indicates:  No Known Allergies  Past Medical History:   Diagnosis Date    Allergy     Arthritis     Cancer 2016    colon    CHF (congestive heart failure)     Colon cancer 2004    Colon cancer screening 9/21/2015    Diabetes mellitus type 2 in nonobese 3/9/2016    borderline    Diverticulosis     DM (diabetes mellitus) 03/2016    BS didn't check 02/13/2019    DM (diabetes mellitus) 03/2016    BS didn't check 03/04/2020    Hyperlipidemia 10/25/2016    Hypertension     Insomnia     Malignant neoplasm of colon 5/13/2021    Malignant neoplasm of lower-outer quadrant of left breast of female, estrogen receptor positive 6/9/2022      PRN meds    acetaminophen, 650 mg, Q6H PRN  bisacodyL, 10 mg, Daily PRN  dextrose 10%, 12.5 g, PRN  dextrose 10%, 25 g, PRN  glucagon (human recombinant), 1 mg, PRN  glucose, 16 g, PRN  glucose, 24 g, PRN  insulin aspart U-100, 0-5 Units, QID (AC + HS) PRN  labetalol, 10 mg, Q15 Min PRN  melatonin, 6 mg, Nightly PRN  ondansetron, 4 mg, Q8H PRN  sodium chloride 0.9%, 10 mL, PRN      Chart check completed. Will continue plan of care.      Orientation: oriented x 4  [place] Coma Scale Score: 15     Lead Monitored: Lead II Rhythm: sinus bradycardia Frequency/Ectopy: PVCs  Cardiac/Telemetry Box Number: [id number]  VTE Required Core Measure: (SCDs) Sequential compression device initiated/maintained Last Bowel Movement: 06/02/24  Diet Cardiac  Voiding Characteristics: external catheter  Laurent Score: 19  Fall Risk Score: 9  Accucheck [x]   Freq?      Lines/Drains/Airways       Peripheral Intravenous Line  Duration                  Peripheral IV - Single Lumen 06/02/24 1203 20 G Right Antecubital 1 day                       Problem: Adult Inpatient Plan of Care  Goal: Plan of Care Review  Outcome: Progressing  Goal:  Optimal Comfort and Wellbeing  Outcome: Progressing     Problem: Stroke, Ischemic (Includes Transient Ischemic Attack)  Goal: Improved Communication Skills  Outcome: Progressing     Problem: Diabetes Comorbidity  Goal: Blood Glucose Level Within Targeted Range  Outcome: Progressing

## 2024-06-04 NOTE — PROGRESS NOTES
[facility] - Telemetry ([facility])  Neurology  Progress Note    Patient Name: Anne Farmer  MRN: [id number]  Admission Date: 6/2/2024  Hospital Length of Stay: 0 days  Code Status: Full Code   Attending Provider: Josesito Cerrato MD  Primary Care Physician: Chiquita Talley MD   Principal Problem:Acute ischemic VBA brainstem stroke, left    Subjective:     Interval History: 68 y.o. female HTN, HLD DM2, CHF, cardiomyopathy, colon CA, breast CA, presented with dizziness and deviated eye.  Patient states about a week ago she started having episodes of dizziness combination of room spinning and lightheaded feeling as well as imbalance.  Also noticed her speech was a little slower than usual but denies slurred speech or word finding.  Saturday she noticed her left eye was deviated.  She states this was the symptom that made her come to the ER yesterday.  She reports associated double vision when looking to the left.  She denies vision loss, facial weakness, swallowing difficulty, weakness/numbness of the arms/legs.  She has never had these symptoms before.  She has no prior history of stroke or TIA.     Current Neurological Medications:     Current Facility-Administered Medications   Medication Dose Route Frequency Provider Last Rate Last Admin    acetaminophen tablet 650 mg  650 mg Oral Q6H PRN Irene Easley NP   650 mg at 06/02/24 1543    amLODIPine tablet 10 mg  10 mg Oral Daily Josesito Cerrato MD   10 mg at 06/04/24 0931    anastrozole tablet 1 mg  1 mg Oral Daily Josesito Cerrato MD   1 mg at 06/04/24 0931    aspirin EC tablet 81 mg  81 mg Oral Daily Irene Easley NP   81 mg at 06/04/24 0931    atorvastatin tablet 80 mg  80 mg Oral QHS Josesito Cerrato MD   80 mg at 06/03/24 2020    bisacodyL suppository 10 mg  10 mg Rectal Daily PRN Irene Easley NP        dextrose 10% bolus 125 mL 125 mL  12.5 g Intravenous PRN Irene Easley NP        dextrose 10% bolus 250 mL 250 mL  25 g Intravenous PRN  Irene Easley, NP        glucagon (human recombinant) injection 1 mg  1 mg Intramuscular PRN Irene Easley, NP        glucose chewable tablet 16 g  16 g Oral PRN Irene Easley, NP        glucose chewable tablet 24 g  24 g Oral PRN Irene Easley, NP        hydroCHLOROthiazide tablet 25 mg  25 mg Oral Daily Josesito Cerrato MD   25 mg at 06/04/24 0931    insulin aspart U-100 pen 0-5 Units  0-5 Units Subcutaneous QID (AC + HS) PRN Irene Easley, NP        labetaloL injection 10 mg  10 mg Intravenous Q15 Min PRN Irene Easley, NP        losartan tablet 100 mg  100 mg Oral Daily Josesito Cerrato MD   100 mg at 06/04/24 0930    melatonin tablet 6 mg  6 mg Oral Nightly PRN Rian Pablo MD   6 mg at 06/03/24 2104    ondansetron injection 4 mg  4 mg Intravenous Q8H PRN Irene Easley, JHON        pantoprazole EC tablet 40 mg  40 mg Oral Daily Josesito Cerrato MD   40 mg at 06/04/24 0931    polyethylene glycol packet 17 g  17 g Oral Daily Irene Easley, NP   17 g at 06/04/24 0930    sodium chloride 0.9% flush 10 mL  10 mL Intravenous PRN Irene Easley NP           Review of Systems   Constitutional:  Positive for fatigue.   HENT:  Negative for trouble swallowing.    Eyes:  Negative for photophobia.   Respiratory:  Negative for shortness of breath.    Cardiovascular:  Negative for chest pain.   Gastrointestinal:  Negative for abdominal pain.   Genitourinary:  Negative for dysuria.   Musculoskeletal:  Negative for back pain.   Neurological:  Negative for headaches.        Double vision   Psychiatric/Behavioral:  Negative for confusion and hallucinations.      Objective:     Vital Signs (Most Recent):  Temp: 98.6 °F (37 °C) (06/04/24 1146)  Pulse: (!) 51 (06/04/24 1146)  Resp: 16 (06/04/24 1146)  BP: (!) 164/74 (06/04/24 1146)  SpO2: 98 % (06/04/24 1146) Vital Signs (24h Range):  Temp:  [97.5 °F (36.4 °C)-98.6 °F (37 °C)] 98.6 °F (37 °C)  Pulse:  [50-64] 51  Resp:  [14-20] 16  SpO2:  [96 %-99  "%] 98 %  BP: (144-186)/(66-87) 164/74     Weight: 74.2 kg (163 lb 9.3 oz)  Body mass index is 26.4 kg/m².    Physical Exam  Constitutional:       General: She is not in acute distress.  Pulmonary:      Effort: No respiratory distress.   Neurological:      Mental Status: She is oriented to person, place, and time.   Psychiatric:         Speech: Speech normal.         NEUROLOGICAL EXAMINATION:     MENTAL STATUS   Oriented to person, place, and time.   Speech: speech is normal   Level of consciousness: alert    CRANIAL NERVES     CN III, IV, VI   Conjugate gaze: absent (Left eye abducted in primary position)    CN VII   Right facial weakness: none  Left facial weakness: none    CN XII   Tongue deviation: none    MOTOR EXAM        AROM of UE's and LE's against gravity       Significant Labs: CBC:   Recent Labs   Lab 06/03/24 0525 06/04/24  0425   WBC 5.22 4.92   HGB 13.4 14.0   HCT 38.4 39.9    361     CMP:   Recent Labs   Lab 06/03/24 0525 06/04/24  0425   GLU 87 111*   * 132*   K 3.3* 3.5   CL 95 94*   CO2 22* 24   BUN 10 12   CREATININE 0.7 0.7   CALCIUM 10.1 10.5   MG 1.9  --    PROT 8.4 8.3   ALBUMIN 4.1 4.1   BILITOT 0.6 0.7   ALKPHOS 45* 44*   AST 23 19   ALT 20 15   ANIONGAP 14 14     CSF Culture: No results for input(s): "CSFCULTURE" in the last 48 hours.  CSF Studies:   Recent Labs   Lab 06/04/24  0830   ALIQUT 2.0   APPEARCSF Slightly hazy*   COLORCSF Colorless   CSFWBC 272*   CSFRBC 619*   GLUCCSF 11*   PROTEINCSF 270*     Inflammatory Markers: No results for input(s): "SEDRATE", "CRP", "PROCAL" in the last 48 hours.  Urine Culture: No results for input(s): "LABURIN" in the last 48 hours.  Urine Studies:   Recent Labs   Lab 06/02/24  1348   COLORU Colorless*   APPEARANCEUA Clear   PHUR 8.0   SPECGRAV 1.030   PROTEINUA 1+*   GLUCUA Negative   KETONESU Negative   BILIRUBINUA Negative   OCCULTUA Negative   NITRITE Negative   UROBILINOGEN Negative   LEUKOCYTESUR Negative   RBCUA 1   WBCUA 0 "   BACTERIA None   HYALINECASTS 0       Significant Imaging: I have reviewed all pertinent imaging results/findings within the past 24 hours.    MRI brain  FINDINGS:  Cerebral and ventricular volumes are within normal limits for the patient's age.  No evidence of hydrocephalus.     There is mild leptomeningeal enhancement overlying the right frontal lobe as well as along the interhemispheric fissure and within the interpeduncular cistern.  There is associated mild T2/FLAIR hyperintense signal along the medial aspects of the cerebral peduncles.     Additional scattered T2/FLAIR hyperintense signal noted throughout the cerebral white matter and yadira.     No evidence of acute territorial infarct or intracranial hemorrhage.  No abnormal restricted diffusion or susceptibility artifact.  No significant midline shift or mass effect.     Midline structures and posterior fossa structures are otherwise unremarkable.  Basal cisterns are clear.  Major vascular flow voids are unremarkable.     Cranium is unremarkable.  Postsurgical appearance of the right orbital lens.  Orbits and globes otherwise within normal limits.  There is complete opacification of the left maxillary sinus and partial opacification of the left ethmoid air cells.  Remaining paranasal sinuses and mastoid air cells are clear.     Impression:     Mild scattered leptomeningeal enhancement including overlying the right frontal lobe as well as along the inter hemispheric fissure, left occipital lobe, and interpeduncular cistern with associated mild T2/FLAIR hyperintense signal the seen along the medial aspect of the cerebral peduncles.  Findings at the interpeduncular cistern may be affecting cranial nerve 3 which may explain reported oculomotor nerve palsy.  Findings are nonspecific with differential considerations including meningitis, metastatic disease, and paraneoplastic syndrome.     Additional nonspecific white matter changes likely related to chronic  microvascular ischemia.     Left paranasal sinus disease.        Electronically signed by:Boris Hwang  Date:                                            06/03/2024  Time:                                           16:50    Assessment and Plan:     69 y/o female with diplopia    Diplopia: given findings on MRI of meningeal enhancement an infectious, inflammatory (sarcoid), carcinomatosis etiology are in DDx.  CSF. Protein is very high suggesting and inflammatory or paraneoplastic process. WBC's are markedly elevated, cannot R/O infectious.  Meningitis/encephalitis panel sent.  VDRL as well as paraneoplastic panel sent in CSF.  Send CSF for cytology.    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] 06/03/2024 Yes    Dizziness and giddiness [R42] 06/02/2024 Yes    Malignant neoplasm of lower-outer quadrant of left breast of female, estrogen receptor positive [C50.512, Z17.0] 06/09/2022 Not Applicable    Hyperlipidemia [E78.5] 10/25/2016 Yes    DM type 2 with diabetic dyslipidemia [E11.69, E78.5]  Yes    Diastolic dysfunction [I51.89] 01/13/2014 Yes    HTN (hypertension) [I10] 10/22/2013 Yes      Problems Resolved During this Admission:       VTE Risk Mitigation (From admission, onward)           Ordered     Reason for No Pharmacological VTE Prophylaxis  Once        Question:  Reasons:  Answer:  Risk of Bleeding    06/02/24 1342     IP VTE HIGH RISK PATIENT  Once         06/02/24 1342     Place sequential compression device  Until discontinued         06/02/24 1342                    Osiel Murray MD  Neurology  [facility] - Telemetry ([facility])

## 2024-06-05 ENCOUNTER — TELEPHONE (OUTPATIENT)
Dept: FAMILY MEDICINE | Facility: CLINIC | Age: 69
End: 2024-06-05
Payer: MEDICARE

## 2024-06-05 PROBLEM — B45.1: Status: ACTIVE | Noted: 2024-06-05

## 2024-06-05 LAB
ALBUMIN SERPL BCP-MCNC: 4.1 G/DL (ref 3.5–5.2)
ALP SERPL-CCNC: 46 U/L (ref 55–135)
ALT SERPL W/O P-5'-P-CCNC: 16 U/L (ref 10–44)
ANION GAP SERPL CALC-SCNC: 13 MMOL/L (ref 8–16)
AST SERPL-CCNC: 19 U/L (ref 10–40)
BASOPHILS # BLD AUTO: 0.04 K/UL (ref 0–0.2)
BASOPHILS NFR BLD: 0.8 % (ref 0–1.9)
BILIRUB SERPL-MCNC: 0.7 MG/DL (ref 0.1–1)
BUN SERPL-MCNC: 13 MG/DL (ref 8–23)
CALCIUM SERPL-MCNC: 10.5 MG/DL (ref 8.7–10.5)
CHLORIDE SERPL-SCNC: 95 MMOL/L (ref 95–110)
CO2 SERPL-SCNC: 21 MMOL/L (ref 23–29)
CREAT SERPL-MCNC: 0.8 MG/DL (ref 0.5–1.4)
DIFFERENTIAL METHOD BLD: ABNORMAL
EOSINOPHIL # BLD AUTO: 0.2 K/UL (ref 0–0.5)
EOSINOPHIL NFR BLD: 3.8 % (ref 0–8)
ERYTHROCYTE [DISTWIDTH] IN BLOOD BY AUTOMATED COUNT: 12.5 % (ref 11.5–14.5)
EST. GFR  (NO RACE VARIABLE): >60 ML/MIN/1.73 M^2
GLUCOSE SERPL-MCNC: 103 MG/DL (ref 70–110)
HCT VFR BLD AUTO: 37.9 % (ref 37–48.5)
HGB BLD-MCNC: 13.3 G/DL (ref 12–16)
HIV 1+2 AB+HIV1 P24 AG SERPL QL IA: NEGATIVE
IMM GRANULOCYTES # BLD AUTO: 0.02 K/UL (ref 0–0.04)
IMM GRANULOCYTES NFR BLD AUTO: 0.4 % (ref 0–0.5)
LYMPHOCYTES # BLD AUTO: 0.9 K/UL (ref 1–4.8)
LYMPHOCYTES NFR BLD: 19.7 % (ref 18–48)
MCH RBC QN AUTO: 30.9 PG (ref 27–31)
MCHC RBC AUTO-ENTMCNC: 35.1 G/DL (ref 32–36)
MCV RBC AUTO: 88 FL (ref 82–98)
MONOCYTES # BLD AUTO: 0.6 K/UL (ref 0.3–1)
MONOCYTES NFR BLD: 12.6 % (ref 4–15)
NEUTROPHILS # BLD AUTO: 3 K/UL (ref 1.8–7.7)
NEUTROPHILS NFR BLD: 62.7 % (ref 38–73)
NRBC BLD-RTO: 0 /100 WBC
PLATELET # BLD AUTO: 320 K/UL (ref 150–450)
PMV BLD AUTO: 8.1 FL (ref 9.2–12.9)
POCT GLUCOSE: 110 MG/DL (ref 70–110)
POCT GLUCOSE: 121 MG/DL (ref 70–110)
POCT GLUCOSE: 206 MG/DL (ref 70–110)
POCT GLUCOSE: 88 MG/DL (ref 70–110)
POTASSIUM SERPL-SCNC: 3.3 MMOL/L (ref 3.5–5.1)
PROT SERPL-MCNC: 8.2 G/DL (ref 6–8.4)
RBC # BLD AUTO: 4.31 M/UL (ref 4–5.4)
SODIUM SERPL-SCNC: 129 MMOL/L (ref 136–145)
TREPONEMA PALLIDUM IGG+IGM AB [PRESENCE] IN SERUM OR PLASMA BY IMMUNOASSAY: NONREACTIVE
WBC # BLD AUTO: 4.78 K/UL (ref 3.9–12.7)

## 2024-06-05 PROCEDURE — 99223 1ST HOSP IP/OBS HIGH 75: CPT | Mod: NSCH,,, | Performed by: INTERNAL MEDICINE

## 2024-06-05 PROCEDURE — 25000003 PHARM REV CODE 250: Performed by: INTERNAL MEDICINE

## 2024-06-05 PROCEDURE — 80053 COMPREHEN METABOLIC PANEL: CPT | Performed by: HOSPITALIST

## 2024-06-05 PROCEDURE — 25000003 PHARM REV CODE 250: Performed by: HOSPITALIST

## 2024-06-05 PROCEDURE — 97116 GAIT TRAINING THERAPY: CPT | Mod: CQ

## 2024-06-05 PROCEDURE — 63600175 PHARM REV CODE 636 W HCPCS: Performed by: NURSE PRACTITIONER

## 2024-06-05 PROCEDURE — 25000003 PHARM REV CODE 250: Performed by: NURSE PRACTITIONER

## 2024-06-05 PROCEDURE — 21400001 HC TELEMETRY ROOM

## 2024-06-05 PROCEDURE — 63600175 PHARM REV CODE 636 W HCPCS: Mod: JZ,JG | Performed by: INTERNAL MEDICINE

## 2024-06-05 PROCEDURE — 85025 COMPLETE CBC W/AUTO DIFF WBC: CPT | Performed by: HOSPITALIST

## 2024-06-05 PROCEDURE — 36415 COLL VENOUS BLD VENIPUNCTURE: CPT | Performed by: HOSPITALIST

## 2024-06-05 PROCEDURE — 87389 HIV-1 AG W/HIV-1&-2 AB AG IA: CPT | Performed by: HOSPITALIST

## 2024-06-05 PROCEDURE — 86593 SYPHILIS TEST NON-TREP QUANT: CPT | Performed by: INTERNAL MEDICINE

## 2024-06-05 RX ORDER — DIPHENHYDRAMINE HYDROCHLORIDE 50 MG/ML
25 INJECTION INTRAMUSCULAR; INTRAVENOUS DAILY PRN
Status: DISCONTINUED | OUTPATIENT
Start: 2024-06-05 | End: 2024-06-12 | Stop reason: HOSPADM

## 2024-06-05 RX ORDER — FLUCYTOSINE 250 MG/1
250 CAPSULE ORAL
Status: DISCONTINUED | OUTPATIENT
Start: 2024-06-05 | End: 2024-06-05

## 2024-06-05 RX ORDER — ACETAMINOPHEN 325 MG/1
650 TABLET ORAL DAILY PRN
Status: DISCONTINUED | OUTPATIENT
Start: 2024-06-05 | End: 2024-06-12 | Stop reason: HOSPADM

## 2024-06-05 RX ORDER — FLUCYTOSINE 250 MG/1
250 CAPSULE ORAL
Status: DISCONTINUED | OUTPATIENT
Start: 2024-06-05 | End: 2024-06-10

## 2024-06-05 RX ADMIN — HYDROCHLOROTHIAZIDE 25 MG: 25 TABLET ORAL at 09:06

## 2024-06-05 RX ADMIN — FLUCYTOSINE 250 MG: 250 CAPSULE ORAL at 05:06

## 2024-06-05 RX ADMIN — ACETAMINOPHEN 650 MG: 325 TABLET ORAL at 11:06

## 2024-06-05 RX ADMIN — AMPHOTERICIN B 200 MG: 50 INJECTION, POWDER, LYOPHILIZED, FOR SOLUTION INTRAVENOUS at 12:06

## 2024-06-05 RX ADMIN — DIPHENHYDRAMINE HYDROCHLORIDE 25 MG: 50 INJECTION, SOLUTION INTRAMUSCULAR; INTRAVENOUS at 11:06

## 2024-06-05 RX ADMIN — LOSARTAN POTASSIUM 100 MG: 50 TABLET, FILM COATED ORAL at 09:06

## 2024-06-05 RX ADMIN — INSULIN ASPART 2 UNITS: 100 INJECTION, SOLUTION INTRAVENOUS; SUBCUTANEOUS at 03:06

## 2024-06-05 RX ADMIN — Medication 6 MG: at 09:06

## 2024-06-05 RX ADMIN — FLUCYTOSINE 1750 MG: 250 CAPSULE ORAL at 05:06

## 2024-06-05 RX ADMIN — ANASTROZOLE 1 MG: 1 TABLET, COATED ORAL at 09:06

## 2024-06-05 RX ADMIN — ATORVASTATIN CALCIUM 80 MG: 40 TABLET, FILM COATED ORAL at 09:06

## 2024-06-05 RX ADMIN — AMLODIPINE BESYLATE 10 MG: 10 TABLET ORAL at 09:06

## 2024-06-05 RX ADMIN — ASPIRIN 81 MG: 81 TABLET, COATED ORAL at 09:06

## 2024-06-05 RX ADMIN — POLYETHYLENE GLYCOL 3350 17 G: 17 POWDER, FOR SOLUTION ORAL at 09:06

## 2024-06-05 RX ADMIN — PANTOPRAZOLE SODIUM 40 MG: 40 TABLET, DELAYED RELEASE ORAL at 09:06

## 2024-06-05 NOTE — TELEPHONE ENCOUNTER
----- Message from Piyush Mendoza MA sent at 6/5/2024 10:42 AM CDT -----  Regarding: Appointment  Good morning,    My apologies I made a mistake & cancelled pt appointment with your clinic on tomorrow for 9:20am. I meant to cancel her cardio appointment. It was an error but pt is currently in the hospital. She's been admitted since 06/02/24 and will have to reschedule once she's discharged.    Thanks

## 2024-06-05 NOTE — PT/OT/SLP PROGRESS
Physical Therapy Treatment    Patient Name:  Anne Farmer   MRN:  [id number]    Recommendations:     Discharge Recommendations: Low Intensity Therapy  Discharge Equipment Recommendations: none  Barriers to discharge: None    Assessment:     Anne Farmer is a 68 y.o. female admitted with a medical diagnosis of Acute ischemic VBA brainstem stroke, left.  She presents with the following impairments/functional limitations: weakness, impaired endurance, impaired functional mobility, gait instability, impaired balance, decreased safety awareness, decreased lower extremity function, decreased upper extremity function, visual deficits.    Pt continues to require assistance with functional activities to ensure safety, but is steadily progressing towards stated goals. Continued therapy is recommended to address remaining functional limitations.    Rehab Prognosis: Good; patient would benefit from acute skilled PT services to address these deficits and reach maximum level of function.    Recent Surgery: * No surgery found *      Plan:     During this hospitalization, patient to be seen 3 x/week to address the identified rehab impairments via gait training, therapeutic activities, therapeutic exercises and progress toward the following goals:    Plan of Care Expires:  06/17/24    Subjective     Chief Complaint: none noted by pt  Patient/Family Comments/goals: ready to return home  Pain/Comfort:  Pain Rating 1: 0/10  Pain Rating Post-Intervention 1: 0/10      Objective:     Communicated with pt's nurse, Carley, prior to session.  Patient found up in chair with peripheral IV, chair check, telemetry upon PT entry to room.     General Precautions: Standard, fall, aspiration  Orthopedic Precautions: N/A  Braces: N/A  Respiratory Status: Room air     Functional Mobility:  Bed Mobility:  Pt met sitting in/ returned to bedside chair  Transfers:     Sit to Stand from bedside chair: contact guard assistance with rolling  walker  Gait: Pt ambulates ~120' throughout the hallway with contact guard assistance using no AD. Pt demonstrates slow leatha, decreased arm swing, decreased stride length, and wide FRED. Mild unsteadiness noted, but no significant LOB observed. Gait distance limited due to pt fatigue.   Balance: CGA      AM-PAC 6 CLICK MOBILITY  Turning over in bed (including adjusting bedclothes, sheets and blankets)?: 4  Sitting down on and standing up from a chair with arms (e.g., wheelchair, bedside commode, etc.): 3  Moving from lying on back to sitting on the side of the bed?: 4  Moving to and from a bed to a chair (including a wheelchair)?: 3  Need to walk in hospital room?: 3  Climbing 3-5 steps with a railing?: 1  Basic Mobility Total Score: 18       Treatment & Education:  Gait belt donned throughout session.    Pt educated on the role of therapy, the goals of the session, assistive device use, fall prevention, and PT POC. Pt with good understanding.    Patient left up in chair with all lines intact, call button in reach, and chair alarm on.    GOALS:   Multidisciplinary Problems       Physical Therapy Goals          Problem: Physical Therapy    Goal Priority Disciplines Outcome Goal Variances Interventions   Physical Therapy Goal     PT, PT/OT      Description: LTG to be met in 14 days: 6/17/24  Pt will complete bed mobility INDEP.  Pt will complete sit to stand INDEP.  Pt will ambulate 300' NOAH with RW.  Pt will increase AMPAC score by 2 to progress gross functional mobility.                       Time Tracking:     PT Received On: 06/05/24  PT Start Time: 0919     PT Stop Time: 0931  PT Total Time (min): 12 min     Billable Minutes: Gait Training 12    Treatment Type: Treatment  PT/PTA: PTA     Number of PTA visits since last PT visit: 1 06/05/2024

## 2024-06-05 NOTE — PLAN OF CARE
Problem: Adult Inpatient Plan of Care  Goal: Plan of Care Review  Outcome: Progressing  Goal: Patient-Specific Goal (Individualized)  Outcome: Progressing  Goal: Absence of Hospital-Acquired Illness or Injury  Outcome: Progressing  Goal: Optimal Comfort and Wellbeing  Outcome: Progressing  Goal: Readiness for Transition of Care  Outcome: Progressing     Problem: Infection  Goal: Absence of Infection Signs and Symptoms  Outcome: Progressing     Monitoring labs. Medications given as ordered. Antifungals given. Monitoring blood glucose. Will continue plan of care

## 2024-06-06 PROBLEM — J32.9 SINUSITIS: Status: ACTIVE | Noted: 2024-06-06

## 2024-06-06 LAB
ALBUMIN SERPL BCP-MCNC: 4.4 G/DL (ref 3.5–5.2)
ALP SERPL-CCNC: 46 U/L (ref 55–135)
ALT SERPL W/O P-5'-P-CCNC: 18 U/L (ref 10–44)
ANION GAP SERPL CALC-SCNC: 15 MMOL/L (ref 8–16)
AST SERPL-CCNC: 20 U/L (ref 10–40)
BASOPHILS # BLD AUTO: 0.04 K/UL (ref 0–0.2)
BASOPHILS NFR BLD: 0.9 % (ref 0–1.9)
BILIRUB SERPL-MCNC: 1 MG/DL (ref 0.1–1)
BUN SERPL-MCNC: 22 MG/DL (ref 8–23)
CALCIUM SERPL-MCNC: 10.9 MG/DL (ref 8.7–10.5)
CHLORIDE SERPL-SCNC: 93 MMOL/L (ref 95–110)
CO2 SERPL-SCNC: 22 MMOL/L (ref 23–29)
CREAT SERPL-MCNC: 1.2 MG/DL (ref 0.5–1.4)
DIFFERENTIAL METHOD BLD: ABNORMAL
EOSINOPHIL # BLD AUTO: 0.2 K/UL (ref 0–0.5)
EOSINOPHIL NFR BLD: 3.9 % (ref 0–8)
ERYTHROCYTE [DISTWIDTH] IN BLOOD BY AUTOMATED COUNT: 12.6 % (ref 11.5–14.5)
EST. GFR  (NO RACE VARIABLE): 49 ML/MIN/1.73 M^2
GLUCOSE SERPL-MCNC: 97 MG/DL (ref 70–110)
HCT VFR BLD AUTO: 38.9 % (ref 37–48.5)
HGB BLD-MCNC: 13.7 G/DL (ref 12–16)
IMM GRANULOCYTES # BLD AUTO: 0.03 K/UL (ref 0–0.04)
IMM GRANULOCYTES NFR BLD AUTO: 0.6 % (ref 0–0.5)
LYMPHOCYTES # BLD AUTO: 0.8 K/UL (ref 1–4.8)
LYMPHOCYTES NFR BLD: 16.8 % (ref 18–48)
MAGNESIUM SERPL-MCNC: 2 MG/DL (ref 1.6–2.6)
MCH RBC QN AUTO: 31.4 PG (ref 27–31)
MCHC RBC AUTO-ENTMCNC: 35.2 G/DL (ref 32–36)
MCV RBC AUTO: 89 FL (ref 82–98)
MONOCYTES # BLD AUTO: 0.5 K/UL (ref 0.3–1)
MONOCYTES NFR BLD: 10.8 % (ref 4–15)
NEUTROPHILS # BLD AUTO: 3.1 K/UL (ref 1.8–7.7)
NEUTROPHILS NFR BLD: 67 % (ref 38–73)
NRBC BLD-RTO: 0 /100 WBC
PLATELET # BLD AUTO: 337 K/UL (ref 150–450)
PMV BLD AUTO: 8.6 FL (ref 9.2–12.9)
POCT GLUCOSE: 102 MG/DL (ref 70–110)
POCT GLUCOSE: 112 MG/DL (ref 70–110)
POCT GLUCOSE: 119 MG/DL (ref 70–110)
POCT GLUCOSE: 124 MG/DL (ref 70–110)
POTASSIUM SERPL-SCNC: 3.3 MMOL/L (ref 3.5–5.1)
PROT SERPL-MCNC: 8.6 G/DL (ref 6–8.4)
RBC # BLD AUTO: 4.37 M/UL (ref 4–5.4)
SODIUM SERPL-SCNC: 130 MMOL/L (ref 136–145)
VDRL CSF QL: NEGATIVE
WBC # BLD AUTO: 4.64 K/UL (ref 3.9–12.7)

## 2024-06-06 PROCEDURE — 02HV33Z INSERTION OF INFUSION DEVICE INTO SUPERIOR VENA CAVA, PERCUTANEOUS APPROACH: ICD-10-PCS | Performed by: HOSPITALIST

## 2024-06-06 PROCEDURE — 99233 SBSQ HOSP IP/OBS HIGH 50: CPT | Mod: NSCH,,, | Performed by: INTERNAL MEDICINE

## 2024-06-06 PROCEDURE — 36569 INSJ PICC 5 YR+ W/O IMAGING: CPT

## 2024-06-06 PROCEDURE — 97110 THERAPEUTIC EXERCISES: CPT

## 2024-06-06 PROCEDURE — 25000003 PHARM REV CODE 250: Performed by: INTERNAL MEDICINE

## 2024-06-06 PROCEDURE — 25000003 PHARM REV CODE 250: Performed by: NURSE PRACTITIONER

## 2024-06-06 PROCEDURE — C1751 CATH, INF, PER/CENT/MIDLINE: HCPCS

## 2024-06-06 PROCEDURE — 21400001 HC TELEMETRY ROOM

## 2024-06-06 PROCEDURE — 85025 COMPLETE CBC W/AUTO DIFF WBC: CPT | Performed by: HOSPITALIST

## 2024-06-06 PROCEDURE — 25000003 PHARM REV CODE 250: Performed by: HOSPITALIST

## 2024-06-06 PROCEDURE — 63600175 PHARM REV CODE 636 W HCPCS: Performed by: INTERNAL MEDICINE

## 2024-06-06 PROCEDURE — 97530 THERAPEUTIC ACTIVITIES: CPT

## 2024-06-06 PROCEDURE — 83735 ASSAY OF MAGNESIUM: CPT | Performed by: HOSPITALIST

## 2024-06-06 PROCEDURE — 97116 GAIT TRAINING THERAPY: CPT | Mod: CQ

## 2024-06-06 PROCEDURE — 80053 COMPREHEN METABOLIC PANEL: CPT | Performed by: HOSPITALIST

## 2024-06-06 PROCEDURE — 86403 PARTICLE AGGLUT ANTBDY SCRN: CPT | Mod: 91 | Performed by: INTERNAL MEDICINE

## 2024-06-06 PROCEDURE — 97530 THERAPEUTIC ACTIVITIES: CPT | Mod: CQ

## 2024-06-06 PROCEDURE — A4216 STERILE WATER/SALINE, 10 ML: HCPCS | Performed by: HOSPITALIST

## 2024-06-06 PROCEDURE — 86403 PARTICLE AGGLUT ANTBDY SCRN: CPT | Performed by: INTERNAL MEDICINE

## 2024-06-06 RX ORDER — SODIUM CHLORIDE 0.9 % (FLUSH) 0.9 %
10 SYRINGE (ML) INJECTION EVERY 6 HOURS
Status: DISCONTINUED | OUTPATIENT
Start: 2024-06-06 | End: 2024-06-12 | Stop reason: HOSPADM

## 2024-06-06 RX ORDER — SODIUM CHLORIDE 0.9 % (FLUSH) 0.9 %
10 SYRINGE (ML) INJECTION
Status: DISCONTINUED | OUTPATIENT
Start: 2024-06-06 | End: 2024-06-12 | Stop reason: HOSPADM

## 2024-06-06 RX ORDER — POTASSIUM CHLORIDE 20 MEQ/1
20 TABLET, EXTENDED RELEASE ORAL 2 TIMES DAILY
Status: COMPLETED | OUTPATIENT
Start: 2024-06-06 | End: 2024-06-07

## 2024-06-06 RX ADMIN — AMPHOTERICIN B 200 MG: 50 INJECTION, POWDER, LYOPHILIZED, FOR SOLUTION INTRAVENOUS at 11:06

## 2024-06-06 RX ADMIN — FLUCYTOSINE 1750 MG: 250 CAPSULE ORAL at 06:06

## 2024-06-06 RX ADMIN — FLUCYTOSINE 250 MG: 250 CAPSULE ORAL at 06:06

## 2024-06-06 RX ADMIN — LOSARTAN POTASSIUM 100 MG: 50 TABLET, FILM COATED ORAL at 09:06

## 2024-06-06 RX ADMIN — FLUCYTOSINE 1750 MG: 250 CAPSULE ORAL at 11:06

## 2024-06-06 RX ADMIN — ASPIRIN 81 MG: 81 TABLET, COATED ORAL at 09:06

## 2024-06-06 RX ADMIN — POTASSIUM CHLORIDE 20 MEQ: 1500 TABLET, EXTENDED RELEASE ORAL at 09:06

## 2024-06-06 RX ADMIN — SODIUM CHLORIDE, PRESERVATIVE FREE 10 ML: 5 INJECTION INTRAVENOUS at 06:06

## 2024-06-06 RX ADMIN — CEFTRIAXONE 2 G: 2 INJECTION, POWDER, FOR SOLUTION INTRAMUSCULAR; INTRAVENOUS at 05:06

## 2024-06-06 RX ADMIN — FLUCYTOSINE 1750 MG: 250 CAPSULE ORAL at 05:06

## 2024-06-06 RX ADMIN — ANASTROZOLE 1 MG: 1 TABLET, COATED ORAL at 09:06

## 2024-06-06 RX ADMIN — ATORVASTATIN CALCIUM 80 MG: 40 TABLET, FILM COATED ORAL at 09:06

## 2024-06-06 RX ADMIN — FLUCYTOSINE 1750 MG: 250 CAPSULE ORAL at 01:06

## 2024-06-06 RX ADMIN — Medication 6 MG: at 10:06

## 2024-06-06 RX ADMIN — PANTOPRAZOLE SODIUM 40 MG: 40 TABLET, DELAYED RELEASE ORAL at 09:06

## 2024-06-06 RX ADMIN — AMLODIPINE BESYLATE 10 MG: 10 TABLET ORAL at 09:06

## 2024-06-06 RX ADMIN — HYDROCHLOROTHIAZIDE 25 MG: 25 TABLET ORAL at 09:06

## 2024-06-06 NOTE — SUBJECTIVE & OBJECTIVE
Past Medical History:   Diagnosis Date    Allergy     Arthritis     Cancer 2016    colon    CHF (congestive heart failure)     Colon cancer 2004    Colon cancer screening 2015    Diabetes mellitus type 2 in nonobese 3/9/2016    borderline    Diverticulosis     DM (diabetes mellitus) 2016    BS didn't check 2019    DM (diabetes mellitus) 2016    BS didn't check 2020    Hyperlipidemia 10/25/2016    Hypertension     Insomnia     Malignant neoplasm of colon 2021    Malignant neoplasm of lower-outer quadrant of left breast of female, estrogen receptor positive 2022       Past Surgical History:   Procedure Laterality Date    BREAST BIOPSY Left     BREAST LUMPECTOMY Left      SECTION      COLON SURGERY      COLONOSCOPY N/A 2015    Procedure: COLONOSCOPY;  Surgeon: Adela Sam MD;  Location: [facility];  Service: Endoscopy;  Laterality: N/A;    COLONOSCOPY N/A 2017    Procedure: COLONOSCOPY;  Surgeon: Chintan Flores MD;  Location: [facility] ENDO;  Service: Endoscopy;  Laterality: N/A;    COLONOSCOPY N/A 2020    Procedure: COLONOSCOPY;  Surgeon: Grisel Atkins MD;  Location: [facility] ENDO;  Service: Endoscopy;  Laterality: N/A;    SENTINEL LYMPH NODE BIOPSY Left 2022    Procedure: BIOPSY, LYMPH NODE, SENTINEL;  Surgeon: Arvin Hernandez MD;  Location: [facility] OR;  Service: General;  Laterality: Left;       Review of patient's allergies indicates:  No Known Allergies    Medications:  Medications Prior to Admission   Medication Sig    albuterol (PROVENTIL/VENTOLIN HFA) 90 mcg/actuation inhaler INHALE 1-2 PUFFS BY MOUTH EVERY 6 HOURS AS NEEDED FOR WHEEZE OR SHORTNESS OF BREATH    amLODIPine (NORVASC) 10 MG tablet TAKE 1 TABLET BY MOUTH EVERY DAY    amoxicillin-clavulanate 875-125mg (AUGMENTIN) 875-125 mg per tablet Take 1 tablet by mouth every 12 (twelve) hours. (Patient not taking: Reported on 2024)    anastrozole (ARIMIDEX) 1 mg Tab TAKE 1 TABLET BY MOUTH  Patient admitted with PNA and Volume overload.patient has been  started on IV Abx and nephrology did HD ,patient is stable on RA. Her BP remains elevated over night,added Norvasc and increased prn IV Hydralazine,DC clonidine and coreg for bradycardia,BP remains elevated,transferred to ICU on Cardene drip.Cardene infusion stopped.  Patient appears to have rebound hypertension after dialysis, again shooting up to the 200s.  Heart rate has recovered, will resume Coreg in addition to clonidine, hydralazine, amlodipine.    Very difficult to control blood pressure, now on:  Nifedipine 60 mg b.i.d.  Coreg 12.5 mg b.i.d. (heart rate will not allow higher BB)  Clonidine 0.3 mg t.i.d.  Hydralazine 100 mg t.i.d.  Minoxidil 20 mg daily  Losartan 100 mg daily  Can not be diuretics, does not make urine  Can not be on spironolactone specifically, because ESRD/K    Will check renal ultrasound to rule out renal artery stenosis, given resistant hypertension despite maximum blood pressure medication treatment.   "EVERY DAY    azelastine (ASTELIN) 137 mcg (0.1 %) nasal spray 2 sprays (274 mcg total) by Nasal route 2 (two) times daily.    fluticasone propionate (FLONASE) 50 mcg/actuation nasal spray 2 sprays (100 mcg total) by Each Nostril route once daily.    furosemide (LASIX) 20 MG tablet Take 1 tablet (20 mg total) by mouth daily as needed (edema, swelling, fluid build up). Do not take if BP is below 110/70    hydroCHLOROthiazide (HYDRODIURIL) 25 MG tablet TAKE 1 TABLET BY MOUTH EVERY DAY    hydrOXYchloroQUINE (PLAQUENIL) 200 mg tablet TAKE 1 TABLET BY MOUTH TWICE A DAY    levocetirizine (XYZAL) 5 MG tablet Take 1 tablet (5 mg total) by mouth every evening.    losartan (COZAAR) 100 MG tablet TAKE 1 TABLET BY MOUTH EVERY DAY    magnesium oxide (MAG-OX) 400 mg (241.3 mg magnesium) tablet Take 1 tablet (400 mg total) by mouth once daily.    meloxicam (MOBIC) 15 MG tablet Take 15 mg by mouth daily as needed.    multivitamin (ONE DAILY MULTIVITAMIN) per tablet Take 1 tablet by mouth once daily.    omeprazole (PRILOSEC) 20 MG capsule TAKE 1 CAPSULE BY MOUTH EVERY DAY    orphenadrine (NORFLEX) 100 mg tablet Take 1 tablet (100 mg total) by mouth 2 (two) times daily.    potassium chloride (KLOR-CON) 10 MEQ TbSR Take 2 tablets (20 mEq total) by mouth daily as needed (take with lasix).    pravastatin (PRAVACHOL) 20 MG tablet TAKE 1 TABLET BY MOUTH EVERY DAY    traZODone (DESYREL) 50 MG tablet TAKE 1 TABLET BY MOUTH EVERY DAY AT NIGHT     Antibiotics (From admission, onward)      None          Antifungals (From admission, onward)      Start     Stop Route Frequency Ordered    06/05/24 1800  flucytosine capsule 1,750 mg        Placed in "And" Linked Group    -- Oral Every 6 hours 06/05/24 1059    06/05/24 1800  flucytosine capsule 250 mg        Placed in "And" Linked Group    -- Oral Every 24 hours (non-standard times) 06/05/24 1059    06/05/24 1115  amphotericin B liposome (AMBISOME) 200 mg in dextrose 5 % (D5W) 200 mL IVPB         -- " IV Every 24 hours (non-standard times) 06/05/24 1003          Antivirals (From admission, onward)      None             Immunization History   Administered Date(s) Administered    COVID-19 MRNA, LN-S PF (MODERNA HALF 0.25 ML DOSE) 12/08/2021    COVID-19, MRNA, LN-S, PF (MODERNA FULL 0.5 ML DOSE) 01/28/2021, 02/25/2021    COVID-19, mRNA, LNP-S, PF (Moderna 2023)Ages 12+ 11/02/2023    COVID-19, mRNA, LNP-S, bivalent booster, PF (PFIZER OMICRON) 10/27/2022    Influenza - Quadrivalent 09/22/2017, 10/15/2021, 10/09/2022, 10/16/2023    Influenza - Quadrivalent - High Dose - PF (65 years and older) 09/23/2020    Influenza - Quadrivalent - PF *Preferred* (6 months and older) 09/24/2009, 11/02/2010    PPD Test 02/15/2017    Pneumococcal Conjugate - 13 Valent 05/21/2019    Pneumococcal Conjugate - 20 Valent 09/28/2023    Pneumococcal Polysaccharide - 23 Valent 04/26/2018    Tdap 01/15/2015       Family History       Problem Relation (Age of Onset)    Diabetes Mother    Hypertension Mother, Father, Sister, Brother, Sister, Sister, Sister, Brother, Brother          Social History     Socioeconomic History    Marital status: Single   Occupational History     Employer: [employer]   Tobacco Use    Smoking status: Never     Passive exposure: Never    Smokeless tobacco: Never   Substance and Sexual Activity    Alcohol use: Yes     Alcohol/week: 0.0 standard drinks of alcohol     Comment: weekends.       Drug use: No    Sexual activity: Not Currently     Review of Systems   Constitutional:  Negative for activity change, appetite change, chills, diaphoresis, fatigue and fever.   HENT:  Negative for congestion.    Neurological:  Negative for dizziness, facial asymmetry, light-headedness and headaches.     Objective:     Vital Signs (Most Recent):  Temp: 99.6 °F (37.6 °C) (06/05/24 2347)  Pulse: 95 (06/05/24 2347)  Resp: 17 (06/05/24 2347)  BP: (!) 153/67 (06/05/24 2347)  SpO2: 98 % (06/05/24 2347) Vital Signs (24h  "Range):  Temp:  [97.7 °F (36.5 °C)-99.6 °F (37.6 °C)] 99.6 °F (37.6 °C)  Pulse:  [52-95] 95  Resp:  [16-20] 17  SpO2:  [95 %-98 %] 98 %  BP: (128-161)/(59-74) 153/67     Weight: 74.2 kg (163 lb 9.3 oz)  Body mass index is 26.4 kg/m².    Estimated Creatinine Clearance: 67.5 mL/min (based on SCr of 0.8 mg/dL).     Physical Exam  Vitals and nursing note reviewed.   HENT:      Head: Normocephalic.      Comments: Left eye ptosis      Nose: Nose normal.   Cardiovascular:      Rate and Rhythm: Normal rate.   Pulmonary:      Effort: Pulmonary effort is normal.   Abdominal:      General: Abdomen is flat.   Neurological:      Mental Status: She is alert.          Significant Labs: Blood Culture: No results for input(s): "LABBLOO" in the last 4320 hours.  BMP:   Recent Labs   Lab 06/05/24  0501      *   K 3.3*   CL 95   CO2 21*   BUN 13   CREATININE 0.8   CALCIUM 10.5     CBC:   Recent Labs   Lab 06/04/24  0425 06/05/24  0501   WBC 4.92 4.78   HGB 14.0 13.3   HCT 39.9 37.9    320     CMP:   Recent Labs   Lab 06/04/24  0425 06/05/24  0501   * 129*   K 3.5 3.3*   CL 94* 95   CO2 24 21*   * 103   BUN 12 13   CREATININE 0.7 0.8   CALCIUM 10.5 10.5   PROT 8.3 8.2   ALBUMIN 4.1 4.1   BILITOT 0.7 0.7   ALKPHOS 44* 46*   AST 19 19   ALT 15 16   ANIONGAP 14 13     All pertinent labs within the past 24 hours have been reviewed.    Significant Imaging: I have reviewed all pertinent imaging results/findings within the past 24 hours.  "

## 2024-06-06 NOTE — HPI
68 year old  female patient,-with history of hypertension ,, DM2, HLD, Colon Cx, Sarcoidosis, RLD, Breast Cx, Cardiomyopathy.     She was admitted with dizziness ,left eye drift  and CVA evaluation.  She had LP that showed cryptococcal infection.      Labs and imaging test   Cryptococcus neoformans/gattii Not Detected Detected Abnormal      Component Ref Range & Units 2 d ago   Heme Aliquot mL 2.0   Appearance, CSF Clear Slightly hazy Abnormal    Color, CSF Colorless Colorless   WBC, CSF 0 - 5 /cu mm 272 High    RBC, CSF 0 /cu mm 619 Abnormal    Segmented Neutrophils, CSF 0 - 6 % 22 High    MRI_Mild scattered leptomeningeal enhancement including overlying the right frontal lobe as well as along the inter hemispheric fissure, left occipital lobe, and interpeduncular cistern with associated mild T2/FLAIR hyperintense signal the seen along the medial aspect of the cerebral peduncles.  Findings at the interpeduncular cistern may be affecting cranial nerve 3 which may explain reported oculomotor nerve palsy.  Findings are nonspecific with differential considerations including meningitis, metastatic disease, and paraneoplastic syndrome.     Additional nonspecific white matter changes likely related to chronic microvascular ischemia.     Left paranasal sinus disease.

## 2024-06-06 NOTE — PROGRESS NOTES
"[facility] - Telemetry ([facility])  [facility] Medicine  Progress Note    Patient Name: Anne Farmer  MRN: [id number]  Patient Class: IP- Inpatient   Admission Date: 6/2/2024  Length of Stay: 2 days  Attending Physician: Josesito Cerrato MD  Primary Care Provider: Chiquita Talley MD        Subjective:     Principal Problem:Cryptococcal meningoencephalitis        HPI:  68 y.o. female patient, PMHx: HTN, DM2, HLD, Colon Cx, Sarcoidosis, RLD, Breast Cx, Cardiomyopathy. Presented to the Emergency Department for evaluation of a dizziness which onset 3 days PTA with left eye drift, new on day of arrival. Concerns for a potential stroke. Symptoms are constant and moderate in severity. No mitigating or exacerbating factors reported. Associated sxs include weakness, lightheadedness, and blurry vision. Pt denies diplopia and all other symptoms at this time. Pt denies Hx of stroke or MI. Code stroke called in the ED, CT head: no acute finding. CTA Head/Neck: negative for significant narrowing or stenosis, negative LVO. Neurology consulted. Vitals: 190/87, 62, 18, 98.2F, 99% RA. Abnormal Labs: BNP: 271. Patient is a full code. Placed in observation under the care of Hospital Medicine for management of suspicion of CVA.     Overview/Hospital Course:  6/3/24  NAEON, patient with left eye drift, reports blurry vision some improved today  MRI/MRA obtained yesterday with chronic microvascular changes  Tele-Neurology consulted, recommended repeat MRI  Repeat MRI with and without contrast with thin slices: "Mild scattered leptomeningeal enhancement including overlying the right frontal lobe as well as along the inter hemispheric fissure, left occipital lobe, and interpeduncular cistern with associated mild T2/FLAIR hyperintense signal the seen along the medial aspect of the cerebral peduncles. Findings at the interpeduncular cistern may be affecting cranial nerve 3 which may explain reported oculomotor nerve palsy. Findings " "are nonspecific with differential considerations including meningitis, metastatic disease, and paraneoplastic syndrome."  Patient with hx of breast and colon cancer, follows with Dr. Thompson    6/4/24  NAEON, patient reports improvement in vision today  S/p LP this morning, noted to be traumatic tap  CSF studies with elevated protein and WBCs  Discussed with Neurology, further CSF studies ordered  CSF cytology, mengitits/encephalitis, VDRL, paraneoplastic autoantibody studies ordered    6/5/24  NAEON  Mengitits panel +cryptococcus  Consult ID, started on amphotericin  Left eye deviated, reports blurry vision and headache onset 1 week PTA    6/6/24  NAEON, patient denies new issues/complaints  Started on amphotericin, flucytosine  Ceftriaxone for sinusitis  Appreciate ID assistance  RN at bedside, difficulty obtained PIV, will order PICC line      Review of Systems   All other systems reviewed and are negative.    Objective:     Vital Signs (Most Recent):  Temp: 98.3 °F (36.8 °C) (06/06/24 1618)  Pulse: 65 (06/06/24 1618)  Resp: 16 (06/06/24 1618)  BP: (!) 153/72 (06/06/24 1618)  SpO2: 98 % (06/06/24 1618) Vital Signs (24h Range):  Temp:  [97.5 °F (36.4 °C)-99.6 °F (37.6 °C)] 98.3 °F (36.8 °C)  Pulse:  [53-95] 65  Resp:  [16-17] 16  SpO2:  [95 %-100 %] 98 %  BP: (135-153)/(62-73) 153/72     Weight: 73.2 kg (161 lb 6 oz)  Body mass index is 26.05 kg/m².  No intake or output data in the 24 hours ending 06/06/24 1810        Physical Exam  Vitals and nursing note reviewed.   Constitutional:       General: She is not in acute distress.     Appearance: Normal appearance. She is normal weight.   Cardiovascular:      Rate and Rhythm: Normal rate and regular rhythm.      Heart sounds: No murmur heard.  Pulmonary:      Effort: Pulmonary effort is normal. No respiratory distress.      Breath sounds: No wheezing.   Neurological:      Mental Status: She is alert and oriented to person, place, and time.      Comments: Left eye " deviated   Psychiatric:         Mood and Affect: Mood normal.         Behavior: Behavior normal.             Significant Labs: All pertinent labs within the past 24 hours have been reviewed.  Recent Lab Results  (Last 5 results in the past 24 hours)        06/06/24  1535   06/06/24  1112   06/06/24  0551   06/06/24  0435   06/05/24  2111        Albumin       4.4         ALP       46         ALT       18         Anion Gap       15         AST       20         Baso #       0.04         Basophil %       0.9         BILIRUBIN TOTAL       1.0  Comment: For infants and newborns, interpretation of results should be based  on gestational age, weight and in agreement with clinical  observations.    Premature Infant recommended reference ranges:  Up to 24 hours.............<8.0 mg/dL  Up to 48 hours............<12.0 mg/dL  3-5 days..................<15.0 mg/dL  6-29 days.................<15.0 mg/dL           BUN       22         Calcium       10.9         Chloride       93         CO2       22         Creatinine       1.2         Differential Method       Automated         eGFR       49         Eos #       0.2         Eos %       3.9         Glucose       97         Gran # (ANC)       3.1         Gran %       67.0         Hematocrit       38.9         Hemoglobin       13.7         Immature Grans (Abs)       0.03  Comment: Mild elevation in immature granulocytes is non specific and   can be seen in a variety of conditions including stress response,   acute inflammation, trauma and pregnancy. Correlation with other   laboratory and clinical findings is essential.           Immature Granulocytes       0.6         Lymph #       0.8         Lymph %       16.8         Magnesium        2.0         MCH       31.4         MCHC       35.2         MCV       89         Mono #       0.5         Mono %       10.8         MPV       8.6         nRBC       0         Platelet Count       337         POCT Glucose 119   112   102     88        Potassium       3.3         PROTEIN TOTAL       8.6         RBC       4.37         RDW       12.6         Sodium       130         WBC       4.64                                Significant Imaging: I have reviewed all pertinent imaging results/findings within the past 24 hours.    X-Ray Chest 1 View   Final Result      As above         Electronically signed by: Cesar Whiteside MD   Date:    06/06/2024   Time:    15:54      MRI Brain W WO Contrast   Final Result      Mild scattered leptomeningeal enhancement including overlying the right frontal lobe as well as along the inter hemispheric fissure, left occipital lobe, and interpeduncular cistern with associated mild T2/FLAIR hyperintense signal the seen along the medial aspect of the cerebral peduncles.  Findings at the interpeduncular cistern may be affecting cranial nerve 3 which may explain reported oculomotor nerve palsy.  Findings are nonspecific with differential considerations including meningitis, metastatic disease, and paraneoplastic syndrome.      Additional nonspecific white matter changes likely related to chronic microvascular ischemia.      Left paranasal sinus disease.         Electronically signed by: Boris Hwang   Date:    06/03/2024   Time:    16:50      MRA Brain without contrast   Final Result      Complete opacification of the left maxillary sinus.  Small left ethmoid and frontal sinus opacification.  Correlate clinically to sinusitis      No acute infarct.  No restricted diffusion      Moderate subcortical and periventricular FLAIR hyperintensities consistent with chronic microvascular ischemic changes      Left maxillary sinus and mild left ethmoid frontal sinus mucosal thickening.      Moderate atherosclerotic changes as evidenced by luminal irregularities of the intracranial vasculature.         Electronically signed by: Simeon Cutler   Date:    06/02/2024   Time:    17:09      MRI Brain Without Contrast   Final Result      Complete  opacification of the left maxillary sinus.  Small left ethmoid and frontal sinus opacification.  Correlate clinically to sinusitis      No acute infarct.  No restricted diffusion      Moderate subcortical and periventricular FLAIR hyperintensities consistent with chronic microvascular ischemic changes      Left maxillary sinus and mild left ethmoid frontal sinus mucosal thickening.      Moderate atherosclerotic changes as evidenced by luminal irregularities of the intracranial vasculature.         Electronically signed by: Simeon Cutler   Date:    06/02/2024   Time:    17:09      CTA Head and Neck (xpd)   Final Result      Scattered atherosclerotic plaque involving the extracranial carotid system as well as the internal carotid arteries at the level of the cavernous sinus bilaterally.  However no significant narrowing or stenosis.  No vessel occlusions or significant stenosis.      Scattered atherosclerotic plaque of the vertebrobasilar system without significant narrowing or stenosis.      All CT scans at this facility use dose modulation, iterative reconstruction and/or weight based dosing when appropriate to reduce radiation dose to as low as reasonably achievable.         Electronically signed by: Victor Manuel Russell MD   Date:    06/02/2024   Time:    13:07      CT Head Without Contrast   Final Result      No acute intracranial abnormality.  CT aspects score 10.      All CT scans at this facility use dose modulation, iterative reconstructions, and/or weight base dosing when appropriate to reduce radiation dose to as low as reasonably achievable.         Electronically signed by: Victor Manuel Russell MD   Date:    06/02/2024   Time:    11:39      IR Lumbar Puncture Diagnostic    (Results Pending)         Assessment/Plan:      * Cryptococcal meningoencephalitis  S/p LP 6/4/24  Elevated CSF protein and glucose  Meningitis panel +cryptococcus  ID consulted, started on amphotericin 6/5/24    Acute ischemic VBA brainstem  stroke, left  Suspicion of acute brainstem CVA vs TIA, continued vision abnormality, denies diplopia  Antithrombotics for secondary stroke prevention: Antiplatelets: Aspirin: 81 mg daily  Clopidogrel: 300 mg loading dose x 1, now  Clopidogrel: 75 mg daily    Statins for secondary stroke prevention and hyperlipidemia, if present:   Statins: Atorvastatin- 40 mg daily    Aggressive risk factor modification: DM, HLD, Exercise     Rehab efforts: The patient has been evaluated by a stroke team provider and the therapy needs have been fully considered based off the presenting complaints and exam findings. The following therapy evaluations are needed: PT evaluate and treat, OT evaluate and treat, SLP evaluate and treat    Diagnostics ordered/pending: CT scan of head without contrast to asses brain parenchyma, CTA Head to assess vasculature , CTA Neck/Arch to assess vasculature, Lipid Profile to assess cholesterol levels, MRA head to assess vasculature, MRI head without contrast to assess brain parenchyma, TTE to assess cardiac function/status , TSH to assess thyroid function    VTE prophylaxis: None: Reason for No Pharmacological VTE Prophylaxis: Currently on anticoagulation    BP parameters: Infarct: No intervention, SBP <220        Dizziness and giddiness  Presented with dizziness, concern for brainstem infarct    --fall precautions      Malignant neoplasm of lower-outer quadrant of left breast of female, estrogen receptor positive  Followed by oncology, managed with Arimidex    --cont home medication      Hyperlipidemia  Managed with statin    --cont statin  --Lipid panel in AM      DM type 2 with diabetic dyslipidemia  Patient's FSGs are controlled on current medication regimen.  Last A1c reviewed-   Lab Results   Component Value Date    HGBA1C 6.7 (H) 05/10/2024     Most recent fingerstick glucose reviewed-   Recent Labs   Lab 06/02/24  1128   POCTGLUCOSE 124*     Current correctional scale  Low  Maintain  anti-hyperglycemic dose as follows-   Antihyperglycemics (From admission, onward)      Start     Stop Route Frequency Ordered    06/02/24 1442  insulin aspart U-100 pen 0-5 Units         -- SubQ Before meals & nightly PRN 06/02/24 1343          Hold Oral hypoglycemics while patient is in the hospital.    Diastolic dysfunction    Euvolemic on exam, elevated BNP on arrival, likely secondary to uncontrolled HTN.    --ECHO    HTN (hypertension)  Chronic, uncontrolled. Latest blood pressure and vitals reviewed-     Temp:  [98.2 °F (36.8 °C)]   Pulse:  [47-63]   Resp:  [13-36]   BP: (175-210)/(81-91)   SpO2:  [88 %-100 %] .   Home meds for hypertension were reviewed and noted below.   Hypertension Medications               amLODIPine (NORVASC) 10 MG tablet TAKE 1 TABLET BY MOUTH EVERY DAY    furosemide (LASIX) 20 MG tablet Take 1 tablet (20 mg total) by mouth daily as needed (edema, swelling, fluid build up). Do not take if BP is below 110/70    hydroCHLOROthiazide (HYDRODIURIL) 25 MG tablet TAKE 1 TABLET BY MOUTH EVERY DAY    losartan (COZAAR) 100 MG tablet TAKE 1 TABLET BY MOUTH EVERY DAY            While in the hospital, will manage blood pressure as follows; Adjust home antihypertensive regimen as follows- Hold for now, permissive HTN    Will utilize p.r.n. blood pressure medication only if patient's blood pressure greater than 220/110 and she develops symptoms such as worsening chest pain or shortness of breath.      VTE Risk Mitigation (From admission, onward)           Ordered     Reason for No Pharmacological VTE Prophylaxis  Once        Question:  Reasons:  Answer:  Risk of Bleeding    06/02/24 1342     IP VTE HIGH RISK PATIENT  Once         06/02/24 1342     Place sequential compression device  Until discontinued         06/02/24 1342                    Discharge Planning   NATALIE: 6/3/2024     Code Status: Full Code   Is the patient medically ready for discharge?:     Reason for patient still in hospital (select  all that apply): Patient trending condition, Laboratory test, Treatment, and Consult recommendations  Discharge Plan A: Home with family, Home Health   Discharge Delays: None known at this time              Josesito Cerrato MD  Department of Hospital [facility]   [facility])

## 2024-06-06 NOTE — PT/OT/SLP PROGRESS
"Occupational Therapy   Treatment    Name: Anne Farmer  MRN: [id number]  Admitting Diagnosis:  Cryptococcal meningoencephalitis       Recommendations:     Discharge Recommendations: Low Intensity Therapy  Discharge Equipment Recommendations:  walker, rolling  Barriers to discharge:  None    Assessment:     Anne Farmer is a 68 y.o. female with a medical diagnosis of Cryptococcal meningoencephalitis.  She presents with the following performance deficits affecting function are weakness, impaired endurance, impaired self care skills, impaired functional mobility, gait instability, impaired balance, decreased coordination, decreased safety awareness.     Rehab Prognosis:  Good; patient would benefit from acute skilled OT services to address these deficits and reach maximum level of function.       Plan:     Patient to be seen 2 x/week to address the above listed problems via self-care/home management, therapeutic activities, therapeutic exercises  Plan of Care Expires: 06/17/24  Plan of Care Reviewed with: patient    Subjective     Chief Complaint: none reported  Patient/Family Comments/goals: "I'm ready to go home; this is the longest I've been in the hospital."  Pain/Comfort:  Pain Rating 1: 0/10    Objective:     Communicated with: Nurse and epic chart review prior to session.  Patient found up in chair with peripheral IV upon OT entry to room.    General Precautions: Standard, fall, vision impaired    Orthopedic Precautions:N/A  Braces: N/A  Respiratory Status: Room air    Functional Mobility/Transfers:  Patient completed Sit <> Stand Transfer with stand by assistance  with  rolling walker   Functional Mobility: Patient completed x400ft functional mobility with SBA and RW to increase dynamic standing balance and activity tolerance needed for ADL completion.   Stand pivot t/f to chair with SBA and RW.    Activities of Daily Living:  Upper Body Dressing: minimum assistance michela gown around back    [facility] 6 " Click ADL: 20    Treatment & Education:  Pt performed x15 reps BUE AROM therex in chair:  Shoulder flexion  Elbow flexion/ext  Wrist flexion/ext  Digit flexion/ext  Reviewed role of OT in acute setting and benefits of participation. Educated on techniques to use to increase independence and decrease fall risk with functional transfers. Educated on importance of OOB activity and calling for A to transfer back to bed. Encouraged completion of B UE AROM therex throughout the day to tolerance to increase functional strength and activity tolerance. Educated patient on importance of increased tolerance to upright position and direct impact on CV endurance and strength. Patient encouraged to sit up in chair for a minimum of 2 consecutive hours per day.  Patient stated understanding and in agreement with POC.     Patient left up in chair with all lines intact and call button in reach    GOALS:   Multidisciplinary Problems       Occupational Therapy Goals          Problem: Occupational Therapy    Goal Priority Disciplines Outcome Interventions   Occupational Therapy Goal     OT, PT/OT Progressing    Description: LTG'S TO BE MET IN 14 DAYS (6/17/24)    1)  PATIENT WILL PERFORM UB DRESSING WITH (I) TO DECREASE (D) ON CAREGIVER.    2)  PATIENT WILL PERFORM LB DRESSING WITH (I) TO DECREASE (D) ON CAREGIVER..    3)  PATIENT WILL PERFORM TOILET T/F WITH (S) TO DECREASE (D) ON CAREGIVER..    4)  PATIENT WILL PERFORM (L) EYE CONVERGENCE HEP WITH (I) AFTER EDUCATION.                       Time Tracking:     OT Date of Treatment: 06/06/24  OT Start Time: 0850  OT Stop Time: 0915  OT Total Time (min): 25 min    Billable Minutes:Therapeutic Activity 15  Therapeutic Exercise 10    OT/[place]: OT      Ania Olmedo OT     6/6/2024

## 2024-06-06 NOTE — ASSESSMENT & PLAN NOTE
S/p LP 6/4/24  Elevated CSF protein and glucose  Meningitis panel +cryptococcus  ID consulted, started on amphotericin 6/5/24

## 2024-06-06 NOTE — PT/OT/SLP PROGRESS
"Physical Therapy  Treatment    Anne Farmer   MRN: [id number]   Admitting Diagnosis: Cryptococcal meningoencephalitis    PT Received On: 06/06/24  PT Start Time: 0850     PT Stop Time: 0915    PT Total Time (min): 25 min       Billable Minutes:  Gait Training 15 and Therapeutic Activity 10    Treatment Type: Treatment  PT/PTA: PTA     Number of PTA visits since last PT visit: 2       General Precautions: Standard, fall  Orthopedic Precautions: N/A  Braces: N/A  Respiratory Status: Room air    Spiritual, Cultural Beliefs, Jehovah's witness Practices, Values that Affect Care: no    Subjective:  Communicated with patient's nurse, Isaias, and completed Epic chart review prior to session.  Patient agreed to PT session.   "I want to go home."    Pain/Comfort  Pain Rating 1: 0/10  Pain Rating Post-Intervention 1: 0/10    Objective:   Patient found with: peripheral IV    Patient found sitting up in chair.     STS from EOB > RW: SBA (VC for hand placement)    400ft w/ RW SBA    Stand pivot T/F to chair w/ RW: SBA    Completed x15 reps AROM TE to BLE: LAQ, Hip Flex, AP   Intermittent cues given as needed to maintain correct form during repetitions    Educated patient on importance of increased tolerance to upright position and direct impact on CV endurance and strength. Patient encouraged to sit up in chair/ EOB, for a minimum of 2 consecutive hours, 3x per day. Encouraged patient to perform AROM TE to BLE throughout the day within all available planes of motion. Re enforced importance of utilizing call light to meet needs in room and not attempt to get up without staff assistance. Patient verbalized understanding and agreed to comply.       AM-PAC 6 CLICK MOBILITY  How much help from another person does this patient currently need?   1 = Unable, Total/Dependent Assistance  2 = A lot, Maximum/Moderate Assistance  3 = A little, Minimum/Contact Guard/Supervision  4 = None, Modified [place]/Independent    Turning over in bed " (including adjusting bedclothes, sheets and blankets)?: 1 (NT)  Sitting down on and standing up from a chair with arms (e.g., wheelchair, bedside commode, etc.): 3  Moving from lying on back to sitting on the side of the bed?: 1 (NT)  Moving to and from a bed to a chair (including a wheelchair)?: 3  Need to walk in hospital room?: 3  Climbing 3-5 steps with a railing?: 1 (NT)  Basic Mobility Total Score: 12    AM-PAC Raw Score CMS G-Code Modifier Level of Impairment Assistance   6 % Total / Unable   7 - 9 CM 80 - 100% Maximal Assist   10 - 14 CL 60 - 80% Moderate Assist   15 - 19 CK 40 - 60% Moderate Assist   20 - 22 CJ 20 - 40% Minimal Assist   23 CI 1-20% SBA / CGA   24 CH 0% Independent/ Mod I     Patient left up in chair with call button in reach.    Assessment:  Anne Farmer is a 68 y.o. female with a medical diagnosis of Cryptococcal meningoencephalitis and presents with overall decline in functional mobility. Patient would continue to benefit from skilled PT to address functional limitations listed below in order to return to PLOF/decrease caregiver burden.     Rehab identified problem list/impairments: weakness, gait instability, impaired balance, decreased safety awareness    Rehab potential is good.    Activity tolerance: Good    Discharge recommendations: Low Intensity Therapy      Barriers to discharge:      Equipment recommendations: walker, rolling     GOALS:   Multidisciplinary Problems       Physical Therapy Goals          Problem: Physical Therapy    Goal Priority Disciplines Outcome Goal Variances Interventions   Physical Therapy Goal     PT, PT/OT      Description: LTG to be met in 14 days: 6/17/24  Pt will complete bed mobility INDEP.  Pt will complete sit to stand INDEP.  Pt will ambulate 300' NOAH with RW.  Pt will increase AMPAC score by 2 to progress gross functional mobility.                       PLAN:    Patient to be seen 3 x/week to address the above listed problems via  gait training, therapeutic activities, therapeutic exercises  Plan of Care expires: 06/17/24  Plan of Care reviewed with: patient         06/06/2024

## 2024-06-06 NOTE — ASSESSMENT & PLAN NOTE
CSF PCR -  Varicella Zoster Virus Not Detected Not Detected   Cryptococcus neoformans/gattii Not Detected Detected Abnormal        Will send cryptococcal assay in the blood and csf- will add   Liposomal Amphotericin/Flucytosine.    Will need up to 2 weeks of therapy- she is not HIV Positive and denies contact with pegions

## 2024-06-06 NOTE — SUBJECTIVE & OBJECTIVE
Review of Systems   All other systems reviewed and are negative.    Objective:     Vital Signs (Most Recent):  Temp: 98.3 °F (36.8 °C) (06/06/24 1618)  Pulse: 65 (06/06/24 1618)  Resp: 16 (06/06/24 1618)  BP: (!) 153/72 (06/06/24 1618)  SpO2: 98 % (06/06/24 1618) Vital Signs (24h Range):  Temp:  [97.5 °F (36.4 °C)-99.6 °F (37.6 °C)] 98.3 °F (36.8 °C)  Pulse:  [53-95] 65  Resp:  [16-17] 16  SpO2:  [95 %-100 %] 98 %  BP: (135-153)/(62-73) 153/72     Weight: 73.2 kg (161 lb 6 oz)  Body mass index is 26.05 kg/m².  No intake or output data in the 24 hours ending 06/06/24 1810        Physical Exam  Vitals and nursing note reviewed.   Constitutional:       General: She is not in acute distress.     Appearance: Normal appearance. She is normal weight.   Cardiovascular:      Rate and Rhythm: Normal rate and regular rhythm.      Heart sounds: No murmur heard.  Pulmonary:      Effort: Pulmonary effort is normal. No respiratory distress.      Breath sounds: No wheezing.   Neurological:      Mental Status: She is alert and oriented to person, place, and time.      Comments: Left eye deviated   Psychiatric:         Mood and Affect: Mood normal.         Behavior: Behavior normal.             Significant Labs: All pertinent labs within the past 24 hours have been reviewed.  Recent Lab Results  (Last 5 results in the past 24 hours)        06/06/24  1535   06/06/24  1112   06/06/24  0551   06/06/24  0435   06/05/24  2111        Albumin       4.4         ALP       46         ALT       18         Anion Gap       15         AST       20         Baso #       0.04         Basophil %       0.9         BILIRUBIN TOTAL       1.0  Comment: For infants and newborns, interpretation of results should be based  on gestational age, weight and in agreement with clinical  observations.    Premature Infant recommended reference ranges:  Up to 24 hours.............<8.0 mg/dL  Up to 48 hours............<12.0 mg/dL  3-5 days..................<15.0  mg/dL  6-29 days.................<15.0 mg/dL           BUN       22         Calcium       10.9         Chloride       93         CO2       22         Creatinine       1.2         Differential Method       Automated         eGFR       49         Eos #       0.2         Eos %       3.9         Glucose       97         Gran # (ANC)       3.1         Gran %       67.0         Hematocrit       38.9         Hemoglobin       13.7         Immature Grans (Abs)       0.03  Comment: Mild elevation in immature granulocytes is non specific and   can be seen in a variety of conditions including stress response,   acute inflammation, trauma and pregnancy. Correlation with other   laboratory and clinical findings is essential.           Immature Granulocytes       0.6         Lymph #       0.8         Lymph %       16.8         Magnesium        2.0         MCH       31.4         MCHC       35.2         MCV       89         Mono #       0.5         Mono %       10.8         MPV       8.6         nRBC       0         Platelet Count       337         POCT Glucose 119   112   102     88       Potassium       3.3         PROTEIN TOTAL       8.6         RBC       4.37         RDW       12.6         Sodium       130         WBC       4.64                                Significant Imaging: I have reviewed all pertinent imaging results/findings within the past 24 hours.    X-Ray Chest 1 View   Final Result      As above         Electronically signed by: Cesar Whiteside MD   Date:    06/06/2024   Time:    15:54      MRI Brain W WO Contrast   Final Result      Mild scattered leptomeningeal enhancement including overlying the right frontal lobe as well as along the inter hemispheric fissure, left occipital lobe, and interpeduncular cistern with associated mild T2/FLAIR hyperintense signal the seen along the medial aspect of the cerebral peduncles.  Findings at the interpeduncular cistern may be affecting cranial nerve 3 which may explain  reported oculomotor nerve palsy.  Findings are nonspecific with differential considerations including meningitis, metastatic disease, and paraneoplastic syndrome.      Additional nonspecific white matter changes likely related to chronic microvascular ischemia.      Left paranasal sinus disease.         Electronically signed by: Boris Hwang   Date:    06/03/2024   Time:    16:50      MRA Brain without contrast   Final Result      Complete opacification of the left maxillary sinus.  Small left ethmoid and frontal sinus opacification.  Correlate clinically to sinusitis      No acute infarct.  No restricted diffusion      Moderate subcortical and periventricular FLAIR hyperintensities consistent with chronic microvascular ischemic changes      Left maxillary sinus and mild left ethmoid frontal sinus mucosal thickening.      Moderate atherosclerotic changes as evidenced by luminal irregularities of the intracranial vasculature.         Electronically signed by: Simeon Cutler   Date:    06/02/2024   Time:    17:09      MRI Brain Without Contrast   Final Result      Complete opacification of the left maxillary sinus.  Small left ethmoid and frontal sinus opacification.  Correlate clinically to sinusitis      No acute infarct.  No restricted diffusion      Moderate subcortical and periventricular FLAIR hyperintensities consistent with chronic microvascular ischemic changes      Left maxillary sinus and mild left ethmoid frontal sinus mucosal thickening.      Moderate atherosclerotic changes as evidenced by luminal irregularities of the intracranial vasculature.         Electronically signed by: Simeon Cutler   Date:    06/02/2024   Time:    17:09      CTA Head and Neck (xpd)   Final Result      Scattered atherosclerotic plaque involving the extracranial carotid system as well as the internal carotid arteries at the level of the cavernous sinus bilaterally.  However no significant narrowing or stenosis.  No vessel  occlusions or significant stenosis.      Scattered atherosclerotic plaque of the vertebrobasilar system without significant narrowing or stenosis.      All CT scans at this facility use dose modulation, iterative reconstruction and/or weight based dosing when appropriate to reduce radiation dose to as low as reasonably achievable.         Electronically signed by: Victor Manuel Russell MD   Date:    06/02/2024   Time:    13:07      CT Head Without Contrast   Final Result      No acute intracranial abnormality.  CT aspects score 10.      All CT scans at this facility use dose modulation, iterative reconstructions, and/or weight base dosing when appropriate to reduce radiation dose to as low as reasonably achievable.         Electronically signed by: Victor Manuel Russell MD   Date:    06/02/2024   Time:    11:39      IR Lumbar Puncture Diagnostic    (Results Pending)

## 2024-06-06 NOTE — SUBJECTIVE & OBJECTIVE
Review of Systems   All other systems reviewed and are negative.    Objective:     Vital Signs (Most Recent):  Temp: 98.2 °F (36.8 °C) (06/05/24 2102)  Pulse: (!) 54 (06/05/24 2102)  Resp: 16 (06/05/24 2102)  BP: (!) 152/70 (06/05/24 2102)  SpO2: 95 % (06/05/24 2102) Vital Signs (24h Range):  Temp:  [97.7 °F (36.5 °C)-98.9 °F (37.2 °C)] 98.2 °F (36.8 °C)  Pulse:  [52-68] 54  Resp:  [16-20] 16  SpO2:  [95 %-98 %] 95 %  BP: (128-161)/(59-74) 152/70     Weight: 74.2 kg (163 lb 9.3 oz)  Body mass index is 26.4 kg/m².  No intake or output data in the 24 hours ending 06/05/24 2114        Physical Exam  Vitals and nursing note reviewed.   Constitutional:       General: She is not in acute distress.     Appearance: Normal appearance. She is normal weight.   Cardiovascular:      Rate and Rhythm: Normal rate and regular rhythm.      Heart sounds: No murmur heard.  Pulmonary:      Effort: Pulmonary effort is normal. No respiratory distress.      Breath sounds: No wheezing.   Neurological:      Mental Status: She is alert and oriented to person, place, and time.      Comments: Left eye deviated   Psychiatric:         Mood and Affect: Mood normal.         Behavior: Behavior normal.             Significant Labs: All pertinent labs within the past 24 hours have been reviewed.  Recent Lab Results  (Last 5 results in the past 24 hours)        06/05/24  1516   06/05/24  1116   06/05/24  1013   06/05/24  0532   06/05/24  0501        Albumin         4.1       ALP         46       ALT         16       Anion Gap         13       AST         19       Baso #         0.04       Basophil %         0.8       BILIRUBIN TOTAL         0.7  Comment: For infants and newborns, interpretation of results should be based  on gestational age, weight and in agreement with clinical  observations.    Premature Infant recommended reference ranges:  Up to 24 hours.............<8.0 mg/dL  Up to 48 hours............<12.0 mg/dL  3-5  days..................<15.0 mg/dL  6-29 days.................<15.0 mg/dL         BUN         13       Calcium         10.5       Chloride         95       CO2         21       Creatinine         0.8       Differential Method         Automated       eGFR         >60       Eos #         0.2       Eos %         3.8       Glucose         103       Gran # (ANC)         3.0       Gran %         62.7       Hematocrit         37.9       Hemoglobin         13.3       HIV 1/2 Ag/Ab     Negative           Immature Grans (Abs)         0.02  Comment: Mild elevation in immature granulocytes is non specific and   can be seen in a variety of conditions including stress response,   acute inflammation, trauma and pregnancy. Correlation with other   laboratory and clinical findings is essential.         Immature Granulocytes         0.4       Lymph #         0.9       Lymph %         19.7       MCH         30.9       MCHC         35.1       MCV         88       Mono #         0.6       Mono %         12.6       MPV         8.1       nRBC         0       Platelet Count         320       POCT Glucose 206   110     121         Potassium         3.3       PROTEIN TOTAL         8.2       RBC         4.31       RDW         12.5       Sodium         129       WBC         4.78                              Significant Imaging: I have reviewed all pertinent imaging results/findings within the past 24 hours.    MRI Brain W WO Contrast   Final Result      Mild scattered leptomeningeal enhancement including overlying the right frontal lobe as well as along the inter hemispheric fissure, left occipital lobe, and interpeduncular cistern with associated mild T2/FLAIR hyperintense signal the seen along the medial aspect of the cerebral peduncles.  Findings at the interpeduncular cistern may be affecting cranial nerve 3 which may explain reported oculomotor nerve palsy.  Findings are nonspecific with differential considerations including meningitis,  metastatic disease, and paraneoplastic syndrome.      Additional nonspecific white matter changes likely related to chronic microvascular ischemia.      Left paranasal sinus disease.         Electronically signed by: Boris Hwang   Date:    06/03/2024   Time:    16:50      MRA Brain without contrast   Final Result      Complete opacification of the left maxillary sinus.  Small left ethmoid and frontal sinus opacification.  Correlate clinically to sinusitis      No acute infarct.  No restricted diffusion      Moderate subcortical and periventricular FLAIR hyperintensities consistent with chronic microvascular ischemic changes      Left maxillary sinus and mild left ethmoid frontal sinus mucosal thickening.      Moderate atherosclerotic changes as evidenced by luminal irregularities of the intracranial vasculature.         Electronically signed by: Simeon Cutler   Date:    06/02/2024   Time:    17:09      MRI Brain Without Contrast   Final Result      Complete opacification of the left maxillary sinus.  Small left ethmoid and frontal sinus opacification.  Correlate clinically to sinusitis      No acute infarct.  No restricted diffusion      Moderate subcortical and periventricular FLAIR hyperintensities consistent with chronic microvascular ischemic changes      Left maxillary sinus and mild left ethmoid frontal sinus mucosal thickening.      Moderate atherosclerotic changes as evidenced by luminal irregularities of the intracranial vasculature.         Electronically signed by: Simeon Cutler   Date:    06/02/2024   Time:    17:09      CTA Head and Neck (xpd)   Final Result      Scattered atherosclerotic plaque involving the extracranial carotid system as well as the internal carotid arteries at the level of the cavernous sinus bilaterally.  However no significant narrowing or stenosis.  No vessel occlusions or significant stenosis.      Scattered atherosclerotic plaque of the vertebrobasilar system without  significant narrowing or stenosis.      All CT scans at this facility use dose modulation, iterative reconstruction and/or weight based dosing when appropriate to reduce radiation dose to as low as reasonably achievable.         Electronically signed by: Victor Manuel Russell MD   Date:    06/02/2024   Time:    13:07      CT Head Without Contrast   Final Result      No acute intracranial abnormality.  CT aspects score 10.      All CT scans at this facility use dose modulation, iterative reconstructions, and/or weight base dosing when appropriate to reduce radiation dose to as low as reasonably achievable.         Electronically signed by: Victor Manuel Russell MD   Date:    06/02/2024   Time:    11:39      IR Lumbar Puncture Diagnostic    (Results Pending)

## 2024-06-06 NOTE — PLAN OF CARE
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   Code Status: Full Code MRN: [id number]   Review of patient's allergies indicates:  No Known Allergies  Past Medical History:   Diagnosis Date    Allergy     Arthritis     Cancer 2016    colon    CHF (congestive heart failure)     Colon cancer 2004    Colon cancer screening 9/21/2015    Diabetes mellitus type 2 in nonobese 3/9/2016    borderline    Diverticulosis     DM (diabetes mellitus) 03/2016    BS didn't check 02/13/2019    DM (diabetes mellitus) 03/2016    BS didn't check 03/04/2020    Hyperlipidemia 10/25/2016    Hypertension     Insomnia     Malignant neoplasm of colon 5/13/2021    Malignant neoplasm of lower-outer quadrant of left breast of female, estrogen receptor positive 6/9/2022      PRN meds    acetaminophen, 650 mg, Q6H PRN  acetaminophen, 650 mg, Daily PRN  bisacodyL, 10 mg, Daily PRN  dextrose 10%, 12.5 g, PRN  dextrose 10%, 25 g, PRN  diphenhydrAMINE, 25 mg, Daily PRN  glucagon (human recombinant), 1 mg, PRN  glucose, 16 g, PRN  glucose, 24 g, PRN  insulin aspart U-100, 0-5 Units, QID (AC + HS) PRN  labetalol, 10 mg, Q15 Min PRN  melatonin, 6 mg, Nightly PRN  ondansetron, 4 mg, Q8H PRN  sodium chloride 0.9%, 10 mL, PRN  sodium chloride 0.9%, 10 mL, PRN      Chart check completed. Will continue plan of care.      Orientation: oriented x 4  [place] Coma Scale Score: 15     Lead Monitored: Lead II Rhythm: sinus bradycardia Frequency/Ectopy: PVCs  Cardiac/Telemetry Box Number: [id number]  VTE Required Core Measure: Pharmacological prophylaxis initiated/maintained Last Bowel Movement: 06/02/24  Diet Cardiac  Voiding Characteristics: external catheter  Laurent Score: 19  Fall Risk Score: 9  Accucheck []   Freq?      Lines/Drains/Airways       Peripherally Inserted Central Catheter Line  Duration             PICC Double Lumen 06/06/24 1520 right brachial <1 day

## 2024-06-06 NOTE — CONSULTS
[facility])  Infectious Disease  Consult Note    Patient Name: Anne Farmer  MRN: [id number]  Admission Date: 6/2/2024  Hospital Length of Stay: 2 days  Attending Physician: Josesito Cerrato MD  Primary Care Provider: Chiquita Talley MD     Isolation Status: No active isolations    Patient information was obtained from patient and ER records.      Consults  Assessment/Plan:     Ophtho  CN III palsy, left  Follow neurology     ENT  Sinusitis  Will add Rocephin    Cardiac/Vascular  DM type 2 with diabetic dyslipidemia  Insulin regime as per primary team    HTN (hypertension)  BP control as per primary team    ID  * Cryptococcal meningoencephalitis  CSF PCR -  Varicella Zoster Virus Not Detected Not Detected   Cryptococcus neoformans/gattii Not Detected Detected Abnormal        Will send cryptococcal assay in the blood and csf- will add   Liposomal Amphotericin/Flucytosine.    Will need up to 2 weeks of therapy- she is not HIV Positive and denies contact with pegions     Oncology  Malignant neoplasm of lower-outer quadrant of left breast of female, estrogen receptor positive  Oncology follow up        Thank you for your consult. I will follow-up with patient. Please contact us if you have any additional questions.    Rich Robertson MD, [facility]  Infectious Disease  [facility])    Subjective:     Principal Problem: Cryptococcal meningoencephalitis    HPI: 68 year old  female patient,-with history of hypertension ,, DM2, HLD, Colon Cx, Sarcoidosis, RLD, Breast Cx, Cardiomyopathy.     She was admitted with dizziness ,left eye drift  and CVA evaluation.  She had LP that showed cryptococcal infection.      Labs and imaging test   Cryptococcus neoformans/gattii Not Detected Detected Abnormal      Component Ref Range & Units 2 d ago   Heme Aliquot mL 2.0   Appearance, CSF Clear Slightly hazy Abnormal    Color, CSF Colorless Colorless   WBC, CSF 0 - 5 /cu mm 272 High    RBC, CSF  0 /cu mm 619 Abnormal    Segmented Neutrophils, CSF 0 - 6 % 22 High    MRI_Mild scattered leptomeningeal enhancement including overlying the right frontal lobe as well as along the inter hemispheric fissure, left occipital lobe, and interpeduncular cistern with associated mild T2/FLAIR hyperintense signal the seen along the medial aspect of the cerebral peduncles.  Findings at the interpeduncular cistern may be affecting cranial nerve 3 which may explain reported oculomotor nerve palsy.  Findings are nonspecific with differential considerations including meningitis, metastatic disease, and paraneoplastic syndrome.     Additional nonspecific white matter changes likely related to chronic microvascular ischemia.     Left paranasal sinus disease.       Past Medical History:   Diagnosis Date    Allergy     Arthritis     Cancer 2016    colon    CHF (congestive heart failure)     Colon cancer 2004    Colon cancer screening 2015    Diabetes mellitus type 2 in nonobese 3/9/2016    borderline    Diverticulosis     DM (diabetes mellitus) 2016    BS didn't check 2019    DM (diabetes mellitus) 2016    BS didn't check 2020    Hyperlipidemia 10/25/2016    Hypertension     Insomnia     Malignant neoplasm of colon 2021    Malignant neoplasm of lower-outer quadrant of left breast of female, estrogen receptor positive 2022       Past Surgical History:   Procedure Laterality Date    BREAST BIOPSY Left     BREAST LUMPECTOMY Left      SECTION      COLON SURGERY      COLONOSCOPY N/A 2015    Procedure: COLONOSCOPY;  Surgeon: Adela Sam MD;  Location: [facility];  Service: Endoscopy;  Laterality: N/A;    COLONOSCOPY N/A 2017    Procedure: COLONOSCOPY;  Surgeon: Chintan Flores MD;  Location: [facility];  Service: Endoscopy;  Laterality: N/A;    COLONOSCOPY N/A 2020    Procedure: COLONOSCOPY;  Surgeon: Grisel Atkins MD;  Location: [facility];  Service: Endoscopy;   Laterality: N/A;    SENTINEL LYMPH NODE BIOPSY Left 07/05/2022    Procedure: BIOPSY, LYMPH NODE, SENTINEL;  Surgeon: Arvin Hernandez MD;  Location: [facility];  Service: General;  Laterality: Left;       Review of patient's allergies indicates:  No Known Allergies    Medications:  Medications Prior to Admission   Medication Sig    albuterol (PROVENTIL/VENTOLIN HFA) 90 mcg/actuation inhaler INHALE 1-2 PUFFS BY MOUTH EVERY 6 HOURS AS NEEDED FOR WHEEZE OR SHORTNESS OF BREATH    amLODIPine (NORVASC) 10 MG tablet TAKE 1 TABLET BY MOUTH EVERY DAY    amoxicillin-clavulanate 875-125mg (AUGMENTIN) 875-125 mg per tablet Take 1 tablet by mouth every 12 (twelve) hours. (Patient not taking: Reported on 5/16/2024)    anastrozole (ARIMIDEX) 1 mg Tab TAKE 1 TABLET BY MOUTH EVERY DAY    azelastine (ASTELIN) 137 mcg (0.1 %) nasal spray 2 sprays (274 mcg total) by Nasal route 2 (two) times daily.    fluticasone propionate (FLONASE) 50 mcg/actuation nasal spray 2 sprays (100 mcg total) by Each Nostril route once daily.    furosemide (LASIX) 20 MG tablet Take 1 tablet (20 mg total) by mouth daily as needed (edema, swelling, fluid build up). Do not take if BP is below 110/70    hydroCHLOROthiazide (HYDRODIURIL) 25 MG tablet TAKE 1 TABLET BY MOUTH EVERY DAY    hydrOXYchloroQUINE (PLAQUENIL) 200 mg tablet TAKE 1 TABLET BY MOUTH TWICE A DAY    levocetirizine (XYZAL) 5 MG tablet Take 1 tablet (5 mg total) by mouth every evening.    losartan (COZAAR) 100 MG tablet TAKE 1 TABLET BY MOUTH EVERY DAY    magnesium oxide (MAG-OX) 400 mg (241.3 mg magnesium) tablet Take 1 tablet (400 mg total) by mouth once daily.    meloxicam (MOBIC) 15 MG tablet Take 15 mg by mouth daily as needed.    multivitamin (ONE DAILY MULTIVITAMIN) per tablet Take 1 tablet by mouth once daily.    omeprazole (PRILOSEC) 20 MG capsule TAKE 1 CAPSULE BY MOUTH EVERY DAY    orphenadrine (NORFLEX) 100 mg tablet Take 1 tablet (100 mg total) by mouth 2 (two) times daily.    potassium  "chloride (KLOR-CON) 10 MEQ TbSR Take 2 tablets (20 mEq total) by mouth daily as needed (take with lasix).    pravastatin (PRAVACHOL) 20 MG tablet TAKE 1 TABLET BY MOUTH EVERY DAY    traZODone (DESYREL) 50 MG tablet TAKE 1 TABLET BY MOUTH EVERY DAY AT NIGHT     Antibiotics (From admission, onward)      None          Antifungals (From admission, onward)      Start     Stop Route Frequency Ordered    06/05/24 1800  flucytosine capsule 1,750 mg        Placed in "And" Linked Group    -- Oral Every 6 hours 06/05/24 1059    06/05/24 1800  flucytosine capsule 250 mg        Placed in "And" Linked Group    -- Oral Every 24 hours (non-standard times) 06/05/24 1059    06/05/24 1115  amphotericin B liposome (AMBISOME) 200 mg in dextrose 5 % (D5W) 200 mL IVPB         -- IV Every 24 hours (non-standard times) 06/05/24 1003          Antivirals (From admission, onward)      None             Immunization History   Administered Date(s) Administered    COVID-19 MRNA, LN-S PF (MODERNA HALF 0.25 ML DOSE) 12/08/2021    COVID-19, MRNA, LN-S, PF (MODERNA FULL 0.5 ML DOSE) 01/28/2021, 02/25/2021    COVID-19, mRNA, LNP-S, PF (Moderna 2023)Ages 12+ 11/02/2023    COVID-19, mRNA, LNP-S, bivalent booster, PF (PFIZER OMICRON) 10/27/2022    Influenza - Quadrivalent 09/22/2017, 10/15/2021, 10/09/2022, 10/16/2023    Influenza - Quadrivalent - High Dose - PF (65 years and older) 09/23/2020    Influenza - Quadrivalent - PF *Preferred* (6 months and older) 09/24/2009, 11/02/2010    PPD Test 02/15/2017    Pneumococcal Conjugate - 13 Valent 05/21/2019    Pneumococcal Conjugate - 20 Valent 09/28/2023    Pneumococcal Polysaccharide - 23 Valent 04/26/2018    Tdap 01/15/2015       Family History       Problem Relation (Age of Onset)    Diabetes Mother    Hypertension Mother, Father, Sister, Brother, Sister, Sister, Sister, Brother, Brother          Social History     Socioeconomic History    Marital status: Single   Occupational History     Employer: [employer]   Tobacco Use    Smoking status: Never     Passive exposure: Never    Smokeless tobacco: Never   Substance and Sexual Activity    Alcohol use: Yes     Alcohol/week: 0.0 standard drinks of alcohol     Comment: weekends.       Drug use: No    Sexual activity: Not Currently     Review of Systems   Constitutional:  Negative for activity change, appetite change, chills, diaphoresis, fatigue and fever.   HENT:  Negative for congestion.    Neurological:  Negative for dizziness, facial asymmetry, light-headedness and headaches.     Objective:     Vital Signs (Most Recent):  Temp: 99.6 °F (37.6 °C) (06/05/24 2347)  Pulse: 95 (06/05/24 2347)  Resp: 17 (06/05/24 2347)  BP: (!) 153/67 (06/05/24 2347)  SpO2: 98 % (06/05/24 2347) Vital Signs (24h Range):  Temp:  [97.7 °F (36.5 °C)-99.6 °F (37.6 °C)] 99.6 °F (37.6 °C)  Pulse:  [52-95] 95  Resp:  [16-20] 17  SpO2:  [95 %-98 %] 98 %  BP: (128-161)/(59-74) 153/67     Weight: 74.2 kg (163 lb 9.3 oz)  Body mass index is 26.4 kg/m².    Estimated Creatinine Clearance: 67.5 mL/min (based on SCr of 0.8 mg/dL).     Physical Exam  Vitals and nursing note reviewed.   HENT:      Head: Normocephalic.      Comments: Left eye ptosis      Nose: Nose normal.   Cardiovascular:      Rate and Rhythm: Normal rate.   Pulmonary:      Effort: Pulmonary effort is normal.   Abdominal:      General: Abdomen is flat.   Neurological:      Mental Status: She is alert.          Significant Labs: Blood Culture: No results for input(s): "LABBLOO" in the last 4320 hours.  BMP:   Recent Labs   Lab 06/05/24  0501      *   K 3.3*   CL 95   CO2 21*   BUN 13   CREATININE 0.8   CALCIUM 10.5     CBC:   Recent Labs   Lab 06/04/24 0425 06/05/24  0501   WBC 4.92 4.78   HGB 14.0 13.3   HCT 39.9 37.9    320     CMP:   Recent Labs   Lab 06/04/24 0425 06/05/24  0501   * 129*   K 3.5 3.3*   CL 94* 95   CO2 24 21*   * 103   BUN 12 13   CREATININE 0.7 0.8   CALCIUM 10.5 10.5   PROT " 8.3 8.2   ALBUMIN 4.1 4.1   BILITOT 0.7 0.7   ALKPHOS 44* 46*   AST 19 19   ALT 15 16   ANIONGAP 14 13     All pertinent labs within the past 24 hours have been reviewed.    Significant Imaging: I have reviewed all pertinent imaging results/findings within the past 24 hours.

## 2024-06-06 NOTE — PROGRESS NOTES
"[facility] - Telemetry ([facility])  [facility]  Progress Note    Patient Name: Anne Farmer  MRN: [id number]  Patient Class: IP- Inpatient   Admission Date: 6/2/2024  Length of Stay: 1 days  Attending Physician: Josesito Cerrato MD  Primary Care Provider: Chiquita Talley MD        Subjective:     Principal Problem:Acute ischemic VBA brainstem stroke, left        HPI:  68 y.o. female patient, PMHx: HTN, DM2, HLD, Colon Cx, Sarcoidosis, RLD, Breast Cx, Cardiomyopathy. Presented to the Emergency Department for evaluation of a dizziness which onset 3 days PTA with left eye drift, new on day of arrival. Concerns for a potential stroke. Symptoms are constant and moderate in severity. No mitigating or exacerbating factors reported. Associated sxs include weakness, lightheadedness, and blurry vision. Pt denies diplopia and all other symptoms at this time. Pt denies Hx of stroke or MI. Code stroke called in the ED, CT head: no acute finding. CTA Head/Neck: negative for significant narrowing or stenosis, negative LVO. Neurology consulted. Vitals: 190/87, 62, 18, 98.2F, 99% RA. Abnormal Labs: BNP: 271. Patient is a full code. Placed in observation under the care of Hospital Medicine for management of suspicion of CVA.     Overview/Hospital Course:  6/3/24  NAEON, patient with left eye drift, reports blurry vision some improved today  MRI/MRA obtained yesterday with chronic microvascular changes  Tele-Neurology consulted, recommended repeat MRI  Repeat MRI with and without contrast with thin slices: "Mild scattered leptomeningeal enhancement including overlying the right frontal lobe as well as along the inter hemispheric fissure, left occipital lobe, and interpeduncular cistern with associated mild T2/FLAIR hyperintense signal the seen along the medial aspect of the cerebral peduncles. Findings at the interpeduncular cistern may be affecting cranial nerve 3 which may explain reported oculomotor nerve palsy. " "Findings are nonspecific with differential considerations including meningitis, metastatic disease, and paraneoplastic syndrome."  Patient with hx of breast and colon cancer, follows with Dr. Thompson    6/4/24  CARLOTA, patient reports improvement in vision today  S/p LP this morning, noted to be traumatic tap  CSF studies with elevated protein and WBCs  Discussed with Neurology, further CSF studies ordered  CSF cytology, mengitits/encephalitis, VDRL, paraneoplastic autoantibody studies ordered    6/5/24  CARLOTA  Mengitits panel +cryptococcus  Consult ID, started on amphotericin  Left eye deviated, reports blurry vision and headache onset 1 week PTA      Review of Systems   All other systems reviewed and are negative.    Objective:     Vital Signs (Most Recent):  Temp: 98.2 °F (36.8 °C) (06/05/24 2102)  Pulse: (!) 54 (06/05/24 2102)  Resp: 16 (06/05/24 2102)  BP: (!) 152/70 (06/05/24 2102)  SpO2: 95 % (06/05/24 2102) Vital Signs (24h Range):  Temp:  [97.7 °F (36.5 °C)-98.9 °F (37.2 °C)] 98.2 °F (36.8 °C)  Pulse:  [52-68] 54  Resp:  [16-20] 16  SpO2:  [95 %-98 %] 95 %  BP: (128-161)/(59-74) 152/70     Weight: 74.2 kg (163 lb 9.3 oz)  Body mass index is 26.4 kg/m².  No intake or output data in the 24 hours ending 06/05/24 2114        Physical Exam  Vitals and nursing note reviewed.   Constitutional:       General: She is not in acute distress.     Appearance: Normal appearance. She is normal weight.   Cardiovascular:      Rate and Rhythm: Normal rate and regular rhythm.      Heart sounds: No murmur heard.  Pulmonary:      Effort: Pulmonary effort is normal. No respiratory distress.      Breath sounds: No wheezing.   Neurological:      Mental Status: She is alert and oriented to person, place, and time.      Comments: Left eye deviated   Psychiatric:         Mood and Affect: Mood normal.         Behavior: Behavior normal.             Significant Labs: All pertinent labs within the past 24 hours have been reviewed.  Recent " Lab Results  (Last 5 results in the past 24 hours)        06/05/24  1516   06/05/24  1116   06/05/24  1013   06/05/24  0532   06/05/24  0501        Albumin         4.1       ALP         46       ALT         16       Anion Gap         13       AST         19       Baso #         0.04       Basophil %         0.8       BILIRUBIN TOTAL         0.7  Comment: For infants and newborns, interpretation of results should be based  on gestational age, weight and in agreement with clinical  observations.    Premature Infant recommended reference ranges:  Up to 24 hours.............<8.0 mg/dL  Up to 48 hours............<12.0 mg/dL  3-5 days..................<15.0 mg/dL  6-29 days.................<15.0 mg/dL         BUN         13       Calcium         10.5       Chloride         95       CO2         21       Creatinine         0.8       Differential Method         Automated       eGFR         >60       Eos #         0.2       Eos %         3.8       Glucose         103       Gran # (ANC)         3.0       Gran %         62.7       Hematocrit         37.9       Hemoglobin         13.3       HIV 1/2 Ag/Ab     Negative           Immature Grans (Abs)         0.02  Comment: Mild elevation in immature granulocytes is non specific and   can be seen in a variety of conditions including stress response,   acute inflammation, trauma and pregnancy. Correlation with other   laboratory and clinical findings is essential.         Immature Granulocytes         0.4       Lymph #         0.9       Lymph %         19.7       MCH         30.9       MCHC         35.1       MCV         88       Mono #         0.6       Mono %         12.6       MPV         8.1       nRBC         0       Platelet Count         320       POCT Glucose 206   110     121         Potassium         3.3       PROTEIN TOTAL         8.2       RBC         4.31       RDW         12.5       Sodium         129       WBC         4.78                              Significant  Imaging: I have reviewed all pertinent imaging results/findings within the past 24 hours.    MRI Brain W WO Contrast   Final Result      Mild scattered leptomeningeal enhancement including overlying the right frontal lobe as well as along the inter hemispheric fissure, left occipital lobe, and interpeduncular cistern with associated mild T2/FLAIR hyperintense signal the seen along the medial aspect of the cerebral peduncles.  Findings at the interpeduncular cistern may be affecting cranial nerve 3 which may explain reported oculomotor nerve palsy.  Findings are nonspecific with differential considerations including meningitis, metastatic disease, and paraneoplastic syndrome.      Additional nonspecific white matter changes likely related to chronic microvascular ischemia.      Left paranasal sinus disease.         Electronically signed by: Boris Hwang   Date:    06/03/2024   Time:    16:50      MRA Brain without contrast   Final Result      Complete opacification of the left maxillary sinus.  Small left ethmoid and frontal sinus opacification.  Correlate clinically to sinusitis      No acute infarct.  No restricted diffusion      Moderate subcortical and periventricular FLAIR hyperintensities consistent with chronic microvascular ischemic changes      Left maxillary sinus and mild left ethmoid frontal sinus mucosal thickening.      Moderate atherosclerotic changes as evidenced by luminal irregularities of the intracranial vasculature.         Electronically signed by: Simeon Cutler   Date:    06/02/2024   Time:    17:09      MRI Brain Without Contrast   Final Result      Complete opacification of the left maxillary sinus.  Small left ethmoid and frontal sinus opacification.  Correlate clinically to sinusitis      No acute infarct.  No restricted diffusion      Moderate subcortical and periventricular FLAIR hyperintensities consistent with chronic microvascular ischemic changes      Left maxillary sinus and  mild left ethmoid frontal sinus mucosal thickening.      Moderate atherosclerotic changes as evidenced by luminal irregularities of the intracranial vasculature.         Electronically signed by: Simeon Cutler   Date:    06/02/2024   Time:    17:09      CTA Head and Neck (xpd)   Final Result      Scattered atherosclerotic plaque involving the extracranial carotid system as well as the internal carotid arteries at the level of the cavernous sinus bilaterally.  However no significant narrowing or stenosis.  No vessel occlusions or significant stenosis.      Scattered atherosclerotic plaque of the vertebrobasilar system without significant narrowing or stenosis.      All CT scans at this facility use dose modulation, iterative reconstruction and/or weight based dosing when appropriate to reduce radiation dose to as low as reasonably achievable.         Electronically signed by: Victor Manuel Russell MD   Date:    06/02/2024   Time:    13:07      CT Head Without Contrast   Final Result      No acute intracranial abnormality.  CT aspects score 10.      All CT scans at this facility use dose modulation, iterative reconstructions, and/or weight base dosing when appropriate to reduce radiation dose to as low as reasonably achievable.         Electronically signed by: Victor Manuel Russell MD   Date:    06/02/2024   Time:    11:39      IR Lumbar Puncture Diagnostic    (Results Pending)         Assessment/Plan:      * Cryptococcal meningoencephalitis  S/p LP 6/4/24  Elevated CSF protein and glucose  Meningitis panel +cryptococcus  ID consulted, started on amphotericin 6/5/24    Acute ischemic VBA brainstem stroke, left  Suspicion of acute brainstem CVA vs TIA, continued vision abnormality, denies diplopia  Antithrombotics for secondary stroke prevention: Antiplatelets: Aspirin: 81 mg daily  Clopidogrel: 300 mg loading dose x 1, now  Clopidogrel: 75 mg daily    Statins for secondary stroke prevention and hyperlipidemia, if present:    Statins: Atorvastatin- 40 mg daily    Aggressive risk factor modification: DM, HLD, Exercise     Rehab efforts: The patient has been evaluated by a stroke team provider and the therapy needs have been fully considered based off the presenting complaints and exam findings. The following therapy evaluations are needed: PT evaluate and treat, OT evaluate and treat, SLP evaluate and treat    Diagnostics ordered/pending: CT scan of head without contrast to asses brain parenchyma, CTA Head to assess vasculature , CTA Neck/Arch to assess vasculature, Lipid Profile to assess cholesterol levels, MRA head to assess vasculature, MRI head without contrast to assess brain parenchyma, TTE to assess cardiac function/status , TSH to assess thyroid function    VTE prophylaxis: None: Reason for No Pharmacological VTE Prophylaxis: Currently on anticoagulation    BP parameters: Infarct: No intervention, SBP <220        Dizziness and giddiness  Presented with dizziness, concern for brainstem infarct    --fall precautions      Malignant neoplasm of lower-outer quadrant of left breast of female, estrogen receptor positive  Followed by oncology, managed with Arimidex    --cont home medication      Hyperlipidemia  Managed with statin    --cont statin  --Lipid panel in AM      DM type 2 with diabetic dyslipidemia  Patient's FSGs are controlled on current medication regimen.  Last A1c reviewed-   Lab Results   Component Value Date    HGBA1C 6.7 (H) 05/10/2024     Most recent fingerstick glucose reviewed-   Recent Labs   Lab 06/02/24  1128   POCTGLUCOSE 124*     Current correctional scale  Low  Maintain anti-hyperglycemic dose as follows-   Antihyperglycemics (From admission, onward)      Start     Stop Route Frequency Ordered    06/02/24 1442  insulin aspart U-100 pen 0-5 Units         -- SubQ Before meals & nightly PRN 06/02/24 1343          Hold Oral hypoglycemics while patient is in the hospital.    Diastolic dysfunction    Euvolemic on  exam, elevated BNP on arrival, likely secondary to uncontrolled HTN.    --ECHO    HTN (hypertension)  Chronic, uncontrolled. Latest blood pressure and vitals reviewed-     Temp:  [98.2 °F (36.8 °C)]   Pulse:  [47-63]   Resp:  [13-36]   BP: (175-210)/(81-91)   SpO2:  [88 %-100 %] .   Home meds for hypertension were reviewed and noted below.   Hypertension Medications               amLODIPine (NORVASC) 10 MG tablet TAKE 1 TABLET BY MOUTH EVERY DAY    furosemide (LASIX) 20 MG tablet Take 1 tablet (20 mg total) by mouth daily as needed (edema, swelling, fluid build up). Do not take if BP is below 110/70    hydroCHLOROthiazide (HYDRODIURIL) 25 MG tablet TAKE 1 TABLET BY MOUTH EVERY DAY    losartan (COZAAR) 100 MG tablet TAKE 1 TABLET BY MOUTH EVERY DAY            While in the hospital, will manage blood pressure as follows; Adjust home antihypertensive regimen as follows- Hold for now, permissive HTN    Will utilize p.r.n. blood pressure medication only if patient's blood pressure greater than 220/110 and she develops symptoms such as worsening chest pain or shortness of breath.      VTE Risk Mitigation (From admission, onward)           Ordered     Reason for No Pharmacological VTE Prophylaxis  Once        Question:  Reasons:  Answer:  Risk of Bleeding    06/02/24 1342     IP VTE HIGH RISK PATIENT  Once         06/02/24 1342     Place sequential compression device  Until discontinued         06/02/24 1342                    Discharge Planning   NATALIE: 6/3/2024     Code Status: Full Code   Is the patient medically ready for discharge?:     Reason for patient still in hospital (select all that apply): Patient new problem, Patient trending condition, Laboratory test, Treatment, and Consult recommendations  Discharge Plan A: Home with family, Home Health   Discharge Delays: None known at this time              Josesito Cerrato MD  Department of Hospital Medicine   [facility] ([facility])

## 2024-06-06 NOTE — PROCEDURES
"Anne Farmer is a 68 y.o. female patient.    Temp: 98.2 °F (36.8 °C) (06/06/24 1158)  Pulse: (!) 53 (06/06/24 1454)  Resp: 17 (06/06/24 1158)  BP: 135/62 (06/06/24 1158)  SpO2: 97 % (06/06/24 1158)  Weight: 74.2 kg (163 lb 9.3 oz) (06/04/24 0430)  Height: 5' 6" (167.6 cm) (06/02/24 1041)    PICC  Date/Time: 6/6/2024 3:20 PM  Performed by: Ned Whatley RN  Consent Done: Yes  Time out: Immediately prior to procedure a time out was called to verify the correct patient, procedure, equipment, support staff and site/side marked as required  Indications: med administration and vascular access  Anesthesia: local infiltration  Local anesthetic: lidocaine 1% without epinephrine  Anesthetic Total (mL): 2  Preparation: skin prepped with ChloraPrep  Skin prep agent dried: skin prep agent completely dried prior to procedure  Sterile barriers: all five maximum sterile barriers used - cap, mask, sterile gown, sterile gloves, and large sterile sheet  Hand hygiene: hand hygiene performed prior to central venous catheter insertion  Location details: right brachial  Catheter type: double lumen  Catheter size: 4 Fr  Catheter Length: 33cm    Ultrasound guidance: yes  Vessel Caliber: medium and patent, compressibility normal  Vascular Doppler: not done  Needle advanced into vessel with real time Ultrasound guidance.  Guidewire confirmed in vessel.  Sterile sheath used.  no esophageal manometryNumber of attempts: 1  Post-procedure: blood return through all ports, chlorhexidine patch and sterile dressing applied  Estimated blood loss (mL): 0            Name Ned Whatley RN  6/6/2024    "

## 2024-06-06 NOTE — PLAN OF CARE
Problem: Adult Inpatient Plan of Care  Goal: Plan of Care Review  Outcome: Progressing  Goal: Optimal Comfort and Wellbeing  Outcome: Progressing     Problem: Stroke, Ischemic (Includes Transient Ischemic Attack)  Goal: Optimal Coping  Outcome: Progressing  Goal: Effective Bowel Elimination  Outcome: Progressing  Goal: Optimal Cerebral Tissue Perfusion  Outcome: Progressing  Goal: Effective Oxygenation and Ventilation  Outcome: Progressing  Goal: Improved Sensorimotor Function  Outcome: Progressing     Problem: Diabetes Comorbidity  Goal: Blood Glucose Level Within Targeted Range  Outcome: Progressing     Problem: Fall Injury Risk  Goal: Absence of Fall and Fall-Related Injury  Outcome: Progressing

## 2024-06-07 LAB
ALBUMIN SERPL BCP-MCNC: 4.2 G/DL (ref 3.5–5.2)
ALP SERPL-CCNC: 46 U/L (ref 55–135)
ALT SERPL W/O P-5'-P-CCNC: 18 U/L (ref 10–44)
ANION GAP SERPL CALC-SCNC: 12 MMOL/L (ref 8–16)
AST SERPL-CCNC: 27 U/L (ref 10–40)
BILIRUB SERPL-MCNC: 0.8 MG/DL (ref 0.1–1)
BUN SERPL-MCNC: 24 MG/DL (ref 8–23)
CALCIUM SERPL-MCNC: 10.8 MG/DL (ref 8.7–10.5)
CHLORIDE SERPL-SCNC: 93 MMOL/L (ref 95–110)
CO2 SERPL-SCNC: 22 MMOL/L (ref 23–29)
CREAT SERPL-MCNC: 1.2 MG/DL (ref 0.5–1.4)
CRYPTOC AG CSF QL LA: ABNORMAL
CRYPTOC AG SER QL LA: ABNORMAL
EST. GFR  (NO RACE VARIABLE): 49 ML/MIN/1.73 M^2
GLUCOSE SERPL-MCNC: 108 MG/DL (ref 70–110)
MAGNESIUM SERPL-MCNC: 2.1 MG/DL (ref 1.6–2.6)
POCT GLUCOSE: 112 MG/DL (ref 70–110)
POCT GLUCOSE: 124 MG/DL (ref 70–110)
POCT GLUCOSE: 133 MG/DL (ref 70–110)
POCT GLUCOSE: 135 MG/DL (ref 70–110)
POTASSIUM SERPL-SCNC: 3.6 MMOL/L (ref 3.5–5.1)
PROT SERPL-MCNC: 8.4 G/DL (ref 6–8.4)
SODIUM SERPL-SCNC: 127 MMOL/L (ref 136–145)

## 2024-06-07 PROCEDURE — 25000003 PHARM REV CODE 250: Performed by: INTERNAL MEDICINE

## 2024-06-07 PROCEDURE — 25000003 PHARM REV CODE 250: Performed by: HOSPITALIST

## 2024-06-07 PROCEDURE — 83735 ASSAY OF MAGNESIUM: CPT | Performed by: HOSPITALIST

## 2024-06-07 PROCEDURE — 80053 COMPREHEN METABOLIC PANEL: CPT | Performed by: HOSPITALIST

## 2024-06-07 PROCEDURE — A4216 STERILE WATER/SALINE, 10 ML: HCPCS | Performed by: HOSPITALIST

## 2024-06-07 PROCEDURE — 97530 THERAPEUTIC ACTIVITIES: CPT | Mod: CQ

## 2024-06-07 PROCEDURE — 36415 COLL VENOUS BLD VENIPUNCTURE: CPT | Performed by: HOSPITALIST

## 2024-06-07 PROCEDURE — 25000003 PHARM REV CODE 250: Performed by: NURSE PRACTITIONER

## 2024-06-07 PROCEDURE — 63600175 PHARM REV CODE 636 W HCPCS: Mod: JZ,JG | Performed by: INTERNAL MEDICINE

## 2024-06-07 PROCEDURE — 63600175 PHARM REV CODE 636 W HCPCS: Performed by: NURSE PRACTITIONER

## 2024-06-07 PROCEDURE — 21400001 HC TELEMETRY ROOM

## 2024-06-07 RX ORDER — SODIUM CHLORIDE 9 MG/ML
INJECTION, SOLUTION INTRAVENOUS CONTINUOUS
Status: DISCONTINUED | OUTPATIENT
Start: 2024-06-07 | End: 2024-06-12 | Stop reason: HOSPADM

## 2024-06-07 RX ADMIN — ONDANSETRON 4 MG: 2 INJECTION INTRAMUSCULAR; INTRAVENOUS at 08:06

## 2024-06-07 RX ADMIN — AMPHOTERICIN B 200 MG: 50 INJECTION, POWDER, LYOPHILIZED, FOR SOLUTION INTRAVENOUS at 03:06

## 2024-06-07 RX ADMIN — SODIUM CHLORIDE: 9 INJECTION, SOLUTION INTRAVENOUS at 08:06

## 2024-06-07 RX ADMIN — LOSARTAN POTASSIUM 100 MG: 50 TABLET, FILM COATED ORAL at 08:06

## 2024-06-07 RX ADMIN — AMLODIPINE BESYLATE 10 MG: 10 TABLET ORAL at 08:06

## 2024-06-07 RX ADMIN — Medication 6 MG: at 10:06

## 2024-06-07 RX ADMIN — FLUCYTOSINE 1750 MG: 250 CAPSULE ORAL at 02:06

## 2024-06-07 RX ADMIN — POTASSIUM CHLORIDE 20 MEQ: 1500 TABLET, EXTENDED RELEASE ORAL at 08:06

## 2024-06-07 RX ADMIN — FLUCYTOSINE 1750 MG: 250 CAPSULE ORAL at 08:06

## 2024-06-07 RX ADMIN — ATORVASTATIN CALCIUM 80 MG: 40 TABLET, FILM COATED ORAL at 08:06

## 2024-06-07 RX ADMIN — PANTOPRAZOLE SODIUM 40 MG: 40 TABLET, DELAYED RELEASE ORAL at 08:06

## 2024-06-07 RX ADMIN — ASPIRIN 81 MG: 81 TABLET, COATED ORAL at 08:06

## 2024-06-07 RX ADMIN — Medication 10 ML: at 02:06

## 2024-06-07 RX ADMIN — SODIUM CHLORIDE, PRESERVATIVE FREE 10 ML: 5 INJECTION INTRAVENOUS at 05:06

## 2024-06-07 RX ADMIN — HYDROCHLOROTHIAZIDE 25 MG: 25 TABLET ORAL at 08:06

## 2024-06-07 RX ADMIN — SODIUM CHLORIDE, PRESERVATIVE FREE 10 ML: 5 INJECTION INTRAVENOUS at 12:06

## 2024-06-07 RX ADMIN — ANASTROZOLE 1 MG: 1 TABLET, COATED ORAL at 08:06

## 2024-06-07 RX ADMIN — DIPHENHYDRAMINE HYDROCHLORIDE 25 MG: 50 INJECTION, SOLUTION INTRAMUSCULAR; INTRAVENOUS at 10:06

## 2024-06-07 RX ADMIN — ONDANSETRON 4 MG: 2 INJECTION INTRAMUSCULAR; INTRAVENOUS at 12:06

## 2024-06-07 RX ADMIN — FLUCYTOSINE 250 MG: 250 CAPSULE ORAL at 06:06

## 2024-06-07 NOTE — SUBJECTIVE & OBJECTIVE
Review of Systems   All other systems reviewed and are negative.    Objective:     Vital Signs (Most Recent):  Temp: 97.9 °F (36.6 °C) (06/07/24 0734)  Pulse: (!) 49 (06/07/24 0804)  Resp: 18 (06/07/24 0734)  BP: (!) 167/79 (06/07/24 0734)  SpO2: 98 % (06/07/24 0734) Vital Signs (24h Range):  Temp:  [97.8 °F (36.6 °C)-98.3 °F (36.8 °C)] 97.9 °F (36.6 °C)  Pulse:  [49-74] 49  Resp:  [16-18] 18  SpO2:  [95 %-99 %] 98 %  BP: (121-167)/(62-79) 167/79     Weight: 73.2 kg (161 lb 6 oz)  Body mass index is 26.05 kg/m².    Intake/Output Summary (Last 24 hours) at 6/7/2024 1152  Last data filed at 6/7/2024 1008  Gross per 24 hour   Intake 240 ml   Output 200 ml   Net 40 ml           Physical Exam  Vitals and nursing note reviewed.   Constitutional:       General: She is not in acute distress.     Appearance: Normal appearance. She is normal weight.   Cardiovascular:      Rate and Rhythm: Normal rate and regular rhythm.      Heart sounds: No murmur heard.  Pulmonary:      Effort: Pulmonary effort is normal. No respiratory distress.      Breath sounds: No wheezing.   Neurological:      Mental Status: She is alert and oriented to person, place, and time.      Comments: Left eye deviated   Psychiatric:         Mood and Affect: Mood normal.         Behavior: Behavior normal.             Significant Labs: All pertinent labs within the past 24 hours have been reviewed.  Recent Lab Results         06/07/24  0512   06/06/24  2104   06/06/24  1535        Albumin 4.2           ALP 46           ALT 18           Anion Gap 12           AST 27           BILIRUBIN TOTAL 0.8  Comment: For infants and newborns, interpretation of results should be based  on gestational age, weight and in agreement with clinical  observations.    Premature Infant recommended reference ranges:  Up to 24 hours.............<8.0 mg/dL  Up to 48 hours............<12.0 mg/dL  3-5 days..................<15.0 mg/dL  6-29 days.................<15.0 mg/dL              BUN 24           Calcium 10.8           Chloride 93           CO2 22           Creatinine 1.2           eGFR 49           Glucose 108           Magnesium  2.1           POCT Glucose   124   119       Potassium 3.6           PROTEIN TOTAL 8.4           Sodium 127                   Significant Imaging: I have reviewed all pertinent imaging results/findings within the past 24 hours.    X-Ray Chest 1 View   Final Result      As above         Electronically signed by: Cesar Whiteside MD   Date:    06/06/2024   Time:    15:54      MRI Brain W WO Contrast   Final Result      Mild scattered leptomeningeal enhancement including overlying the right frontal lobe as well as along the inter hemispheric fissure, left occipital lobe, and interpeduncular cistern with associated mild T2/FLAIR hyperintense signal the seen along the medial aspect of the cerebral peduncles.  Findings at the interpeduncular cistern may be affecting cranial nerve 3 which may explain reported oculomotor nerve palsy.  Findings are nonspecific with differential considerations including meningitis, metastatic disease, and paraneoplastic syndrome.      Additional nonspecific white matter changes likely related to chronic microvascular ischemia.      Left paranasal sinus disease.         Electronically signed by: Boris Hwang   Date:    06/03/2024   Time:    16:50      MRA Brain without contrast   Final Result      Complete opacification of the left maxillary sinus.  Small left ethmoid and frontal sinus opacification.  Correlate clinically to sinusitis      No acute infarct.  No restricted diffusion      Moderate subcortical and periventricular FLAIR hyperintensities consistent with chronic microvascular ischemic changes      Left maxillary sinus and mild left ethmoid frontal sinus mucosal thickening.      Moderate atherosclerotic changes as evidenced by luminal irregularities of the intracranial vasculature.         Electronically signed by: Simeon Cutler    Date:    06/02/2024   Time:    17:09      MRI Brain Without Contrast   Final Result      Complete opacification of the left maxillary sinus.  Small left ethmoid and frontal sinus opacification.  Correlate clinically to sinusitis      No acute infarct.  No restricted diffusion      Moderate subcortical and periventricular FLAIR hyperintensities consistent with chronic microvascular ischemic changes      Left maxillary sinus and mild left ethmoid frontal sinus mucosal thickening.      Moderate atherosclerotic changes as evidenced by luminal irregularities of the intracranial vasculature.         Electronically signed by: Simeon Cutler   Date:    06/02/2024   Time:    17:09      CTA Head and Neck (xpd)   Final Result      Scattered atherosclerotic plaque involving the extracranial carotid system as well as the internal carotid arteries at the level of the cavernous sinus bilaterally.  However no significant narrowing or stenosis.  No vessel occlusions or significant stenosis.      Scattered atherosclerotic plaque of the vertebrobasilar system without significant narrowing or stenosis.      All CT scans at this facility use dose modulation, iterative reconstruction and/or weight based dosing when appropriate to reduce radiation dose to as low as reasonably achievable.         Electronically signed by: Victor Manuel Russell MD   Date:    06/02/2024   Time:    13:07      CT Head Without Contrast   Final Result      No acute intracranial abnormality.  CT aspects score 10.      All CT scans at this facility use dose modulation, iterative reconstructions, and/or weight base dosing when appropriate to reduce radiation dose to as low as reasonably achievable.         Electronically signed by: Victor Manuel Russell MD   Date:    06/02/2024   Time:    11:39      IR Lumbar Puncture Diagnostic    (Results Pending)

## 2024-06-07 NOTE — ASSESSMENT & PLAN NOTE
CSF PCR -  Varicella Zoster Virus Not Detected Not Detected   Cryptococcus neoformans/gattii Not Detected Detected Abnormal        Will send cryptococcal assay in the blood and csf- will add   Liposomal Amphotericin/Flucytosine.    Will need up to 2 weeks of therapy- she is not HIV Positive and denies contact with pegions   06/06- day 2 /14 of therapy - continue Amphotericin/Flucytosine  She needs close follow up

## 2024-06-07 NOTE — PROGRESS NOTES
"[facility] - Telemetry ([facility])  [facility] Medicine  Progress Note    Patient Name: Anne Farmer  MRN: [id number]  Patient Class: IP- Inpatient   Admission Date: 6/2/2024  Length of Stay: 3 days  Attending Physician: Josesito Cerrato MD  Primary Care Provider: Chiquita Talley MD        Subjective:     Principal Problem:Cryptococcal meningoencephalitis        HPI:  68 y.o. female patient, PMHx: HTN, DM2, HLD, Colon Cx, Sarcoidosis, RLD, Breast Cx, Cardiomyopathy. Presented to the Emergency Department for evaluation of a dizziness which onset 3 days PTA with left eye drift, new on day of arrival. Concerns for a potential stroke. Symptoms are constant and moderate in severity. No mitigating or exacerbating factors reported. Associated sxs include weakness, lightheadedness, and blurry vision. Pt denies diplopia and all other symptoms at this time. Pt denies Hx of stroke or MI. Code stroke called in the ED, CT head: no acute finding. CTA Head/Neck: negative for significant narrowing or stenosis, negative LVO. Neurology consulted. Vitals: 190/87, 62, 18, 98.2F, 99% RA. Abnormal Labs: BNP: 271. Patient is a full code. Placed in observation under the care of Hospital Medicine for management of suspicion of CVA.     Overview/Hospital Course:  6/3/24  NAEON, patient with left eye drift, reports blurry vision some improved today  MRI/MRA obtained yesterday with chronic microvascular changes  Tele-Neurology consulted, recommended repeat MRI  Repeat MRI with and without contrast with thin slices: "Mild scattered leptomeningeal enhancement including overlying the right frontal lobe as well as along the inter hemispheric fissure, left occipital lobe, and interpeduncular cistern with associated mild T2/FLAIR hyperintense signal the seen along the medial aspect of the cerebral peduncles. Findings at the interpeduncular cistern may be affecting cranial nerve 3 which may explain reported oculomotor nerve palsy. Findings " "are nonspecific with differential considerations including meningitis, metastatic disease, and paraneoplastic syndrome."  Patient with hx of breast and colon cancer, follows with Dr. Thompson    6/4/24  NAEON, patient reports improvement in vision today  S/p LP this morning, noted to be traumatic tap  CSF studies with elevated protein and WBCs  Discussed with Neurology, further CSF studies ordered  CSF cytology, mengitits/encephalitis, VDRL, paraneoplastic autoantibody studies ordered    6/5/24  NAEON  Mengitits panel +cryptococcus  Consult ID, started on amphotericin  Left eye deviated, reports blurry vision and headache onset 1 week PTA    6/6/24  NAEON, patient denies new issues/complaints  Started on amphotericin, flucytosine  Ceftriaxone for sinusitis  Appreciate ID assistance  RN at bedside, difficulty obtained PIV, will order PICC line    6/7/24  NAEON, left eye deviation some better today  Patient wishes to leave hospital, advised to stay, not medically ready  Cr 1.2 (0.8 yesterday), started on IVF      Review of Systems   All other systems reviewed and are negative.    Objective:     Vital Signs (Most Recent):  Temp: 97.9 °F (36.6 °C) (06/07/24 0734)  Pulse: (!) 49 (06/07/24 0804)  Resp: 18 (06/07/24 0734)  BP: (!) 167/79 (06/07/24 0734)  SpO2: 98 % (06/07/24 0734) Vital Signs (24h Range):  Temp:  [97.8 °F (36.6 °C)-98.3 °F (36.8 °C)] 97.9 °F (36.6 °C)  Pulse:  [49-74] 49  Resp:  [16-18] 18  SpO2:  [95 %-99 %] 98 %  BP: (121-167)/(62-79) 167/79     Weight: 73.2 kg (161 lb 6 oz)  Body mass index is 26.05 kg/m².    Intake/Output Summary (Last 24 hours) at 6/7/2024 1152  Last data filed at 6/7/2024 1008  Gross per 24 hour   Intake 240 ml   Output 200 ml   Net 40 ml           Physical Exam  Vitals and nursing note reviewed.   Constitutional:       General: She is not in acute distress.     Appearance: Normal appearance. She is normal weight.   Cardiovascular:      Rate and Rhythm: Normal rate and regular rhythm. "      Heart sounds: No murmur heard.  Pulmonary:      Effort: Pulmonary effort is normal. No respiratory distress.      Breath sounds: No wheezing.   Neurological:      Mental Status: She is alert and oriented to person, place, and time.      Comments: Left eye deviated   Psychiatric:         Mood and Affect: Mood normal.         Behavior: Behavior normal.             Significant Labs: All pertinent labs within the past 24 hours have been reviewed.  Recent Lab Results         06/07/24  0512   06/06/24  2104   06/06/24  1535        Albumin 4.2           ALP 46           ALT 18           Anion Gap 12           AST 27           BILIRUBIN TOTAL 0.8  Comment: For infants and newborns, interpretation of results should be based  on gestational age, weight and in agreement with clinical  observations.    Premature Infant recommended reference ranges:  Up to 24 hours.............<8.0 mg/dL  Up to 48 hours............<12.0 mg/dL  3-5 days..................<15.0 mg/dL  6-29 days.................<15.0 mg/dL             BUN 24           Calcium 10.8           Chloride 93           CO2 22           Creatinine 1.2           eGFR 49           Glucose 108           Magnesium  2.1           POCT Glucose   124   119       Potassium 3.6           PROTEIN TOTAL 8.4           Sodium 127                   Significant Imaging: I have reviewed all pertinent imaging results/findings within the past 24 hours.    X-Ray Chest 1 View   Final Result      As above         Electronically signed by: Cesar Whiteside MD   Date:    06/06/2024   Time:    15:54      MRI Brain W WO Contrast   Final Result      Mild scattered leptomeningeal enhancement including overlying the right frontal lobe as well as along the inter hemispheric fissure, left occipital lobe, and interpeduncular cistern with associated mild T2/FLAIR hyperintense signal the seen along the medial aspect of the cerebral peduncles.  Findings at the interpeduncular cistern may be affecting  cranial nerve 3 which may explain reported oculomotor nerve palsy.  Findings are nonspecific with differential considerations including meningitis, metastatic disease, and paraneoplastic syndrome.      Additional nonspecific white matter changes likely related to chronic microvascular ischemia.      Left paranasal sinus disease.         Electronically signed by: Boris Hwang   Date:    06/03/2024   Time:    16:50      MRA Brain without contrast   Final Result      Complete opacification of the left maxillary sinus.  Small left ethmoid and frontal sinus opacification.  Correlate clinically to sinusitis      No acute infarct.  No restricted diffusion      Moderate subcortical and periventricular FLAIR hyperintensities consistent with chronic microvascular ischemic changes      Left maxillary sinus and mild left ethmoid frontal sinus mucosal thickening.      Moderate atherosclerotic changes as evidenced by luminal irregularities of the intracranial vasculature.         Electronically signed by: Simeon Cutler   Date:    06/02/2024   Time:    17:09      MRI Brain Without Contrast   Final Result      Complete opacification of the left maxillary sinus.  Small left ethmoid and frontal sinus opacification.  Correlate clinically to sinusitis      No acute infarct.  No restricted diffusion      Moderate subcortical and periventricular FLAIR hyperintensities consistent with chronic microvascular ischemic changes      Left maxillary sinus and mild left ethmoid frontal sinus mucosal thickening.      Moderate atherosclerotic changes as evidenced by luminal irregularities of the intracranial vasculature.         Electronically signed by: Simeon Cutler   Date:    06/02/2024   Time:    17:09      CTA Head and Neck (xpd)   Final Result      Scattered atherosclerotic plaque involving the extracranial carotid system as well as the internal carotid arteries at the level of the cavernous sinus bilaterally.  However no significant  narrowing or stenosis.  No vessel occlusions or significant stenosis.      Scattered atherosclerotic plaque of the vertebrobasilar system without significant narrowing or stenosis.      All CT scans at this facility use dose modulation, iterative reconstruction and/or weight based dosing when appropriate to reduce radiation dose to as low as reasonably achievable.         Electronically signed by: Victor Manuel Russell MD   Date:    06/02/2024   Time:    13:07      CT Head Without Contrast   Final Result      No acute intracranial abnormality.  CT aspects score 10.      All CT scans at this facility use dose modulation, iterative reconstructions, and/or weight base dosing when appropriate to reduce radiation dose to as low as reasonably achievable.         Electronically signed by: Victor Manuel Russell MD   Date:    06/02/2024   Time:    11:39      IR Lumbar Puncture Diagnostic    (Results Pending)         Assessment/Plan:      * Cryptococcal meningoencephalitis  S/p LP 6/4/24  Elevated CSF protein and glucose  Meningitis panel +cryptococcus  ID consulted, started on amphotericin 6/5/24    Acute ischemic VBA brainstem stroke, left  Suspicion of acute brainstem CVA vs TIA, continued vision abnormality, denies diplopia  Antithrombotics for secondary stroke prevention: Antiplatelets: Aspirin: 81 mg daily  Clopidogrel: 300 mg loading dose x 1, now  Clopidogrel: 75 mg daily    Statins for secondary stroke prevention and hyperlipidemia, if present:   Statins: Atorvastatin- 40 mg daily    Aggressive risk factor modification: DM, HLD, Exercise     Rehab efforts: The patient has been evaluated by a stroke team provider and the therapy needs have been fully considered based off the presenting complaints and exam findings. The following therapy evaluations are needed: PT evaluate and treat, OT evaluate and treat, SLP evaluate and treat    Diagnostics ordered/pending: CT scan of head without contrast to asses brain parenchyma, CTA Head to  assess vasculature , CTA Neck/Arch to assess vasculature, Lipid Profile to assess cholesterol levels, MRA head to assess vasculature, MRI head without contrast to assess brain parenchyma, TTE to assess cardiac function/status , TSH to assess thyroid function    VTE prophylaxis: None: Reason for No Pharmacological VTE Prophylaxis: Currently on anticoagulation    BP parameters: Infarct: No intervention, SBP <220        Dizziness and giddiness  Presented with dizziness, concern for brainstem infarct    --fall precautions      Malignant neoplasm of lower-outer quadrant of left breast of female, estrogen receptor positive  Followed by oncology, managed with Arimidex    --cont home medication      Hyperlipidemia  Managed with statin    --cont statin  --Lipid panel in AM      DM type 2 with diabetic dyslipidemia  Patient's FSGs are controlled on current medication regimen.  Last A1c reviewed-   Lab Results   Component Value Date    HGBA1C 6.7 (H) 05/10/2024     Most recent fingerstick glucose reviewed-   Recent Labs   Lab 06/02/24  1128   POCTGLUCOSE 124*     Current correctional scale  Low  Maintain anti-hyperglycemic dose as follows-   Antihyperglycemics (From admission, onward)      Start     Stop Route Frequency Ordered    06/02/24 1442  insulin aspart U-100 pen 0-5 Units         -- SubQ Before meals & nightly PRN 06/02/24 1343          Hold Oral hypoglycemics while patient is in the hospital.    Diastolic dysfunction    Euvolemic on exam, elevated BNP on arrival, likely secondary to uncontrolled HTN.    --ECHO    HTN (hypertension)  Chronic, uncontrolled. Latest blood pressure and vitals reviewed-     Temp:  [98.2 °F (36.8 °C)]   Pulse:  [47-63]   Resp:  [13-36]   BP: (175-210)/(81-91)   SpO2:  [88 %-100 %] .   Home meds for hypertension were reviewed and noted below.   Hypertension Medications               amLODIPine (NORVASC) 10 MG tablet TAKE 1 TABLET BY MOUTH EVERY DAY    furosemide (LASIX) 20 MG tablet Take 1  tablet (20 mg total) by mouth daily as needed (edema, swelling, fluid build up). Do not take if BP is below 110/70    hydroCHLOROthiazide (HYDRODIURIL) 25 MG tablet TAKE 1 TABLET BY MOUTH EVERY DAY    losartan (COZAAR) 100 MG tablet TAKE 1 TABLET BY MOUTH EVERY DAY            While in the hospital, will manage blood pressure as follows; Adjust home antihypertensive regimen as follows- Hold for now, permissive HTN    Will utilize p.r.n. blood pressure medication only if patient's blood pressure greater than 220/110 and she develops symptoms such as worsening chest pain or shortness of breath.      VTE Risk Mitigation (From admission, onward)           Ordered     Reason for No Pharmacological VTE Prophylaxis  Once        Question:  Reasons:  Answer:  Risk of Bleeding    06/02/24 1342     IP VTE HIGH RISK PATIENT  Once         06/02/24 1342     Place sequential compression device  Until discontinued         06/02/24 1342                    Discharge Planning   NATALIE: 6/3/2024     Code Status: Full Code   Is the patient medically ready for discharge?:     Reason for patient still in hospital (select all that apply): Patient trending condition, Laboratory test, Treatment, and Consult recommendations  Discharge Plan A: Home with family, Home Health   Discharge Delays: None known at this time              Josesito Cerrato MD  Department of Hospital Medicine   [facility] - Telemetry ([facility])

## 2024-06-07 NOTE — PT/OT/SLP PROGRESS
Physical Therapy Treatment    Patient Name:  Anne Farmer   MRN:  [id number]    Recommendations:     Discharge Recommendations: Low Intensity Therapy  Discharge Equipment Recommendations: walker, rolling  Barriers to discharge: None    Assessment:     Anne Farmer is a 68 y.o. female admitted with a medical diagnosis of Cryptococcal meningoencephalitis.  She presents with the following impairments/functional limitations: weakness, impaired endurance, impaired self care skills, impaired functional mobility, gait instability, impaired balance, decreased coordination, decreased safety awareness.    Pt met sitting up in bedside chair with complaints of nausea and mild pain secondary to discomfort of positioning in chair. Pt declines ambulation and seated therapeutic exercises on this date.    Rehab Prognosis: Good; patient would benefit from acute skilled PT services to address these deficits and reach maximum level of function.    Recent Surgery: * No surgery found *      Plan:     During this hospitalization, patient to be seen 3 x/week to address the identified rehab impairments via gait training, therapeutic activities, therapeutic exercises and progress toward the following goals:    Plan of Care Expires:  06/17/24    Subjective     Chief Complaint: nausea  Patient/Family Comments/goals: to d/c home  Pain/Comfort:  Location - Orientation 1: generalized  Location 1: back  Pain Addressed 1: Nurse notified (offered TherEx and mobility; pt declined)      Objective:     Communicated with pt's nurse, Isaias, prior to session.  Patient found up in chair with peripheral IV upon PT entry to room.     General Precautions: Standard, fall, vision impaired  Orthopedic Precautions: N/A  Braces: N/A  Respiratory Status: Room air     Functional Mobility:  Pt declines ambulation and therapeutic exercises secondary to irritability and complaints of nausea.       AM-PAC 6 CLICK MOBILITY  Turning over in bed (including  adjusting bedclothes, sheets and blankets)?: 4  Sitting down on and standing up from a chair with arms (e.g., wheelchair, bedside commode, etc.): 3  Moving from lying on back to sitting on the side of the bed?: 1 (NT)  Moving to and from a bed to a chair (including a wheelchair)?: 3  Need to walk in hospital room?: 3  Climbing 3-5 steps with a railing?: 1 (NT)  Basic Mobility Total Score: 15       Treatment & Education:  Pt's nurse and writing clinician present to educate patient on this benefits of functional ambulation to reduce functional decline during hospitalization. Time provided for education, counseling, and discussion of health disposition in regards to patient's current status.    Pt also declines seated therapeutic exercises despite education for such activities targeting muscular strength and endurance, circulation, and joint mobility.    Patient left up in chair with all lines intact, call button in reach, and pt's nurse present.    GOALS:   Multidisciplinary Problems       Physical Therapy Goals          Problem: Physical Therapy    Goal Priority Disciplines Outcome Goal Variances Interventions   Physical Therapy Goal     PT, PT/OT      Description: LTG to be met in 14 days: 6/17/24  Pt will complete bed mobility INDEP.  Pt will complete sit to stand INDEP.  Pt will ambulate 300' NOAH with RW.  Pt will increase AMPAC score by 2 to progress gross functional mobility.                       Time Tracking:     PT Received On: 06/07/24  PT Start Time: 1004     PT Stop Time: 1012  PT Total Time (min): 8 min     Billable Minutes: Therapeutic Activity 8    Treatment Type: Treatment  PT/PTA: PTA     Number of PTA visits since last PT visit: 3     06/07/2024

## 2024-06-07 NOTE — PLAN OF CARE
06/07/24 1718   Rounds   Attendance Provider;Nurse ;Charge nurse;Physical therapist;Occupational therapist   Discharge Plan A Home;Home Health   Why the patient remains in the hospital Requires continued medical care   Transition of Care Barriers None     Patient on Amphotericin.  Still needs close monitoring.

## 2024-06-07 NOTE — SUBJECTIVE & OBJECTIVE
"Interval History:  68 year old woman with cryptococcal meningitis .  She denies headache at this time    Review of Systems   Constitutional:  Negative for activity change, appetite change, chills, diaphoresis and fatigue.   Respiratory:  Negative for apnea, choking and chest tightness.      Objective:     Vital Signs (Most Recent):  Temp: 98 °F (36.7 °C) (06/07/24 0438)  Pulse: 74 (06/07/24 0438)  Resp: 16 (06/07/24 0438)  BP: 121/68 (06/07/24 0438)  SpO2: 99 % (06/07/24 0438) Vital Signs (24h Range):  Temp:  [97.5 °F (36.4 °C)-98.3 °F (36.8 °C)] 98 °F (36.7 °C)  Pulse:  [52-74] 74  Resp:  [16-18] 16  SpO2:  [95 %-100 %] 99 %  BP: (121-153)/(62-72) 121/68     Weight: 73.2 kg (161 lb 6 oz)  Body mass index is 26.05 kg/m².    Estimated Creatinine Clearance: 44.7 mL/min (based on SCr of 1.2 mg/dL).     Physical Exam  Vitals and nursing note reviewed.   HENT:      Head: Normocephalic.   Cardiovascular:      Rate and Rhythm: Normal rate.   Pulmonary:      Effort: Pulmonary effort is normal.   Musculoskeletal:         General: Normal range of motion.      Cervical back: Normal range of motion.   Skin:     General: Skin is warm.   Neurological:      General: No focal deficit present.      Mental Status: She is alert.   Psychiatric:         Mood and Affect: Mood normal.          Significant Labs: Blood Culture: No results for input(s): "LABBLOO" in the last 4320 hours.  BMP:   Recent Labs   Lab 06/06/24 0435   GLU 97   *   K 3.3*   CL 93*   CO2 22*   BUN 22   CREATININE 1.2   CALCIUM 10.9*   MG 2.0     CBC:   Recent Labs   Lab 06/06/24 0435   WBC 4.64   HGB 13.7   HCT 38.9        CMP:   Recent Labs   Lab 06/06/24 0435   *   K 3.3*   CL 93*   CO2 22*   GLU 97   BUN 22   CREATININE 1.2   CALCIUM 10.9*   PROT 8.6*   ALBUMIN 4.4   BILITOT 1.0   ALKPHOS 46*   AST 20   ALT 18   ANIONGAP 15     Wound Culture: No results for input(s): "LABAERO" in the last 4320 hours.  All pertinent labs within the past 24 " hours have been reviewed.    Significant Imaging: I have reviewed all pertinent imaging results/findings within the past 24 hours.

## 2024-06-07 NOTE — PROGRESS NOTES
[facility]  Infectious Disease  Progress Note    Patient Name: Anne Farmer  MRN: [id number]  Admission Date: 6/2/2024  Length of Stay: 3 days  Attending Physician: Josesito Cerrato MD  Primary Care Provider: Chiquita Talley MD    Isolation Status: No active isolations  Assessment/Plan:      Neuro  Acute ischemic VBA brainstem stroke, left  Follow neurology    Ophtho  CN III palsy, left  Follow neurology     Cardiac/Vascular  DM type 2 with diabetic dyslipidemia  Insulin regime as per primary team    ID  * Cryptococcal meningoencephalitis  CSF PCR -  Varicella Zoster Virus Not Detected Not Detected   Cryptococcus neoformans/gattii Not Detected Detected Abnormal        Will send cryptococcal assay in the blood and csf- will add   Liposomal Amphotericin/Flucytosine.    Will need up to 2 weeks of therapy- she is not HIV Positive and denies contact with pegions   06/06- day 2 /14 of therapy - continue Amphotericin/Flucytosine  She needs close follow up         Anticipated Disposition:     Thank you for your consult. I will follow-up with patient. Please contact us if you have any additional questions.    Rich Robertson MD, [facility]  Infectious Disease  [facility])    Subjective:     Principal Problem:Cryptococcal meningoencephalitis    HPI: 68 year old  female patient,-with history of hypertension ,, DM2, HLD, Colon Cx, Sarcoidosis, RLD, Breast Cx, Cardiomyopathy.     She was admitted with dizziness ,left eye drift  and CVA evaluation.  She had LP that showed cryptococcal infection.      Labs and imaging test   Cryptococcus neoformans/gattii Not Detected Detected Abnormal      Component Ref Range & Units 2 d ago   Heme Aliquot mL 2.0   Appearance, CSF Clear Slightly hazy Abnormal    Color, CSF Colorless Colorless   WBC, CSF 0 - 5 /cu mm 272 High    RBC, CSF 0 /cu mm 619 Abnormal    Segmented Neutrophils, CSF 0 - 6 % 22 High    MRI_Mild scattered leptomeningeal enhancement  including overlying the right frontal lobe as well as along the inter hemispheric fissure, left occipital lobe, and interpeduncular cistern with associated mild T2/FLAIR hyperintense signal the seen along the medial aspect of the cerebral peduncles.  Findings at the interpeduncular cistern may be affecting cranial nerve 3 which may explain reported oculomotor nerve palsy.  Findings are nonspecific with differential considerations including meningitis, metastatic disease, and paraneoplastic syndrome.     Additional nonspecific white matter changes likely related to chronic microvascular ischemia.     Left paranasal sinus disease.     Interval History:  68 year old woman with cryptococcal meningitis .  She denies headache at this time    Review of Systems   Constitutional:  Negative for activity change, appetite change, chills, diaphoresis and fatigue.   Respiratory:  Negative for apnea, choking and chest tightness.      Objective:     Vital Signs (Most Recent):  Temp: 98 °F (36.7 °C) (06/07/24 0438)  Pulse: 74 (06/07/24 0438)  Resp: 16 (06/07/24 0438)  BP: 121/68 (06/07/24 0438)  SpO2: 99 % (06/07/24 0438) Vital Signs (24h Range):  Temp:  [97.5 °F (36.4 °C)-98.3 °F (36.8 °C)] 98 °F (36.7 °C)  Pulse:  [52-74] 74  Resp:  [16-18] 16  SpO2:  [95 %-100 %] 99 %  BP: (121-153)/(62-72) 121/68     Weight: 73.2 kg (161 lb 6 oz)  Body mass index is 26.05 kg/m².    Estimated Creatinine Clearance: 44.7 mL/min (based on SCr of 1.2 mg/dL).     Physical Exam  Vitals and nursing note reviewed.   HENT:      Head: Normocephalic.   Cardiovascular:      Rate and Rhythm: Normal rate.   Pulmonary:      Effort: Pulmonary effort is normal.   Musculoskeletal:         General: Normal range of motion.      Cervical back: Normal range of motion.   Skin:     General: Skin is warm.   Neurological:      General: No focal deficit present.      Mental Status: She is alert.   Psychiatric:         Mood and Affect: Mood normal.          Significant  "Labs: Blood Culture: No results for input(s): "LABBLOO" in the last 4320 hours.  BMP:   Recent Labs   Lab 06/06/24  0435   GLU 97   *   K 3.3*   CL 93*   CO2 22*   BUN 22   CREATININE 1.2   CALCIUM 10.9*   MG 2.0     CBC:   Recent Labs   Lab 06/06/24  0435   WBC 4.64   HGB 13.7   HCT 38.9        CMP:   Recent Labs   Lab 06/06/24  0435   *   K 3.3*   CL 93*   CO2 22*   GLU 97   BUN 22   CREATININE 1.2   CALCIUM 10.9*   PROT 8.6*   ALBUMIN 4.4   BILITOT 1.0   ALKPHOS 46*   AST 20   ALT 18   ANIONGAP 15     Wound Culture: No results for input(s): "LABAERO" in the last 4320 hours.  All pertinent labs within the past 24 hours have been reviewed.    Significant Imaging: I have reviewed all pertinent imaging results/findings within the past 24 hours.  "

## 2024-06-08 LAB
ALBUMIN SERPL BCP-MCNC: 4.3 G/DL (ref 3.5–5.2)
ALP SERPL-CCNC: 52 U/L (ref 55–135)
ALT SERPL W/O P-5'-P-CCNC: 24 U/L (ref 10–44)
ANION GAP SERPL CALC-SCNC: 14 MMOL/L (ref 8–16)
AST SERPL-CCNC: 51 U/L (ref 10–40)
BILIRUB SERPL-MCNC: 0.6 MG/DL (ref 0.1–1)
BUN SERPL-MCNC: 20 MG/DL (ref 8–23)
CALCIUM SERPL-MCNC: 10 MG/DL (ref 8.7–10.5)
CHLORIDE SERPL-SCNC: 100 MMOL/L (ref 95–110)
CO2 SERPL-SCNC: 14 MMOL/L (ref 23–29)
CREAT SERPL-MCNC: 1.2 MG/DL (ref 0.5–1.4)
EST. GFR  (NO RACE VARIABLE): 49 ML/MIN/1.73 M^2
GLUCOSE SERPL-MCNC: 83 MG/DL (ref 70–110)
MAGNESIUM SERPL-MCNC: 2 MG/DL (ref 1.6–2.6)
POCT GLUCOSE: 103 MG/DL (ref 70–110)
POCT GLUCOSE: 104 MG/DL (ref 70–110)
POCT GLUCOSE: 222 MG/DL (ref 70–110)
POCT GLUCOSE: 72 MG/DL (ref 70–110)
POTASSIUM SERPL-SCNC: 5 MMOL/L (ref 3.5–5.1)
PROT SERPL-MCNC: 8.7 G/DL (ref 6–8.4)
SODIUM SERPL-SCNC: 128 MMOL/L (ref 136–145)

## 2024-06-08 PROCEDURE — 80053 COMPREHEN METABOLIC PANEL: CPT | Performed by: HOSPITALIST

## 2024-06-08 PROCEDURE — 83735 ASSAY OF MAGNESIUM: CPT | Performed by: HOSPITALIST

## 2024-06-08 PROCEDURE — 97116 GAIT TRAINING THERAPY: CPT | Mod: CQ

## 2024-06-08 PROCEDURE — 99233 SBSQ HOSP IP/OBS HIGH 50: CPT | Mod: NSCH,,, | Performed by: INTERNAL MEDICINE

## 2024-06-08 PROCEDURE — 25000003 PHARM REV CODE 250: Performed by: NURSE PRACTITIONER

## 2024-06-08 PROCEDURE — 25000003 PHARM REV CODE 250: Performed by: INTERNAL MEDICINE

## 2024-06-08 PROCEDURE — A4216 STERILE WATER/SALINE, 10 ML: HCPCS | Performed by: HOSPITALIST

## 2024-06-08 PROCEDURE — 63600175 PHARM REV CODE 636 W HCPCS: Mod: JZ,JG | Performed by: INTERNAL MEDICINE

## 2024-06-08 PROCEDURE — 21400001 HC TELEMETRY ROOM

## 2024-06-08 PROCEDURE — 97530 THERAPEUTIC ACTIVITIES: CPT | Mod: CQ

## 2024-06-08 PROCEDURE — 63600175 PHARM REV CODE 636 W HCPCS: Performed by: NURSE PRACTITIONER

## 2024-06-08 PROCEDURE — 25000003 PHARM REV CODE 250: Performed by: HOSPITALIST

## 2024-06-08 PROCEDURE — 36415 COLL VENOUS BLD VENIPUNCTURE: CPT | Performed by: HOSPITALIST

## 2024-06-08 RX ORDER — PROMETHAZINE HYDROCHLORIDE 12.5 MG/1
12.5 TABLET ORAL EVERY 6 HOURS PRN
Status: DISCONTINUED | OUTPATIENT
Start: 2024-06-08 | End: 2024-06-12 | Stop reason: HOSPADM

## 2024-06-08 RX ADMIN — AMPHOTERICIN B 250 MG: 50 INJECTABLE, LIPOSOMAL INTRAVENOUS at 02:06

## 2024-06-08 RX ADMIN — PROMETHAZINE HYDROCHLORIDE 12.5 MG: 12.5 TABLET ORAL at 04:06

## 2024-06-08 RX ADMIN — ANASTROZOLE 1 MG: 1 TABLET, COATED ORAL at 08:06

## 2024-06-08 RX ADMIN — SODIUM CHLORIDE: 9 INJECTION, SOLUTION INTRAVENOUS at 02:06

## 2024-06-08 RX ADMIN — ASPIRIN 81 MG: 81 TABLET, COATED ORAL at 08:06

## 2024-06-08 RX ADMIN — FLUCYTOSINE 1750 MG: 250 CAPSULE ORAL at 02:06

## 2024-06-08 RX ADMIN — HYDROCHLOROTHIAZIDE 25 MG: 25 TABLET ORAL at 08:06

## 2024-06-08 RX ADMIN — ONDANSETRON 4 MG: 2 INJECTION INTRAMUSCULAR; INTRAVENOUS at 03:06

## 2024-06-08 RX ADMIN — FLUCYTOSINE 1750 MG: 250 CAPSULE ORAL at 08:06

## 2024-06-08 RX ADMIN — LOSARTAN POTASSIUM 100 MG: 50 TABLET, FILM COATED ORAL at 08:06

## 2024-06-08 RX ADMIN — SODIUM CHLORIDE, PRESERVATIVE FREE 10 ML: 5 INJECTION INTRAVENOUS at 05:06

## 2024-06-08 RX ADMIN — SODIUM CHLORIDE, PRESERVATIVE FREE 10 ML: 5 INJECTION INTRAVENOUS at 12:06

## 2024-06-08 RX ADMIN — FLUCYTOSINE 250 MG: 250 CAPSULE ORAL at 05:06

## 2024-06-08 RX ADMIN — SODIUM CHLORIDE: 9 INJECTION, SOLUTION INTRAVENOUS at 08:06

## 2024-06-08 RX ADMIN — ATORVASTATIN CALCIUM 80 MG: 40 TABLET, FILM COATED ORAL at 08:06

## 2024-06-08 RX ADMIN — CEFTRIAXONE 2 G: 2 INJECTION, POWDER, FOR SOLUTION INTRAMUSCULAR; INTRAVENOUS at 04:06

## 2024-06-08 RX ADMIN — AMLODIPINE BESYLATE 10 MG: 10 TABLET ORAL at 08:06

## 2024-06-08 RX ADMIN — PANTOPRAZOLE SODIUM 40 MG: 40 TABLET, DELAYED RELEASE ORAL at 08:06

## 2024-06-08 NOTE — PROGRESS NOTES
"[facility] - Telemetry ([facility])  [facility] Medicine  Progress Note    Patient Name: Anne Farmer  MRN: [id number]  Patient Class: IP- Inpatient   Admission Date: 6/2/2024  Length of Stay: 4 days  Attending Physician: Josesito Cerrato MD  Primary Care Provider: Chiquita Talley MD        Subjective:     Principal Problem:Cryptococcal meningoencephalitis        HPI:  68 y.o. female patient, PMHx: HTN, DM2, HLD, Colon Cx, Sarcoidosis, RLD, Breast Cx, Cardiomyopathy. Presented to the Emergency Department for evaluation of a dizziness which onset 3 days PTA with left eye drift, new on day of arrival. Concerns for a potential stroke. Symptoms are constant and moderate in severity. No mitigating or exacerbating factors reported. Associated sxs include weakness, lightheadedness, and blurry vision. Pt denies diplopia and all other symptoms at this time. Pt denies Hx of stroke or MI. Code stroke called in the ED, CT head: no acute finding. CTA Head/Neck: negative for significant narrowing or stenosis, negative LVO. Neurology consulted. Vitals: 190/87, 62, 18, 98.2F, 99% RA. Abnormal Labs: BNP: 271. Patient is a full code. Placed in observation under the care of Hospital Medicine for management of suspicion of CVA.     Overview/Hospital Course:  6/3/24  NAEON, patient with left eye drift, reports blurry vision some improved today  MRI/MRA obtained yesterday with chronic microvascular changes  Tele-Neurology consulted, recommended repeat MRI  Repeat MRI with and without contrast with thin slices: "Mild scattered leptomeningeal enhancement including overlying the right frontal lobe as well as along the inter hemispheric fissure, left occipital lobe, and interpeduncular cistern with associated mild T2/FLAIR hyperintense signal the seen along the medial aspect of the cerebral peduncles. Findings at the interpeduncular cistern may be affecting cranial nerve 3 which may explain reported oculomotor nerve palsy. Findings " "are nonspecific with differential considerations including meningitis, metastatic disease, and paraneoplastic syndrome."  Patient with hx of breast and colon cancer, follows with Dr. Thompson    6/4/24  NAEON, patient reports improvement in vision today  S/p LP this morning, noted to be traumatic tap  CSF studies with elevated protein and WBCs  Discussed with Neurology, further CSF studies ordered  CSF cytology, mengitits/encephalitis, VDRL, paraneoplastic autoantibody studies ordered    6/5/24  NAEON  Mengitits panel +cryptococcus  Consult ID, started on amphotericin  Left eye deviated, reports blurry vision and headache onset 1 week PTA    6/6/24  NAEON, patient denies new issues/complaints  Started on amphotericin, flucytosine  Ceftriaxone for sinusitis  Appreciate ID assistance  RN at bedside, difficulty obtained PIV, will order PICC line    6/7/24  NAEON, left eye deviation some better today  Patient wishes to leave hospital, advised to stay, not medically ready  Cr 1.2 (0.8 yesterday), started on IVF    6/8/24  NAEON, no new issues  Day #4 of 14 IV amphotericin  Discussed with Dr. Robretson, may be possible to arrange outpatient treatment at infusion center  Consult case management      Review of Systems   All other systems reviewed and are negative.    Objective:     Vital Signs (Most Recent):  Temp: 98.5 °F (36.9 °C) (06/08/24 1248)  Pulse: 67 (06/08/24 1424)  Resp: 16 (06/08/24 1248)  BP: (!) 141/75 (06/08/24 1248)  SpO2: 97 % (06/08/24 1248) Vital Signs (24h Range):  Temp:  [97.7 °F (36.5 °C)-98.9 °F (37.2 °C)] 98.5 °F (36.9 °C)  Pulse:  [48-67] 67  Resp:  [16-18] 16  SpO2:  [94 %-97 %] 97 %  BP: (126-156)/(63-75) 141/75     Weight: 73.2 kg (161 lb 6 oz)  Body mass index is 26.05 kg/m².  No intake or output data in the 24 hours ending 06/08/24 1540          Physical Exam  Vitals and nursing note reviewed.   Constitutional:       General: She is not in acute distress.     Appearance: Normal appearance. She is " normal weight.   Cardiovascular:      Rate and Rhythm: Normal rate and regular rhythm.      Heart sounds: No murmur heard.  Pulmonary:      Effort: Pulmonary effort is normal. No respiratory distress.      Breath sounds: No wheezing.   Neurological:      Mental Status: She is alert and oriented to person, place, and time.      Comments: Left eye deviated   Psychiatric:         Mood and Affect: Mood normal.         Behavior: Behavior normal.             Significant Labs: All pertinent labs within the past 24 hours have been reviewed.  Recent Lab Results         06/08/24  1237   06/08/24  0959   06/08/24  0634   06/07/24  2126        Albumin   4.3           ALP   52           ALT   24           Anion Gap   14           AST   51           BILIRUBIN TOTAL   0.6  Comment: For infants and newborns, interpretation of results should be based  on gestational age, weight and in agreement with clinical  observations.    Premature Infant recommended reference ranges:  Up to 24 hours.............<8.0 mg/dL  Up to 48 hours............<12.0 mg/dL  3-5 days..................<15.0 mg/dL  6-29 days.................<15.0 mg/dL             BUN   20           Calcium   10.0           Chloride   100           CO2   14           Creatinine   1.2           eGFR   49           Glucose   83           Magnesium    2.0           POCT Glucose 72     104   112       Potassium   5.0           PROTEIN TOTAL   8.7           Sodium   128                   Significant Imaging: I have reviewed all pertinent imaging results/findings within the past 24 hours.    X-Ray Chest 1 View   Final Result      As above         Electronically signed by: Cesar Whiteside MD   Date:    06/06/2024   Time:    15:54      MRI Brain W WO Contrast   Final Result      Mild scattered leptomeningeal enhancement including overlying the right frontal lobe as well as along the inter hemispheric fissure, left occipital lobe, and interpeduncular cistern with associated mild T2/FLAIR  hyperintense signal the seen along the medial aspect of the cerebral peduncles.  Findings at the interpeduncular cistern may be affecting cranial nerve 3 which may explain reported oculomotor nerve palsy.  Findings are nonspecific with differential considerations including meningitis, metastatic disease, and paraneoplastic syndrome.      Additional nonspecific white matter changes likely related to chronic microvascular ischemia.      Left paranasal sinus disease.         Electronically signed by: Boris Hwang   Date:    06/03/2024   Time:    16:50      MRA Brain without contrast   Final Result      Complete opacification of the left maxillary sinus.  Small left ethmoid and frontal sinus opacification.  Correlate clinically to sinusitis      No acute infarct.  No restricted diffusion      Moderate subcortical and periventricular FLAIR hyperintensities consistent with chronic microvascular ischemic changes      Left maxillary sinus and mild left ethmoid frontal sinus mucosal thickening.      Moderate atherosclerotic changes as evidenced by luminal irregularities of the intracranial vasculature.         Electronically signed by: Simeon Cutler   Date:    06/02/2024   Time:    17:09      MRI Brain Without Contrast   Final Result      Complete opacification of the left maxillary sinus.  Small left ethmoid and frontal sinus opacification.  Correlate clinically to sinusitis      No acute infarct.  No restricted diffusion      Moderate subcortical and periventricular FLAIR hyperintensities consistent with chronic microvascular ischemic changes      Left maxillary sinus and mild left ethmoid frontal sinus mucosal thickening.      Moderate atherosclerotic changes as evidenced by luminal irregularities of the intracranial vasculature.         Electronically signed by: Simeon Cutler   Date:    06/02/2024   Time:    17:09      CTA Head and Neck (xpd)   Final Result      Scattered atherosclerotic plaque involving the  extracranial carotid system as well as the internal carotid arteries at the level of the cavernous sinus bilaterally.  However no significant narrowing or stenosis.  No vessel occlusions or significant stenosis.      Scattered atherosclerotic plaque of the vertebrobasilar system without significant narrowing or stenosis.      All CT scans at this facility use dose modulation, iterative reconstruction and/or weight based dosing when appropriate to reduce radiation dose to as low as reasonably achievable.         Electronically signed by: Victor Manuel Russell MD   Date:    06/02/2024   Time:    13:07      CT Head Without Contrast   Final Result      No acute intracranial abnormality.  CT aspects score 10.      All CT scans at this facility use dose modulation, iterative reconstructions, and/or weight base dosing when appropriate to reduce radiation dose to as low as reasonably achievable.         Electronically signed by: Victor Manuel Russell MD   Date:    06/02/2024   Time:    11:39      IR Lumbar Puncture Diagnostic    (Results Pending)         Assessment/Plan:      * Cryptococcal meningoencephalitis  S/p LP 6/4/24  Elevated CSF protein and glucose  Meningitis panel +cryptococcus  ID consulted, started on amphotericin 6/5/24    Acute ischemic VBA brainstem stroke, left  Suspicion of acute brainstem CVA vs TIA, continued vision abnormality, denies diplopia  Antithrombotics for secondary stroke prevention: Antiplatelets: Aspirin: 81 mg daily  Clopidogrel: 300 mg loading dose x 1, now  Clopidogrel: 75 mg daily    Statins for secondary stroke prevention and hyperlipidemia, if present:   Statins: Atorvastatin- 40 mg daily    Aggressive risk factor modification: DM, HLD, Exercise     Rehab efforts: The patient has been evaluated by a stroke team provider and the therapy needs have been fully considered based off the presenting complaints and exam findings. The following therapy evaluations are needed: PT evaluate and treat, OT  evaluate and treat, SLP evaluate and treat    Diagnostics ordered/pending: CT scan of head without contrast to asses brain parenchyma, CTA Head to assess vasculature , CTA Neck/Arch to assess vasculature, Lipid Profile to assess cholesterol levels, MRA head to assess vasculature, MRI head without contrast to assess brain parenchyma, TTE to assess cardiac function/status , TSH to assess thyroid function    VTE prophylaxis: None: Reason for No Pharmacological VTE Prophylaxis: Currently on anticoagulation    BP parameters: Infarct: No intervention, SBP <220        Dizziness and giddiness  Presented with dizziness, concern for brainstem infarct    --fall precautions      Malignant neoplasm of lower-outer quadrant of left breast of female, estrogen receptor positive  Followed by oncology, managed with Arimidex    --cont home medication      Hyperlipidemia  Managed with statin    --cont statin  --Lipid panel in AM      DM type 2 with diabetic dyslipidemia  Patient's FSGs are controlled on current medication regimen.  Last A1c reviewed-   Lab Results   Component Value Date    HGBA1C 6.7 (H) 05/10/2024     Most recent fingerstick glucose reviewed-   Recent Labs   Lab 06/02/24  1128   POCTGLUCOSE 124*     Current correctional scale  Low  Maintain anti-hyperglycemic dose as follows-   Antihyperglycemics (From admission, onward)      Start     Stop Route Frequency Ordered    06/02/24 1442  insulin aspart U-100 pen 0-5 Units         -- SubQ Before meals & nightly PRN 06/02/24 1343          Hold Oral hypoglycemics while patient is in the hospital.    Diastolic dysfunction    Euvolemic on exam, elevated BNP on arrival, likely secondary to uncontrolled HTN.    --ECHO    HTN (hypertension)  Chronic, uncontrolled. Latest blood pressure and vitals reviewed-     Temp:  [98.2 °F (36.8 °C)]   Pulse:  [47-63]   Resp:  [13-36]   BP: (175-210)/(81-91)   SpO2:  [88 %-100 %] .   Home meds for hypertension were reviewed and noted below.    Hypertension Medications               amLODIPine (NORVASC) 10 MG tablet TAKE 1 TABLET BY MOUTH EVERY DAY    furosemide (LASIX) 20 MG tablet Take 1 tablet (20 mg total) by mouth daily as needed (edema, swelling, fluid build up). Do not take if BP is below 110/70    hydroCHLOROthiazide (HYDRODIURIL) 25 MG tablet TAKE 1 TABLET BY MOUTH EVERY DAY    losartan (COZAAR) 100 MG tablet TAKE 1 TABLET BY MOUTH EVERY DAY            While in the hospital, will manage blood pressure as follows; Adjust home antihypertensive regimen as follows- Hold for now, permissive HTN    Will utilize p.r.n. blood pressure medication only if patient's blood pressure greater than 220/110 and she develops symptoms such as worsening chest pain or shortness of breath.      VTE Risk Mitigation (From admission, onward)           Ordered     Reason for No Pharmacological VTE Prophylaxis  Once        Question:  Reasons:  Answer:  Risk of Bleeding    06/02/24 1342     IP VTE HIGH RISK PATIENT  Once         06/02/24 1342     Place sequential compression device  Until discontinued         06/02/24 1342                    Discharge Planning   NATALIE: 6/3/2024     Code Status: Full Code   Is the patient medically ready for discharge?:     Reason for patient still in hospital (select all that apply): Patient trending condition, Treatment, Consult recommendations, and Pending disposition  Discharge Plan A: Home, Home Health   Discharge Delays: None known at this time              Josesito Cerrato MD  Department of Hospital Medicine   [facility] [facility] ([facility])

## 2024-06-08 NOTE — SUBJECTIVE & OBJECTIVE
"Interval History:  68 year old woman with cryptococcal meningitis .  She denies headache at this time  06/07-she wants to go home  She is feeling better, tolerating meds  06/08- she continues to improve- denies headache  Review of Systems   Constitutional:  Negative for activity change, appetite change, chills, diaphoresis and fatigue.   Respiratory:  Negative for apnea, choking and chest tightness.      Objective:     Vital Signs (Most Recent):  Temp: 98.2 °F (36.8 °C) (06/07/24 2354)  Pulse: (!) 50 (06/07/24 2354)  Resp: 18 (06/07/24 2354)  BP: (!) 140/66 (06/07/24 2354)  SpO2: 96 % (06/07/24 2354) Vital Signs (24h Range):  Temp:  [97.9 °F (36.6 °C)-98.9 °F (37.2 °C)] 98.2 °F (36.8 °C)  Pulse:  [49-74] 50  Resp:  [16-18] 18  SpO2:  [96 %-99 %] 96 %  BP: (121-167)/(66-79) 140/66     Weight: 73.2 kg (161 lb 6 oz)  Body mass index is 26.05 kg/m².    Estimated Creatinine Clearance: 44.7 mL/min (based on SCr of 1.2 mg/dL).     Physical Exam  Vitals and nursing note reviewed.   HENT:      Head: Normocephalic.   Cardiovascular:      Rate and Rhythm: Normal rate.   Pulmonary:      Effort: Pulmonary effort is normal.   Musculoskeletal:         General: Normal range of motion.      Cervical back: Normal range of motion.   Skin:     General: Skin is warm.   Neurological:      General: No focal deficit present.      Mental Status: She is alert.   Psychiatric:         Mood and Affect: Mood normal.          Significant Labs: Blood Culture: No results for input(s): "LABBLOO" in the last 4320 hours.  BMP:   Recent Labs   Lab 06/07/24  0512      *   K 3.6   CL 93*   CO2 22*   BUN 24*   CREATININE 1.2   CALCIUM 10.8*   MG 2.1     CBC:   Recent Labs   Lab 06/06/24  0435   WBC 4.64   HGB 13.7   HCT 38.9        CMP:   Recent Labs   Lab 06/06/24  0435 06/07/24  0512   * 127*   K 3.3* 3.6   CL 93* 93*   CO2 22* 22*   GLU 97 108   BUN 22 24*   CREATININE 1.2 1.2   CALCIUM 10.9* 10.8*   PROT 8.6* 8.4   ALBUMIN " "4.4 4.2   BILITOT 1.0 0.8   ALKPHOS 46* 46*   AST 20 27   ALT 18 18   ANIONGAP 15 12     Wound Culture: No results for input(s): "LABAERO" in the last 4320 hours.  All pertinent labs within the past 24 hours have been reviewed.    Significant Imaging: I have reviewed all pertinent imaging results/findings within the past 24 hours.  "

## 2024-06-08 NOTE — PT/OT/SLP PROGRESS
Physical Therapy  Treatment    Anne Farmer   MRN: [id number]   Admitting Diagnosis: Cryptococcal meningoencephalitis       PT Start Time: 1205     PT Stop Time: 1230    PT Total Time (min): 25 min       Billable Minutes:  Gait Training 15 and Therapeutic Activity 10    Treatment Type: Treatment  PT/PTA: PTA     Number of PTA visits since last PT visit: 4       General Precautions: Standard, fall, vision impaired  Orthopedic Precautions: N/A  Braces: N/A    Spiritual, Cultural Beliefs, Zoroastrian Practices, Values that Affect Care: no    Subjective:  Communicated with VICKIJEREMIAH prior to session.      Pain/Comfort  Pain Rating 1: 0/10  Pain Rating Post-Intervention 1: 0/10    Treatment and Education:    INITIALLY PATIENT REFUSED TO PARTICIPATE BUT AFTER ENCOURAGEMENT, SHE AGREED    SITTING YAYA UPON ARRIVAL     SIT<-->STAND VC FOR UPPER EXTREMITY PLACEMENT    AMBULATED WITH RW 2 X 200' CG WITH CONVERSATION     PATIENT AMBULATED WITH GOOD JOSE ENRIQUE AND IS MOTIVATED     AM-PAC 6 CLICK MOBILITY  How much help from another person does this patient currently need?   1 = Unable, Total/Dependent Assistance  2 = A lot, Maximum/Moderate Assistance  3 = A little, Minimum/Contact Guard/Supervision  4 = None, Modified [place]/Independent    Turning over in bed (including adjusting bedclothes, sheets and blankets)?:  (NA)  Sitting down on and standing up from a chair with arms (e.g., wheelchair, bedside commode, etc.): 3  Moving from lying on back to sitting on the side of the bed?:  (NA)  Moving to and from a bed to a chair (including a wheelchair)?:  (NA)  Need to walk in hospital room?: 3  Climbing 3-5 steps with a railing?: 1    AM-PAC Raw Score CMS G-Code Modifier Level of Impairment Assistance   6 % Total / Unable   7 - 9 CM 80 - 100% Maximal Assist   10 - 14 CL 60 - 80% Moderate Assist   15 - 19 CK 40 - 60% Moderate Assist   20 - 22 CJ 20 - 40% Minimal Assist   23 CI 1-20% SBA / CGA   24 CH 0% Independent/ Mod  I     Patient left up in chair with all lines intact and call button in reach.    Assessment:  Anne Farmer is a 68 y.o. female with a medical diagnosis of Cryptococcal meningoencephalitis and presents with .    Rehab identified problem list/impairments: weakness, visual deficits, impaired endurance, impaired self care skills, decreased lower extremity function, impaired functional mobility, gait instability    Rehab potential is good.    Activity tolerance: Good    Discharge recommendations: Low Intensity Therapy      Barriers to discharge:      Equipment recommendations: walker, rolling     GOALS:   Multidisciplinary Problems       Physical Therapy Goals          Problem: Physical Therapy    Goal Priority Disciplines Outcome Goal Variances Interventions   Physical Therapy Goal     PT, PT/OT      Description: LTG to be met in 14 days: 6/17/24  Pt will complete bed mobility INDEP.  Pt will complete sit to stand INDEP.  Pt will ambulate 300' NOAH with RW.  Pt will increase AMPAC score by 2 to progress gross functional mobility.                       PLAN:    Patient to be seen 3 x/week to address the above listed problems via gait training, therapeutic activities, therapeutic exercises  Plan of Care expires: 06/17/24  Plan of Care reviewed with: patient         06/08/2024

## 2024-06-08 NOTE — PLAN OF CARE
INITIALLY PATIENT REFUSED TO PARTICIPATE BUT AFTER ENCOURAGEMENT, SHE AGREED    SITTING YAYA UPON ARRIVAL     SIT<-->STAND VC FOR UPPER EXTREMITY PLACEMENT    AMBULATED WITH RW 2 X 200' CG WITH CONVERSATION     PATIENT AMBULATED WITH GOOD JOSE ENRIQUE AND IS MOTIVATED

## 2024-06-08 NOTE — PLAN OF CARE
Pt oriented x4.  VSS.  Pt remained afebrile throughout this shift.   All meds administered per order.   Pt remained free of falls this shift.   Plan of care reviewed. Patient verbalizes understanding.   Pt moving/turning independently.   PBlood glucose monitored, no coverage required.  Bed low, side rails up x 2, wheels locked, call light in reach.   Patient instructed to call for assistance.  Patient education provided  Will continue to monitor.       Problem: Adult Inpatient Plan of Care  Goal: Plan of Care Review  Outcome: Progressing  Goal: Patient-Specific Goal (Individualized)  Outcome: Progressing  Goal: Absence of Hospital-Acquired Illness or Injury  Outcome: Progressing  Goal: Optimal Comfort and Wellbeing  Outcome: Progressing  Goal: Readiness for Transition of Care  Outcome: Progressing     Problem: Infection  Goal: Absence of Infection Signs and Symptoms  Outcome: Progressing     Problem: Stroke, Ischemic (Includes Transient Ischemic Attack)  Goal: Optimal Coping  Outcome: Progressing  Goal: Effective Bowel Elimination  Outcome: Progressing  Goal: Optimal Cerebral Tissue Perfusion  Outcome: Progressing  Goal: Optimal Cognitive Function  Outcome: Progressing  Goal: Improved Communication Skills  Outcome: Progressing  Goal: Optimal Functional Ability  Outcome: Progressing  Goal: Optimal Nutrition Intake  Outcome: Progressing  Goal: Effective Oxygenation and Ventilation  Outcome: Progressing  Goal: Improved Sensorimotor Function  Outcome: Progressing  Goal: Safe and Effective Swallow  Outcome: Progressing  Goal: Effective Urinary Elimination  Outcome: Progressing     Problem: Diabetes Comorbidity  Goal: Blood Glucose Level Within Targeted Range  Outcome: Progressing     Problem: Fall Injury Risk  Goal: Absence of Fall and Fall-Related Injury  Outcome: Progressing

## 2024-06-08 NOTE — ASSESSMENT & PLAN NOTE
CSF PCR -  Varicella Zoster Virus Not Detected Not Detected   Cryptococcus neoformans/gattii Not Detected Detected Abnormal        Will send cryptococcal assay in the blood and csf- will add   Liposomal Amphotericin/Flucytosine.    Will need up to 2 weeks of therapy- she is not HIV Positive and denies contact with pegions   06/06- day 2 /14 of therapy - continue Amphotericin/Flucytosine  She needs close follow up   06/07  It will be challenging to give medication at home due to frequent monitoring of labs - we can discuss with home health about this - another option will be going to infusion center daily where they can then be able to draw blood.  Outpatient Antibiotic Therapy Plan:     Please send referral to [facility].     1) Infection:  Cryptococcal meningitis     2) Discharge Antibiotics:     Intravenous antibiotics: IV Liposomal Amphotericin 250mg daily      3) Therapy Duration:       Estimated end date of IV antibiotics: 06/19/24     4) Outpatient Weekly Labs:     Twice weekly   CBC  CMP   Mag    Please send all labs to [facility] .    5) Outpatient Infectious Diseases Follow-up       06/08- will need close follow up  Continue Amphotericin-/Flucytosine- monitor cbc/cmp

## 2024-06-08 NOTE — SUBJECTIVE & OBJECTIVE
Review of Systems   All other systems reviewed and are negative.    Objective:     Vital Signs (Most Recent):  Temp: 98.5 °F (36.9 °C) (06/08/24 1248)  Pulse: 67 (06/08/24 1424)  Resp: 16 (06/08/24 1248)  BP: (!) 141/75 (06/08/24 1248)  SpO2: 97 % (06/08/24 1248) Vital Signs (24h Range):  Temp:  [97.7 °F (36.5 °C)-98.9 °F (37.2 °C)] 98.5 °F (36.9 °C)  Pulse:  [48-67] 67  Resp:  [16-18] 16  SpO2:  [94 %-97 %] 97 %  BP: (126-156)/(63-75) 141/75     Weight: 73.2 kg (161 lb 6 oz)  Body mass index is 26.05 kg/m².  No intake or output data in the 24 hours ending 06/08/24 1540          Physical Exam  Vitals and nursing note reviewed.   Constitutional:       General: She is not in acute distress.     Appearance: Normal appearance. She is normal weight.   Cardiovascular:      Rate and Rhythm: Normal rate and regular rhythm.      Heart sounds: No murmur heard.  Pulmonary:      Effort: Pulmonary effort is normal. No respiratory distress.      Breath sounds: No wheezing.   Neurological:      Mental Status: She is alert and oriented to person, place, and time.      Comments: Left eye deviated   Psychiatric:         Mood and Affect: Mood normal.         Behavior: Behavior normal.             Significant Labs: All pertinent labs within the past 24 hours have been reviewed.  Recent Lab Results         06/08/24  1237   06/08/24  0959   06/08/24  0634   06/07/24  2126        Albumin   4.3           ALP   52           ALT   24           Anion Gap   14           AST   51           BILIRUBIN TOTAL   0.6  Comment: For infants and newborns, interpretation of results should be based  on gestational age, weight and in agreement with clinical  observations.    Premature Infant recommended reference ranges:  Up to 24 hours.............<8.0 mg/dL  Up to 48 hours............<12.0 mg/dL  3-5 days..................<15.0 mg/dL  6-29 days.................<15.0 mg/dL             BUN   20           Calcium   10.0           Chloride   100            CO2   14           Creatinine   1.2           eGFR   49           Glucose   83           Magnesium    2.0           POCT Glucose 72     104   112       Potassium   5.0           PROTEIN TOTAL   8.7           Sodium   128                   Significant Imaging: I have reviewed all pertinent imaging results/findings within the past 24 hours.    X-Ray Chest 1 View   Final Result      As above         Electronically signed by: Cesar Whiteside MD   Date:    06/06/2024   Time:    15:54      MRI Brain W WO Contrast   Final Result      Mild scattered leptomeningeal enhancement including overlying the right frontal lobe as well as along the inter hemispheric fissure, left occipital lobe, and interpeduncular cistern with associated mild T2/FLAIR hyperintense signal the seen along the medial aspect of the cerebral peduncles.  Findings at the interpeduncular cistern may be affecting cranial nerve 3 which may explain reported oculomotor nerve palsy.  Findings are nonspecific with differential considerations including meningitis, metastatic disease, and paraneoplastic syndrome.      Additional nonspecific white matter changes likely related to chronic microvascular ischemia.      Left paranasal sinus disease.         Electronically signed by: Boris Hwang   Date:    06/03/2024   Time:    16:50      MRA Brain without contrast   Final Result      Complete opacification of the left maxillary sinus.  Small left ethmoid and frontal sinus opacification.  Correlate clinically to sinusitis      No acute infarct.  No restricted diffusion      Moderate subcortical and periventricular FLAIR hyperintensities consistent with chronic microvascular ischemic changes      Left maxillary sinus and mild left ethmoid frontal sinus mucosal thickening.      Moderate atherosclerotic changes as evidenced by luminal irregularities of the intracranial vasculature.         Electronically signed by: Simeon Cutler   Date:    06/02/2024   Time:    17:09       MRI Brain Without Contrast   Final Result      Complete opacification of the left maxillary sinus.  Small left ethmoid and frontal sinus opacification.  Correlate clinically to sinusitis      No acute infarct.  No restricted diffusion      Moderate subcortical and periventricular FLAIR hyperintensities consistent with chronic microvascular ischemic changes      Left maxillary sinus and mild left ethmoid frontal sinus mucosal thickening.      Moderate atherosclerotic changes as evidenced by luminal irregularities of the intracranial vasculature.         Electronically signed by: Simeon Cutler   Date:    06/02/2024   Time:    17:09      CTA Head and Neck (xpd)   Final Result      Scattered atherosclerotic plaque involving the extracranial carotid system as well as the internal carotid arteries at the level of the cavernous sinus bilaterally.  However no significant narrowing or stenosis.  No vessel occlusions or significant stenosis.      Scattered atherosclerotic plaque of the vertebrobasilar system without significant narrowing or stenosis.      All CT scans at this facility use dose modulation, iterative reconstruction and/or weight based dosing when appropriate to reduce radiation dose to as low as reasonably achievable.         Electronically signed by: Victor Manuel Russell MD   Date:    06/02/2024   Time:    13:07      CT Head Without Contrast   Final Result      No acute intracranial abnormality.  CT aspects score 10.      All CT scans at this facility use dose modulation, iterative reconstructions, and/or weight base dosing when appropriate to reduce radiation dose to as low as reasonably achievable.         Electronically signed by: Victor Manuel Russell MD   Date:    06/02/2024   Time:    11:39      IR Lumbar Puncture Diagnostic    (Results Pending)

## 2024-06-08 NOTE — PLAN OF CARE
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   Code Status: Full Code MRN: [id number]   Review of patient's allergies indicates:  No Known Allergies  Past Medical History:   Diagnosis Date    Allergy     Arthritis     Cancer 2016    colon    CHF (congestive heart failure)     Colon cancer 2004    Colon cancer screening 9/21/2015    Diabetes mellitus type 2 in nonobese 3/9/2016    borderline    Diverticulosis     DM (diabetes mellitus) 03/2016    BS didn't check 02/13/2019    DM (diabetes mellitus) 03/2016    BS didn't check 03/04/2020    Hyperlipidemia 10/25/2016    Hypertension     Insomnia     Malignant neoplasm of colon 5/13/2021    Malignant neoplasm of lower-outer quadrant of left breast of female, estrogen receptor positive 6/9/2022      PRN meds    acetaminophen, 650 mg, Q6H PRN  acetaminophen, 650 mg, Daily PRN  bisacodyL, 10 mg, Daily PRN  dextrose 10%, 12.5 g, PRN  dextrose 10%, 25 g, PRN  diphenhydrAMINE, 25 mg, Daily PRN  glucagon (human recombinant), 1 mg, PRN  glucose, 16 g, PRN  glucose, 24 g, PRN  insulin aspart U-100, 0-5 Units, QID (AC + HS) PRN  labetalol, 10 mg, Q15 Min PRN  melatonin, 6 mg, Nightly PRN  ondansetron, 4 mg, Q8H PRN  promethazine, 12.5 mg, Q6H PRN  sodium chloride 0.9%, 10 mL, PRN  sodium chloride 0.9%, 10 mL, PRN      Chart check completed. Will continue plan of care.      Orientation: oriented x 4  Yoni Coma Scale Score: 15     Lead Monitored: Lead II Rhythm: sinus bradycardia Frequency/Ectopy: PVCs  Cardiac/Telemetry Box Number: [id number]  VTE Required Core Measure: (SCDs) Sequential compression device initiated/maintained Last Bowel Movement: 06/08/24  Diet Cardiac  Voiding Characteristics: voids spontaneously without difficulty  Laurent Score: 21  Fall Risk Score: 8  Accucheck []   Freq?      Lines/Drains/Airways       Peripherally Inserted Central Catheter Line  Duration             PICC Double Lumen 06/06/24 5072  right brachial 2 days

## 2024-06-09 LAB
BACTERIA CSF CULT: NO GROWTH
GRAM STN SPEC: NORMAL
POCT GLUCOSE: 109 MG/DL (ref 70–110)
POCT GLUCOSE: 110 MG/DL (ref 70–110)
POCT GLUCOSE: 118 MG/DL (ref 70–110)
POCT GLUCOSE: 127 MG/DL (ref 70–110)

## 2024-06-09 PROCEDURE — A4216 STERILE WATER/SALINE, 10 ML: HCPCS | Performed by: HOSPITALIST

## 2024-06-09 PROCEDURE — 25000003 PHARM REV CODE 250: Performed by: HOSPITALIST

## 2024-06-09 PROCEDURE — 25000003 PHARM REV CODE 250: Performed by: NURSE PRACTITIONER

## 2024-06-09 PROCEDURE — 21400001 HC TELEMETRY ROOM

## 2024-06-09 PROCEDURE — 63600175 PHARM REV CODE 636 W HCPCS: Performed by: NURSE PRACTITIONER

## 2024-06-09 PROCEDURE — 99233 SBSQ HOSP IP/OBS HIGH 50: CPT | Mod: NSCH,,, | Performed by: INTERNAL MEDICINE

## 2024-06-09 PROCEDURE — 63600175 PHARM REV CODE 636 W HCPCS: Performed by: INTERNAL MEDICINE

## 2024-06-09 PROCEDURE — 25000003 PHARM REV CODE 250: Performed by: INTERNAL MEDICINE

## 2024-06-09 RX ADMIN — SODIUM CHLORIDE: 9 INJECTION, SOLUTION INTRAVENOUS at 02:06

## 2024-06-09 RX ADMIN — SODIUM CHLORIDE, PRESERVATIVE FREE 10 ML: 5 INJECTION INTRAVENOUS at 06:06

## 2024-06-09 RX ADMIN — ANASTROZOLE 1 MG: 1 TABLET, COATED ORAL at 08:06

## 2024-06-09 RX ADMIN — FLUCYTOSINE 1750 MG: 250 CAPSULE ORAL at 02:06

## 2024-06-09 RX ADMIN — ONDANSETRON 4 MG: 2 INJECTION INTRAMUSCULAR; INTRAVENOUS at 09:06

## 2024-06-09 RX ADMIN — DIPHENHYDRAMINE HYDROCHLORIDE 25 MG: 50 INJECTION, SOLUTION INTRAMUSCULAR; INTRAVENOUS at 09:06

## 2024-06-09 RX ADMIN — HYDROCHLOROTHIAZIDE 25 MG: 25 TABLET ORAL at 08:06

## 2024-06-09 RX ADMIN — CEFTRIAXONE 2 G: 2 INJECTION, POWDER, FOR SOLUTION INTRAMUSCULAR; INTRAVENOUS at 05:06

## 2024-06-09 RX ADMIN — FLUCYTOSINE 1750 MG: 250 CAPSULE ORAL at 01:06

## 2024-06-09 RX ADMIN — FLUCYTOSINE 1750 MG: 250 CAPSULE ORAL at 09:06

## 2024-06-09 RX ADMIN — FLUCYTOSINE 1750 MG: 250 CAPSULE ORAL at 08:06

## 2024-06-09 RX ADMIN — AMLODIPINE BESYLATE 10 MG: 10 TABLET ORAL at 08:06

## 2024-06-09 RX ADMIN — SODIUM CHLORIDE, PRESERVATIVE FREE 10 ML: 5 INJECTION INTRAVENOUS at 12:06

## 2024-06-09 RX ADMIN — AMPHOTERICIN B 250 MG: 50 INJECTABLE, LIPOSOMAL INTRAVENOUS at 02:06

## 2024-06-09 RX ADMIN — Medication 6 MG: at 09:06

## 2024-06-09 RX ADMIN — PROMETHAZINE HYDROCHLORIDE 12.5 MG: 12.5 TABLET ORAL at 04:06

## 2024-06-09 RX ADMIN — ASPIRIN 81 MG: 81 TABLET, COATED ORAL at 08:06

## 2024-06-09 RX ADMIN — PANTOPRAZOLE SODIUM 40 MG: 40 TABLET, DELAYED RELEASE ORAL at 08:06

## 2024-06-09 RX ADMIN — LOSARTAN POTASSIUM 100 MG: 50 TABLET, FILM COATED ORAL at 08:06

## 2024-06-09 RX ADMIN — ATORVASTATIN CALCIUM 80 MG: 40 TABLET, FILM COATED ORAL at 09:06

## 2024-06-09 NOTE — ASSESSMENT & PLAN NOTE
CSF PCR -  Varicella Zoster Virus Not Detected Not Detected   Cryptococcus neoformans/gattii Not Detected Detected Abnormal        Will send cryptococcal assay in the blood and csf- will add   Liposomal Amphotericin/Flucytosine.    Will need up to 2 weeks of therapy- she is not HIV Positive and denies contact with pegions   06/06- day 2 /14 of therapy - continue Amphotericin/Flucytosine  She needs close follow up   06/07  It will be challenging to give medication at home due to frequent monitoring of labs - we can discuss with home health about this - another option will be going to infusion center daily where they can then be able to draw blood.  Outpatient Antibiotic Therapy Plan:     Please send referral to [facility].     1) Infection:  Cryptococcal meningitis     2) Discharge Antibiotics:     Intravenous antibiotics: IV Liposomal Amphotericin 250mg daily      3) Therapy Duration:       Estimated end date of IV antibiotics: 06/19/24     4) Outpatient Weekly Labs:     Twice weekly   CBC  CMP   Mag    Please send all labs to [facility] .    5) Outpatient Infectious Diseases Follow-up       06/08- will need close follow up  Continue Amphotericin-/Flucytosine- monitor cbc/cmp     06/09- she is tolerating meds -  Awaiting final discharge plans  On Amphotericin /Flucytosine

## 2024-06-09 NOTE — SUBJECTIVE & OBJECTIVE
"Interval History:  68 year old woman with cryptococcal meningitis .  She denies headache at this time  06/07-she wants to go home  She is feeling better, tolerating meds  06/08- she continues to improve- denies headache  06/09- tolerating meds  Denies headache  Review of Systems   Constitutional:  Negative for activity change, appetite change, chills, diaphoresis and fatigue.   Respiratory:  Negative for apnea, choking and chest tightness.      Objective:     Vital Signs (Most Recent):  Temp: 98.1 °F (36.7 °C) (06/09/24 1613)  Pulse: (!) 52 (06/09/24 1613)  Resp: 18 (06/09/24 1613)  BP: (!) 152/69 (06/09/24 1613)  SpO2: 98 % (06/09/24 1613) Vital Signs (24h Range):  Temp:  [97.9 °F (36.6 °C)-98.6 °F (37 °C)] 98.1 °F (36.7 °C)  Pulse:  [45-76] 52  Resp:  [14-18] 18  SpO2:  [94 %-98 %] 98 %  BP: (125-154)/(65-70) 152/69     Weight: 73.2 kg (161 lb 6 oz)  Body mass index is 26.05 kg/m².    Estimated Creatinine Clearance: 44.7 mL/min (based on SCr of 1.2 mg/dL).     Physical Exam  Vitals and nursing note reviewed.   HENT:      Head: Normocephalic.   Cardiovascular:      Rate and Rhythm: Normal rate.   Pulmonary:      Effort: Pulmonary effort is normal.   Musculoskeletal:         General: Normal range of motion.      Cervical back: Normal range of motion.   Skin:     General: Skin is warm.   Neurological:      General: No focal deficit present.      Mental Status: She is alert.   Psychiatric:         Mood and Affect: Mood normal.          Significant Labs: Blood Culture: No results for input(s): "LABBLOO" in the last 4320 hours.  BMP:   Recent Labs   Lab 06/08/24  0959   GLU 83   *   K 5.0      CO2 14*   BUN 20   CREATININE 1.2   CALCIUM 10.0   MG 2.0     CBC:   No results for input(s): "WBC", "HGB", "HCT", "PLT" in the last 48 hours.    CMP:   Recent Labs   Lab 06/08/24  0959   *   K 5.0      CO2 14*   GLU 83   BUN 20   CREATININE 1.2   CALCIUM 10.0   PROT 8.7*   ALBUMIN 4.3   BILITOT 0.6 " "  ALKPHOS 52*   AST 51*   ALT 24   ANIONGAP 14     Wound Culture: No results for input(s): "LABAERO" in the last 4320 hours.  All pertinent labs within the past 24 hours have been reviewed.    Significant Imaging: I have reviewed all pertinent imaging results/findings within the past 24 hours.  "

## 2024-06-09 NOTE — PROGRESS NOTES
[facility] - Telemetry ([facility])  Infectious Disease  Progress Note    Patient Name: Anne Farmer  MRN: [id number]  Admission Date: 6/2/2024  Length of Stay: 5 days  Attending Physician: Josesito Cerrato MD  Primary Care Provider: Chiquita Talley MD    Isolation Status: No active isolations  Assessment/Plan:      Ophtho  CN III palsy, left  Follow neurology     Cardiac/Vascular  DM type 2 with diabetic dyslipidemia  Insulin regime as per primary team    ID  * Cryptococcal meningoencephalitis  CSF PCR -  Varicella Zoster Virus Not Detected Not Detected   Cryptococcus neoformans/gattii Not Detected Detected Abnormal        Will send cryptococcal assay in the blood and csf- will add   Liposomal Amphotericin/Flucytosine.    Will need up to 2 weeks of therapy- she is not HIV Positive and denies contact with pegions   06/06- day 2 /14 of therapy - continue Amphotericin/Flucytosine  She needs close follow up   06/07  It will be challenging to give medication at home due to frequent monitoring of labs - we can discuss with home health about this - another option will be going to infusion center daily where they can then be able to draw blood.  Outpatient Antibiotic Therapy Plan:     Please send referral to [facility].     1) Infection:  Cryptococcal meningitis     2) Discharge Antibiotics:     Intravenous antibiotics: IV Liposomal Amphotericin 250mg daily      3) Therapy Duration:       Estimated end date of IV antibiotics: 06/19/24     4) Outpatient Weekly Labs:     Twice weekly   CBC  CMP   Mag    Please send all labs to [facility] .    5) Outpatient Infectious Diseases Follow-up       06/08- will need close follow up  Continue Amphotericin-/Flucytosine- monitor cbc/cmp     Oncology  Malignant neoplasm of lower-outer quadrant of left breast of female, estrogen receptor positive  Oncology follow up        Anticipated Disposition:     Thank you for your consult. I will follow-up with patient. Please  contact us if you have any additional questions.    Rich Robertson MD, [facility]  Infectious Disease  [facility] - Telemetry ([facility])    Subjective:     Principal Problem:Cryptococcal meningoencephalitis    HPI: 68 year old  female patient,-with history of hypertension ,, DM2, HLD, Colon Cx, Sarcoidosis, RLD, Breast Cx, Cardiomyopathy.     She was admitted with dizziness ,left eye drift  and CVA evaluation.  She had LP that showed cryptococcal infection.      Labs and imaging test   Cryptococcus neoformans/gattii Not Detected Detected Abnormal      Component Ref Range & Units 2 d ago   Heme Aliquot mL 2.0   Appearance, CSF Clear Slightly hazy Abnormal    Color, CSF Colorless Colorless   WBC, CSF 0 - 5 /cu mm 272 High    RBC, CSF 0 /cu mm 619 Abnormal    Segmented Neutrophils, CSF 0 - 6 % 22 High    MRI_Mild scattered leptomeningeal enhancement including overlying the right frontal lobe as well as along the inter hemispheric fissure, left occipital lobe, and interpeduncular cistern with associated mild T2/FLAIR hyperintense signal the seen along the medial aspect of the cerebral peduncles.  Findings at the interpeduncular cistern may be affecting cranial nerve 3 which may explain reported oculomotor nerve palsy.  Findings are nonspecific with differential considerations including meningitis, metastatic disease, and paraneoplastic syndrome.     Additional nonspecific white matter changes likely related to chronic microvascular ischemia.     Left paranasal sinus disease.     Interval History:  68 year old woman with cryptococcal meningitis .  She denies headache at this time  06/07-she wants to go home  She is feeling better, tolerating meds  06/08- she continues to improve- denies headache  Review of Systems   Constitutional:  Negative for activity change, appetite change, chills, diaphoresis and fatigue.   Respiratory:  Negative for apnea, choking and chest tightness.      Objective:     Vital Signs (Most Recent):  Temp:  "98.2 °F (36.8 °C) (06/07/24 2354)  Pulse: (!) 50 (06/07/24 2354)  Resp: 18 (06/07/24 2354)  BP: (!) 140/66 (06/07/24 2354)  SpO2: 96 % (06/07/24 2354) Vital Signs (24h Range):  Temp:  [97.9 °F (36.6 °C)-98.9 °F (37.2 °C)] 98.2 °F (36.8 °C)  Pulse:  [49-74] 50  Resp:  [16-18] 18  SpO2:  [96 %-99 %] 96 %  BP: (121-167)/(66-79) 140/66     Weight: 73.2 kg (161 lb 6 oz)  Body mass index is 26.05 kg/m².    Estimated Creatinine Clearance: 44.7 mL/min (based on SCr of 1.2 mg/dL).     Physical Exam  Vitals and nursing note reviewed.   HENT:      Head: Normocephalic.   Cardiovascular:      Rate and Rhythm: Normal rate.   Pulmonary:      Effort: Pulmonary effort is normal.   Musculoskeletal:         General: Normal range of motion.      Cervical back: Normal range of motion.   Skin:     General: Skin is warm.   Neurological:      General: No focal deficit present.      Mental Status: She is alert.   Psychiatric:         Mood and Affect: Mood normal.          Significant Labs: Blood Culture: No results for input(s): "LABBLOO" in the last 4320 hours.  BMP:   Recent Labs   Lab 06/07/24  0512      *   K 3.6   CL 93*   CO2 22*   BUN 24*   CREATININE 1.2   CALCIUM 10.8*   MG 2.1     CBC:   Recent Labs   Lab 06/06/24 0435   WBC 4.64   HGB 13.7   HCT 38.9        CMP:   Recent Labs   Lab 06/06/24 0435 06/07/24  0512   * 127*   K 3.3* 3.6   CL 93* 93*   CO2 22* 22*   GLU 97 108   BUN 22 24*   CREATININE 1.2 1.2   CALCIUM 10.9* 10.8*   PROT 8.6* 8.4   ALBUMIN 4.4 4.2   BILITOT 1.0 0.8   ALKPHOS 46* 46*   AST 20 27   ALT 18 18   ANIONGAP 15 12     Wound Culture: No results for input(s): "LABAERO" in the last 4320 hours.  All pertinent labs within the past 24 hours have been reviewed.    Significant Imaging: I have reviewed all pertinent imaging results/findings within the past 24 hours.  "

## 2024-06-09 NOTE — PROGRESS NOTES
[facility] - Tele[facility] ([facility])  Infectious Disease  Progress Note    Patient Name: Anne Farmer  MRN: [id number]  Admission Date: 6/2/2024  Length of Stay: 5 days  Attending Physician: Josesito Cerrato MD  Primary Care Provider: Chiquita Talley MD    Isolation Status: No active isolations  Assessment/Plan:      Ophtho  CN III palsy, left  Follow neurology     ENT  Sinusitis  Will add Rocephin    06/09/24- will continue Rocephin    Cardiac/Vascular  DM type 2 with diabetic dyslipidemia  Insulin regime as per primary team    ID  * Cryptococcal meningoencephalitis  CSF PCR -  Varicella Zoster Virus Not Detected Not Detected   Cryptococcus neoformans/gattii Not Detected Detected Abnormal        Will send cryptococcal assay in the blood and csf- will add   Liposomal Amphotericin/Flucytosine.    Will need up to 2 weeks of therapy- she is not HIV Positive and denies contact with pegions   06/06- day 2 /14 of therapy - continue Amphotericin/Flucytosine  She needs close follow up   06/07  It will be challenging to give medication at home due to frequent monitoring of labs - we can discuss with home health about this - another option will be going to infusion center daily where they can then be able to draw blood.  Outpatient Antibiotic Therapy Plan:     Please send referral to [facility].     1) Infection:  Cryptococcal meningitis     2) Discharge Antibiotics:     Intravenous antibiotics: IV Liposomal Amphotericin 250mg daily      3) Therapy Duration:       Estimated end date of IV antibiotics: 06/19/24     4) Outpatient Weekly Labs:     Twice weekly   CBC  CMP   Mag    Please send all labs to [facility] .    5) Outpatient Infectious Diseases Follow-up       06/08- will need close follow up  Continue Amphotericin-/Flucytosine- monitor cbc/cmp     06/09- she is tolerating meds -  Awaiting final discharge plans  On Amphotericin /Flucytosine        Anticipated Disposition:     Thank you for your consult.  I will follow-up with patient. Please contact us if you have any additional questions.    Rich Robertson MD, [facility]  Infectious Disease  [facility] - Tele[facility] ([facility])    Subjective:     Principal Problem:Cryptococcal meningoencephalitis    HPI: 68 year old  female patient,-with history of hypertension ,, DM2, HLD, Colon Cx, Sarcoidosis, RLD, Breast Cx, Cardiomyopathy.     She was admitted with dizziness ,left eye drift  and CVA evaluation.  She had LP that showed cryptococcal infection.      Labs and imaging test   Cryptococcus neoformans/gattii Not Detected Detected Abnormal      Component Ref Range & Units 2 d ago   Heme Aliquot mL 2.0   Appearance, CSF Clear Slightly hazy Abnormal    Color, CSF Colorless Colorless   WBC, CSF 0 - 5 /cu mm 272 High    RBC, CSF 0 /cu mm 619 Abnormal    Segmented Neutrophils, CSF 0 - 6 % 22 High    MRI_Mild scattered leptomeningeal enhancement including overlying the right frontal lobe as well as along the inter hemispheric fissure, left occipital lobe, and interpeduncular cistern with associated mild T2/FLAIR hyperintense signal the seen along the medial aspect of the cerebral peduncles.  Findings at the interpeduncular cistern may be affecting cranial nerve 3 which may explain reported oculomotor nerve palsy.  Findings are nonspecific with differential considerations including meningitis, metastatic disease, and paraneoplastic syndrome.     Additional nonspecific white matter changes likely related to chronic microvascular ischemia.     Left paranasal sinus disease.     Interval History:  68 year old woman with cryptococcal meningitis .  She denies headache at this time  06/07-she wants to go home  She is feeling better, tolerating meds  06/08- she continues to improve- denies headache  06/09- tolerating meds  Denies headache  Review of Systems   Constitutional:  Negative for activity change, appetite change, chills, diaphoresis and fatigue.   Respiratory:  Negative for apnea,  "choking and chest tightness.      Objective:     Vital Signs (Most Recent):  Temp: 98.1 °F (36.7 °C) (06/09/24 1613)  Pulse: (!) 52 (06/09/24 1613)  Resp: 18 (06/09/24 1613)  BP: (!) 152/69 (06/09/24 1613)  SpO2: 98 % (06/09/24 1613) Vital Signs (24h Range):  Temp:  [97.9 °F (36.6 °C)-98.6 °F (37 °C)] 98.1 °F (36.7 °C)  Pulse:  [45-76] 52  Resp:  [14-18] 18  SpO2:  [94 %-98 %] 98 %  BP: (125-154)/(65-70) 152/69     Weight: 73.2 kg (161 lb 6 oz)  Body mass index is 26.05 kg/m².    Estimated Creatinine Clearance: 44.7 mL/min (based on SCr of 1.2 mg/dL).     Physical Exam  Vitals and nursing note reviewed.   HENT:      Head: Normocephalic.   Cardiovascular:      Rate and Rhythm: Normal rate.   Pulmonary:      Effort: Pulmonary effort is normal.   Musculoskeletal:         General: Normal range of motion.      Cervical back: Normal range of motion.   Skin:     General: Skin is warm.   Neurological:      General: No focal deficit present.      Mental Status: She is alert.   Psychiatric:         Mood and Affect: Mood normal.          Significant Labs: Blood Culture: No results for input(s): "LABBLOO" in the last 4320 hours.  BMP:   Recent Labs   Lab 06/08/24  0959   GLU 83   *   K 5.0      CO2 14*   BUN 20   CREATININE 1.2   CALCIUM 10.0   MG 2.0     CBC:   No results for input(s): "WBC", "HGB", "HCT", "PLT" in the last 48 hours.    CMP:   Recent Labs   Lab 06/08/24  0959   *   K 5.0      CO2 14*   GLU 83   BUN 20   CREATININE 1.2   CALCIUM 10.0   PROT 8.7*   ALBUMIN 4.3   BILITOT 0.6   ALKPHOS 52*   AST 51*   ALT 24   ANIONGAP 14     Wound Culture: No results for input(s): "LABAERO" in the last 4320 hours.  All pertinent labs within the past 24 hours have been reviewed.    Significant Imaging: I have reviewed all pertinent imaging results/findings within the past 24 hours.  "

## 2024-06-09 NOTE — PLAN OF CARE
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   Code Status: Full Code MRN: [id number]   Review of patient's allergies indicates:  No Known Allergies  Past Medical History:   Diagnosis Date    Allergy     Arthritis     Cancer 2016    colon    CHF (congestive heart failure)     Colon cancer 2004    Colon cancer screening 9/21/2015    Diabetes mellitus type 2 in nonobese 3/9/2016    borderline    Diverticulosis     DM (diabetes mellitus) 03/2016    BS didn't check 02/13/2019    DM (diabetes mellitus) 03/2016    BS didn't check 03/04/2020    Hyperlipidemia 10/25/2016    Hypertension     Insomnia     Malignant neoplasm of colon 5/13/2021    Malignant neoplasm of lower-outer quadrant of left breast of female, estrogen receptor positive 6/9/2022      PRN meds    acetaminophen, 650 mg, Q6H PRN  acetaminophen, 650 mg, Daily PRN  bisacodyL, 10 mg, Daily PRN  dextrose 10%, 12.5 g, PRN  dextrose 10%, 25 g, PRN  diphenhydrAMINE, 25 mg, Daily PRN  glucagon (human recombinant), 1 mg, PRN  glucose, 16 g, PRN  glucose, 24 g, PRN  insulin aspart U-100, 0-5 Units, QID (AC + HS) PRN  labetalol, 10 mg, Q15 Min PRN  melatonin, 6 mg, Nightly PRN  ondansetron, 4 mg, Q8H PRN  promethazine, 12.5 mg, Q6H PRN  sodium chloride 0.9%, 10 mL, PRN  sodium chloride 0.9%, 10 mL, PRN      Chart check completed. Will continue plan of care.      Orientation: oriented x 4  Yoni Coma Scale Score: 15     Lead Monitored: Lead II Rhythm: sinus bradycardia Frequency/Ectopy: PVCs  Cardiac/Telemetry Box Number: [id number]  VTE Required Core Measure: (SCDs) Sequential compression device initiated/maintained Last Bowel Movement: 06/08/24  Diet Cardiac  Voiding Characteristics: voids spontaneously without difficulty  Laurent Score: 21  Fall Risk Score: 8  Accucheck []   Freq?      Lines/Drains/Airways       Peripherally Inserted Central Catheter Line  Duration             PICC Double Lumen 06/06/24 8105  right brachial 3 days

## 2024-06-10 LAB
ACE CSF-CCNC: 22.5 U/L (ref 0–2.5)
ALBUMIN SERPL BCP-MCNC: 3.2 G/DL (ref 3.5–5.2)
ALP SERPL-CCNC: 60 U/L (ref 55–135)
ALT SERPL W/O P-5'-P-CCNC: 30 U/L (ref 10–44)
ANION GAP SERPL CALC-SCNC: 12 MMOL/L (ref 8–16)
AST SERPL-CCNC: 36 U/L (ref 10–40)
BILIRUB SERPL-MCNC: 0.2 MG/DL (ref 0.1–1)
BUN SERPL-MCNC: 13 MG/DL (ref 8–23)
CALCIUM SERPL-MCNC: 8.8 MG/DL (ref 8.7–10.5)
CHLORIDE SERPL-SCNC: 101 MMOL/L (ref 95–110)
CO2 SERPL-SCNC: 20 MMOL/L (ref 23–29)
COMMENT: ABNORMAL
CREAT SERPL-MCNC: 1.1 MG/DL (ref 0.5–1.4)
EST. GFR  (NO RACE VARIABLE): 54 ML/MIN/1.73 M^2
FINAL PATHOLOGIC DIAGNOSIS: ABNORMAL
GLUCOSE SERPL-MCNC: 104 MG/DL (ref 70–110)
Lab: ABNORMAL
MAGNESIUM SERPL-MCNC: 1.5 MG/DL (ref 1.6–2.6)
POCT GLUCOSE: 106 MG/DL (ref 70–110)
POCT GLUCOSE: 122 MG/DL (ref 70–110)
POCT GLUCOSE: 129 MG/DL (ref 70–110)
POCT GLUCOSE: 133 MG/DL (ref 70–110)
POTASSIUM SERPL-SCNC: 2.5 MMOL/L (ref 3.5–5.1)
POTASSIUM SERPL-SCNC: 3.2 MMOL/L (ref 3.5–5.1)
PROT SERPL-MCNC: 6.5 G/DL (ref 6–8.4)
SODIUM SERPL-SCNC: 133 MMOL/L (ref 136–145)

## 2024-06-10 PROCEDURE — 63600175 PHARM REV CODE 636 W HCPCS: Performed by: HOSPITALIST

## 2024-06-10 PROCEDURE — 99233 SBSQ HOSP IP/OBS HIGH 50: CPT | Mod: NSCH,,, | Performed by: INTERNAL MEDICINE

## 2024-06-10 PROCEDURE — 80053 COMPREHEN METABOLIC PANEL: CPT | Performed by: HOSPITALIST

## 2024-06-10 PROCEDURE — 84132 ASSAY OF SERUM POTASSIUM: CPT | Performed by: HOSPITALIST

## 2024-06-10 PROCEDURE — 83735 ASSAY OF MAGNESIUM: CPT | Performed by: HOSPITALIST

## 2024-06-10 PROCEDURE — 21400001 HC TELEMETRY ROOM

## 2024-06-10 PROCEDURE — 25000003 PHARM REV CODE 250: Performed by: NURSE PRACTITIONER

## 2024-06-10 PROCEDURE — 25000003 PHARM REV CODE 250: Performed by: HOSPITALIST

## 2024-06-10 PROCEDURE — 63600175 PHARM REV CODE 636 W HCPCS: Mod: JZ,JG | Performed by: INTERNAL MEDICINE

## 2024-06-10 PROCEDURE — A4216 STERILE WATER/SALINE, 10 ML: HCPCS | Performed by: HOSPITALIST

## 2024-06-10 PROCEDURE — 25000003 PHARM REV CODE 250: Performed by: INTERNAL MEDICINE

## 2024-06-10 RX ORDER — POTASSIUM CHLORIDE 7.45 MG/ML
10 INJECTION INTRAVENOUS
Status: COMPLETED | OUTPATIENT
Start: 2024-06-10 | End: 2024-06-10

## 2024-06-10 RX ORDER — FLUCYTOSINE 500 MG/1
25 CAPSULE ORAL
Status: DISCONTINUED | OUTPATIENT
Start: 2024-06-10 | End: 2024-06-12 | Stop reason: HOSPADM

## 2024-06-10 RX ORDER — POTASSIUM CHLORIDE 750 MG/1
10 TABLET, EXTENDED RELEASE ORAL 3 TIMES DAILY
Status: COMPLETED | OUTPATIENT
Start: 2024-06-10 | End: 2024-06-11

## 2024-06-10 RX ORDER — POTASSIUM CHLORIDE 7.45 MG/ML
30 INJECTION INTRAVENOUS EVERY 6 HOURS
Status: DISCONTINUED | OUTPATIENT
Start: 2024-06-10 | End: 2024-06-10

## 2024-06-10 RX ADMIN — FLUCYTOSINE 2000 MG: 500 CAPSULE ORAL at 01:06

## 2024-06-10 RX ADMIN — FLUCYTOSINE 2000 MG: 500 CAPSULE ORAL at 09:06

## 2024-06-10 RX ADMIN — POTASSIUM CHLORIDE 10 MEQ: 7.46 INJECTION, SOLUTION INTRAVENOUS at 02:06

## 2024-06-10 RX ADMIN — FLUCYTOSINE 1750 MG: 250 CAPSULE ORAL at 02:06

## 2024-06-10 RX ADMIN — CEFTRIAXONE 2 G: 2 INJECTION, POWDER, FOR SOLUTION INTRAMUSCULAR; INTRAVENOUS at 05:06

## 2024-06-10 RX ADMIN — SODIUM CHLORIDE, PRESERVATIVE FREE 10 ML: 5 INJECTION INTRAVENOUS at 05:06

## 2024-06-10 RX ADMIN — POTASSIUM CHLORIDE 10 MEQ: 7.46 INJECTION, SOLUTION INTRAVENOUS at 05:06

## 2024-06-10 RX ADMIN — FLUCYTOSINE 1750 MG: 250 CAPSULE ORAL at 08:06

## 2024-06-10 RX ADMIN — LOSARTAN POTASSIUM 100 MG: 50 TABLET, FILM COATED ORAL at 08:06

## 2024-06-10 RX ADMIN — HYDROCHLOROTHIAZIDE 25 MG: 25 TABLET ORAL at 08:06

## 2024-06-10 RX ADMIN — POTASSIUM CHLORIDE 10 MEQ: 7.46 INJECTION, SOLUTION INTRAVENOUS at 01:06

## 2024-06-10 RX ADMIN — PANTOPRAZOLE SODIUM 40 MG: 40 TABLET, DELAYED RELEASE ORAL at 08:06

## 2024-06-10 RX ADMIN — SODIUM CHLORIDE, PRESERVATIVE FREE 10 ML: 5 INJECTION INTRAVENOUS at 11:06

## 2024-06-10 RX ADMIN — POTASSIUM CHLORIDE 10 MEQ: 7.46 INJECTION, SOLUTION INTRAVENOUS at 11:06

## 2024-06-10 RX ADMIN — ANASTROZOLE 1 MG: 1 TABLET, COATED ORAL at 08:06

## 2024-06-10 RX ADMIN — POTASSIUM CHLORIDE 10 MEQ: 750 TABLET, EXTENDED RELEASE ORAL at 09:06

## 2024-06-10 RX ADMIN — ASPIRIN 81 MG: 81 TABLET, COATED ORAL at 08:06

## 2024-06-10 RX ADMIN — AMLODIPINE BESYLATE 10 MG: 10 TABLET ORAL at 08:06

## 2024-06-10 RX ADMIN — ATORVASTATIN CALCIUM 80 MG: 40 TABLET, FILM COATED ORAL at 09:06

## 2024-06-10 RX ADMIN — POTASSIUM CHLORIDE 10 MEQ: 7.46 INJECTION, SOLUTION INTRAVENOUS at 06:06

## 2024-06-10 RX ADMIN — POTASSIUM CHLORIDE 10 MEQ: 7.46 INJECTION, SOLUTION INTRAVENOUS at 09:06

## 2024-06-10 RX ADMIN — Medication 6 MG: at 09:06

## 2024-06-10 RX ADMIN — AMPHOTERICIN B 250 MG: 50 INJECTABLE, LIPOSOMAL INTRAVENOUS at 04:06

## 2024-06-10 NOTE — PROGRESS NOTES
"[facility] - Telemetry ([facility])  [facility] Medicine  Progress Note    Patient Name: Anne Farmer  MRN: [id number]  Patient Class: IP- Inpatient   Admission Date: 6/2/2024  Length of Stay: 6 days  Attending Physician: Josesito Cerrato MD  Primary Care Provider: Chiquita Talley MD        Subjective:     Principal Problem:Cryptococcal meningoencephalitis        HPI:  68 y.o. female patient, PMHx: HTN, DM2, HLD, Colon Cx, Sarcoidosis, RLD, Breast Cx, Cardiomyopathy. Presented to the Emergency Department for evaluation of a dizziness which onset 3 days PTA with left eye drift, new on day of arrival. Concerns for a potential stroke. Symptoms are constant and moderate in severity. No mitigating or exacerbating factors reported. Associated sxs include weakness, lightheadedness, and blurry vision. Pt denies diplopia and all other symptoms at this time. Pt denies Hx of stroke or MI. Code stroke called in the ED, CT head: no acute finding. CTA Head/Neck: negative for significant narrowing or stenosis, negative LVO. Neurology consulted. Vitals: 190/87, 62, 18, 98.2F, 99% RA. Abnormal Labs: BNP: 271. Patient is a full code. Placed in observation under the care of Hospital Medicine for management of suspicion of CVA.     Overview/Hospital Course:  6/3/24  NAEON, patient with left eye drift, reports blurry vision some improved today  MRI/MRA obtained yesterday with chronic microvascular changes  Tele-Neurology consulted, recommended repeat MRI  Repeat MRI with and without contrast with thin slices: "Mild scattered leptomeningeal enhancement including overlying the right frontal lobe as well as along the inter hemispheric fissure, left occipital lobe, and interpeduncular cistern with associated mild T2/FLAIR hyperintense signal the seen along the medial aspect of the cerebral peduncles. Findings at the interpeduncular cistern may be affecting cranial nerve 3 which may explain reported oculomotor nerve palsy. Findings " "are nonspecific with differential considerations including meningitis, metastatic disease, and paraneoplastic syndrome."  Patient with hx of breast and colon cancer, follows with Dr. Thompson    6/4/24  NAEON, patient reports improvement in vision today  S/p LP this morning, noted to be traumatic tap  CSF studies with elevated protein and WBCs  Discussed with Neurology, further CSF studies ordered  CSF cytology, mengitits/encephalitis, VDRL, paraneoplastic autoantibody studies ordered    6/5/24  NAEON  Mengitits panel +cryptococcus  Consult ID, started on amphotericin  Left eye deviated, reports blurry vision and headache onset 1 week PTA    6/6/24  NAEON, patient denies new issues/complaints  Started on amphotericin, flucytosine  Ceftriaxone for sinusitis  Appreciate ID assistance  RN at bedside, difficulty obtained PIV, will order PICC line    6/7/24  NAEON, left eye deviation some better today  Patient wishes to leave hospital, advised to stay, not medically ready  Cr 1.2 (0.8 yesterday), started on IVF    6/8/24  NAEON, no new issues  Day #4 of 14 IV amphotericin  Discussed with Dr. Robertson, may be possible to arrange outpatient treatment at infusion center  Would also need close monitoring of labs due to nephrotoxicity  Consult case management    6/9/24  NAEON, day #5 of IV amphotericin  Headache improving    6/10/24  Day #6 of IV amphotericin  K 2.5, replete IV and PO, recheck this afternoon  Social Work consulted to assist with outpatient IV treatment      Review of Systems   All other systems reviewed and are negative.    Objective:     Vital Signs (Most Recent):  Temp: 98.7 °F (37.1 °C) (06/10/24 1154)  Pulse: (!) 48 (06/10/24 1447)  Resp: 18 (06/10/24 1154)  BP: 139/60 (06/10/24 1154)  SpO2: 99 % (06/10/24 1154) Vital Signs (24h Range):  Temp:  [98.1 °F (36.7 °C)-98.9 °F (37.2 °C)] 98.7 °F (37.1 °C)  Pulse:  [48-72] 48  Resp:  [15-20] 18  SpO2:  [95 %-99 %] 99 %  BP: (139-168)/(60-74) 139/60     Weight: 77.1 " kg (169 lb 15.6 oz)  Body mass index is 27.43 kg/m².    Intake/Output Summary (Last 24 hours) at 6/10/2024 1547  Last data filed at 6/10/2024 1324  Gross per 24 hour   Intake --   Output 4 ml   Net -4 ml             Physical Exam  Vitals and nursing note reviewed.   Constitutional:       General: She is not in acute distress.     Appearance: Normal appearance. She is normal weight.   Cardiovascular:      Rate and Rhythm: Normal rate and regular rhythm.      Heart sounds: No murmur heard.  Pulmonary:      Effort: Pulmonary effort is normal. No respiratory distress.      Breath sounds: No wheezing.   Neurological:      Mental Status: She is alert and oriented to person, place, and time.      Comments: Left eye deviated   Psychiatric:         Mood and Affect: Mood normal.         Behavior: Behavior normal.             Significant Labs: All pertinent labs within the past 24 hours have been reviewed.  Recent Lab Results         06/10/24  1144   06/10/24  0944   06/10/24  0522   06/09/24  2108        Albumin   3.2           ALP   60           ALT   30           Anion Gap   12           AST   36           BILIRUBIN TOTAL   0.2  Comment: For infants and newborns, interpretation of results should be based  on gestational age, weight and in agreement with clinical  observations.    Premature Infant recommended reference ranges:  Up to 24 hours.............<8.0 mg/dL  Up to 48 hours............<12.0 mg/dL  3-5 days..................<15.0 mg/dL  6-29 days.................<15.0 mg/dL             BUN   13           Calcium   8.8           Chloride   101           CO2   20           Creatinine   1.1           eGFR   54           Glucose   104           POCT Glucose 133     129   118       Potassium   2.5  Comment: POTASSIUM  critical result(s) called and verbal readback obtained   from CARLOS GORDON RN by PATRICE 06/10/2024 10:36             PROTEIN TOTAL   6.5           Sodium   133                   Significant Imaging: I have  reviewed all pertinent imaging results/findings within the past 24 hours.    X-Ray Chest 1 View   Final Result      As above         Electronically signed by: Cesar Whiteside MD   Date:    06/06/2024   Time:    15:54      MRI Brain W WO Contrast   Final Result      Mild scattered leptomeningeal enhancement including overlying the right frontal lobe as well as along the inter hemispheric fissure, left occipital lobe, and interpeduncular cistern with associated mild T2/FLAIR hyperintense signal the seen along the medial aspect of the cerebral peduncles.  Findings at the interpeduncular cistern may be affecting cranial nerve 3 which may explain reported oculomotor nerve palsy.  Findings are nonspecific with differential considerations including meningitis, metastatic disease, and paraneoplastic syndrome.      Additional nonspecific white matter changes likely related to chronic microvascular ischemia.      Left paranasal sinus disease.         Electronically signed by: Boris Hwang   Date:    06/03/2024   Time:    16:50      MRA Brain without contrast   Final Result      Complete opacification of the left maxillary sinus.  Small left ethmoid and frontal sinus opacification.  Correlate clinically to sinusitis      No acute infarct.  No restricted diffusion      Moderate subcortical and periventricular FLAIR hyperintensities consistent with chronic microvascular ischemic changes      Left maxillary sinus and mild left ethmoid frontal sinus mucosal thickening.      Moderate atherosclerotic changes as evidenced by luminal irregularities of the intracranial vasculature.         Electronically signed by: Simeon Cutler   Date:    06/02/2024   Time:    17:09      MRI Brain Without Contrast   Final Result      Complete opacification of the left maxillary sinus.  Small left ethmoid and frontal sinus opacification.  Correlate clinically to sinusitis      No acute infarct.  No restricted diffusion      Moderate subcortical and  periventricular FLAIR hyperintensities consistent with chronic microvascular ischemic changes      Left maxillary sinus and mild left ethmoid frontal sinus mucosal thickening.      Moderate atherosclerotic changes as evidenced by luminal irregularities of the intracranial vasculature.         Electronically signed by: Simeon Cutler   Date:    06/02/2024   Time:    17:09      CTA Head and Neck (xpd)   Final Result      Scattered atherosclerotic plaque involving the extracranial carotid system as well as the internal carotid arteries at the level of the cavernous sinus bilaterally.  However no significant narrowing or stenosis.  No vessel occlusions or significant stenosis.      Scattered atherosclerotic plaque of the vertebrobasilar system without significant narrowing or stenosis.      All CT scans at this facility use dose modulation, iterative reconstruction and/or weight based dosing when appropriate to reduce radiation dose to as low as reasonably achievable.         Electronically signed by: Victor Manuel Russell MD   Date:    06/02/2024   Time:    13:07      CT Head Without Contrast   Final Result      No acute intracranial abnormality.  CT aspects score 10.      All CT scans at this facility use dose modulation, iterative reconstructions, and/or weight base dosing when appropriate to reduce radiation dose to as low as reasonably achievable.         Electronically signed by: Victor Manuel Russell MD   Date:    06/02/2024   Time:    11:39      IR Lumbar Puncture Diagnostic    (Results Pending)         Assessment/Plan:      * Cryptococcal meningoencephalitis  S/p LP 6/4/24  Elevated CSF protein and glucose  Meningitis panel +cryptococcus  ID consulted, started on amphotericin 6/5/24    Acute ischemic VBA brainstem stroke, left  Suspicion of acute brainstem CVA vs TIA, continued vision abnormality, denies diplopia  Antithrombotics for secondary stroke prevention: Antiplatelets: Aspirin: 81 mg daily  Clopidogrel: 300 mg  loading dose x 1, now  Clopidogrel: 75 mg daily    Statins for secondary stroke prevention and hyperlipidemia, if present:   Statins: Atorvastatin- 40 mg daily    Aggressive risk factor modification: DM, HLD, Exercise     Rehab efforts: The patient has been evaluated by a stroke team provider and the therapy needs have been fully considered based off the presenting complaints and exam findings. The following therapy evaluations are needed: PT evaluate and treat, OT evaluate and treat, SLP evaluate and treat    Diagnostics ordered/pending: CT scan of head without contrast to asses brain parenchyma, CTA Head to assess vasculature , CTA Neck/Arch to assess vasculature, Lipid Profile to assess cholesterol levels, MRA head to assess vasculature, MRI head without contrast to assess brain parenchyma, TTE to assess cardiac function/status , TSH to assess thyroid function    VTE prophylaxis: None: Reason for No Pharmacological VTE Prophylaxis: Currently on anticoagulation    BP parameters: Infarct: No intervention, SBP <220        Dizziness and giddiness  Presented with dizziness, concern for brainstem infarct    --fall precautions      Malignant neoplasm of lower-outer quadrant of left breast of female, estrogen receptor positive  Followed by oncology, managed with Arimidex    --cont home medication      Hyperlipidemia  Managed with statin    --cont statin  --Lipid panel in AM      DM type 2 with diabetic dyslipidemia  Patient's FSGs are controlled on current medication regimen.  Last A1c reviewed-   Lab Results   Component Value Date    HGBA1C 6.7 (H) 05/10/2024     Most recent fingerstick glucose reviewed-   Recent Labs   Lab 06/02/24  1128   POCTGLUCOSE 124*     Current correctional scale  Low  Maintain anti-hyperglycemic dose as follows-   Antihyperglycemics (From admission, onward)      Start     Stop Route Frequency Ordered    06/02/24 1442  insulin aspart U-100 pen 0-5 Units         -- SubQ Before meals & nightly PRN  06/02/24 1343          Hold Oral hypoglycemics while patient is in the hospital.    Diastolic dysfunction    Euvolemic on exam, elevated BNP on arrival, likely secondary to uncontrolled HTN.    --ECHO    HTN (hypertension)  Chronic, uncontrolled. Latest blood pressure and vitals reviewed-     Temp:  [98.2 °F (36.8 °C)]   Pulse:  [47-63]   Resp:  [13-36]   BP: (175-210)/(81-91)   SpO2:  [88 %-100 %] .   Home meds for hypertension were reviewed and noted below.   Hypertension Medications               amLODIPine (NORVASC) 10 MG tablet TAKE 1 TABLET BY MOUTH EVERY DAY    furosemide (LASIX) 20 MG tablet Take 1 tablet (20 mg total) by mouth daily as needed (edema, swelling, fluid build up). Do not take if BP is below 110/70    hydroCHLOROthiazide (HYDRODIURIL) 25 MG tablet TAKE 1 TABLET BY MOUTH EVERY DAY    losartan (COZAAR) 100 MG tablet TAKE 1 TABLET BY MOUTH EVERY DAY            While in the hospital, will manage blood pressure as follows; Adjust home antihypertensive regimen as follows- Hold for now, permissive HTN    Will utilize p.r.n. blood pressure medication only if patient's blood pressure greater than 220/110 and she develops symptoms such as worsening chest pain or shortness of breath.      VTE Risk Mitigation (From admission, onward)           Ordered     Reason for No Pharmacological VTE Prophylaxis  Once        Question:  Reasons:  Answer:  Risk of Bleeding    06/02/24 1342     IP VTE HIGH RISK PATIENT  Once         06/02/24 1342     Place sequential compression device  Until discontinued         06/02/24 1342                    Discharge Planning   NATALIE: 6/3/2024     Code Status: Full Code   Is the patient medically ready for discharge?:     Reason for patient still in hospital (select all that apply): Patient trending condition, Treatment, Consult recommendations, and Pending disposition  Discharge Plan A: Home Health   Discharge Delays: None known at this time              Josesito Cerrato MD  Department  of [facility] Medicine   [facility] - Tele[facility] ([facility])

## 2024-06-10 NOTE — PLAN OF CARE
[facility]    [facility] is in-network and running benefits.       06/10/24 1117   Post-Acute Status   Post-Acute Authorization IV Infusion   IV Infusion Status Referral(s) sent   Discharge Plan   Discharge Plan A [facility]

## 2024-06-10 NOTE — SUBJECTIVE & OBJECTIVE
Review of Systems   All other systems reviewed and are negative.    Objective:     Vital Signs (Most Recent):  Temp: 98.6 °F (37 °C) (06/09/24 1917)  Pulse: (!) 50 (06/09/24 2000)  Resp: 15 (06/09/24 1917)  BP: (!) 156/69 (06/09/24 1917)  SpO2: 98 % (06/09/24 1917) Vital Signs (24h Range):  Temp:  [97.9 °F (36.6 °C)-98.6 °F (37 °C)] 98.6 °F (37 °C)  Pulse:  [45-76] 50  Resp:  [14-18] 15  SpO2:  [94 %-98 %] 98 %  BP: (125-156)/(65-70) 156/69     Weight: 73.2 kg (161 lb 6 oz)  Body mass index is 26.05 kg/m².  No intake or output data in the 24 hours ending 06/09/24 2140          Physical Exam  Vitals and nursing note reviewed.   Constitutional:       General: She is not in acute distress.     Appearance: Normal appearance. She is normal weight.   Cardiovascular:      Rate and Rhythm: Normal rate and regular rhythm.      Heart sounds: No murmur heard.  Pulmonary:      Effort: Pulmonary effort is normal. No respiratory distress.      Breath sounds: No wheezing.   Neurological:      Mental Status: She is alert and oriented to person, place, and time.      Comments: Left eye deviated   Psychiatric:         Mood and Affect: Mood normal.         Behavior: Behavior normal.             Significant Labs: All pertinent labs within the past 24 hours have been reviewed.  Recent Lab Results         06/09/24  2108   06/09/24  1517   06/09/24  1114   06/09/24  0522        POCT Glucose 118   109   127   110               Significant Imaging: I have reviewed all pertinent imaging results/findings within the past 24 hours.    X-Ray Chest 1 View   Final Result      As above         Electronically signed by: Cesar Whiteside MD   Date:    06/06/2024   Time:    15:54      MRI Brain W WO Contrast   Final Result      Mild scattered leptomeningeal enhancement including overlying the right frontal lobe as well as along the inter hemispheric fissure, left occipital lobe, and interpeduncular cistern with associated mild T2/FLAIR hyperintense  signal the seen along the medial aspect of the cerebral peduncles.  Findings at the interpeduncular cistern may be affecting cranial nerve 3 which may explain reported oculomotor nerve palsy.  Findings are nonspecific with differential considerations including meningitis, metastatic disease, and paraneoplastic syndrome.      Additional nonspecific white matter changes likely related to chronic microvascular ischemia.      Left paranasal sinus disease.         Electronically signed by: Boris Hwang   Date:    06/03/2024   Time:    16:50      MRA Brain without contrast   Final Result      Complete opacification of the left maxillary sinus.  Small left ethmoid and frontal sinus opacification.  Correlate clinically to sinusitis      No acute infarct.  No restricted diffusion      Moderate subcortical and periventricular FLAIR hyperintensities consistent with chronic microvascular ischemic changes      Left maxillary sinus and mild left ethmoid frontal sinus mucosal thickening.      Moderate atherosclerotic changes as evidenced by luminal irregularities of the intracranial vasculature.         Electronically signed by: Simeon Cutler   Date:    06/02/2024   Time:    17:09      MRI Brain Without Contrast   Final Result      Complete opacification of the left maxillary sinus.  Small left ethmoid and frontal sinus opacification.  Correlate clinically to sinusitis      No acute infarct.  No restricted diffusion      Moderate subcortical and periventricular FLAIR hyperintensities consistent with chronic microvascular ischemic changes      Left maxillary sinus and mild left ethmoid frontal sinus mucosal thickening.      Moderate atherosclerotic changes as evidenced by luminal irregularities of the intracranial vasculature.         Electronically signed by: Simeon Cutler   Date:    06/02/2024   Time:    17:09      CTA Head and Neck (xpd)   Final Result      Scattered atherosclerotic plaque involving the extracranial carotid  system as well as the internal carotid arteries at the level of the cavernous sinus bilaterally.  However no significant narrowing or stenosis.  No vessel occlusions or significant stenosis.      Scattered atherosclerotic plaque of the vertebrobasilar system without significant narrowing or stenosis.      All CT scans at this facility use dose modulation, iterative reconstruction and/or weight based dosing when appropriate to reduce radiation dose to as low as reasonably achievable.         Electronically signed by: Victor Manuel Russell MD   Date:    06/02/2024   Time:    13:07      CT Head Without Contrast   Final Result      No acute intracranial abnormality.  CT aspects score 10.      All CT scans at this facility use dose modulation, iterative reconstructions, and/or weight base dosing when appropriate to reduce radiation dose to as low as reasonably achievable.         Electronically signed by: Victor Manuel Russell MD   Date:    06/02/2024   Time:    11:39      IR Lumbar Puncture Diagnostic    (Results Pending)

## 2024-06-10 NOTE — SUBJECTIVE & OBJECTIVE
Review of Systems   All other systems reviewed and are negative.    Objective:     Vital Signs (Most Recent):  Temp: 98.7 °F (37.1 °C) (06/10/24 1154)  Pulse: (!) 48 (06/10/24 1447)  Resp: 18 (06/10/24 1154)  BP: 139/60 (06/10/24 1154)  SpO2: 99 % (06/10/24 1154) Vital Signs (24h Range):  Temp:  [98.1 °F (36.7 °C)-98.9 °F (37.2 °C)] 98.7 °F (37.1 °C)  Pulse:  [48-72] 48  Resp:  [15-20] 18  SpO2:  [95 %-99 %] 99 %  BP: (139-168)/(60-74) 139/60     Weight: 77.1 kg (169 lb 15.6 oz)  Body mass index is 27.43 kg/m².    Intake/Output Summary (Last 24 hours) at 6/10/2024 1547  Last data filed at 6/10/2024 1324  Gross per 24 hour   Intake --   Output 4 ml   Net -4 ml             Physical Exam  Vitals and nursing note reviewed.   Constitutional:       General: She is not in acute distress.     Appearance: Normal appearance. She is normal weight.   Cardiovascular:      Rate and Rhythm: Normal rate and regular rhythm.      Heart sounds: No murmur heard.  Pulmonary:      Effort: Pulmonary effort is normal. No respiratory distress.      Breath sounds: No wheezing.   Neurological:      Mental Status: She is alert and oriented to person, place, and time.      Comments: Left eye deviated   Psychiatric:         Mood and Affect: Mood normal.         Behavior: Behavior normal.             Significant Labs: All pertinent labs within the past 24 hours have been reviewed.  Recent Lab Results         06/10/24  1144   06/10/24  0944   06/10/24  0522   06/09/24  2108        Albumin   3.2           ALP   60           ALT   30           Anion Gap   12           AST   36           BILIRUBIN TOTAL   0.2  Comment: For infants and newborns, interpretation of results should be based  on gestational age, weight and in agreement with clinical  observations.    Premature Infant recommended reference ranges:  Up to 24 hours.............<8.0 mg/dL  Up to 48 hours............<12.0 mg/dL  3-5 days..................<15.0 mg/dL  6-29  days.................<15.0 mg/dL             BUN   13           Calcium   8.8           Chloride   101           CO2   20           Creatinine   1.1           eGFR   54           Glucose   104           POCT Glucose 133     129   118       Potassium   2.5  Comment: POTASSIUM  critical result(s) called and verbal readback obtained   from CARLOS GORDON RN by PARTICE 06/10/2024 10:36             PROTEIN TOTAL   6.5           Sodium   133                   Significant Imaging: I have reviewed all pertinent imaging results/findings within the past 24 hours.    X-Ray Chest 1 View   Final Result      As above         Electronically signed by: Cesar Whiteside MD   Date:    06/06/2024   Time:    15:54      MRI Brain W WO Contrast   Final Result      Mild scattered leptomeningeal enhancement including overlying the right frontal lobe as well as along the inter hemispheric fissure, left occipital lobe, and interpeduncular cistern with associated mild T2/FLAIR hyperintense signal the seen along the medial aspect of the cerebral peduncles.  Findings at the interpeduncular cistern may be affecting cranial nerve 3 which may explain reported oculomotor nerve palsy.  Findings are nonspecific with differential considerations including meningitis, metastatic disease, and paraneoplastic syndrome.      Additional nonspecific white matter changes likely related to chronic microvascular ischemia.      Left paranasal sinus disease.         Electronically signed by: Boris Hwang   Date:    06/03/2024   Time:    16:50      MRA Brain without contrast   Final Result      Complete opacification of the left maxillary sinus.  Small left ethmoid and frontal sinus opacification.  Correlate clinically to sinusitis      No acute infarct.  No restricted diffusion      Moderate subcortical and periventricular FLAIR hyperintensities consistent with chronic microvascular ischemic changes      Left maxillary sinus and mild left ethmoid frontal sinus  mucosal thickening.      Moderate atherosclerotic changes as evidenced by luminal irregularities of the intracranial vasculature.         Electronically signed by: Simeon Cutler   Date:    06/02/2024   Time:    17:09      MRI Brain Without Contrast   Final Result      Complete opacification of the left maxillary sinus.  Small left ethmoid and frontal sinus opacification.  Correlate clinically to sinusitis      No acute infarct.  No restricted diffusion      Moderate subcortical and periventricular FLAIR hyperintensities consistent with chronic microvascular ischemic changes      Left maxillary sinus and mild left ethmoid frontal sinus mucosal thickening.      Moderate atherosclerotic changes as evidenced by luminal irregularities of the intracranial vasculature.         Electronically signed by: iSmeon Cutler   Date:    06/02/2024   Time:    17:09      CTA Head and Neck (xpd)   Final Result      Scattered atherosclerotic plaque involving the extracranial carotid system as well as the internal carotid arteries at the level of the cavernous sinus bilaterally.  However no significant narrowing or stenosis.  No vessel occlusions or significant stenosis.      Scattered atherosclerotic plaque of the vertebrobasilar system without significant narrowing or stenosis.      All CT scans at this facility use dose modulation, iterative reconstruction and/or weight based dosing when appropriate to reduce radiation dose to as low as reasonably achievable.         Electronically signed by: Victor Manuel Russell MD   Date:    06/02/2024   Time:    13:07      CT Head Without Contrast   Final Result      No acute intracranial abnormality.  CT aspects score 10.      All CT scans at this facility use dose modulation, iterative reconstructions, and/or weight base dosing when appropriate to reduce radiation dose to as low as reasonably achievable.         Electronically signed by: Victor Manuel Russell MD   Date:    06/02/2024   Time:    11:39       IR Lumbar Puncture Diagnostic    (Results Pending)

## 2024-06-10 NOTE — PT/OT/SLP PROGRESS
Physical Therapy      Patient Name:  Anne Farmer   MRN:  [id number]    Pt declines ambulation and therapeutic exercises on this date, and notes lack of participation is due to being ready to discharge. Will follow-up for progressive mobility as pt's tolerance allows.

## 2024-06-10 NOTE — PROGRESS NOTES
"[facility] - Telemetry ([facility])  [facility] Medicine  Progress Note    Patient Name: Anne Farmer  MRN: [id number]  Patient Class: IP- Inpatient   Admission Date: 6/2/2024  Length of Stay: 5 days  Attending Physician: Josesito Cerrato MD  Primary Care Provider: Chiquita Talley MD        Subjective:     Principal Problem:Cryptococcal meningoencephalitis        HPI:  68 y.o. female patient, PMHx: HTN, DM2, HLD, Colon Cx, Sarcoidosis, RLD, Breast Cx, Cardiomyopathy. Presented to the Emergency Department for evaluation of a dizziness which onset 3 days PTA with left eye drift, new on day of arrival. Concerns for a potential stroke. Symptoms are constant and moderate in severity. No mitigating or exacerbating factors reported. Associated sxs include weakness, lightheadedness, and blurry vision. Pt denies diplopia and all other symptoms at this time. Pt denies Hx of stroke or MI. Code stroke called in the ED, CT head: no acute finding. CTA Head/Neck: negative for significant narrowing or stenosis, negative LVO. Neurology consulted. Vitals: 190/87, 62, 18, 98.2F, 99% RA. Abnormal Labs: BNP: 271. Patient is a full code. Placed in observation under the care of Hospital Medicine for management of suspicion of CVA.     Overview/Hospital Course:  6/3/24  NAEON, patient with left eye drift, reports blurry vision some improved today  MRI/MRA obtained yesterday with chronic microvascular changes  Tele-Neurology consulted, recommended repeat MRI  Repeat MRI with and without contrast with thin slices: "Mild scattered leptomeningeal enhancement including overlying the right frontal lobe as well as along the inter hemispheric fissure, left occipital lobe, and interpeduncular cistern with associated mild T2/FLAIR hyperintense signal the seen along the medial aspect of the cerebral peduncles. Findings at the interpeduncular cistern may be affecting cranial nerve 3 which may explain reported oculomotor nerve palsy. Findings " "are nonspecific with differential considerations including meningitis, metastatic disease, and paraneoplastic syndrome."  Patient with hx of breast and colon cancer, follows with Dr. Thompson    6/4/24  NAEON, patient reports improvement in vision today  S/p LP this morning, noted to be traumatic tap  CSF studies with elevated protein and WBCs  Discussed with Neurology, further CSF studies ordered  CSF cytology, mengitits/encephalitis, VDRL, paraneoplastic autoantibody studies ordered    6/5/24  NAEON  Mengitits panel +cryptococcus  Consult ID, started on amphotericin  Left eye deviated, reports blurry vision and headache onset 1 week PTA    6/6/24  NAEON, patient denies new issues/complaints  Started on amphotericin, flucytosine  Ceftriaxone for sinusitis  Appreciate ID assistance  RN at bedside, difficulty obtained PIV, will order PICC line    6/7/24  NAEON, left eye deviation some better today  Patient wishes to leave hospital, advised to stay, not medically ready  Cr 1.2 (0.8 yesterday), started on IVF    6/8/24  NAEON, no new issues  Day #4 of 14 IV amphotericin  Discussed with Dr. Robertson, may be possible to arrange outpatient treatment at infusion center  Would also need close monitoring of labs due to nephrotoxicity  Consult case management    6/9/24  NAEON, day #5 of IV amphotericin  Headache improving      Review of Systems   All other systems reviewed and are negative.    Objective:     Vital Signs (Most Recent):  Temp: 98.6 °F (37 °C) (06/09/24 1917)  Pulse: (!) 50 (06/09/24 2000)  Resp: 15 (06/09/24 1917)  BP: (!) 156/69 (06/09/24 1917)  SpO2: 98 % (06/09/24 1917) Vital Signs (24h Range):  Temp:  [97.9 °F (36.6 °C)-98.6 °F (37 °C)] 98.6 °F (37 °C)  Pulse:  [45-76] 50  Resp:  [14-18] 15  SpO2:  [94 %-98 %] 98 %  BP: (125-156)/(65-70) 156/69     Weight: 73.2 kg (161 lb 6 oz)  Body mass index is 26.05 kg/m².  No intake or output data in the 24 hours ending 06/09/24 2140          Physical Exam  Vitals and " nursing note reviewed.   Constitutional:       General: She is not in acute distress.     Appearance: Normal appearance. She is normal weight.   Cardiovascular:      Rate and Rhythm: Normal rate and regular rhythm.      Heart sounds: No murmur heard.  Pulmonary:      Effort: Pulmonary effort is normal. No respiratory distress.      Breath sounds: No wheezing.   Neurological:      Mental Status: She is alert and oriented to person, place, and time.      Comments: Left eye deviated   Psychiatric:         Mood and Affect: Mood normal.         Behavior: Behavior normal.             Significant Labs: All pertinent labs within the past 24 hours have been reviewed.  Recent Lab Results         06/09/24  2108   06/09/24  1517   06/09/24  1114   06/09/24  0522        POCT Glucose 118   109   127   110               Significant Imaging: I have reviewed all pertinent imaging results/findings within the past 24 hours.    X-Ray Chest 1 View   Final Result      As above         Electronically signed by: Cesar Whiteside MD   Date:    06/06/2024   Time:    15:54      MRI Brain W WO Contrast   Final Result      Mild scattered leptomeningeal enhancement including overlying the right frontal lobe as well as along the inter hemispheric fissure, left occipital lobe, and interpeduncular cistern with associated mild T2/FLAIR hyperintense signal the seen along the medial aspect of the cerebral peduncles.  Findings at the interpeduncular cistern may be affecting cranial nerve 3 which may explain reported oculomotor nerve palsy.  Findings are nonspecific with differential considerations including meningitis, metastatic disease, and paraneoplastic syndrome.      Additional nonspecific white matter changes likely related to chronic microvascular ischemia.      Left paranasal sinus disease.         Electronically signed by: Boris Hwang   Date:    06/03/2024   Time:    16:50      MRA Brain without contrast   Final Result      Complete  opacification of the left maxillary sinus.  Small left ethmoid and frontal sinus opacification.  Correlate clinically to sinusitis      No acute infarct.  No restricted diffusion      Moderate subcortical and periventricular FLAIR hyperintensities consistent with chronic microvascular ischemic changes      Left maxillary sinus and mild left ethmoid frontal sinus mucosal thickening.      Moderate atherosclerotic changes as evidenced by luminal irregularities of the intracranial vasculature.         Electronically signed by: Simeon Cutler   Date:    06/02/2024   Time:    17:09      MRI Brain Without Contrast   Final Result      Complete opacification of the left maxillary sinus.  Small left ethmoid and frontal sinus opacification.  Correlate clinically to sinusitis      No acute infarct.  No restricted diffusion      Moderate subcortical and periventricular FLAIR hyperintensities consistent with chronic microvascular ischemic changes      Left maxillary sinus and mild left ethmoid frontal sinus mucosal thickening.      Moderate atherosclerotic changes as evidenced by luminal irregularities of the intracranial vasculature.         Electronically signed by: Simeon Cutler   Date:    06/02/2024   Time:    17:09      CTA Head and Neck (xpd)   Final Result      Scattered atherosclerotic plaque involving the extracranial carotid system as well as the internal carotid arteries at the level of the cavernous sinus bilaterally.  However no significant narrowing or stenosis.  No vessel occlusions or significant stenosis.      Scattered atherosclerotic plaque of the vertebrobasilar system without significant narrowing or stenosis.      All CT scans at this facility use dose modulation, iterative reconstruction and/or weight based dosing when appropriate to reduce radiation dose to as low as reasonably achievable.         Electronically signed by: Victor Manuel Russell MD   Date:    06/02/2024   Time:    13:07      CT Head Without  Contrast   Final Result      No acute intracranial abnormality.  CT aspects score 10.      All CT scans at this facility use dose modulation, iterative reconstructions, and/or weight base dosing when appropriate to reduce radiation dose to as low as reasonably achievable.         Electronically signed by: Victor Manuel Russell MD   Date:    06/02/2024   Time:    11:39      IR Lumbar Puncture Diagnostic    (Results Pending)         Assessment/Plan:      * Cryptococcal meningoencephalitis  S/p LP 6/4/24  Elevated CSF protein and glucose  Meningitis panel +cryptococcus  ID consulted, started on amphotericin 6/5/24    Acute ischemic VBA brainstem stroke, left  Suspicion of acute brainstem CVA vs TIA, continued vision abnormality, denies diplopia  Antithrombotics for secondary stroke prevention: Antiplatelets: Aspirin: 81 mg daily  Clopidogrel: 300 mg loading dose x 1, now  Clopidogrel: 75 mg daily    Statins for secondary stroke prevention and hyperlipidemia, if present:   Statins: Atorvastatin- 40 mg daily    Aggressive risk factor modification: DM, HLD, Exercise     Rehab efforts: The patient has been evaluated by a stroke team provider and the therapy needs have been fully considered based off the presenting complaints and exam findings. The following therapy evaluations are needed: PT evaluate and treat, OT evaluate and treat, SLP evaluate and treat    Diagnostics ordered/pending: CT scan of head without contrast to asses brain parenchyma, CTA Head to assess vasculature , CTA Neck/Arch to assess vasculature, Lipid Profile to assess cholesterol levels, MRA head to assess vasculature, MRI head without contrast to assess brain parenchyma, TTE to assess cardiac function/status , TSH to assess thyroid function    VTE prophylaxis: None: Reason for No Pharmacological VTE Prophylaxis: Currently on anticoagulation    BP parameters: Infarct: No intervention, SBP <220        Dizziness and giddiness  Presented with dizziness, concern  for brainstem infarct    --fall precautions      Malignant neoplasm of lower-outer quadrant of left breast of female, estrogen receptor positive  Followed by oncology, managed with Arimidex    --cont home medication      Hyperlipidemia  Managed with statin    --cont statin  --Lipid panel in AM      DM type 2 with diabetic dyslipidemia  Patient's FSGs are controlled on current medication regimen.  Last A1c reviewed-   Lab Results   Component Value Date    HGBA1C 6.7 (H) 05/10/2024     Most recent fingerstick glucose reviewed-   Recent Labs   Lab 06/02/24  1128   POCTGLUCOSE 124*     Current correctional scale  Low  Maintain anti-hyperglycemic dose as follows-   Antihyperglycemics (From admission, onward)      Start     Stop Route Frequency Ordered    06/02/24 1442  insulin aspart U-100 pen 0-5 Units         -- SubQ Before meals & nightly PRN 06/02/24 1343          Hold Oral hypoglycemics while patient is in the hospital.    Diastolic dysfunction    Euvolemic on exam, elevated BNP on arrival, likely secondary to uncontrolled HTN.    --ECHO    HTN (hypertension)  Chronic, uncontrolled. Latest blood pressure and vitals reviewed-     Temp:  [98.2 °F (36.8 °C)]   Pulse:  [47-63]   Resp:  [13-36]   BP: (175-210)/(81-91)   SpO2:  [88 %-100 %] .   Home meds for hypertension were reviewed and noted below.   Hypertension Medications               amLODIPine (NORVASC) 10 MG tablet TAKE 1 TABLET BY MOUTH EVERY DAY    furosemide (LASIX) 20 MG tablet Take 1 tablet (20 mg total) by mouth daily as needed (edema, swelling, fluid build up). Do not take if BP is below 110/70    hydroCHLOROthiazide (HYDRODIURIL) 25 MG tablet TAKE 1 TABLET BY MOUTH EVERY DAY    losartan (COZAAR) 100 MG tablet TAKE 1 TABLET BY MOUTH EVERY DAY            While in the hospital, will manage blood pressure as follows; Adjust home antihypertensive regimen as follows- Hold for now, permissive HTN    Will utilize p.r.n. blood pressure medication only if  patient's blood pressure greater than 220/110 and she develops symptoms such as worsening chest pain or shortness of breath.      VTE Risk Mitigation (From admission, onward)           Ordered     Reason for No Pharmacological VTE Prophylaxis  Once        Question:  Reasons:  Answer:  Risk of Bleeding    06/02/24 1342     IP VTE HIGH RISK PATIENT  Once         06/02/24 1342     Place sequential compression device  Until discontinued         06/02/24 1342                    Discharge Planning   NATALIE: 6/3/2024     Code Status: Full Code   Is the patient medically ready for discharge?:     Reason for patient still in hospital (select all that apply): Patient trending condition, Treatment, and Consult recommendations  Discharge Plan A: Home, Home Health   Discharge Delays: None known at this time              Josesito Cerrato MD  Department of Hospital Medicine   [facility]

## 2024-06-11 LAB
ANION GAP SERPL CALC-SCNC: 10 MMOL/L (ref 8–16)
BUN SERPL-MCNC: 12 MG/DL (ref 8–23)
CALCIUM SERPL-MCNC: 8.6 MG/DL (ref 8.7–10.5)
CHLORIDE SERPL-SCNC: 100 MMOL/L (ref 95–110)
CO2 SERPL-SCNC: 21 MMOL/L (ref 23–29)
CREAT SERPL-MCNC: 1 MG/DL (ref 0.5–1.4)
EST. GFR  (NO RACE VARIABLE): >60 ML/MIN/1.73 M^2
GLUCOSE SERPL-MCNC: 98 MG/DL (ref 70–110)
POCT GLUCOSE: 122 MG/DL (ref 70–110)
POCT GLUCOSE: 123 MG/DL (ref 70–110)
POCT GLUCOSE: 130 MG/DL (ref 70–110)
POTASSIUM SERPL-SCNC: 3.3 MMOL/L (ref 3.5–5.1)
SODIUM SERPL-SCNC: 131 MMOL/L (ref 136–145)

## 2024-06-11 PROCEDURE — 21400001 HC TELEMETRY ROOM

## 2024-06-11 PROCEDURE — 97530 THERAPEUTIC ACTIVITIES: CPT

## 2024-06-11 PROCEDURE — A4216 STERILE WATER/SALINE, 10 ML: HCPCS | Performed by: HOSPITALIST

## 2024-06-11 PROCEDURE — 63600175 PHARM REV CODE 636 W HCPCS: Mod: JZ,JG | Performed by: HOSPITALIST

## 2024-06-11 PROCEDURE — 99233 SBSQ HOSP IP/OBS HIGH 50: CPT | Mod: NSCH,,, | Performed by: INTERNAL MEDICINE

## 2024-06-11 PROCEDURE — 97116 GAIT TRAINING THERAPY: CPT

## 2024-06-11 PROCEDURE — 25000003 PHARM REV CODE 250: Performed by: HOSPITALIST

## 2024-06-11 PROCEDURE — 63600175 PHARM REV CODE 636 W HCPCS: Performed by: NURSE PRACTITIONER

## 2024-06-11 PROCEDURE — 25000003 PHARM REV CODE 250: Performed by: NURSE PRACTITIONER

## 2024-06-11 PROCEDURE — 63600175 PHARM REV CODE 636 W HCPCS: Performed by: INTERNAL MEDICINE

## 2024-06-11 PROCEDURE — 80048 BASIC METABOLIC PNL TOTAL CA: CPT | Performed by: HOSPITALIST

## 2024-06-11 PROCEDURE — 25000003 PHARM REV CODE 250: Performed by: INTERNAL MEDICINE

## 2024-06-11 RX ORDER — DEXTROSE MONOHYDRATE 50 MG/ML
INJECTION, SOLUTION INTRAVENOUS
Status: DISCONTINUED | OUTPATIENT
Start: 2024-06-11 | End: 2024-06-12 | Stop reason: HOSPADM

## 2024-06-11 RX ORDER — SODIUM CHLORIDE 9 MG/ML
INJECTION, SOLUTION INTRAVENOUS
Status: DISCONTINUED | OUTPATIENT
Start: 2024-06-11 | End: 2024-06-12 | Stop reason: HOSPADM

## 2024-06-11 RX ORDER — MAGNESIUM SULFATE HEPTAHYDRATE 40 MG/ML
2 INJECTION, SOLUTION INTRAVENOUS ONCE
Status: COMPLETED | OUTPATIENT
Start: 2024-06-11 | End: 2024-06-11

## 2024-06-11 RX ORDER — POTASSIUM CHLORIDE 750 MG/1
10 TABLET, EXTENDED RELEASE ORAL 3 TIMES DAILY
Status: COMPLETED | OUTPATIENT
Start: 2024-06-11 | End: 2024-06-12

## 2024-06-11 RX ADMIN — MAGNESIUM SULFATE HEPTAHYDRATE 2 G: 40 INJECTION, SOLUTION INTRAVENOUS at 08:06

## 2024-06-11 RX ADMIN — SODIUM CHLORIDE, PRESERVATIVE FREE 10 ML: 5 INJECTION INTRAVENOUS at 02:06

## 2024-06-11 RX ADMIN — ASPIRIN 81 MG: 81 TABLET, COATED ORAL at 08:06

## 2024-06-11 RX ADMIN — HYDROCHLOROTHIAZIDE 25 MG: 25 TABLET ORAL at 08:06

## 2024-06-11 RX ADMIN — ALTEPLASE 2 MG: 2.2 INJECTION, POWDER, LYOPHILIZED, FOR SOLUTION INTRAVENOUS at 11:06

## 2024-06-11 RX ADMIN — FLUCYTOSINE 2000 MG: 500 CAPSULE ORAL at 08:06

## 2024-06-11 RX ADMIN — FLUCYTOSINE 2000 MG: 500 CAPSULE ORAL at 02:06

## 2024-06-11 RX ADMIN — SODIUM CHLORIDE: 9 INJECTION, SOLUTION INTRAVENOUS at 05:06

## 2024-06-11 RX ADMIN — PANTOPRAZOLE SODIUM 40 MG: 40 TABLET, DELAYED RELEASE ORAL at 08:06

## 2024-06-11 RX ADMIN — SODIUM CHLORIDE, PRESERVATIVE FREE 10 ML: 5 INJECTION INTRAVENOUS at 06:06

## 2024-06-11 RX ADMIN — SODIUM CHLORIDE, PRESERVATIVE FREE 10 ML: 5 INJECTION INTRAVENOUS at 12:06

## 2024-06-11 RX ADMIN — POTASSIUM CHLORIDE 10 MEQ: 750 TABLET, EXTENDED RELEASE ORAL at 08:06

## 2024-06-11 RX ADMIN — POLYETHYLENE GLYCOL 3350 17 G: 17 POWDER, FOR SOLUTION ORAL at 08:06

## 2024-06-11 RX ADMIN — SODIUM CHLORIDE: 9 INJECTION, SOLUTION INTRAVENOUS at 08:06

## 2024-06-11 RX ADMIN — LOSARTAN POTASSIUM 100 MG: 50 TABLET, FILM COATED ORAL at 08:06

## 2024-06-11 RX ADMIN — Medication 6 MG: at 08:06

## 2024-06-11 RX ADMIN — AMLODIPINE BESYLATE 10 MG: 10 TABLET ORAL at 08:06

## 2024-06-11 RX ADMIN — AMPHOTERICIN B 250 MG: 50 INJECTABLE, LIPOSOMAL INTRAVENOUS at 06:06

## 2024-06-11 RX ADMIN — CEFTRIAXONE 2 G: 2 INJECTION, POWDER, FOR SOLUTION INTRAMUSCULAR; INTRAVENOUS at 05:06

## 2024-06-11 RX ADMIN — ONDANSETRON 4 MG: 2 INJECTION INTRAMUSCULAR; INTRAVENOUS at 12:06

## 2024-06-11 RX ADMIN — ANASTROZOLE 1 MG: 1 TABLET, COATED ORAL at 08:06

## 2024-06-11 RX ADMIN — ATORVASTATIN CALCIUM 80 MG: 40 TABLET, FILM COATED ORAL at 08:06

## 2024-06-11 RX ADMIN — POTASSIUM CHLORIDE 10 MEQ: 750 TABLET, EXTENDED RELEASE ORAL at 02:06

## 2024-06-11 RX ADMIN — DEXTROSE MONOHYDRATE: 50 INJECTION, SOLUTION INTRAVENOUS at 06:06

## 2024-06-11 NOTE — PROGRESS NOTES
[facility] Tele[facility] ([facility])  Infectious Disease  Progress Note    Patient Name: Anne Farmer  MRN: [id number]  Admission Date: 6/2/2024  Length of Stay: 7 days  Attending Physician: Josesito Cerrato MD  Primary Care Provider: Chiquita Talley MD    Isolation Status: No active isolations  Assessment/Plan:      Ophtho  CN III palsy, left  Follow neurology     Cardiac/Vascular  DM type 2 with diabetic dyslipidemia  Insulin regime as per primary team    ID  * Cryptococcal meningoencephalitis  CSF PCR -  Varicella Zoster Virus Not Detected Not Detected   Cryptococcus neoformans/gattii Not Detected Detected Abnormal        Will send cryptococcal assay in the blood and csf- will add   Liposomal Amphotericin/Flucytosine.    Will need up to 2 weeks of therapy- she is not HIV Positive and denies contact with pegions   06/06- day 2 /14 of therapy - continue Amphotericin/Flucytosine  She needs close follow up   06/07  It will be challenging to give medication at home due to frequent monitoring of labs - we can discuss with home health about this - another option will be going to infusion center daily where they can then be able to draw blood.  Outpatient Antibiotic Therapy Plan:     Please send referral to [facility].     1) Infection:  Cryptococcal meningitis     2) Discharge Antibiotics:     Intravenous antibiotics: IV Liposomal Amphotericin 250mg daily      3) Therapy Duration:       Estimated end date of IV antibiotics: 06/19/24     4) Outpatient Weekly Labs:     Twice weekly   CBC  CMP   Mag    Please send all labs to [facility] .    5) Outpatient Infectious Diseases Follow-up       06/08- will need close follow up  Continue Amphotericin-/Flucytosine- monitor cbc/cmp     06/09- she is tolerating meds -  Awaiting final discharge plans  On Amphotericin /Flucytosine  06/10- will continue to monitor CMP while on Amphotericin , follow CBC closely  Follow case management for discharge planning      Oncology  Malignant neoplasm of lower-outer quadrant of left breast of female, estrogen receptor positive  Oncology follow up        Anticipated Disposition:     Thank you for your consult. I will follow-up with patient. Please contact us if you have any additional questions.    Rich Robertson MD, [facility]  Infectious Disease  [facility] - Telemetry ([facility])    Subjective:     Principal Problem:Cryptococcal meningoencephalitis    HPI: 68 year old  female patient,-with history of hypertension ,, DM2, HLD, Colon Cx, Sarcoidosis, RLD, Breast Cx, Cardiomyopathy.     She was admitted with dizziness ,left eye drift  and CVA evaluation.  She had LP that showed cryptococcal infection.      Labs and imaging test   Cryptococcus neoformans/gattii Not Detected Detected Abnormal      Component Ref Range & Units 2 d ago   Heme Aliquot mL 2.0   Appearance, CSF Clear Slightly hazy Abnormal    Color, CSF Colorless Colorless   WBC, CSF 0 - 5 /cu mm 272 High    RBC, CSF 0 /cu mm 619 Abnormal    Segmented Neutrophils, CSF 0 - 6 % 22 High    MRI_Mild scattered leptomeningeal enhancement including overlying the right frontal lobe as well as along the inter hemispheric fissure, left occipital lobe, and interpeduncular cistern with associated mild T2/FLAIR hyperintense signal the seen along the medial aspect of the cerebral peduncles.  Findings at the interpeduncular cistern may be affecting cranial nerve 3 which may explain reported oculomotor nerve palsy.  Findings are nonspecific with differential considerations including meningitis, metastatic disease, and paraneoplastic syndrome.     Additional nonspecific white matter changes likely related to chronic microvascular ischemia.     Left paranasal sinus disease.     Interval History:  68 year old woman with cryptococcal meningitis .  She denies headache at this time  06/07-she wants to go home  She is feeling better, tolerating meds  06/08- she continues to improve- denies headache  06/09-  "tolerating meds  Denies headache  06/10- she is awaiting discharge plans for OPAT  Review of Systems   Constitutional:  Negative for activity change, appetite change, chills, diaphoresis and fatigue.   Respiratory:  Negative for apnea, choking and chest tightness.      Objective:     Vital Signs (Most Recent):  Temp: 98 °F (36.7 °C) (06/11/24 0442)  Pulse: 70 (06/11/24 0442)  Resp: 20 (06/11/24 0442)  BP: (!) 160/75 (06/11/24 0442)  SpO2: 96 % (06/11/24 0442) Vital Signs (24h Range):  Temp:  [98 °F (36.7 °C)-98.7 °F (37.1 °C)] 98 °F (36.7 °C)  Pulse:  [48-70] 70  Resp:  [18-20] 20  SpO2:  [95 %-99 %] 96 %  BP: (139-161)/(60-75) 160/75     Weight: 77.1 kg (169 lb 15.6 oz)  Body mass index is 27.43 kg/m².    Estimated Creatinine Clearance: 49.9 mL/min (based on SCr of 1.1 mg/dL).     Physical Exam  Vitals and nursing note reviewed.   HENT:      Head: Normocephalic.   Cardiovascular:      Rate and Rhythm: Normal rate.   Pulmonary:      Effort: Pulmonary effort is normal.   Musculoskeletal:         General: Normal range of motion.      Cervical back: Normal range of motion.   Skin:     General: Skin is warm.   Neurological:      General: No focal deficit present.      Mental Status: She is alert.   Psychiatric:         Mood and Affect: Mood normal.          Significant Labs: Blood Culture: No results for input(s): "LABBLOO" in the last 4320 hours.  BMP:   Recent Labs   Lab 06/10/24  0944 06/10/24  1619     --    *  --    K 2.5* 3.2*     --    CO2 20*  --    BUN 13  --    CREATININE 1.1  --    CALCIUM 8.8  --    MG  --  1.5*     CBC:   No results for input(s): "WBC", "HGB", "HCT", "PLT" in the last 48 hours.    CMP:   Recent Labs   Lab 06/10/24  0944 06/10/24  1619   *  --    K 2.5* 3.2*     --    CO2 20*  --      --    BUN 13  --    CREATININE 1.1  --    CALCIUM 8.8  --    PROT 6.5  --    ALBUMIN 3.2*  --    BILITOT 0.2  --    ALKPHOS 60  --    AST 36  --    ALT 30  --    ANIONGAP " "12  --      Wound Culture: No results for input(s): "LABAERO" in the last 4320 hours.  All pertinent labs within the past 24 hours have been reviewed.    Significant Imaging: I have reviewed all pertinent imaging results/findings within the past 24 hours.  "

## 2024-06-11 NOTE — PLAN OF CARE
POC reviewed with pt. Pt verbalizes understanding of POC. No questions at this time.  AAOx3. NADN.  SB on cardiac monitor.  Pt remains free of falls.  PRN medication given.   No complaints at this time.  Safety measures in place. Will continue to monitor.  Informed pt to call for assistance before getting up. Pt verbalizes understanding.  Hourly rounding and chart check complete.

## 2024-06-11 NOTE — SUBJECTIVE & OBJECTIVE
"Interval History:  68 year old woman with cryptococcal meningitis .  She denies headache at this time  06/07-she wants to go home  She is feeling better, tolerating meds  06/08- she continues to improve- denies headache  06/09- tolerating meds  Denies headache  06/10- she is awaiting discharge plans for OPAT  Review of Systems   Constitutional:  Negative for activity change, appetite change, chills, diaphoresis and fatigue.   Respiratory:  Negative for apnea, choking and chest tightness.      Objective:     Vital Signs (Most Recent):  Temp: 98 °F (36.7 °C) (06/11/24 0442)  Pulse: 70 (06/11/24 0442)  Resp: 20 (06/11/24 0442)  BP: (!) 160/75 (06/11/24 0442)  SpO2: 96 % (06/11/24 0442) Vital Signs (24h Range):  Temp:  [98 °F (36.7 °C)-98.7 °F (37.1 °C)] 98 °F (36.7 °C)  Pulse:  [48-70] 70  Resp:  [18-20] 20  SpO2:  [95 %-99 %] 96 %  BP: (139-161)/(60-75) 160/75     Weight: 77.1 kg (169 lb 15.6 oz)  Body mass index is 27.43 kg/m².    Estimated Creatinine Clearance: 49.9 mL/min (based on SCr of 1.1 mg/dL).     Physical Exam  Vitals and nursing note reviewed.   HENT:      Head: Normocephalic.   Cardiovascular:      Rate and Rhythm: Normal rate.   Pulmonary:      Effort: Pulmonary effort is normal.   Musculoskeletal:         General: Normal range of motion.      Cervical back: Normal range of motion.   Skin:     General: Skin is warm.   Neurological:      General: No focal deficit present.      Mental Status: She is alert.   Psychiatric:         Mood and Affect: Mood normal.          Significant Labs: Blood Culture: No results for input(s): "LABBLOO" in the last 4320 hours.  BMP:   Recent Labs   Lab 06/10/24  0944 06/10/24  1619     --    *  --    K 2.5* 3.2*     --    CO2 20*  --    BUN 13  --    CREATININE 1.1  --    CALCIUM 8.8  --    MG  --  1.5*     CBC:   No results for input(s): "WBC", "HGB", "HCT", "PLT" in the last 48 hours.    CMP:   Recent Labs   Lab 06/10/24  0944 06/10/24  1619   *  -- " "   K 2.5* 3.2*     --    CO2 20*  --      --    BUN 13  --    CREATININE 1.1  --    CALCIUM 8.8  --    PROT 6.5  --    ALBUMIN 3.2*  --    BILITOT 0.2  --    ALKPHOS 60  --    AST 36  --    ALT 30  --    ANIONGAP 12  --      Wound Culture: No results for input(s): "LABAERO" in the last 4320 hours.  All pertinent labs within the past 24 hours have been reviewed.    Significant Imaging: I have reviewed all pertinent imaging results/findings within the past 24 hours.  "

## 2024-06-11 NOTE — PLAN OF CARE
06/11/24 1431   Post-Acute Status   Post-Acute Authorization IV Infusion   IV Infusion Status Pending payor review/awaiting authorization (if required)   Discharge Plan   Discharge Plan A [facility]     Osbaldo Denney with [facility], need prior authorization for IV antibiotics.  They requested this morning and sent as urgent request.

## 2024-06-11 NOTE — ASSESSMENT & PLAN NOTE
CSF PCR -  Varicella Zoster Virus Not Detected Not Detected   Cryptococcus neoformans/gattii Not Detected Detected Abnormal        Will send cryptococcal assay in the blood and csf- will add   Liposomal Amphotericin/Flucytosine.    Will need up to 2 weeks of therapy- she is not HIV Positive and denies contact with pegions   06/06- day 2 /14 of therapy - continue Amphotericin/Flucytosine  She needs close follow up   06/07  It will be challenging to give medication at home due to frequent monitoring of labs - we can discuss with home health about this - another option will be going to infusion center daily where they can then be able to draw blood.  Outpatient Antibiotic Therapy Plan:     Please send referral to [facility].     1) Infection:  Cryptococcal meningitis     2) Discharge Antibiotics:     Intravenous antibiotics: IV Liposomal Amphotericin 250mg daily      3) Therapy Duration:       Estimated end date of IV antibiotics: 06/19/24     4) Outpatient Weekly Labs:     Twice weekly   CBC  CMP   Mag    Please send all labs to [facility] .    5) Outpatient Infectious Diseases Follow-up       06/08- will need close follow up  Continue Amphotericin-/Flucytosine- monitor cbc/cmp     06/09- she is tolerating meds -  Awaiting final discharge plans  On Amphotericin /Flucytosine  06/10- will continue to monitor CMP while on Amphotericin , follow CBC closely  Follow case management for discharge planning

## 2024-06-11 NOTE — PLAN OF CARE
Pt is progressing and willing to participate. CGA for sit>stand and stand>sit; ambulated 200' CGA with no AD. PT d/c rec: low intensity therapy

## 2024-06-11 NOTE — PLAN OF CARE
Pt tolerated session well today. Pt completed sit>stand and bed>chair with CGA. Pt ambulated 100 ft x 2 with no AD requiring CGA. Pt completed grooming with set up assistance. OT still recommending low intensity intervention at d/c.

## 2024-06-11 NOTE — PROGRESS NOTES
"[facility] - Telemetry ([facility])  [facility] Medicine  Progress Note    Patient Name: Anne Farmer  MRN: [id number]  Patient Class: IP- Inpatient   Admission Date: 6/2/2024  Length of Stay: 7 days  Attending Physician: Josesito Cerrato MD  Primary Care Provider: Chiquita Talley MD        Subjective:     Principal Problem:Cryptococcal meningoencephalitis        HPI:  68 y.o. female patient, PMHx: HTN, DM2, HLD, Colon Cx, Sarcoidosis, RLD, Breast Cx, Cardiomyopathy. Presented to the Emergency Department for evaluation of a dizziness which onset 3 days PTA with left eye drift, new on day of arrival. Concerns for a potential stroke. Symptoms are constant and moderate in severity. No mitigating or exacerbating factors reported. Associated sxs include weakness, lightheadedness, and blurry vision. Pt denies diplopia and all other symptoms at this time. Pt denies Hx of stroke or MI. Code stroke called in the ED, CT head: no acute finding. CTA Head/Neck: negative for significant narrowing or stenosis, negative LVO. Neurology consulted. Vitals: 190/87, 62, 18, 98.2F, 99% RA. Abnormal Labs: BNP: 271. Patient is a full code. Placed in observation under the care of Hospital Medicine for management of suspicion of CVA.     Overview/Hospital Course:  6/3/24  NAEON, patient with left eye drift, reports blurry vision some improved today  MRI/MRA obtained yesterday with chronic microvascular changes  Tele-Neurology consulted, recommended repeat MRI  Repeat MRI with and without contrast with thin slices: "Mild scattered leptomeningeal enhancement including overlying the right frontal lobe as well as along the inter hemispheric fissure, left occipital lobe, and interpeduncular cistern with associated mild T2/FLAIR hyperintense signal the seen along the medial aspect of the cerebral peduncles. Findings at the interpeduncular cistern may be affecting cranial nerve 3 which may explain reported oculomotor nerve palsy. Findings " "are nonspecific with differential considerations including meningitis, metastatic disease, and paraneoplastic syndrome."  Patient with hx of breast and colon cancer, follows with Dr. Thompson    6/4/24  NAEON, patient reports improvement in vision today  S/p LP this morning, noted to be traumatic tap  CSF studies with elevated protein and WBCs  Discussed with Neurology, further CSF studies ordered  CSF cytology, mengitits/encephalitis, VDRL, paraneoplastic autoantibody studies ordered    6/5/24  NAEON  Mengitits panel +cryptococcus  Consult ID, started on amphotericin  Left eye deviated, reports blurry vision and headache onset 1 week PTA    6/6/24  NAEON, patient denies new issues/complaints  Started on amphotericin, flucytosine  Ceftriaxone for sinusitis  Appreciate ID assistance  RN at bedside, difficulty obtained PIV, will order PICC line    6/7/24 NAEON, left eye deviation some better today  Patient wishes to leave hospital, advised to stay, not medically ready  Cr 1.2 (0.8 yesterday), started on IVF    6/8/24  NAEON, no new issues  Day #4 of 14 IV amphotericin  Discussed with Dr. Robertson, may be possible to arrange outpatient treatment at infusion center  Would also need close monitoring of labs due to nephrotoxicity  Consult case management    6/9/24  NAEON, day #5 of IV amphotericin  Headache improving    6/10/24  Day #6 of IV amphotericin  K 2.5, replete IV and PO, recheck this afternoon  Social Work consulted to assist with outpatient IV treatment    6/11/24  NAEON, day #7 of amphotericin  K 3.3, continue PO repletion  Home IV infusion arraignments pending per       Review of Systems   All other systems reviewed and are negative.    Objective:     Vital Signs (Most Recent):  Temp: 98.3 °F (36.8 °C) (06/11/24 1618)  Pulse: (!) 47 (06/11/24 1726)  Resp: 16 (06/11/24 1618)  BP: (!) 140/66 (06/11/24 1618)  SpO2: 96 % (06/11/24 1618) Vital Signs (24h Range):  Temp:  [97.6 °F (36.4 °C)-98.6 °F (37 °C)] 98.3 °F " (36.8 °C)  Pulse:  [45-70] 47  Resp:  [16-20] 16  SpO2:  [96 %-99 %] 96 %  BP: (128-161)/(60-75) 140/66     Weight: 77.1 kg (169 lb 15.6 oz)  Body mass index is 27.43 kg/m².    Intake/Output Summary (Last 24 hours) at 6/11/2024 1802  Last data filed at 6/11/2024 1452  Gross per 24 hour   Intake 480 ml   Output 2 ml   Net 478 ml             Physical Exam  Vitals and nursing note reviewed.   Constitutional:       General: She is not in acute distress.     Appearance: Normal appearance. She is normal weight.   Cardiovascular:      Rate and Rhythm: Normal rate and regular rhythm.      Heart sounds: No murmur heard.  Pulmonary:      Effort: Pulmonary effort is normal. No respiratory distress.      Breath sounds: No wheezing.   Neurological:      Mental Status: She is alert and oriented to person, place, and time.      Comments: Left eye deviated   Psychiatric:         Mood and Affect: Mood normal.         Behavior: Behavior normal.             Significant Labs: All pertinent labs within the past 24 hours have been reviewed.  Recent Lab Results  (Last 5 results in the past 24 hours)        06/11/24  1508   06/11/24  1111   06/11/24  0802   06/11/24  0555   06/10/24  2104        Anion Gap     10           BUN     12           Calcium     8.6           Chloride     100           CO2     21           Creatinine     1.0           eGFR     >60           Glucose     98           POCT Glucose 122   123     130   122       Potassium     3.3           Sodium     131                                  Significant Imaging: I have reviewed all pertinent imaging results/findings within the past 24 hours.    X-Ray Chest 1 View   Final Result      As above         Electronically signed by: Cesar Whiteside MD   Date:    06/06/2024   Time:    15:54      MRI Brain W WO Contrast   Final Result      Mild scattered leptomeningeal enhancement including overlying the right frontal lobe as well as along the inter hemispheric fissure, left occipital  lobe, and interpeduncular cistern with associated mild T2/FLAIR hyperintense signal the seen along the medial aspect of the cerebral peduncles.  Findings at the interpeduncular cistern may be affecting cranial nerve 3 which may explain reported oculomotor nerve palsy.  Findings are nonspecific with differential considerations including meningitis, metastatic disease, and paraneoplastic syndrome.      Additional nonspecific white matter changes likely related to chronic microvascular ischemia.      Left paranasal sinus disease.         Electronically signed by: Boris Hwang   Date:    06/03/2024   Time:    16:50      MRA Brain without contrast   Final Result      Complete opacification of the left maxillary sinus.  Small left ethmoid and frontal sinus opacification.  Correlate clinically to sinusitis      No acute infarct.  No restricted diffusion      Moderate subcortical and periventricular FLAIR hyperintensities consistent with chronic microvascular ischemic changes      Left maxillary sinus and mild left ethmoid frontal sinus mucosal thickening.      Moderate atherosclerotic changes as evidenced by luminal irregularities of the intracranial vasculature.         Electronically signed by: Simeon Cutler   Date:    06/02/2024   Time:    17:09      MRI Brain Without Contrast   Final Result      Complete opacification of the left maxillary sinus.  Small left ethmoid and frontal sinus opacification.  Correlate clinically to sinusitis      No acute infarct.  No restricted diffusion      Moderate subcortical and periventricular FLAIR hyperintensities consistent with chronic microvascular ischemic changes      Left maxillary sinus and mild left ethmoid frontal sinus mucosal thickening.      Moderate atherosclerotic changes as evidenced by luminal irregularities of the intracranial vasculature.         Electronically signed by: Simeon Cutler   Date:    06/02/2024   Time:    17:09      CTA Head and Neck (xpd)   Final  Result      Scattered atherosclerotic plaque involving the extracranial carotid system as well as the internal carotid arteries at the level of the cavernous sinus bilaterally.  However no significant narrowing or stenosis.  No vessel occlusions or significant stenosis.      Scattered atherosclerotic plaque of the vertebrobasilar system without significant narrowing or stenosis.      All CT scans at this facility use dose modulation, iterative reconstruction and/or weight based dosing when appropriate to reduce radiation dose to as low as reasonably achievable.         Electronically signed by: Victor Manuel Russell MD   Date:    06/02/2024   Time:    13:07      CT Head Without Contrast   Final Result      No acute intracranial abnormality.  CT aspects score 10.      All CT scans at this facility use dose modulation, iterative reconstructions, and/or weight base dosing when appropriate to reduce radiation dose to as low as reasonably achievable.         Electronically signed by: Victor Manuel Russell MD   Date:    06/02/2024   Time:    11:39      IR Lumbar Puncture Diagnostic    (Results Pending)         Assessment/Plan:      * Cryptococcal meningoencephalitis  S/p LP 6/4/24  Elevated CSF protein and glucose  Meningitis panel +cryptococcus  ID consulted, started on amphotericin 6/5/24    Acute ischemic VBA brainstem stroke, left  Suspicion of acute brainstem CVA vs TIA, continued vision abnormality, denies diplopia  Antithrombotics for secondary stroke prevention: Antiplatelets: Aspirin: 81 mg daily  Clopidogrel: 300 mg loading dose x 1, now  Clopidogrel: 75 mg daily    Statins for secondary stroke prevention and hyperlipidemia, if present:   Statins: Atorvastatin- 40 mg daily    Aggressive risk factor modification: DM, HLD, Exercise     Rehab efforts: The patient has been evaluated by a stroke team provider and the therapy needs have been fully considered based off the presenting complaints and exam findings. The following  therapy evaluations are needed: PT evaluate and treat, OT evaluate and treat, SLP evaluate and treat    Diagnostics ordered/pending: CT scan of head without contrast to asses brain parenchyma, CTA Head to assess vasculature , CTA Neck/Arch to assess vasculature, Lipid Profile to assess cholesterol levels, MRA head to assess vasculature, MRI head without contrast to assess brain parenchyma, TTE to assess cardiac function/status , TSH to assess thyroid function    VTE prophylaxis: None: Reason for No Pharmacological VTE Prophylaxis: Currently on anticoagulation    BP parameters: Infarct: No intervention, SBP <220        Dizziness and giddiness  Presented with dizziness, concern for brainstem infarct    --fall precautions      Malignant neoplasm of lower-outer quadrant of left breast of female, estrogen receptor positive  Followed by oncology, managed with Arimidex    --cont home medication      Hyperlipidemia  Managed with statin    --cont statin  --Lipid panel in AM      DM type 2 with diabetic dyslipidemia  Patient's FSGs are controlled on current medication regimen.  Last A1c reviewed-   Lab Results   Component Value Date    HGBA1C 6.7 (H) 05/10/2024     Most recent fingerstick glucose reviewed-   Recent Labs   Lab 06/02/24  1128   POCTGLUCOSE 124*     Current correctional scale  Low  Maintain anti-hyperglycemic dose as follows-   Antihyperglycemics (From admission, onward)      Start     Stop Route Frequency Ordered    06/02/24 1442  insulin aspart U-100 pen 0-5 Units         -- SubQ Before meals & nightly PRN 06/02/24 1343          Hold Oral hypoglycemics while patient is in the hospital.    Diastolic dysfunction    Euvolemic on exam, elevated BNP on arrival, likely secondary to uncontrolled HTN.    --ECHO    HTN (hypertension)  Chronic, uncontrolled. Latest blood pressure and vitals reviewed-     Temp:  [98.2 °F (36.8 °C)]   Pulse:  [47-63]   Resp:  [13-36]   BP: (175-210)/(81-91)   SpO2:  [88 %-100 %] .   Home  meds for hypertension were reviewed and noted below.   Hypertension Medications               amLODIPine (NORVASC) 10 MG tablet TAKE 1 TABLET BY MOUTH EVERY DAY    furosemide (LASIX) 20 MG tablet Take 1 tablet (20 mg total) by mouth daily as needed (edema, swelling, fluid build up). Do not take if BP is below 110/70    hydroCHLOROthiazide (HYDRODIURIL) 25 MG tablet TAKE 1 TABLET BY MOUTH EVERY DAY    losartan (COZAAR) 100 MG tablet TAKE 1 TABLET BY MOUTH EVERY DAY            While in the hospital, will manage blood pressure as follows; Adjust home antihypertensive regimen as follows- Hold for now, permissive HTN    Will utilize p.r.n. blood pressure medication only if patient's blood pressure greater than 220/110 and she develops symptoms such as worsening chest pain or shortness of breath.      VTE Risk Mitigation (From admission, onward)           Ordered     Reason for No Pharmacological VTE Prophylaxis  Once        Question:  Reasons:  Answer:  Risk of Bleeding    06/02/24 1342     IP VTE HIGH RISK PATIENT  Once         06/02/24 1342     Place sequential compression device  Until discontinued         06/02/24 1342                    Discharge Planning   NATALIE: 6/3/2024     Code Status: Full Code   Is the patient medically ready for discharge?:     Reason for patient still in hospital (select all that apply): Patient trending condition, Laboratory test, Treatment, and Consult recommendations  Discharge Plan A: Home Health   Discharge Delays: None known at this time              Josesito Cerrato MD  Department of Hospital Medicine   [facility] - Telemetry ([facility])

## 2024-06-11 NOTE — PT/OT/SLP PROGRESS
Occupational Therapy   Treatment    Name: Anne Farmre  MRN: [id number]  Admitting Diagnosis:  Cryptococcal meningoencephalitis       Recommendations:     Discharge Recommendations: Low Intensity Therapy  Discharge Equipment Recommendations:  walker, rolling, shower chair  Barriers to discharge:  None    Assessment:     Anne Farmer is a 68 y.o. female with a medical diagnosis of Cryptococcal meningoencephalitis.  She presents with the following performance deficits affecting function are weakness, impaired endurance, impaired self care skills, impaired functional mobility, gait instability, impaired balance, decreased safety awareness, decreased lower extremity function, decreased upper extremity function.     Rehab Prognosis:  Good; patient would benefit from acute skilled OT services to address these deficits and reach maximum level of function.       This patient has a mobility limitation that   Prevents them entirely  from accomplishing MRADL's in customary locations in the home   Places them at reasonable determined heightened risk of mobility or mortality secondary to attempts to perform an MRADL   Prevents them from completing MRADL's in a reasonable time frame  The mobility limitation cannot be sufficiently resolved by the use of a cane.   Patient's functional mobility deficit can be sufficiently resolved with the use of a rolling walker. Patient's mobility limitation significantly impairs their ability to participate in one of more activities of daily living. The use of a rolling walker will significantly improve the patient's ability to participate in MRADLS and the patient will use it on regular basis in the home.     Plan:     Patient to be seen 2 x/week to address the above listed problems via self-care/home management, therapeutic activities, therapeutic exercises  Plan of Care Expires: 06/17/24  Plan of Care Reviewed with: patient    Subjective     Chief Complaint: Pt with no complaints  "and reports she is feeling great  Patient/Family Comments/goals: "I want to go home"  Pain/Comfort:  Pain Rating 1: 0/10    Objective:     Communicated with: her nurse, Sivan, prior to session.  Patient found up in chair with telemetry, peripheral IV upon OT entry to room.    General Precautions: Standard, fall    Orthopedic Precautions:N/A  Braces: N/A  Respiratory Status: Room air     Occupational Performance:     Functional Mobility/Transfers:  Patient completed Sit <> Stand Transfer with contact guard assistance  with  no assistive device   Functional Mobility: Pt ambulated for 100 ft x 2 with CGA without the use of AD. Pt presented with unsteady gait and needed assistance for impaired balance. Pt completed this activity to improve endurance and dynamic standing balance required for ADL completion.     Activities of Daily Living:  Grooming: set up assistance  Pt completed face washing activity with set up assistance, requiring assistance only for item retrieval.       [facility] 6 Click ADL: 21    Treatment & Education:  Pt educated on importance of remaining OOB as much as possible to increase upright sitting tolerance. Pt educated on BUE exercises focused on shoulder flexion, elbow flexion, and elbow extension to increase AROM and strength of BUE. Pt educated on using her call button to call for assistance when getting OOB or back into bed in order to reduce her fall risk. Pt expressed understanding and agreed to the above education.     Patient left up in chair with all lines intact, call button in reach, chair alarm on, and nurse present    GOALS:   Multidisciplinary Problems       Occupational Therapy Goals          Problem: Occupational Therapy    Goal Priority Disciplines Outcome Interventions   Occupational Therapy Goal     OT, PT/OT Progressing    Description: LTG'S TO BE MET IN 14 DAYS (6/17/24)    1)  PATIENT WILL PERFORM UB DRESSING WITH (I) TO DECREASE (D) ON CAREGIVER.    2)  PATIENT WILL PERFORM LB " DRESSING WITH (I) TO DECREASE (D) ON CAREGIVER..    3)  PATIENT WILL PERFORM TOILET T/F WITH (S) TO DECREASE (D) ON CAREGIVER..    4)  PATIENT WILL PERFORM (L) EYE CONVERGENCE HEP WITH (I) AFTER EDUCATION.                       Time Tracking:     OT Date of Treatment: 06/11/24  OT Start Time: 0750  OT Stop Time: 0805  OT Total Time (min): 15 min    Billable Minutes:Therapeutic Activity 15             NAYELI Leija  6/11/2024

## 2024-06-11 NOTE — PT/OT/SLP PROGRESS
"Physical Therapy Treatment    Patient Name:  Anne Farmer   MRN:  [id number]    Recommendations:     Discharge Recommendations: Low Intensity Therapy  Discharge Equipment Recommendations: walker, rolling  Barriers to discharge: None    Assessment:     Anne Farmer is a 68 y.o. female admitted with a medical diagnosis of Cryptococcal meningoencephalitis.  She presents with the following impairments/functional limitations: weakness, visual deficits, impaired endurance, impaired self care skills, impaired functional mobility, gait instability, impaired balance.    Rehab Prognosis: Good; patient would benefit from acute skilled PT services to address these deficits and reach maximum level of function.    Recent Surgery: * No surgery found *      Plan:     During this hospitalization, patient to be seen 3 x/week to address the identified rehab impairments via gait training, therapeutic activities, therapeutic exercises and progress toward the following goals:    Plan of Care Expires:  06/17/24    Subjective     Chief Complaint: "I'm ready to go now".  Patient/Family Comments/goals: "I'm doing great".  Pain/Comfort:  Pain Rating 1: 0/10      Objective:     Communicated with nurse Finnegan prior to session.  Patient found  laying in recliner  with telemetry, peripheral IV upon PT entry to room.     General Precautions: Standard, fall, vision impaired  Orthopedic Precautions: N/A  Braces: N/A  Respiratory Status: Room air     Functional Mobility:  Transfers:     Sit to Stand:  contact guard assistance with no AD  Gait: pt ambulated 200' CGA with no AD. Pt demonstrates occasional unsteady gait and increased lateral lean; Pt denies dizziness, LOB, or SOB.  Balance: sitting: SBA; standing: CGA      AM-PAC 6 CLICK MOBILITY  Turning over in bed (including adjusting bedclothes, sheets and blankets)?: 1 (NT)  Sitting down on and standing up from a chair with arms (e.g., wheelchair, bedside commode, etc.): 3  Moving from " "lying on back to sitting on the side of the bed?: 1 (NT)  Moving to and from a bed to a chair (including a wheelchair)?: 1 (NT)  Need to walk in hospital room?: 3  Climbing 3-5 steps with a railing?: 1 (NT)  Basic Mobility Total Score: 10       Treatment & Education:  Pt educated on role of PT and POC in acute care setting.  Pt reviewed and performed seated therex x10: marches, AP, LAQs, heel slides.  Pt educated on increasing time OOB in chair to tolerance.  Pt educated on "call don't fall" and encouraged to call for assistance with OOB mobility.     Patient left up in chair with all lines intact, call button in reach, chair alarm on, and nurse notified..    GOALS:   Multidisciplinary Problems       Physical Therapy Goals          Problem: Physical Therapy    Goal Priority Disciplines Outcome Goal Variances Interventions   Physical Therapy Goal     PT, PT/OT      Description: LTG to be met in 14 days: 6/17/24  Pt will complete bed mobility INDEP.  Pt will complete sit to stand INDEP.  Pt will ambulate 300' NOAH with RW.  Pt will increase AMPAC score by 2 to progress gross functional mobility.                       Time Tracking:     PT Received On: 06/11/24  PT Start Time: 0730     PT Stop Time: 0800  PT Total Time (min): 30 min     Billable Minutes: Gait Training 15 and Therapeutic Activity 15    Treatment Type: Treatment  PT/PTA: PT     Number of PTA visits since last PT visit: 0     06/11/2024  "

## 2024-06-11 NOTE — SUBJECTIVE & OBJECTIVE
Review of Systems   All other systems reviewed and are negative.    Objective:     Vital Signs (Most Recent):  Temp: 98.3 °F (36.8 °C) (06/11/24 1618)  Pulse: (!) 47 (06/11/24 1726)  Resp: 16 (06/11/24 1618)  BP: (!) 140/66 (06/11/24 1618)  SpO2: 96 % (06/11/24 1618) Vital Signs (24h Range):  Temp:  [97.6 °F (36.4 °C)-98.6 °F (37 °C)] 98.3 °F (36.8 °C)  Pulse:  [45-70] 47  Resp:  [16-20] 16  SpO2:  [96 %-99 %] 96 %  BP: (128-161)/(60-75) 140/66     Weight: 77.1 kg (169 lb 15.6 oz)  Body mass index is 27.43 kg/m².    Intake/Output Summary (Last 24 hours) at 6/11/2024 1802  Last data filed at 6/11/2024 1452  Gross per 24 hour   Intake 480 ml   Output 2 ml   Net 478 ml             Physical Exam  Vitals and nursing note reviewed.   Constitutional:       General: She is not in acute distress.     Appearance: Normal appearance. She is normal weight.   Cardiovascular:      Rate and Rhythm: Normal rate and regular rhythm.      Heart sounds: No murmur heard.  Pulmonary:      Effort: Pulmonary effort is normal. No respiratory distress.      Breath sounds: No wheezing.   Neurological:      Mental Status: She is alert and oriented to person, place, and time.      Comments: Left eye deviated   Psychiatric:         Mood and Affect: Mood normal.         Behavior: Behavior normal.             Significant Labs: All pertinent labs within the past 24 hours have been reviewed.  Recent Lab Results  (Last 5 results in the past 24 hours)        06/11/24  1508   06/11/24  1111   06/11/24  0802   06/11/24  0555   06/10/24  2104        Anion Gap     10           BUN     12           Calcium     8.6           Chloride     100           CO2     21           Creatinine     1.0           eGFR     >60           Glucose     98           POCT Glucose 122   123     130   122       Potassium     3.3           Sodium     131                                  Significant Imaging: I have reviewed all pertinent imaging results/findings within the past  24 hours.    X-Ray Chest 1 View   Final Result      As above         Electronically signed by: Cesar Whiteside MD   Date:    06/06/2024   Time:    15:54      MRI Brain W WO Contrast   Final Result      Mild scattered leptomeningeal enhancement including overlying the right frontal lobe as well as along the inter hemispheric fissure, left occipital lobe, and interpeduncular cistern with associated mild T2/FLAIR hyperintense signal the seen along the medial aspect of the cerebral peduncles.  Findings at the interpeduncular cistern may be affecting cranial nerve 3 which may explain reported oculomotor nerve palsy.  Findings are nonspecific with differential considerations including meningitis, metastatic disease, and paraneoplastic syndrome.      Additional nonspecific white matter changes likely related to chronic microvascular ischemia.      Left paranasal sinus disease.         Electronically signed by: Boris Hwang   Date:    06/03/2024   Time:    16:50      MRA Brain without contrast   Final Result      Complete opacification of the left maxillary sinus.  Small left ethmoid and frontal sinus opacification.  Correlate clinically to sinusitis      No acute infarct.  No restricted diffusion      Moderate subcortical and periventricular FLAIR hyperintensities consistent with chronic microvascular ischemic changes      Left maxillary sinus and mild left ethmoid frontal sinus mucosal thickening.      Moderate atherosclerotic changes as evidenced by luminal irregularities of the intracranial vasculature.         Electronically signed by: Simeon Cutler   Date:    06/02/2024   Time:    17:09      MRI Brain Without Contrast   Final Result      Complete opacification of the left maxillary sinus.  Small left ethmoid and frontal sinus opacification.  Correlate clinically to sinusitis      No acute infarct.  No restricted diffusion      Moderate subcortical and periventricular FLAIR hyperintensities consistent with chronic  microvascular ischemic changes      Left maxillary sinus and mild left ethmoid frontal sinus mucosal thickening.      Moderate atherosclerotic changes as evidenced by luminal irregularities of the intracranial vasculature.         Electronically signed by: Simeon Cutler   Date:    06/02/2024   Time:    17:09      CTA Head and Neck (xpd)   Final Result      Scattered atherosclerotic plaque involving the extracranial carotid system as well as the internal carotid arteries at the level of the cavernous sinus bilaterally.  However no significant narrowing or stenosis.  No vessel occlusions or significant stenosis.      Scattered atherosclerotic plaque of the vertebrobasilar system without significant narrowing or stenosis.      All CT scans at this facility use dose modulation, iterative reconstruction and/or weight based dosing when appropriate to reduce radiation dose to as low as reasonably achievable.         Electronically signed by: Victor Manuel Russell MD   Date:    06/02/2024   Time:    13:07      CT Head Without Contrast   Final Result      No acute intracranial abnormality.  CT aspects score 10.      All CT scans at this facility use dose modulation, iterative reconstructions, and/or weight base dosing when appropriate to reduce radiation dose to as low as reasonably achievable.         Electronically signed by: Victor Manuel Russell MD   Date:    06/02/2024   Time:    11:39      IR Lumbar Puncture Diagnostic    (Results Pending)

## 2024-06-12 VITALS
BODY MASS INDEX: 27.32 KG/M2 | SYSTOLIC BLOOD PRESSURE: 147 MMHG | RESPIRATION RATE: 17 BRPM | WEIGHT: 170 LBS | HEIGHT: 66 IN | DIASTOLIC BLOOD PRESSURE: 68 MMHG | TEMPERATURE: 97 F | OXYGEN SATURATION: 95 % | HEART RATE: 54 BPM

## 2024-06-12 LAB
AMPHIPHYSIN AB TITR CSF: NEGATIVE {TITER}
ANION GAP SERPL CALC-SCNC: 11 MMOL/L (ref 8–16)
ANNOTATION COMMENT IMP: NORMAL
BUN SERPL-MCNC: 9 MG/DL (ref 8–23)
CALCIUM SERPL-MCNC: 9.4 MG/DL (ref 8.7–10.5)
CHLORIDE SERPL-SCNC: 98 MMOL/L (ref 95–110)
CO2 SERPL-SCNC: 23 MMOL/L (ref 23–29)
CREAT SERPL-MCNC: 0.9 MG/DL (ref 0.5–1.4)
CRYPTOC AB TITR SER: NORMAL {TITER}
CV2 IGG TITR CSF: NEGATIVE {TITER}
EST. GFR  (NO RACE VARIABLE): >60 ML/MIN/1.73 M^2
GLIAL NUC TYPE 1 AB TITR CSF: NEGATIVE {TITER}
GLUCOSE SERPL-MCNC: 115 MG/DL (ref 70–110)
HU1 AB TITR CSF IF: NEGATIVE {TITER}
HU2 AB TITR CSF IF: NEGATIVE {TITER}
HU3 AB TITR CSF: NEGATIVE {TITER}
MAGNESIUM SERPL-MCNC: 1.3 MG/DL (ref 1.6–2.6)
OHS QRS DURATION: 122 MS
OHS QTC CALCULATION: 398 MS
PARANEOPLASTIC INTERPRETATION,CSF: NORMAL
PCA-2 AB TITR CSF: NEGATIVE {TITER}
PCA-TR AB TITR CSF: NEGATIVE {TITER}
POCT GLUCOSE: 110 MG/DL (ref 70–110)
POCT GLUCOSE: 119 MG/DL (ref 70–110)
POTASSIUM SERPL-SCNC: 2.8 MMOL/L (ref 3.5–5.1)
PURKINJE CELLS AB TITR CSF IF: NEGATIVE {TITER}
SODIUM SERPL-SCNC: 132 MMOL/L (ref 136–145)

## 2024-06-12 PROCEDURE — 25000003 PHARM REV CODE 250: Performed by: HOSPITALIST

## 2024-06-12 PROCEDURE — 97116 GAIT TRAINING THERAPY: CPT | Mod: CQ

## 2024-06-12 PROCEDURE — 97535 SELF CARE MNGMENT TRAINING: CPT

## 2024-06-12 PROCEDURE — 83735 ASSAY OF MAGNESIUM: CPT | Performed by: HOSPITALIST

## 2024-06-12 PROCEDURE — 93005 ELECTROCARDIOGRAM TRACING: CPT

## 2024-06-12 PROCEDURE — 97530 THERAPEUTIC ACTIVITIES: CPT | Mod: CQ

## 2024-06-12 PROCEDURE — 25000003 PHARM REV CODE 250: Performed by: NURSE PRACTITIONER

## 2024-06-12 PROCEDURE — A4216 STERILE WATER/SALINE, 10 ML: HCPCS | Performed by: HOSPITALIST

## 2024-06-12 PROCEDURE — 63600175 PHARM REV CODE 636 W HCPCS: Performed by: INTERNAL MEDICINE

## 2024-06-12 PROCEDURE — 80048 BASIC METABOLIC PNL TOTAL CA: CPT | Performed by: HOSPITALIST

## 2024-06-12 PROCEDURE — 25000003 PHARM REV CODE 250: Performed by: INTERNAL MEDICINE

## 2024-06-12 PROCEDURE — 93010 ELECTROCARDIOGRAM REPORT: CPT | Mod: ,,, | Performed by: INTERNAL MEDICINE

## 2024-06-12 PROCEDURE — 97530 THERAPEUTIC ACTIVITIES: CPT

## 2024-06-12 RX ORDER — POTASSIUM CHLORIDE 750 MG/1
10 TABLET, EXTENDED RELEASE ORAL 2 TIMES DAILY
Qty: 20 TABLET | Refills: 0 | Status: ON HOLD | OUTPATIENT
Start: 2024-06-12 | End: 2024-06-19 | Stop reason: HOSPADM

## 2024-06-12 RX ADMIN — SODIUM CHLORIDE, PRESERVATIVE FREE 10 ML: 5 INJECTION INTRAVENOUS at 06:06

## 2024-06-12 RX ADMIN — ANASTROZOLE 1 MG: 1 TABLET, COATED ORAL at 09:06

## 2024-06-12 RX ADMIN — SODIUM CHLORIDE, PRESERVATIVE FREE 10 ML: 5 INJECTION INTRAVENOUS at 12:06

## 2024-06-12 RX ADMIN — FLUCYTOSINE 2000 MG: 500 CAPSULE ORAL at 09:06

## 2024-06-12 RX ADMIN — CEFTRIAXONE 2 G: 2 INJECTION, POWDER, FOR SOLUTION INTRAMUSCULAR; INTRAVENOUS at 06:06

## 2024-06-12 RX ADMIN — ASPIRIN 81 MG: 81 TABLET, COATED ORAL at 09:06

## 2024-06-12 RX ADMIN — AMLODIPINE BESYLATE 10 MG: 10 TABLET ORAL at 09:06

## 2024-06-12 RX ADMIN — PANTOPRAZOLE SODIUM 40 MG: 40 TABLET, DELAYED RELEASE ORAL at 09:06

## 2024-06-12 RX ADMIN — FLUCYTOSINE 2000 MG: 500 CAPSULE ORAL at 02:06

## 2024-06-12 RX ADMIN — POTASSIUM CHLORIDE 10 MEQ: 750 TABLET, EXTENDED RELEASE ORAL at 03:06

## 2024-06-12 RX ADMIN — LOSARTAN POTASSIUM 100 MG: 50 TABLET, FILM COATED ORAL at 09:06

## 2024-06-12 RX ADMIN — POTASSIUM CHLORIDE 10 MEQ: 750 TABLET, EXTENDED RELEASE ORAL at 09:06

## 2024-06-12 RX ADMIN — HYDROCHLOROTHIAZIDE 25 MG: 25 TABLET ORAL at 09:06

## 2024-06-12 NOTE — PROGRESS NOTES
[facility] ([facility])  Infectious Disease  Progress Note    Patient Name: Anne Farmer  MRN: [id number]  Admission Date: 6/2/2024  Length of Stay: 7 days  Attending Physician: Josesito Cerrato MD  Primary Care Provider: Chiquita Talley MD    Isolation Status: No active isolations  Assessment/Plan:      ID  * Cryptococcal meningoencephalitis  CSF PCR -  Varicella Zoster Virus Not Detected Not Detected   Cryptococcus neoformans/gattii Not Detected Detected Abnormal        Will send cryptococcal assay in the blood and csf- will add   Liposomal Amphotericin/Flucytosine.    Will need up to 2 weeks of therapy- she is not HIV Positive and denies contact with pegions   06/06- day 2 /14 of therapy - continue Amphotericin/Flucytosine  She needs close follow up   06/07  It will be challenging to give medication at home due to frequent monitoring of labs - we can discuss with home health about this - another option will be going to infusion center daily where they can then be able to draw blood.  Outpatient Antibiotic Therapy Plan:     [facility].     1) Infection:  Cryptococcal meningitis     2) Discharge Antibiotics:     Intravenous antibiotics: IV Liposomal Amphotericin 250mg daily      3) Therapy Duration:       Estimated end date of IV antibiotics: 06/19/24     4) Outpatient Weekly Labs:     Twice weekly   CBC  CMP   Mag    Please send all labs to [facility] .    5) Outpatient Infectious Diseases Follow-up       06/08- will need close follow up  Continue Amphotericin-/Flucytosine- monitor cbc/cmp     06/09- she is tolerating meds -  Awaiting final discharge plans  On Amphotericin /Flucytosine  06/10- will continue to monitor CMP while on Amphotericin , follow CBC closely  Follow case management for discharge planning     06/11- tolerating meds-   Will monitor CMP while on Amphotericin /Flucytosine.   Needs clinic follow up     Oncology  Malignant neoplasm of  lower-outer quadrant of left breast of female, estrogen receptor positive  Oncology follow up        Anticipated Disposition:     Thank you for your consult. I will follow-up with patient. Please contact us if you have any additional questions.    Rich Robertson MD, Counts include 234 beds at the [facility]  Infectious Disease  [facility] - Tele[facility] ([facility])    Subjective:     Principal Problem:Cryptococcal meningoencephalitis    HPI: 68 year old  female patient,-with history of hypertension ,, DM2, HLD, Colon Cx, Sarcoidosis, RLD, Breast Cx, Cardiomyopathy.     She was admitted with dizziness ,left eye drift  and CVA evaluation.  She had LP that showed cryptococcal infection.      Labs and imaging test   Cryptococcus neoformans/gattii Not Detected Detected Abnormal      Component Ref Range & Units 2 d ago   Heme Aliquot mL 2.0   Appearance, CSF Clear Slightly hazy Abnormal    Color, CSF Colorless Colorless   WBC, CSF 0 - 5 /cu mm 272 High    RBC, CSF 0 /cu mm 619 Abnormal    Segmented Neutrophils, CSF 0 - 6 % 22 High    MRI_Mild scattered leptomeningeal enhancement including overlying the right frontal lobe as well as along the inter hemispheric fissure, left occipital lobe, and interpeduncular cistern with associated mild T2/FLAIR hyperintense signal the seen along the medial aspect of the cerebral peduncles.  Findings at the interpeduncular cistern may be affecting cranial nerve 3 which may explain reported oculomotor nerve palsy.  Findings are nonspecific with differential considerations including meningitis, metastatic disease, and paraneoplastic syndrome.     Additional nonspecific white matter changes likely related to chronic microvascular ischemia.     Left paranasal sinus disease.     Interval History:  68 year old woman with cryptococcal meningitis .  She denies headache at this time  06/07-she wants to go home  She is feeling better, tolerating meds  06/08- she continues to improve- denies headache  06/09- tolerating meds  Denies  "headache  06/10- she is awaiting discharge plans for OPAT    06/11- denies headache.  Labs reviewed .  CMP  Serum K - 3.3   Review of Systems   Constitutional:  Negative for activity change, appetite change, chills, diaphoresis and fatigue.   Respiratory:  Negative for apnea, choking and chest tightness.      Objective:     Vital Signs (Most Recent):  Temp: 98.3 °F (36.8 °C) (06/11/24 1618)  Pulse: (!) 47 (06/11/24 1726)  Resp: 16 (06/11/24 1618)  BP: (!) 140/66 (06/11/24 1618)  SpO2: 96 % (06/11/24 1618) Vital Signs (24h Range):  Temp:  [97.6 °F (36.4 °C)-98.6 °F (37 °C)] 98.3 °F (36.8 °C)  Pulse:  [45-70] 47  Resp:  [16-20] 16  SpO2:  [96 %-99 %] 96 %  BP: (128-161)/(60-75) 140/66     Weight: 77.1 kg (169 lb 15.6 oz)  Body mass index is 27.43 kg/m².    Estimated Creatinine Clearance: 54.9 mL/min (based on SCr of 1 mg/dL).     Physical Exam  Vitals and nursing note reviewed.   HENT:      Head: Normocephalic.   Cardiovascular:      Rate and Rhythm: Normal rate.   Pulmonary:      Effort: Pulmonary effort is normal.   Musculoskeletal:         General: Normal range of motion.      Cervical back: Normal range of motion.   Skin:     General: Skin is warm.   Neurological:      General: No focal deficit present.      Mental Status: She is alert.   Psychiatric:         Mood and Affect: Mood normal.          Significant Labs: Blood Culture: No results for input(s): "LABBLOO" in the last 4320 hours.  BMP:   Recent Labs   Lab 06/10/24  1619 06/11/24  0802   GLU  --  98   NA  --  131*   K 3.2* 3.3*   CL  --  100   CO2  --  21*   BUN  --  12   CREATININE  --  1.0   CALCIUM  --  8.6*   MG 1.5*  --      CBC:   No results for input(s): "WBC", "HGB", "HCT", "PLT" in the last 48 hours.    CMP:   Recent Labs   Lab 06/10/24  0944 06/10/24  1619 06/11/24  0802   *  --  131*   K 2.5* 3.2* 3.3*     --  100   CO2 20*  --  21*     --  98   BUN 13  --  12   CREATININE 1.1  --  1.0   CALCIUM 8.8  --  8.6*   PROT 6.5  --   " "--    ALBUMIN 3.2*  --   --    BILITOT 0.2  --   --    ALKPHOS 60  --   --    AST 36  --   --    ALT 30  --   --    ANIONGAP 12  --  10     Wound Culture: No results for input(s): "LABAERO" in the last 4320 hours.  All pertinent labs within the past 24 hours have been reviewed.    Significant Imaging: I have reviewed all pertinent imaging results/findings within the past 24 hours.  "

## 2024-06-12 NOTE — DISCHARGE SUMMARY
"[facility] - Tele[facility] ([facility])  [facility] Medicine  Discharge Summary      Patient Name: Anne Farmer  MRN: [id number]  RAJIV: [id number]  Patient Class: IP- Inpatient  Admission Date: 6/2/2024  Hospital Length of Stay: 8 days  Discharge Date and Time:  06/12/2024 2:56 PM  Attending Physician: Josesito Cerrato MD   Discharging Provider: Josesito Cerrato MD  Primary Care Provider: Chiquita Talley MD    Primary Care Team: Networked reference to record PCT     HPI:   68 y.o. female patient, PMHx: HTN, DM2, HLD, Colon Cx, Sarcoidosis, RLD, Breast Cx, Cardiomyopathy. Presented to the Emergency Department for evaluation of a dizziness which onset 3 days PTA with left eye drift, new on day of arrival. Concerns for a potential stroke. Symptoms are constant and moderate in severity. No mitigating or exacerbating factors reported. Associated sxs include weakness, lightheadedness, and blurry vision. Pt denies diplopia and all other symptoms at this time. Pt denies Hx of stroke or MI. Code stroke called in the ED, CT head: no acute finding. CTA Head/Neck: negative for significant narrowing or stenosis, negative LVO. Neurology consulted. Vitals: 190/87, 62, 18, 98.2F, 99% RA. Abnormal Labs: BNP: 271. Patient is a full code. Placed in observation under the care of Hospital Medicine for management of suspicion of CVA.     * No surgery found *      Hospital Course:   6/3/24  NAEON, patient with left eye drift, reports blurry vision some improved today  MRI/MRA obtained yesterday with chronic microvascular changes  Tele-Neurology consulted, recommended repeat MRI  Repeat MRI with and without contrast with thin slices: "Mild scattered leptomeningeal enhancement including overlying the right frontal lobe as well as along the inter hemispheric fissure, left occipital lobe, and interpeduncular cistern with associated mild T2/FLAIR hyperintense signal the seen along the medial aspect of the cerebral peduncles. Findings at the " "interpeduncular cistern may be affecting cranial nerve 3 which may explain reported oculomotor nerve palsy. Findings are nonspecific with differential considerations including meningitis, metastatic disease, and paraneoplastic syndrome."  Patient with hx of breast and colon cancer, follows with Dr. Thompson    6/4/24  NAEON, patient reports improvement in vision today  S/p LP this morning, noted to be traumatic tap  CSF studies with elevated protein and WBCs  Discussed with Neurology, further CSF studies ordered  CSF cytology, mengitits/encephalitis, VDRL, paraneoplastic autoantibody studies ordered    6/5/24  NAEON  Mengitits panel +cryptococcus  Consult ID, started on amphotericin  Left eye deviated, reports blurry vision and headache onset 1 week PTA    6/6/24  NAEON, patient denies new issues/complaints  Started on amphotericin, flucytosine  Ceftriaxone for sinusitis  Appreciate ID assistance  RN at bedside, difficulty obtained PIV, will order PICC line    6/7/24  NAEON, left eye deviation some better today  Patient wishes to leave hospital, advised to stay, not medically ready  Cr 1.2 (0.8 yesterday), started on IVF    6/8/24  NAEON, no new issues  Day #4 of 14 IV amphotericin  Discussed with Dr. Robertson, may be possible to arrange outpatient treatment at infusion center  Would also need close monitoring of labs due to nephrotoxicity  Consult case management    6/9/24  NAEON, day #5 of IV amphotericin  Headache improving    6/10/24  Day #6 of IV amphotericin  K 2.5, replete IV and PO, recheck this afternoon  Social Work consulted to assist with outpatient IV treatment    6/11/24  NAEON, day #7 of amphotericin  K 3.3, continue PO repletion  Home IV infusion arraignments pending per SW    6/12/24  NAEON  Approved for home IV amphotericin, MILEY 6/19/24  Advised to continue holding plaquenil, meloxicam and lasix on discharge  Increased potassium chloride to 10 meq BID x 10 days  Will have bi-weekly labs with home " health  F/U with ID and PCP in 1-2 weeks for further management     Goals of Care Treatment Preferences:  Code Status: Full Code      Consults:   Consults (From admission, onward)          Status Ordering Provider     Inpatient consult to PICC team (NIAS)  Once        Provider:  (Not yet assigned)    Acknowledged FREY, MELINA     Inpatient consult to Social Work  Once        Provider:  (Not yet assigned)    Completed FREY, MELINA     Inpatient consult to PICC team (NIAS)  Once        Provider:  (Not yet assigned)    Acknowledged FREY, MELINA     Inpatient consult to Infectious Diseases  Once        Provider:  Rich Robertson MD, KATHARINE    Acknowledged FREY, MELINA     Inpatient consult to Telemedicine-General Neurology  Once        Provider:  Aury Brewster NP    Completed FREY, MELINA     IP consult to case management/social work  Once        Provider:  (Not yet assigned)    Completed MARGAUX MORALES     Consult to Telemedicine - Acute Stroke  Once        Provider:  Edward Trimble MD    Acknowledged REGIS MARI JR            No new Assessment & Plan notes have been filed under this hospital service since the last note was generated.  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] 06/03/2024 Yes    Dizziness and giddiness [R42] 06/02/2024 Yes    Acute ischemic VBA brainstem stroke, left [I63.212, I63.22] 06/02/2024 Yes    Malignant neoplasm of lower-outer quadrant of left breast of female, estrogen receptor positive [C50.512, Z17.0] 06/09/2022 Not Applicable    Hyperlipidemia [E78.5] 10/25/2016 Yes    DM type 2 with diabetic dyslipidemia [E11.69, E78.5]  Yes    Diastolic dysfunction [I51.89] 01/13/2014 Yes    HTN (hypertension) [I10] 10/22/2013 Yes      Problems Resolved During this Admission:       Discharged Condition:  stable    Disposition: Home-Health Care Svc    Follow Up:   Follow-up Information       [facility].    Specialties: Home Health Services, Home Therapy Services, Home Living Aide Services  Why: Home Health: Agency will set up appointments for PICC line dressing changes and Labs  Contact information:  [address]  [facility] [address]  [phone]             Rich Robertson MD, FIDSA. Schedule an appointment as soon as possible for a visit in 1 week(s).    Specialties: Infectious Diseases, Hospitalist  Contact information:  [address] [facility] [address]  [phone]               Chiquita Talley MD. Schedule an appointment as soon as possible for a visit in 2 week(s).    Specialty: Family Medicine  Why: Hospital discharge follow up  Contact information:  [address] [facility] [address]  [phone]                           Patient Instructions:   No discharge procedures on file.    Significant Diagnostic Studies: Labs: All labs within the past 24 hours have been reviewed    Pending Diagnostic Studies:       Procedure Component Value Units Date/Time    Basic metabolic panel [[id number]]     Order Status: Sent Lab Status: No result     Specimen: Blood     CBC auto differential [[id number]] Collected: 06/11/24 0702    Order Status: Sent Lab Status: No result     Specimen: Blood     Cryptococcus antibody [[id number]] Collected: 06/06/24 0435    Order Status: Sent Lab Status: In process Updated: 06/06/24 1734    Specimen: Blood     IR Lumbar Puncture Diagnostic [[id number]] Resulted: 06/04/24 0759    Order Status: Sent Lab Status: In process Updated: 06/04/24 0956    Magnesium [[id number]]     Order Status: Sent Lab Status: No result     Specimen: Blood     Paraneoplastic Autoantibody Evaulation, Serum [[id number]] Collected: 06/04/24 1224    Order Status: Sent Lab Status: In process Updated: 06/04/24 2210     Specimen: Blood            Medications:  Reconciled Home Medications:      Medication List        START taking these medications      amphotericin B liposome 1 mg/mL in dextrose 5 % (D5W) infusion  Inject 250 mLs (250 mg total) into the vein once daily. for 7 days            CHANGE how you take these medications      potassium chloride 10 MEQ Tbsr  Commonly known as: KLOR-CON  Take 1 tablet (10 mEq total) by mouth 2 (two) times daily. for 10 days  What changed:   how much to take  when to take this  reasons to take this            CONTINUE taking these medications      albuterol 90 mcg/actuation inhaler  Commonly known as: PROVENTIL/VENTOLIN HFA  INHALE 1-2 PUFFS BY MOUTH EVERY 6 HOURS AS NEEDED FOR WHEEZE OR SHORTNESS OF BREATH     amLODIPine 10 MG tablet  Commonly known as: NORVASC  TAKE 1 TABLET BY MOUTH EVERY DAY     anastrozole 1 mg Tab  Commonly known as: ARIMIDEX  TAKE 1 TABLET BY MOUTH EVERY DAY     azelastine 137 mcg (0.1 %) nasal spray  Commonly known as: ASTELIN  2 sprays (274 mcg total) by Nasal route 2 (two) times daily.     fluticasone propionate 50 mcg/actuation nasal spray  Commonly known as: FLONASE  2 sprays (100 mcg total) by Each Nostril route once daily.     hydroCHLOROthiazide 25 MG tablet  Commonly known as: HYDRODIURIL  TAKE 1 TABLET BY MOUTH EVERY DAY     levocetirizine 5 MG tablet  Commonly known as: XYZAL  Take 1 tablet (5 mg total) by mouth every evening.     losartan 100 MG tablet  Commonly known as: COZAAR  TAKE 1 TABLET BY MOUTH EVERY DAY     magnesium oxide 400 mg (241.3 mg magnesium) tablet  Commonly known as: MAG-OX  Take 1 tablet (400 mg total) by mouth once daily.     omeprazole 20 MG capsule  Commonly known as: PRILOSEC  TAKE 1 CAPSULE BY MOUTH EVERY DAY     ONE DAILY MULTIVITAMIN tablet  Generic drug: multivitamin  Take 1 tablet by mouth once daily.     orphenadrine 100 mg tablet  Commonly known as: NORFLEX  Take 1 tablet (100 mg total) by mouth 2 (two) times daily.      pravastatin 20 MG tablet  Commonly known as: PRAVACHOL  TAKE 1 TABLET BY MOUTH EVERY DAY     traZODone 50 MG tablet  Commonly known as: DESYREL  TAKE 1 TABLET BY MOUTH EVERY DAY AT NIGHT            STOP taking these medications      amoxicillin-clavulanate 875-125mg 875-125 mg per tablet  Commonly known as: AUGMENTIN     furosemide 20 MG tablet  Commonly known as: LASIX     hydroxychloroquine 200 mg tablet  Commonly known as: PLAQUENIL     meloxicam 15 MG tablet  Commonly known as: MOBIC              Indwelling Lines/Drains at time of discharge:   Lines/Drains/Airways       Peripherally Inserted Central Catheter Line  Duration             PICC Double Lumen 06/06/24 1520 right brachial 5 days                    Time spent on the discharge of patient: 50 minutes         Josesito eCrrato MD  Department of Hospital Medicine  [facility] - Tele[facility] ([facility])

## 2024-06-12 NOTE — PT/OT/SLP PROGRESS
Physical Therapy      Patient Name:  Anne Farmer   MRN:  [id number]    07:35 a.m.  Patient declined to participate in PT session at this time due to complaints of fatigue. Requesting therapist return at a later time.   Will follow up at next available opportunity.

## 2024-06-12 NOTE — PLAN OF CARE
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   Code Status: Full Code MRN: [id number]   Review of patient's allergies indicates:  No Known Allergies  Past Medical History:   Diagnosis Date    Allergy     Arthritis     Cancer 2016    colon    CHF (congestive heart failure)     Colon cancer 2004    Colon cancer screening 9/21/2015    Diabetes mellitus type 2 in nonobese 3/9/2016    borderline    Diverticulosis     DM (diabetes mellitus) 03/2016    BS didn't check 02/13/2019    DM (diabetes mellitus) 03/2016    BS didn't check 03/04/2020    Hyperlipidemia 10/25/2016    Hypertension     Insomnia     Malignant neoplasm of colon 5/13/2021    Malignant neoplasm of lower-outer quadrant of left breast of female, estrogen receptor positive 6/9/2022      PRN meds    sodium chloride 0.9%, , PRN  acetaminophen, 650 mg, Q6H PRN  acetaminophen, 650 mg, Daily PRN  bisacodyL, 10 mg, Daily PRN  dextrose 10%, 12.5 g, PRN  dextrose 10%, 25 g, PRN  dextrose 5 % (D5W), , PRN  diphenhydrAMINE, 25 mg, Daily PRN  glucagon (human recombinant), 1 mg, PRN  glucose, 16 g, PRN  glucose, 24 g, PRN  insulin aspart U-100, 0-5 Units, QID (AC + HS) PRN  melatonin, 6 mg, Nightly PRN  ondansetron, 4 mg, Q8H PRN  promethazine, 12.5 mg, Q6H PRN  sodium chloride 0.9%, 10 mL, PRN  sodium chloride 0.9%, 10 mL, PRN      Discharge instructions received and reviewed with pt and family at bedside.  Pt voiced understanding and all questions answered to satisfaction.  Stressed importance to making and keeping all follow up appointments.  Medications sent to pt pharmacy and reviewed with pt.  Tele monitor removed and brought to monitor tech.  IV d/c'd with tip intact, pressure dressing applied.  Pt transported to front of hospital via w/c by PCT to be discharged home.       Orientation: oriented x 4  Yoni Coma Scale Score: 15     Lead Monitored: Lead II Rhythm: sinus bradycardia Frequency/Ectopy:  PVCs  Cardiac/Telemetry Box Number: [id number]  VTE Required Core Measure: (SCDs) Sequential compression device initiated/maintained Last Bowel Movement: 06/12/24  Diet Cardiac  Voiding Characteristics: voids spontaneously without difficulty  Laurent Score: 17  Fall Risk Score: 5  Accucheck [x]   Freq? ACHS     Lines/Drains/Airways       Peripherally Inserted Central Catheter Line  Duration             PICC Double Lumen 06/06/24 1520 right brachial 6 days

## 2024-06-12 NOTE — PT/OT/SLP PROGRESS
Occupational Therapy   Treatment    Name: Anne Farmer  MRN: [id number]  Admitting Diagnosis:  Cryptococcal meningoencephalitis       Recommendations:     Discharge Recommendations: Low Intensity Therapy  Discharge Equipment Recommendations:  walker, rolling, shower chair  Barriers to discharge:  None    Assessment:     Anne Farmer is a 68 y.o. female with a medical diagnosis of Cryptococcal meningoencephalitis.  She presents with the following performance deficits affecting function are weakness, impaired endurance, impaired self care skills, impaired functional mobility, gait instability, impaired balance, decreased upper extremity function, decreased lower extremity function, decreased safety awareness.     Rehab Prognosis:  Good; patient would benefit from acute skilled OT services to address these deficits and reach maximum level of function.       Plan:     Patient to be seen 2 x/week to address the above listed problems via self-care/home management, therapeutic activities, therapeutic exercises  Plan of Care Expires: 06/17/24  Plan of Care Reviewed with: patient    Subjective     Chief Complaint: none reported  Patient/Family Comments/goals: improve independence, strength, and mobility  Pain/Comfort:  Pain Rating 1: 0/10    Objective:     Communicated with: Nurse and epic chart review prior to session.  Patient found  standing at sink  with telemetry, PICC line upon OT entry to room.    General Precautions: Standard, fall    Orthopedic Precautions:N/A  Braces: N/A  Respiratory Status: Room air    Functional Mobility/Transfers:  Patient completed Sit <> Stand Transfer with stand by assistance  with  no assistive device   Patient completed Bed <> Chair Transfer using Stand Pivot technique with stand by assistance with no assistive device  Functional Mobility: Patient completed x120ft functional mobility with CGA-Min A and no AD to increase dynamic standing balance and activity tolerance needed  for ADL completion.     Activities of Daily Living:  Grooming: Standby Assist. Performed oral care while standing at sink  Upper Body Dressing: minimum assistance doff/michela gown while standing  Lower Body Dressing: moderate assistance michela brief while standing    [facility] 6 Click ADL: 20    Treatment & Education:  Reviewed role of OT in acute setting and benefits of participation. Educated on techniques to use to increase independence and decrease fall risk with functional transfers. Educated on importance of OOB activity and calling for A to transfer back to bed and meet needs. Encouraged completion of B UE AROM therex throughout the day to tolerance to increase functional strength and activity tolerance. Educated patient on importance of increased tolerance to upright position and direct impact on CV endurance and strength. Patient encouraged to sit up in chair for a minimum of 2 consecutive hours per day and for meals.  Patient stated understanding and in agreement with POC.     Patient left up in chair with all lines intact and call button in reach    GOALS:   Multidisciplinary Problems       Occupational Therapy Goals          Problem: Occupational Therapy    Goal Priority Disciplines Outcome Interventions   Occupational Therapy Goal     OT, PT/OT Progressing    Description: LTG'S TO BE MET IN 14 DAYS (6/17/24)    1)  PATIENT WILL PERFORM UB DRESSING WITH (I) TO DECREASE (D) ON CAREGIVER.    2)  PATIENT WILL PERFORM LB DRESSING WITH (I) TO DECREASE (D) ON CAREGIVER..    3)  PATIENT WILL PERFORM TOILET T/F WITH (S) TO DECREASE (D) ON CAREGIVER..    4)  PATIENT WILL PERFORM (L) EYE CONVERGENCE HEP WITH (I) AFTER EDUCATION.                       Time Tracking:     OT Date of Treatment: 06/12/24  OT Start Time: 0945  OT Stop Time: 1010  OT Total Time (min): 25 min    Billable Minutes:Self Care/Home Management 10  Therapeutic Activity 15    OT/[place]: OT      Ania Olmedo OT     6/12/2024

## 2024-06-12 NOTE — PLAN OF CARE
[facility] - Telemetry (Hospital)  Discharge Assessment    Primary Care Provider: Chiquita Talley MD     Discharge Assessment (most recent)       BRIEF DISCHARGE ASSESSMENT - 06/03/24 5493          Discharge Planning    Assessment Type Discharge Planning Brief Assessment     Resource/Environmental Concerns none     Support Systems Children     Equipment Currently Used at Home none     Current Living Arrangements home     Patient/Family Anticipates Transition to home with family     Patient/Family Anticipated Services at Transition none     DME Needed Upon Discharge  none     Discharge Plan A Home with family

## 2024-06-12 NOTE — PLAN OF CARE
IV antibiotics approved by insurance.  Patient and family will be taught at 1pm. Advised Dr. Cerrato via secure chat.    Referral sent to [facility].       06/12/24 1153   Post-Acute Status   Post-Acute Authorization IV Infusion;Home Health   Home Health Status Referrals Sent   IV Infusion Status Set-up Complete/Auth obtained   Discharge Plan   Discharge Plan A Home Health [facility] Accepted

## 2024-06-12 NOTE — SUBJECTIVE & OBJECTIVE
"Interval History:  68 year old woman with cryptococcal meningitis .  She denies headache at this time  06/07-she wants to go home  She is feeling better, tolerating meds  06/08- she continues to improve- denies headache  06/09- tolerating meds  Denies headache  06/10- she is awaiting discharge plans for OPAT    06/11- denies headache.  Labs reviewed .  CMP  Serum K - 3.3   Review of Systems   Constitutional:  Negative for activity change, appetite change, chills, diaphoresis and fatigue.   Respiratory:  Negative for apnea, choking and chest tightness.      Objective:     Vital Signs (Most Recent):  Temp: 98.3 °F (36.8 °C) (06/11/24 1618)  Pulse: (!) 47 (06/11/24 1726)  Resp: 16 (06/11/24 1618)  BP: (!) 140/66 (06/11/24 1618)  SpO2: 96 % (06/11/24 1618) Vital Signs (24h Range):  Temp:  [97.6 °F (36.4 °C)-98.6 °F (37 °C)] 98.3 °F (36.8 °C)  Pulse:  [45-70] 47  Resp:  [16-20] 16  SpO2:  [96 %-99 %] 96 %  BP: (128-161)/(60-75) 140/66     Weight: 77.1 kg (169 lb 15.6 oz)  Body mass index is 27.43 kg/m².    Estimated Creatinine Clearance: 54.9 mL/min (based on SCr of 1 mg/dL).     Physical Exam  Vitals and nursing note reviewed.   HENT:      Head: Normocephalic.   Cardiovascular:      Rate and Rhythm: Normal rate.   Pulmonary:      Effort: Pulmonary effort is normal.   Musculoskeletal:         General: Normal range of motion.      Cervical back: Normal range of motion.   Skin:     General: Skin is warm.   Neurological:      General: No focal deficit present.      Mental Status: She is alert.   Psychiatric:         Mood and Affect: Mood normal.          Significant Labs: Blood Culture: No results for input(s): "LABBLOO" in the last 4320 hours.  BMP:   Recent Labs   Lab 06/10/24  1619 06/11/24  0802   GLU  --  98   NA  --  131*   K 3.2* 3.3*   CL  --  100   CO2  --  21*   BUN  --  12   CREATININE  --  1.0   CALCIUM  --  8.6*   MG 1.5*  --      CBC:   No results for input(s): "WBC", "HGB", "HCT", "PLT" in the last 48 " "hours.    CMP:   Recent Labs   Lab 06/10/24  0944 06/10/24  1619 06/11/24  0802   *  --  131*   K 2.5* 3.2* 3.3*     --  100   CO2 20*  --  21*     --  98   BUN 13  --  12   CREATININE 1.1  --  1.0   CALCIUM 8.8  --  8.6*   PROT 6.5  --   --    ALBUMIN 3.2*  --   --    BILITOT 0.2  --   --    ALKPHOS 60  --   --    AST 36  --   --    ALT 30  --   --    ANIONGAP 12  --  10     Wound Culture: No results for input(s): "LABAERO" in the last 4320 hours.  All pertinent labs within the past 24 hours have been reviewed.    Significant Imaging: I have reviewed all pertinent imaging results/findings within the past 24 hours.  "

## 2024-06-12 NOTE — PLAN OF CARE
[facility] - Telemetry (Hospital)  Discharge Final Note    Primary Care Provider: Chiquita Talley MD    Expected Discharge Date: 6/12/2024    Final Discharge Note (most recent)       Final Note - 06/12/24 1520          Final Note    Assessment Type Final Discharge Note (P)      Anticipated Discharge Disposition Home-Health Care Svc (P)      Hospital Resources/Appts/Education Provided Appointments scheduled and added to AVS (P)         Post-Acute Status    Post-Acute Authorization Home Health;IV Infusion (P)      Home Health Status Set-up Complete/Auth obtained (P)    [facility]    IV Infusion Status Set-up Complete/Auth obtained (P)    [facility]    Discharge Delays None known at this time (P)                      Important Message from Medicare             Contact Info       [facility]   Specialty: Home Health Services, Home Therapy Services, Home Living Aide Services    [address] [address]   Phone: [phone]       Next Steps: Follow up    Instructions: Home Health: Agency will set up appointments for PICC line dressing changes and Labs    Rich Robertson MD, On license of [facility]   Specialty: Infectious Diseases, Hospitalist    [id number] [place] [id number]   Phone: [phone]       Next Steps: Schedule an appointment as soon as possible for a visit in 1 week(s)    Chiquita Talley MD   Specialty: Family Medicine   Relationship: PCP - General    [address] [place] [id number]   Phone: [phone]       Next Steps: Schedule an appointment as soon as possible for a visit in 2 week(s)    Instructions: Hospital discharge follow up

## 2024-06-12 NOTE — ASSESSMENT & PLAN NOTE
CSF PCR -  Varicella Zoster Virus Not Detected Not Detected   Cryptococcus neoformans/gattii Not Detected Detected Abnormal        Will send cryptococcal assay in the blood and csf- will add   Liposomal Amphotericin/Flucytosine.    Will need up to 2 weeks of therapy- she is not HIV Positive and denies contact with pegions   06/06- day 2 /14 of therapy - continue Amphotericin/Flucytosine  She needs close follow up   06/07  It will be challenging to give medication at home due to frequent monitoring of labs - we can discuss with home health about this - another option will be going to infusion center daily where they can then be able to draw blood.  Outpatient Antibiotic Therapy Plan:     Please send referral to [facility].     1) Infection:  Cryptococcal meningitis     2) Discharge Antibiotics:     Intravenous antibiotics: IV Liposomal Amphotericin 250mg daily      3) Therapy Duration:       Estimated end date of IV antibiotics: 06/19/24     4) Outpatient Weekly Labs:     Twice weekly   CBC  CMP   Mag    Please send all labs to [facility] .    5) Outpatient Infectious Diseases Follow-up       06/08- will need close follow up  Continue Amphotericin-/Flucytosine- monitor cbc/cmp     06/09- she is tolerating meds -  Awaiting final discharge plans  On Amphotericin /Flucytosine  06/10- will continue to monitor CMP while on Amphotericin , follow CBC closely  Follow case management for discharge planning     06/11- tolerating meds-   Will monitor CMP while on Amphotericin /Flucytosine.   Needs clinic follow up

## 2024-06-12 NOTE — PT/OT/SLP PROGRESS
Physical Therapy  Treatment    Anne Farmer   MRN: [id number]   Admitting Diagnosis: Cryptococcal meningoencephalitis    PT Received On: 06/12/24  PT Start Time: 0945     PT Stop Time: 1010    PT Total Time (min): 25 min       Billable Minutes:  Gait Training 10 and Therapeutic Activity 15    Treatment Type: Treatment  PT/PTA: PTA     Number of PTA visits since last PT visit: 1       General Precautions: Standard, fall  Orthopedic Precautions: N/A  Braces: N/A  Respiratory Status: Room air    Spiritual, Cultural Beliefs, Congregational Practices, Values that Affect Care: no    Subjective:  Communicated with patient's nurse, Bianca, and completed Epic chart review prior to session.  Patient agreed to PT session.     Pain/Comfort  Pain Rating 1: 0/10  Pain Rating Post-Intervention 1: 0/10    Objective:   Patient found with: telemetry, PICC line    Patient found standing up in room at sink.    Donned brief with Mod A & gown with Min A    120ft No AD CGA-Min A     Stand at sink x5 min total to complete ADL self care tasks SBA    Stand pivot T/F to chair No AD: SBA    Reviewed AROM TE to BLE including: hip flex/ext, knee flex/ext, ankle PF/DF  To be completed a minimum of 10 reps for each LE in order to promote return of function, strength and ROM.     Educated patient on importance of increased tolerance to upright position and direct impact on CV endurance and strength. Patient encouraged to sit up in chair/ EOB, for a minimum of 2 consecutive hours, 3x per day. Encouraged patient to perform AROM TE to BLE throughout the day within all available planes of motion. Re enforced importance of utilizing call light to meet needs in room and not attempt to get up without staff assistance. Patient verbalized understanding and agreed to comply.       AM-PAC 6 CLICK MOBILITY  How much help from another person does this patient currently need?   1 = Unable, Total/Dependent Assistance  2 = A lot, Maximum/Moderate Assistance  3 =  A little, Minimum/Contact Guard/Supervision  4 = None, Modified [place]/Independent    Turning over in bed (including adjusting bedclothes, sheets and blankets)?: 1 (NT)  Sitting down on and standing up from a chair with arms (e.g., wheelchair, bedside commode, etc.): 1 (NT)  Moving from lying on back to sitting on the side of the bed?: 1 (NT)  Moving to and from a bed to a chair (including a wheelchair)?: 3  Need to walk in hospital room?: 3  Climbing 3-5 steps with a railing?: 1 (NT)  Basic Mobility Total Score: 10    AM-PAC Raw Score CMS G-Code Modifier Level of Impairment Assistance   6 % Total / Unable   7 - 9 CM 80 - 100% Maximal Assist   10 - 14 CL 60 - 80% Moderate Assist   15 - 19 CK 40 - 60% Moderate Assist   20 - 22 CJ 20 - 40% Minimal Assist   23 CI 1-20% SBA / CGA   24 CH 0% Independent/ Mod I     Patient left up in chair with call button in reach.    Assessment:  Anne Farmer is a 68 y.o. female with a medical diagnosis of Cryptococcal meningoencephalitis and presents with overall decline in functional mobility. Patient would continue to benefit from skilled PT to address functional limitations listed below in order to return to PLOF/decrease caregiver burden.    Rehab identified problem list/impairments: weakness, impaired endurance, impaired self care skills, impaired functional mobility, gait instability, impaired balance, decreased safety awareness, impaired cardiopulmonary response to activity    Rehab potential is fair.    Activity tolerance: Fair    Discharge recommendations: Low Intensity Therapy      Barriers to discharge:      Equipment recommendations: walker, rolling, shower chair     GOALS:   Multidisciplinary Problems       Physical Therapy Goals          Problem: Physical Therapy    Goal Priority Disciplines Outcome Goal Variances Interventions   Physical Therapy Goal     PT, PT/OT      Description: LTG to be met in 14 days: 6/17/24  Pt will complete bed mobility  INDEP.  Pt will complete sit to stand INDEP.  Pt will ambulate 300' NOAH with RW.  Pt will increase AMPAC score by 2 to progress gross functional mobility.                       PLAN:    Patient to be seen 3 x/week to address the above listed problems via gait training, therapeutic activities, therapeutic exercises  Plan of Care expires: 06/17/24  Plan of Care reviewed with: patient         06/12/2024

## 2024-06-13 DIAGNOSIS — I11.9 CARDIOMYOPATHY DUE TO HYPERTENSION, WITHOUT HEART FAILURE: ICD-10-CM

## 2024-06-13 DIAGNOSIS — I43 CARDIOMYOPATHY DUE TO HYPERTENSION, WITHOUT HEART FAILURE: ICD-10-CM

## 2024-06-13 RX ORDER — CYCLOBENZAPRINE HCL 5 MG
5 TABLET ORAL 3 TIMES DAILY PRN
Qty: 30 TABLET | Refills: 0 | Status: SHIPPED | OUTPATIENT
Start: 2024-06-13

## 2024-06-13 RX ORDER — ORPHENADRINE CITRATE 100 MG/1
100 TABLET, EXTENDED RELEASE ORAL 2 TIMES DAILY
Qty: 10 TABLET | Refills: 0 | Status: SHIPPED | OUTPATIENT
Start: 2024-06-13 | End: 2024-06-13

## 2024-06-13 RX ORDER — HYDROCHLOROTHIAZIDE 25 MG/1
25 TABLET ORAL DAILY
Qty: 7 TABLET | Refills: 0 | Status: SHIPPED | OUTPATIENT
Start: 2024-06-13

## 2024-06-13 NOTE — TELEPHONE ENCOUNTER
No care due was identified.  [facility] Embedded Care Due Messages. Reference number: [id number].   6/13/2024 1:56:11 PM CDT

## 2024-06-13 NOTE — TELEPHONE ENCOUNTER
----- Message from Alfie Gamez sent at 6/13/2024 11:37 AM CDT -----  Contact: Hayde/[facility] nurse  Hayde chapa nurse with [facility] is requesting a call back from the nurse to see if Anne is still needing to take a couple medications. Please give her  a call at [phone]

## 2024-06-13 NOTE — TELEPHONE ENCOUNTER
No care due was identified.  [facility] Embedded Care Due Messages. Reference number: [id number].   6/13/2024 11:50:07 AM CDT

## 2024-06-13 NOTE — TELEPHONE ENCOUNTER
Return call to [facility] nurse regarding medication refill. Sending message to Dr. Campos. Awaiting response.

## 2024-06-14 LAB — LGI1-IGG CBA: NEGATIVE

## 2024-06-17 ENCOUNTER — DOCUMENTATION ONLY (OUTPATIENT)
Dept: INFECTIOUS DISEASES | Facility: HOSPITAL | Age: 69
End: 2024-06-17
Payer: MEDICARE

## 2024-06-17 ENCOUNTER — NURSE TRIAGE (OUTPATIENT)
Dept: ADMINISTRATIVE | Facility: CLINIC | Age: 69
End: 2024-06-17
Payer: MEDICARE

## 2024-06-17 ENCOUNTER — TELEPHONE (OUTPATIENT)
Dept: FAMILY MEDICINE | Facility: CLINIC | Age: 69
End: 2024-06-17
Payer: MEDICARE

## 2024-06-17 ENCOUNTER — HOSPITAL ENCOUNTER (INPATIENT)
Facility: HOSPITAL | Age: 69
LOS: 1 days | Discharge: HOME HEALTH | DRG: 640 | End: 2024-06-19
Attending: EMERGENCY MEDICINE | Admitting: FAMILY MEDICINE
Payer: MEDICARE

## 2024-06-17 DIAGNOSIS — E87.6 HYPOKALEMIA: Primary | ICD-10-CM

## 2024-06-17 DIAGNOSIS — B45.1 CRYPTOCOCCAL MENINGITIS: ICD-10-CM

## 2024-06-17 DIAGNOSIS — B45.1 CRYPTOCOCCAL MENINGOENCEPHALITIS: Primary | ICD-10-CM

## 2024-06-17 DIAGNOSIS — R07.9 CHEST PAIN: ICD-10-CM

## 2024-06-17 DIAGNOSIS — E83.42 HYPOMAGNESEMIA: ICD-10-CM

## 2024-06-17 LAB
ALBUMIN SERPL BCP-MCNC: 3.5 G/DL (ref 3.5–5.2)
ALP SERPL-CCNC: 53 U/L (ref 55–135)
ALT SERPL W/O P-5'-P-CCNC: 50 U/L (ref 10–44)
AMPHIPHYSIN AB TITR SER: NEGATIVE {TITER}
ANION GAP SERPL CALC-SCNC: 13 MMOL/L (ref 8–16)
ANNOTATION COMMENT IMP: ABNORMAL
AST SERPL-CCNC: 37 U/L (ref 10–40)
BASOPHILS # BLD AUTO: 0.04 K/UL (ref 0–0.2)
BASOPHILS NFR BLD: 0.3 % (ref 0–1.9)
BILIRUB SERPL-MCNC: 0.6 MG/DL (ref 0.1–1)
BUN SERPL-MCNC: 12 MG/DL (ref 8–23)
CALCIUM SERPL-MCNC: 9.6 MG/DL (ref 8.7–10.5)
CHLORIDE SERPL-SCNC: 97 MMOL/L (ref 95–110)
CO2 SERPL-SCNC: 21 MMOL/L (ref 23–29)
CREAT SERPL-MCNC: 1.2 MG/DL (ref 0.5–1.4)
CV2 IGG TITR SER: NEGATIVE {TITER}
DIFFERENTIAL METHOD BLD: ABNORMAL
EOSINOPHIL # BLD AUTO: 0.1 K/UL (ref 0–0.5)
EOSINOPHIL NFR BLD: 0.4 % (ref 0–8)
ERYTHROCYTE [DISTWIDTH] IN BLOOD BY AUTOMATED COUNT: 12.8 % (ref 11.5–14.5)
EST. GFR  (NO RACE VARIABLE): 49 ML/MIN/1.73 M^2
GLIAL NUC TYPE 1 AB TITR SER: NEGATIVE {TITER}
GLUCOSE SERPL-MCNC: 152 MG/DL (ref 70–110)
HCT VFR BLD AUTO: 29.7 % (ref 37–48.5)
HGB BLD-MCNC: 10.6 G/DL (ref 12–16)
HU1 AB TITR SER: NEGATIVE {TITER}
HU2 AB TITR SER IF: NEGATIVE {TITER}
HU3 AB TITR SER: NEGATIVE {TITER}
IMM GRANULOCYTES # BLD AUTO: 0.18 K/UL (ref 0–0.04)
IMM GRANULOCYTES NFR BLD AUTO: 1.2 % (ref 0–0.5)
IMMUNOLOGIST REVIEW: ABNORMAL
LYMPHOCYTES # BLD AUTO: 0.6 K/UL (ref 1–4.8)
LYMPHOCYTES NFR BLD: 4.3 % (ref 18–48)
MAGNESIUM SERPL-MCNC: 1.1 MG/DL (ref 1.6–2.6)
MCH RBC QN AUTO: 31 PG (ref 27–31)
MCHC RBC AUTO-ENTMCNC: 35.7 G/DL (ref 32–36)
MCV RBC AUTO: 87 FL (ref 82–98)
MONOCYTES # BLD AUTO: 0.8 K/UL (ref 0.3–1)
MONOCYTES NFR BLD: 5.6 % (ref 4–15)
NEUTROPHILS # BLD AUTO: 13.3 K/UL (ref 1.8–7.7)
NEUTROPHILS NFR BLD: 88.2 % (ref 38–73)
NRBC BLD-RTO: 0 /100 WBC
PCA-1 AB TITR SER: NEGATIVE {TITER}
PCA-TR AB TITR SER: NEGATIVE {TITER}
PLATELET # BLD AUTO: 208 K/UL (ref 150–450)
PMV BLD AUTO: 8.1 FL (ref 9.2–12.9)
POTASSIUM SERPL-SCNC: 2.7 MMOL/L (ref 3.5–5.1)
PROT SERPL-MCNC: 6.8 G/DL (ref 6–8.4)
PURKINJE CELL CYTOPLASMIC AB TYPE2: NEGATIVE
RBC # BLD AUTO: 3.42 M/UL (ref 4–5.4)
SODIUM SERPL-SCNC: 131 MMOL/L (ref 136–145)
VGCC-P/Q BIND AB SER-SCNC: 0 NMOL/L
VGKC AB SER-SCNC: 0.06 NMOL/L
WBC # BLD AUTO: 15.02 K/UL (ref 3.9–12.7)

## 2024-06-17 PROCEDURE — 83735 ASSAY OF MAGNESIUM: CPT | Mod: 91 | Performed by: EMERGENCY MEDICINE

## 2024-06-17 PROCEDURE — 85025 COMPLETE CBC W/AUTO DIFF WBC: CPT | Mod: 91 | Performed by: EMERGENCY MEDICINE

## 2024-06-17 PROCEDURE — 93005 ELECTROCARDIOGRAM TRACING: CPT

## 2024-06-17 PROCEDURE — 25000003 PHARM REV CODE 250: Performed by: EMERGENCY MEDICINE

## 2024-06-17 PROCEDURE — 80053 COMPREHEN METABOLIC PANEL: CPT | Mod: 91 | Performed by: EMERGENCY MEDICINE

## 2024-06-17 PROCEDURE — 63600175 PHARM REV CODE 636 W HCPCS: Performed by: EMERGENCY MEDICINE

## 2024-06-17 PROCEDURE — 93010 ELECTROCARDIOGRAM REPORT: CPT | Mod: ,,, | Performed by: INTERNAL MEDICINE

## 2024-06-17 RX ORDER — FLUCONAZOLE 200 MG/1
800 TABLET ORAL DAILY
Qty: 240 TABLET | Refills: 0 | Status: SHIPPED | OUTPATIENT
Start: 2024-06-17 | End: 2024-08-16

## 2024-06-17 RX ORDER — ONDANSETRON HYDROCHLORIDE 2 MG/ML
4 INJECTION, SOLUTION INTRAVENOUS
Status: COMPLETED | OUTPATIENT
Start: 2024-06-17 | End: 2024-06-17

## 2024-06-17 RX ORDER — POTASSIUM CHLORIDE 20 MEQ/1
40 TABLET, EXTENDED RELEASE ORAL
Status: COMPLETED | OUTPATIENT
Start: 2024-06-17 | End: 2024-06-17

## 2024-06-17 RX ORDER — POTASSIUM CHLORIDE 7.45 MG/ML
20 INJECTION INTRAVENOUS
Status: COMPLETED | OUTPATIENT
Start: 2024-06-17 | End: 2024-06-18

## 2024-06-17 RX ORDER — MAGNESIUM SULFATE HEPTAHYDRATE 40 MG/ML
2 INJECTION, SOLUTION INTRAVENOUS
Status: COMPLETED | OUTPATIENT
Start: 2024-06-17 | End: 2024-06-18

## 2024-06-17 RX ADMIN — ONDANSETRON 4 MG: 2 INJECTION INTRAMUSCULAR; INTRAVENOUS at 11:06

## 2024-06-17 RX ADMIN — MAGNESIUM SULFATE HEPTAHYDRATE 2 G: 40 INJECTION, SOLUTION INTRAVENOUS at 11:06

## 2024-06-17 RX ADMIN — POTASSIUM CHLORIDE 40 MEQ: 1500 TABLET, EXTENDED RELEASE ORAL at 10:06

## 2024-06-17 RX ADMIN — POTASSIUM CHLORIDE 20 MEQ: 7.46 INJECTION, SOLUTION INTRAVENOUS at 11:06

## 2024-06-17 NOTE — TELEPHONE ENCOUNTER
----- Message from Sabra Fragoso MA sent at 6/17/2024  9:29 AM CDT -----  Type:  Needs Medical Advice/Symptom-based Call    Who Called:  Pt linn Cisse   Symptoms (please be specific):    How long has patient had these symptoms:      Pharmacy:    Would the patient rather a call back or a response via [facility]?    Best Call Back Number:  [phone]  Additional Information:   Taking antibiotics from IV drips needs help , because linn doesn't feel comfortable doing . Has been receiving it due to do . Asked if staff call call and adive if pt can come in clinic or go to hospital in regards

## 2024-06-17 NOTE — TELEPHONE ENCOUNTER
HH is at [facility].  Bunny they can draw her labs twice a week buty cannot administrer IV antibiotics.  Cannot route message to Dr. Robertson who ordered IV abx therapy to request alt therapy for pt.

## 2024-06-17 NOTE — TELEPHONE ENCOUNTER
Pt reports home health came to her house and started IV antibiotics. Pt does not know how to stop them. The IV pump can be heard beeping in the background. Pt has a home health folder in the home and provided the number. Called [facility] and conferenced patient in to request a prn visit to address the IV infusion. Instructed patient to call back with any additional questions or concerns.   Reason for Disposition   [1] Follow-up call to recent contact AND [2] information only call, no triage required    Protocols used: Information Only Call - No Triage-A-

## 2024-06-17 NOTE — TELEPHONE ENCOUNTER
Reason for Disposition   Caller has already spoken with another triager and has no further questions.    Protocols used: No Contact or Duplicate Contact Call-A-  No contact for this encounter, pt returned call but was trying to reach [facility], call never transferred to this nurse.

## 2024-06-17 NOTE — PHYSICIAN QUERY
Please clarify if the diagnosis identified in the query has been:         Other clarification or diagnosis, please specify: Initial concern for VBA stroke.  No diffusion restriction to suggest acute stroke.  Found to have cryptococcal meningoencephalitis on repeat MRI with contrast.

## 2024-06-18 PROBLEM — E83.42 HYPOMAGNESEMIA: Status: ACTIVE | Noted: 2024-06-18

## 2024-06-18 LAB
ALBUMIN SERPL BCP-MCNC: 3.6 G/DL (ref 3.5–5.2)
ALP SERPL-CCNC: 59 U/L (ref 55–135)
ALT SERPL W/O P-5'-P-CCNC: 49 U/L (ref 10–44)
ANION GAP SERPL CALC-SCNC: 10 MMOL/L (ref 8–16)
ANION GAP SERPL CALC-SCNC: 7 MMOL/L (ref 8–16)
AST SERPL-CCNC: 36 U/L (ref 10–40)
BASOPHILS # BLD AUTO: 0.07 K/UL (ref 0–0.2)
BASOPHILS NFR BLD: 0.7 % (ref 0–1.9)
BILIRUB SERPL-MCNC: 0.6 MG/DL (ref 0.1–1)
BUN SERPL-MCNC: 10 MG/DL (ref 8–23)
BUN SERPL-MCNC: 11 MG/DL (ref 8–23)
CALCIUM SERPL-MCNC: 9.4 MG/DL (ref 8.7–10.5)
CALCIUM SERPL-MCNC: 9.8 MG/DL (ref 8.7–10.5)
CHLORIDE SERPL-SCNC: 100 MMOL/L (ref 95–110)
CHLORIDE SERPL-SCNC: 101 MMOL/L (ref 95–110)
CLARITY CSF: CLEAR
CO2 SERPL-SCNC: 21 MMOL/L (ref 23–29)
CO2 SERPL-SCNC: 23 MMOL/L (ref 23–29)
COLOR CSF: COLORLESS
CREAT SERPL-MCNC: 0.9 MG/DL (ref 0.5–1.4)
CREAT SERPL-MCNC: 1 MG/DL (ref 0.5–1.4)
CSF TUBE NUMBER: 1
CSF TUBE NUMBER: 1
DIFFERENTIAL METHOD BLD: ABNORMAL
EOSINOPHIL # BLD AUTO: 0.3 K/UL (ref 0–0.5)
EOSINOPHIL NFR BLD: 3.2 % (ref 0–8)
ERYTHROCYTE [DISTWIDTH] IN BLOOD BY AUTOMATED COUNT: 12.8 % (ref 11.5–14.5)
EST. GFR  (NO RACE VARIABLE): >60 ML/MIN/1.73 M^2
EST. GFR  (NO RACE VARIABLE): >60 ML/MIN/1.73 M^2
GLUCOSE CSF-MCNC: 28 MG/DL (ref 40–70)
GLUCOSE SERPL-MCNC: 134 MG/DL (ref 70–110)
GLUCOSE SERPL-MCNC: 98 MG/DL (ref 70–110)
HCT VFR BLD AUTO: 33 % (ref 37–48.5)
HGB BLD-MCNC: 11.7 G/DL (ref 12–16)
IMM GRANULOCYTES # BLD AUTO: 0.11 K/UL (ref 0–0.04)
IMM GRANULOCYTES NFR BLD AUTO: 1.1 % (ref 0–0.5)
INDIA INK PREP CSF: NORMAL
LYMPHOCYTES # BLD AUTO: 0.7 K/UL (ref 1–4.8)
LYMPHOCYTES NFR BLD: 6.7 % (ref 18–48)
LYMPHOCYTES NFR CSF MANUAL: 90 % (ref 40–80)
MAGNESIUM SERPL-MCNC: 1.6 MG/DL (ref 1.6–2.6)
MAGNESIUM SERPL-MCNC: 1.8 MG/DL (ref 1.6–2.6)
MCH RBC QN AUTO: 31.5 PG (ref 27–31)
MCHC RBC AUTO-ENTMCNC: 35.5 G/DL (ref 32–36)
MCV RBC AUTO: 89 FL (ref 82–98)
MONOCYTES # BLD AUTO: 0.6 K/UL (ref 0.3–1)
MONOCYTES NFR BLD: 6.3 % (ref 4–15)
MONOS+MACROS NFR CSF MANUAL: 10 % (ref 15–45)
NEUTROPHILS # BLD AUTO: 8 K/UL (ref 1.8–7.7)
NEUTROPHILS NFR BLD: 82 % (ref 38–73)
NRBC BLD-RTO: 0 /100 WBC
OHS QRS DURATION: 108 MS
OHS QTC CALCULATION: 443 MS
PLATELET # BLD AUTO: 205 K/UL (ref 150–450)
PMV BLD AUTO: 8.5 FL (ref 9.2–12.9)
POCT GLUCOSE: 106 MG/DL (ref 70–110)
POCT GLUCOSE: 117 MG/DL (ref 70–110)
POTASSIUM SERPL-SCNC: 2.9 MMOL/L (ref 3.5–5.1)
POTASSIUM SERPL-SCNC: 3.5 MMOL/L (ref 3.5–5.1)
POTASSIUM SERPL-SCNC: 3.7 MMOL/L (ref 3.5–5.1)
PROT CSF-MCNC: 194 MG/DL (ref 15–40)
PROT SERPL-MCNC: 7.2 G/DL (ref 6–8.4)
RBC # BLD AUTO: 3.72 M/UL (ref 4–5.4)
RBC # CSF: 32 /CU MM
SODIUM SERPL-SCNC: 130 MMOL/L (ref 136–145)
SODIUM SERPL-SCNC: 132 MMOL/L (ref 136–145)
SPECIMEN VOL CSF: 2 ML
WBC # BLD AUTO: 9.77 K/UL (ref 3.9–12.7)
WBC # CSF: 133 /CU MM (ref 0–5)

## 2024-06-18 PROCEDURE — 63600175 PHARM REV CODE 636 W HCPCS: Mod: JZ,JG | Performed by: NURSE PRACTITIONER

## 2024-06-18 PROCEDURE — 25000003 PHARM REV CODE 250: Performed by: NURSE PRACTITIONER

## 2024-06-18 PROCEDURE — 87102 FUNGUS ISOLATION CULTURE: CPT | Performed by: STUDENT IN AN ORGANIZED HEALTH CARE EDUCATION/TRAINING PROGRAM

## 2024-06-18 PROCEDURE — 84157 ASSAY OF PROTEIN OTHER: CPT | Performed by: STUDENT IN AN ORGANIZED HEALTH CARE EDUCATION/TRAINING PROGRAM

## 2024-06-18 PROCEDURE — 36415 COLL VENOUS BLD VENIPUNCTURE: CPT | Performed by: NURSE PRACTITIONER

## 2024-06-18 PROCEDURE — 87205 SMEAR GRAM STAIN: CPT | Performed by: STUDENT IN AN ORGANIZED HEALTH CARE EDUCATION/TRAINING PROGRAM

## 2024-06-18 PROCEDURE — 86403 PARTICLE AGGLUT ANTBDY SCRN: CPT | Performed by: STUDENT IN AN ORGANIZED HEALTH CARE EDUCATION/TRAINING PROGRAM

## 2024-06-18 PROCEDURE — 87070 CULTURE OTHR SPECIMN AEROBIC: CPT | Performed by: STUDENT IN AN ORGANIZED HEALTH CARE EDUCATION/TRAINING PROGRAM

## 2024-06-18 PROCEDURE — 83735 ASSAY OF MAGNESIUM: CPT | Mod: 91 | Performed by: NURSE PRACTITIONER

## 2024-06-18 PROCEDURE — 80048 BASIC METABOLIC PNL TOTAL CA: CPT | Performed by: EMERGENCY MEDICINE

## 2024-06-18 PROCEDURE — 99223 1ST HOSP IP/OBS HIGH 75: CPT | Mod: ,,, | Performed by: STUDENT IN AN ORGANIZED HEALTH CARE EDUCATION/TRAINING PROGRAM

## 2024-06-18 PROCEDURE — 80053 COMPREHEN METABOLIC PANEL: CPT | Performed by: NURSE PRACTITIONER

## 2024-06-18 PROCEDURE — 89051 BODY FLUID CELL COUNT: CPT | Performed by: STUDENT IN AN ORGANIZED HEALTH CARE EDUCATION/TRAINING PROGRAM

## 2024-06-18 PROCEDURE — 25000242 PHARM REV CODE 250 ALT 637 W/ HCPCS: Performed by: NURSE PRACTITIONER

## 2024-06-18 PROCEDURE — 85025 COMPLETE CBC W/AUTO DIFF WBC: CPT | Performed by: NURSE PRACTITIONER

## 2024-06-18 PROCEDURE — 63600175 PHARM REV CODE 636 W HCPCS: Performed by: FAMILY MEDICINE

## 2024-06-18 PROCEDURE — 83735 ASSAY OF MAGNESIUM: CPT | Performed by: EMERGENCY MEDICINE

## 2024-06-18 PROCEDURE — 86403 PARTICLE AGGLUT ANTBDY SCRN: CPT | Mod: 91 | Performed by: STUDENT IN AN ORGANIZED HEALTH CARE EDUCATION/TRAINING PROGRAM

## 2024-06-18 PROCEDURE — 87210 SMEAR WET MOUNT SALINE/INK: CPT | Performed by: STUDENT IN AN ORGANIZED HEALTH CARE EDUCATION/TRAINING PROGRAM

## 2024-06-18 PROCEDURE — A4216 STERILE WATER/SALINE, 10 ML: HCPCS | Performed by: NURSE PRACTITIONER

## 2024-06-18 PROCEDURE — 11000001 HC ACUTE MED/SURG PRIVATE ROOM

## 2024-06-18 PROCEDURE — 36415 COLL VENOUS BLD VENIPUNCTURE: CPT | Performed by: STUDENT IN AN ORGANIZED HEALTH CARE EDUCATION/TRAINING PROGRAM

## 2024-06-18 PROCEDURE — 009U3ZX DRAINAGE OF SPINAL CANAL, PERCUTANEOUS APPROACH, DIAGNOSTIC: ICD-10-PCS | Performed by: STUDENT IN AN ORGANIZED HEALTH CARE EDUCATION/TRAINING PROGRAM

## 2024-06-18 PROCEDURE — 82945 GLUCOSE OTHER FLUID: CPT | Performed by: STUDENT IN AN ORGANIZED HEALTH CARE EDUCATION/TRAINING PROGRAM

## 2024-06-18 PROCEDURE — 84132 ASSAY OF SERUM POTASSIUM: CPT | Performed by: FAMILY MEDICINE

## 2024-06-18 RX ORDER — ALUMINUM HYDROXIDE, MAGNESIUM HYDROXIDE, AND SIMETHICONE 1200; 120; 1200 MG/30ML; MG/30ML; MG/30ML
30 SUSPENSION ORAL 4 TIMES DAILY PRN
Status: DISCONTINUED | OUTPATIENT
Start: 2024-06-18 | End: 2024-06-19 | Stop reason: HOSPADM

## 2024-06-18 RX ORDER — POLYETHYLENE GLYCOL 3350 17 G/17G
17 POWDER, FOR SOLUTION ORAL DAILY PRN
Status: DISCONTINUED | OUTPATIENT
Start: 2024-06-18 | End: 2024-06-19 | Stop reason: HOSPADM

## 2024-06-18 RX ORDER — IPRATROPIUM BROMIDE AND ALBUTEROL SULFATE 2.5; .5 MG/3ML; MG/3ML
3 SOLUTION RESPIRATORY (INHALATION) EVERY 6 HOURS PRN
Status: DISCONTINUED | OUTPATIENT
Start: 2024-06-18 | End: 2024-06-19 | Stop reason: HOSPADM

## 2024-06-18 RX ORDER — LANOLIN ALCOHOL/MO/W.PET/CERES
400 CREAM (GRAM) TOPICAL 2 TIMES DAILY
Status: DISCONTINUED | OUTPATIENT
Start: 2024-06-18 | End: 2024-06-19 | Stop reason: HOSPADM

## 2024-06-18 RX ORDER — ANASTROZOLE 1 MG/1
1 TABLET ORAL DAILY
Status: DISCONTINUED | OUTPATIENT
Start: 2024-06-18 | End: 2024-06-19 | Stop reason: HOSPADM

## 2024-06-18 RX ORDER — AZELASTINE 1 MG/ML
2 SPRAY, METERED NASAL 2 TIMES DAILY
Status: DISCONTINUED | OUTPATIENT
Start: 2024-06-18 | End: 2024-06-19 | Stop reason: HOSPADM

## 2024-06-18 RX ORDER — GLUCAGON 1 MG
1 KIT INJECTION
Status: DISCONTINUED | OUTPATIENT
Start: 2024-06-18 | End: 2024-06-19 | Stop reason: HOSPADM

## 2024-06-18 RX ORDER — LANOLIN ALCOHOL/MO/W.PET/CERES
800 CREAM (GRAM) TOPICAL ONCE
Status: COMPLETED | OUTPATIENT
Start: 2024-06-18 | End: 2024-06-18

## 2024-06-18 RX ORDER — ONDANSETRON HYDROCHLORIDE 2 MG/ML
4 INJECTION, SOLUTION INTRAVENOUS EVERY 6 HOURS PRN
Status: DISCONTINUED | OUTPATIENT
Start: 2024-06-18 | End: 2024-06-19 | Stop reason: HOSPADM

## 2024-06-18 RX ORDER — AMLODIPINE BESYLATE 10 MG/1
10 TABLET ORAL DAILY
Status: DISCONTINUED | OUTPATIENT
Start: 2024-06-18 | End: 2024-06-19 | Stop reason: HOSPADM

## 2024-06-18 RX ORDER — POTASSIUM CHLORIDE 7.45 MG/ML
10 INJECTION INTRAVENOUS
Status: COMPLETED | OUTPATIENT
Start: 2024-06-18 | End: 2024-06-18

## 2024-06-18 RX ORDER — PRAVASTATIN SODIUM 20 MG/1
20 TABLET ORAL DAILY
Status: DISCONTINUED | OUTPATIENT
Start: 2024-06-18 | End: 2024-06-19 | Stop reason: HOSPADM

## 2024-06-18 RX ORDER — NALOXONE HCL 0.4 MG/ML
0.02 VIAL (ML) INJECTION
Status: DISCONTINUED | OUTPATIENT
Start: 2024-06-18 | End: 2024-06-19 | Stop reason: HOSPADM

## 2024-06-18 RX ORDER — LOSARTAN POTASSIUM 50 MG/1
100 TABLET ORAL DAILY
Status: DISCONTINUED | OUTPATIENT
Start: 2024-06-18 | End: 2024-06-19 | Stop reason: HOSPADM

## 2024-06-18 RX ORDER — SODIUM CHLORIDE 0.9 % (FLUSH) 0.9 %
10 SYRINGE (ML) INJECTION EVERY 8 HOURS
Status: DISCONTINUED | OUTPATIENT
Start: 2024-06-18 | End: 2024-06-19 | Stop reason: HOSPADM

## 2024-06-18 RX ORDER — POTASSIUM CHLORIDE 20 MEQ/1
40 TABLET, EXTENDED RELEASE ORAL DAILY
Status: DISCONTINUED | OUTPATIENT
Start: 2024-06-18 | End: 2024-06-19 | Stop reason: HOSPADM

## 2024-06-18 RX ORDER — TRAZODONE HYDROCHLORIDE 50 MG/1
50 TABLET ORAL NIGHTLY
Status: DISCONTINUED | OUTPATIENT
Start: 2024-06-18 | End: 2024-06-19 | Stop reason: HOSPADM

## 2024-06-18 RX ORDER — CETIRIZINE HYDROCHLORIDE 10 MG/1
10 TABLET ORAL DAILY
Status: DISCONTINUED | OUTPATIENT
Start: 2024-06-18 | End: 2024-06-19 | Stop reason: HOSPADM

## 2024-06-18 RX ORDER — TALC
6 POWDER (GRAM) TOPICAL NIGHTLY PRN
Status: DISCONTINUED | OUTPATIENT
Start: 2024-06-18 | End: 2024-06-19 | Stop reason: HOSPADM

## 2024-06-18 RX ORDER — IBUPROFEN 200 MG
16 TABLET ORAL
Status: DISCONTINUED | OUTPATIENT
Start: 2024-06-18 | End: 2024-06-19 | Stop reason: HOSPADM

## 2024-06-18 RX ORDER — IBUPROFEN 200 MG
24 TABLET ORAL
Status: DISCONTINUED | OUTPATIENT
Start: 2024-06-18 | End: 2024-06-19 | Stop reason: HOSPADM

## 2024-06-18 RX ORDER — PANTOPRAZOLE SODIUM 40 MG/1
40 TABLET, DELAYED RELEASE ORAL DAILY
Status: DISCONTINUED | OUTPATIENT
Start: 2024-06-18 | End: 2024-06-19 | Stop reason: HOSPADM

## 2024-06-18 RX ORDER — PROMETHAZINE HYDROCHLORIDE 12.5 MG/1
12.5 TABLET ORAL EVERY 6 HOURS PRN
Status: DISCONTINUED | OUTPATIENT
Start: 2024-06-18 | End: 2024-06-19 | Stop reason: HOSPADM

## 2024-06-18 RX ORDER — FLUTICASONE PROPIONATE 50 MCG
2 SPRAY, SUSPENSION (ML) NASAL DAILY
Status: DISCONTINUED | OUTPATIENT
Start: 2024-06-18 | End: 2024-06-19 | Stop reason: HOSPADM

## 2024-06-18 RX ORDER — ACETAMINOPHEN 325 MG/1
650 TABLET ORAL EVERY 4 HOURS PRN
Status: DISCONTINUED | OUTPATIENT
Start: 2024-06-18 | End: 2024-06-19 | Stop reason: HOSPADM

## 2024-06-18 RX ORDER — POTASSIUM CHLORIDE 7.45 MG/ML
10 INJECTION INTRAVENOUS
Status: DISCONTINUED | OUTPATIENT
Start: 2024-06-18 | End: 2024-06-18

## 2024-06-18 RX ORDER — INSULIN ASPART 100 [IU]/ML
0-10 INJECTION, SOLUTION INTRAVENOUS; SUBCUTANEOUS
Status: DISCONTINUED | OUTPATIENT
Start: 2024-06-18 | End: 2024-06-19 | Stop reason: HOSPADM

## 2024-06-18 RX ORDER — MAGNESIUM SULFATE HEPTAHYDRATE 40 MG/ML
2 INJECTION, SOLUTION INTRAVENOUS ONCE
Status: COMPLETED | OUTPATIENT
Start: 2024-06-18 | End: 2024-06-18

## 2024-06-18 RX ADMIN — PRAVASTATIN SODIUM 20 MG: 20 TABLET ORAL at 08:06

## 2024-06-18 RX ADMIN — ANASTROZOLE 1 MG: 1 TABLET ORAL at 09:06

## 2024-06-18 RX ADMIN — THERA TABS 1 TABLET: TAB at 08:06

## 2024-06-18 RX ADMIN — Medication 10 ML: at 05:06

## 2024-06-18 RX ADMIN — POTASSIUM CHLORIDE 10 MEQ: 7.46 INJECTION, SOLUTION INTRAVENOUS at 03:06

## 2024-06-18 RX ADMIN — MAGNESIUM SULFATE HEPTAHYDRATE 2 G: 40 INJECTION, SOLUTION INTRAVENOUS at 09:06

## 2024-06-18 RX ADMIN — Medication 10 ML: at 09:06

## 2024-06-18 RX ADMIN — AMPHOTERICIN B 250 MG: 50 INJECTION, POWDER, LYOPHILIZED, FOR SOLUTION INTRAVENOUS at 09:06

## 2024-06-18 RX ADMIN — AMLODIPINE BESYLATE 10 MG: 10 TABLET ORAL at 08:06

## 2024-06-18 RX ADMIN — CETIRIZINE HYDROCHLORIDE 10 MG: 10 TABLET, FILM COATED ORAL at 08:06

## 2024-06-18 RX ADMIN — AZELASTINE HYDROCHLORIDE 274 MCG: 137 SPRAY, METERED NASAL at 08:06

## 2024-06-18 RX ADMIN — POTASSIUM CHLORIDE 40 MEQ: 1500 TABLET, EXTENDED RELEASE ORAL at 08:06

## 2024-06-18 RX ADMIN — LOSARTAN POTASSIUM 100 MG: 50 TABLET, FILM COATED ORAL at 08:06

## 2024-06-18 RX ADMIN — POTASSIUM CHLORIDE 10 MEQ: 7.46 INJECTION, SOLUTION INTRAVENOUS at 04:06

## 2024-06-18 RX ADMIN — PANTOPRAZOLE SODIUM 40 MG: 40 TABLET, DELAYED RELEASE ORAL at 08:06

## 2024-06-18 RX ADMIN — Medication 800 MG: at 03:06

## 2024-06-18 RX ADMIN — FLUTICASONE PROPIONATE 100 MCG: 50 SPRAY, METERED NASAL at 08:06

## 2024-06-18 RX ADMIN — Medication 400 MG: at 08:06

## 2024-06-18 RX ADMIN — TRAZODONE HYDROCHLORIDE 50 MG: 50 TABLET ORAL at 08:06

## 2024-06-18 NOTE — CONSULTS
Chart reviewed by Dr. Sen.       ASSESSMENT/PLAN:    LP    The order for a LP has been placed and the procedure will be performed asap.          Thank you for the consult.

## 2024-06-18 NOTE — ASSESSMENT & PLAN NOTE
Chronic, controlled. Latest blood pressure and vitals reviewed-     Temp:  [98.5 °F (36.9 °C)-98.9 °F (37.2 °C)]   Pulse:  [43-50]   Resp:  [16-21]   BP: (129-157)/(60-76)   SpO2:  [95 %-100 %] .   Home meds for hypertension were reviewed and noted below.   Hypertension Medications               amLODIPine (NORVASC) 10 MG tablet TAKE 1 TABLET BY MOUTH EVERY DAY    hydroCHLOROthiazide (HYDRODIURIL) 25 MG tablet Take 1 tablet (25 mg total) by mouth once daily.    losartan (COZAAR) 100 MG tablet TAKE 1 TABLET BY MOUTH EVERY DAY            While in the hospital, will manage blood pressure as follows; Continue home antihypertensive regimen    Will utilize p.r.n. blood pressure medication only if patient's blood pressure greater than 180/110 and she develops symptoms such as worsening chest pain or shortness of breath.

## 2024-06-18 NOTE — SUBJECTIVE & OBJECTIVE
Interval History:  Follow up for hypokalemia and hypomagnesemia.  Patient's electrolytes has been replaced and labs are stable at this time.  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    Review of Systems  Objective:     Vital Signs (Most Recent):  Temp: 98 °F (36.7 °C) (06/18/24 1238)  Pulse: (!) 41 (06/18/24 1544)  Resp: 20 (06/18/24 1238)  BP: 131/60 (06/18/24 1238)  SpO2: 99 % (06/18/24 1238) Vital Signs (24h Range):  Temp:  [97 °F (36.1 °C)-98.9 °F (37.2 °C)] 98 °F (36.7 °C)  Pulse:  [41-62] 41  Resp:  [16-21] 20  SpO2:  [95 %-100 %] 99 %  BP: (129-157)/(60-76) 131/60     Weight: 75.7 kg (166 lb 14.2 oz)  Body mass index is 29.56 kg/m².    Intake/Output Summary (Last 24 hours) at 6/18/2024 1607  Last data filed at 6/18/2024 0621  Gross per 24 hour   Intake 247.65 ml   Output 600 ml   Net -352.35 ml         Physical Exam  Vitals and nursing note reviewed.   Constitutional:       Appearance: Normal appearance.   HENT:      Head: Normocephalic and atraumatic.      Nose: Nose normal.      Mouth/Throat:      Mouth: Mucous membranes are moist.   Eyes:      Extraocular Movements: Extraocular movements intact.      Conjunctiva/sclera: Conjunctivae normal.   Cardiovascular:      Rate and Rhythm: Normal rate and regular rhythm.      Pulses: Normal pulses.      Heart sounds: Normal heart sounds.   Pulmonary:      Effort: Pulmonary effort is normal.      Breath sounds: Normal breath sounds.   Abdominal:      General: Abdomen is flat. Bowel sounds are normal.      Palpations: Abdomen is soft.   Musculoskeletal:         General: Normal range of motion.      Cervical back: Normal range of motion and neck supple.   Skin:     General: Skin is warm.      Capillary Refill: Capillary refill takes less than 2 seconds.   Neurological:      Mental Status: She is alert. Mental status is at baseline.   Psychiatric:         Mood and  Affect: Mood normal.         Behavior: Behavior normal.             Significant Labs: All pertinent labs within the past 24 hours have been reviewed.  Recent Lab Results  (Last 5 results in the past 24 hours)        06/18/24  1445   06/18/24  0637   06/18/24  0507   06/18/24  0126   06/17/24  2131        Appearance, CSF Clear               Volume, Cell Count CSF 2.0               Wbc, Cell Count                CSF Tube Number 1                1               Albumin   3.6       3.5       ALP   59       53       ALT   49       50       Anion Gap   10     7   13       AST   36       37       Baso #   0.07       0.04       Basophil %   0.7       0.3       BILIRUBIN TOTAL   0.6  Comment: For infants and newborns, interpretation of results should be based  on gestational age, weight and in agreement with clinical  observations.    Premature Infant recommended reference ranges:  Up to 24 hours.............<8.0 mg/dL  Up to 48 hours............<12.0 mg/dL  3-5 days..................<15.0 mg/dL  6-29 days.................<15.0 mg/dL         0.6  Comment: For infants and newborns, interpretation of results should be based  on gestational age, weight and in agreement with clinical  observations.    Premature Infant recommended reference ranges:  Up to 24 hours.............<8.0 mg/dL  Up to 48 hours............<12.0 mg/dL  3-5 days..................<15.0 mg/dL  6-29 days.................<15.0 mg/dL         BUN   10     11   12       Calcium   9.8     9.4   9.6       Chloride   101     100   97       CO2   21     23   21       COLOR CSF Colorless               Creatinine   0.9     1.0   1.2       Differential Method   Automated       Automated       eGFR   >60     >60   49       Eos #   0.3       0.1       Eos %   3.2       0.4       Glucose   98     134   152       Glucose CSF 28  Comment: Infants: 60 to 80 mg/dL               Gran # (ANC)   8.0       13.3       Gran %   82.0       88.2       Hematocrit   33.0        29.7       Hemoglobin   11.7       10.6       Immature Grans (Abs)   0.11  Comment: Mild elevation in immature granulocytes is non specific and   can be seen in a variety of conditions including stress response,   acute inflammation, trauma and pregnancy. Correlation with other   laboratory and clinical findings is essential.         0.18  Comment: Mild elevation in immature granulocytes is non specific and   can be seen in a variety of conditions including stress response,   acute inflammation, trauma and pregnancy. Correlation with other   laboratory and clinical findings is essential.         Immature Granulocytes   1.1       1.2       Lymph #   0.7       0.6       Lymph %   6.7       4.3       Magnesium    1.6     1.8   1.1       MCH   31.5       31.0       MCHC   35.5       35.7       MCV   89       87       Mono #   0.6       0.8       Mono %   6.3       5.6       MPV   8.5       8.1       nRBC   0       0       Platelet Count   205       208       POCT Glucose     106           Potassium   3.5     2.9   2.7  Comment: K critical result(s) called and verbal readback obtained from   Chuyita Neal RN by BO1 06/17/2024 22:23         Protein,   Comment: Infants can have higher CSF protein results due to increased  permeability of the blood-brain barrier.                 PROTEIN TOTAL   7.2       6.8       RBC   3.72       3.42       RDW   12.8       12.8       Sodium   132     130   131       WBC   9.77       15.02                              Significant Imaging:   FL Lumbar Puncture (xpd)   Final Result      Successful fluoroscopically guided lumbar puncture.         Electronically signed by: Marc Sen   Date:    06/18/2024   Time:    14:52

## 2024-06-18 NOTE — PLAN OF CARE
Problem: Adult Inpatient Plan of Care  Goal: Plan of Care Review  Outcome: Progressing  Goal: Patient-Specific Goal (Individualized)  Outcome: Progressing  Goal: Absence of Hospital-Acquired Illness or Injury  Outcome: Progressing  Goal: Optimal Comfort and Wellbeing  Outcome: Progressing  Goal: Readiness for Transition of Care  Outcome: Progressing     Problem: Diabetes Comorbidity  Goal: Blood Glucose Level Within Targeted Range  Outcome: Progressing     Problem: Skin Injury Risk Increased  Goal: Skin Health and Integrity  Outcome: Progressing     Problem: Infection  Goal: Absence of Infection Signs and Symptoms  Outcome: Progressing

## 2024-06-18 NOTE — PLAN OF CARE
Discussed poc with pt, pt verbalized understanding    Purposeful rounding every 2hours    VS wnl  Cardiac monitoring in use, pt is NSR, tele monitor # [id number]  Fall precautions in place, remains injury free    IVFs  Abx given as prescribed  Bed locked at lowest position  Call light within reach    Chart check complete  Will cont with POC

## 2024-06-18 NOTE — HPI
Patient is a 68 year old female with past medical history significant  for arthritis, hypertension, type 2 diabetes mellitus, sarcoidosis, cardiomyopathy, hyperlipidemia, GERD, insomnia, breast cancer, colon cancer, diverticulosis, congestive heart failure, anemia, and 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 Robertson (ID) who presented to ED per instructions from Dr. Robertson due to lab results showing potassium level 2.7. She has PICC line to right arm and has been getting home health. Per Dr. Robertson's note, she is to have LP to ensure CSF sterilization on 6/20 and then if negative she will start fluconazole. She complains of generalized weakness, intermittent dizziness,  nausea and decreased appetite/oral intake. Patient is very poor historian and does not know the details of her medical care. She is supposed to be taking potassium chloride 10 mEq BID and magnesium oxide 400 mg daily, as well as losartan for hypertension, however there is no potassium supplement in the bag of medications that she brought to the ED.  she is afebrile, no distress, blood pressure stable, heart rate 40s to low 50s, saturating normally on room air.  Labs repeated here showed WBC 15, + left shift, hemoglobin 10.6, hematocrit 29.7, platelet 208,  sodium 131, potassium 2.7, CO2 21, glucose 152, magnesium 1.1. She was given Mg sulfate 2 g IVPB, Zofran, CR potassium chloride 40 mEq po, and potassium chloride 10 mEq IV while in ED. Chemistry was repeated and potassium 2.9, sodium 130, magnesium 1.8. Hospital Medicine was consutled for admission due to hypokalemia.

## 2024-06-18 NOTE — ASSESSMENT & PLAN NOTE
Cardiac monitoring    Echo    Result Date: 5/23/2024    Left Ventricle: The left ventricle is normal in size. Normal wall   thickness. There is concentric remodeling. Normal wall motion. Ejection   fraction by visual approximation is 60%. There is indeterminate diastolic   function.    Right Ventricle: Normal right ventricular cavity size. Wall thickness   is normal. Systolic function is normal.    Aortic Valve: The aortic valve is a trileaflet valve. There is mild   aortic regurgitation.    Mitral Valve: There is mild regurgitation.    Tricuspid Valve: There is moderate regurgitation.    Pulmonary Artery: The estimated pulmonary artery systolic pressure is   49 mmHg.    IVC/SVC: Normal venous pressure at 3 mmHg.       Holding HCTZ at this time due to hypokalemia requiring IV repletion

## 2024-06-18 NOTE — ASSESSMENT & PLAN NOTE
Patient has hypokalemia which is Acute on Chronic and currently uncontrolled. Most recent potassium levels reviewed-   Lab Results   Component Value Date    K 2.9 (L) 06/18/2024     K 2.7 initially  Nausea has been an ongoing issue, unsure of compliance with home potassium/magnesium supplementation  Replete potassium and magnesium  Patient refused potassium bicarb, so extended release oral potassium ordered as well as IV replacement  Cardiac monitoring

## 2024-06-18 NOTE — ASSESSMENT & PLAN NOTE
Patient has Abnormal Magnesium: hypomagnesemia. Will continue to monitor electrolytes closely. Will replace the affected electrolytes and repeat labs to be done after interventions completed. The patient's magnesium results have been reviewed and are listed below.  Initially Mg 1.1  Has been given IV magnesium sulfate and po mag ox    Recent Labs   Lab 06/18/24  0126   MG 1.8

## 2024-06-18 NOTE — ASSESSMENT & PLAN NOTE
Consult Dr. Robertson  Has been on liposomal amphotericin 250 mg IV q.day via PICC line/home health, we will resume while in hospital, end of completion date is 06/19  Per Dr. Robertson's note, she will need LP to ensure CSF sterilization on 6/20 and if negative will start fluconazole 800 mg for 8 weeks   -- will need to hold trazodone once this is started   -- followed by 200 mg daily for 6-12 months

## 2024-06-18 NOTE — ASSESSMENT & PLAN NOTE
Cardiac monitoring    Echo    Result Date: 5/23/2024    Left Ventricle: The left ventricle is normal in size. Normal wall   thickness. There is concentric remodeling. Normal wall motion. Ejection   fraction by visual approximation is 60%. There is indeterminate diastolic   function.    Right Ventricle: Normal right ventricular cavity size. Wall thickness   is normal. Systolic function is normal.    Aortic Valve: The aortic valve is a trileaflet valve. There is mild   aortic regurgitation.    Mitral Valve: There is mild regurgitation.    Tricuspid Valve: There is moderate regurgitation.    Pulmonary Artery: The estimated pulmonary artery systolic pressure is   49 mmHg.    IVC/SVC: Normal venous pressure at 3 mmHg.       Holding HCTZ at this time due to hypokalemia requiring IV repletion

## 2024-06-18 NOTE — ASSESSMENT & PLAN NOTE
Patient has Abnormal Magnesium: hypomagnesemia. Will continue to monitor electrolytes closely. Will replace the affected electrolytes and repeat labs to be done after interventions completed. The patient's magnesium results have been reviewed and are listed below.  Initially Mg 1.1  Has been given IV magnesium sulfate and po mag ox    Recent Labs   Lab 06/18/24  0637   MG 1.6      -resolved, repeat labs in a.m.  -continue p.o. magnesium   none

## 2024-06-18 NOTE — PROGRESS NOTES
[facility] [place] [facility]  Progress Note    Patient Name: Anne Farmer  MRN: [id number]  Patient Class: IP- Inpatient   Admission Date: 6/17/2024  Length of Stay: 0 days  Attending Physician: Cassi Hayes MD  Primary Care Provider: Chiquita Talley MD        Subjective:     Principal Problem:Hypokalemia        HPI:  Patient is a 68 year old female with past medical history significant  for arthritis, hypertension, type 2 diabetes mellitus, sarcoidosis, cardiomyopathy, hyperlipidemia, GERD, insomnia, breast cancer, colon cancer, diverticulosis, congestive heart failure, anemia, and 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 Robertson (ID) who presented to ED per instructions from Dr. Robertson due to lab results showing potassium level 2.7. She has PICC line to right arm and has been getting home health. Per Dr. Robertson's note, she is to have LP to ensure CSF sterilization on 6/20 and then if negative she will start fluconazole. She complains of generalized weakness, intermittent dizziness,  nausea and decreased appetite/oral intake. Patient is very poor historian and does not know the details of her medical care. She is supposed to be taking potassium chloride 10 mEq BID and magnesium oxide 400 mg daily, as well as losartan for hypertension, however there is no potassium supplement in the bag of medications that she brought to the ED.  she is afebrile, no distress, blood pressure stable, heart rate 40s to low 50s, saturating normally on room air.  Labs repeated here showed WBC 15, + left shift, hemoglobin 10.6, hematocrit 29.7, platelet 208,  sodium 131, potassium 2.7, CO2 21, glucose 152, magnesium 1.1. She was given Mg sulfate 2 g IVPB, Zofran, CR potassium chloride 40 mEq po, and potassium chloride 10 mEq IV while in ED. Chemistry was repeated and potassium 2.9, sodium 130, magnesium 1.8. Hospital Medicine was  consutled for admission due to hypokalemia.    Overview/Hospital Course:  Patient with recent discharge for cryptococcal meningitis admitted with hyponatremia and hypomagnesemia.  Her electrolyte abnormalities were corrected on hospital day 1.  Infectious Disease was consulted as patient is on amphotericin B for cryptococcal meningitis.  Dr. Burris recommended a repeat LP to determine if patient can be switched to fluconazole.  Upon discussion with home health agency patient has only received 2 doses of amphotericin B at home, due to noncompliance patient will need to go to the clinic to receive the remainder of antibiotics if necessary.  Awaiting further recommendations based on LP results.    Interval History:  Follow up for hypokalemia and hypomagnesemia.  Patient's electrolytes has been replaced and labs are stable at this time.  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    Review of Systems  Objective:     Vital Signs (Most Recent):  Temp: 98 °F (36.7 °C) (06/18/24 1238)  Pulse: (!) 41 (06/18/24 1544)  Resp: 20 (06/18/24 1238)  BP: 131/60 (06/18/24 1238)  SpO2: 99 % (06/18/24 1238) Vital Signs (24h Range):  Temp:  [97 °F (36.1 °C)-98.9 °F (37.2 °C)] 98 °F (36.7 °C)  Pulse:  [41-62] 41  Resp:  [16-21] 20  SpO2:  [95 %-100 %] 99 %  BP: (129-157)/(60-76) 131/60     Weight: 75.7 kg (166 lb 14.2 oz)  Body mass index is 29.56 kg/m².    Intake/Output Summary (Last 24 hours) at 6/18/2024 1607  Last data filed at 6/18/2024 0621  Gross per 24 hour   Intake 247.65 ml   Output 600 ml   Net -352.35 ml         Physical Exam  Vitals and nursing note reviewed.   Constitutional:       Appearance: Normal appearance.   HENT:      Head: Normocephalic and atraumatic.      Nose: Nose normal.      Mouth/Throat:      Mouth: Mucous membranes are moist.   Eyes:      Extraocular Movements: Extraocular movements intact.       Conjunctiva/sclera: Conjunctivae normal.   Cardiovascular:      Rate and Rhythm: Normal rate and regular rhythm.      Pulses: Normal pulses.      Heart sounds: Normal heart sounds.   Pulmonary:      Effort: Pulmonary effort is normal.      Breath sounds: Normal breath sounds.   Abdominal:      General: Abdomen is flat. Bowel sounds are normal.      Palpations: Abdomen is soft.   Musculoskeletal:         General: Normal range of motion.      Cervical back: Normal range of motion and neck supple.   Skin:     General: Skin is warm.      Capillary Refill: Capillary refill takes less than 2 seconds.   Neurological:      Mental Status: She is alert. Mental status is at baseline.   Psychiatric:         Mood and Affect: Mood normal.         Behavior: Behavior normal.             Significant Labs: All pertinent labs within the past 24 hours have been reviewed.  Recent Lab Results  (Last 5 results in the past 24 hours)        06/18/24  1445   06/18/24  0637   06/18/24  0507   06/18/24  0126   06/17/24  2131        Appearance, CSF Clear               Volume, Cell Count CSF 2.0               Wbc, Cell Count                CSF Tube Number 1                1               Albumin   3.6       3.5       ALP   59       53       ALT   49       50       Anion Gap   10     7   13       AST   36       37       Baso #   0.07       0.04       Basophil %   0.7       0.3       BILIRUBIN TOTAL   0.6  Comment: For infants and newborns, interpretation of results should be based  on gestational age, weight and in agreement with clinical  observations.    Premature Infant recommended reference ranges:  Up to 24 hours.............<8.0 mg/dL  Up to 48 hours............<12.0 mg/dL  3-5 days..................<15.0 mg/dL  6-29 days.................<15.0 mg/dL         0.6  Comment: For infants and newborns, interpretation of results should be based  on gestational age, weight and in agreement with clinical  observations.    Premature Infant  recommended reference ranges:  Up to 24 hours.............<8.0 mg/dL  Up to 48 hours............<12.0 mg/dL  3-5 days..................<15.0 mg/dL  6-29 days.................<15.0 mg/dL         BUN   10     11   12       Calcium   9.8     9.4   9.6       Chloride   101     100   97       CO2   21     23   21       COLOR CSF Colorless               Creatinine   0.9     1.0   1.2       Differential Method   Automated       Automated       eGFR   >60     >60   49       Eos #   0.3       0.1       Eos %   3.2       0.4       Glucose   98     134   152       Glucose CSF 28  Comment: Infants: 60 to 80 mg/dL               Gran # (ANC)   8.0       13.3       Gran %   82.0       88.2       Hematocrit   33.0       29.7       Hemoglobin   11.7       10.6       Immature Grans (Abs)   0.11  Comment: Mild elevation in immature granulocytes is non specific and   can be seen in a variety of conditions including stress response,   acute inflammation, trauma and pregnancy. Correlation with other   laboratory and clinical findings is essential.         0.18  Comment: Mild elevation in immature granulocytes is non specific and   can be seen in a variety of conditions including stress response,   acute inflammation, trauma and pregnancy. Correlation with other   laboratory and clinical findings is essential.         Immature Granulocytes   1.1       1.2       Lymph #   0.7       0.6       Lymph %   6.7       4.3       Magnesium    1.6     1.8   1.1       MCH   31.5       31.0       MCHC   35.5       35.7       MCV   89       87       Mono #   0.6       0.8       Mono %   6.3       5.6       MPV   8.5       8.1       nRBC   0       0       Platelet Count   205       208       POCT Glucose     106           Potassium   3.5     2.9   2.7  Comment: K critical result(s) called and verbal readback obtained from   Chuyita Neal RN by BO1 06/17/2024 22:23         Protein,   Comment: Infants can have higher CSF protein results due to  increased  permeability of the blood-brain barrier.                 PROTEIN TOTAL   7.2       6.8       RBC   3.72       3.42       RDW   12.8       12.8       Sodium   132     130   131       WBC   9.77       15.02                              Significant Imaging:   FL Lumbar Puncture (xpd)   Final Result      Successful fluoroscopically guided lumbar puncture.         Electronically signed by: Marc Sen   Date:    06/18/2024   Time:    14:52           Assessment/Plan:      * Hypokalemia  Patient has hypokalemia which is Acute on Chronic and currently uncontrolled. Most recent potassium levels reviewed-   Lab Results   Component Value Date    K 3.5 06/18/2024     K 2.7 initially  Nausea has been an ongoing issue, unsure of compliance with home potassium/magnesium supplementation  Replete potassium and magnesium  Patient refused potassium bicarb, so extended release oral potassium ordered as well as IV replacement  Cardiac monitoring  Resolved, repeat labs in a.m.    Hypomagnesemia  Patient has Abnormal Magnesium: hypomagnesemia. Will continue to monitor electrolytes closely. Will replace the affected electrolytes and repeat labs to be done after interventions completed. The patient's magnesium results have been reviewed and are listed below.  Initially Mg 1.1  Has been given IV magnesium sulfate and po mag ox    Recent Labs   Lab 06/18/24  0637   MG 1.6      -resolved, repeat labs in a.m.  -continue p.o. magnesium    Cryptococcal meningoencephalitis  Consult Dr. Robertson  Has been on liposomal amphotericin 250 mg IV q.day via PICC line/home health, we will resume while in hospital, end of completion date is 06/19  Per Dr. Robertson's note, she will need LP to ensure CSF sterilization on 6/20 and if negative will start fluconazole 800 mg for 8 weeks   -- will need to hold trazodone once this is started   -- followed by 200 mg daily for 6-12 months  -- repeat LP per recommendations from Dr. Burris.  Awaiting further  recommendations based on results.  -- patient has been noncompliant with the amphotericin B at home        Malignant neoplasm of lower-outer quadrant of left breast of female, estrogen receptor positive  Continue anastrozole      Chronic restrictive lung disease  Continue albuterol PRN      Hyperlipidemia  Continue statin      DM type 2 with diabetic dyslipidemia  Patient's FSGs are controlled on current medication regimen.  Last A1c reviewed-   Lab Results   Component Value Date    HGBA1C 6.7 (H) 05/10/2024     Most recent fingerstick glucose reviewed-   Recent Labs   Lab 06/18/24  0507   POCTGLUCOSE 106     Current correctional scale  Medium  Maintain anti-hyperglycemic dose as follows-   Antihyperglycemics (From admission, onward)      Start     Stop Route Frequency Ordered    06/18/24 0505  insulin aspart U-100 pen 0-10 Units         -- SubQ Before meals & nightly PRN 06/18/24 0408          Hold Oral hypoglycemics while patient is in the hospital.    Hypertensive cardiomyopathy  Cardiac monitoring    Echo    Result Date: 5/23/2024    Left Ventricle: The left ventricle is normal in size. Normal wall   thickness. There is concentric remodeling. Normal wall motion. Ejection   fraction by visual approximation is 60%. There is indeterminate diastolic   function.    Right Ventricle: Normal right ventricular cavity size. Wall thickness   is normal. Systolic function is normal.    Aortic Valve: The aortic valve is a trileaflet valve. There is mild   aortic regurgitation.    Mitral Valve: There is mild regurgitation.    Tricuspid Valve: There is moderate regurgitation.    Pulmonary Artery: The estimated pulmonary artery systolic pressure is   49 mmHg.    IVC/SVC: Normal venous pressure at 3 mmHg.       Holding HCTZ at this time due to hypokalemia requiring IV repletion       HTN (hypertension)  Chronic, controlled. Latest blood pressure and vitals reviewed-     Temp:  [97 °F (36.1 °C)-98.9 °F (37.2 °C)]   Pulse:  [41-62]    Resp:  [16-21]   BP: (129-157)/(60-76)   SpO2:  [95 %-100 %] .   Home meds for hypertension were reviewed and noted below.   Hypertension Medications               amLODIPine (NORVASC) 10 MG tablet TAKE 1 TABLET BY MOUTH EVERY DAY    hydroCHLOROthiazide (HYDRODIURIL) 25 MG tablet Take 1 tablet (25 mg total) by mouth once daily.    losartan (COZAAR) 100 MG tablet TAKE 1 TABLET BY MOUTH EVERY DAY            While in the hospital, will manage blood pressure as follows; Continue home antihypertensive regimen    Will utilize p.r.n. blood pressure medication only if patient's blood pressure greater than 180/110 and she develops symptoms such as worsening chest pain or shortness of breath.      VTE Risk Mitigation (From admission, onward)           Ordered     Reason for No Pharmacological VTE Prophylaxis  Once        Comments: Planned LP to be done 6/20   Question:  Reasons:  Answer:  Physician Provided (leave comment)    06/18/24 0408     IP VTE HIGH RISK PATIENT  Once         06/18/24 0408     Place sequential compression device  Until discontinued         06/18/24 0408                    Discharge Planning   NATALIE:      Code Status: Full Code   Is the patient medically ready for discharge?:     Reason for patient still in hospital (select all that apply): Patient trending condition, Laboratory test, Treatment, and Consult recommendations  Discharge Plan A: Home Health                  Cassi Hayes MD  Department of Hospital Medicine   [facility] - Med Surg

## 2024-06-18 NOTE — HPI
This is a 67 yo with history of arthritis, hypertension, type 2 diabetes mellitus, sarcoidosis, cardiomyopathy, hyperlipidemia, GERD, insomnia, breast cancer, colon cancer, diverticulosis, congestive heart failure, anemia, and cryptococcal meningitis diagnosed last admission currently on IV liposomal Amphotericin (planned end date 6/19/24) having labs monitored by Dr. Robertson who presented to ER per instructions from Dr. Robertson due to lab results showing potassium level 2.7. Likely associated with amphotericin. Unfortunately upon discussion with patient she states she only received one outpatient dose of amphotericin. Family has been unable to help administer the antibiotic and is looking for infusion center to assist. CM reached out to [facility] who will be able to administer at their suite. They confirmed she received two doses outpatient for a total of 9 days of therapy so far. Will plan for LP to ensure sterilization while admitted this time. ID consulted for continued Cryptococcal management.

## 2024-06-18 NOTE — ED PROVIDER NOTES
SCRIBE #1 NOTE: I, Barbi Ruano, am scribing for, and in the presence of, Deanna Martino DO. I have scribed the entire note.       History     Chief Complaint   Patient presents with    abnormal labs     Pt states she was told to come to the ED because her potassium is low.      Review of patient's allergies indicates:  No Known Allergies      History of Present Illness     HPI    6/17/2024, 9:27 PM  History obtained from the patient  History is limited as the patient is a poor historian      History of Present Illness: Anne Farmer is a 68 y.o. female patient with a PMHx of colon cancer, CHF, HTN, HLD, and T2DM who presents to the Emergency Department for evaluation of hypokalemia. The patient is c/o constant, moderate generalized weakness and dizziness. No mitigating or exacerbating factors reported. Patient denies any CP, SOB, and N/V/D. The patient's medications include Amphotericin B 250 mL Q24H for 7 days (6/12 - 6/19) for cryptococcal meningitis, Magnesium Oxide 400 mg Q24H, and Losartan 100 mg Q24H. Potassium Chloride 10 mEq BID is also on the patient's medication list, but this is not in the bag of pill bottles that the patient brought to the ER. No further complaints or concerns at this time.       Arrival mode: Personal vehicle    PCP: Chiquita Talley MD        Past Medical History:  Past Medical History:   Diagnosis Date    Allergy     Arthritis     Cancer 2016    colon    CHF (congestive heart failure)     Colon cancer 2004    Colon cancer screening 9/21/2015    Diabetes mellitus type 2 in nonobese 3/9/2016    borderline    Diverticulosis     DM (diabetes mellitus) 03/2016    BS didn't check 02/13/2019    DM (diabetes mellitus) 03/2016    BS didn't check 03/04/2020    Hyperlipidemia 10/25/2016    Hypertension     Insomnia     Malignant neoplasm of colon 5/13/2021    Malignant neoplasm of lower-outer quadrant of left breast of female, estrogen receptor positive 6/9/2022       Past  Surgical History:  Past Surgical History:   Procedure Laterality Date    BREAST BIOPSY Left     BREAST LUMPECTOMY Left      SECTION      COLON SURGERY      COLONOSCOPY N/A 2015    Procedure: COLONOSCOPY;  Surgeon: Adela Sam MD;  Location: [facility];  Service: Endoscopy;  Laterality: N/A;    COLONOSCOPY N/A 2017    Procedure: COLONOSCOPY;  Surgeon: Chintan Flores MD;  Location: [facility];  Service: Endoscopy;  Laterality: N/A;    COLONOSCOPY N/A 2020    Procedure: COLONOSCOPY;  Surgeon: Grisel Atkins MD;  Location: [facility];  Service: Endoscopy;  Laterality: N/A;    SENTINEL LYMPH NODE BIOPSY Left 2022    Procedure: BIOPSY, LYMPH NODE, SENTINEL;  Surgeon: Arvin Hernandze MD;  Location: [facility];  Service: General;  Laterality: Left;         Family History:  Family History   Problem Relation Name Age of Onset    Hypertension Mother      Diabetes Mother      Hypertension Father      Hypertension Sister      Hypertension Brother      Hypertension Sister      Hypertension Sister      Hypertension Sister      Hypertension Brother      Hypertension Brother         Social History:  Social History     Tobacco Use    Smoking status: Never     Passive exposure: Never    Smokeless tobacco: Never   Substance and Sexual Activity    Alcohol use: Yes     Alcohol/week: 0.0 standard drinks of alcohol     Comment: weekends.       Drug use: No    Sexual activity: Not Currently        Review of Systems     Review of Systems   Respiratory:  Negative for shortness of breath.    Cardiovascular:  Negative for chest pain.   Gastrointestinal:  Negative for diarrhea, nausea and vomiting.   Neurological:  Positive for dizziness and weakness (generalized).      Physical Exam     Initial Vitals [249]   BP Pulse Resp Temp SpO2   137/76 (!) 50 16 98.9 °F (37.2 °C) 97 %      MAP       --          Physical Exam  Nursing Notes and Vital Signs Reviewed.  Constitutional: Patient is in no acute  distress. Well-developed and well-nourished.  Head: Atraumatic. Normocephalic.  Eyes: PERRL. EOM intact. Conjunctivae are not pale. No scleral icterus.  ENT: Mucous membranes are moist. Oropharynx is clear and symmetric.    Neck: Supple. Full ROM.   Cardiovascular: Bradycardic. Regular rhythm. No murmurs, rubs, or gallops. Distal pulses are 2+ and symmetric.  Pulmonary/Chest: No respiratory distress. Clear to auscultation bilaterally. No wheezing or rales.  Abdominal: Soft and non-distended.  There is no tenderness.  No rebound, guarding, or rigidity.   Musculoskeletal: Moves all extremities. No obvious deformities. No edema. No calf tenderness.  Skin: Warm and dry.  Neurological:  Alert, awake, and appropriate.  Normal speech.  No acute focal neurological deficits are appreciated.  Psychiatric: Normal affect. Good eye contact. Appropriate in content.     ED Course   Critical Care    Date/Time: 6/18/2024 2:02 AM    Performed by: Deanna Martino DO  Authorized by: Deanna Martino DO  Direct patient critical care time: 21 minutes  Ordering / reviewing critical care time: 12 minutes  Documentation critical care time: 5 minutes  Consulting other physicians critical care time: 7 minutes  Total critical care time (exclusive of procedural time) : 45 minutes  Critical care time was exclusive of separately billable procedures and treating other patients and teaching time.  Critical care was necessary to treat or prevent imminent or life-threatening deterioration of the following conditions: metabolic crisis (Hypokalemia).  Critical care was time spent personally by me on the following activities: blood draw for specimens, development of treatment plan with patient or surrogate, discussions with consultants, interpretation of cardiac output measurements, evaluation of patient's response to treatment, examination of patient, obtaining history from patient or surrogate, ordering and review of laboratory studies, ordering  and performing treatments and interventions, pulse oximetry, review of old charts and re-evaluation of patient's condition.        ED Vital Signs:  Vitals:    06/17/24 2119 06/17/24 2131 06/17/24 2200 06/17/24 2302   BP: 137/76 129/69 133/60 139/67   Pulse: (!) 50 (!) 45 (!) 45 (!) 48   Resp: 16 17 20 19   Temp: 98.9 °F (37.2 °C)      TempSrc: Oral      SpO2: 97% 100% 100% 100%   Weight:  75.8 kg (167 lb)      06/17/24 2330 06/18/24 0002 06/18/24 0103   BP:  (!) 143/66 138/63   Pulse: (!) 43 (!) 49 (!) 48   Resp: 19 20 (!) 21   Temp:      TempSrc:      SpO2: 96% 95% 95%   Weight:          Abnormal Lab Results:  Labs Reviewed   CBC W/ AUTO DIFFERENTIAL - Abnormal; Notable for the following components:       Result Value    WBC 15.02 (*)     RBC 3.42 (*)     Hemoglobin 10.6 (*)     Hematocrit 29.7 (*)     MPV 8.1 (*)     Immature Granulocytes 1.2 (*)     Gran # (ANC) 13.3 (*)     Immature Grans (Abs) 0.18 (*)     Lymph # 0.6 (*)     Gran % 88.2 (*)     Lymph % 4.3 (*)     All other components within normal limits   COMPREHENSIVE METABOLIC PANEL - Abnormal; Notable for the following components:    Sodium 131 (*)     Potassium 2.7 (*)     CO2 21 (*)     Glucose 152 (*)     Alkaline Phosphatase 53 (*)     ALT 50 (*)     eGFR 49 (*)     All other components within normal limits    Narrative:     K critical result(s) called and verbal readback obtained from   Chuyita Neal RN by BO1 06/17/2024 22:23   MAGNESIUM - Abnormal; Notable for the following components:    Magnesium 1.1 (*)     All other components within normal limits   BASIC METABOLIC PANEL - Abnormal; Notable for the following components:    Sodium 130 (*)     Potassium 2.9 (*)     Glucose 134 (*)     Anion Gap 7 (*)     All other components within normal limits   MAGNESIUM        All Lab Results:  Results for orders placed or performed during the hospital encounter of 06/17/24   CBC auto differential   Result Value Ref Range    WBC 15.02 (H) 3.90 - 12.70 K/uL     RBC 3.42 (L) 4.00 - 5.40 M/uL    Hemoglobin 10.6 (L) 12.0 - 16.0 g/dL    Hematocrit 29.7 (L) 37.0 - 48.5 %    MCV 87 82 - 98 fL    MCH 31.0 27.0 - 31.0 pg    MCHC 35.7 32.0 - 36.0 g/dL    RDW 12.8 11.5 - 14.5 %    Platelets 208 150 - 450 K/uL    MPV 8.1 (L) 9.2 - 12.9 fL    Immature Granulocytes 1.2 (H) 0.0 - 0.5 %    Gran # (ANC) 13.3 (H) 1.8 - 7.7 K/uL    Immature Grans (Abs) 0.18 (H) 0.00 - 0.04 K/uL    Lymph # 0.6 (L) 1.0 - 4.8 K/uL    Mono # 0.8 0.3 - 1.0 K/uL    Eos # 0.1 0.0 - 0.5 K/uL    Baso # 0.04 0.00 - 0.20 K/uL    nRBC 0 0 /100 WBC    Gran % 88.2 (H) 38.0 - 73.0 %    Lymph % 4.3 (L) 18.0 - 48.0 %    Mono % 5.6 4.0 - 15.0 %    Eosinophil % 0.4 0.0 - 8.0 %    Basophil % 0.3 0.0 - 1.9 %    Differential Method Automated    Comprehensive metabolic panel   Result Value Ref Range    Sodium 131 (L) 136 - 145 mmol/L    Potassium 2.7 (LL) 3.5 - 5.1 mmol/L    Chloride 97 95 - 110 mmol/L    CO2 21 (L) 23 - 29 mmol/L    Glucose 152 (H) 70 - 110 mg/dL    BUN 12 8 - 23 mg/dL    Creatinine 1.2 0.5 - 1.4 mg/dL    Calcium 9.6 8.7 - 10.5 mg/dL    Total Protein 6.8 6.0 - 8.4 g/dL    Albumin 3.5 3.5 - 5.2 g/dL    Total Bilirubin 0.6 0.1 - 1.0 mg/dL    Alkaline Phosphatase 53 (L) 55 - 135 U/L    AST 37 10 - 40 U/L    ALT 50 (H) 10 - 44 U/L    eGFR 49 (A) >60 mL/min/1.73 m^2    Anion Gap 13 8 - 16 mmol/L   Magnesium   Result Value Ref Range    Magnesium 1.1 (L) 1.6 - 2.6 mg/dL   Basic metabolic panel   Result Value Ref Range    Sodium 130 (L) 136 - 145 mmol/L    Potassium 2.9 (L) 3.5 - 5.1 mmol/L    Chloride 100 95 - 110 mmol/L    CO2 23 23 - 29 mmol/L    Glucose 134 (H) 70 - 110 mg/dL    BUN 11 8 - 23 mg/dL    Creatinine 1.0 0.5 - 1.4 mg/dL    Calcium 9.4 8.7 - 10.5 mg/dL    Anion Gap 7 (L) 8 - 16 mmol/L    eGFR >60 >60 mL/min/1.73 m^2   Magnesium   Result Value Ref Range    Magnesium 1.8 1.6 - 2.6 mg/dL         Imaging Results:  Imaging Results    None          The EKG was ordered, reviewed, and independently interpreted  by the ED provider.  Interpretation time: 21:33  Rate: 48 BPM  Rhythm: Sinus bradycardia with premature atrial complexes  Interpretation: Nonspecific ST & T wave abnormality. No STEMI.           The Emergency Provider reviewed the vital signs and test results, which are outlined above.     ED Discussion     2:07 AM: Discussed case with Josephine Dee NP ([facility]). Dr. Pablo agrees with current care and management of pt and accepts admission.   Admitting Service: Hospital Medicine  Admitting Physician: Dr. Pablo  Admit to: Obs Med Tele    2:07 AM: Re-evaluated pt. I have discussed test results, shared treatment plan, and the need for admission with patient and family at bedside. Pt and family express understanding at this time and agree with all information. All questions answered. Pt and family have no further questions or concerns at this time. Pt is ready for admit.        ED Course as of 06/18/24 0300   Mon Jun 17, 2024   2238 Magnesium (!): 1.1 [NF]   2238 Potassium(!!): 2.7 [NF]   2238 WBC(!): 15.02 [NF]   2238 Hemoglobin(!): 10.6 [NF]   2238 Hematocrit(!): 29.7 [NF]   2238 Platelet Count: 208 [NF]   2238 Sodium(!): 131 [NF]   2238 CO2(!): 21 [NF]   2238 Glucose(!): 152 [NF]   2238 ALP(!): 53 [NF]   2238 ALT(!): 50 [NF]   2238 eGFR(!): 49 [NF]   Tue Jun 18, 2024   0037 Magnesium (!): 1.1 [NF]   0155 Magnesium : 1.8 [NF]   0155 68-year-old female on amphotericin, HCTZ, losartan, and oral sodium and potassium replacement presents with symptomatic hypokalemia.  Despite receiving 50 mg of potassium and 2 g of magnesium, potassium increased from 2.7 to only 2.9.  Renal function normal magnesium improved.  Consider admission for further potassium replacement.  [NF]   0202 Potassium(!): 2.9 [NF]   0202 Sodium(!): 130 [NF]      ED Course User Index  [NF] Deanna Martino, DO     Medical Decision Making  Amount and/or Complexity of Data Reviewed  Labs: ordered. Decision-making details documented in ED  Course.  ECG/medicine tests: ordered and independent interpretation performed. Decision-making details documented in ED Course.    Risk  Prescription drug management.  Drug therapy requiring intensive monitoring for toxicity.  Decision regarding hospitalization.    Critical Care  Total time providing critical care: 45 minutes       Additional MDM:   Differential Diagnosis:   Includes but is not limited to medication side effect, inadequate dietary potassium intake, SAGRARIO, infection.             ED Medication(s):  Medications   potassium chloride 10 mEq in 100 mL IVPB (0 mEq Intravenous Stopped 6/18/24 0039)   magnesium sulfate 2g in water 50mL IVPB (premix) (0 g Intravenous Stopped 6/18/24 0055)   potassium chloride SA CR tablet 40 mEq (40 mEq Oral Given 6/17/24 2256)   ondansetron injection 4 mg (4 mg Intravenous Given 6/17/24 2307)       Current Discharge Medication List                  Scribe Attestation:   Scribe #1: I performed the above scribed service and the documentation accurately describes the services I performed. I attest to the accuracy of the note.     Attending:   Physician Attestation Statement for Scribe #1: I, Deanna Martino DO, personally performed the services described in this documentation, as scribed by Barbi Ruano, in my presence, and it is both accurate and complete.           Clinical Impression       ICD-10-CM ICD-9-CM   1. Hypokalemia  E87.6 276.8   2. Hypomagnesemia  E83.42 275.2   3. 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

## 2024-06-18 NOTE — PLAN OF CARE
Inpatient Upgrade Note    Anne Farmer has warranted treatment spanning two or more midnights of hospital level care for the management of  Hypokalemia, cryptococcal meningoencephalitis . She continues to require daily labs, further testing/imaging, monitoring of vital signs, advancing diet, medication adjustments, further evaluation by consultants, and repeat LP, repeat Crypto Ags, IV antibiotics . Her condition is also complicated by the following comorbidities: Hypertension, Diabetes, Heart failure, and Malignancy                 .

## 2024-06-18 NOTE — ASSESSMENT & PLAN NOTE
Patient's FSGs are controlled on current medication regimen.  Last A1c reviewed-   Lab Results   Component Value Date    HGBA1C 6.7 (H) 05/10/2024     Most recent fingerstick glucose reviewed-   Recent Labs   Lab 06/18/24  0507   POCTGLUCOSE 106     Current correctional scale  Medium  Maintain anti-hyperglycemic dose as follows-   Antihyperglycemics (From admission, onward)    Start     Stop Route Frequency Ordered    06/18/24 0505  insulin aspart U-100 pen 0-10 Units         -- SubQ Before meals & nightly PRN 06/18/24 0408        Hold Oral hypoglycemics while patient is in the hospital.

## 2024-06-18 NOTE — ASSESSMENT & PLAN NOTE
--No known risk factors including but not limited to pigeon droppings  --LP last admission with traumatic tap, OP 15 cm of h2o, 11 ccs reddish which cleared fluid   --Cryptococcal CSF Ag 1:160, serum >1:320  --Will repeat LP this admission to ensure OP still WNL and that inflammation has subsided   --Will send repeat Crypto Ags for trend   --Continue IV amphotericin induction for now while closely monitoring for electrolyte imbalance  --Even if sterilized CSF would still plan to complete remaining days of induction amphotericin as an outpatient with aid of [employer]; tentative stop date 6/22    --After completing 14 day course will plan switch to maintenance therapy with PO fluconazole 800 mg daily for 8 weeks (stop date 8/19/24) then switch to final maintenance phase of fluconazole 200 mg daily for 6-12 months   --Above d/w primary team

## 2024-06-18 NOTE — ASSESSMENT & PLAN NOTE
"Patient's FSGs are controlled on current medication regimen.  Last A1c reviewed-   Lab Results   Component Value Date    HGBA1C 6.7 (H) 05/10/2024     Most recent fingerstick glucose reviewed- No results for input(s): "POCTGLUCOSE" in the last 24 hours.  Current correctional scale  Medium  Maintain anti-hyperglycemic dose as follows-   Antihyperglycemics (From admission, onward)      Start     Stop Route Frequency Ordered    06/18/24 0505  insulin aspart U-100 pen 0-10 Units         -- SubQ Before meals & nightly PRN 06/18/24 0408          Hold Oral hypoglycemics while patient is in the hospital.  "

## 2024-06-18 NOTE — OP NOTE
Pre Op Diagnosis: meningitis     Post Op Diagnosis: same     Procedure:  LP     Procedure performed by: Francy MEYER, Willem POON     Written Informed Consent Obtained: Yes     Specimen Removed:  yes     Estimated Blood Loss:  minimal     Findings: Local anesthesia     Sedation:  no     The patient tolerated the procedure well and there were no complications.      Disposition:  F/U in clinic or with ordering physician    Discharge instructions:  Light activity for 24 hours.  Remove band aid in 24 hours.  No baths (showers are appropriate).      Sterile technique was performed in the lower back, lidocaine was used as a local anesthetic.  OP 17 cm of h2o, 11 ccs of light yellow tinged clear fluid removed and sent to lab.  Pt tolerated the procedure well without immediate complications.  Please see radiologist report for details. F/u with PCP and/or ordering physician.

## 2024-06-18 NOTE — ASSESSMENT & PLAN NOTE
Patient has hypokalemia which is Acute on Chronic and currently uncontrolled. Most recent potassium levels reviewed-   Lab Results   Component Value Date    K 3.5 06/18/2024     K 2.7 initially  Nausea has been an ongoing issue, unsure of compliance with home potassium/magnesium supplementation  Replete potassium and magnesium  Patient refused potassium bicarb, so extended release oral potassium ordered as well as IV replacement  Cardiac monitoring  Resolved, repeat labs in a.m.   n/a

## 2024-06-18 NOTE — SUBJECTIVE & OBJECTIVE
Past Medical History:   Diagnosis Date    Allergy     Arthritis     Cancer 2016    colon    CHF (congestive heart failure)     Colon cancer 2004    Colon cancer screening 2015    Diabetes mellitus type 2 in nonobese 3/9/2016    borderline    Diverticulosis     DM (diabetes mellitus) 2016    BS didn't check 2019    DM (diabetes mellitus) 2016    BS didn't check 2020    Hyperlipidemia 10/25/2016    Hypertension     Insomnia     Malignant neoplasm of colon 2021    Malignant neoplasm of lower-outer quadrant of left breast of female, estrogen receptor positive 2022       Past Surgical History:   Procedure Laterality Date    BREAST BIOPSY Left     BREAST LUMPECTOMY Left      SECTION      COLON SURGERY      COLONOSCOPY N/A 2015    Procedure: COLONOSCOPY;  Surgeon: Adela Sam MD;  Location: [facility] ENDO;  Service: Endoscopy;  Laterality: N/A;    COLONOSCOPY N/A 2017    Procedure: COLONOSCOPY;  Surgeon: Chintan Flores MD;  Location: [facility] ENDO;  Service: Endoscopy;  Laterality: N/A;    COLONOSCOPY N/A 2020    Procedure: COLONOSCOPY;  Surgeon: Grisel Atkins MD;  Location: [facility] ENDO;  Service: Endoscopy;  Laterality: N/A;    SENTINEL LYMPH NODE BIOPSY Left 2022    Procedure: BIOPSY, LYMPH NODE, SENTINEL;  Surgeon: Arvin Hernandez MD;  Location: [facility] OR;  Service: General;  Laterality: Left;       Review of patient's allergies indicates:  No Known Allergies    Medications:  Medications Prior to Admission   Medication Sig    albuterol (PROVENTIL/VENTOLIN HFA) 90 mcg/actuation inhaler INHALE 1-2 PUFFS BY MOUTH EVERY 6 HOURS AS NEEDED FOR WHEEZE OR SHORTNESS OF BREATH    amLODIPine (NORVASC) 10 MG tablet TAKE 1 TABLET BY MOUTH EVERY DAY    amphotericin B liposome 1 mg/mL in dextrose 5 % (D5W) infusion Inject 250 mLs (250 mg total) into the vein once daily. for 7 days    anastrozole (ARIMIDEX) 1 mg Tab TAKE 1 TABLET BY MOUTH EVERY DAY    azelastine  (ASTELIN) 137 mcg (0.1 %) nasal spray 2 sprays (274 mcg total) by Nasal route 2 (two) times daily.    cyclobenzaprine (FLEXERIL) 5 MG tablet Take 1 tablet (5 mg total) by mouth 3 (three) times daily as needed for Muscle spasms.    fluconazole (DIFLUCAN) 200 MG Tab Take 4 tablets (800 mg total) by mouth once daily.    fluticasone propionate (FLONASE) 50 mcg/actuation nasal spray 2 sprays (100 mcg total) by Each Nostril route once daily.    hydroCHLOROthiazide (HYDRODIURIL) 25 MG tablet Take 1 tablet (25 mg total) by mouth once daily.    levocetirizine (XYZAL) 5 MG tablet Take 1 tablet (5 mg total) by mouth every evening.    losartan (COZAAR) 100 MG tablet TAKE 1 TABLET BY MOUTH EVERY DAY    magnesium oxide (MAG-OX) 400 mg (241.3 mg magnesium) tablet Take 1 tablet (400 mg total) by mouth once daily.    multivitamin (ONE DAILY MULTIVITAMIN) per tablet Take 1 tablet by mouth once daily.    omeprazole (PRILOSEC) 20 MG capsule TAKE 1 CAPSULE BY MOUTH EVERY DAY    potassium chloride (KLOR-CON) 10 MEQ TbSR Take 1 tablet (10 mEq total) by mouth 2 (two) times daily. for 10 days    pravastatin (PRAVACHOL) 20 MG tablet TAKE 1 TABLET BY MOUTH EVERY DAY    traZODone (DESYREL) 50 MG tablet TAKE 1 TABLET BY MOUTH EVERY DAY AT NIGHT     Antibiotics (From admission, onward)      None          Antifungals (From admission, onward)      Start     Stop Route Frequency Ordered    06/18/24 0900  amphotericin B liposome (AMBISOME) 250 mg in dextrose 5 % (D5W) 250 mL IVPB         06/20/24 0859 IV Daily 06/18/24 0412          Antivirals (From admission, onward)      None             Immunization History   Administered Date(s) Administered    COVID-19 MRNA, LN-S PF (MODERNA HALF 0.25 ML DOSE) 12/08/2021    COVID-19, MRNA, LN-S, PF (MODERNA FULL 0.5 ML DOSE) 01/28/2021, 02/25/2021    COVID-19, mRNA, LNP-S, PF (Moderna 2023)Ages 12+ 11/02/2023    COVID-19, mRNA, LNP-S, bivalent booster, PF (PFIZER OMICRON) 10/27/2022    Influenza - Quadrivalent  09/22/2017, 10/15/2021, 10/09/2022, 10/16/2023    Influenza - Quadrivalent - High Dose - PF (65 years and older) 09/23/2020    Influenza - Quadrivalent - PF *Preferred* (6 months and older) 09/24/2009, 11/02/2010    PPD Test 02/15/2017    Pneumococcal Conjugate - 13 Valent 05/21/2019    Pneumococcal Conjugate - 20 Valent 09/28/2023    Pneumococcal Polysaccharide - 23 Valent 04/26/2018    Tdap 01/15/2015       Family History       Problem Relation (Age of Onset)    Diabetes Mother    Hypertension Mother, Father, Sister, Brother, Sister, Sister, Sister, Brother, Brother          Social History     Socioeconomic History    Marital status: Single   Occupational History     Employer: [employer]   Tobacco Use    Smoking status: Never     Passive exposure: Never    Smokeless tobacco: Never   Substance and Sexual Activity    Alcohol use: Yes     Alcohol/week: 0.0 standard drinks of alcohol     Comment: weekends.       Drug use: No    Sexual activity: Not Currently     Review of Systems   Constitutional:  Positive for activity change.   Eyes:  Positive for visual disturbance.   Musculoskeletal:  Negative for neck pain and neck stiffness.   Neurological:  Positive for headaches.   All other systems reviewed and are negative.    Objective:     Vital Signs (Most Recent):  Temp: 98 °F (36.7 °C) (06/18/24 1238)  Pulse: 62 (06/18/24 1238)  Resp: 20 (06/18/24 1238)  BP: 131/60 (06/18/24 1238)  SpO2: 99 % (06/18/24 1238) Vital Signs (24h Range):  Temp:  [97 °F (36.1 °C)-98.9 °F (37.2 °C)] 98 °F (36.7 °C)  Pulse:  [42-62] 62  Resp:  [16-21] 20  SpO2:  [95 %-100 %] 99 %  BP: (129-157)/(60-76) 131/60     Weight: 75.7 kg (166 lb 14.2 oz)  Body mass index is 29.56 kg/m².    Estimated Creatinine Clearance: 56.7 mL/min (based on SCr of 0.9 mg/dL).     Physical Exam  Constitutional:       General: She is not in acute distress.     Appearance: Normal appearance. She is not ill-appearing.   Cardiovascular:      Rate and Rhythm:  "Normal rate and regular rhythm.      Pulses: Normal pulses.      Heart sounds: Normal heart sounds. No murmur heard.     No friction rub. No gallop.   Pulmonary:      Effort: Pulmonary effort is normal. No respiratory distress.      Breath sounds: Normal breath sounds.   Abdominal:      General: Abdomen is flat. Bowel sounds are normal. There is no distension.      Palpations: Abdomen is soft.      Tenderness: There is no abdominal tenderness.   Musculoskeletal:      Comments: PICC clean and intact   Skin:     General: Skin is warm and dry.   Neurological:      Mental Status: She is alert.          Significant Labs: Blood Culture: No results for input(s): "LABBLOO" in the last 4320 hours.  CBC:   Recent Labs   Lab 06/17/24  1355 06/17/24  2131 06/18/24  0637   WBC 3.69* 15.02* 9.77   HGB 11.2* 10.6* 11.7*   HCT 31.5* 29.7* 33.0*    208 205     CMP:   Recent Labs   Lab 06/17/24  1355 06/17/24  2131 06/18/24  0126 06/18/24  0637   * 131* 130* 132*   K 2.7* 2.7* 2.9* 3.5   CL 99 97 100 101   CO2 23 21* 23 21*   * 152* 134* 98   BUN 10 12 11 10   CREATININE 0.9 1.2 1.0 0.9   CALCIUM 10.3 9.6 9.4 9.8   PROT 7.2 6.8  --  7.2   ALBUMIN 3.8 3.5  --  3.6   BILITOT 0.4 0.6  --  0.6   ALKPHOS 53* 53*  --  59   AST 39 37  --  36   ALT 55* 50*  --  49*   ANIONGAP 13 13 7* 10     Microbiology Results (last 7 days)       Procedure Component Value Units Date/Time    CSF culture [[id number]]     Order Status: No result Specimen: CSF (Spinal Fluid) from CSF Tap, Tube 1     Gram stain [[id number]]     Order Status: No result Specimen: CSF (Spinal Fluid) from CSF Tap, Tube 1     Sarah Ink (CSF) [[id number]]     Order Status: No result Specimen: CSF (Spinal Fluid) from CSF Tap, Tube 1     Fungus culture [[id number]]     Order Status: No result Specimen: CSF (Spinal Fluid) from CSF Tap, Tube 1     Cryptococcal antigen, CSF [[id number]]     Order Status: No result Specimen: CSF (Spinal Fluid) from CSF Tap, Tube 1       "     All pertinent labs within the past 24 hours have been reviewed.    Significant Imaging: I have reviewed all pertinent imaging results/findings within the past 24 hours.

## 2024-06-18 NOTE — ASSESSMENT & PLAN NOTE
Chronic, controlled. Latest blood pressure and vitals reviewed-     Temp:  [97 °F (36.1 °C)-98.9 °F (37.2 °C)]   Pulse:  [41-62]   Resp:  [16-21]   BP: (129-157)/(60-76)   SpO2:  [95 %-100 %] .   Home meds for hypertension were reviewed and noted below.   Hypertension Medications               amLODIPine (NORVASC) 10 MG tablet TAKE 1 TABLET BY MOUTH EVERY DAY    hydroCHLOROthiazide (HYDRODIURIL) 25 MG tablet Take 1 tablet (25 mg total) by mouth once daily.    losartan (COZAAR) 100 MG tablet TAKE 1 TABLET BY MOUTH EVERY DAY            While in the hospital, will manage blood pressure as follows; Continue home antihypertensive regimen    Will utilize p.r.n. blood pressure medication only if patient's blood pressure greater than 180/110 and she develops symptoms such as worsening chest pain or shortness of breath.

## 2024-06-18 NOTE — H&P
[facility] Medicine  History & Physical    Patient Name: Anne Farmer  MRN: [id number]  Patient Class: OP- Observation  Admission Date: 6/17/2024  Attending Physician: Rian Pablo MD   Primary Care Provider: Chiquita Talley MD         Patient information was obtained from patient, past medical records, and ER records.     Subjective:     Principal Problem:Hypokalemia    Chief Complaint:   Chief Complaint   Patient presents with    abnormal labs     Pt states she was told to come to the ED because her potassium is low.         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 Robertson (ID) who presented to ED per instructions from Dr. Robertson due to lab results showing potassium level 2.7. She has PICC line to right arm and has been getting home health. Per Dr. Robertson's note, she is to have LP to ensure CSF sterilization on 6/20 and then if negative she will start fluconazole. She complains of generalized weakness, intermittent dizziness,  nausea and decreased appetite/oral intake. Patient is very poor historian and does not know the details of her medical care. She is supposed to be taking potassium chloride 10 mEq BID and magnesium oxide 400 mg daily, as well as losartan for hypertension, however there is no potassium supplement in the bag of medications that she brought to the ED.  she is afebrile, no distress, blood pressure stable, heart rate 40s to low 50s, saturating normally on room air.  Labs repeated here showed WBC 15, + left shift, hemoglobin 10.6, hematocrit 29.7, platelet 208,  sodium 131, potassium 2.7, CO2 21, glucose 152,  magnesium 1.1. She was given Mg sulfate 2 g IVPB, Zofran, CR potassium chloride 40 mEq po, and potassium chloride 10 mEq IV while in ED. Chemistry was repeated and potassium 2.9, sodium 130, magnesium 1.8. Hospital Medicine was consutled for admission due to hypokalemia.    Past Medical History:   Diagnosis Date    Allergy     Arthritis     Cancer 2016    colon    CHF (congestive heart failure)     Colon cancer 2004    Colon cancer screening 2015    Diabetes mellitus type 2 in nonobese 3/9/2016    borderline    Diverticulosis     DM (diabetes mellitus) 2016    BS didn't check 2019    DM (diabetes mellitus) 2016    BS didn't check 2020    Hyperlipidemia 10/25/2016    Hypertension     Insomnia     Malignant neoplasm of colon 2021    Malignant neoplasm of lower-outer quadrant of left breast of female, estrogen receptor positive 2022       Past Surgical History:   Procedure Laterality Date    BREAST BIOPSY Left     BREAST LUMPECTOMY Left      SECTION      COLON SURGERY      COLONOSCOPY N/A 2015    Procedure: COLONOSCOPY;  Surgeon: Adela Sam MD;  Location: [facility] ENDO;  Service: Endoscopy;  Laterality: N/A;    COLONOSCOPY N/A 2017    Procedure: COLONOSCOPY;  Surgeon: Chintan Flores MD;  Location: [facility] ENDO;  Service: Endoscopy;  Laterality: N/A;    COLONOSCOPY N/A 2020    Procedure: COLONOSCOPY;  Surgeon: Grisel Atkins MD;  Location: [facility] ENDO;  Service: Endoscopy;  Laterality: N/A;    SENTINEL LYMPH NODE BIOPSY Left 2022    Procedure: BIOPSY, LYMPH NODE, SENTINEL;  Surgeon: Arvin Hernandez MD;  Location: [facility] OR;  Service: General;  Laterality: Left;       Review of patient's allergies indicates:  No Known Allergies    No current facility-administered medications on file prior to encounter.     Current Outpatient Medications on File Prior to Encounter   Medication Sig    albuterol (PROVENTIL/VENTOLIN HFA) 90 mcg/actuation inhaler INHALE 1-2  PUFFS BY MOUTH EVERY 6 HOURS AS NEEDED FOR WHEEZE OR SHORTNESS OF BREATH    amLODIPine (NORVASC) 10 MG tablet TAKE 1 TABLET BY MOUTH EVERY DAY    amphotericin B liposome 1 mg/mL in dextrose 5 % (D5W) infusion Inject 250 mLs (250 mg total) into the vein once daily. for 7 days    anastrozole (ARIMIDEX) 1 mg Tab TAKE 1 TABLET BY MOUTH EVERY DAY    azelastine (ASTELIN) 137 mcg (0.1 %) nasal spray 2 sprays (274 mcg total) by Nasal route 2 (two) times daily.    cyclobenzaprine (FLEXERIL) 5 MG tablet Take 1 tablet (5 mg total) by mouth 3 (three) times daily as needed for Muscle spasms.    fluconazole (DIFLUCAN) 200 MG Tab Take 4 tablets (800 mg total) by mouth once daily.    fluticasone propionate (FLONASE) 50 mcg/actuation nasal spray 2 sprays (100 mcg total) by Each Nostril route once daily.    hydroCHLOROthiazide (HYDRODIURIL) 25 MG tablet Take 1 tablet (25 mg total) by mouth once daily.    levocetirizine (XYZAL) 5 MG tablet Take 1 tablet (5 mg total) by mouth every evening.    losartan (COZAAR) 100 MG tablet TAKE 1 TABLET BY MOUTH EVERY DAY    magnesium oxide (MAG-OX) 400 mg (241.3 mg magnesium) tablet Take 1 tablet (400 mg total) by mouth once daily.    multivitamin (ONE DAILY MULTIVITAMIN) per tablet Take 1 tablet by mouth once daily.    omeprazole (PRILOSEC) 20 MG capsule TAKE 1 CAPSULE BY MOUTH EVERY DAY    potassium chloride (KLOR-CON) 10 MEQ TbSR Take 1 tablet (10 mEq total) by mouth 2 (two) times daily. for 10 days    pravastatin (PRAVACHOL) 20 MG tablet TAKE 1 TABLET BY MOUTH EVERY DAY    traZODone (DESYREL) 50 MG tablet TAKE 1 TABLET BY MOUTH EVERY DAY AT NIGHT     Family History       Problem Relation (Age of Onset)    Diabetes Mother    Hypertension Mother, Father, Sister, Brother, Sister, Sister, Sister, Brother, Brother          Tobacco Use    Smoking status: Never     Passive exposure: Never    Smokeless tobacco: Never   Substance and Sexual Activity    Alcohol use: Yes     Alcohol/week: 0.0 standard  drinks of alcohol     Comment: weekends.       Drug use: No    Sexual activity: Not Currently     Review of Systems   Constitutional:  Positive for appetite change and fatigue. Negative for chills, diaphoresis and fever.        Decreased appetite, decreased oral intake   HENT: Negative.     Eyes: Negative.    Respiratory: Negative.  Negative for cough, chest tightness, shortness of breath and wheezing.    Cardiovascular: Negative.  Negative for chest pain, palpitations and leg swelling.   Gastrointestinal:  Positive for nausea. Negative for abdominal distention, abdominal pain and vomiting.   Endocrine: Negative.    Genitourinary: Negative.    Musculoskeletal: Negative.         Denies falls   Skin: Negative.    Neurological:  Positive for dizziness, weakness and light-headedness. Negative for syncope, speech difficulty and headaches.   Psychiatric/Behavioral: Negative.       Objective:     Vital Signs (Most Recent):  Temp: 98.5 °F (36.9 °C) (06/18/24 0304)  Pulse: (!) 49 (06/18/24 0304)  Resp: 18 (06/18/24 0304)  BP: (!) 157/68 (06/18/24 0304)  SpO2: 96 % (06/18/24 0304) Vital Signs (24h Range):  Temp:  [98.5 °F (36.9 °C)-98.9 °F (37.2 °C)] 98.5 °F (36.9 °C)  Pulse:  [43-50] 49  Resp:  [16-21] 18  SpO2:  [95 %-100 %] 96 %  BP: (129-157)/(60-76) 157/68     Weight: 75.8 kg (167 lb)  Body mass index is 26.95 kg/m².     Physical Exam  Vitals reviewed.   Constitutional:       General: She is not in acute distress.     Appearance: Normal appearance. She is ill-appearing. She is not toxic-appearing or diaphoretic.   HENT:      Head: Normocephalic.      Nose: Nose normal.      Mouth/Throat:      Mouth: Mucous membranes are moist.      Pharynx: Oropharynx is clear.   Eyes:      Extraocular Movements: Extraocular movements intact.      Pupils: Pupils are equal, round, and reactive to light.   Cardiovascular:      Rate and Rhythm: Regular rhythm. Bradycardia present.      Pulses: Normal pulses.      Heart sounds: Normal heart  sounds.   Pulmonary:      Effort: Pulmonary effort is normal. No respiratory distress.      Breath sounds: Normal breath sounds. No stridor. No wheezing or rhonchi.   Chest:      Chest wall: No tenderness.   Abdominal:      General: Bowel sounds are normal. There is no distension.      Palpations: Abdomen is soft.      Tenderness: There is no abdominal tenderness. There is no right CVA tenderness, left CVA tenderness or guarding.   Genitourinary:     Comments: deferred  Musculoskeletal:         General: Normal range of motion.      Cervical back: Normal range of motion and neck supple.      Right lower leg: No edema.      Left lower leg: No edema.   Skin:     General: Skin is warm and dry.      Capillary Refill: Capillary refill takes less than 2 seconds.   Neurological:      General: No focal deficit present.      Mental Status: She is alert and oriented to person, place, and time. Mental status is at baseline.   Psychiatric:         Mood and Affect: Mood normal.         Behavior: Behavior normal.              CRANIAL NERVES     CN III, IV, VI   Pupils are equal, round, and reactive to light.       Significant Labs: All pertinent labs within the past 24 hours have been reviewed.  Recent Lab Results         06/18/24  0126   06/17/24  2131   06/17/24  1355        Albumin   3.5   3.8       ALP   53   53       ALT   50   55       Anion Gap 7   13   13       AST   37   39       Baso #   0.04   0.02       Basophil %   0.3   0.5       BILIRUBIN TOTAL   0.6  Comment: For infants and newborns, interpretation of results should be based  on gestational age, weight and in agreement with clinical  observations.    Premature Infant recommended reference ranges:  Up to 24 hours.............<8.0 mg/dL  Up to 48 hours............<12.0 mg/dL  3-5 days..................<15.0 mg/dL  6-29 days.................<15.0 mg/dL     0.4  Comment: For infants and newborns, interpretation of results should be based  on gestational age, weight  and in agreement with clinical  observations.    Premature Infant recommended reference ranges:  Up to 24 hours.............<8.0 mg/dL  Up to 48 hours............<12.0 mg/dL  3-5 days..................<15.0 mg/dL  6-29 days.................<15.0 mg/dL         BUN 11   12   10       Calcium 9.4   9.6   10.3       Chloride 100   97   99       CO2 23   21   23       Creatinine 1.0   1.2   0.9       Differential Method   Automated   Automated       eGFR >60   49   >60.0       Eos #   0.1   0.1       Eos %   0.4   2.7       Glucose 134   152   114       Gran # (ANC)   13.3   2.4       Gran %   88.2   66.1       Hematocrit   29.7   31.5       Hemoglobin   10.6   11.2       Immature Grans (Abs)   0.18  Comment: Mild elevation in immature granulocytes is non specific and   can be seen in a variety of conditions including stress response,   acute inflammation, trauma and pregnancy. Correlation with other   laboratory and clinical findings is essential.     0.05  Comment: Mild elevation in immature granulocytes is non specific and   can be seen in a variety of conditions including stress response,   acute inflammation, trauma and pregnancy. Correlation with other   laboratory and clinical findings is essential.         Immature Granulocytes   1.2   1.4       Lymph #   0.6   0.7       Lymph %   4.3   18.2       Magnesium  1.8   1.1   1.2       MCH   31.0   31.1       MCHC   35.7   35.6       MCV   87   88       Mono #   0.8   0.4       Mono %   5.6   11.1       MPV   8.1   9.4       nRBC   0   0       Platelet Count   208   247       Potassium 2.9   2.7  Comment: K critical result(s) called and verbal readback obtained from   Chuyita Neal RN by BO1 06/17/2024 22:23     2.7  Comment: *Critical value notification by pod with confirmation of receipt to  Boris Abrams MD at  Date 6/17/24 Time 8:04pm         PROTEIN TOTAL   6.8   7.2       RBC   3.42   3.60       RDW   12.8   12.8       Sodium 130   131   135       WBC    15.02   3.69               Significant Imaging: I have reviewed all pertinent imaging results/findings within the past 24 hours.    Assessment/Plan:     * Hypokalemia  Patient has hypokalemia which is Acute on Chronic and currently uncontrolled. Most recent potassium levels reviewed-   Lab Results   Component Value Date    K 2.9 (L) 06/18/2024     K 2.7 initially  Nausea has been an ongoing issue, unsure of compliance with home potassium/magnesium supplementation  Replete potassium and magnesium  Patient refused potassium bicarb, so extended release oral potassium ordered as well as IV replacement  Cardiac monitoring      Hypomagnesemia  Patient has Abnormal Magnesium: hypomagnesemia. Will continue to monitor electrolytes closely. Will replace the affected electrolytes and repeat labs to be done after interventions completed. The patient's magnesium results have been reviewed and are listed below.  Initially Mg 1.1  Has been given IV magnesium sulfate and po mag ox    Recent Labs   Lab 06/18/24  0126   MG 1.8        Cryptococcal meningoencephalitis  Consult Dr. Robertson  Has been on liposomal amphotericin 250 mg IV q.day via PICC line/home health, we will resume while in hospital, end of completion date is 06/19  Per Dr. Robertson's note, she will need LP to ensure CSF sterilization on 6/20 and if negative will start fluconazole 800 mg for 8 weeks   -- will need to hold trazodone once this is started   -- followed by 200 mg daily for 6-12 months        Malignant neoplasm of lower-outer quadrant of left breast of female, estrogen receptor positive  Continue anastrozole      Chronic restrictive lung disease  Continue albuterol PRN      Hyperlipidemia  Continue statin      DM type 2 with diabetic dyslipidemia  Patient's FSGs are controlled on current medication regimen.  Last A1c reviewed-   Lab Results   Component Value Date    HGBA1C 6.7 (H) 05/10/2024     Most recent fingerstick glucose reviewed- No results for input(s):  ""POCTGLUCOSE" in the last 24 hours.  Current correctional scale  Medium  Maintain anti-hyperglycemic dose as follows-   Antihyperglycemics (From admission, onward)      Start     Stop Route Frequency Ordered    06/18/24 0505  insulin aspart U-100 pen 0-10 Units         -- SubQ Before meals & nightly PRN 06/18/24 0408          Hold Oral hypoglycemics while patient is in the hospital.    Hypertensive cardiomyopathy  Cardiac monitoring    Echo    Result Date: 5/23/2024    Left Ventricle: The left ventricle is normal in size. Normal wall   thickness. There is concentric remodeling. Normal wall motion. Ejection   fraction by visual approximation is 60%. There is indeterminate diastolic   function.    Right Ventricle: Normal right ventricular cavity size. Wall thickness   is normal. Systolic function is normal.    Aortic Valve: The aortic valve is a trileaflet valve. There is mild   aortic regurgitation.    Mitral Valve: There is mild regurgitation.    Tricuspid Valve: There is moderate regurgitation.    Pulmonary Artery: The estimated pulmonary artery systolic pressure is   49 mmHg.    IVC/SVC: Normal venous pressure at 3 mmHg.       Holding HCTZ at this time due to hypokalemia requiring IV repletion       HTN (hypertension)  Chronic, controlled. Latest blood pressure and vitals reviewed-     Temp:  [98.5 °F (36.9 °C)-98.9 °F (37.2 °C)]   Pulse:  [43-50]   Resp:  [16-21]   BP: (129-157)/(60-76)   SpO2:  [95 %-100 %] .   Home meds for hypertension were reviewed and noted below.   Hypertension Medications               amLODIPine (NORVASC) 10 MG tablet TAKE 1 TABLET BY MOUTH EVERY DAY    hydroCHLOROthiazide (HYDRODIURIL) 25 MG tablet Take 1 tablet (25 mg total) by mouth once daily.    losartan (COZAAR) 100 MG tablet TAKE 1 TABLET BY MOUTH EVERY DAY            While in the hospital, will manage blood pressure as follows; Continue home antihypertensive regimen    Will utilize p.r.n. blood pressure medication only if " patient's blood pressure greater than 180/110 and she develops symptoms such as worsening chest pain or shortness of breath.      VTE Risk Mitigation (From admission, onward)           Ordered     Reason for No Pharmacological VTE Prophylaxis  Once        Comments: Planned LP to be done 6/20   Question:  Reasons:  Answer:  Physician Provided (leave comment)    06/18/24 0408     IP VTE HIGH RISK PATIENT  Once         06/18/24 0408     Place sequential compression device  Until discontinued         06/18/24 0408                       On 06/18/2024, patient should be placed in hospital observation services under my care in collaboration with Dr. Rian Pablo.           Josephine Dee NP  Department of Hospital Medicine  '[facility]

## 2024-06-18 NOTE — PROGRESS NOTES
Serum K is 2.7  I called and advised her to go to the Ed  Plan- will also check serum Mag.  Replete serum K .  Will inform ED   Her QTC is 398-  Planned end of care is 06/19/24.  -Cryptococcal meningitis   -  -She will complete induction phase by 06/19/24.    Will plan to do LP to ensure CSF sterilization - order placed for 06/20-  If negative , will start   Consolidation phase-   Fluconazole 800mg for 8 weeks-End date 08/19/24-  Will need to stop Trazodone while on this regime   -Maintenance phase-  Fluconazole 200mg daily for 6 -12 months   Will need clinic follow up

## 2024-06-18 NOTE — CONSULTS
[facility]  Infectious Disease  Consult Note    Patient Name: Anne Farmer  MRN: [id number]  Admission Date: 6/17/2024  Hospital Length of Stay: 0 days  Attending Physician: Cassi Hayes MD  Primary Care Provider: Chiquita Talley MD     Isolation Status: No active isolations    Patient information was obtained from patient, ER records, and primary team.      Consults  Assessment/Plan:     Cardiac/Vascular  Hyperlipidemia  Continue lipid control per primary     DM type 2 with diabetic dyslipidemia  Continue insulin regimen per primary      HTN (hypertension)  Continue current medications per primary      ID  Cryptococcal meningoencephalitis  --No known risk factors including but not limited to pigeon droppings  --LP last admission with traumatic tap, OP 15 cm of h2o, 11 ccs reddish which cleared fluid   --Cryptococcal CSF Ag 1:160, serum >1:320  --Will repeat LP this admission to ensure OP still WNL and that inflammation has subsided   --Will send repeat Crypto Ags for trend   --Continue IV amphotericin induction for now while closely monitoring for electrolyte imbalance  --Even if sterilized CSF would still plan to complete remaining days of induction amphotericin as an outpatient with aid of [facility]; tentative stop date 6/22    --After completing 14 day course will plan switch to maintenance therapy with PO fluconazole 800 mg daily for 8 weeks (stop date 8/19/24) then switch to final maintenance phase of fluconazole 200 mg daily for 6-12 months   --Above d/w primary team        Thank you for your consult. I will follow-up with patient. Please contact us if you have any additional questions.    Richard Burris, DO  Infectious Disease  [facility]    Subjective:     Principal Problem: Hypokalemia    HPI: This is a 67 yo with history of arthritis, hypertension, type 2 diabetes mellitus, sarcoidosis, cardiomyopathy, hyperlipidemia, GERD, insomnia, breast cancer, colon cancer,  diverticulosis, congestive heart failure, anemia, and cryptococcal meningitis diagnosed last admission currently on IV liposomal Amphotericin (planned end date 24) having labs monitored by Dr. Robertson who presented to ER per instructions from Dr. Robertson due to lab results showing potassium level 2.7. Likely associated with amphotericin. Unfortunately upon discussion with patient she states she only received one outpatient dose of amphotericin. Family has been unable to help administer the antibiotic and is looking for infusion center to assist. CM reached out to [facility] who will be able to administer at their suite. They confirmed she received two doses outpatient for a total of 9 days of therapy so far. Will plan for LP to ensure sterilization while admitted this time. ID consulted for continued Cryptococcal management.     Past Medical History:   Diagnosis Date    Allergy     Arthritis     Cancer     colon    CHF (congestive heart failure)     Colon cancer 2004    Colon cancer screening 2015    Diabetes mellitus type 2 in nonobese 3/9/2016    borderline    Diverticulosis     DM (diabetes mellitus) 2016    BS didn't check 2019    DM (diabetes mellitus) 2016    BS didn't check 2020    Hyperlipidemia 10/25/2016    Hypertension     Insomnia     Malignant neoplasm of colon 2021    Malignant neoplasm of lower-outer quadrant of left breast of female, estrogen receptor positive 2022       Past Surgical History:   Procedure Laterality Date    BREAST BIOPSY Left     BREAST LUMPECTOMY Left      SECTION      COLON SURGERY      COLONOSCOPY N/A 2015    Procedure: COLONOSCOPY;  Surgeon: Adela Sam MD;  Location: [facility];  Service: Endoscopy;  Laterality: N/A;    COLONOSCOPY N/A 2017    Procedure: COLONOSCOPY;  Surgeon: Chintan Flores MD;  Location: [facility] ENDO;  Service: Endoscopy;  Laterality: N/A;    COLONOSCOPY N/A 2020    Procedure: COLONOSCOPY;   Surgeon: Grisel Atkins MD;  Location: [facility] ENDO;  Service: Endoscopy;  Laterality: N/A;    SENTINEL LYMPH NODE BIOPSY Left 07/05/2022    Procedure: BIOPSY, LYMPH NODE, SENTINEL;  Surgeon: Arvin Hernandez MD;  Location: [facility] OR;  Service: General;  Laterality: Left;       Review of patient's allergies indicates:  No Known Allergies    Medications:  Medications Prior to Admission   Medication Sig    albuterol (PROVENTIL/VENTOLIN HFA) 90 mcg/actuation inhaler INHALE 1-2 PUFFS BY MOUTH EVERY 6 HOURS AS NEEDED FOR WHEEZE OR SHORTNESS OF BREATH    amLODIPine (NORVASC) 10 MG tablet TAKE 1 TABLET BY MOUTH EVERY DAY    amphotericin B liposome 1 mg/mL in dextrose 5 % (D5W) infusion Inject 250 mLs (250 mg total) into the vein once daily. for 7 days    anastrozole (ARIMIDEX) 1 mg Tab TAKE 1 TABLET BY MOUTH EVERY DAY    azelastine (ASTELIN) 137 mcg (0.1 %) nasal spray 2 sprays (274 mcg total) by Nasal route 2 (two) times daily.    cyclobenzaprine (FLEXERIL) 5 MG tablet Take 1 tablet (5 mg total) by mouth 3 (three) times daily as needed for Muscle spasms.    fluconazole (DIFLUCAN) 200 MG Tab Take 4 tablets (800 mg total) by mouth once daily.    fluticasone propionate (FLONASE) 50 mcg/actuation nasal spray 2 sprays (100 mcg total) by Each Nostril route once daily.    hydroCHLOROthiazide (HYDRODIURIL) 25 MG tablet Take 1 tablet (25 mg total) by mouth once daily.    levocetirizine (XYZAL) 5 MG tablet Take 1 tablet (5 mg total) by mouth every evening.    losartan (COZAAR) 100 MG tablet TAKE 1 TABLET BY MOUTH EVERY DAY    magnesium oxide (MAG-OX) 400 mg (241.3 mg magnesium) tablet Take 1 tablet (400 mg total) by mouth once daily.    multivitamin (ONE DAILY MULTIVITAMIN) per tablet Take 1 tablet by mouth once daily.    omeprazole (PRILOSEC) 20 MG capsule TAKE 1 CAPSULE BY MOUTH EVERY DAY    potassium chloride (KLOR-CON) 10 MEQ TbSR Take 1 tablet (10 mEq total) by mouth 2 (two) times daily. for 10 days    pravastatin (PRAVACHOL)  20 MG tablet TAKE 1 TABLET BY MOUTH EVERY DAY    traZODone (DESYREL) 50 MG tablet TAKE 1 TABLET BY MOUTH EVERY DAY AT NIGHT     Antibiotics (From admission, onward)      None          Antifungals (From admission, onward)      Start     Stop Route Frequency Ordered    06/18/24 0900  amphotericin B liposome (AMBISOME) 250 mg in dextrose 5 % (D5W) 250 mL IVPB         06/20/24 0859 IV Daily 06/18/24 0412          Antivirals (From admission, onward)      None             Immunization History   Administered Date(s) Administered    COVID-19 MRNA, LN-S PF (MODERNA HALF 0.25 ML DOSE) 12/08/2021    COVID-19, MRNA, LN-S, PF (MODERNA FULL 0.5 ML DOSE) 01/28/2021, 02/25/2021    COVID-19, mRNA, LNP-S, PF (Moderna 2023)Ages 12+ 11/02/2023    COVID-19, mRNA, LNP-S, bivalent booster, PF (PFIZER OMICRON) 10/27/2022    Influenza - Quadrivalent 09/22/2017, 10/15/2021, 10/09/2022, 10/16/2023    Influenza - Quadrivalent - High Dose - PF (65 years and older) 09/23/2020    Influenza - Quadrivalent - PF *Preferred* (6 months and older) 09/24/2009, 11/02/2010    PPD Test 02/15/2017    Pneumococcal Conjugate - 13 Valent 05/21/2019    Pneumococcal Conjugate - 20 Valent 09/28/2023    Pneumococcal Polysaccharide - 23 Valent 04/26/2018    Tdap 01/15/2015       Family History       Problem Relation (Age of Onset)    Diabetes Mother    Hypertension Mother, Father, Sister, Brother, Sister, Sister, Sister, Brother, Brother          Social History     Socioeconomic History    Marital status: Single   Occupational History     Employer: [employer]   Tobacco Use    Smoking status: Never     Passive exposure: Never    Smokeless tobacco: Never   Substance and Sexual Activity    Alcohol use: Yes     Alcohol/week: 0.0 standard drinks of alcohol     Comment: weekends.       Drug use: No    Sexual activity: Not Currently     Review of Systems   Constitutional:  Positive for activity change.   Eyes:  Positive for visual disturbance.  "  Musculoskeletal:  Negative for neck pain and neck stiffness.   Neurological:  Positive for headaches.   All other systems reviewed and are negative.    Objective:     Vital Signs (Most Recent):  Temp: 98 °F (36.7 °C) (06/18/24 1238)  Pulse: 62 (06/18/24 1238)  Resp: 20 (06/18/24 1238)  BP: 131/60 (06/18/24 1238)  SpO2: 99 % (06/18/24 1238) Vital Signs (24h Range):  Temp:  [97 °F (36.1 °C)-98.9 °F (37.2 °C)] 98 °F (36.7 °C)  Pulse:  [42-62] 62  Resp:  [16-21] 20  SpO2:  [95 %-100 %] 99 %  BP: (129-157)/(60-76) 131/60     Weight: 75.7 kg (166 lb 14.2 oz)  Body mass index is 29.56 kg/m².    Estimated Creatinine Clearance: 56.7 mL/min (based on SCr of 0.9 mg/dL).     Physical Exam  Constitutional:       General: She is not in acute distress.     Appearance: Normal appearance. She is not ill-appearing.   Cardiovascular:      Rate and Rhythm: Normal rate and regular rhythm.      Pulses: Normal pulses.      Heart sounds: Normal heart sounds. No murmur heard.     No friction rub. No gallop.   Pulmonary:      Effort: Pulmonary effort is normal. No respiratory distress.      Breath sounds: Normal breath sounds.   Abdominal:      General: Abdomen is flat. Bowel sounds are normal. There is no distension.      Palpations: Abdomen is soft.      Tenderness: There is no abdominal tenderness.   Musculoskeletal:      Comments: PICC clean and intact   Skin:     General: Skin is warm and dry.   Neurological:      Mental Status: She is alert.          Significant Labs: Blood Culture: No results for input(s): "LABBLOO" in the last 4320 hours.  CBC:   Recent Labs   Lab 06/17/24  1355 06/17/24  2131 06/18/24  0637   WBC 3.69* 15.02* 9.77   HGB 11.2* 10.6* 11.7*   HCT 31.5* 29.7* 33.0*    208 205     CMP:   Recent Labs   Lab 06/17/24  1355 06/17/24  2131 06/18/24  0126 06/18/24  0637   * 131* 130* 132*   K 2.7* 2.7* 2.9* 3.5   CL 99 97 100 101   CO2 23 21* 23 21*   * 152* 134* 98   BUN 10 12 11 10   CREATININE 0.9 1.2 " 1.0 0.9   CALCIUM 10.3 9.6 9.4 9.8   PROT 7.2 6.8  --  7.2   ALBUMIN 3.8 3.5  --  3.6   BILITOT 0.4 0.6  --  0.6   ALKPHOS 53* 53*  --  59   AST 39 37  --  36   ALT 55* 50*  --  49*   ANIONGAP 13 13 7* 10     Microbiology Results (last 7 days)       Procedure Component Value Units Date/Time    CSF culture [[id number]]     Order Status: No result Specimen: CSF (Spinal Fluid) from CSF Tap, Tube 1     Gram stain [[id number]]     Order Status: No result Specimen: CSF (Spinal Fluid) from CSF Tap, Tube 1     Sarah Ink (CSF) [[id number]]     Order Status: No result Specimen: CSF (Spinal Fluid) from CSF Tap, Tube 1     Fungus culture [[id number]]     Order Status: No result Specimen: CSF (Spinal Fluid) from CSF Tap, Tube 1     Cryptococcal antigen, CSF [[id number]]     Order Status: No result Specimen: CSF (Spinal Fluid) from CSF Tap, Tube 1           All pertinent labs within the past 24 hours have been reviewed.    Significant Imaging: I have reviewed all pertinent imaging results/findings within the past 24 hours.

## 2024-06-18 NOTE — HOSPITAL COURSE
Patient with recent discharge for cryptococcal meningitis admitted with hyponatremia and hypomagnesemia.  Her electrolyte abnormalities were corrected on hospital day 1.  Infectious Disease was consulted as patient is on amphotericin B for cryptococcal meningitis.  Dr. Burris recommended a repeat LP to determine if patient can be switched to fluconazole.  Upon discussion with home health agency patient has only received 2 doses of amphotericin B at home, due to noncompliance patient will need to go to the clinic to receive the remainder of antibiotics if necessary.  LP reviewed with Dr. Burris.  Plan for continued amphotericin B until 06/22/2024, flucytosine to be started on 06/20/2024 through 06/22/2024 and start fluconazole on 06/23/2024.  Patient will also be continued on p.o. potassium and magnesium.  Patient will need to follow up with ID and PCP.  She has also been set up with [facility] outpatient infusion for her amphotericin B and with [facility] home [facility] for lab draws and PICC removal.  Patient's son has been educated on taking the patient to the infusion center Thursday and Friday, followed by 1 infusion at home by him and to  the flucytosine which we will be ready for pickup on 06/20/2024 at [facility].  This has all been reviewed in the AVS as well.  Patient was medically stable for discharge.

## 2024-06-18 NOTE — SUBJECTIVE & OBJECTIVE
Past Medical History:   Diagnosis Date    Allergy     Arthritis     Cancer 2016    colon    CHF (congestive heart failure)     Colon cancer 2004    Colon cancer screening 2015    Diabetes mellitus type 2 in nonobese 3/9/2016    borderline    Diverticulosis     DM (diabetes mellitus) 2016    BS didn't check 2019    DM (diabetes mellitus) 2016    BS didn't check 2020    Hyperlipidemia 10/25/2016    Hypertension     Insomnia     Malignant neoplasm of colon 2021    Malignant neoplasm of lower-outer quadrant of left breast of female, estrogen receptor positive 2022       Past Surgical History:   Procedure Laterality Date    BREAST BIOPSY Left     BREAST LUMPECTOMY Left      SECTION      COLON SURGERY      COLONOSCOPY N/A 2015    Procedure: COLONOSCOPY;  Surgeon: Adela Sam MD;  Location: [facility] ENDO;  Service: Endoscopy;  Laterality: N/A;    COLONOSCOPY N/A 2017    Procedure: COLONOSCOPY;  Surgeon: Chintan Flores MD;  Location: [facility] ENDO;  Service: Endoscopy;  Laterality: N/A;    COLONOSCOPY N/A 2020    Procedure: COLONOSCOPY;  Surgeon: Grisel Atkins MD;  Location: [facility] ENDO;  Service: Endoscopy;  Laterality: N/A;    SENTINEL LYMPH NODE BIOPSY Left 2022    Procedure: BIOPSY, LYMPH NODE, SENTINEL;  Surgeon: Arvin Hernandez MD;  Location: [facility] OR;  Service: General;  Laterality: Left;       Review of patient's allergies indicates:  No Known Allergies    No current facility-administered medications on file prior to encounter.     Current Outpatient Medications on File Prior to Encounter   Medication Sig    albuterol (PROVENTIL/VENTOLIN HFA) 90 mcg/actuation inhaler INHALE 1-2 PUFFS BY MOUTH EVERY 6 HOURS AS NEEDED FOR WHEEZE OR SHORTNESS OF BREATH    amLODIPine (NORVASC) 10 MG tablet TAKE 1 TABLET BY MOUTH EVERY DAY    amphotericin B liposome 1 mg/mL in dextrose 5 % (D5W) infusion Inject 250 mLs (250 mg total) into the vein once daily. for 7 days     anastrozole (ARIMIDEX) 1 mg Tab TAKE 1 TABLET BY MOUTH EVERY DAY    azelastine (ASTELIN) 137 mcg (0.1 %) nasal spray 2 sprays (274 mcg total) by Nasal route 2 (two) times daily.    cyclobenzaprine (FLEXERIL) 5 MG tablet Take 1 tablet (5 mg total) by mouth 3 (three) times daily as needed for Muscle spasms.    fluconazole (DIFLUCAN) 200 MG Tab Take 4 tablets (800 mg total) by mouth once daily.    fluticasone propionate (FLONASE) 50 mcg/actuation nasal spray 2 sprays (100 mcg total) by Each Nostril route once daily.    hydroCHLOROthiazide (HYDRODIURIL) 25 MG tablet Take 1 tablet (25 mg total) by mouth once daily.    levocetirizine (XYZAL) 5 MG tablet Take 1 tablet (5 mg total) by mouth every evening.    losartan (COZAAR) 100 MG tablet TAKE 1 TABLET BY MOUTH EVERY DAY    magnesium oxide (MAG-OX) 400 mg (241.3 mg magnesium) tablet Take 1 tablet (400 mg total) by mouth once daily.    multivitamin (ONE DAILY MULTIVITAMIN) per tablet Take 1 tablet by mouth once daily.    omeprazole (PRILOSEC) 20 MG capsule TAKE 1 CAPSULE BY MOUTH EVERY DAY    potassium chloride (KLOR-CON) 10 MEQ TbSR Take 1 tablet (10 mEq total) by mouth 2 (two) times daily. for 10 days    pravastatin (PRAVACHOL) 20 MG tablet TAKE 1 TABLET BY MOUTH EVERY DAY    traZODone (DESYREL) 50 MG tablet TAKE 1 TABLET BY MOUTH EVERY DAY AT NIGHT     Family History       Problem Relation (Age of Onset)    Diabetes Mother    Hypertension Mother, Father, Sister, Brother, Sister, Sister, Sister, Brother, Brother          Tobacco Use    Smoking status: Never     Passive exposure: Never    Smokeless tobacco: Never   Substance and Sexual Activity    Alcohol use: Yes     Alcohol/week: 0.0 standard drinks of alcohol     Comment: weekends.       Drug use: No    Sexual activity: Not Currently     Review of Systems   Constitutional:  Positive for appetite change and fatigue. Negative for chills, diaphoresis and fever.        Decreased appetite, decreased oral intake   HENT:  Negative.     Eyes: Negative.    Respiratory: Negative.  Negative for cough, chest tightness, shortness of breath and wheezing.    Cardiovascular: Negative.  Negative for chest pain, palpitations and leg swelling.   Gastrointestinal:  Positive for nausea. Negative for abdominal distention, abdominal pain and vomiting.   Endocrine: Negative.    Genitourinary: Negative.    Musculoskeletal: Negative.         Denies falls   Skin: Negative.    Neurological:  Positive for dizziness, weakness and light-headedness. Negative for syncope, speech difficulty and headaches.   Psychiatric/Behavioral: Negative.       Objective:     Vital Signs (Most Recent):  Temp: 98.5 °F (36.9 °C) (06/18/24 0304)  Pulse: (!) 49 (06/18/24 0304)  Resp: 18 (06/18/24 0304)  BP: (!) 157/68 (06/18/24 0304)  SpO2: 96 % (06/18/24 0304) Vital Signs (24h Range):  Temp:  [98.5 °F (36.9 °C)-98.9 °F (37.2 °C)] 98.5 °F (36.9 °C)  Pulse:  [43-50] 49  Resp:  [16-21] 18  SpO2:  [95 %-100 %] 96 %  BP: (129-157)/(60-76) 157/68     Weight: 75.8 kg (167 lb)  Body mass index is 26.95 kg/m².     Physical Exam  Vitals reviewed.   Constitutional:       General: She is not in acute distress.     Appearance: Normal appearance. She is ill-appearing. She is not toxic-appearing or diaphoretic.   HENT:      Head: Normocephalic.      Nose: Nose normal.      Mouth/Throat:      Mouth: Mucous membranes are moist.      Pharynx: Oropharynx is clear.   Eyes:      Extraocular Movements: Extraocular movements intact.      Pupils: Pupils are equal, round, and reactive to light.   Cardiovascular:      Rate and Rhythm: Regular rhythm. Bradycardia present.      Pulses: Normal pulses.      Heart sounds: Normal heart sounds.   Pulmonary:      Effort: Pulmonary effort is normal. No respiratory distress.      Breath sounds: Normal breath sounds. No stridor. No wheezing or rhonchi.   Chest:      Chest wall: No tenderness.   Abdominal:      General: Bowel sounds are normal. There is no  distension.      Palpations: Abdomen is soft.      Tenderness: There is no abdominal tenderness. There is no right CVA tenderness, left CVA tenderness or guarding.   Genitourinary:     Comments: deferred  Musculoskeletal:         General: Normal range of motion.      Cervical back: Normal range of motion and neck supple.      Right lower leg: No edema.      Left lower leg: No edema.   Skin:     General: Skin is warm and dry.      Capillary Refill: Capillary refill takes less than 2 seconds.   Neurological:      General: No focal deficit present.      Mental Status: She is alert and oriented to person, place, and time. Mental status is at baseline.   Psychiatric:         Mood and Affect: Mood normal.         Behavior: Behavior normal.              CRANIAL NERVES     CN III, IV, VI   Pupils are equal, round, and reactive to light.       Significant Labs: All pertinent labs within the past 24 hours have been reviewed.  Recent Lab Results         06/18/24  0126   06/17/24  2131   06/17/24  1355        Albumin   3.5   3.8       ALP   53   53       ALT   50   55       Anion Gap 7   13   13       AST   37   39       Baso #   0.04   0.02       Basophil %   0.3   0.5       BILIRUBIN TOTAL   0.6  Comment: For infants and newborns, interpretation of results should be based  on gestational age, weight and in agreement with clinical  observations.    Premature Infant recommended reference ranges:  Up to 24 hours.............<8.0 mg/dL  Up to 48 hours............<12.0 mg/dL  3-5 days..................<15.0 mg/dL  6-29 days.................<15.0 mg/dL     0.4  Comment: For infants and newborns, interpretation of results should be based  on gestational age, weight and in agreement with clinical  observations.    Premature Infant recommended reference ranges:  Up to 24 hours.............<8.0 mg/dL  Up to 48 hours............<12.0 mg/dL  3-5 days..................<15.0 mg/dL  6-29 days.................<15.0 mg/dL         BUN 11    12   10       Calcium 9.4   9.6   10.3       Chloride 100   97   99       CO2 23   21   23       Creatinine 1.0   1.2   0.9       Differential Method   Automated   Automated       eGFR >60   49   >60.0       Eos #   0.1   0.1       Eos %   0.4   2.7       Glucose 134   152   114       Gran # (ANC)   13.3   2.4       Gran %   88.2   66.1       Hematocrit   29.7   31.5       Hemoglobin   10.6   11.2       Immature Grans (Abs)   0.18  Comment: Mild elevation in immature granulocytes is non specific and   can be seen in a variety of conditions including stress response,   acute inflammation, trauma and pregnancy. Correlation with other   laboratory and clinical findings is essential.     0.05  Comment: Mild elevation in immature granulocytes is non specific and   can be seen in a variety of conditions including stress response,   acute inflammation, trauma and pregnancy. Correlation with other   laboratory and clinical findings is essential.         Immature Granulocytes   1.2   1.4       Lymph #   0.6   0.7       Lymph %   4.3   18.2       Magnesium  1.8   1.1   1.2       MCH   31.0   31.1       MCHC   35.7   35.6       MCV   87   88       Mono #   0.8   0.4       Mono %   5.6   11.1       MPV   8.1   9.4       nRBC   0   0       Platelet Count   208   247       Potassium 2.9   2.7  Comment: K critical result(s) called and verbal readback obtained from   Chuyita Neal RN by BO1 06/17/2024 22:23     2.7  Comment: *Critical value notification by pod with confirmation of receipt to  Boris Abrams MD at  Date 6/17/24 Time 8:04pm         PROTEIN TOTAL   6.8   7.2       RBC   3.42   3.60       RDW   12.8   12.8       Sodium 130   131   135       WBC   15.02   3.69               Significant Imaging: I have reviewed all pertinent imaging results/findings within the past 24 hours.

## 2024-06-18 NOTE — ASSESSMENT & PLAN NOTE
Consult Dr. Robertson  Has been on liposomal amphotericin 250 mg IV q.day via PICC line/home health, we will resume while in hospital, end of completion date is 06/19  Per Dr. Robertson's note, she will need LP to ensure CSF sterilization on 6/20 and if negative will start fluconazole 800 mg for 8 weeks   -- will need to hold trazodone once this is started   -- followed by 200 mg daily for 6-12 months  -- repeat LP per recommendations from Dr. Burris.  Awaiting further recommendations based on results.  -- patient has been noncompliant with the amphotericin B at home

## 2024-06-19 VITALS
OXYGEN SATURATION: 100 % | HEIGHT: 63 IN | RESPIRATION RATE: 20 BRPM | TEMPERATURE: 98 F | SYSTOLIC BLOOD PRESSURE: 128 MMHG | BODY MASS INDEX: 29.57 KG/M2 | WEIGHT: 166.88 LBS | DIASTOLIC BLOOD PRESSURE: 63 MMHG | HEART RATE: 60 BPM

## 2024-06-19 LAB
ALBUMIN SERPL BCP-MCNC: 3.2 G/DL (ref 3.5–5.2)
ALP SERPL-CCNC: 52 U/L (ref 55–135)
ALT SERPL W/O P-5'-P-CCNC: 40 U/L (ref 10–44)
ANION GAP SERPL CALC-SCNC: 8 MMOL/L (ref 8–16)
AST SERPL-CCNC: 28 U/L (ref 10–40)
BASOPHILS # BLD AUTO: 0.05 K/UL (ref 0–0.2)
BASOPHILS NFR BLD: 1.1 % (ref 0–1.9)
BILIRUB SERPL-MCNC: 0.4 MG/DL (ref 0.1–1)
BUN SERPL-MCNC: 12 MG/DL (ref 8–23)
CALCIUM SERPL-MCNC: 9.2 MG/DL (ref 8.7–10.5)
CASPR2-IGG CBA: NEGATIVE
CHLORIDE SERPL-SCNC: 101 MMOL/L (ref 95–110)
CO2 SERPL-SCNC: 24 MMOL/L (ref 23–29)
CREAT SERPL-MCNC: 1.4 MG/DL (ref 0.5–1.4)
CRYPTOC AG CSF QL LA: ABNORMAL
CRYPTOC AG SER QL LA: ABNORMAL
DIFFERENTIAL METHOD BLD: ABNORMAL
EOSINOPHIL # BLD AUTO: 0.4 K/UL (ref 0–0.5)
EOSINOPHIL NFR BLD: 8.1 % (ref 0–8)
ERYTHROCYTE [DISTWIDTH] IN BLOOD BY AUTOMATED COUNT: 12.9 % (ref 11.5–14.5)
EST. GFR  (NO RACE VARIABLE): 40 ML/MIN/1.73 M^2
FUNGUS SPEC CULT: NORMAL
GLUCOSE SERPL-MCNC: 90 MG/DL (ref 70–110)
HCT VFR BLD AUTO: 31.5 % (ref 37–48.5)
HGB BLD-MCNC: 10.9 G/DL (ref 12–16)
IMM GRANULOCYTES # BLD AUTO: 0.04 K/UL (ref 0–0.04)
IMM GRANULOCYTES NFR BLD AUTO: 0.9 % (ref 0–0.5)
LYMPHOCYTES # BLD AUTO: 0.7 K/UL (ref 1–4.8)
LYMPHOCYTES NFR BLD: 14.6 % (ref 18–48)
MAGNESIUM SERPL-MCNC: 1.6 MG/DL (ref 1.6–2.6)
MCH RBC QN AUTO: 30.4 PG (ref 27–31)
MCHC RBC AUTO-ENTMCNC: 34.6 G/DL (ref 32–36)
MCV RBC AUTO: 88 FL (ref 82–98)
MONOCYTES # BLD AUTO: 0.9 K/UL (ref 0.3–1)
MONOCYTES NFR BLD: 18.5 % (ref 4–15)
NEUTROPHILS # BLD AUTO: 2.6 K/UL (ref 1.8–7.7)
NEUTROPHILS NFR BLD: 56.8 % (ref 38–73)
NRBC BLD-RTO: 0 /100 WBC
PLATELET # BLD AUTO: 202 K/UL (ref 150–450)
PMV BLD AUTO: 8.2 FL (ref 9.2–12.9)
POCT GLUCOSE: 140 MG/DL (ref 70–110)
POCT GLUCOSE: 86 MG/DL (ref 70–110)
POTASSIUM SERPL-SCNC: 3.3 MMOL/L (ref 3.5–5.1)
PROT SERPL-MCNC: 6.4 G/DL (ref 6–8.4)
RBC # BLD AUTO: 3.58 M/UL (ref 4–5.4)
SODIUM SERPL-SCNC: 133 MMOL/L (ref 136–145)
WBC # BLD AUTO: 4.59 K/UL (ref 3.9–12.7)

## 2024-06-19 PROCEDURE — 99233 SBSQ HOSP IP/OBS HIGH 50: CPT | Mod: ,,, | Performed by: STUDENT IN AN ORGANIZED HEALTH CARE EDUCATION/TRAINING PROGRAM

## 2024-06-19 PROCEDURE — A4216 STERILE WATER/SALINE, 10 ML: HCPCS | Performed by: NURSE PRACTITIONER

## 2024-06-19 PROCEDURE — 80053 COMPREHEN METABOLIC PANEL: CPT | Performed by: NURSE PRACTITIONER

## 2024-06-19 PROCEDURE — 25000003 PHARM REV CODE 250: Performed by: NURSE PRACTITIONER

## 2024-06-19 PROCEDURE — 63600175 PHARM REV CODE 636 W HCPCS: Performed by: FAMILY MEDICINE

## 2024-06-19 PROCEDURE — 63600175 PHARM REV CODE 636 W HCPCS: Mod: JZ,JG | Performed by: NURSE PRACTITIONER

## 2024-06-19 PROCEDURE — 85025 COMPLETE CBC W/AUTO DIFF WBC: CPT | Performed by: NURSE PRACTITIONER

## 2024-06-19 PROCEDURE — 25000242 PHARM REV CODE 250 ALT 637 W/ HCPCS: Performed by: NURSE PRACTITIONER

## 2024-06-19 PROCEDURE — 83735 ASSAY OF MAGNESIUM: CPT | Performed by: NURSE PRACTITIONER

## 2024-06-19 RX ORDER — POTASSIUM CHLORIDE 20 MEQ/1
40 TABLET, EXTENDED RELEASE ORAL 2 TIMES DAILY
Qty: 40 TABLET | Refills: 0 | Status: SHIPPED | OUTPATIENT
Start: 2024-06-19 | End: 2024-06-30

## 2024-06-19 RX ORDER — POTASSIUM CHLORIDE 7.45 MG/ML
10 INJECTION INTRAVENOUS
Status: COMPLETED | OUTPATIENT
Start: 2024-06-19 | End: 2024-06-19

## 2024-06-19 RX ORDER — FLUCYTOSINE 500 MG/1
25 CAPSULE ORAL EVERY 6 HOURS
Qty: 48 CAPSULE | Refills: 0 | Status: SHIPPED | OUTPATIENT
Start: 2024-06-19 | End: 2024-06-23

## 2024-06-19 RX ADMIN — LOSARTAN POTASSIUM 100 MG: 50 TABLET, FILM COATED ORAL at 09:06

## 2024-06-19 RX ADMIN — POTASSIUM CHLORIDE 10 MEQ: 7.46 INJECTION, SOLUTION INTRAVENOUS at 11:06

## 2024-06-19 RX ADMIN — POTASSIUM CHLORIDE 40 MEQ: 1500 TABLET, EXTENDED RELEASE ORAL at 09:06

## 2024-06-19 RX ADMIN — ANASTROZOLE 1 MG: 1 TABLET ORAL at 09:06

## 2024-06-19 RX ADMIN — FLUTICASONE PROPIONATE 100 MCG: 50 SPRAY, METERED NASAL at 09:06

## 2024-06-19 RX ADMIN — POTASSIUM CHLORIDE 10 MEQ: 7.46 INJECTION, SOLUTION INTRAVENOUS at 09:06

## 2024-06-19 RX ADMIN — AZELASTINE HYDROCHLORIDE 274 MCG: 137 SPRAY, METERED NASAL at 09:06

## 2024-06-19 RX ADMIN — Medication 400 MG: at 09:06

## 2024-06-19 RX ADMIN — CETIRIZINE HYDROCHLORIDE 10 MG: 10 TABLET, FILM COATED ORAL at 09:06

## 2024-06-19 RX ADMIN — THERA TABS 1 TABLET: TAB at 09:06

## 2024-06-19 RX ADMIN — PRAVASTATIN SODIUM 20 MG: 20 TABLET ORAL at 09:06

## 2024-06-19 RX ADMIN — AMLODIPINE BESYLATE 10 MG: 10 TABLET ORAL at 09:06

## 2024-06-19 RX ADMIN — AMPHOTERICIN B 250 MG: 50 INJECTION, POWDER, LYOPHILIZED, FOR SOLUTION INTRAVENOUS at 10:06

## 2024-06-19 RX ADMIN — Medication 10 ML: at 05:06

## 2024-06-19 RX ADMIN — PANTOPRAZOLE SODIUM 40 MG: 40 TABLET, DELAYED RELEASE ORAL at 09:06

## 2024-06-19 NOTE — PLAN OF CARE
Call button within reach. Blood glucose monitored per MD order. No complaints of pain during shift.  Problem: Adult Inpatient Plan of Care  Goal: Plan of Care Review  Outcome: Progressing  Goal: Patient-Specific Goal (Individualized)  Outcome: Progressing  Goal: Absence of Hospital-Acquired Illness or Injury  Outcome: Progressing  Goal: Optimal Comfort and Wellbeing  Outcome: Progressing  Goal: Readiness for Transition of Care  Outcome: Progressing     Problem: Diabetes Comorbidity  Goal: Blood Glucose Level Within Targeted Range  Outcome: Progressing     Problem: Skin Injury Risk Increased  Goal: Skin Health and Integrity  Outcome: Progressing     Problem: Infection  Goal: Absence of Infection Signs and Symptoms  Outcome: Progressing

## 2024-06-19 NOTE — SUBJECTIVE & OBJECTIVE
"Interval History: Patient with vision improving and no headaches today. CSF improving on repeat LP. Opening pressure normal. No yeast reported on gram stain. Will complete induction therapy this weekend and switch to consolidation on Sunday.     Review of Systems   Constitutional:  Positive for activity change.   Eyes:  Negative for visual disturbance.   Musculoskeletal:  Negative for neck pain and neck stiffness.   Neurological:  Negative for headaches.   All other systems reviewed and are negative.    Objective:     Vital Signs (Most Recent):  Temp: 98.2 °F (36.8 °C) (06/19/24 1246)  Pulse: 60 (06/19/24 1246)  Resp: 20 (06/19/24 1246)  BP: 128/63 (06/19/24 1246)  SpO2: 100 % (06/19/24 1246) Vital Signs (24h Range):  Temp:  [98.2 °F (36.8 °C)-98.8 °F (37.1 °C)] 98.2 °F (36.8 °C)  Pulse:  [41-73] 60  Resp:  [16-22] 20  SpO2:  [93 %-100 %] 100 %  BP: (128-148)/(60-71) 128/63     Weight: 75.7 kg (166 lb 14.2 oz)  Body mass index is 29.56 kg/m².    Estimated Creatinine Clearance: 36.4 mL/min (based on SCr of 1.4 mg/dL).     Physical Exam  Constitutional:       General: She is not in acute distress.     Appearance: Normal appearance. She is not ill-appearing.   Cardiovascular:      Rate and Rhythm: Normal rate and regular rhythm.      Pulses: Normal pulses.      Heart sounds: Normal heart sounds. No murmur heard.     No friction rub. No gallop.   Pulmonary:      Effort: Pulmonary effort is normal. No respiratory distress.      Breath sounds: Normal breath sounds.   Abdominal:      General: Abdomen is flat. Bowel sounds are normal. There is no distension.      Palpations: Abdomen is soft.      Tenderness: There is no abdominal tenderness.   Musculoskeletal:      Comments: PICC clean and intact   Skin:     General: Skin is warm and dry.   Neurological:      Mental Status: She is alert.          Significant Labs: Blood Culture: No results for input(s): "LABBLOO" in the last 4320 hours.  CBC:   Recent Labs   Lab " 06/17/24  2131 06/18/24  0637 06/19/24  0509   WBC 15.02* 9.77 4.59   HGB 10.6* 11.7* 10.9*   HCT 29.7* 33.0* 31.5*    205 202     CMP:   Recent Labs   Lab 06/17/24 2131 06/18/24  0126 06/18/24  0637 06/18/24  1535 06/19/24  0509   * 130* 132*  --  133*   K 2.7* 2.9* 3.5 3.7 3.3*   CL 97 100 101  --  101   CO2 21* 23 21*  --  24   * 134* 98  --  90   BUN 12 11 10  --  12   CREATININE 1.2 1.0 0.9  --  1.4   CALCIUM 9.6 9.4 9.8  --  9.2   PROT 6.8  --  7.2  --  6.4   ALBUMIN 3.5  --  3.6  --  3.2*   BILITOT 0.6  --  0.6  --  0.4   ALKPHOS 53*  --  59  --  52*   AST 37  --  36  --  28   ALT 50*  --  49*  --  40   ANIONGAP 13 7* 10  --  8     CSF:   Recent Labs   Lab 06/04/24  0830   CSFCULTURE No Growth     Microbiology Results (last 7 days)       Procedure Component Value Units Date/Time    Fungus culture [[id number]] Collected: 06/18/24 1445    Order Status: Completed Specimen: CSF (Spinal Fluid) from CSF Tap, Tube 2 Updated: 06/19/24 1011     Fungus (Mycology) Culture Culture in progress    Cryptococcal antigen, CSF [[id number]]  (Abnormal) Collected: 06/18/24 1445    Order Status: Completed Specimen: CSF (Spinal Fluid) from CSF Tap, Tube 2 Updated: 06/19/24 1010     Crypto Ag, CSF Positive >1:320    Cryptococcal antigen [[id number]]  (Abnormal) Collected: 06/18/24 1536    Order Status: Completed Specimen: Blood, Venous Updated: 06/19/24 1010     Cryptococcal Ag, Blood Positive >1:320    Sarah Ink (CSF) [[id number]] Collected: 06/18/24 1445    Order Status: Completed Specimen: CSF (Spinal Fluid) from CSF Tap, Tube 2 Updated: 06/18/24 2257     Sarah Ink No encapsulated yeast seen    CSF culture [[id number]] Collected: 06/18/24 1445    Order Status: Completed Specimen: CSF (Spinal Fluid) from CSF Tap, Tube 2 Updated: 06/18/24 1628     Gram Stain Result Cytospin indicates:      Many WBC's      No epithelial cells      No organisms seen    Gram stain [[id number]] Collected: 06/18/24 1445    Order  Status: Canceled Specimen: CSF (Spinal Fluid) from CSF Tap, Tube 2           All pertinent labs within the past 24 hours have been reviewed.    Significant Imaging: I have reviewed all pertinent imaging results/findings within the past 24 hours.

## 2024-06-19 NOTE — PLAN OF CARE
Outpatient Antibiotic Therapy Plan:    Please send referral to [facility]    1) Infection: Cryptococcal meningitis     2) Discharge Antifungals:    Intravenous antifungal:  IV amphotericin 250 mg daily     Oral antifungal:  PO flucytosine 25 mg/kg Q6H     3) Therapy Duration:  14 days (resuming from last dose given outpatient since previous admission)     Estimated end date of IV antibiotics: 6/22/24     4) Outpatient Weekly Labs:    Order the following labs to be drawn on Mondays:   CBC  CMP     5) Fax Lab Results to Infectious Diseases Provider: Dr. [facility] Fax Number: [phone]

## 2024-06-19 NOTE — PROGRESS NOTES
[facility]  Infectious Disease  Progress Note    Patient Name: Anne Farmer  MRN: [id number]  Admission Date: 6/17/2024  Length of Stay: 1 days  Attending Physician: Cassi Hayes MD  Primary Care Provider: Chiquita Talley MD    Isolation Status: No active isolations  Assessment/Plan:      Cardiac/Vascular  Hyperlipidemia  Continue lipid control per primary     DM type 2 with diabetic dyslipidemia  Continue insulin regimen per primary      HTN (hypertension)  Continue current medications per primary      ID  Cryptococcal meningoencephalitis  --No known risk factors including but not limited to pigeon droppings  --LP last admission with traumatic tap, OP 15 cm of h2o, 11 ccs reddish which cleared fluid   --Cryptococcal CSF Ag 1:160, serum >1:320  --Repeat LP this admission with OP 17, 11 ccs of light yellow tinged clear fluid, 133 WBC (lymphocyte predominance) vs 272 WBC (lymphocyte predominance) 14 days ago, noting traumatic tap   --No yeast reported this time on CSF gram stain; Sarah ink negative   --Crypto Ags remain high which is to be expected   --Continue IV amphotericin induction for now while closely monitoring for electrolyte imbalance; will add PO flucytosine as outpatient   --Anticipated stop date for both amphotericin and flucytosine will be 6/22 to complete 14 day induction phase   --On Sunday will switch to consolidation therapy with PO fluconazole 800 mg daily for 8 weeks (stop date 8/19/24) then switch to final maintenance phase of fluconazole 200 mg daily for 6-12 months  --Will follow CSF cultures to ensure no growth of Crypto   --Will schedule ID clinic follow up    --Above d/w primary team      Thank you for your consult. I will follow-up with patient. Please contact us if you have any additional questions.    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 Robertson who presented to ER per instructions from Dr. Robertson due to lab results showing potassium level 2.7. Likely associated with amphotericin. Unfortunately upon discussion with patient she states she only received one outpatient dose of amphotericin. Family has been unable to help administer the antibiotic and is looking for infusion center to assist. CM reached out to [facility] who will be able to administer at their suite. They confirmed she received two doses outpatient for a total of 9 days of therapy so far. Will plan for LP to ensure sterilization while admitted this time. ID consulted for continued Cryptococcal management.   Interval History: Patient with vision improving and no headaches today. CSF improving on repeat LP. Opening pressure normal. No yeast reported on gram stain. Will complete induction therapy this weekend and switch to consolidation on Sunday.     Review of Systems   Constitutional:  Positive for activity change.   Eyes:  Negative for visual disturbance.   Musculoskeletal:  Negative for neck pain and neck stiffness.   Neurological:  Negative for headaches.   All other systems reviewed and are negative.    Objective:     Vital Signs (Most Recent):  Temp: 98.2 °F (36.8 °C) (06/19/24 1246)  Pulse: 60 (06/19/24 1246)  Resp: 20 (06/19/24 1246)  BP: 128/63 (06/19/24 1246)  SpO2: 100 % (06/19/24 1246) Vital Signs (24h Range):  Temp:  [98.2 °F (36.8 °C)-98.8 °F (37.1 °C)] 98.2 °F (36.8 °C)  Pulse:  [41-73] 60  Resp:  [16-22] 20  SpO2:  [93 %-100 %] 100 %  BP: (128-148)/(60-71) 128/63     Weight: 75.7 kg (166 lb 14.2 oz)  Body mass index is 29.56 kg/m².    Estimated Creatinine  "Clearance: 36.4 mL/min (based on SCr of 1.4 mg/dL).     Physical Exam  Constitutional:       General: She is not in acute distress.     Appearance: Normal appearance. She is not ill-appearing.   Cardiovascular:      Rate and Rhythm: Normal rate and regular rhythm.      Pulses: Normal pulses.      Heart sounds: Normal heart sounds. No murmur heard.     No friction rub. No gallop.   Pulmonary:      Effort: Pulmonary effort is normal. No respiratory distress.      Breath sounds: Normal breath sounds.   Abdominal:      General: Abdomen is flat. Bowel sounds are normal. There is no distension.      Palpations: Abdomen is soft.      Tenderness: There is no abdominal tenderness.   Musculoskeletal:      Comments: PICC clean and intact   Skin:     General: Skin is warm and dry.   Neurological:      Mental Status: She is alert.          Significant Labs: Blood Culture: No results for input(s): "LABBLOO" in the last 4320 hours.  CBC:   Recent Labs   Lab 06/17/24  2131 06/18/24  0637 06/19/24  0509   WBC 15.02* 9.77 4.59   HGB 10.6* 11.7* 10.9*   HCT 29.7* 33.0* 31.5*    205 202     CMP:   Recent Labs   Lab 06/17/24  2131 06/18/24  0126 06/18/24  0637 06/18/24  1535 06/19/24  0509   * 130* 132*  --  133*   K 2.7* 2.9* 3.5 3.7 3.3*   CL 97 100 101  --  101   CO2 21* 23 21*  --  24   * 134* 98  --  90   BUN 12 11 10  --  12   CREATININE 1.2 1.0 0.9  --  1.4   CALCIUM 9.6 9.4 9.8  --  9.2   PROT 6.8  --  7.2  --  6.4   ALBUMIN 3.5  --  3.6  --  3.2*   BILITOT 0.6  --  0.6  --  0.4   ALKPHOS 53*  --  59  --  52*   AST 37  --  36  --  28   ALT 50*  --  49*  --  40   ANIONGAP 13 7* 10  --  8     CSF:   Recent Labs   Lab 06/04/24  0830   CSFCULTURE No Growth     Microbiology Results (last 7 days)       Procedure Component Value Units Date/Time    Fungus culture [[id number]] Collected: 06/18/24 1445    Order Status: Completed Specimen: CSF (Spinal Fluid) from CSF Tap, Tube 2 Updated: 06/19/24 1011     Fungus " (Mycology) Culture Culture in progress    Cryptococcal antigen, CSF [[id number]]  (Abnormal) Collected: 06/18/24 1445    Order Status: Completed Specimen: CSF (Spinal Fluid) from CSF Tap, Tube 2 Updated: 06/19/24 1010     Crypto Ag, CSF Positive >1:320    Cryptococcal antigen [[id number]]  (Abnormal) Collected: 06/18/24 1536    Order Status: Completed Specimen: Blood, Venous Updated: 06/19/24 1010     Cryptococcal Ag, Blood Positive >1:320    Sarah Ink (CSF) [[id number]] Collected: 06/18/24 1445    Order Status: Completed Specimen: CSF (Spinal Fluid) from CSF Tap, Tube 2 Updated: 06/18/24 2257     Sarah Ink No encapsulated yeast seen    CSF culture [[id number]] Collected: 06/18/24 1445    Order Status: Completed Specimen: CSF (Spinal Fluid) from CSF Tap, Tube 2 Updated: 06/18/24 1628     Gram Stain Result Cytospin indicates:      Many WBC's      No epithelial cells      No organisms seen    Gram stain [[id number]] Collected: 06/18/24 1445    Order Status: Canceled Specimen: CSF (Spinal Fluid) from CSF Tap, Tube 2           All pertinent labs within the past 24 hours have been reviewed.    Significant Imaging: I have reviewed all pertinent imaging results/findings within the past 24 hours.

## 2024-06-19 NOTE — PLAN OF CARE
"Sw spoke with pt this morning to confirm d/c plans for this afternoon and to update her on going to [facility] daily for IV infusions at 11:30am. Sw explained that family will have to give her last IV dose on Saturday since [facility] clinic is closed. Pt insisted that she and her family are "not able to do it."     Sw and pt contacted pt's son, Víctor, via phone to discuss d/c plans. Sw explained all information to pt's son that Sw previously explained to pt. Pt's son, Víctor, stated he is agreeable to go with pt to IV infusion appointments to help learn how to administer last dose of IV abx. Pt's son is agreeable to give last dose of IV meds at home.   "

## 2024-06-19 NOTE — PLAN OF CARE
Pt being discharged Home in stable condition. PICC line in place to R upper arm, dressing changed 6/19/24. Tele monitor removed, given to US. Discharge instructions given to pt, pt verbalized understanding.

## 2024-06-19 NOTE — ASSESSMENT & PLAN NOTE
--No known risk factors including but not limited to pigeon droppings  --LP last admission with traumatic tap, OP 15 cm of h2o, 11 ccs reddish which cleared fluid   --Cryptococcal CSF Ag 1:160, serum >1:320  --Repeat LP this admission with OP 17, 11 ccs of light yellow tinged clear fluid, 133 WBC (lymphocyte predominance) vs 272 WBC (lymphocyte predominance) 14 days ago, noting traumatic tap   --No yeast reported this time on CSF gram stain; Sarah ink negative   --Crypto Ags remain high which is to be expected   --Continue IV amphotericin induction for now while closely monitoring for electrolyte imbalance; will add PO flucytosine as outpatient   --Anticipated stop date for both amphotericin and flucytosine will be 6/22 to complete 14 day induction phase   --On Sunday will switch to consolidation therapy with PO fluconazole 800 mg daily for 8 weeks (stop date 8/19/24) then switch to final maintenance phase of fluconazole 200 mg daily for 6-12 months  --Will schedule ID clinic follow up    --Above d/w primary team

## 2024-06-19 NOTE — PLAN OF CARE
[facility]  Discharge Final Note    Primary Care Provider: Chiquita Talley MD    Expected Discharge Date: 6/19/2024    Final Discharge Note (most recent)       Final Note - 06/19/24 1041          Final Note    Assessment Type Final Discharge Note     Anticipated Discharge Disposition Home-Health Care [facility] Resources/Appts/Education Provided Appointments scheduled and added to AVS;Post-Acute resouces added to AVS        Post-Acute Status    Post-Acute Authorization Home Health;IV Infusion     Home Health Status Set-up Complete/Auth obtained     IV Infusion Status Set-up Complete/Auth obtained     Discharge Delays None known at this time                      Follow-up providers       [facility]    [address] [place] [address]  [place] [id number] [phone]       Next Steps: Go to    Instructions: Please go to [facility] for daily IV infusion services at 11:30am.              After-discharge care                Home Medical Care       [facility]   Service: [facility] Health Services    [address] [facility] [address]  [place] [address]   Phone: [phone]                           Pt and pt's son, Víctor, aware that plan is for IV infusions at [facility] daily at 11:30am until Friday and family to administer last dose of IV meds on Saturday at home.     Patient has no d/c needs at this time. Sw to follow up, as needed, for d/c planning purposes.

## 2024-06-19 NOTE — DISCHARGE SUMMARY
[facility] Medicine  Discharge Summary      Patient Name: Anne Farmer  MRN: [id number]  RAJIV: [id number]  Patient Class: IP- Inpatient  Admission Date: 6/17/2024  Hospital Length of Stay: 1 days  Discharge Date and Time: No discharge date for patient encounter.  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 Robertson (ID) who presented to ED per instructions from Dr. Robertson due to lab results showing potassium level 2.7. She has PICC line to right arm and has been getting home health. Per Dr. Robertson's note, she is to have LP to ensure CSF sterilization on 6/20 and then if negative she will start fluconazole. She complains of generalized weakness, intermittent dizziness,  nausea and decreased appetite/oral intake. Patient is very poor historian and does not know the details of her medical care. She is supposed to be taking potassium chloride 10 mEq BID and magnesium oxide 400 mg daily, as well as losartan for hypertension, however there is no potassium supplement in the bag of medications that she brought to the ED.  she is afebrile, no distress, blood pressure stable, heart rate 40s to low 50s, saturating normally on room air.  Labs repeated here showed WBC 15, + left shift, hemoglobin 10.6, hematocrit 29.7, platelet 208,  sodium 131, potassium 2.7, CO2 21, glucose 152, magnesium 1.1. She was given Mg sulfate 2 g IVPB, Zofran, CR potassium chloride 40 mEq po,  and potassium chloride 10 mEq IV while in ED. Chemistry was repeated and potassium 2.9, sodium 130, magnesium 1.8. Hospital Medicine was consutled for admission due to hypokalemia.    * No surgery found *      Hospital Course:   Patient with recent discharge for cryptococcal meningitis admitted with hyponatremia and hypomagnesemia.  Her electrolyte abnormalities were corrected on hospital day 1.  Infectious Disease was consulted as patient is on amphotericin B for cryptococcal meningitis.  Dr. Burris recommended a repeat LP to determine if patient can be switched to fluconazole.  Upon discussion with home health agency patient has only received 2 doses of amphotericin B at home, due to noncompliance patient will need to go to the clinic to receive the remainder of antibiotics if necessary.  LP reviewed with Dr. Burris.  Plan for continued amphotericin B until 06/22/2024, flucytosine to be started on 06/20/2024 through 06/22/2024 and start fluconazole on 06/23/2024.  Patient will also be continued on p.o. potassium and magnesium.  Patient will need to follow up with ID and PCP.  She has also been set up with [facility] outpatient infusion for her amphotericin B and with [facility] for lab draws and PICC removal.  Patient's son has been educated on taking the patient to the infusion center Thursday and Friday, followed by 1 infusion at home by him and to  the flucytosine which we will be ready for pickup on 06/20/2024 at [facility].  This has all been reviewed in the AVS as well.  Patient was medically stable for discharge.     Goals of Care Treatment Preferences:  Code Status: Full Code      Consults:   Consults (From admission, onward)          Status Ordering Provider     Inpatient consult to Interventional Radiology  Once        Provider:  (Not yet assigned)    Completed MAYA HERNÁNDEZ            No new Assessment & Plan notes have been filed under this hospital service  since the last note was generated.  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] 06/05/2024 Yes    Malignant neoplasm of lower-outer quadrant of left breast of female, estrogen receptor positive [C50.512, Z17.0] 06/09/2022 Not Applicable    Chronic restrictive lung disease [J98.4] 07/31/2019 Yes    Hyperlipidemia [E78.5] 10/25/2016 Yes    DM type 2 with diabetic dyslipidemia [E11.69, E78.5]  Yes    Hypertensive cardiomyopathy [I11.9, I43] 01/13/2014 Yes    HTN (hypertension) [I10] 10/22/2013 Yes      Problems Resolved During this Admission:       Discharged Condition: stable    Disposition: Home or Self Care    Follow Up:   Contact information for follow-up providers       [facility]. Go to.    Why: Please go to [facility] for daily IV infusion services at 11:30am.  Contact information:  [address] [place] [id number] [phone]                     Contact information for after-discharge care       Home Medical Care       [facility] .    Service: Home Health Services  Contact information:  [address] [facility] [address] [phone]                                 Patient Instructions:      Diet Adult Regular     SUBSEQUENT HOME HEALTH ORDERS     Order Specific Question Answer Comments   What Home Health Agency is the patient currently using? [facility]      Activity as tolerated       Significant Diagnostic Studies: Labs: BMP:   Recent Labs   Lab 06/18/24  0126 06/18/24  0637 06/18/24  1535 06/19/24  0509   * 98  --  90   * 132*  --  133*   K 2.9* 3.5 3.7 3.3*    101  --  101   CO2 23 21*  --  24   BUN 11 10  --  12   CREATININE 1.0 0.9  --  1.4   CALCIUM 9.4 9.8  --  9.2   MG 1.8 1.6  --  1.6    and CBC    Recent Labs   Lab 06/17/24  2131 06/18/24  0637 06/19/24  0509   WBC 15.02* 9.77 4.59   HGB 10.6* 11.7* 10.9*   HCT 29.7* 33.0* 31.5*    205 202     Microbiology: CSF Culture:   Lab Results   Component Value Date    CSFCULTURE No Growth 06/04/2024     Radiology:   FL Lumbar Puncture (xpd)   Final Result      Successful fluoroscopically guided lumbar puncture.         Electronically signed by: Marc Sen   Date:    06/18/2024   Time:    14:52           Pending Diagnostic Studies:       None           Medications:  Reconciled Home Medications:      Medication List        START taking these medications      flucytosine 500 mg Cap  Commonly known as: ANCOBON  Take 4 capsules (2,000 mg total) by mouth every 6 (six) hours. for 3 days     potassium chloride SA 20 MEQ tablet  Commonly known as: K-DUR,KLOR-CON  Take 2 tablets (40 mEq total) by mouth 2 (two) times daily. for 10 days  Replaces: potassium chloride 10 MEQ Tbsr            CONTINUE taking these medications      albuterol 90 mcg/actuation inhaler  Commonly known as: PROVENTIL/VENTOLIN HFA  INHALE 1-2 PUFFS BY MOUTH EVERY 6 HOURS AS NEEDED FOR WHEEZE OR SHORTNESS OF BREATH     amLODIPine 10 MG tablet  Commonly known as: NORVASC  TAKE 1 TABLET BY MOUTH EVERY DAY     amphotericin B liposome 1 mg/mL in dextrose 5 % (D5W) infusion  Inject 250 mLs (250 mg total) into the vein once daily. for 3 days     anastrozole 1 mg Tab  Commonly known as: ARIMIDEX  TAKE 1 TABLET BY MOUTH EVERY DAY     azelastine 137 mcg (0.1 %) nasal spray  Commonly known as: ASTELIN  2 sprays (274 mcg total) by Nasal route 2 (two) times daily.     cyclobenzaprine 5 MG tablet  Commonly known as: FLEXERIL  Take 1 tablet (5 mg total) by mouth 3 (three) times daily as needed for Muscle spasms.     fluconazole 200 MG Tab  Commonly known as: DIFLUCAN  Take 4 tablets (800 mg total) by mouth once daily.     fluticasone propionate 50 mcg/actuation nasal spray  Commonly known as: FLONASE  2  sprays (100 mcg total) by Each Nostril route once daily.     hydroCHLOROthiazide 25 MG tablet  Commonly known as: HYDRODIURIL  Take 1 tablet (25 mg total) by mouth once daily.     levocetirizine 5 MG tablet  Commonly known as: XYZAL  Take 1 tablet (5 mg total) by mouth every evening.     losartan 100 MG tablet  Commonly known as: COZAAR  TAKE 1 TABLET BY MOUTH EVERY DAY     magnesium oxide 400 mg (241.3 mg magnesium) tablet  Commonly known as: MAG-OX  Take 1 tablet (400 mg total) by mouth once daily.     omeprazole 20 MG capsule  Commonly known as: PRILOSEC  TAKE 1 CAPSULE BY MOUTH EVERY DAY     ONE DAILY MULTIVITAMIN tablet  Generic drug: multivitamin  Take 1 tablet by mouth once daily.     pravastatin 20 MG tablet  Commonly known as: PRAVACHOL  TAKE 1 TABLET BY MOUTH EVERY DAY     traZODone 50 MG tablet  Commonly known as: DESYREL  TAKE 1 TABLET BY MOUTH EVERY DAY AT NIGHT            STOP taking these medications      potassium chloride 10 MEQ Tbsr  Commonly known as: KLOR-CON  Replaced by: potassium chloride SA 20 MEQ tablet              Indwelling Lines/Drains at time of discharge:   Lines/Drains/Airways       Peripherally Inserted Central Catheter Line  Duration             PICC Double Lumen 06/06/24 1520 right brachial 13 days                    Time spent on the discharge of patient: 40 minutes         Cassi Hayes MD  Department of Hospital Medicine  [facility]

## 2024-06-20 ENCOUNTER — TELEPHONE (OUTPATIENT)
Dept: INFECTIOUS DISEASES | Facility: CLINIC | Age: 69
End: 2024-06-20
Payer: MEDICARE

## 2024-06-20 ENCOUNTER — HOSPITAL ENCOUNTER (EMERGENCY)
Facility: HOSPITAL | Age: 69
Discharge: HOME | End: 2024-06-20
Attending: EMERGENCY MEDICINE
Payer: MEDICARE

## 2024-06-20 VITALS
WEIGHT: 166.88 LBS | OXYGEN SATURATION: 98 % | DIASTOLIC BLOOD PRESSURE: 66 MMHG | RESPIRATION RATE: 18 BRPM | SYSTOLIC BLOOD PRESSURE: 135 MMHG | BODY MASS INDEX: 29.56 KG/M2 | HEART RATE: 62 BPM | TEMPERATURE: 98 F

## 2024-06-20 DIAGNOSIS — Z78.9 PROBLEM WITH VASCULAR ACCESS: Primary | ICD-10-CM

## 2024-06-20 DIAGNOSIS — T82.898A OCCLUSION OF PERIPHERALLY INSERTED CENTRAL CATHETER (PICC) LINE, INITIAL ENCOUNTER: ICD-10-CM

## 2024-06-20 PROCEDURE — 99282 EMERGENCY DEPT VISIT SF MDM: CPT

## 2024-06-20 NOTE — DISCHARGE INSTRUCTIONS
Picc line appears to be working appropriately, it draws back blood and able to flush per nursing staff

## 2024-06-20 NOTE — TELEPHONE ENCOUNTER
S/w [facility], confirmed dosage of DIFLUCAN, per Dr. Robertson. [facility] verbalized understanding and stated that they will fill rx.

## 2024-06-20 NOTE — PHYSICIAN QUERY
Please specify the diagnosis associated with the clinical findings.  Chronic Diastolic Heart Failure (HFpEF)

## 2024-06-20 NOTE — ED NOTES
Andrade, paramedic, flushed both lumens of picc line and was able to get blood return on both without difficulty.

## 2024-06-20 NOTE — ED PROVIDER NOTES
SCRIBE #1 NOTE: I, Ramya Joy, am scribing for, and in the presence of, Nicky Mccormick MD. I have scribed the entire note.       History     Chief Complaint   Patient presents with    Vascular Access Problem     Picc line placement 2 weeks ago. Last accessed yesterday. Pt attempted to admin antibiotics and was unsuccessful. Pt was instructed to come to the ER for New placement     Review of patient's allergies indicates:  No Known Allergies      History of Present Illness     HPI    2024, 2:23 PM  History obtained from the patient      History of Present Illness: Anne Farmer is a 68 y.o. female patient with a PMHx of colon cancer, CHF, HTN, HLD, and T2DM who presents to the Emergency Department for evaluation of PICC line access problem which onset today. Pt had a PICC line placed 2 wks PTA for abx administration. Access can draw blood back, but cannot infuse anything into it. Pt denies site pain or swelling. No further complaints or concerns at this time.       Arrival mode: by private vehicle.  PCP: Chiquita Talley MD        Past Medical History:  Past Medical History:   Diagnosis Date    Allergy     Arthritis     Cancer 2016    colon    CHF (congestive heart failure)     Colon cancer 2004    Colon cancer screening 2015    Diabetes mellitus type 2 in nonobese 3/9/2016    borderline    Diverticulosis     DM (diabetes mellitus) 2016    BS didn't check 2019    DM (diabetes mellitus) 2016    BS didn't check 2020    Hyperlipidemia 10/25/2016    Hypertension     Insomnia     Malignant neoplasm of colon 2021    Malignant neoplasm of lower-outer quadrant of left breast of female, estrogen receptor positive 2022       Past Surgical History:  Past Surgical History:   Procedure Laterality Date    BREAST BIOPSY Left     BREAST LUMPECTOMY Left      SECTION      COLON SURGERY      COLONOSCOPY N/A 2015    Procedure: COLONOSCOPY;  Surgeon: Adela  XOCHILT Sam MD;  Location: [facility] ENDO;  Service: Endoscopy;  Laterality: N/A;    COLONOSCOPY N/A 08/24/2017    Procedure: COLONOSCOPY;  Surgeon: Chintan Flores MD;  Location: [facility] ENDO;  Service: Endoscopy;  Laterality: N/A;    COLONOSCOPY N/A 06/30/2020    Procedure: COLONOSCOPY;  Surgeon: Grisel Atkins MD;  Location: [facility] ENDO;  Service: Endoscopy;  Laterality: N/A;    SENTINEL LYMPH NODE BIOPSY Left 07/05/2022    Procedure: BIOPSY, LYMPH NODE, SENTINEL;  Surgeon: Arvin Hernandez MD;  Location: [facility] OR;  Service: General;  Laterality: Left;         Family History:  Family History   Problem Relation Name Age of Onset    Hypertension Mother      Diabetes Mother      Hypertension Father      Hypertension Sister      Hypertension Brother      Hypertension Sister      Hypertension Sister      Hypertension Sister      Hypertension Brother      Hypertension Brother         Social History:  Social History     Tobacco Use    Smoking status: Never     Passive exposure: Never    Smokeless tobacco: Never   Substance and Sexual Activity    Alcohol use: Yes     Alcohol/week: 0.0 standard drinks of alcohol     Comment: weekends.       Drug use: No    Sexual activity: Not Currently        Review of Systems     Review of Systems   Constitutional: Negative.    HENT: Negative.     Eyes: Negative.    Respiratory: Negative.     Cardiovascular: Negative.    Gastrointestinal: Negative.    Endocrine: Negative.    Genitourinary: Negative.    Musculoskeletal: Negative.    Skin: Negative.    Allergic/Immunologic: Negative.    Neurological: Negative.    Hematological:         (+) PICC line access problem  (-) PICC line site pain  (-) PICC line site swelling   Psychiatric/Behavioral: Negative.     All other systems reviewed and are negative.     Physical Exam     Initial Vitals [06/20/24 1312]   BP Pulse Resp Temp SpO2   (!) 127/57 62 18 98.2 °F (36.8 °C) 98 %      MAP       --          Physical Exam  Nursing Notes and Vital Signs  Reviewed.   Constitutional: Patient is in no acute distress. Well-developed and well-nourished.  Head: Atraumatic. Normocephalic.  Eyes: PERRL. EOM intact. Conjunctivae are not pale. No scleral icterus.  ENT: Mucous membranes are moist. Oropharynx is clear and symmetric.    Neck: Supple. Full ROM. No lymphadenopathy.  Cardiovascular: Regular rate. Regular rhythm. No murmurs, rubs, or gallops. Distal pulses are 2+ and symmetric. PICC line to RUE. Site is well-appearing. No tenderness. No swelling.  Pulmonary/Chest: No respiratory distress. Clear to auscultation bilaterally. No wheezing or rales.  Abdominal: Soft and non-distended.  There is no tenderness.  No rebound, guarding, or rigidity. Good bowel sounds.  Musculoskeletal: Moves all extremities. No obvious deformities. No edema. No calf tenderness.  Skin: Warm and dry.  Neurological:  Alert, awake, and appropriate.  Normal speech.  No acute focal neurological deficits are appreciated.  Psychiatric: Normal affect. Good eye contact. Appropriate in content.     ED Course   Procedures  ED Vital Signs:  Vitals:    06/20/24 1309 06/20/24 1312 06/20/24 1414   BP:  (!) 127/57 135/66   Pulse:  62    Resp:  18    Temp:  98.2 °F (36.8 °C) 98.2 °F (36.8 °C)   TempSrc:  Oral Oral   SpO2:  98%    Weight: 75.7 kg (166 lb 14.2 oz)         Abnormal Lab Results:  Labs Reviewed - No data to display     All Lab Results:  None    Imaging Results:  Imaging Results    None                  The Emergency Provider reviewed the vital signs and test results, which are outlined above.     ED Discussion       2:04 PM: Reassessed pt at this time. PICC line was able to be flushed and withdraws blood without difficulty. Patient does not need PICC line replaced. Discussed with pt all pertinent ED information and results. Discussed pt dx and plan of tx. Gave pt all f/u and return to the ED instructions. All questions and concerns were addressed at this time. Pt expresses understanding of  information and instructions, and is comfortable with plan to discharge. Pt is stable for discharge.    I discussed with patient that evaluation in the ED does not suggest any emergent or life threatening medical conditions requiring immediate intervention beyond what was provided in the ED, and I believe patient is safe for discharge.  Regardless, an unremarkable evaluation in the ED does not preclude the development or presence of a serious of life threatening condition. As such, patient was instructed to return immediately for any worsening or change in current symptoms.       Medical Decision Making  DDX: 1. Occluded picc line 2. Infection of picc line 3. Malposition    No physical exam findings suggestive of infection, no fever, no tenderness or swelling, picc line flushed in the ER, picc line was able to both pull blood and flush without difficulty, reasurance provided, no further workup indicated.     Amount and/or Complexity of Data Reviewed  External Data Reviewed: radiology and notes.                ED Medication(s):  Medications - No data to display    New Prescriptions    No medications on file               Scribe Attestation:   Scribe #1: I performed the above scribed service and the documentation accurately describes the services I performed. I attest to the accuracy of the note.     Attending:   Physician Attestation Statement for Scribe #1: I, Nicky Mccormick MD, personally performed the services described in this documentation, as scribed by Ramya Joy, in my presence, and it is both accurate and complete.           Clinical Impression       ICD-10-CM ICD-9-CM   1. Problem with vascular access  Z78.9 V49.9   2. Occlusion of peripherally inserted central catheter (PICC) line, initial encounter  T82.898A 996.74       Disposition:   Disposition: Discharged  Condition: Stable          Nicky Mccormick MD  06/24/24 0852

## 2024-06-20 NOTE — FIRST PROVIDER EVALUATION
Medical screening examination initiated.  I have conducted a focused provider triage encounter, findings are as follows:    Brief history of present illness:  PICC line stopped up    Vitals:    06/20/24 1309   Weight: 75.7 kg (166 lb 14.2 oz)       Pertinent physical exam:  nad, alert      Preliminary workup initiated; this workup will be continued and followed by the physician or advanced practice provider that is assigned to the patient when roomed.

## 2024-06-20 NOTE — TELEPHONE ENCOUNTER
----- Message from Kate Pena sent at 6/20/2024 12:37 PM CDT -----  Contact: [facility]  [facility] is calling in regards to the dose on fluconazole (DIFLUCAN) 200 MG Tab.      .  [id number] - [place], [place] - [address] [facility] [address]  Phone: [phone] Fax: [phone]    Thanks  CAMERON

## 2024-06-20 NOTE — ED NOTES
Bed: 08  Expected date:   Expected time:   Means of arrival:   Comments:  Darian     Age appropriate behavior

## 2024-06-22 LAB
BACTERIA CSF CULT: NORMAL
GRAM STN SPEC: NORMAL

## 2024-06-24 ENCOUNTER — DOCUMENTATION ONLY (OUTPATIENT)
Dept: INFECTIOUS DISEASES | Facility: CLINIC | Age: 69
End: 2024-06-24
Payer: MEDICARE

## 2024-06-24 RX ORDER — LANOLIN ALCOHOL/MO/W.PET/CERES
400 CREAM (GRAM) TOPICAL 3 TIMES DAILY
Qty: 6 TABLET | Refills: 0 | Status: SHIPPED | OUTPATIENT
Start: 2024-06-24 | End: 2024-06-26

## 2024-06-24 RX ORDER — LANOLIN ALCOHOL/MO/W.PET/CERES
400 CREAM (GRAM) TOPICAL 2 TIMES DAILY
Qty: 4 TABLET | Refills: 0 | Status: SHIPPED | OUTPATIENT
Start: 2024-06-24 | End: 2024-06-26

## 2024-06-25 ENCOUNTER — OFFICE VISIT (OUTPATIENT)
Dept: FAMILY MEDICINE | Facility: CLINIC | Age: 69
End: 2024-06-25
Attending: FAMILY MEDICINE
Payer: MEDICARE

## 2024-06-25 VITALS
TEMPERATURE: 99 F | SYSTOLIC BLOOD PRESSURE: 110 MMHG | HEART RATE: 52 BPM | BODY MASS INDEX: 28.21 KG/M2 | OXYGEN SATURATION: 97 % | HEIGHT: 63 IN | DIASTOLIC BLOOD PRESSURE: 60 MMHG | WEIGHT: 159.19 LBS

## 2024-06-25 DIAGNOSIS — J01.40 ACUTE NON-RECURRENT PANSINUSITIS: ICD-10-CM

## 2024-06-25 DIAGNOSIS — E83.42 HYPOMAGNESEMIA: ICD-10-CM

## 2024-06-25 DIAGNOSIS — B45.1 CRYPTOCOCCAL MENINGOENCEPHALITIS: Primary | ICD-10-CM

## 2024-06-25 DIAGNOSIS — E87.6 HYPOKALEMIA: ICD-10-CM

## 2024-06-25 DIAGNOSIS — H49.02 CN III PALSY, LEFT: ICD-10-CM

## 2024-06-25 PROBLEM — J32.9 SINUSITIS: Status: RESOLVED | Noted: 2024-06-06 | Resolved: 2024-06-25

## 2024-06-25 PROBLEM — R42 DIZZINESS AND GIDDINESS: Status: RESOLVED | Noted: 2024-06-02 | Resolved: 2024-06-25

## 2024-06-25 PROCEDURE — 1159F MED LIST DOCD IN RCRD: CPT | Mod: CPTII,S$GLB,, | Performed by: FAMILY MEDICINE

## 2024-06-25 PROCEDURE — 1160F RVW MEDS BY RX/DR IN RCRD: CPT | Mod: CPTII,S$GLB,, | Performed by: FAMILY MEDICINE

## 2024-06-25 PROCEDURE — 3044F HG A1C LEVEL LT 7.0%: CPT | Mod: CPTII,S$GLB,, | Performed by: FAMILY MEDICINE

## 2024-06-25 PROCEDURE — 3074F SYST BP LT 130 MM HG: CPT | Mod: CPTII,S$GLB,, | Performed by: FAMILY MEDICINE

## 2024-06-25 PROCEDURE — 1101F PT FALLS ASSESS-DOCD LE1/YR: CPT | Mod: CPTII,S$GLB,, | Performed by: FAMILY MEDICINE

## 2024-06-25 PROCEDURE — 99214 OFFICE O/P EST MOD 30 MIN: CPT | Mod: S$GLB,,, | Performed by: FAMILY MEDICINE

## 2024-06-25 PROCEDURE — 3078F DIAST BP <80 MM HG: CPT | Mod: CPTII,S$GLB,, | Performed by: FAMILY MEDICINE

## 2024-06-25 PROCEDURE — 3288F FALL RISK ASSESSMENT DOCD: CPT | Mod: CPTII,S$GLB,, | Performed by: FAMILY MEDICINE

## 2024-06-25 PROCEDURE — 4010F ACE/ARB THERAPY RXD/TAKEN: CPT | Mod: CPTII,S$GLB,, | Performed by: FAMILY MEDICINE

## 2024-06-25 PROCEDURE — 1126F AMNT PAIN NOTED NONE PRSNT: CPT | Mod: CPTII,S$GLB,, | Performed by: FAMILY MEDICINE

## 2024-06-25 PROCEDURE — 1111F DSCHRG MED/CURRENT MED MERGE: CPT | Mod: CPTII,S$GLB,, | Performed by: FAMILY MEDICINE

## 2024-06-25 PROCEDURE — 99999 PR PBB SHADOW E&M-EST. PATIENT-LVL III: CPT | Mod: PBBFAC,,, | Performed by: FAMILY MEDICINE

## 2024-06-25 RX ORDER — LEVOCETIRIZINE DIHYDROCHLORIDE 5 MG/1
5 TABLET, FILM COATED ORAL NIGHTLY
Qty: 90 TABLET | Refills: 2 | Status: SHIPPED | OUTPATIENT
Start: 2024-06-25

## 2024-06-25 NOTE — LETTER
June 25, 2024      [facility]  [address]  [facility] [address]  Phone: [phone]  Fax: [phone]       Patient: Anne Farmer   YOB: 1955  Date of Visit: 06/25/2024    To Whom It May Concern:    Gladis Farmer  was at [facility] on 06/25/2024. She presented with [facility] paperwork today at visit, however this could not be completed in visit. Please allow her to be off until further notice.  If you have any questions or concerns, or if I can be of further assistance, please do not hesitate to contact me.    Sincerely,    TOMÁS COX

## 2024-06-25 NOTE — PROGRESS NOTES
"Subjective:       Patient ID: Anne Farmer is a 68 y.o. female.    Chief Complaint: Hospital Follow Up    Transitional Care Note    Family and/or Caretaker present at visit?  Yes.  Diagnostic tests reviewed/disposition: I have reviewed all completed as well as pending diagnostic tests at the time of discharge.  Disease/illness education: Yes   Home health/community services discussion/referrals: Patient has home health established at [facility]   Establishment or re-establishment of referral orders for community resources: No other necessary community resources.   Discussion with other health care providers: No discussion with other health care providers necessary.     68 y old female with PMH of HTN, DM2, HLD, Colon Cx, Sarcoidosis, RLD, Breast Cx, Cardiomyopathy f.u on hospitalization on 6/2 due to constant dizziness, left eye deviation, Neurology was consulted . CT Head did not demonstrate any acute findings . MRI was done on 6/3   which demonstrate : Mild scattered leptomeningeal enhancement including overlying the right frontal lobe as well as along the inter hemispheric fissure, left occipital lobe, and interpeduncular cistern with associated mild T 2/FLAIR hyperintense signal the seen along the medial aspect of the cerebral peduncles. Findings at the interpeduncular cistern may be affecting cranial nerve 3 which may explain reported oculomotor nerve palsy. Findings are nonspecific with differential considerations including meningitis, metastatic disease, and paraneoplastic syndrome".ID was consult.Underwent lumbar puncture on 6/4 . Meningitis panel was+ for Cryptococcus . She was started on amphotericin and flucytosine . Clinically improved on 6/12 . She was Approved for home IV amphotericin, also advised to omit plaquenil , meloxicam and lasix . Discharged on 6/12 . She return to ER on    6.17 following  ID recs due to  hypokalemia ,  she received  IV potasium at ER , she was also found to have hypomagnesia " " which resolved next day. Another LP was recomended to determine if she can be transition to Fluconazole . After finding out that she only has received 2 infusion of amphotericin with h/h  she was informed to go to infusion center to continue treatment plan . "Plan was to continue flucytosine to be started on 06/20/2024 through 06/22/2024 and start fluconazole on 06/23/2024.  Patient will also be continued on p.o. potassium and magnesium.amphotericin  until 6.22". Doing better today . Eating well . Taking Potasium and Magnesium               Review of Systems   Constitutional:  Positive for fatigue.   HENT: Negative.     Eyes: Negative.    Respiratory: Negative.     Cardiovascular: Negative.    Gastrointestinal: Negative.    Genitourinary: Negative.    Musculoskeletal: Negative.    Skin: Negative.    Hematological: Negative.        Objective:      Physical Exam  Vitals and nursing note reviewed.   Constitutional:       General: She is not in acute distress.     Appearance: She is well-developed. She is not diaphoretic.   HENT:      Head: Normocephalic and atraumatic.      Right Ear: External ear normal.      Left Ear: External ear normal.      Nose: Nose normal.   Eyes:      General: No scleral icterus.        Left eye: No discharge.      Conjunctiva/sclera: Conjunctivae normal.      Pupils: Pupils are equal, round, and reactive to light.   Neck:      Thyroid: No thyromegaly.      Vascular: No JVD.      Trachea: No tracheal deviation.   Cardiovascular:      Rate and Rhythm: Normal rate and regular rhythm.      Heart sounds: Normal heart sounds. No murmur heard.     No friction rub. No gallop.   Pulmonary:      Effort: Pulmonary effort is normal. No respiratory distress.      Breath sounds: Normal breath sounds. No wheezing or rales.   Chest:      Chest wall: No tenderness.   Abdominal:      General: Bowel sounds are normal. There is no distension.      Palpations: Abdomen is soft. There is no mass.      Tenderness: " There is no abdominal tenderness. There is no guarding.   Musculoskeletal:      Cervical back: Normal range of motion and neck supple.   Lymphadenopathy:      Cervical: No cervical adenopathy.   Neurological:      Cranial Nerves: Cranial nerve deficit present.         Assessment:       1. Cryptococcal meningoencephalitis    2. CN III palsy, left    3. Acute non-recurrent pansinusitis    4. Hypomagnesemia    5. Hypokalemia        Plan:     Anne was seen today for hospital follow up.    Diagnoses and all orders for this visit:    Cryptococcal meningoencephalitis  -     CBC Auto Differential; Future  -     Comprehensive Metabolic Panel; Future  -     Magnesium; Future    CN III palsy, left    Acute non-recurrent pansinusitis  -     levocetirizine (XYZAL) 5 MG tablet; Take 1 tablet (5 mg total) by mouth every evening.    Hypomagnesemia  -     Magnesium; Future    Hypokalemia  -     CBC Auto Differential; Future  -     Comprehensive Metabolic Panel; Future     Continue ID treatment plan   Pending labs .

## 2024-06-26 ENCOUNTER — TELEPHONE (OUTPATIENT)
Dept: FAMILY MEDICINE | Facility: CLINIC | Age: 69
End: 2024-06-26
Payer: MEDICARE

## 2024-06-26 NOTE — TELEPHONE ENCOUNTER
----- Message from Mirna Lennon sent at 6/26/2024  4:03 PM CDT -----  Contact: Laurent Soler [phone]  Would like to receive medical advice.    Would they like a call back or a response via [facility]:  call back    Additional information:  Calling to confirm if pt short term disability paperwork has been completed.

## 2024-07-03 ENCOUNTER — HOSPITAL ENCOUNTER (OUTPATIENT)
Dept: RADIOLOGY | Facility: HOSPITAL | Age: 69
Discharge: HOME | End: 2024-07-03
Attending: NURSE PRACTITIONER
Payer: MEDICARE

## 2024-07-03 DIAGNOSIS — Z12.31 ENCOUNTER FOR SCREENING MAMMOGRAM FOR MALIGNANT NEOPLASM OF BREAST: ICD-10-CM

## 2024-07-05 ENCOUNTER — EXTERNAL HOME HEALTH (OUTPATIENT)
Dept: HOME HEALTH SERVICES | Facility: HOSPITAL | Age: 69
End: 2024-07-05
Payer: MEDICARE

## 2024-07-06 NOTE — TELEPHONE ENCOUNTER
No care due was identified.  [facility] Embedded Care Due Messages. Reference number: [id number].   7/06/2024 7:41:53 AM CDT

## 2024-07-07 RX ORDER — PRAVASTATIN SODIUM 20 MG/1
TABLET ORAL
Qty: 90 TABLET | Refills: 3 | Status: SHIPPED | OUTPATIENT
Start: 2024-07-07

## 2024-07-07 NOTE — TELEPHONE ENCOUNTER
Refill Decision Note   Anne aFrmer  is requesting a refill authorization.  Brief Assessment and Rationale for Refill:  Approve     Medication Therapy Plan:        Comments:     Note composed:4:41 PM 07/07/2024

## 2024-07-09 ENCOUNTER — OFFICE VISIT (OUTPATIENT)
Dept: INFECTIOUS DISEASES | Facility: CLINIC | Age: 69
End: 2024-07-09
Payer: MEDICARE

## 2024-07-09 ENCOUNTER — TELEPHONE (OUTPATIENT)
Dept: FAMILY MEDICINE | Facility: CLINIC | Age: 69
End: 2024-07-09
Payer: MEDICARE

## 2024-07-09 VITALS
HEART RATE: 58 BPM | HEIGHT: 66 IN | DIASTOLIC BLOOD PRESSURE: 52 MMHG | BODY MASS INDEX: 25.08 KG/M2 | WEIGHT: 156.06 LBS | SYSTOLIC BLOOD PRESSURE: 140 MMHG

## 2024-07-09 DIAGNOSIS — Z17.0 MALIGNANT NEOPLASM OF LOWER-OUTER QUADRANT OF LEFT BREAST OF FEMALE, ESTROGEN RECEPTOR POSITIVE: ICD-10-CM

## 2024-07-09 DIAGNOSIS — C50.512 MALIGNANT NEOPLASM OF LOWER-OUTER QUADRANT OF LEFT BREAST OF FEMALE, ESTROGEN RECEPTOR POSITIVE: ICD-10-CM

## 2024-07-09 DIAGNOSIS — B45.1 CRYPTOCOCCAL MENINGOENCEPHALITIS: Primary | ICD-10-CM

## 2024-07-09 DIAGNOSIS — I10 PRIMARY HYPERTENSION: ICD-10-CM

## 2024-07-09 DIAGNOSIS — E78.5 HYPERLIPIDEMIA, UNSPECIFIED HYPERLIPIDEMIA TYPE: ICD-10-CM

## 2024-07-09 DIAGNOSIS — E78.5 DM TYPE 2 WITH DIABETIC DYSLIPIDEMIA: ICD-10-CM

## 2024-07-09 DIAGNOSIS — E11.69 DM TYPE 2 WITH DIABETIC DYSLIPIDEMIA: ICD-10-CM

## 2024-07-09 PROCEDURE — 1101F PT FALLS ASSESS-DOCD LE1/YR: CPT | Mod: CPTII,S$GLB,, | Performed by: STUDENT IN AN ORGANIZED HEALTH CARE EDUCATION/TRAINING PROGRAM

## 2024-07-09 PROCEDURE — 3008F BODY MASS INDEX DOCD: CPT | Mod: CPTII,S$GLB,, | Performed by: STUDENT IN AN ORGANIZED HEALTH CARE EDUCATION/TRAINING PROGRAM

## 2024-07-09 PROCEDURE — 3044F HG A1C LEVEL LT 7.0%: CPT | Mod: CPTII,S$GLB,, | Performed by: STUDENT IN AN ORGANIZED HEALTH CARE EDUCATION/TRAINING PROGRAM

## 2024-07-09 PROCEDURE — 1159F MED LIST DOCD IN RCRD: CPT | Mod: CPTII,S$GLB,, | Performed by: STUDENT IN AN ORGANIZED HEALTH CARE EDUCATION/TRAINING PROGRAM

## 2024-07-09 PROCEDURE — 4010F ACE/ARB THERAPY RXD/TAKEN: CPT | Mod: CPTII,S$GLB,, | Performed by: STUDENT IN AN ORGANIZED HEALTH CARE EDUCATION/TRAINING PROGRAM

## 2024-07-09 PROCEDURE — 99214 OFFICE O/P EST MOD 30 MIN: CPT | Mod: S$GLB,,, | Performed by: STUDENT IN AN ORGANIZED HEALTH CARE EDUCATION/TRAINING PROGRAM

## 2024-07-09 PROCEDURE — 1126F AMNT PAIN NOTED NONE PRSNT: CPT | Mod: CPTII,S$GLB,, | Performed by: STUDENT IN AN ORGANIZED HEALTH CARE EDUCATION/TRAINING PROGRAM

## 2024-07-09 PROCEDURE — 99999 PR PBB SHADOW E&M-EST. PATIENT-LVL IV: CPT | Mod: PBBFAC,,, | Performed by: STUDENT IN AN ORGANIZED HEALTH CARE EDUCATION/TRAINING PROGRAM

## 2024-07-09 PROCEDURE — 3078F DIAST BP <80 MM HG: CPT | Mod: CPTII,S$GLB,, | Performed by: STUDENT IN AN ORGANIZED HEALTH CARE EDUCATION/TRAINING PROGRAM

## 2024-07-09 PROCEDURE — 3077F SYST BP >= 140 MM HG: CPT | Mod: CPTII,S$GLB,, | Performed by: STUDENT IN AN ORGANIZED HEALTH CARE EDUCATION/TRAINING PROGRAM

## 2024-07-09 PROCEDURE — 3288F FALL RISK ASSESSMENT DOCD: CPT | Mod: CPTII,S$GLB,, | Performed by: STUDENT IN AN ORGANIZED HEALTH CARE EDUCATION/TRAINING PROGRAM

## 2024-07-09 NOTE — TELEPHONE ENCOUNTER
----- Message from Chiquita Talley MD sent at 7/9/2024  3:07 PM CDT -----  Regarding: FW: Cryptococcal meningitis  Please see below and find out if she has any home health agency that is helping with medication administration  ----- Message -----  From: Richard Burris DO  Sent: 7/9/2024   2:56 PM CDT  To: Chiquita Talley MD  Subject: RE: Cryptococcal meningitis                      Looks like she was discharged with [facility].  ----- Message -----  From: Chiquita Talley MD  Sent: 7/9/2024   2:48 PM CDT  To: Richard Burris DO  Subject: RE: Cryptococcal meningitis                      Does she have Home health ?  ----- Message -----  From: Richard Burris DO  Sent: 7/9/2024  11:51 AM CDT  To: Chiquita Talley MD  Subject: Cryptococcal meningitis                          Good morning Chiquita this is Charlie with ID. [place] patient Ms. Farmer saw me in clinic today. I was consulted on her in hospital last month for incomplete treatment of Cryptococcal meningitis. Dr Robertson had placed her on IV amphotericin to be completed at home but she did not do so. I set her up with [facility] and they took care of her. Well now she is supposed to be on PO fluconazole 800 mg daily until August 19th followed by 200 mg daily for at least 6-12 months afterward. Patient is not sure what pills she takes. Has a niece named Karlene who prepares all her meds. Her number is [phone]. I have tried calling twice today with no response. Would you be able to follow up with both her and the patient whenever you have a moment? And when you see her next reiterate importance of taking this pill? Risk of recurrence with Crypto is high. Appreciate your help.    Charlie

## 2024-07-09 NOTE — PROGRESS NOTES
Infectious Disease Clinic Note    Patient Name: Anne Farmer  YOB: 1955    PRESENTING HISTORY       History of Present Illness:  Ms. Anne Farmer is a 68 y.o. 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 Robertson who presented to ER per instructions from Dr. Robertson due to lab results showing potassium level 2.7. Likely associated with amphotericin. Unfortunately upon discussion with patient she states she only received one outpatient dose of amphotericin. Family has been unable to help administer the antibiotic and is looking for infusion center to assist. CM reached out to [facility] who will be able to administer at their suite. They confirmed she received two doses outpatient for a total of 9 days of therapy so far. Will plan for LP to ensure sterilization while admitted this time. ID consulted for continued Cryptococcal management. Repeat LP this admission with OP 17, 11 ccs of light yellow tinged clear fluid, 133 WBC (lymphocyte predominance) vs 272 WBC (lymphocyte predominance) 14 days ago, noting traumatic tap. No yeast reported this time on CSF gram stain; Sarah ink negative. Crypto Ags remain high which is to be expected. Discharged on IV amphotericin induction while closely monitoring for electrolyte imbalance along with PO flucytosine. Anticipated stop date for both amphotericin and flucytosine 6/22 to complete 14 day induction phase.    7/9: Here for hospital follow up. Unfortunately patient is confused regarding her medications. I explained that she was diagnosed with a form of fungal meningitis that requires a pill for up to one year. I reiterated the dosing  and wrote it for her on AVS. Will try to contact niece who manages her medication. Currently denies headaches or neck pain/stiffness. Occasional dizziness that resolves when sitting. Will let PCP know about current treatment plan.     Review of Systems:  Constitutional: no fever or chills  Eyes: no visual changes  ENT: no nasal congestion or sore throat  Respiratory: no cough or shortness of breath  Cardiovascular: no chest pain  Gastrointestinal: no nausea or vomiting, no abdominal pain, no constipation, no diarrhea  Genitourinary: no hematuria or dysuria  Musculoskeletal: no arthralgias or myalgias  Skin: no rash  Neurological: no headaches, numbness, or paresthesias; dizziness     The following portions of the patient's history were reviewed and updated as appropriate: allergies, current medications, past family history, past medical history, past social history, past surgical history, and problem list.    PAST HISTORY:     Immunization History   Administered Date(s) Administered    COVID-19 MRNA, LN-S PF (MODERNA HALF 0.25 ML DOSE) 12/08/2021    COVID-19, MRNA, LN-S, PF (MODERNA FULL 0.5 ML DOSE) 01/28/2021, 02/25/2021    COVID-19, mRNA, LNP-S, PF (Moderna 2023)Ages 12+ 11/02/2023    COVID-19, mRNA, LNP-S, bivalent booster, PF (PFIZER OMICRON) 10/27/2022    Influenza - Quadrivalent 09/22/2017, 10/15/2021, 10/09/2022, 10/16/2023    Influenza - Quadrivalent - High Dose - PF (65 years and older) 09/23/2020    Influenza - Quadrivalent - PF *Preferred* (6 months and older) 09/24/2009, 11/02/2010    PPD Test 02/15/2017    Pneumococcal Conjugate - 13 Valent 05/21/2019    Pneumococcal Conjugate - 20 Valent 09/28/2023    Pneumococcal Polysaccharide - 23 Valent 04/26/2018    Tdap 01/15/2015       Past Medical History:   Diagnosis Date    Allergy     Arthritis     Cancer 2016    colon    CHF (congestive heart failure)     Colon cancer 2004    Colon cancer screening 9/21/2015    Diabetes mellitus type 2 in nonobese 3/9/2016     borderline    Diverticulosis     DM (diabetes mellitus) 2016    BS didn't check 2019    DM (diabetes mellitus) 2016    BS didn't check 2020    Hyperlipidemia 10/25/2016    Hypertension     Insomnia     Malignant neoplasm of colon 2021    Malignant neoplasm of lower-outer quadrant of left breast of female, estrogen receptor positive 2022       Past Surgical History:   Procedure Laterality Date    BREAST BIOPSY Left     BREAST LUMPECTOMY Left      SECTION      COLON SURGERY      COLONOSCOPY N/A 2015    Procedure: COLONOSCOPY;  Surgeon: Adela Sam MD;  Location: [facility] ENDO;  Service: Endoscopy;  Laterality: N/A;    COLONOSCOPY N/A 2017    Procedure: COLONOSCOPY;  Surgeon: Chintan Flores MD;  Location: [facility] ENDO;  Service: Endoscopy;  Laterality: N/A;    COLONOSCOPY N/A 2020    Procedure: COLONOSCOPY;  Surgeon: Grisel Atkins MD;  Location: [facility] ENDO;  Service: Endoscopy;  Laterality: N/A;    SENTINEL LYMPH NODE BIOPSY Left 2022    Procedure: BIOPSY, LYMPH NODE, SENTINEL;  Surgeon: Arvin Hernandez MD;  Location: [facility] OR;  Service: General;  Laterality: Left;       Family History   Problem Relation Name Age of Onset    Hypertension Mother      Diabetes Mother      Hypertension Father      Hypertension Sister      Hypertension Brother      Hypertension Sister      Hypertension Sister      Hypertension Sister      Hypertension Brother      Hypertension Brother         Social History     Socioeconomic History    Marital status: Single   Occupational History     Employer: [employer]   Tobacco Use    Smoking status: Never     Passive exposure: Never    Smokeless tobacco: Never   Substance and Sexual Activity    Alcohol use: Yes     Alcohol/week: 0.0 standard drinks of alcohol     Comment: weekends.       Drug use: No    Sexual activity: Not Currently       MEDICATIONS & ALLERGIES:     Current Outpatient Medications on File Prior to Visit  "  Medication Sig    albuterol (PROVENTIL/VENTOLIN HFA) 90 mcg/actuation inhaler INHALE 1-2 PUFFS BY MOUTH EVERY 6 HOURS AS NEEDED FOR WHEEZE OR SHORTNESS OF BREATH    amLODIPine (NORVASC) 10 MG tablet TAKE 1 TABLET BY MOUTH EVERY DAY    anastrozole (ARIMIDEX) 1 mg Tab TAKE 1 TABLET BY MOUTH EVERY DAY    azelastine (ASTELIN) 137 mcg (0.1 %) nasal spray 2 sprays (274 mcg total) by Nasal route 2 (two) times daily.    cyclobenzaprine (FLEXERIL) 5 MG tablet Take 1 tablet (5 mg total) by mouth 3 (three) times daily as needed for Muscle spasms.    hydroCHLOROthiazide (HYDRODIURIL) 25 MG tablet Take 1 tablet (25 mg total) by mouth once daily.    levocetirizine (XYZAL) 5 MG tablet Take 1 tablet (5 mg total) by mouth every evening.    losartan (COZAAR) 100 MG tablet TAKE 1 TABLET BY MOUTH EVERY DAY    magnesium oxide (MAG-OX) 400 mg (241.3 mg magnesium) tablet Take 1 tablet (400 mg total) by mouth once daily.    multivitamin (ONE DAILY MULTIVITAMIN) per tablet Take 1 tablet by mouth once daily.    omeprazole (PRILOSEC) 20 MG capsule TAKE 1 CAPSULE BY MOUTH EVERY DAY    pravastatin (PRAVACHOL) 20 MG tablet TAKE 1 TABLET BY MOUTH EVERY DAY    traZODone (DESYREL) 50 MG tablet TAKE 1 TABLET BY MOUTH EVERY DAY AT NIGHT    fluconazole (DIFLUCAN) 200 MG Tab Take 4 tablets (800 mg total) by mouth once daily. (Patient not taking: Reported on 6/25/2024)    fluticasone propionate (FLONASE) 50 mcg/actuation nasal spray 2 sprays (100 mcg total) by Each Nostril route once daily. (Patient not taking: Reported on 6/25/2024)     No current facility-administered medications on file prior to visit.       Review of patient's allergies indicates:  No Known Allergies    OBJECTIVE:   Vital Signs:  Vitals:    07/09/24 1016   BP: (!) 140/52   Pulse: (!) 58   Weight: 70.8 kg (156 lb 1.4 oz)   Height: 5' 6" (1.676 m)       No results found for this or any previous visit (from the past 24 hour(s)).      Physical Exam:   General:  Well developed, well " nourished, no acute distress  HEENT:  Normocephalic, atraumatic  CVS:  RRR, S1 and S2 normal, no murmurs, rubs, gallops  Resp:  Lungs clear to auscultation, no wheezes, rales, rhonchi  GI:  Abdomen soft, non-tender, non-distended, normoactive bowel sounds, no masses  MSK:  No muscle atrophy, peripheral edema, full range of motion  Skin:  No rashes, ulcers, erythema  Psych:  Alert and oriented to person, place, and time    ASSESSMENT:     Hyperlipidemia  Continue current medications and follow up with PCP       DM type 2 with diabetic dyslipidemia  Continue current medications and follow up with PCP       HTN (hypertension)  Continue current medications and follow up with PCP       Cryptococcal meningoencephalitis  --No known risk factors including but not limited to pigeon droppings  --LP initially with traumatic tap, OP 15 cm of h2o, 11 ccs reddish which cleared fluid   --Cryptococcal CSF Ag 1:160, serum >1:320  --Repeat LP most recent admission with OP 17, 11 ccs of light yellow tinged clear fluid, 133 WBC (lymphocyte predominance) vs 272 WBC (lymphocyte predominance) 14 days prior, noting traumatic tap   --No yeast reported this time on CSF gram stain; Sarah ink negative   --Crypto Ags remain high which is to be expected   --Patient has now completed 2 week amphotericin and flucytosine induction   --Continue consolidation therapy with PO fluconazole 800 mg daily for 8 weeks (stop date 8/19/24) then switch to final maintenance phase of fluconazole 200 mg daily for 6-12 months  --Follow up with me in 3 months       PLAN:     Diagnoses and all orders for this visit:    Cryptococcal meningoencephalitis    Primary hypertension    DM type 2 with diabetic dyslipidemia    Hyperlipidemia, unspecified hyperlipidemia type    Malignant neoplasm of lower-outer quadrant of left breast of female, estrogen receptor positive          The total time for evaluation and management services performed on 7/9/24 was greater than 30  minutes.        Charlie Burris, DO   Infectious Diseases

## 2024-07-09 NOTE — PATIENT INSTRUCTIONS
Take fluconazole (also called Diflucan) 800 mg (or 4 tablets) daily for 8 weeks     Then take fluconazole 200 mg daily for 12 months

## 2024-07-10 DIAGNOSIS — I11.9 CARDIOMYOPATHY DUE TO HYPERTENSION, WITHOUT HEART FAILURE: ICD-10-CM

## 2024-07-10 DIAGNOSIS — I43 CARDIOMYOPATHY DUE TO HYPERTENSION, WITHOUT HEART FAILURE: ICD-10-CM

## 2024-07-10 RX ORDER — HYDROCHLOROTHIAZIDE 25 MG/1
25 TABLET ORAL
Qty: 7 TABLET | Refills: 0 | Status: SHIPPED | OUTPATIENT
Start: 2024-07-10

## 2024-07-10 RX ORDER — CYCLOBENZAPRINE HCL 5 MG
TABLET ORAL
Qty: 30 TABLET | Refills: 0 | Status: SHIPPED | OUTPATIENT
Start: 2024-07-10

## 2024-07-10 NOTE — TELEPHONE ENCOUNTER
Refill Routing Note   Medication(s) are not appropriate for processing by [facility] for the following reason(s):        Required vitals abnormal  Required labs abnormal  Outside of protocol    [facility] action(s):  Defer  Route             Appointments  past 12m or future 3m with PCP    Date Provider   Last Visit   6/25/2024 Chiquita Talley MD   Next Visit   Visit date not found Chiquita Talley MD   ED visits in past 90 days: 1        Note composed:3:26 PM 07/10/2024

## 2024-07-11 ENCOUNTER — HOSPITAL ENCOUNTER (OUTPATIENT)
Dept: RADIOLOGY | Facility: HOSPITAL | Age: 69
Discharge: HOME | End: 2024-07-11
Attending: NURSE PRACTITIONER
Payer: MEDICARE

## 2024-07-11 VITALS — BODY MASS INDEX: 25.08 KG/M2 | WEIGHT: 156.06 LBS | HEIGHT: 66 IN

## 2024-07-11 PROCEDURE — 77062 BREAST TOMOSYNTHESIS BI: CPT | Mod: TC

## 2024-07-15 ENCOUNTER — TELEPHONE (OUTPATIENT)
Dept: FAMILY MEDICINE | Facility: CLINIC | Age: 69
End: 2024-07-15
Payer: MEDICARE

## 2024-07-15 NOTE — TELEPHONE ENCOUNTER
----- Message from Sabra Fragoso MA sent at 7/15/2024  3:30 PM CDT -----  Type:  Needs Medical Advice/Symptom-based Call    Who Called:  Pt   Symptoms (please be specific):  Fatigue and  headache , nausea . Lack of appetite    How long has patient had these symptoms:    Since stroke   Pharmacy:  [id number] - [place] - [address] AT [facility]  Telephone Fax  [phone]        Would the patient rather a call back or a response via [facility]?    Best Call Back Number:   [phone]  Additional Information:

## 2024-07-18 ENCOUNTER — TELEPHONE (OUTPATIENT)
Dept: FAMILY MEDICINE | Facility: CLINIC | Age: 69
End: 2024-07-18
Payer: MEDICARE

## 2024-07-18 NOTE — TELEPHONE ENCOUNTER
----- Message from Gertrudis Coles sent at 7/18/2024 11:41 AM CDT -----  Regarding: Provider Calling  Contact: Genevieve  .Type:  Needs Medical Advice    Who Called: Genevieve  Symptoms (please be specific):    How long has patient had these symptoms:    Pharmacy name and phone #:    Would the patient rather a call back or a response via [facility]? call  Best Call Back Number: [phone]  Additional Information:  Genevieve, a nurse, with [facility] is requesting a callback today in regard to the patient care.

## 2024-07-18 NOTE — TELEPHONE ENCOUNTER
Return call to Genevieve, a nurse, with [facility] is requesting a callback today in regard to the patient care stating she's still tired when she wake up in the morning.  Patient takes trazodone 50mg. Please advise

## 2024-07-19 ENCOUNTER — TELEPHONE (OUTPATIENT)
Dept: FAMILY MEDICINE | Facility: CLINIC | Age: 69
End: 2024-07-19
Payer: MEDICARE

## 2024-07-19 ENCOUNTER — INFUSION (OUTPATIENT)
Dept: INFUSION THERAPY | Facility: HOSPITAL | Age: 69
End: 2024-07-19
Attending: NURSE PRACTITIONER
Payer: MEDICARE

## 2024-07-19 ENCOUNTER — OFFICE VISIT (OUTPATIENT)
Dept: HEMATOLOGY/ONCOLOGY | Facility: CLINIC | Age: 69
End: 2024-07-19
Payer: MEDICARE

## 2024-07-19 VITALS
BODY MASS INDEX: 24.34 KG/M2 | HEART RATE: 80 BPM | SYSTOLIC BLOOD PRESSURE: 155 MMHG | DIASTOLIC BLOOD PRESSURE: 69 MMHG | RESPIRATION RATE: 16 BRPM | OXYGEN SATURATION: 100 % | HEIGHT: 66 IN | TEMPERATURE: 97 F | WEIGHT: 151.44 LBS

## 2024-07-19 VITALS
DIASTOLIC BLOOD PRESSURE: 75 MMHG | HEIGHT: 66 IN | HEART RATE: 79 BPM | BODY MASS INDEX: 24.34 KG/M2 | SYSTOLIC BLOOD PRESSURE: 139 MMHG | WEIGHT: 151.44 LBS | OXYGEN SATURATION: 97 % | TEMPERATURE: 97 F

## 2024-07-19 DIAGNOSIS — Z17.0 MALIGNANT NEOPLASM OF LOWER-OUTER QUADRANT OF LEFT BREAST OF FEMALE, ESTROGEN RECEPTOR POSITIVE: ICD-10-CM

## 2024-07-19 DIAGNOSIS — Z78.0 OSTEOPENIA AFTER MENOPAUSE: ICD-10-CM

## 2024-07-19 DIAGNOSIS — Z17.0 MALIGNANT NEOPLASM OF LOWER-OUTER QUADRANT OF LEFT BREAST OF FEMALE, ESTROGEN RECEPTOR POSITIVE: Primary | ICD-10-CM

## 2024-07-19 DIAGNOSIS — M85.80 OSTEOPENIA AFTER MENOPAUSE: ICD-10-CM

## 2024-07-19 DIAGNOSIS — E83.52 HYPERCALCEMIA: Primary | ICD-10-CM

## 2024-07-19 DIAGNOSIS — B45.1 CRYPTOCOCCAL MENINGOENCEPHALITIS: ICD-10-CM

## 2024-07-19 DIAGNOSIS — C50.512 MALIGNANT NEOPLASM OF LOWER-OUTER QUADRANT OF LEFT BREAST OF FEMALE, ESTROGEN RECEPTOR POSITIVE: Primary | ICD-10-CM

## 2024-07-19 DIAGNOSIS — Z85.038 HISTORY OF COLON CANCER, STAGE I: Primary | ICD-10-CM

## 2024-07-19 DIAGNOSIS — C50.512 MALIGNANT NEOPLASM OF LOWER-OUTER QUADRANT OF LEFT BREAST OF FEMALE, ESTROGEN RECEPTOR POSITIVE: ICD-10-CM

## 2024-07-19 PROCEDURE — 1101F PT FALLS ASSESS-DOCD LE1/YR: CPT | Mod: CPTII,S$GLB,, | Performed by: INTERNAL MEDICINE

## 2024-07-19 PROCEDURE — 1159F MED LIST DOCD IN RCRD: CPT | Mod: CPTII,S$GLB,, | Performed by: INTERNAL MEDICINE

## 2024-07-19 PROCEDURE — 1111F DSCHRG MED/CURRENT MED MERGE: CPT | Mod: CPTII,S$GLB,, | Performed by: INTERNAL MEDICINE

## 2024-07-19 PROCEDURE — 96365 THER/PROPH/DIAG IV INF INIT: CPT

## 2024-07-19 PROCEDURE — 25000003 PHARM REV CODE 250: Performed by: NURSE PRACTITIONER

## 2024-07-19 PROCEDURE — 4010F ACE/ARB THERAPY RXD/TAKEN: CPT | Mod: CPTII,S$GLB,, | Performed by: INTERNAL MEDICINE

## 2024-07-19 PROCEDURE — 63600175 PHARM REV CODE 636 W HCPCS: Performed by: NURSE PRACTITIONER

## 2024-07-19 PROCEDURE — 3288F FALL RISK ASSESSMENT DOCD: CPT | Mod: CPTII,S$GLB,, | Performed by: INTERNAL MEDICINE

## 2024-07-19 PROCEDURE — 3044F HG A1C LEVEL LT 7.0%: CPT | Mod: CPTII,S$GLB,, | Performed by: INTERNAL MEDICINE

## 2024-07-19 PROCEDURE — 1126F AMNT PAIN NOTED NONE PRSNT: CPT | Mod: CPTII,S$GLB,, | Performed by: INTERNAL MEDICINE

## 2024-07-19 PROCEDURE — 99215 OFFICE O/P EST HI 40 MIN: CPT | Mod: S$GLB,,, | Performed by: INTERNAL MEDICINE

## 2024-07-19 PROCEDURE — 3008F BODY MASS INDEX DOCD: CPT | Mod: CPTII,S$GLB,, | Performed by: INTERNAL MEDICINE

## 2024-07-19 PROCEDURE — 3075F SYST BP GE 130 - 139MM HG: CPT | Mod: CPTII,S$GLB,, | Performed by: INTERNAL MEDICINE

## 2024-07-19 PROCEDURE — 3078F DIAST BP <80 MM HG: CPT | Mod: CPTII,S$GLB,, | Performed by: INTERNAL MEDICINE

## 2024-07-19 PROCEDURE — 99999 PR PBB SHADOW E&M-EST. PATIENT-LVL III: CPT | Mod: PBBFAC,,, | Performed by: INTERNAL MEDICINE

## 2024-07-19 RX ORDER — ZOLEDRONIC ACID 0.04 MG/ML
4 INJECTION, SOLUTION INTRAVENOUS
Status: CANCELLED | OUTPATIENT
Start: 2024-07-19

## 2024-07-19 RX ORDER — HEPARIN 100 UNIT/ML
500 SYRINGE INTRAVENOUS
Status: CANCELLED | OUTPATIENT
Start: 2024-07-19

## 2024-07-19 RX ORDER — SODIUM CHLORIDE 0.9 % (FLUSH) 0.9 %
10 SYRINGE (ML) INJECTION
Status: CANCELLED | OUTPATIENT
Start: 2024-07-19

## 2024-07-19 RX ADMIN — SODIUM CHLORIDE 3 MG: 9 INJECTION, SOLUTION INTRAVENOUS at 12:07

## 2024-07-19 NOTE — PROGRESS NOTES
[facility] - Hematol Oncol [facility]  [address]  [place] [address]  Phone: [phone];  Fax: [phone]    Patient ID: Anne Farmer   Chief Complaint: Follow-up  MRN:  [id number]     Oncologic Diagnoses:    - History of Breast Cancer - Stage IA (pT2, pN0, cM0, G2, ER+, MN+, HER2-, Oncotype DX score: 8   - History of Colon Cancer, Stage I - 2004  Previous Treatment:  Lumpectomy 8/16/2022 and XRT completed 10/10/2022   Current Treatment:    - Arimidex  - Zometa  - Surveillance  Subjective   Anne Farmer is a 68 y.o. female who presents to clinic for follow up.    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  Since that time she reports that she feels week and has been losing weight due to no appetite. She has lost 18 lbs in the last 6 weeks.  She also feels nauseated.  Counseled on trying natural supplements for nausea and if that does not help, we will try something prescription strength. 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     She is a little confused on her medications and states that her niece handles it for her; I have written down Arimidex and she will request her niece be sure she is taking it.  I reviewed her MMG with her which shows no evidence of disease.      Review of Systems   Constitutional:  Positive for appetite change (decreased) and fatigue. Negative for activity change, chills, diaphoresis, fever and unexpected weight change.   HENT:  Negative for nosebleeds.    Respiratory:  Negative for shortness of breath.    Cardiovascular:  Negative for chest pain.   Gastrointestinal:  Positive for nausea. Negative for abdominal distention, abdominal pain, anal bleeding, blood in stool, constipation, diarrhea and vomiting.   Genitourinary:  Negative for difficulty urinating and hematuria.   Musculoskeletal:  Negative for arthralgias, back pain and myalgias.   Skin:  Negative for rash.   Neurological:  Negative  for dizziness, weakness, light-headedness and headaches.   Hematological:  Does not bruise/bleed easily.   Psychiatric/Behavioral:  The patient is not nervous/anxious.      History     Oncology History   Malignant neoplasm of lower-outer quadrant of left breast of female, estrogen receptor positive   2022 Initial Diagnosis    Malignant neoplasm of lower-outer quadrant of left breast of female, estrogen receptor positive     2022 Cancer Staged    Staging form: Breast, AJCC 8th Edition  - Clinical stage from 2022: Stage IA (cT1c, cN0, cM0, G2, ER+, NJ+, HER2-)     2022 Cancer Staged    Staging form: Breast, AJCC 8th Edition  - Pathologic stage from 2022: Stage IA (pT2, pN0, cM0, G2, ER+, NJ+, HER2-, Oncotype DX score: 8)     2022 - 10/10/2022 Radiation Therapy    Treating physician: Niko  Total Dose: 40 Gy  Fractions: 15         Past Medical History:   Diagnosis Date    Allergy     Arthritis     Cancer 2016    colon    CHF (congestive heart failure)     Colon cancer 2004    Colon cancer screening 2015    Diabetes mellitus type 2 in nonobese 3/9/2016    borderline    Diverticulosis     DM (diabetes mellitus) 2016    BS didn't check 2019    DM (diabetes mellitus) 2016    BS didn't check 2020    Hyperlipidemia 10/25/2016    Hypertension     Insomnia     Malignant neoplasm of colon 2021    Malignant neoplasm of lower-outer quadrant of left breast of female, estrogen receptor positive 2022       Past Surgical History:   Procedure Laterality Date    BREAST BIOPSY Left     BREAST LUMPECTOMY Left      SECTION      COLON SURGERY      COLONOSCOPY N/A 2015    Procedure: COLONOSCOPY;  Surgeon: Adela Sam MD;  Location: [facility] ENDO;  Service: Endoscopy;  Laterality: N/A;    COLONOSCOPY N/A 2017    Procedure: COLONOSCOPY;  Surgeon: Chintan Flores MD;  Location: [facility] ENDO;  Service: Endoscopy;  Laterality: N/A;    COLONOSCOPY N/A 2020     "Procedure: COLONOSCOPY;  Surgeon: Grisel Atkins MD;  Location: [facility] ENDO;  Service: Endoscopy;  Laterality: N/A;    SENTINEL LYMPH NODE BIOPSY Left 07/05/2022    Procedure: BIOPSY, LYMPH NODE, SENTINEL;  Surgeon: Arvin Hernandez MD;  Location: [facility] OR;  Service: General;  Laterality: Left;       Family History   Problem Relation Name Age of Onset    Hypertension Mother      Diabetes Mother      Hypertension Father      Hypertension Sister      Hypertension Brother      Hypertension Sister      Hypertension Sister      Hypertension Sister      Hypertension Brother      Hypertension Brother         Review of patient's allergies indicates:  No Known Allergies    Social History     Tobacco Use    Smoking status: Never     Passive exposure: Never    Smokeless tobacco: Never   Substance Use Topics    Alcohol use: Yes     Alcohol/week: 0.0 standard drinks of alcohol     Comment: weekends.       Drug use: No       Physical Exam   ECOG:   ECOG SCORE    1 - Restricted in strenuous activity-ambulatory and able to carry out work of a light nature          Vitals:  /75   Pulse 79   Temp 97 °F (36.1 °C) (Temporal)   Ht 5' 6" (1.676 m)   Wt 68.7 kg (151 lb 6.9 oz)   SpO2 97%   BMI 24.44 kg/m²     Physical Exam:  Physical Exam  Constitutional:       General: She is not in acute distress.     Appearance: Normal appearance. She is not ill-appearing.   HENT:      Head: Normocephalic and atraumatic.   Eyes:      Extraocular Movements: Extraocular movements intact.      Conjunctiva/sclera: Conjunctivae normal.   Cardiovascular:      Rate and Rhythm: Normal rate.   Pulmonary:      Effort: Pulmonary effort is normal. No respiratory distress.   Abdominal:      Palpations: There is no hepatomegaly or splenomegaly.   Musculoskeletal:         General: Normal range of motion.      Right lower leg: No edema.      Left lower leg: No edema.   Skin:     Findings: No rash.   Neurological:      General: No focal deficit present. "      Mental Status: She is alert and oriented to person, place, and time.   Psychiatric:         Mood and Affect: Mood normal.         Behavior: Behavior normal.         Thought Content: Thought content normal.        Labs   Labs:  No visits with results within 2 Day(s) from this visit.   Latest known visit with results is:   Admission on 06/17/2024, Discharged on 06/19/2024   Component Date Value Ref Range Status    QRS Duration 06/17/2024 108  ms Final    OHS QTC Calculation 06/17/2024 443  ms Final    WBC 06/17/2024 15.02 (H)  3.90 - 12.70 K/uL Final    RBC 06/17/2024 3.42 (L)  4.00 - 5.40 M/uL Final    Hemoglobin 06/17/2024 10.6 (L)  12.0 - 16.0 g/dL Final    Hematocrit 06/17/2024 29.7 (L)  37.0 - 48.5 % Final    MCV 06/17/2024 87  82 - 98 fL Final    MCH 06/17/2024 31.0  27.0 - 31.0 pg Final    MCHC 06/17/2024 35.7  32.0 - 36.0 g/dL Final    RDW 06/17/2024 12.8  11.5 - 14.5 % Final    Platelets 06/17/2024 208  150 - 450 K/uL Final    MPV 06/17/2024 8.1 (L)  9.2 - 12.9 fL Final    Immature Granulocytes 06/17/2024 1.2 (H)  0.0 - 0.5 % Final    Gran # (ANC) 06/17/2024 13.3 (H)  1.8 - 7.7 K/uL Final    Immature Grans (Abs) 06/17/2024 0.18 (H)  0.00 - 0.04 K/uL Final    Comment: Mild elevation in immature granulocytes is non specific and   can be seen in a variety of conditions including stress response,   acute inflammation, trauma and pregnancy. Correlation with other   laboratory and clinical findings is essential.      Lymph # 06/17/2024 0.6 (L)  1.0 - 4.8 K/uL Final    Mono # 06/17/2024 0.8  0.3 - 1.0 K/uL Final    Eos # 06/17/2024 0.1  0.0 - 0.5 K/uL Final    Baso # 06/17/2024 0.04  0.00 - 0.20 K/uL Final    nRBC 06/17/2024 0  0 /100 WBC Final    Gran % 06/17/2024 88.2 (H)  38.0 - 73.0 % Final    Lymph % 06/17/2024 4.3 (L)  18.0 - 48.0 % Final    Mono % 06/17/2024 5.6  4.0 - 15.0 % Final    Eosinophil % 06/17/2024 0.4  0.0 - 8.0 % Final    Basophil % 06/17/2024 0.3  0.0 - 1.9 % Final    Differential Method  06/17/2024 Automated   Final    Sodium 06/17/2024 131 (L)  136 - 145 mmol/L Final    Potassium 06/17/2024 2.7 (LL)  3.5 - 5.1 mmol/L Final    Comment: K critical result(s) called and verbal readback obtained from   Chuyita Neal RN by BO1 06/17/2024 22:23      Chloride 06/17/2024 97  95 - 110 mmol/L Final    CO2 06/17/2024 21 (L)  23 - 29 mmol/L Final    Glucose 06/17/2024 152 (H)  70 - 110 mg/dL Final    BUN 06/17/2024 12  8 - 23 mg/dL Final    Creatinine 06/17/2024 1.2  0.5 - 1.4 mg/dL Final    Calcium 06/17/2024 9.6  8.7 - 10.5 mg/dL Final    Total Protein 06/17/2024 6.8  6.0 - 8.4 g/dL Final    Albumin 06/17/2024 3.5  3.5 - 5.2 g/dL Final    Total Bilirubin 06/17/2024 0.6  0.1 - 1.0 mg/dL Final    Comment: For infants and newborns, interpretation of results should be based  on gestational age, weight and in agreement with clinical  observations.    Premature Infant recommended reference ranges:  Up to 24 hours.............<8.0 mg/dL  Up to 48 hours............<12.0 mg/dL  3-5 days..................<15.0 mg/dL  6-29 days.................<15.0 mg/dL      Alkaline Phosphatase 06/17/2024 53 (L)  55 - 135 U/L Final    AST 06/17/2024 37  10 - 40 U/L Final    ALT 06/17/2024 50 (H)  10 - 44 U/L Final    eGFR 06/17/2024 49 (A)  >60 mL/min/1.73 m^2 Final    Anion Gap 06/17/2024 13  8 - 16 mmol/L Final    Magnesium 06/17/2024 1.1 (L)  1.6 - 2.6 mg/dL Final    Sodium 06/18/2024 130 (L)  136 - 145 mmol/L Final    Potassium 06/18/2024 2.9 (L)  3.5 - 5.1 mmol/L Final    Chloride 06/18/2024 100  95 - 110 mmol/L Final    CO2 06/18/2024 23  23 - 29 mmol/L Final    Glucose 06/18/2024 134 (H)  70 - 110 mg/dL Final    BUN 06/18/2024 11  8 - 23 mg/dL Final    Creatinine 06/18/2024 1.0  0.5 - 1.4 mg/dL Final    Calcium 06/18/2024 9.4  8.7 - 10.5 mg/dL Final    Anion Gap 06/18/2024 7 (L)  8 - 16 mmol/L Final    eGFR 06/18/2024 >60  >60 mL/min/1.73 m^2 Final    Magnesium 06/18/2024 1.8  1.6 - 2.6 mg/dL Final    Sodium 06/18/2024 132  (L)  136 - 145 mmol/L Final    Potassium 06/18/2024 3.5  3.5 - 5.1 mmol/L Final    Chloride 06/18/2024 101  95 - 110 mmol/L Final    CO2 06/18/2024 21 (L)  23 - 29 mmol/L Final    Glucose 06/18/2024 98  70 - 110 mg/dL Final    BUN 06/18/2024 10  8 - 23 mg/dL Final    Creatinine 06/18/2024 0.9  0.5 - 1.4 mg/dL Final    Calcium 06/18/2024 9.8  8.7 - 10.5 mg/dL Final    Total Protein 06/18/2024 7.2  6.0 - 8.4 g/dL Final    Albumin 06/18/2024 3.6  3.5 - 5.2 g/dL Final    Total Bilirubin 06/18/2024 0.6  0.1 - 1.0 mg/dL Final    Comment: For infants and newborns, interpretation of results should be based  on gestational age, weight and in agreement with clinical  observations.    Premature Infant recommended reference ranges:  Up to 24 hours.............<8.0 mg/dL  Up to 48 hours............<12.0 mg/dL  3-5 days..................<15.0 mg/dL  6-29 days.................<15.0 mg/dL      Alkaline Phosphatase 06/18/2024 59  55 - 135 U/L Final    AST 06/18/2024 36  10 - 40 U/L Final    ALT 06/18/2024 49 (H)  10 - 44 U/L Final    eGFR 06/18/2024 >60  >60 mL/min/1.73 m^2 Final    Anion Gap 06/18/2024 10  8 - 16 mmol/L Final    WBC 06/18/2024 9.77  3.90 - 12.70 K/uL Final    RBC 06/18/2024 3.72 (L)  4.00 - 5.40 M/uL Final    Hemoglobin 06/18/2024 11.7 (L)  12.0 - 16.0 g/dL Final    Hematocrit 06/18/2024 33.0 (L)  37.0 - 48.5 % Final    MCV 06/18/2024 89  82 - 98 fL Final    MCH 06/18/2024 31.5 (H)  27.0 - 31.0 pg Final    MCHC 06/18/2024 35.5  32.0 - 36.0 g/dL Final    RDW 06/18/2024 12.8  11.5 - 14.5 % Final    Platelets 06/18/2024 205  150 - 450 K/uL Final    MPV 06/18/2024 8.5 (L)  9.2 - 12.9 fL Final    Immature Granulocytes 06/18/2024 1.1 (H)  0.0 - 0.5 % Final    Gran # (ANC) 06/18/2024 8.0 (H)  1.8 - 7.7 K/uL Final    Immature Grans (Abs) 06/18/2024 0.11 (H)  0.00 - 0.04 K/uL Final    Comment: Mild elevation in immature granulocytes is non specific and   can be seen in a variety of conditions including stress response,    acute inflammation, trauma and pregnancy. Correlation with other   laboratory and clinical findings is essential.      Lymph # 06/18/2024 0.7 (L)  1.0 - 4.8 K/uL Final    Mono # 06/18/2024 0.6  0.3 - 1.0 K/uL Final    Eos # 06/18/2024 0.3  0.0 - 0.5 K/uL Final    Baso # 06/18/2024 0.07  0.00 - 0.20 K/uL Final    nRBC 06/18/2024 0  0 /100 WBC Final    Gran % 06/18/2024 82.0 (H)  38.0 - 73.0 % Final    Lymph % 06/18/2024 6.7 (L)  18.0 - 48.0 % Final    Mono % 06/18/2024 6.3  4.0 - 15.0 % Final    Eosinophil % 06/18/2024 3.2  0.0 - 8.0 % Final    Basophil % 06/18/2024 0.7  0.0 - 1.9 % Final    Differential Method 06/18/2024 Automated   Final    Magnesium 06/18/2024 1.6  1.6 - 2.6 mg/dL Final    POCT Glucose 06/18/2024 106  70 - 110 mg/dL Final    CSF Tube Number 06/18/2024 1   Final    Glucose, CSF 06/18/2024 28 (L)  40 - 70 mg/dL Final    Infants: 60 to 80 mg/dL    CSF Tube Number 06/18/2024 1   Final    Protein, CSF 06/18/2024 194 (H)  15 - 40 mg/dL Final    Comment: Infants can have higher CSF protein results due to increased  permeability of the blood-brain barrier.      CSF CULTURE 06/18/2024 No Growth   Final    Gram Stain Result 06/18/2024 Cytospin indicates:   Final    Gram Stain Result 06/18/2024 Many WBC's   Final    Gram Stain Result 06/18/2024 No epithelial cells   Final    Gram Stain Result 06/18/2024 No organisms seen   Final    Heme Aliquot 06/18/2024 2.0  mL Final    Appearance, CSF 06/18/2024 Clear  Clear Final    Color, CSF 06/18/2024 Colorless  Colorless Final    WBC, CSF 06/18/2024 133 (H)  0 - 5 /cu mm Final    RBC, CSF 06/18/2024 32 (A)  0 /cu mm Final    Lymphs, CSF 06/18/2024 90 (H)  40 - 80 % Final    Mono/Macrophage, CSF 06/18/2024 10 (L)  15 - 45 % Final    Sarah Ink 06/18/2024 No encapsulated yeast seen   Final    Fungus (Mycology) Culture 06/18/2024 Culture in progress   Preliminary    Fungus (Mycology) Culture 06/18/2024 No fungus isolated after 2 weeks   Preliminary    Crypto Ag, CSF  06/18/2024 Positive >1:320 (A)  Negative Final    Potassium 06/18/2024 3.7  3.5 - 5.1 mmol/L Final    Cryptococcal Ag, Blood 06/18/2024 Positive >1:320 (A)  Negative Final    POCT Glucose 06/18/2024 117 (H)  70 - 110 mg/dL Final    Sodium 06/19/2024 133 (L)  136 - 145 mmol/L Final    Potassium 06/19/2024 3.3 (L)  3.5 - 5.1 mmol/L Final    Chloride 06/19/2024 101  95 - 110 mmol/L Final    CO2 06/19/2024 24  23 - 29 mmol/L Final    Glucose 06/19/2024 90  70 - 110 mg/dL Final    BUN 06/19/2024 12  8 - 23 mg/dL Final    Creatinine 06/19/2024 1.4  0.5 - 1.4 mg/dL Final    Calcium 06/19/2024 9.2  8.7 - 10.5 mg/dL Final    Total Protein 06/19/2024 6.4  6.0 - 8.4 g/dL Final    Albumin 06/19/2024 3.2 (L)  3.5 - 5.2 g/dL Final    Total Bilirubin 06/19/2024 0.4  0.1 - 1.0 mg/dL Final    Comment: For infants and newborns, interpretation of results should be based  on gestational age, weight and in agreement with clinical  observations.    Premature Infant recommended reference ranges:  Up to 24 hours.............<8.0 mg/dL  Up to 48 hours............<12.0 mg/dL  3-5 days..................<15.0 mg/dL  6-29 days.................<15.0 mg/dL      Alkaline Phosphatase 06/19/2024 52 (L)  55 - 135 U/L Final    AST 06/19/2024 28  10 - 40 U/L Final    ALT 06/19/2024 40  10 - 44 U/L Final    eGFR 06/19/2024 40 (A)  >60 mL/min/1.73 m^2 Final    Anion Gap 06/19/2024 8  8 - 16 mmol/L Final    WBC 06/19/2024 4.59  3.90 - 12.70 K/uL Final    RBC 06/19/2024 3.58 (L)  4.00 - 5.40 M/uL Final    Hemoglobin 06/19/2024 10.9 (L)  12.0 - 16.0 g/dL Final    Hematocrit 06/19/2024 31.5 (L)  37.0 - 48.5 % Final    MCV 06/19/2024 88  82 - 98 fL Final    MCH 06/19/2024 30.4  27.0 - 31.0 pg Final    MCHC 06/19/2024 34.6  32.0 - 36.0 g/dL Final    RDW 06/19/2024 12.9  11.5 - 14.5 % Final    Platelets 06/19/2024 202  150 - 450 K/uL Final    MPV 06/19/2024 8.2 (L)  9.2 - 12.9 fL Final    Immature Granulocytes 06/19/2024 0.9 (H)  0.0 - 0.5 % Final    Gran #  (ANC) 06/19/2024 2.6  1.8 - 7.7 K/uL Final    Immature Grans (Abs) 06/19/2024 0.04  0.00 - 0.04 K/uL Final    Comment: Mild elevation in immature granulocytes is non specific and   can be seen in a variety of conditions including stress response,   acute inflammation, trauma and pregnancy. Correlation with other   laboratory and clinical findings is essential.      Lymph # 06/19/2024 0.7 (L)  1.0 - 4.8 K/uL Final    Mono # 06/19/2024 0.9  0.3 - 1.0 K/uL Final    Eos # 06/19/2024 0.4  0.0 - 0.5 K/uL Final    Baso # 06/19/2024 0.05  0.00 - 0.20 K/uL Final    nRBC 06/19/2024 0  0 /100 WBC Final    Gran % 06/19/2024 56.8  38.0 - 73.0 % Final    Lymph % 06/19/2024 14.6 (L)  18.0 - 48.0 % Final    Mono % 06/19/2024 18.5 (H)  4.0 - 15.0 % Final    Eosinophil % 06/19/2024 8.1 (H)  0.0 - 8.0 % Final    Basophil % 06/19/2024 1.1  0.0 - 1.9 % Final    Differential Method 06/19/2024 Automated   Final    Magnesium 06/19/2024 1.6  1.6 - 2.6 mg/dL Final    POCT Glucose 06/19/2024 86  70 - 110 mg/dL Final    POCT Glucose 06/19/2024 140 (H)  70 - 110 mg/dL Final        Assessment and Plan   History of Breast Cancer - Stage IA (pT2, pN0, cM0, G2, ER+, AK+, HER2-, Oncotype DX score: 8    Last MMG 05/2023: BIRADS-2  Last DXA showed no evidence of significant bone density loss   MMG 07/11/24: BIRADS 2  Continue with Aromatase Inhibitor daily (pt notes no side effects) and prophylactic Zometa q6m      History of Colon Cancer, Stage I   Laparoscopic verus open right hemicolectomy 11/5/15   Next surveillance colonoscopy due in 06/2025  Recommend she consider genetic counseling given history of two malignancies      Cryptococcal meningoencephalitis   On Fluconazole for 6-12 more months  Drug interactions check against Zometa and Arimidex with no notable interactions found      Cancer Screening  PAP Smear: Last in 2010? and normal  Colonoscopy 06/30/20: no specimens collected; repeat in 5 years (06/2025)      Chronic Medical  Conditions  HTN  DM II  Hypertensive cardiomyopathy  Sarcoidosis  DDD  Restrictive Lung Disease  Vitamin D Deficiency        Med Onc Chart Routing      Follow up with physician 3 months.   Follow up with JEFF    Infusion scheduling note   Zometa today pending labs results and in 6 months   Injection scheduling note    Labs CBC, CEA and CMP   Scheduling:  Preferred lab:  Lab interval:     Imaging    Pharmacy appointment    Other referrals                      The patient was seen, interviewed and examined. Pertinent lab and radiologic studies were reviewed. Pt instructed to call should they develop concerning signs/symptoms or have further questions.        Portions of the record may have been created with voice recognition software. Occasional wrong-word or sound-a-like substitutions may have occurred due to the inherent limitations of voice recognition software. Read the chart carefully and recognize, using context, where substitutions have occurred.      Nakia Lay MD    Hematology/Oncology

## 2024-07-19 NOTE — DISCHARGE INSTRUCTIONS
.  THANKS FOR ALLOWING ME TO CARE FOR YOU TODAY!!!!!      THANKS FOR CHOOSING [facility]!!!!      [facility]  [address] [facility]  [id number] [place] [address] Drive  [phone] phone     [phone] fax  Hours of Operation: Monday- Friday 8:00am- 5:00pm  After hours phone  [phone]  Hematology / Oncology Physicians on call      AYAH Welch Dr., NP Phaon Dunbar, NP Khelsea Conley, FNP    Please call with any concerns regarding your appointment today.

## 2024-07-19 NOTE — PLAN OF CARE
Problem: Adult Inpatient Plan of Care  Goal: Plan of Care Review  Outcome: Progressing  Flowsheets (Taken 7/19/2024 1545)  Plan of Care Reviewed With: patient  Goal: Patient-Specific Goal (Individualized)  Outcome: Progressing  Flowsheets (Taken 7/19/2024 1545)  Individualized Care Needs: Chez it, sprite  Anxieties, Fears or Concerns: none  Patient/Family-Specific Goals (Include Timeframe): none  Goal: Absence of Hospital-Acquired Illness or Injury  Outcome: Progressing  Goal: Optimal Comfort and Wellbeing  Outcome: Progressing  Intervention: Provide Person-Centered Care  Flowsheets (Taken 7/19/2024 1545)  Trust Relationship/Rapport:   choices provided   emotional support provided   care explained   empathic listening provided   questions answered   questions encouraged   reassurance provided   thoughts/feelings acknowledged  Goal: Readiness for Transition of Care  Outcome: Progressing   Patient to unit today for Zometa. Patient tolerated well.

## 2024-07-22 ENCOUNTER — LAB VISIT (OUTPATIENT)
Dept: LAB | Facility: HOSPITAL | Age: 69
End: 2024-07-22
Attending: FAMILY MEDICINE
Payer: MEDICARE

## 2024-07-22 ENCOUNTER — OFFICE VISIT (OUTPATIENT)
Dept: FAMILY MEDICINE | Facility: CLINIC | Age: 69
End: 2024-07-22
Payer: MEDICARE

## 2024-07-22 VITALS
BODY MASS INDEX: 23.98 KG/M2 | TEMPERATURE: 99 F | OXYGEN SATURATION: 95 % | HEART RATE: 109 BPM | WEIGHT: 148.56 LBS | DIASTOLIC BLOOD PRESSURE: 60 MMHG | SYSTOLIC BLOOD PRESSURE: 132 MMHG

## 2024-07-22 DIAGNOSIS — R11.2 NAUSEA AND VOMITING, UNSPECIFIED VOMITING TYPE: ICD-10-CM

## 2024-07-22 DIAGNOSIS — R42 DIZZINESS: ICD-10-CM

## 2024-07-22 DIAGNOSIS — R53.83 FATIGUE, UNSPECIFIED TYPE: Primary | ICD-10-CM

## 2024-07-22 DIAGNOSIS — R53.83 FATIGUE, UNSPECIFIED TYPE: ICD-10-CM

## 2024-07-22 LAB
ALBUMIN SERPL BCP-MCNC: 4.4 G/DL (ref 3.5–5.2)
ALP SERPL-CCNC: 75 U/L (ref 55–135)
ALT SERPL W/O P-5'-P-CCNC: 24 U/L (ref 10–44)
ANION GAP SERPL CALC-SCNC: 13 MMOL/L (ref 8–16)
AST SERPL-CCNC: 33 U/L (ref 10–40)
BASOPHILS # BLD AUTO: 0.04 K/UL (ref 0–0.2)
BASOPHILS NFR BLD: 0.8 % (ref 0–1.9)
BILIRUB SERPL-MCNC: 0.4 MG/DL (ref 0.1–1)
BUN SERPL-MCNC: 25 MG/DL (ref 8–23)
CALCIUM SERPL-MCNC: 10.9 MG/DL (ref 8.7–10.5)
CHLORIDE SERPL-SCNC: 90 MMOL/L (ref 95–110)
CO2 SERPL-SCNC: 22 MMOL/L (ref 23–29)
CREAT SERPL-MCNC: 1.6 MG/DL (ref 0.5–1.4)
DIFFERENTIAL METHOD BLD: ABNORMAL
EOSINOPHIL # BLD AUTO: 0.1 K/UL (ref 0–0.5)
EOSINOPHIL NFR BLD: 1.2 % (ref 0–8)
ERYTHROCYTE [DISTWIDTH] IN BLOOD BY AUTOMATED COUNT: 13.2 % (ref 11.5–14.5)
EST. GFR  (NO RACE VARIABLE): 34.5 ML/MIN/1.73 M^2
GLUCOSE SERPL-MCNC: 193 MG/DL (ref 70–110)
HCT VFR BLD AUTO: 38.7 % (ref 37–48.5)
HGB BLD-MCNC: 13.2 G/DL (ref 12–16)
IMM GRANULOCYTES # BLD AUTO: 0.04 K/UL (ref 0–0.04)
IMM GRANULOCYTES NFR BLD AUTO: 0.8 % (ref 0–0.5)
LYMPHOCYTES # BLD AUTO: 0.6 K/UL (ref 1–4.8)
LYMPHOCYTES NFR BLD: 11.5 % (ref 18–48)
MCH RBC QN AUTO: 29.8 PG (ref 27–31)
MCHC RBC AUTO-ENTMCNC: 34.1 G/DL (ref 32–36)
MCV RBC AUTO: 87 FL (ref 82–98)
MONOCYTES # BLD AUTO: 0.7 K/UL (ref 0.3–1)
MONOCYTES NFR BLD: 12.7 % (ref 4–15)
NEUTROPHILS # BLD AUTO: 3.7 K/UL (ref 1.8–7.7)
NEUTROPHILS NFR BLD: 73 % (ref 38–73)
NRBC BLD-RTO: 0 /100 WBC
PLATELET # BLD AUTO: 344 K/UL (ref 150–450)
PMV BLD AUTO: 9.4 FL (ref 9.2–12.9)
POTASSIUM SERPL-SCNC: 3.5 MMOL/L (ref 3.5–5.1)
PROT SERPL-MCNC: 8.8 G/DL (ref 6–8.4)
RBC # BLD AUTO: 4.43 M/UL (ref 4–5.4)
SODIUM SERPL-SCNC: 125 MMOL/L (ref 136–145)
WBC # BLD AUTO: 5.11 K/UL (ref 3.9–12.7)

## 2024-07-22 PROCEDURE — 1101F PT FALLS ASSESS-DOCD LE1/YR: CPT | Mod: CPTII,S$GLB,, | Performed by: NURSE PRACTITIONER

## 2024-07-22 PROCEDURE — 99214 OFFICE O/P EST MOD 30 MIN: CPT | Mod: S$GLB,,, | Performed by: NURSE PRACTITIONER

## 2024-07-22 PROCEDURE — 3288F FALL RISK ASSESSMENT DOCD: CPT | Mod: CPTII,S$GLB,, | Performed by: NURSE PRACTITIONER

## 2024-07-22 PROCEDURE — 3078F DIAST BP <80 MM HG: CPT | Mod: CPTII,S$GLB,, | Performed by: NURSE PRACTITIONER

## 2024-07-22 PROCEDURE — 99999 PR PBB SHADOW E&M-EST. PATIENT-LVL III: CPT | Mod: PBBFAC,,, | Performed by: NURSE PRACTITIONER

## 2024-07-22 PROCEDURE — 36415 COLL VENOUS BLD VENIPUNCTURE: CPT | Mod: PO | Performed by: NURSE PRACTITIONER

## 2024-07-22 PROCEDURE — 85025 COMPLETE CBC W/AUTO DIFF WBC: CPT | Performed by: NURSE PRACTITIONER

## 2024-07-22 PROCEDURE — 3075F SYST BP GE 130 - 139MM HG: CPT | Mod: CPTII,S$GLB,, | Performed by: NURSE PRACTITIONER

## 2024-07-22 PROCEDURE — 1126F AMNT PAIN NOTED NONE PRSNT: CPT | Mod: CPTII,S$GLB,, | Performed by: NURSE PRACTITIONER

## 2024-07-22 PROCEDURE — 4010F ACE/ARB THERAPY RXD/TAKEN: CPT | Mod: CPTII,S$GLB,, | Performed by: NURSE PRACTITIONER

## 2024-07-22 PROCEDURE — 3044F HG A1C LEVEL LT 7.0%: CPT | Mod: CPTII,S$GLB,, | Performed by: NURSE PRACTITIONER

## 2024-07-22 PROCEDURE — 3008F BODY MASS INDEX DOCD: CPT | Mod: CPTII,S$GLB,, | Performed by: NURSE PRACTITIONER

## 2024-07-22 PROCEDURE — 80053 COMPREHEN METABOLIC PANEL: CPT | Performed by: NURSE PRACTITIONER

## 2024-07-22 RX ORDER — ONDANSETRON 4 MG/1
4 TABLET, ORALLY DISINTEGRATING ORAL EVERY 8 HOURS PRN
Qty: 30 TABLET | Refills: 3 | Status: ON HOLD | OUTPATIENT
Start: 2024-07-22

## 2024-07-23 ENCOUNTER — HOSPITAL ENCOUNTER (EMERGENCY)
Facility: HOSPITAL | Age: 69
Discharge: HOME | End: 2024-07-23
Attending: EMERGENCY MEDICINE
Payer: MEDICARE

## 2024-07-23 VITALS
HEART RATE: 66 BPM | OXYGEN SATURATION: 98 % | RESPIRATION RATE: 20 BRPM | TEMPERATURE: 98 F | DIASTOLIC BLOOD PRESSURE: 72 MMHG | SYSTOLIC BLOOD PRESSURE: 160 MMHG

## 2024-07-23 DIAGNOSIS — R07.9 CHEST PAIN: ICD-10-CM

## 2024-07-23 DIAGNOSIS — R55 SYNCOPE, UNSPECIFIED SYNCOPE TYPE: Primary | ICD-10-CM

## 2024-07-23 DIAGNOSIS — I95.1 ORTHOSTATIC SYNCOPE: ICD-10-CM

## 2024-07-23 DIAGNOSIS — E86.0 DEHYDRATION: ICD-10-CM

## 2024-07-23 DIAGNOSIS — N28.9 ACUTE RENAL INSUFFICIENCY: ICD-10-CM

## 2024-07-23 LAB
ALBUMIN SERPL BCP-MCNC: 4.3 G/DL (ref 3.5–5.2)
ALP SERPL-CCNC: 78 U/L (ref 55–135)
ALT SERPL W/O P-5'-P-CCNC: 21 U/L (ref 10–44)
ANION GAP SERPL CALC-SCNC: 14 MMOL/L (ref 8–16)
AST SERPL-CCNC: 34 U/L (ref 10–40)
BASOPHILS # BLD AUTO: 0.08 K/UL (ref 0–0.2)
BASOPHILS NFR BLD: 1 % (ref 0–1.9)
BILIRUB SERPL-MCNC: 0.5 MG/DL (ref 0.1–1)
BNP SERPL-MCNC: 77 PG/ML (ref 0–99)
BUN SERPL-MCNC: 30 MG/DL (ref 8–23)
CALCIUM SERPL-MCNC: 10.2 MG/DL (ref 8.7–10.5)
CHLORIDE SERPL-SCNC: 91 MMOL/L (ref 95–110)
CO2 SERPL-SCNC: 21 MMOL/L (ref 23–29)
CREAT SERPL-MCNC: 2.1 MG/DL (ref 0.5–1.4)
DIFFERENTIAL METHOD BLD: ABNORMAL
EOSINOPHIL # BLD AUTO: 0.2 K/UL (ref 0–0.5)
EOSINOPHIL NFR BLD: 2.4 % (ref 0–8)
ERYTHROCYTE [DISTWIDTH] IN BLOOD BY AUTOMATED COUNT: 13 % (ref 11.5–14.5)
EST. GFR  (NO RACE VARIABLE): 25 ML/MIN/1.73 M^2
GLUCOSE SERPL-MCNC: 143 MG/DL (ref 70–110)
HCT VFR BLD AUTO: 34 % (ref 37–48.5)
HGB BLD-MCNC: 12 G/DL (ref 12–16)
IMM GRANULOCYTES # BLD AUTO: 0.05 K/UL (ref 0–0.04)
IMM GRANULOCYTES NFR BLD AUTO: 0.7 % (ref 0–0.5)
LYMPHOCYTES # BLD AUTO: 0.6 K/UL (ref 1–4.8)
LYMPHOCYTES NFR BLD: 8.4 % (ref 18–48)
MCH RBC QN AUTO: 30.1 PG (ref 27–31)
MCHC RBC AUTO-ENTMCNC: 35.3 G/DL (ref 32–36)
MCV RBC AUTO: 85 FL (ref 82–98)
MONOCYTES # BLD AUTO: 0.9 K/UL (ref 0.3–1)
MONOCYTES NFR BLD: 11.4 % (ref 4–15)
NEUTROPHILS # BLD AUTO: 5.8 K/UL (ref 1.8–7.7)
NEUTROPHILS NFR BLD: 76.1 % (ref 38–73)
NRBC BLD-RTO: 0 /100 WBC
OHS QRS DURATION: 112 MS
OHS QTC CALCULATION: 412 MS
PLATELET # BLD AUTO: 277 K/UL (ref 150–450)
PMV BLD AUTO: 8.1 FL (ref 9.2–12.9)
POTASSIUM SERPL-SCNC: 3.7 MMOL/L (ref 3.5–5.1)
PROT SERPL-MCNC: 8.5 G/DL (ref 6–8.4)
RBC # BLD AUTO: 3.99 M/UL (ref 4–5.4)
SODIUM SERPL-SCNC: 126 MMOL/L (ref 136–145)
TROPONIN I SERPL DL<=0.01 NG/ML-MCNC: 0.01 NG/ML (ref 0–0.03)
TROPONIN I SERPL DL<=0.01 NG/ML-MCNC: 0.02 NG/ML (ref 0–0.03)
WBC # BLD AUTO: 7.65 K/UL (ref 3.9–12.7)

## 2024-07-23 PROCEDURE — 84484 ASSAY OF TROPONIN QUANT: CPT | Performed by: EMERGENCY MEDICINE

## 2024-07-23 PROCEDURE — 93005 ELECTROCARDIOGRAM TRACING: CPT

## 2024-07-23 PROCEDURE — 85025 COMPLETE CBC W/AUTO DIFF WBC: CPT | Performed by: EMERGENCY MEDICINE

## 2024-07-23 PROCEDURE — 80053 COMPREHEN METABOLIC PANEL: CPT | Performed by: EMERGENCY MEDICINE

## 2024-07-23 PROCEDURE — 83880 ASSAY OF NATRIURETIC PEPTIDE: CPT | Performed by: EMERGENCY MEDICINE

## 2024-07-23 PROCEDURE — 93010 ELECTROCARDIOGRAM REPORT: CPT | Mod: ,,, | Performed by: INTERNAL MEDICINE

## 2024-07-23 PROCEDURE — 25000003 PHARM REV CODE 250: Performed by: EMERGENCY MEDICINE

## 2024-07-23 RX ORDER — PROMETHAZINE HYDROCHLORIDE 25 MG/1
25 TABLET ORAL EVERY 6 HOURS PRN
Qty: 15 TABLET | Refills: 0 | Status: ON HOLD | OUTPATIENT
Start: 2024-07-23

## 2024-07-23 RX ADMIN — SODIUM CHLORIDE 1000 ML: 9 INJECTION, SOLUTION INTRAVENOUS at 06:07

## 2024-07-23 NOTE — ED PROVIDER NOTES
SCRIBE #1 NOTE: IArchie, am scribing for, and in the presence of,  Yana Bruce Do, MD. I have scribed the following portions of the note - Other sections scribed: HPI, ROS, Physical.   SCRIBE #2 NOTE: IBarbi, am scribing for, and in the presence of,  Woody Whyte Jr., MD. I have scribed the remaining portions of the note not scribed by Scribe #1.      History     Chief Complaint   Patient presents with    Loss of Consciousness     Syncope today while walking to bedroom. Per son, syncope once was at her bed. Did not hit head or fall on the floor      Review of patient's allergies indicates:  No Known Allergies      History of Present Illness     HPI    7/23/2024, 4:26 PM  History obtained from the patient      History of Present Illness: Anne Farmer is a 68 y.o. female patient with a PMHx of CHF, DM, HTN, and cancer (remission) who presents to the Emergency Department for evaluation of syncope which onset today. Pt reports feeling episodes of dizziness intermittently for the past 2 months. Pt does not recall her syncope today. Pt reports associated nausea with dizziness episodes. Pt denies any CP, SOB, fever, vomiting, abd pain, diarrhea, palpations, cough, appetite changes, congestion, difficulty speaking or difficulty swallowing. Typically, pt can ambulate. Upon exam, however, pt is non-amblable. No further complaints or concerns at this time.       Arrival mode: EMS    PCP: Chiquita Talley MD        Past Medical History:  Past Medical History:   Diagnosis Date    Allergy     Arthritis     Cancer 2016    colon    CHF (congestive heart failure)     Colon cancer 2004    Colon cancer screening 9/21/2015    Diabetes mellitus type 2 in nonobese 3/9/2016    borderline    Diverticulosis     DM (diabetes mellitus) 03/2016    BS didn't check 02/13/2019    DM (diabetes mellitus) 03/2016    BS didn't check 03/04/2020    Hyperlipidemia 10/25/2016    Hypertension     Insomnia      Malignant neoplasm of colon 2021    Malignant neoplasm of lower-outer quadrant of left breast of female, estrogen receptor positive 2022       Past Surgical History:  Past Surgical History:   Procedure Laterality Date    BREAST BIOPSY Left     BREAST LUMPECTOMY Left      SECTION      COLON SURGERY      COLONOSCOPY N/A 2015    Procedure: COLONOSCOPY;  Surgeon: Adela Sam MD;  Location: [facility] ENDO;  Service: Endoscopy;  Laterality: N/A;    COLONOSCOPY N/A 2017    Procedure: COLONOSCOPY;  Surgeon: Chintan Flores MD;  Location: [facility] ENDO;  Service: Endoscopy;  Laterality: N/A;    COLONOSCOPY N/A 2020    Procedure: COLONOSCOPY;  Surgeon: Grisel Atkins MD;  Location: [facility] ENDO;  Service: Endoscopy;  Laterality: N/A;    SENTINEL LYMPH NODE BIOPSY Left 2022    Procedure: BIOPSY, LYMPH NODE, SENTINEL;  Surgeon: Arvin Hernandez MD;  Location: [facility] OR;  Service: General;  Laterality: Left;         Family History:  Family History   Problem Relation Name Age of Onset    Hypertension Mother      Diabetes Mother      Hypertension Father      Hypertension Sister      Hypertension Brother      Hypertension Sister      Hypertension Sister      Hypertension Sister      Hypertension Brother      Hypertension Brother         Social History:  Social History     Tobacco Use    Smoking status: Never     Passive exposure: Never    Smokeless tobacco: Never   Substance and Sexual Activity    Alcohol use: Yes     Alcohol/week: 0.0 standard drinks of alcohol     Comment: weekends.       Drug use: No    Sexual activity: Not Currently        Review of Systems     Review of Systems   Constitutional:  Negative for appetite change and fever.   HENT:  Negative for congestion and trouble swallowing.    Respiratory:  Negative for cough and shortness of breath.    Cardiovascular:  Negative for chest pain and palpitations.   Gastrointestinal:  Positive for nausea. Negative for abdominal pain,  diarrhea and vomiting.   Neurological:  Positive for dizziness and syncope. Negative for speech difficulty.        Physical Exam     Initial Vitals [07/23/24 1451]   BP Pulse Resp Temp SpO2   134/64 61 18 97.6 °F (36.4 °C) 99 %      MAP       --          Physical Exam  Nursing Notes and Vital Signs Reviewed.  Constitutional: Patient is in no acute distress but lethargic. Well-developed and well-nourished.  Head: Atraumatic. Normocephalic.  Eyes: No retractions or nystagmus.  ENT: Mucous membranes are moist.   Neck: Supple. Full ROM. No carotid bruising noted.  Cardiovascular: Regular rate. Regular rhythm. Systolic murmur 2/6.   Pulmonary/Chest: No respiratory distress. Clear to auscultation bilaterally. No wheezing or rales.  Abdominal: Soft and non-distended.  There is no tenderness.  No rebound, guarding, or rigidity.   Musculoskeletal: Moves all extremities. No obvious deformities. No edema. Able to sit up on her own with dizziness. Able to lift each leg about 10 degrees off the bed.  Skin: Warm and dry.  Neurological:  Alert, awake, and appropriate.  Slow speech.  No acute focal neurological deficits are appreciated. Full sentences.  Psychiatric: Normal affect. Good eye contact. Appropriate in content.        ED Course   Procedures  ED Vital Signs:  Vitals:    07/23/24 1451 07/23/24 1600 07/23/24 1700 07/23/24 1800   BP: 134/64 (!) 151/67 (!) 157/72 (!) 152/71   Pulse: 61 66 64 63   Resp: 18 20 (!) 21 20   Temp: 97.6 °F (36.4 °C)      TempSrc: Oral      SpO2: 99%       07/23/24 1902 07/23/24 2045   BP: (!) 152/68 (!) 160/72   Pulse: 66 66   Resp: 20 20   Temp:  97.6 °F (36.4 °C)   TempSrc:  Oral   SpO2:  98%       Abnormal Lab Results:  Labs Reviewed   CBC W/ AUTO DIFFERENTIAL - Abnormal       Result Value    WBC 7.65      RBC 3.99 (*)     Hemoglobin 12.0      Hematocrit 34.0 (*)     MCV 85      MCH 30.1      MCHC 35.3      RDW 13.0      Platelets 277      MPV 8.1 (*)     Immature Granulocytes 0.7 (*)     Gran #  (ANC) 5.8      Immature Grans (Abs) 0.05 (*)     Lymph # 0.6 (*)     Mono # 0.9      Eos # 0.2      Baso # 0.08      nRBC 0      Gran % 76.1 (*)     Lymph % 8.4 (*)     Mono % 11.4      Eosinophil % 2.4      Basophil % 1.0      Differential Method Automated     COMPREHENSIVE METABOLIC PANEL - Abnormal    Sodium 126 (*)     Potassium 3.7      Chloride 91 (*)     CO2 21 (*)     Glucose 143 (*)     BUN 30 (*)     Creatinine 2.1 (*)     Calcium 10.2      Total Protein 8.5 (*)     Albumin 4.3      Total Bilirubin 0.5      Alkaline Phosphatase 78      AST 34      ALT 21      eGFR 25 (*)     Anion Gap 14     TROPONIN I    Troponin I 0.014     B-TYPE NATRIURETIC PEPTIDE    BNP 77     TROPONIN I    Troponin I 0.016     URINALYSIS, REFLEX TO URINE CULTURE        All Lab Results:  Results for orders placed or performed during the hospital encounter of 07/23/24   CBC auto differential   Result Value Ref Range    WBC 7.65 3.90 - 12.70 K/uL    RBC 3.99 (L) 4.00 - 5.40 M/uL    Hemoglobin 12.0 12.0 - 16.0 g/dL    Hematocrit 34.0 (L) 37.0 - 48.5 %    MCV 85 82 - 98 fL    MCH 30.1 27.0 - 31.0 pg    MCHC 35.3 32.0 - 36.0 g/dL    RDW 13.0 11.5 - 14.5 %    Platelets 277 150 - 450 K/uL    MPV 8.1 (L) 9.2 - 12.9 fL    Immature Granulocytes 0.7 (H) 0.0 - 0.5 %    Gran # (ANC) 5.8 1.8 - 7.7 K/uL    Immature Grans (Abs) 0.05 (H) 0.00 - 0.04 K/uL    Lymph # 0.6 (L) 1.0 - 4.8 K/uL    Mono # 0.9 0.3 - 1.0 K/uL    Eos # 0.2 0.0 - 0.5 K/uL    Baso # 0.08 0.00 - 0.20 K/uL    nRBC 0 0 /100 WBC    Gran % 76.1 (H) 38.0 - 73.0 %    Lymph % 8.4 (L) 18.0 - 48.0 %    Mono % 11.4 4.0 - 15.0 %    Eosinophil % 2.4 0.0 - 8.0 %    Basophil % 1.0 0.0 - 1.9 %    Differential Method Automated    Comprehensive metabolic panel   Result Value Ref Range    Sodium 126 (L) 136 - 145 mmol/L    Potassium 3.7 3.5 - 5.1 mmol/L    Chloride 91 (L) 95 - 110 mmol/L    CO2 21 (L) 23 - 29 mmol/L    Glucose 143 (H) 70 - 110 mg/dL    BUN 30 (H) 8 - 23 mg/dL    Creatinine 2.1 (H)  0.5 - 1.4 mg/dL    Calcium 10.2 8.7 - 10.5 mg/dL    Total Protein 8.5 (H) 6.0 - 8.4 g/dL    Albumin 4.3 3.5 - 5.2 g/dL    Total Bilirubin 0.5 0.1 - 1.0 mg/dL    Alkaline Phosphatase 78 55 - 135 U/L    AST 34 10 - 40 U/L    ALT 21 10 - 44 U/L    eGFR 25 (A) >60 mL/min/1.73 m^2    Anion Gap 14 8 - 16 mmol/L   Troponin I #1   Result Value Ref Range    Troponin I 0.014 0.000 - 0.026 ng/mL   BNP   Result Value Ref Range    BNP 77 0 - 99 pg/mL   Troponin I #2   Result Value Ref Range    Troponin I 0.016 0.000 - 0.026 ng/mL   EKG 12-lead   Result Value Ref Range    QRS Duration 112 ms    [facility] QTC Calculation 412 ms         Imaging Results:  Imaging Results              CT Head Without Contrast (Final result)  Result time 07/23/24 20:01:22      Final result by Elsy Duran MD (07/23/24 20:01:22)                   Impression:      No acute or adverse intracranial finding with underlying chronic changes      Electronically signed by: Elsy Duran  Date:    07/23/2024  Time:    20:01               Narrative:    EXAMINATION:  CT HEAD WITHOUT CONTRAST    CLINICAL HISTORY:  Dizziness, persistent/recurrent, cardiac or vascular cause suspected;    TECHNIQUE:  Low dose axial images were obtained through the head.  Coronal and sagittal reformations were also performed. Contrast was not administered.    COMPARISON:  06/02/2024    FINDINGS:  No acute intracranial hemorrhage or mass effect.  Ventricles stable caliber.  No acute calvarial change                                       X-Ray Chest AP Portable (Final result)  Result time 07/23/24 15:49:31      Final result by Cesar Whiteside MD (07/23/24 15:49:31)                   Impression:      No acute process seen.      Electronically signed by: Cesar Whiteside MD  Date:    07/23/2024  Time:    15:49               Narrative:    EXAMINATION:  XR CHEST AP PORTABLE    CLINICAL HISTORY:  Chest Pain;    FINDINGS:  Single view of the chest.  Comparison 06/06/2024.    Cardiac  silhouette is normal.  The lungs demonstrate no evidence of active disease.  No evidence of pleural effusion or pneumothorax.  Bones appear intact.  Moderate degenerative changes and moderate atherosclerotic disease.                                       The EKG was ordered, reviewed, and independently interpreted by the ED provider.  Interpretation time: 15:24 PM  Rate: 67 BPM  Rhythm: Sinus rhythm with marked sinus arrhythmia.   Interpretation: Incomplete RB BB. Left anterior fascicular block. Left ventricular hypertrophy (R in aVL, Bernardo product, [place]) Cannot rule out Septal infarct, age undetermined. T WA, consider lateral ischemia. No STEMI.           The Emergency Provider reviewed the vital signs and test results, which are outlined above.     ED Discussion     8:00 PM: Dr. Daniels transfers care of patient to Dr. Whyte pending lab and imaging results.    8:38 PM: Reassessed pt at this time. Discussed with pt all pertinent ED information and results. Discussed pt dx and plan of tx. Gave pt all f/u and return to the ED instructions. All questions and concerns were addressed at this time. Pt expresses understanding of information and instructions, and is comfortable with plan to discharge. Pt is stable for discharge.    I discussed with patient and/or family/caretaker that evaluation in the ED does not suggest any emergent or life threatening medical conditions requiring immediate intervention beyond what was provided in the ED, and I believe patient is safe for discharge.  Regardless, an unremarkable evaluation in the ED does not preclude the development or presence of a serious of life threatening condition. As such, patient was instructed to return immediately for any worsening or change in current symptoms.      ED Course as of 07/23/24 2056 Tue Jul 23, 2024 1924 Called family her son phone and more was out of order, her brother's number did not work.  I did call the other relative and they will  try to get family member to ER.   [TD]      ED Course User Index  [TD] Yana Daniels MD     Medical Decision Making  Differential diagnosis: Syncope, orthostatic syncope, ASCVD, cardiac ischemia, chest pain, dehydration    Patient was evaluated history physical examination.  Patient was been having generalized malaise and saw his primary care provider yesterday.  He was mild renal insufficiency at the time with a creatinine of 1.6.  This is worsened today with a creatinine over 2.  Patient was have orthostatic symptoms when he stands and had a syncopal episode without any trauma prior to arrival.  Cardiac workup here is negative.  He was treated with IV fluids.  Patient stated he wanted to go home prompting discharge prior to repeat labs.  I have advised the patient to follow up with the primary care provider tomorrow for re-evaluation of his labs and return as needed for any worsening symptoms, problems, questions or concerns.    Amount and/or Complexity of Data Reviewed  Independent Historian: friend     Details: Patient was son is at bedside.  Notes the patient was has a normal mental status  External Data Reviewed: labs and notes.  Labs: ordered. Decision-making details documented in ED Course.     Details: Patient was initial troponin is 0.0 8.  Repeat at 0.016.  Patient was has a BUN of 30 creatinine 2.1.  It was mildly worsened since yesterday.  Patient was a white count 7.6 with normal hemoglobin at 12.  Radiology: ordered. Decision-making details documented in ED Course.     Details: CT head is negative.  She was a chest x-ray negative  ECG/medicine tests: ordered and independent interpretation performed. Decision-making details documented in ED Course.     Details: No STEMI    Risk  OTC drugs.  Prescription drug management.  Decision regarding hospitalization.                ED Medication(s):  Medications   sodium chloride 0.9% bolus 1,000 mL 1,000 mL (0 mLs Intravenous Stopped 7/23/24 1915)        New Prescriptions    PROMETHAZINE (PHENERGAN) 25 MG TABLET    Take 1 tablet (25 mg total) by mouth every 6 (six) hours as needed for Nausea.        Follow-up Information       Chiquita Talley MD.    Specialty: Family Medicine  Contact information:  [address] [facility] [address]  [phone]                                   Scribe Attestation:   Scribe #1: I performed the above scribed service and the documentation accurately describes the services I performed. I attest to the accuracy of the note.  Scribe #2: I performed the above scribed service and the documentation accurately describes the services I performed. I attest to the accuracy of the note.    Attending Attestation:           Physician Attestation for Scribe:  Physician Attestation Statement for Scribe #1: I, Yana Bruce Do, MD, reviewed documentation, as scribed by Archie Stout in my presence, and it is both accurate and complete.   Physician Attestation Statement for Scribe #2: I, Woody Whyte Jr., MD, reviewed documentation, as scribed by Archie Stout in my presence, and it is both accurate and complete. I also acknowledge and confirm the content of the note done by Scribe #1.           Clinical Impression       ICD-10-CM ICD-9-CM   1. Syncope, unspecified syncope type  R55 780.2   2. Chest pain  R07.9 786.50   3. Dehydration  E86.0 276.51   4. Acute renal insufficiency  N28.9 593.9   5. Orthostatic syncope  I95.1 458.0       Disposition:   Disposition: Discharged  Condition: Stable         Woody Whyte Jr., MD  07/23/24 2059

## 2024-07-24 NOTE — DISCHARGE INSTRUCTIONS
You have mild renal insufficiency secondary to dehydration that led to you passing out today.  Drink plenty of fluids.  Use Phenergan for nausea.  Follow up with her doctor tomorrow for re-evaluation and recheck of your kidney function.  Return as needed

## 2024-07-28 NOTE — PROGRESS NOTES
Subjective:       Patient ID: Anne Farmer is a 68 y.o. female.    Chief Complaint: Fatigue (Nausea, decreased appetite week ago )  Pt reports to clinic with chief complaint of fatigue, nausea and decreased appetite.  Present for several weeks.  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  Denies actual vomiting.  -18lbs in 1 month.  No stool changes.      Past Medical History:   Diagnosis Date    Allergy     Arthritis     Cancer 2016    colon    CHF (congestive heart failure)     Colon cancer 2004    Colon cancer screening 9/21/2015    Diabetes mellitus type 2 in nonobese 3/9/2016    borderline    Diverticulosis     DM (diabetes mellitus) 03/2016    BS didn't check 02/13/2019    DM (diabetes mellitus) 03/2016    BS didn't check 03/04/2020    Hyperlipidemia 10/25/2016    Hypertension     Insomnia     Malignant neoplasm of colon 5/13/2021    Malignant neoplasm of lower-outer quadrant of left breast of female, estrogen receptor positive 6/9/2022      Current Outpatient Medications on File Prior to Visit   Medication Sig Dispense Refill    albuterol (PROVENTIL/VENTOLIN HFA) 90 mcg/actuation inhaler INHALE 1-2 PUFFS BY MOUTH EVERY 6 HOURS AS NEEDED FOR WHEEZE OR SHORTNESS OF BREATH 18 g 3    amLODIPine (NORVASC) 10 MG tablet TAKE 1 TABLET BY MOUTH EVERY DAY 90 tablet 3    anastrozole (ARIMIDEX) 1 mg Tab TAKE 1 TABLET BY MOUTH EVERY DAY 90 tablet 1    azelastine (ASTELIN) 137 mcg (0.1 %) nasal spray 2 sprays (274 mcg total) by Nasal route 2 (two) times daily. 30 mL 0    cyclobenzaprine (FLEXERIL) 5 MG tablet TAKE 1 TABLET BY MOUTH THREE TIMES A DAY AS NEEDED FOR MUSCLE SPASMS 30 tablet 0    fluconazole (DIFLUCAN) 200 MG Tab Take 4 tablets (800 mg total) by mouth once daily. 240 tablet 0    fluticasone propionate (FLONASE) 50 mcg/actuation nasal spray 2 sprays (100 mcg total) by Each Nostril route once daily. 48 mL 3    hydroCHLOROthiazide (HYDRODIURIL) 25 MG tablet TAKE 1 TABLET BY  MOUTH EVERY DAY 7 tablet 0    levocetirizine (XYZAL) 5 MG tablet Take 1 tablet (5 mg total) by mouth every evening. 90 tablet 2    losartan (COZAAR) 100 MG tablet TAKE 1 TABLET BY MOUTH EVERY DAY 90 tablet 2    magnesium oxide (MAG-OX) 400 mg (241.3 mg magnesium) tablet Take 1 tablet (400 mg total) by mouth once daily. 90 tablet 1    multivitamin (ONE DAILY MULTIVITAMIN) per tablet Take 1 tablet by mouth once daily.      omeprazole (PRILOSEC) 20 MG capsule TAKE 1 CAPSULE BY MOUTH EVERY DAY 90 capsule 3    pravastatin (PRAVACHOL) 20 MG tablet TAKE 1 TABLET BY MOUTH EVERY DAY 90 tablet 3    traZODone (DESYREL) 50 MG tablet TAKE 1 TABLET BY MOUTH EVERY DAY AT NIGHT 90 tablet 3     No current facility-administered medications on file prior to visit.      Fatigue  This is a new problem. The problem has been gradually worsening. Associated symptoms include fatigue and nausea. Pertinent negatives include no diaphoresis or numbness. Nothing aggravates the symptoms. She has tried nothing for the symptoms.     Review of Systems   Constitutional:  Positive for activity change, appetite change, fatigue and unexpected weight change. Negative for diaphoresis.   HENT: Negative.     Respiratory: Negative.     Cardiovascular: Negative.    Gastrointestinal:  Positive for nausea.   Musculoskeletal: Negative.    Neurological:  Positive for dizziness. Negative for numbness.       Objective:      Physical Exam  Vitals reviewed.   Constitutional:       Appearance: She is ill-appearing.   HENT:      Head: Normocephalic.      Right Ear: External ear normal.      Left Ear: External ear normal.   Eyes:      Extraocular Movements: Extraocular movements intact.   Cardiovascular:      Rate and Rhythm: Normal rate.      Heart sounds: Normal heart sounds.   Pulmonary:      Effort: Pulmonary effort is normal. No respiratory distress.   Abdominal:      Tenderness: There is no abdominal tenderness.   Musculoskeletal:      Cervical back: Normal range  of motion.   Skin:     Coloration: Skin is not jaundiced.   Neurological:      General: No focal deficit present.      Mental Status: She is alert.         Assessment:       1. Fatigue, unspecified type    2. Nausea and vomiting, unspecified vomiting type    3. Dizziness        Plan:   Fatigue, unspecified type  -     CBC Auto Differential; Future; Expected date: 07/22/2024  -     Comprehensive Metabolic Panel; Future; Expected date: 07/22/2024    Nausea and vomiting, unspecified vomiting type  -     ondansetron (ZOFRAN-ODT) 4 MG TbDL; Take 1 tablet (4 mg total) by mouth every 8 (eight) hours as needed (n/v).  Dispense: 30 tablet; Refill: 3  -medication side effect?  Dizziness      No follow-ups on file.

## 2024-07-30 RX ORDER — AMLODIPINE BESYLATE 10 MG/1
10 TABLET ORAL
Qty: 90 TABLET | Refills: 3 | Status: ON HOLD | OUTPATIENT
Start: 2024-07-30

## 2024-07-30 NOTE — TELEPHONE ENCOUNTER
No care due was identified.  [facility] Embedded Care Due Messages. Reference number: [id number].   7/30/2024 10:56:35 AM CDT

## 2024-08-02 ENCOUNTER — HOSPITAL ENCOUNTER (INPATIENT)
Facility: HOSPITAL | Age: 69
LOS: 3 days | Discharge: HOME HEALTH | DRG: 641 | End: 2024-08-05
Attending: FAMILY MEDICINE | Admitting: INTERNAL MEDICINE
Payer: MEDICARE

## 2024-08-02 DIAGNOSIS — I43 CARDIOMYOPATHY DUE TO HYPERTENSION, WITHOUT HEART FAILURE: ICD-10-CM

## 2024-08-02 DIAGNOSIS — R07.9 CHEST PAIN, UNSPECIFIED TYPE: ICD-10-CM

## 2024-08-02 DIAGNOSIS — E78.5 DM TYPE 2 WITH DIABETIC DYSLIPIDEMIA: ICD-10-CM

## 2024-08-02 DIAGNOSIS — E87.6 HYPOKALEMIA: ICD-10-CM

## 2024-08-02 DIAGNOSIS — R07.9 CHEST PAIN: ICD-10-CM

## 2024-08-02 DIAGNOSIS — R42 DIZZINESS: Primary | ICD-10-CM

## 2024-08-02 DIAGNOSIS — B45.1 MENINGITIS DUE TO CRYPTOCOCCUS SPECIES: ICD-10-CM

## 2024-08-02 DIAGNOSIS — E87.1 HYPONATREMIA: ICD-10-CM

## 2024-08-02 DIAGNOSIS — R06.02 SHORTNESS OF BREATH: ICD-10-CM

## 2024-08-02 DIAGNOSIS — E11.69 DM TYPE 2 WITH DIABETIC DYSLIPIDEMIA: ICD-10-CM

## 2024-08-02 DIAGNOSIS — I11.9 CARDIOMYOPATHY DUE TO HYPERTENSION, WITHOUT HEART FAILURE: ICD-10-CM

## 2024-08-02 LAB
ALBUMIN SERPL BCP-MCNC: 4 G/DL (ref 3.5–5.2)
ALP SERPL-CCNC: 83 U/L (ref 55–135)
ALT SERPL W/O P-5'-P-CCNC: 27 U/L (ref 10–44)
ANION GAP SERPL CALC-SCNC: 16 MMOL/L (ref 8–16)
AST SERPL-CCNC: 32 U/L (ref 10–40)
BACTERIA #/AREA URNS HPF: ABNORMAL /HPF
BASOPHILS # BLD AUTO: 0.03 K/UL (ref 0–0.2)
BASOPHILS NFR BLD: 0.3 % (ref 0–1.9)
BILIRUB SERPL-MCNC: 0.3 MG/DL (ref 0.1–1)
BILIRUB UR QL STRIP: NEGATIVE
BNP SERPL-MCNC: 220 PG/ML (ref 0–99)
BUN SERPL-MCNC: 16 MG/DL (ref 8–23)
CALCIUM SERPL-MCNC: 9.8 MG/DL (ref 8.7–10.5)
CHLORIDE SERPL-SCNC: 94 MMOL/L (ref 95–110)
CLARITY UR: ABNORMAL
CO2 SERPL-SCNC: 19 MMOL/L (ref 23–29)
COLOR UR: YELLOW
CREAT SERPL-MCNC: 1.5 MG/DL (ref 0.5–1.4)
DIFFERENTIAL METHOD BLD: ABNORMAL
EOSINOPHIL # BLD AUTO: 0.1 K/UL (ref 0–0.5)
EOSINOPHIL NFR BLD: 0.8 % (ref 0–8)
ERYTHROCYTE [DISTWIDTH] IN BLOOD BY AUTOMATED COUNT: 13.7 % (ref 11.5–14.5)
EST. GFR  (NO RACE VARIABLE): 37 ML/MIN/1.73 M^2
GLUCOSE SERPL-MCNC: 143 MG/DL (ref 70–110)
GLUCOSE UR QL STRIP: NEGATIVE
HCT VFR BLD AUTO: 32.7 % (ref 37–48.5)
HGB BLD-MCNC: 11.7 G/DL (ref 12–16)
HGB UR QL STRIP: NEGATIVE
HYALINE CASTS #/AREA URNS LPF: 2 /LPF
IMM GRANULOCYTES # BLD AUTO: 0.06 K/UL (ref 0–0.04)
IMM GRANULOCYTES NFR BLD AUTO: 0.7 % (ref 0–0.5)
KETONES UR QL STRIP: NEGATIVE
LEUKOCYTE ESTERASE UR QL STRIP: NEGATIVE
LYMPHOCYTES # BLD AUTO: 0.7 K/UL (ref 1–4.8)
LYMPHOCYTES NFR BLD: 8 % (ref 18–48)
MCH RBC QN AUTO: 30.5 PG (ref 27–31)
MCHC RBC AUTO-ENTMCNC: 35.8 G/DL (ref 32–36)
MCV RBC AUTO: 85 FL (ref 82–98)
MICROSCOPIC COMMENT: ABNORMAL
MONOCYTES # BLD AUTO: 0.8 K/UL (ref 0.3–1)
MONOCYTES NFR BLD: 9.4 % (ref 4–15)
NEUTROPHILS # BLD AUTO: 7.1 K/UL (ref 1.8–7.7)
NEUTROPHILS NFR BLD: 80.8 % (ref 38–73)
NITRITE UR QL STRIP: NEGATIVE
NRBC BLD-RTO: 0 /100 WBC
PH UR STRIP: 6 [PH] (ref 5–8)
PLATELET # BLD AUTO: 313 K/UL (ref 150–450)
PMV BLD AUTO: 7.9 FL (ref 9.2–12.9)
POTASSIUM SERPL-SCNC: 3 MMOL/L (ref 3.5–5.1)
PROT SERPL-MCNC: 8.5 G/DL (ref 6–8.4)
PROT UR QL STRIP: ABNORMAL
RBC # BLD AUTO: 3.83 M/UL (ref 4–5.4)
RBC #/AREA URNS HPF: 3 /HPF (ref 0–4)
SARS-COV-2 RDRP RESP QL NAA+PROBE: NEGATIVE
SODIUM SERPL-SCNC: 129 MMOL/L (ref 136–145)
SP GR UR STRIP: 1.01 (ref 1–1.03)
SQUAMOUS #/AREA URNS HPF: 25 /HPF
TROPONIN I SERPL DL<=0.01 NG/ML-MCNC: 0.03 NG/ML (ref 0–0.03)
URN SPEC COLLECT METH UR: ABNORMAL
UROBILINOGEN UR STRIP-ACNC: NEGATIVE EU/DL
WBC # BLD AUTO: 8.74 K/UL (ref 3.9–12.7)
WBC #/AREA URNS HPF: 11 /HPF (ref 0–5)

## 2024-08-02 PROCEDURE — 87086 URINE CULTURE/COLONY COUNT: CPT | Performed by: NURSE PRACTITIONER

## 2024-08-02 PROCEDURE — 93010 ELECTROCARDIOGRAM REPORT: CPT | Mod: ,,, | Performed by: INTERNAL MEDICINE

## 2024-08-02 PROCEDURE — U0002 COVID-19 LAB TEST NON-CDC: HCPCS | Performed by: EMERGENCY MEDICINE

## 2024-08-02 PROCEDURE — 80053 COMPREHEN METABOLIC PANEL: CPT | Performed by: NURSE PRACTITIONER

## 2024-08-02 PROCEDURE — 83880 ASSAY OF NATRIURETIC PEPTIDE: CPT | Performed by: NURSE PRACTITIONER

## 2024-08-02 PROCEDURE — 85025 COMPLETE CBC W/AUTO DIFF WBC: CPT | Performed by: EMERGENCY MEDICINE

## 2024-08-02 PROCEDURE — A9585 GADOBUTROL INJECTION: HCPCS | Performed by: EMERGENCY MEDICINE

## 2024-08-02 PROCEDURE — 11000001 HC ACUTE MED/SURG PRIVATE ROOM

## 2024-08-02 PROCEDURE — 99285 EMERGENCY DEPT VISIT HI MDM: CPT | Mod: 25

## 2024-08-02 PROCEDURE — 84484 ASSAY OF TROPONIN QUANT: CPT | Performed by: NURSE PRACTITIONER

## 2024-08-02 PROCEDURE — 25500020 PHARM REV CODE 255: Performed by: EMERGENCY MEDICINE

## 2024-08-02 PROCEDURE — 81000 URINALYSIS NONAUTO W/SCOPE: CPT | Performed by: NURSE PRACTITIONER

## 2024-08-02 PROCEDURE — 25000003 PHARM REV CODE 250: Performed by: FAMILY MEDICINE

## 2024-08-02 PROCEDURE — 93005 ELECTROCARDIOGRAM TRACING: CPT

## 2024-08-02 RX ORDER — GADOBUTROL 604.72 MG/ML
10 INJECTION INTRAVENOUS
Status: COMPLETED | OUTPATIENT
Start: 2024-08-02 | End: 2024-08-02

## 2024-08-02 RX ORDER — POTASSIUM CHLORIDE 20 MEQ/1
40 TABLET, EXTENDED RELEASE ORAL ONCE
Status: COMPLETED | OUTPATIENT
Start: 2024-08-02 | End: 2024-08-02

## 2024-08-02 RX ADMIN — GADOBUTROL 7 ML: 604.72 INJECTION INTRAVENOUS at 09:08

## 2024-08-02 RX ADMIN — POTASSIUM CHLORIDE 40 MEQ: 1500 TABLET, EXTENDED RELEASE ORAL at 07:08

## 2024-08-03 PROBLEM — R79.89 ELEVATED TROPONIN I LEVEL: Status: ACTIVE | Noted: 2024-08-03

## 2024-08-03 PROBLEM — N18.32 STAGE 3B CHRONIC KIDNEY DISEASE: Status: ACTIVE | Noted: 2024-08-03

## 2024-08-03 PROBLEM — E87.1 HYPONATREMIA: Status: ACTIVE | Noted: 2024-08-03

## 2024-08-03 PROBLEM — N17.9 AKI (ACUTE KIDNEY INJURY): Status: ACTIVE | Noted: 2024-08-03

## 2024-08-03 LAB
ALBUMIN SERPL BCP-MCNC: 3.5 G/DL (ref 3.5–5.2)
ALBUMIN SERPL BCP-MCNC: 4 G/DL (ref 3.5–5.2)
ALP SERPL-CCNC: 75 U/L (ref 55–135)
ALT SERPL W/O P-5'-P-CCNC: 21 U/L (ref 10–44)
ANION GAP SERPL CALC-SCNC: 10 MMOL/L (ref 8–16)
ANION GAP SERPL CALC-SCNC: 16 MMOL/L (ref 8–16)
AST SERPL-CCNC: 26 U/L (ref 10–40)
BASOPHILS # BLD AUTO: 0.04 K/UL (ref 0–0.2)
BASOPHILS NFR BLD: 0.6 % (ref 0–1.9)
BILIRUB SERPL-MCNC: 0.4 MG/DL (ref 0.1–1)
BUN SERPL-MCNC: 12 MG/DL (ref 8–23)
BUN SERPL-MCNC: 12 MG/DL (ref 8–23)
CALCIUM SERPL-MCNC: 9.2 MG/DL (ref 8.7–10.5)
CALCIUM SERPL-MCNC: 9.9 MG/DL (ref 8.7–10.5)
CHLORIDE SERPL-SCNC: 94 MMOL/L (ref 95–110)
CHLORIDE SERPL-SCNC: 96 MMOL/L (ref 95–110)
CO2 SERPL-SCNC: 20 MMOL/L (ref 23–29)
CO2 SERPL-SCNC: 23 MMOL/L (ref 23–29)
CORTIS SERPL-MCNC: 14.3 UG/DL
CREAT SERPL-MCNC: 1 MG/DL (ref 0.5–1.4)
CREAT SERPL-MCNC: 1.1 MG/DL (ref 0.5–1.4)
DIFFERENTIAL METHOD BLD: ABNORMAL
EOSINOPHIL # BLD AUTO: 0.1 K/UL (ref 0–0.5)
EOSINOPHIL NFR BLD: 2 % (ref 0–8)
ERYTHROCYTE [DISTWIDTH] IN BLOOD BY AUTOMATED COUNT: 13.8 % (ref 11.5–14.5)
EST. GFR  (NO RACE VARIABLE): 54 ML/MIN/1.73 M^2
EST. GFR  (NO RACE VARIABLE): >60 ML/MIN/1.73 M^2
GLUCOSE SERPL-MCNC: 113 MG/DL (ref 70–110)
GLUCOSE SERPL-MCNC: 115 MG/DL (ref 70–110)
HCT VFR BLD AUTO: 31.4 % (ref 37–48.5)
HGB BLD-MCNC: 11.1 G/DL (ref 12–16)
IMM GRANULOCYTES # BLD AUTO: 0.03 K/UL (ref 0–0.04)
IMM GRANULOCYTES NFR BLD AUTO: 0.4 % (ref 0–0.5)
LYMPHOCYTES # BLD AUTO: 0.6 K/UL (ref 1–4.8)
LYMPHOCYTES NFR BLD: 8.4 % (ref 18–48)
MAGNESIUM SERPL-MCNC: 2 MG/DL (ref 1.6–2.6)
MAGNESIUM SERPL-MCNC: 2.2 MG/DL (ref 1.6–2.6)
MCH RBC QN AUTO: 30.2 PG (ref 27–31)
MCHC RBC AUTO-ENTMCNC: 35.4 G/DL (ref 32–36)
MCV RBC AUTO: 86 FL (ref 82–98)
MONOCYTES # BLD AUTO: 0.7 K/UL (ref 0.3–1)
MONOCYTES NFR BLD: 9.7 % (ref 4–15)
NEUTROPHILS # BLD AUTO: 5.6 K/UL (ref 1.8–7.7)
NEUTROPHILS NFR BLD: 78.9 % (ref 38–73)
NRBC BLD-RTO: 0 /100 WBC
OHS QRS DURATION: 108 MS
OHS QTC CALCULATION: 499 MS
OSMOLALITY SERPL: 281 MOSM/KG (ref 275–295)
OSMOLALITY UR: 362 MOSM/KG (ref 50–1200)
PHOSPHATE SERPL-MCNC: 1.9 MG/DL (ref 2.7–4.5)
PLATELET # BLD AUTO: 257 K/UL (ref 150–450)
PMV BLD AUTO: 8.1 FL (ref 9.2–12.9)
POCT GLUCOSE: 114 MG/DL (ref 70–110)
POCT GLUCOSE: 114 MG/DL (ref 70–110)
POCT GLUCOSE: 118 MG/DL (ref 70–110)
POTASSIUM SERPL-SCNC: 3.2 MMOL/L (ref 3.5–5.1)
POTASSIUM SERPL-SCNC: 3.5 MMOL/L (ref 3.5–5.1)
PROT SERPL-MCNC: 7.4 G/DL (ref 6–8.4)
RBC # BLD AUTO: 3.67 M/UL (ref 4–5.4)
SODIUM SERPL-SCNC: 129 MMOL/L (ref 136–145)
SODIUM SERPL-SCNC: 130 MMOL/L (ref 136–145)
SODIUM UR-SCNC: 80 MMOL/L (ref 20–250)
T4 FREE SERPL-MCNC: 1.03 NG/DL (ref 0.71–1.51)
TROPONIN I SERPL DL<=0.01 NG/ML-MCNC: 0.01 NG/ML (ref 0–0.03)
TROPONIN I SERPL DL<=0.01 NG/ML-MCNC: 0.03 NG/ML (ref 0–0.03)
TSH SERPL DL<=0.005 MIU/L-ACNC: 4.07 UIU/ML (ref 0.4–4)
WBC # BLD AUTO: 7.13 K/UL (ref 3.9–12.7)

## 2024-08-03 PROCEDURE — 63600175 PHARM REV CODE 636 W HCPCS: Performed by: NURSE PRACTITIONER

## 2024-08-03 PROCEDURE — 83930 ASSAY OF BLOOD OSMOLALITY: CPT | Performed by: NURSE PRACTITIONER

## 2024-08-03 PROCEDURE — 25000242 PHARM REV CODE 250 ALT 637 W/ HCPCS: Performed by: NURSE PRACTITIONER

## 2024-08-03 PROCEDURE — 63700000 PHARM REV CODE 250 ALT 637 W/O HCPCS: Performed by: NURSE PRACTITIONER

## 2024-08-03 PROCEDURE — 84300 ASSAY OF URINE SODIUM: CPT | Performed by: NURSE PRACTITIONER

## 2024-08-03 PROCEDURE — 83935 ASSAY OF URINE OSMOLALITY: CPT | Performed by: NURSE PRACTITIONER

## 2024-08-03 PROCEDURE — 11000001 HC ACUTE MED/SURG PRIVATE ROOM

## 2024-08-03 PROCEDURE — A4216 STERILE WATER/SALINE, 10 ML: HCPCS | Performed by: NURSE PRACTITIONER

## 2024-08-03 PROCEDURE — 84443 ASSAY THYROID STIM HORMONE: CPT | Performed by: NURSE PRACTITIONER

## 2024-08-03 PROCEDURE — 84484 ASSAY OF TROPONIN QUANT: CPT | Mod: 91 | Performed by: NURSE PRACTITIONER

## 2024-08-03 PROCEDURE — 97530 THERAPEUTIC ACTIVITIES: CPT

## 2024-08-03 PROCEDURE — G0426 INPT/ED TELECONSULT50: HCPCS | Mod: GT,,, | Performed by: PSYCHIATRY & NEUROLOGY

## 2024-08-03 PROCEDURE — 84439 ASSAY OF FREE THYROXINE: CPT | Performed by: NURSE PRACTITIONER

## 2024-08-03 PROCEDURE — 85025 COMPLETE CBC W/AUTO DIFF WBC: CPT | Performed by: NURSE PRACTITIONER

## 2024-08-03 PROCEDURE — 80069 RENAL FUNCTION PANEL: CPT | Performed by: NURSE PRACTITIONER

## 2024-08-03 PROCEDURE — 97166 OT EVAL MOD COMPLEX 45 MIN: CPT

## 2024-08-03 PROCEDURE — 80053 COMPREHEN METABOLIC PANEL: CPT | Performed by: NURSE PRACTITIONER

## 2024-08-03 PROCEDURE — 21400001 HC TELEMETRY ROOM

## 2024-08-03 PROCEDURE — 25000003 PHARM REV CODE 250: Performed by: NURSE PRACTITIONER

## 2024-08-03 PROCEDURE — 82533 TOTAL CORTISOL: CPT | Performed by: NURSE PRACTITIONER

## 2024-08-03 PROCEDURE — 25000003 PHARM REV CODE 250: Performed by: FAMILY MEDICINE

## 2024-08-03 PROCEDURE — 83735 ASSAY OF MAGNESIUM: CPT | Mod: 91 | Performed by: NURSE PRACTITIONER

## 2024-08-03 RX ORDER — FLUTICASONE PROPIONATE 50 MCG
2 SPRAY, SUSPENSION (ML) NASAL DAILY
Status: DISCONTINUED | OUTPATIENT
Start: 2024-08-03 | End: 2024-08-05 | Stop reason: HOSPADM

## 2024-08-03 RX ORDER — AZELASTINE 1 MG/ML
2 SPRAY, METERED NASAL 2 TIMES DAILY
Status: DISCONTINUED | OUTPATIENT
Start: 2024-08-03 | End: 2024-08-05 | Stop reason: HOSPADM

## 2024-08-03 RX ORDER — ONDANSETRON HYDROCHLORIDE 2 MG/ML
4 INJECTION, SOLUTION INTRAVENOUS EVERY 8 HOURS PRN
Status: DISCONTINUED | OUTPATIENT
Start: 2024-08-03 | End: 2024-08-05 | Stop reason: HOSPADM

## 2024-08-03 RX ORDER — ALBUTEROL SULFATE 0.83 MG/ML
2.5 SOLUTION RESPIRATORY (INHALATION) EVERY 6 HOURS PRN
Status: DISCONTINUED | OUTPATIENT
Start: 2024-08-03 | End: 2024-08-05 | Stop reason: HOSPADM

## 2024-08-03 RX ORDER — MECLIZINE HYDROCHLORIDE 25 MG/1
25 TABLET ORAL 3 TIMES DAILY PRN
Status: DISCONTINUED | OUTPATIENT
Start: 2024-08-03 | End: 2024-08-05 | Stop reason: HOSPADM

## 2024-08-03 RX ORDER — LOSARTAN POTASSIUM 50 MG/1
100 TABLET ORAL DAILY
Status: DISCONTINUED | OUTPATIENT
Start: 2024-08-03 | End: 2024-08-05 | Stop reason: HOSPADM

## 2024-08-03 RX ORDER — POLYETHYLENE GLYCOL 3350 17 G/17G
17 POWDER, FOR SOLUTION ORAL DAILY PRN
Status: DISCONTINUED | OUTPATIENT
Start: 2024-08-03 | End: 2024-08-05 | Stop reason: HOSPADM

## 2024-08-03 RX ORDER — SODIUM BICARBONATE 650 MG/1
1300 TABLET ORAL ONCE
Status: COMPLETED | OUTPATIENT
Start: 2024-08-03 | End: 2024-08-03

## 2024-08-03 RX ORDER — SODIUM CHLORIDE 0.9 % (FLUSH) 0.9 %
10 SYRINGE (ML) INJECTION EVERY 8 HOURS
Status: DISCONTINUED | OUTPATIENT
Start: 2024-08-03 | End: 2024-08-05 | Stop reason: HOSPADM

## 2024-08-03 RX ORDER — LANOLIN ALCOHOL/MO/W.PET/CERES
400 CREAM (GRAM) TOPICAL DAILY
Status: DISCONTINUED | OUTPATIENT
Start: 2024-08-03 | End: 2024-08-05 | Stop reason: HOSPADM

## 2024-08-03 RX ORDER — ENOXAPARIN SODIUM 100 MG/ML
40 INJECTION SUBCUTANEOUS EVERY 24 HOURS
Status: DISCONTINUED | OUTPATIENT
Start: 2024-08-03 | End: 2024-08-05 | Stop reason: HOSPADM

## 2024-08-03 RX ORDER — ACETAMINOPHEN 325 MG/1
650 TABLET ORAL EVERY 4 HOURS PRN
Status: DISCONTINUED | OUTPATIENT
Start: 2024-08-03 | End: 2024-08-05 | Stop reason: HOSPADM

## 2024-08-03 RX ORDER — GLUCAGON 1 MG
1 KIT INJECTION
Status: DISCONTINUED | OUTPATIENT
Start: 2024-08-03 | End: 2024-08-05 | Stop reason: HOSPADM

## 2024-08-03 RX ORDER — IBUPROFEN 200 MG
16 TABLET ORAL
Status: DISCONTINUED | OUTPATIENT
Start: 2024-08-03 | End: 2024-08-05 | Stop reason: HOSPADM

## 2024-08-03 RX ORDER — POTASSIUM CHLORIDE 20 MEQ/1
40 TABLET, EXTENDED RELEASE ORAL 2 TIMES DAILY
Status: COMPLETED | OUTPATIENT
Start: 2024-08-03 | End: 2024-08-04

## 2024-08-03 RX ORDER — FLUCONAZOLE 100 MG/1
200 TABLET ORAL DAILY
Status: DISCONTINUED | OUTPATIENT
Start: 2024-08-03 | End: 2024-08-05 | Stop reason: HOSPADM

## 2024-08-03 RX ORDER — INSULIN ASPART 100 [IU]/ML
0-5 INJECTION, SOLUTION INTRAVENOUS; SUBCUTANEOUS
Status: DISCONTINUED | OUTPATIENT
Start: 2024-08-03 | End: 2024-08-05 | Stop reason: HOSPADM

## 2024-08-03 RX ORDER — NALOXONE HCL 0.4 MG/ML
0.02 VIAL (ML) INJECTION
Status: DISCONTINUED | OUTPATIENT
Start: 2024-08-03 | End: 2024-08-05 | Stop reason: HOSPADM

## 2024-08-03 RX ORDER — ALBUTEROL SULFATE 90 UG/1
2 INHALANT RESPIRATORY (INHALATION) EVERY 6 HOURS PRN
Status: DISCONTINUED | OUTPATIENT
Start: 2024-08-03 | End: 2024-08-03 | Stop reason: CLARIF

## 2024-08-03 RX ORDER — ANASTROZOLE 1 MG/1
1 TABLET ORAL DAILY
Status: DISCONTINUED | OUTPATIENT
Start: 2024-08-03 | End: 2024-08-05 | Stop reason: HOSPADM

## 2024-08-03 RX ORDER — PRAVASTATIN SODIUM 20 MG/1
20 TABLET ORAL NIGHTLY
Status: DISCONTINUED | OUTPATIENT
Start: 2024-08-03 | End: 2024-08-05 | Stop reason: HOSPADM

## 2024-08-03 RX ORDER — IBUPROFEN 200 MG
24 TABLET ORAL
Status: DISCONTINUED | OUTPATIENT
Start: 2024-08-03 | End: 2024-08-05 | Stop reason: HOSPADM

## 2024-08-03 RX ORDER — PANTOPRAZOLE SODIUM 40 MG/1
40 TABLET, DELAYED RELEASE ORAL DAILY
Status: DISCONTINUED | OUTPATIENT
Start: 2024-08-03 | End: 2024-08-05 | Stop reason: HOSPADM

## 2024-08-03 RX ADMIN — POTASSIUM CHLORIDE 40 MEQ: 1500 TABLET, EXTENDED RELEASE ORAL at 09:08

## 2024-08-03 RX ADMIN — Medication 400 MG: at 09:08

## 2024-08-03 RX ADMIN — ENOXAPARIN SODIUM 40 MG: 40 INJECTION SUBCUTANEOUS at 05:08

## 2024-08-03 RX ADMIN — AZELASTINE HYDROCHLORIDE 274 MCG: 137 SPRAY, METERED NASAL at 09:08

## 2024-08-03 RX ADMIN — Medication 10 ML: at 02:08

## 2024-08-03 RX ADMIN — PRAVASTATIN SODIUM 20 MG: 20 TABLET ORAL at 09:08

## 2024-08-03 RX ADMIN — SODIUM BICARBONATE 650 MG TABLET 1300 MG: at 02:08

## 2024-08-03 RX ADMIN — FLUCONAZOLE 200 MG: 100 TABLET ORAL at 09:08

## 2024-08-03 RX ADMIN — PANTOPRAZOLE SODIUM 40 MG: 40 TABLET, DELAYED RELEASE ORAL at 09:08

## 2024-08-03 RX ADMIN — Medication 10 ML: at 05:08

## 2024-08-03 RX ADMIN — FLUTICASONE PROPIONATE 100 MCG: 50 SPRAY, METERED NASAL at 09:08

## 2024-08-03 RX ADMIN — ANASTROZOLE 1 MG: 1 TABLET ORAL at 08:08

## 2024-08-03 RX ADMIN — Medication 10 ML: at 09:08

## 2024-08-03 RX ADMIN — THERA TABS 1 TABLET: TAB at 09:08

## 2024-08-03 RX ADMIN — LOSARTAN POTASSIUM 100 MG: 50 TABLET, FILM COATED ORAL at 09:08

## 2024-08-04 LAB
ALBUMIN SERPL BCP-MCNC: 3.5 G/DL (ref 3.5–5.2)
ALP SERPL-CCNC: 80 U/L (ref 55–135)
ALT SERPL W/O P-5'-P-CCNC: 25 U/L (ref 10–44)
ANION GAP SERPL CALC-SCNC: 10 MMOL/L (ref 8–16)
AST SERPL-CCNC: 28 U/L (ref 10–40)
BACTERIA UR CULT: NORMAL
BACTERIA UR CULT: NORMAL
BASOPHILS # BLD AUTO: 0.04 K/UL (ref 0–0.2)
BASOPHILS NFR BLD: 0.6 % (ref 0–1.9)
BILIRUB SERPL-MCNC: 0.5 MG/DL (ref 0.1–1)
BUN SERPL-MCNC: 14 MG/DL (ref 8–23)
CALCIUM SERPL-MCNC: 8.8 MG/DL (ref 8.7–10.5)
CHLORIDE SERPL-SCNC: 98 MMOL/L (ref 95–110)
CO2 SERPL-SCNC: 20 MMOL/L (ref 23–29)
CREAT SERPL-MCNC: 1 MG/DL (ref 0.5–1.4)
DIFFERENTIAL METHOD BLD: ABNORMAL
EOSINOPHIL # BLD AUTO: 0.2 K/UL (ref 0–0.5)
EOSINOPHIL NFR BLD: 2.7 % (ref 0–8)
ERYTHROCYTE [DISTWIDTH] IN BLOOD BY AUTOMATED COUNT: 14.1 % (ref 11.5–14.5)
EST. GFR  (NO RACE VARIABLE): >60 ML/MIN/1.73 M^2
GLUCOSE SERPL-MCNC: 91 MG/DL (ref 70–110)
HCT VFR BLD AUTO: 32.6 % (ref 37–48.5)
HGB BLD-MCNC: 11.3 G/DL (ref 12–16)
IMM GRANULOCYTES # BLD AUTO: 0.05 K/UL (ref 0–0.04)
IMM GRANULOCYTES NFR BLD AUTO: 0.8 % (ref 0–0.5)
LYMPHOCYTES # BLD AUTO: 0.6 K/UL (ref 1–4.8)
LYMPHOCYTES NFR BLD: 9.6 % (ref 18–48)
MCH RBC QN AUTO: 30.6 PG (ref 27–31)
MCHC RBC AUTO-ENTMCNC: 34.7 G/DL (ref 32–36)
MCV RBC AUTO: 88 FL (ref 82–98)
MONOCYTES # BLD AUTO: 0.7 K/UL (ref 0.3–1)
MONOCYTES NFR BLD: 11.7 % (ref 4–15)
NEUTROPHILS # BLD AUTO: 4.7 K/UL (ref 1.8–7.7)
NEUTROPHILS NFR BLD: 74.6 % (ref 38–73)
NRBC BLD-RTO: 0 /100 WBC
PLATELET # BLD AUTO: 262 K/UL (ref 150–450)
PMV BLD AUTO: 8 FL (ref 9.2–12.9)
POCT GLUCOSE: 102 MG/DL (ref 70–110)
POCT GLUCOSE: 103 MG/DL (ref 70–110)
POCT GLUCOSE: 123 MG/DL (ref 70–110)
POCT GLUCOSE: 160 MG/DL (ref 70–110)
POTASSIUM SERPL-SCNC: 4.5 MMOL/L (ref 3.5–5.1)
PROT SERPL-MCNC: 6.9 G/DL (ref 6–8.4)
RBC # BLD AUTO: 3.69 M/UL (ref 4–5.4)
SODIUM SERPL-SCNC: 128 MMOL/L (ref 136–145)
WBC # BLD AUTO: 6.34 K/UL (ref 3.9–12.7)

## 2024-08-04 PROCEDURE — 11000001 HC ACUTE MED/SURG PRIVATE ROOM

## 2024-08-04 PROCEDURE — 80053 COMPREHEN METABOLIC PANEL: CPT | Performed by: NURSE PRACTITIONER

## 2024-08-04 PROCEDURE — 21400001 HC TELEMETRY ROOM

## 2024-08-04 PROCEDURE — 25000003 PHARM REV CODE 250: Performed by: FAMILY MEDICINE

## 2024-08-04 PROCEDURE — 25000003 PHARM REV CODE 250: Performed by: NURSE PRACTITIONER

## 2024-08-04 PROCEDURE — 36415 COLL VENOUS BLD VENIPUNCTURE: CPT | Performed by: NURSE PRACTITIONER

## 2024-08-04 PROCEDURE — 63700000 PHARM REV CODE 250 ALT 637 W/O HCPCS: Performed by: NURSE PRACTITIONER

## 2024-08-04 PROCEDURE — 97161 PT EVAL LOW COMPLEX 20 MIN: CPT

## 2024-08-04 PROCEDURE — 97530 THERAPEUTIC ACTIVITIES: CPT

## 2024-08-04 PROCEDURE — A4216 STERILE WATER/SALINE, 10 ML: HCPCS | Performed by: NURSE PRACTITIONER

## 2024-08-04 PROCEDURE — 85025 COMPLETE CBC W/AUTO DIFF WBC: CPT | Performed by: NURSE PRACTITIONER

## 2024-08-04 PROCEDURE — 63600175 PHARM REV CODE 636 W HCPCS: Performed by: NURSE PRACTITIONER

## 2024-08-04 RX ORDER — SODIUM CHLORIDE 1 G/1
1000 TABLET ORAL 2 TIMES DAILY
Status: COMPLETED | OUTPATIENT
Start: 2024-08-04 | End: 2024-08-04

## 2024-08-04 RX ADMIN — Medication 10 ML: at 06:08

## 2024-08-04 RX ADMIN — Medication 400 MG: at 09:08

## 2024-08-04 RX ADMIN — ANASTROZOLE 1 MG: 1 TABLET ORAL at 09:08

## 2024-08-04 RX ADMIN — POTASSIUM CHLORIDE 40 MEQ: 1500 TABLET, EXTENDED RELEASE ORAL at 09:08

## 2024-08-04 RX ADMIN — LOSARTAN POTASSIUM 100 MG: 50 TABLET, FILM COATED ORAL at 09:08

## 2024-08-04 RX ADMIN — AZELASTINE HYDROCHLORIDE 274 MCG: 137 SPRAY, METERED NASAL at 09:08

## 2024-08-04 RX ADMIN — PANTOPRAZOLE SODIUM 40 MG: 40 TABLET, DELAYED RELEASE ORAL at 09:08

## 2024-08-04 RX ADMIN — Medication 10 ML: at 01:08

## 2024-08-04 RX ADMIN — FLUTICASONE PROPIONATE 100 MCG: 50 SPRAY, METERED NASAL at 09:08

## 2024-08-04 RX ADMIN — Medication 10 ML: at 10:08

## 2024-08-04 RX ADMIN — ENOXAPARIN SODIUM 40 MG: 40 INJECTION SUBCUTANEOUS at 05:08

## 2024-08-04 RX ADMIN — SODIUM CHLORIDE 1000 MG: 1 TABLET ORAL at 09:08

## 2024-08-04 RX ADMIN — PRAVASTATIN SODIUM 20 MG: 20 TABLET ORAL at 09:08

## 2024-08-04 RX ADMIN — FLUCONAZOLE 200 MG: 100 TABLET ORAL at 09:08

## 2024-08-04 RX ADMIN — THERA TABS 1 TABLET: TAB at 09:08

## 2024-08-05 VITALS
SYSTOLIC BLOOD PRESSURE: 108 MMHG | RESPIRATION RATE: 18 BRPM | HEART RATE: 74 BPM | WEIGHT: 154.56 LBS | OXYGEN SATURATION: 96 % | DIASTOLIC BLOOD PRESSURE: 58 MMHG | BODY MASS INDEX: 24.84 KG/M2 | TEMPERATURE: 98 F | HEIGHT: 66 IN

## 2024-08-05 PROBLEM — R42 DIZZINESS: Status: RESOLVED | Noted: 2024-06-02 | Resolved: 2024-08-05

## 2024-08-05 PROBLEM — B45.1: Status: RESOLVED | Noted: 2024-06-05 | Resolved: 2024-08-05

## 2024-08-05 PROBLEM — R79.89 ELEVATED TROPONIN I LEVEL: Status: RESOLVED | Noted: 2024-08-03 | Resolved: 2024-08-05

## 2024-08-05 PROBLEM — E87.6 HYPOKALEMIA: Status: RESOLVED | Noted: 2022-12-13 | Resolved: 2024-08-05

## 2024-08-05 PROBLEM — N18.32 STAGE 3B CHRONIC KIDNEY DISEASE: Status: RESOLVED | Noted: 2024-08-03 | Resolved: 2024-08-05

## 2024-08-05 LAB
ALBUMIN SERPL BCP-MCNC: 3.3 G/DL (ref 3.5–5.2)
ALP SERPL-CCNC: 79 U/L (ref 55–135)
ALT SERPL W/O P-5'-P-CCNC: 25 U/L (ref 10–44)
ANION GAP SERPL CALC-SCNC: 9 MMOL/L (ref 8–16)
AST SERPL-CCNC: 32 U/L (ref 10–40)
BASOPHILS # BLD AUTO: 0.06 K/UL (ref 0–0.2)
BASOPHILS NFR BLD: 1.2 % (ref 0–1.9)
BILIRUB SERPL-MCNC: 0.3 MG/DL (ref 0.1–1)
BUN SERPL-MCNC: 17 MG/DL (ref 8–23)
CALCIUM SERPL-MCNC: 9.4 MG/DL (ref 8.7–10.5)
CHLORIDE SERPL-SCNC: 101 MMOL/L (ref 95–110)
CO2 SERPL-SCNC: 18 MMOL/L (ref 23–29)
CREAT SERPL-MCNC: 1.1 MG/DL (ref 0.5–1.4)
DIFFERENTIAL METHOD BLD: ABNORMAL
EOSINOPHIL # BLD AUTO: 0.2 K/UL (ref 0–0.5)
EOSINOPHIL NFR BLD: 4.8 % (ref 0–8)
ERYTHROCYTE [DISTWIDTH] IN BLOOD BY AUTOMATED COUNT: 14.3 % (ref 11.5–14.5)
EST. GFR  (NO RACE VARIABLE): 54 ML/MIN/1.73 M^2
GLUCOSE SERPL-MCNC: 94 MG/DL (ref 70–110)
HCT VFR BLD AUTO: 31 % (ref 37–48.5)
HGB BLD-MCNC: 10.6 G/DL (ref 12–16)
IMM GRANULOCYTES # BLD AUTO: 0.03 K/UL (ref 0–0.04)
IMM GRANULOCYTES NFR BLD AUTO: 0.6 % (ref 0–0.5)
LYMPHOCYTES # BLD AUTO: 0.7 K/UL (ref 1–4.8)
LYMPHOCYTES NFR BLD: 13.9 % (ref 18–48)
MCH RBC QN AUTO: 30.2 PG (ref 27–31)
MCHC RBC AUTO-ENTMCNC: 34.2 G/DL (ref 32–36)
MCV RBC AUTO: 88 FL (ref 82–98)
MONOCYTES # BLD AUTO: 0.6 K/UL (ref 0.3–1)
MONOCYTES NFR BLD: 12.1 % (ref 4–15)
NEUTROPHILS # BLD AUTO: 3.2 K/UL (ref 1.8–7.7)
NEUTROPHILS NFR BLD: 67.4 % (ref 38–73)
NRBC BLD-RTO: 0 /100 WBC
PLATELET # BLD AUTO: 286 K/UL (ref 150–450)
PMV BLD AUTO: 7.9 FL (ref 9.2–12.9)
POCT GLUCOSE: 100 MG/DL (ref 70–110)
POCT GLUCOSE: 117 MG/DL (ref 70–110)
POTASSIUM SERPL-SCNC: 5 MMOL/L (ref 3.5–5.1)
PROT SERPL-MCNC: 7.3 G/DL (ref 6–8.4)
RBC # BLD AUTO: 3.51 M/UL (ref 4–5.4)
SODIUM SERPL-SCNC: 128 MMOL/L (ref 136–145)
WBC # BLD AUTO: 4.81 K/UL (ref 3.9–12.7)

## 2024-08-05 PROCEDURE — 36415 COLL VENOUS BLD VENIPUNCTURE: CPT | Performed by: NURSE PRACTITIONER

## 2024-08-05 PROCEDURE — 63700000 PHARM REV CODE 250 ALT 637 W/O HCPCS: Performed by: NURSE PRACTITIONER

## 2024-08-05 PROCEDURE — 97116 GAIT TRAINING THERAPY: CPT

## 2024-08-05 PROCEDURE — A4216 STERILE WATER/SALINE, 10 ML: HCPCS | Performed by: NURSE PRACTITIONER

## 2024-08-05 PROCEDURE — 80053 COMPREHEN METABOLIC PANEL: CPT | Performed by: NURSE PRACTITIONER

## 2024-08-05 PROCEDURE — 85025 COMPLETE CBC W/AUTO DIFF WBC: CPT | Performed by: NURSE PRACTITIONER

## 2024-08-05 PROCEDURE — 25000003 PHARM REV CODE 250: Performed by: NURSE PRACTITIONER

## 2024-08-05 PROCEDURE — 97110 THERAPEUTIC EXERCISES: CPT

## 2024-08-05 RX ORDER — MECLIZINE HCL 12.5 MG 12.5 MG/1
12.5 TABLET ORAL 3 TIMES DAILY PRN
Qty: 30 TABLET | Refills: 1 | Status: SHIPPED | OUTPATIENT
Start: 2024-08-05

## 2024-08-05 RX ADMIN — Medication 10 ML: at 02:08

## 2024-08-05 RX ADMIN — ANASTROZOLE 1 MG: 1 TABLET ORAL at 09:08

## 2024-08-05 RX ADMIN — AZELASTINE HYDROCHLORIDE 274 MCG: 137 SPRAY, METERED NASAL at 09:08

## 2024-08-05 RX ADMIN — PANTOPRAZOLE SODIUM 40 MG: 40 TABLET, DELAYED RELEASE ORAL at 09:08

## 2024-08-05 RX ADMIN — Medication 400 MG: at 09:08

## 2024-08-05 RX ADMIN — FLUTICASONE PROPIONATE 100 MCG: 50 SPRAY, METERED NASAL at 09:08

## 2024-08-05 RX ADMIN — FLUCONAZOLE 200 MG: 100 TABLET ORAL at 09:08

## 2024-08-05 RX ADMIN — THERA TABS 1 TABLET: TAB at 09:08

## 2024-08-05 RX ADMIN — Medication 10 ML: at 05:08

## 2024-08-05 RX ADMIN — LOSARTAN POTASSIUM 100 MG: 50 TABLET, FILM COATED ORAL at 09:08

## 2024-08-07 ENCOUNTER — PATIENT OUTREACH (OUTPATIENT)
Dept: ADMINISTRATIVE | Facility: CLINIC | Age: 69
End: 2024-08-07
Payer: MEDICARE

## 2024-08-08 ENCOUNTER — TELEPHONE (OUTPATIENT)
Dept: FAMILY MEDICINE | Facility: CLINIC | Age: 69
End: 2024-08-08
Payer: MEDICARE

## 2024-08-08 ENCOUNTER — DOCUMENTATION ONLY (OUTPATIENT)
Dept: INFECTIOUS DISEASES | Facility: CLINIC | Age: 69
End: 2024-08-08
Payer: MEDICARE

## 2024-08-08 RX ORDER — FLUCONAZOLE 200 MG/1
200 TABLET ORAL DAILY
Qty: 90 TABLET | Refills: 3 | Status: SHIPPED | OUTPATIENT
Start: 2024-08-17 | End: 2025-08-17

## 2024-08-09 ENCOUNTER — DOCUMENT SCAN (OUTPATIENT)
Dept: HOME HEALTH SERVICES | Facility: HOSPITAL | Age: 69
End: 2024-08-09
Payer: MEDICARE

## 2024-08-12 ENCOUNTER — OFFICE VISIT (OUTPATIENT)
Dept: FAMILY MEDICINE | Facility: CLINIC | Age: 69
End: 2024-08-12
Payer: MEDICARE

## 2024-08-12 ENCOUNTER — LAB VISIT (OUTPATIENT)
Dept: LAB | Facility: HOSPITAL | Age: 69
End: 2024-08-12
Attending: NURSE PRACTITIONER
Payer: MEDICARE

## 2024-08-12 VITALS
TEMPERATURE: 98 F | HEART RATE: 73 BPM | SYSTOLIC BLOOD PRESSURE: 118 MMHG | WEIGHT: 146.63 LBS | DIASTOLIC BLOOD PRESSURE: 60 MMHG | OXYGEN SATURATION: 98 % | BODY MASS INDEX: 23.57 KG/M2 | HEIGHT: 66 IN

## 2024-08-12 DIAGNOSIS — E87.6 HYPOKALEMIA: ICD-10-CM

## 2024-08-12 DIAGNOSIS — E87.1 HYPONATREMIA: ICD-10-CM

## 2024-08-12 DIAGNOSIS — I70.0 AORTIC ATHEROSCLEROSIS: Primary | ICD-10-CM

## 2024-08-12 DIAGNOSIS — I51.89 DIASTOLIC DYSFUNCTION: ICD-10-CM

## 2024-08-12 DIAGNOSIS — B45.1 CRYPTOCOCCAL MENINGOENCEPHALITIS: ICD-10-CM

## 2024-08-12 LAB
ANION GAP SERPL CALC-SCNC: 10 MMOL/L (ref 8–16)
BUN SERPL-MCNC: 11 MG/DL (ref 8–23)
CALCIUM SERPL-MCNC: 10 MG/DL (ref 8.7–10.5)
CHLORIDE SERPL-SCNC: 100 MMOL/L (ref 95–110)
CO2 SERPL-SCNC: 22 MMOL/L (ref 23–29)
CREAT SERPL-MCNC: 1 MG/DL (ref 0.5–1.4)
EST. GFR  (NO RACE VARIABLE): >60 ML/MIN/1.73 M^2
GLUCOSE SERPL-MCNC: 127 MG/DL (ref 70–110)
POTASSIUM SERPL-SCNC: 3.7 MMOL/L (ref 3.5–5.1)
SODIUM SERPL-SCNC: 132 MMOL/L (ref 136–145)

## 2024-08-12 PROCEDURE — 99999 PR PBB SHADOW E&M-EST. PATIENT-LVL IV: CPT | Mod: PBBFAC,,, | Performed by: NURSE PRACTITIONER

## 2024-08-12 PROCEDURE — 1101F PT FALLS ASSESS-DOCD LE1/YR: CPT | Mod: CPTII,S$GLB,, | Performed by: NURSE PRACTITIONER

## 2024-08-12 PROCEDURE — 1159F MED LIST DOCD IN RCRD: CPT | Mod: CPTII,S$GLB,, | Performed by: NURSE PRACTITIONER

## 2024-08-12 PROCEDURE — 3074F SYST BP LT 130 MM HG: CPT | Mod: CPTII,S$GLB,, | Performed by: NURSE PRACTITIONER

## 2024-08-12 PROCEDURE — 80048 BASIC METABOLIC PNL TOTAL CA: CPT | Performed by: NURSE PRACTITIONER

## 2024-08-12 PROCEDURE — 3078F DIAST BP <80 MM HG: CPT | Mod: CPTII,S$GLB,, | Performed by: NURSE PRACTITIONER

## 2024-08-12 PROCEDURE — 1111F DSCHRG MED/CURRENT MED MERGE: CPT | Mod: CPTII,S$GLB,, | Performed by: NURSE PRACTITIONER

## 2024-08-12 PROCEDURE — 3044F HG A1C LEVEL LT 7.0%: CPT | Mod: CPTII,S$GLB,, | Performed by: NURSE PRACTITIONER

## 2024-08-12 PROCEDURE — 36415 COLL VENOUS BLD VENIPUNCTURE: CPT | Mod: PO | Performed by: NURSE PRACTITIONER

## 2024-08-12 PROCEDURE — 1160F RVW MEDS BY RX/DR IN RCRD: CPT | Mod: CPTII,S$GLB,, | Performed by: NURSE PRACTITIONER

## 2024-08-12 PROCEDURE — 4010F ACE/ARB THERAPY RXD/TAKEN: CPT | Mod: CPTII,S$GLB,, | Performed by: NURSE PRACTITIONER

## 2024-08-12 PROCEDURE — 99496 TRANSJ CARE MGMT HIGH F2F 7D: CPT | Mod: S$GLB,,, | Performed by: NURSE PRACTITIONER

## 2024-08-12 PROCEDURE — 1126F AMNT PAIN NOTED NONE PRSNT: CPT | Mod: CPTII,S$GLB,, | Performed by: NURSE PRACTITIONER

## 2024-08-12 PROCEDURE — 3288F FALL RISK ASSESSMENT DOCD: CPT | Mod: CPTII,S$GLB,, | Performed by: NURSE PRACTITIONER

## 2024-08-12 RX ORDER — PRAVASTATIN SODIUM 40 MG/1
40 TABLET ORAL DAILY
Qty: 90 TABLET | Refills: 3 | Status: SHIPPED | OUTPATIENT
Start: 2024-08-12 | End: 2025-08-12

## 2024-08-12 NOTE — PROGRESS NOTES
Subjective:       Patient ID: Anne Farmer is a 68 y.o. female.    Chief Complaint: Hospital Follow Up  Transitional Care Note    Family and/or Caretaker present at visit?  Yes.  Diagnostic tests reviewed/disposition: I have reviewed all completed as well as pending diagnostic tests at the time of discharge.  Disease/illness education: [facility]  Home health/community services discussion/referrals: Patient has home health established at [facility]  .   Establishment or re-establishment of referral orders for community resources: No other necessary community resources.   Discussion with other health care providers: No discussion with other health care providers necessary.        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  she reports that she has been having dizziness for past 4 weeks.  She also complains of intermittent shortness on breath, lower extremity edema, right upper extremity weakness, right upper extremity tremor, decreased appetite, decreased oral intake, and weight loss.  She denies fever, chills, headache, syncope, congestion, cough, chest pain, abdominal pain, nausea, vomiting, diarrhea, dysuria, urinary frequency.  Initial vital signs in ED stable, temp 98.9°, blood pressure 139/65, heart rate 66, respirations 18, 98% SpO2 on room air.  Lab workup shows no leukocytosis, hemoglobin 11.7, hematocrit 32.7, platelets 313, sodium 129, potassium 3.0, chloride 94, CO2 19, BUN 16, creatinine 1.5, glucose 143, normal LFTs, , troponin 0.030, urinalysis with proteinuria, cloudy  appearance, WBC 11, few bacteria.  Urine culture is in progress.  EKG shows normal sinus rhythm, heart rate 63, incomplete right bundle-branch block, left anterior fascicular block, LVH, no STEMI, can not rule out septal infarct age undetermined.  Chest x-ray shows similar degree of vascular congestion versus reticular interstitial changes throughout the lungs, no new pulmonary opacities when compared to previous imaging, negative for focal infiltrate, tortuous aorta with calcifications of the aortic knob, degenerative changes of the spine and both shoulder girdles.  CT head without contrast was done which shows atrophy and chronic white matter changes.  MRI brain synaptic stealth with and without contrast was done which demonstrated no acute process, again notes atrophy and chronic microvascular ischemic changes not unusual for stated age, with no MRI findings to explain patient's history of dizziness.  She was given potassium supplementation while in ED. hospital Medicine team was consulted for admission to further evaluate her dizziness and to correct her multiple lab abnormalities including hyponatremia, hypokalemia,   Mild SAGRARIO, and mild troponin elevation.     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 She reports that she has been having dizziness for past 4 weeks. She also c/o intermittent SOB, lower extremity edema, right upper extremity weakness, right upper extremity tremor, decreased appetite, decreased oral intake, and weight loss.      Initial VSS, temp 98.9°, blood pressure 139/65, heart rate 66,  respirations 18, 98% SpO2 on RA. Labs no leukocytosis, H/H 12/33, platelets 313, Na 129, K 3.0, Cl 94, CO2 19, Bun/Cr 16/1.5, glucose 143, normal LFTs, , troponin 0.030, urinalysis with proteinuria, cloudy appearance, WBC 11, few bacteria. Urine culture is in progress. EKG NSR, 63, CXR some vascular congestion. CT head without contrast- Atrophy and chronic white matter changes. MRI brain synaptic stealth with and without contrast was done which demonstrated no acute process, again notes atrophy and chronic microvascular ischemic changes.      Pt received potassium supplementation and admitted to Hospital Medicine for hyponatremia, hypokalemia, Mild SAGRARIO, and mild troponin elevation.      8/5- Pt seen and examined, VSS Afeb, Looks and feels much better. Her labs have mostly improved, SAGRARIO resolved, K corrected. Na still at 128 which is chronic. Dizziness is chronic and stable as well. She is eating drinking well, walking around well with PT/OT about 400 feet. She was seen and examined and deemed stable for discharge back to SNF today.      Post discharge patient denies LOZADA, no cough or peripheral edema.  No dizziness         Review of Systems   Constitutional: Negative.    HENT: Negative.     Respiratory:  Negative for cough and shortness of breath.    Cardiovascular: Negative.    Gastrointestinal: Negative.    Genitourinary: Negative.    Neurological: Negative.    Psychiatric/Behavioral: Negative.         Objective:      Physical Exam  Vitals reviewed.   Constitutional:       Appearance: She is well-developed.   HENT:      Head: Normocephalic.      Mouth/Throat:      Pharynx: No oropharyngeal exudate.   Eyes:      Pupils: Pupils are equal, round, and reactive to light.   Neck:      Vascular: No JVD.      Trachea: No tracheal deviation.   Cardiovascular:      Rate and Rhythm: Normal rate and regular rhythm.      Heart sounds: Normal heart sounds. No murmur heard.  Pulmonary:      Effort: Pulmonary effort is  normal. No respiratory distress.      Breath sounds: Normal breath sounds.   Abdominal:      General: Bowel sounds are normal. There is no distension.      Palpations: Abdomen is soft.      Tenderness: There is no abdominal tenderness.   Musculoskeletal:         General: Normal range of motion.      Cervical back: Normal range of motion.   Skin:     General: Skin is warm and dry.   Neurological:      Mental Status: She is alert and oriented to person, place, and time.      Deep Tendon Reflexes: Reflexes are normal and symmetric.   Psychiatric:         Speech: Speech normal.         Behavior: Behavior normal.         Assessment:       1. Aortic atherosclerosis    2. Diastolic dysfunction    3. Hyponatremia    4. Hypokalemia    5. Cryptococcal meningoencephalitis        Plan:   Aortic atherosclerosis  -     pravastatin (PRAVACHOL) 40 MG tablet; Take 1 tablet (40 mg total) by mouth once daily.  Dispense: 90 tablet; Refill: 3  Dose increased from 20mg to 40mg  Diastolic dysfunction  -follow up with card  Hyponatremia  -     Basic Metabolic Panel; Future; Expected date: 08/12/2024    Hypokalemia  -     Basic Metabolic Panel; Future; Expected date: 08/12/2024    Cryptococcal meningoencephalitis  Follow up with ID    No follow-ups on file.

## 2024-08-13 RX ORDER — CYCLOBENZAPRINE HCL 5 MG
TABLET ORAL
Qty: 30 TABLET | Refills: 0 | Status: SHIPPED | OUTPATIENT
Start: 2024-08-13

## 2024-08-13 NOTE — TELEPHONE ENCOUNTER
No care due was identified.  Health [facility] Embedded Care Due Messages. Reference number: [id number].   8/13/2024 10:50:03 AM CDT

## 2024-08-26 ENCOUNTER — DOCUMENT SCAN (OUTPATIENT)
Dept: HOME HEALTH SERVICES | Facility: HOSPITAL | Age: 69
End: 2024-08-26
Payer: MEDICARE

## 2024-08-28 ENCOUNTER — TELEPHONE (OUTPATIENT)
Dept: FAMILY MEDICINE | Facility: CLINIC | Age: 69
End: 2024-08-28
Payer: MEDICARE

## 2024-08-28 NOTE — TELEPHONE ENCOUNTER
----- Message from Amira Hatch sent at 8/28/2024 11:49 AM CDT -----  Contact: [phone]  Would like to receive medical advice.    Pt called and stated that she have questions about medication.    Would they like a call back or a response via [facility]:  call    Additional information:  please call to advise

## 2024-09-04 ENCOUNTER — TELEPHONE (OUTPATIENT)
Dept: FAMILY MEDICINE | Facility: CLINIC | Age: 69
End: 2024-09-04
Payer: MEDICARE

## 2024-09-04 NOTE — TELEPHONE ENCOUNTER
Patient can into clinic to requesting appointment  schedule appointment for 9/5/24 at 1:20pm. Pt. Verbalized understanding.

## 2024-09-05 ENCOUNTER — HOSPITAL ENCOUNTER (OUTPATIENT)
Dept: RADIOLOGY | Facility: HOSPITAL | Age: 69
Discharge: HOME | End: 2024-09-05
Attending: FAMILY MEDICINE
Payer: MEDICARE

## 2024-09-05 ENCOUNTER — OFFICE VISIT (OUTPATIENT)
Dept: FAMILY MEDICINE | Facility: CLINIC | Age: 69
End: 2024-09-05
Attending: FAMILY MEDICINE
Payer: MEDICARE

## 2024-09-05 VITALS
HEART RATE: 66 BPM | BODY MASS INDEX: 23.17 KG/M2 | DIASTOLIC BLOOD PRESSURE: 70 MMHG | OXYGEN SATURATION: 99 % | TEMPERATURE: 98 F | SYSTOLIC BLOOD PRESSURE: 120 MMHG | WEIGHT: 144.19 LBS | HEIGHT: 66 IN

## 2024-09-05 DIAGNOSIS — R06.02 SOB (SHORTNESS OF BREATH): ICD-10-CM

## 2024-09-05 DIAGNOSIS — F32.0 CURRENT MILD EPISODE OF MAJOR DEPRESSIVE DISORDER WITHOUT PRIOR EPISODE: ICD-10-CM

## 2024-09-05 DIAGNOSIS — J98.4 CHRONIC RESTRICTIVE LUNG DISEASE: ICD-10-CM

## 2024-09-05 DIAGNOSIS — R06.02 SOB (SHORTNESS OF BREATH): Primary | ICD-10-CM

## 2024-09-05 DIAGNOSIS — R11.2 NAUSEA AND VOMITING, UNSPECIFIED VOMITING TYPE: ICD-10-CM

## 2024-09-05 DIAGNOSIS — I50.9 CONGESTIVE HEART FAILURE, UNSPECIFIED HF CHRONICITY, UNSPECIFIED HEART FAILURE TYPE: ICD-10-CM

## 2024-09-05 DIAGNOSIS — G47.00 INSOMNIA, UNSPECIFIED TYPE: ICD-10-CM

## 2024-09-05 LAB
OHS QRS DURATION: 98 MS
OHS QTC CALCULATION: 442 MS

## 2024-09-05 PROCEDURE — 99999 PR PBB SHADOW E&M-EST. PATIENT-LVL V: CPT | Mod: PBBFAC,,, | Performed by: FAMILY MEDICINE

## 2024-09-05 PROCEDURE — 71046 X-RAY EXAM CHEST 2 VIEWS: CPT | Mod: 26,,, | Performed by: RADIOLOGY

## 2024-09-05 PROCEDURE — 93010 ELECTROCARDIOGRAM REPORT: CPT | Mod: S$GLB,,, | Performed by: INTERNAL MEDICINE

## 2024-09-05 PROCEDURE — 71046 X-RAY EXAM CHEST 2 VIEWS: CPT | Mod: TC,PO

## 2024-09-05 RX ORDER — ONDANSETRON 4 MG/1
4 TABLET, ORALLY DISINTEGRATING ORAL EVERY 8 HOURS PRN
Qty: 30 TABLET | Refills: 3 | Status: SHIPPED | OUTPATIENT
Start: 2024-09-05

## 2024-09-05 RX ORDER — MIRTAZAPINE 30 MG/1
30 TABLET, FILM COATED ORAL NIGHTLY
Qty: 30 TABLET | Refills: 1 | Status: SHIPPED | OUTPATIENT
Start: 2024-09-05 | End: 2025-09-05

## 2024-09-05 RX ORDER — POTASSIUM CHLORIDE 750 MG/1
20 TABLET, FILM COATED, EXTENDED RELEASE ORAL DAILY PRN
COMMUNITY
Start: 2024-08-11

## 2024-09-05 NOTE — PROGRESS NOTES
Subjective:       Patient ID: Anne Farmer is a 68 y.o. female.    Chief Complaint: Follow-up    68 y old female who over the last 2 weeks has been experiencing LOZADA .No HUMBERTO, cough , cp , fever . Also will like to start medication to help with mood , sleep and appetite . She has lost 20 lbs in 2 months . Sleeping no more than 4 hrs at nights. Upset about her ailments . Son checks on her periodically . No S/I .     Follow-up  Associated symptoms include fatigue.     Review of Systems   Constitutional:  Positive for fatigue.   HENT: Negative.     Eyes: Negative.    Respiratory:  Positive for shortness of breath.    Cardiovascular: Negative.    Gastrointestinal: Negative.    Genitourinary: Negative.    Musculoskeletal: Negative.    Skin: Negative.    Hematological: Negative.    Psychiatric/Behavioral:  Positive for sleep disturbance.        Objective:      Physical Exam  Constitutional:       General: She is not in acute distress.     Appearance: She is well-developed. She is not diaphoretic.   HENT:      Head: Normocephalic and atraumatic.      Right Ear: External ear normal.      Left Ear: External ear normal.      Mouth/Throat:      Pharynx: No oropharyngeal exudate.   Eyes:      General: No scleral icterus.        Right eye: No discharge.         Left eye: No discharge.      Conjunctiva/sclera: Conjunctivae normal.      Pupils: Pupils are equal, round, and reactive to light.   Neck:      Thyroid: No thyromegaly.      Vascular: No JVD.      Trachea: No tracheal deviation.   Cardiovascular:      Rate and Rhythm: Normal rate and regular rhythm.      Heart sounds: Normal heart sounds. No murmur heard.     No friction rub. No gallop.   Pulmonary:      Effort: Pulmonary effort is normal. No respiratory distress.      Breath sounds: Normal breath sounds. No stridor. No wheezing or rales.   Chest:      Chest wall: No tenderness.   Abdominal:      General: Bowel sounds are normal. There is no distension.       Palpations: Abdomen is soft.      Tenderness: There is no abdominal tenderness. There is no guarding or rebound.   Musculoskeletal:         General: Normal range of motion.      Cervical back: Normal range of motion and neck supple.   Lymphadenopathy:      Cervical: No cervical adenopathy.   Skin:     General: Skin is warm and dry.   Neurological:      Mental Status: She is alert and oriented to person, place, and time.   Psychiatric:         Mood and Affect: Mood normal.         Behavior: Behavior normal.         Thought Content: Thought content normal.         Judgment: Judgment normal.         Assessment:         Anne was seen today for follow-up.    Diagnoses and all orders for this visit:    SOB (shortness of breath)  -     X-Ray Chest PA And Lateral; Future  -     IN OFFICE EKG 12-LEAD (to Muse)  -     CBC Auto Differential; Future  -     Comprehensive Metabolic Panel; Future  -     BNP; Future    Nausea and vomiting, unspecified vomiting type  -     ondansetron (ZOFRAN-ODT) 4 MG TbDL; Take 1 tablet (4 mg total) by mouth every 8 (eight) hours as needed (n/v).    Congestive heart failure, unspecified HF chronicity, unspecified heart failure type    Chronic restrictive lung disease    Current mild episode of major depressive disorder without prior episode    Insomnia, unspecified type    Other orders  -     mirtazapine (REMERON) 30 MG tablet; Take 1 tablet (30 mg total) by mouth every evening.       Plan:      We will continue to f.u   Start Remeron .   F,u in 6 weeks .

## 2024-09-06 ENCOUNTER — TELEPHONE (OUTPATIENT)
Dept: FAMILY MEDICINE | Facility: CLINIC | Age: 69
End: 2024-09-06
Payer: MEDICARE

## 2024-09-06 DIAGNOSIS — E83.52 HYPERCALCEMIA: Primary | ICD-10-CM

## 2024-09-06 DIAGNOSIS — R79.89 ELEVATED BRAIN NATRIURETIC PEPTIDE (BNP) LEVEL: ICD-10-CM

## 2024-09-06 RX ORDER — FUROSEMIDE 20 MG/1
20 TABLET ORAL DAILY
Qty: 4 TABLET | Refills: 0 | Status: SHIPPED | OUTPATIENT
Start: 2024-09-06 | End: 2025-09-06

## 2024-09-09 PROBLEM — I63.212 ACUTE ISCHEMIC VBA BRAINSTEM STROKE, LEFT: Status: RESOLVED | Noted: 2024-06-02 | Resolved: 2024-09-09

## 2024-09-09 PROBLEM — I63.22 ACUTE ISCHEMIC VBA BRAINSTEM STROKE, LEFT: Status: RESOLVED | Noted: 2024-06-02 | Resolved: 2024-09-09

## 2024-09-09 RX ORDER — CYCLOBENZAPRINE HCL 5 MG
TABLET ORAL
Qty: 30 TABLET | Refills: 0 | Status: SHIPPED | OUTPATIENT
Start: 2024-09-09

## 2024-09-09 NOTE — TELEPHONE ENCOUNTER
No care due was identified.  [facility] Embedded Care Due Messages. Reference number: [id number].   9/09/2024 12:29:00 PM CDT

## 2024-09-20 RX ORDER — LOSARTAN POTASSIUM 100 MG/1
TABLET ORAL
Qty: 90 TABLET | Refills: 3 | Status: SHIPPED | OUTPATIENT
Start: 2024-09-20

## 2024-09-20 NOTE — TELEPHONE ENCOUNTER
Refill Decision Note   Anne Farmer  is requesting a refill authorization.  Brief Assessment and Rationale for Refill:  Approve     Medication Therapy Plan:         Comments:     Note composed:3:32 PM 09/20/2024             Appointments     Last Visit   9/5/2024 Chiquita Talley MD   Next Visit   11/12/2024 Chiquita Talley MD

## 2024-09-20 NOTE — TELEPHONE ENCOUNTER
No care due was identified.  [facility] Embedded Care Due Messages. Reference number: [id number].   9/20/2024 9:51:35 AM CDT

## 2024-09-27 NOTE — TELEPHONE ENCOUNTER
No care due was identified.  [facility] Embedded Care Due Messages. Reference number: [id number].   9/27/2024 11:32:48 AM CDT

## 2024-09-28 RX ORDER — MIRTAZAPINE 30 MG/1
30 TABLET, FILM COATED ORAL NIGHTLY
Qty: 90 TABLET | Refills: 1 | Status: SHIPPED | OUTPATIENT
Start: 2024-09-28

## 2024-09-28 NOTE — TELEPHONE ENCOUNTER
Refill Routing Note   Medication(s) are not appropriate for processing by [facility] for the following reason(s):      New or recently adjusted medication    [facility] action(s):  Defer Care Due:  None identified            Appointments  past 12m or future 3m with PCP    Date Provider   Last Visit   9/5/2024 Chiquita Talley MD   Next Visit   11/12/2024 Chiquita Talley MD   ED visits in past 90 days: 1        Note composed:4:14 AM 09/28/2024

## 2024-09-30 RX ORDER — PROMETHAZINE HYDROCHLORIDE 25 MG/1
25 TABLET ORAL EVERY 6 HOURS PRN
Qty: 15 TABLET | Refills: 0 | Status: SHIPPED | OUTPATIENT
Start: 2024-09-30

## 2024-09-30 RX ORDER — FLUCONAZOLE 200 MG/1
200 TABLET ORAL DAILY
Qty: 90 TABLET | Refills: 3 | Status: SHIPPED | OUTPATIENT
Start: 2024-09-30 | End: 2024-10-01 | Stop reason: SDUPTHER

## 2024-09-30 NOTE — TELEPHONE ENCOUNTER
Patient stopped by clinic requesting refills on Fluconazole, Meclizine, and Promethazine. She states she has an appointment with Dr. Burris on 10/11/24 but needs refills before then.  Please advise?

## 2024-09-30 NOTE — TELEPHONE ENCOUNTER
No care due was identified.  [facility] Embedded Care Due Messages. Reference number: [id number].   9/30/2024 11:37:40 AM CDT

## 2024-10-01 ENCOUNTER — DOCUMENT SCAN (OUTPATIENT)
Dept: HOME HEALTH SERVICES | Facility: HOSPITAL | Age: 69
End: 2024-10-01
Payer: MEDICARE

## 2024-10-01 ENCOUNTER — OFFICE VISIT (OUTPATIENT)
Dept: CARDIOLOGY | Facility: CLINIC | Age: 69
End: 2024-10-01
Payer: MEDICARE

## 2024-10-01 VITALS
WEIGHT: 140.88 LBS | SYSTOLIC BLOOD PRESSURE: 136 MMHG | RESPIRATION RATE: 16 BRPM | DIASTOLIC BLOOD PRESSURE: 65 MMHG | HEIGHT: 66 IN | OXYGEN SATURATION: 99 % | BODY MASS INDEX: 22.64 KG/M2 | HEART RATE: 71 BPM

## 2024-10-01 DIAGNOSIS — E78.5 HYPERLIPIDEMIA, UNSPECIFIED HYPERLIPIDEMIA TYPE: ICD-10-CM

## 2024-10-01 DIAGNOSIS — I10 PRIMARY HYPERTENSION: ICD-10-CM

## 2024-10-01 DIAGNOSIS — E11.69 DM TYPE 2 WITH DIABETIC DYSLIPIDEMIA: ICD-10-CM

## 2024-10-01 DIAGNOSIS — J98.4 CHRONIC RESTRICTIVE LUNG DISEASE: ICD-10-CM

## 2024-10-01 DIAGNOSIS — R60.0 LOCALIZED EDEMA: ICD-10-CM

## 2024-10-01 DIAGNOSIS — E78.5 DM TYPE 2 WITH DIABETIC DYSLIPIDEMIA: ICD-10-CM

## 2024-10-01 DIAGNOSIS — I51.89 DIASTOLIC DYSFUNCTION: Primary | ICD-10-CM

## 2024-10-01 DIAGNOSIS — Z86.73 HISTORY OF STROKE: ICD-10-CM

## 2024-10-01 DIAGNOSIS — R42 DIZZINESS: ICD-10-CM

## 2024-10-01 DIAGNOSIS — B45.1 CRYPTOCOCCAL MENINGOENCEPHALITIS: ICD-10-CM

## 2024-10-01 DIAGNOSIS — R06.09 DOE (DYSPNEA ON EXERTION): ICD-10-CM

## 2024-10-01 PROCEDURE — 3075F SYST BP GE 130 - 139MM HG: CPT | Mod: CPTII,S$GLB,, | Performed by: INTERNAL MEDICINE

## 2024-10-01 PROCEDURE — 1160F RVW MEDS BY RX/DR IN RCRD: CPT | Mod: CPTII,S$GLB,, | Performed by: INTERNAL MEDICINE

## 2024-10-01 PROCEDURE — G2211 COMPLEX E/M VISIT ADD ON: HCPCS | Mod: S$GLB,,, | Performed by: INTERNAL MEDICINE

## 2024-10-01 PROCEDURE — 3008F BODY MASS INDEX DOCD: CPT | Mod: CPTII,S$GLB,, | Performed by: INTERNAL MEDICINE

## 2024-10-01 PROCEDURE — 1159F MED LIST DOCD IN RCRD: CPT | Mod: CPTII,S$GLB,, | Performed by: INTERNAL MEDICINE

## 2024-10-01 PROCEDURE — 3044F HG A1C LEVEL LT 7.0%: CPT | Mod: CPTII,S$GLB,, | Performed by: INTERNAL MEDICINE

## 2024-10-01 PROCEDURE — 3288F FALL RISK ASSESSMENT DOCD: CPT | Mod: CPTII,S$GLB,, | Performed by: INTERNAL MEDICINE

## 2024-10-01 PROCEDURE — 99214 OFFICE O/P EST MOD 30 MIN: CPT | Mod: S$GLB,,, | Performed by: INTERNAL MEDICINE

## 2024-10-01 PROCEDURE — 4010F ACE/ARB THERAPY RXD/TAKEN: CPT | Mod: CPTII,S$GLB,, | Performed by: INTERNAL MEDICINE

## 2024-10-01 PROCEDURE — 99999 PR PBB SHADOW E&M-EST. PATIENT-LVL IV: CPT | Mod: PBBFAC,,, | Performed by: INTERNAL MEDICINE

## 2024-10-01 PROCEDURE — 3078F DIAST BP <80 MM HG: CPT | Mod: CPTII,S$GLB,, | Performed by: INTERNAL MEDICINE

## 2024-10-01 PROCEDURE — 1101F PT FALLS ASSESS-DOCD LE1/YR: CPT | Mod: CPTII,S$GLB,, | Performed by: INTERNAL MEDICINE

## 2024-10-01 RX ORDER — MECLIZINE HCL 12.5 MG 12.5 MG/1
12.5 TABLET ORAL 3 TIMES DAILY PRN
Qty: 30 TABLET | Refills: 1 | Status: SHIPPED | OUTPATIENT
Start: 2024-10-01

## 2024-10-01 RX ORDER — FLUCONAZOLE 200 MG/1
200 TABLET ORAL DAILY
Qty: 90 TABLET | Refills: 3 | Status: SHIPPED | OUTPATIENT
Start: 2024-10-01 | End: 2025-10-01

## 2024-10-01 RX ORDER — FUROSEMIDE 20 MG/1
20 TABLET ORAL DAILY PRN
Qty: 30 TABLET | Refills: 0 | Status: SHIPPED | OUTPATIENT
Start: 2024-10-01 | End: 2025-10-01

## 2024-10-01 RX ORDER — POTASSIUM CHLORIDE 750 MG/1
20 TABLET, FILM COATED, EXTENDED RELEASE ORAL DAILY PRN
Qty: 90 TABLET | Refills: 1 | Status: SHIPPED | OUTPATIENT
Start: 2024-10-01

## 2024-10-01 NOTE — PROGRESS NOTES
Subjective:   Patient ID:  Anne Farmer is a 68 y.o. female who presents for cardiac consult of No chief complaint on file.      Referral by: No referring provider defined for this encounter.     Reason for consult: dizziness       HPI  The patient came in today for cardiac consult of No chief complaint on file.      Anne Farmer is a 68 y.o. female pt with HTN, HLD, HFPEF, aortic atherosc, sarcoidosis, chronic restrictive lung diseaes, DM2 presents for follow up up CV eval.     5/16/24  BP stable. HR 50s. BMI 26 - 165   She has been getting more dizzy and lightheaded along with LOZADA.   Last BNP elevated - takes HCTZ but still has more edema, and SOB.   She had prior nuc stress in 2016, will need further CV eval - ECHO, nuc stress once euvolemic.     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 She reports that she has been having dizziness for past 4 weeks. She also c/o intermittent SOB, lower extremity edema, right upper extremity weakness, right upper extremity tremor, decreased appetite, decreased oral intake, and weight loss.      Initial VSS, temp 98.9°, blood pressure 139/65, heart rate 66, respirations 18, 98% SpO2 on RA. Labs no leukocytosis, H/H 12/33, platelets 313, Na 129, K 3.0, Cl 94, CO2 19, Bun/Cr 16/1.5, glucose 143, normal LFTs, , troponin 0.030, urinalysis with proteinuria, cloudy appearance, WBC 11, few bacteria. Urine culture is in progress. EKG NSR, 63, CXR some vascular congestion. CT head without  contrast- Atrophy and chronic white matter changes. MRI brain synaptic stealth with and without contrast was done which demonstrated no acute process, again notes atrophy and chronic microvascular ischemic changes.   Pt received potassium supplementation and admitted to Hospital Medicine for hyponatremia, hypokalemia, Mild SAGRARIO, and mild troponin elevation.  8/5- Pt seen and examined, VSS Afeb, Looks and feels much better. Her labs have mostly improved, SAGRARIO resolved, K corrected. Na still at 128 which is chronic. Dizziness is chronic and stable as well. She is eating drinking well, walking around well with PT/OT about 400 feet. She was seen and examined and deemed stable for discharge back to SNF today.       BNP (pg/mL)   Date Value   09/05/2024 166 (H)   08/02/2024 220 (H)   07/23/2024 77   06/02/2024 271 (H)   03/24/2024 197 (H)        She was admitted in Aug for dizziness with dec PO intake, edema, weakness and hyponatremia, hypokal and mild trop elevated.   BP and HR stable today. BMI 22 - 140 lbs   BNP improving  She has no appetite, is doing protein drinks/shakes now.   She has started Remeron for sleep and appetite        Results for orders placed during the hospital encounter of 05/22/24    Echo    Interpretation Summary    Left Ventricle: The left ventricle is normal in size. Normal wall thickness. There is concentric remodeling. Normal wall motion. Ejection fraction by visual approximation is 60%. There is indeterminate diastolic function.    Right Ventricle: Normal right ventricular cavity size. Wall thickness is normal. Systolic function is normal.    Aortic Valve: The aortic valve is a trileaflet valve. There is mild aortic regurgitation.    Mitral Valve: There is mild regurgitation.    Tricuspid Valve: There is moderate regurgitation.    Pulmonary Artery: The estimated pulmonary artery systolic pressure is 49 mmHg.    IVC/SVC: Normal venous pressure at 3 mmHg.      ECG  Sinus bradycardia with sinus  arrhythmia   Right bundle branch block   Left anterior fascicular block    Bifascicular block   LVH with repolarization abnormality   Cannot rule out Septal infarct ,age undetermined   Abnormal ECG   When compared with ECG of 2022 12:33,   Minimal criteria for Septal infarct are now Present   T wave inversion now evident in Lateral leads   Confirmed by REJI LOPEZ MD ([id number]) on 2024 3:43:46 PM     No cardiac monitor results found for the past 12 months         Past Medical History:   Diagnosis Date    Allergy     Arthritis     Cancer 2016    colon    CHF (congestive heart failure)     Colon cancer 2004    Colon cancer screening 2015    Diabetes mellitus type 2 in nonobese 3/9/2016    borderline    Diverticulosis     DM (diabetes mellitus) 2016    BS didn't check 2019    DM (diabetes mellitus) 2016    BS didn't check 2020    Hyperlipidemia 10/25/2016    Hypertension     Insomnia     Malignant neoplasm of colon 2021    Malignant neoplasm of lower-outer quadrant of left breast of female, estrogen receptor positive 2022       Past Surgical History:   Procedure Laterality Date    BREAST BIOPSY Left     BREAST LUMPECTOMY Left      SECTION      COLON SURGERY      COLONOSCOPY N/A 2015    Procedure: COLONOSCOPY;  Surgeon: Adela Sam MD;  Location: [facility] ENDO;  Service: Endoscopy;  Laterality: N/A;    COLONOSCOPY N/A 2017    Procedure: COLONOSCOPY;  Surgeon: Chintan Flores MD;  Location: [facility] ENDO;  Service: Endoscopy;  Laterality: N/A;    COLONOSCOPY N/A 2020    Procedure: COLONOSCOPY;  Surgeon: Grisel Atkins MD;  Location: [facility] ENDO;  Service: Endoscopy;  Laterality: N/A;    SENTINEL LYMPH NODE BIOPSY Left 2022    Procedure: BIOPSY, LYMPH NODE, SENTINEL;  Surgeon: Arvin Hernandez MD;  Location: [facility] OR;  Service: General;  Laterality: Left;       Social History     Tobacco Use    Smoking status: Never     Passive exposure: Never     Smokeless tobacco: Never   Substance Use Topics    Alcohol use: Yes     Alcohol/week: 0.0 standard drinks of alcohol     Comment: weekends.       Drug use: No       Family History   Problem Relation Name Age of Onset    Hypertension Mother      Diabetes Mother      Hypertension Father      Hypertension Sister      Hypertension Brother      Hypertension Sister      Hypertension Sister      Hypertension Sister      Hypertension Brother      Hypertension Brother         Patient's Medications   New Prescriptions    No medications on file   Previous Medications    ALBUTEROL (PROVENTIL/VENTOLIN HFA) 90 MCG/ACTUATION INHALER    INHALE 1-2 PUFFS BY MOUTH EVERY 6 HOURS AS NEEDED FOR WHEEZE OR SHORTNESS OF BREATH    AMLODIPINE (NORVASC) 10 MG TABLET    TAKE 1 TABLET BY MOUTH EVERY DAY    ANASTROZOLE (ARIMIDEX) 1 MG TAB    TAKE 1 TABLET BY MOUTH EVERY DAY    AZELASTINE (ASTELIN) 137 MCG (0.1 %) NASAL SPRAY    2 sprays (274 mcg total) by Nasal route 2 (two) times daily.    CYCLOBENZAPRINE (FLEXERIL) 5 MG TABLET    TAKE 1 TABLET BY MOUTH THREE TIMES A DAY AS NEEDED FOR MUSCLE SPASM    FLUCONAZOLE (DIFLUCAN) 200 MG TAB    Take 1 tablet (200 mg total) by mouth once daily.    FLUTICASONE PROPIONATE (FLONASE) 50 MCG/ACTUATION NASAL SPRAY    2 sprays (100 mcg total) by Each Nostril route once daily.    FUROSEMIDE (LASIX) 20 MG TABLET    Take 1 tablet (20 mg total) by mouth once daily.    KLOR-CON 10 10 MEQ TBSR    Take 20 mEq by mouth daily as needed.    LEVOCETIRIZINE (XYZAL) 5 MG TABLET    Take 1 tablet (5 mg total) by mouth every evening.    LOSARTAN (COZAAR) 100 MG TABLET    TAKE 1 TABLET BY MOUTH EVERY DAY    MAGNESIUM OXIDE (MAG-OX) 400 MG (241.3 MG MAGNESIUM) TABLET    Take 1 tablet (400 mg total) by mouth once daily.    MECLIZINE (ANTIVERT) 12.5 MG TABLET    Take 1 tablet (12.5 mg total) by mouth 3 (three) times daily as needed for Dizziness.    MIRTAZAPINE (REMERON) 30 MG TABLET    TAKE 1 TABLET BY MOUTH EVERY EVENING.     "MULTIVITAMIN (ONE DAILY MULTIVITAMIN) PER TABLET    Take 1 tablet by mouth once daily.    OMEPRAZOLE (PRILOSEC) 20 MG CAPSULE    TAKE 1 CAPSULE BY MOUTH EVERY DAY    ONDANSETRON (ZOFRAN-ODT) 4 MG TBDL    Take 1 tablet (4 mg total) by mouth every 8 (eight) hours as needed (n/v).    PRAVASTATIN (PRAVACHOL) 40 MG TABLET    Take 1 tablet (40 mg total) by mouth once daily.    PROMETHAZINE (PHENERGAN) 25 MG TABLET    Take 1 tablet (25 mg total) by mouth every 6 (six) hours as needed for Nausea.    TRAZODONE (DESYREL) 50 MG TABLET    TAKE 1 TABLET BY MOUTH EVERY DAY AT NIGHT   Modified Medications    No medications on file   Discontinued Medications    No medications on file       Review of Systems   Constitutional:  Positive for malaise/fatigue.   HENT: Negative.     Eyes: Negative.    Respiratory:  Positive for shortness of breath.    Cardiovascular:  Positive for orthopnea and leg swelling.   Gastrointestinal: Negative.    Genitourinary: Negative.    Musculoskeletal: Negative.    Skin: Negative.    Neurological:  Positive for dizziness.   Endo/Heme/Allergies: Negative.    Psychiatric/Behavioral: Negative.     All 12 systems otherwise negative.      Wt Readings from Last 3 Encounters:   10/01/24 63.9 kg (140 lb 14 oz)   09/05/24 65.4 kg (144 lb 2.9 oz)   08/12/24 66.5 kg (146 lb 9.7 oz)     Temp Readings from Last 3 Encounters:   09/05/24 97.9 °F (36.6 °C) (Tympanic)   08/12/24 98 °F (36.7 °C)   08/05/24 98.2 °F (36.8 °C) (Oral)     BP Readings from Last 3 Encounters:   10/01/24 136/65   09/05/24 120/70   08/12/24 118/60     Pulse Readings from Last 3 Encounters:   10/01/24 71   09/05/24 66   08/12/24 73       /65   Pulse 71   Resp 16   Ht 5' 6" (1.676 m)   Wt 63.9 kg (140 lb 14 oz)   SpO2 99%   BMI 22.74 kg/m²     Objective:   Physical Exam  Vitals and nursing note reviewed.   Constitutional:       General: She is not in acute distress.     Appearance: She is well-developed. She is not diaphoretic.   HENT: "      Head: Normocephalic and atraumatic.      Nose: Nose normal.   Eyes:      General: No scleral icterus.     Conjunctiva/sclera: Conjunctivae normal.   Neck:      Thyroid: No thyromegaly.      Vascular: No JVD.   Cardiovascular:      Rate and Rhythm: Normal rate and regular rhythm.      Heart sounds: S1 normal and S2 normal. Murmur heard.      No friction rub. No gallop. No S3 or S4 sounds.   Pulmonary:      Effort: Pulmonary effort is normal. No respiratory distress.      Breath sounds: Normal breath sounds. No stridor. No wheezing or rales.   Chest:      Chest wall: No tenderness.   Abdominal:      General: Bowel sounds are normal. There is no distension.      Palpations: Abdomen is soft. There is no mass.      Tenderness: There is no abdominal tenderness. There is no rebound.   Genitourinary:     Comments: Deferred  Musculoskeletal:         General: No tenderness or deformity. Normal range of motion.      Cervical back: Normal range of motion and neck supple.      Right lower leg: Edema present.      Left lower leg: Edema present.   Lymphadenopathy:      Cervical: No cervical adenopathy.   Skin:     General: Skin is warm and dry.      Coloration: Skin is not pale.      Findings: No erythema or rash.   Neurological:      Mental Status: She is alert and oriented to person, place, and time.      Motor: No abnormal muscle tone.      Coordination: Coordination normal.   Psychiatric:         Behavior: Behavior normal.         Thought Content: Thought content normal.         Judgment: Judgment normal.         Lab Results   Component Value Date     (L) 09/05/2024    K 4.3 09/05/2024     09/05/2024    CO2 17 (L) 09/05/2024    BUN 12 09/05/2024    CREATININE 1.1 09/05/2024     09/05/2024    HGBA1C 6.7 (H) 05/10/2024    MG 2.0 08/03/2024    AST 36 09/05/2024    ALT 21 09/05/2024    ALBUMIN 4.4 09/05/2024    PROT 8.7 (H) 09/05/2024    BILITOT 0.4 09/05/2024    WBC 5.61 09/05/2024    HGB 11.9 (L)  09/05/2024    HCT 35.2 (L) 09/05/2024    MCV 91 09/05/2024     09/05/2024    INR 0.9 06/03/2024    TSH 4.065 (H) 08/03/2024    CHOL 208 (H) 06/02/2024    HDL 86 (H) 06/02/2024    LDLCALC 106.4 06/02/2024    TRIG 78 06/02/2024     (H) 09/05/2024         BNP (pg/mL)   Date Value   09/05/2024 166 (H)   08/02/2024 220 (H)   07/23/2024 77   06/02/2024 271 (H)   03/24/2024 197 (H)     INR (no units)   Date Value   06/03/2024 0.9   06/02/2024 1.0   04/03/2012 0.9          Assessment:      1. Diastolic dysfunction    2. Primary hypertension    3. Hyperlipidemia, unspecified hyperlipidemia type    4. DM type 2 with diabetic dyslipidemia    5. Dizziness    6. Chronic restrictive lung disease    7. LOZADA (dyspnea on exertion)    8. History of stroke    9. Cryptococcal meningoencephalitis          Plan:     1  Dizziness, dyspnea   - ECHO 5/2024 with normal bi V function, DD, mild AI, mild MR, mod TR, PASP 49 mmHg  - hosp - Aug 24 for dizziness with dec PO intake, edema, weakness and hyponatremia, hypokal and mild trop elevated.     2. HTN, diastolic dysfunction with LOZADA , min elevated trop -->  BNP improving  - titrate meds ,rec compression stockings  - cont diuretics, low salt diet - lasix PRN  -ECHO 5/2024 with normal bi V function, DD, mild AI, mild MR, mod TR, PASP 49 mmHg  - order LE u/s venous - r/o DVT    3. Chronic restrictive lung disease, Sarcoid  - cont tx - on inhalers     4. Aortic atherosc, HLD, DM2  - cont to monitor - not on DM meds now  - cont statin    5. H/O breast CA  - cont tx and f/u heme/onc   - on Arimidex     6. H/O brainstem stroke, cryptococcal meningitis  - cont tx and f/u with neuro    Visit today included increased complexity associated with the care of the episodic problem dyspnea addressed and managing the longitudinal care of the patient due to the serious and/or complex managed problem(s) .    Thank you for allowing me to participate in this patient's care. Please do not hesitate to  contact me with any questions or concerns. Consult note has been forwarded to the referral physician.

## 2024-10-02 DIAGNOSIS — E11.9 TYPE 2 DIABETES MELLITUS WITHOUT COMPLICATION: ICD-10-CM

## 2024-10-09 ENCOUNTER — HOSPITAL ENCOUNTER (OUTPATIENT)
Dept: CARDIOLOGY | Facility: HOSPITAL | Age: 69
Discharge: HOME | End: 2024-10-09
Attending: INTERNAL MEDICINE
Payer: MEDICARE

## 2024-10-09 VITALS — HEIGHT: 66 IN | WEIGHT: 140 LBS | BODY MASS INDEX: 22.5 KG/M2

## 2024-10-09 DIAGNOSIS — R60.0 LOCALIZED EDEMA: ICD-10-CM

## 2024-10-09 PROCEDURE — 93970 EXTREMITY STUDY: CPT

## 2024-10-09 PROCEDURE — 93970 EXTREMITY STUDY: CPT | Mod: 26,,, | Performed by: INTERNAL MEDICINE

## 2024-10-10 ENCOUNTER — TELEPHONE (OUTPATIENT)
Dept: CARDIOLOGY | Facility: CLINIC | Age: 69
End: 2024-10-10
Payer: MEDICARE

## 2024-10-10 NOTE — TELEPHONE ENCOUNTER
Ferdinand Woodson MD [facility] Staff  Please contact the patient and let them know that their results of leg ultrasound is negative for clots in leg veins. Thanks    No answer lvm to return call

## 2024-10-11 ENCOUNTER — OFFICE VISIT (OUTPATIENT)
Dept: INFECTIOUS DISEASES | Facility: CLINIC | Age: 69
End: 2024-10-11
Payer: MEDICARE

## 2024-10-11 ENCOUNTER — TELEPHONE (OUTPATIENT)
Dept: INFECTIOUS DISEASES | Facility: CLINIC | Age: 69
End: 2024-10-11
Payer: MEDICARE

## 2024-10-11 VITALS
SYSTOLIC BLOOD PRESSURE: 129 MMHG | DIASTOLIC BLOOD PRESSURE: 74 MMHG | WEIGHT: 136.69 LBS | BODY MASS INDEX: 22.06 KG/M2 | RESPIRATION RATE: 18 BRPM | HEART RATE: 76 BPM | TEMPERATURE: 98 F

## 2024-10-11 DIAGNOSIS — E78.5 HYPERLIPIDEMIA, UNSPECIFIED HYPERLIPIDEMIA TYPE: ICD-10-CM

## 2024-10-11 DIAGNOSIS — E78.5 DM TYPE 2 WITH DIABETIC DYSLIPIDEMIA: ICD-10-CM

## 2024-10-11 DIAGNOSIS — I10 PRIMARY HYPERTENSION: ICD-10-CM

## 2024-10-11 DIAGNOSIS — B45.1 CRYPTOCOCCAL MENINGOENCEPHALITIS: Primary | ICD-10-CM

## 2024-10-11 DIAGNOSIS — E11.69 DM TYPE 2 WITH DIABETIC DYSLIPIDEMIA: ICD-10-CM

## 2024-10-11 PROCEDURE — 99999 PR PBB SHADOW E&M-EST. PATIENT-LVL IV: CPT | Mod: PBBFAC,,, | Performed by: STUDENT IN AN ORGANIZED HEALTH CARE EDUCATION/TRAINING PROGRAM
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
## 2024-10-14 ENCOUNTER — PATIENT OUTREACH (OUTPATIENT)
Dept: ADMINISTRATIVE | Facility: HOSPITAL | Age: 69
End: 2024-10-14
Payer: MEDICARE

## 2024-10-15 RX ORDER — TRAZODONE HYDROCHLORIDE 50 MG/1
TABLET ORAL
Qty: 90 TABLET | Refills: 3 | Status: SHIPPED | OUTPATIENT
Start: 2024-10-15

## 2024-10-15 NOTE — TELEPHONE ENCOUNTER
No care due was identified.  [facility] Embedded Care Due Messages. Reference number: [id number].   10/15/2024 1:23:41 PM CDT

## 2024-10-15 NOTE — TELEPHONE ENCOUNTER
Refill Decision Note   Anne Farmer  is requesting a refill authorization.  Brief Assessment and Rationale for Refill:  Approve     Medication Therapy Plan:        Comments:     Note composed:5:41 PM 10/15/2024

## 2024-10-17 ENCOUNTER — OFFICE VISIT (OUTPATIENT)
Dept: FAMILY MEDICINE | Facility: CLINIC | Age: 69
End: 2024-10-17
Payer: MEDICARE

## 2024-10-17 VITALS
DIASTOLIC BLOOD PRESSURE: 60 MMHG | SYSTOLIC BLOOD PRESSURE: 110 MMHG | HEART RATE: 88 BPM | HEIGHT: 66 IN | WEIGHT: 135.69 LBS | TEMPERATURE: 98 F | BODY MASS INDEX: 21.81 KG/M2 | OXYGEN SATURATION: 95 %

## 2024-10-17 DIAGNOSIS — Z23 NEED FOR VACCINATION: ICD-10-CM

## 2024-10-17 DIAGNOSIS — E11.69 DM TYPE 2 WITH DIABETIC DYSLIPIDEMIA: Primary | ICD-10-CM

## 2024-10-17 DIAGNOSIS — E78.5 DM TYPE 2 WITH DIABETIC DYSLIPIDEMIA: Primary | ICD-10-CM

## 2024-10-17 PROCEDURE — 1101F PT FALLS ASSESS-DOCD LE1/YR: CPT | Mod: CPTII,S$GLB,, | Performed by: NURSE PRACTITIONER

## 2024-10-17 PROCEDURE — 3066F NEPHROPATHY DOC TX: CPT | Mod: CPTII,S$GLB,, | Performed by: NURSE PRACTITIONER

## 2024-10-17 PROCEDURE — 90750 HZV VACC RECOMBINANT IM: CPT | Mod: S$GLB,,, | Performed by: NURSE PRACTITIONER

## 2024-10-17 PROCEDURE — 99999 PR PBB SHADOW E&M-EST. PATIENT-LVL V: CPT | Mod: PBBFAC,,, | Performed by: NURSE PRACTITIONER

## 2024-10-17 PROCEDURE — 3060F POS MICROALBUMINURIA REV: CPT | Mod: CPTII,S$GLB,, | Performed by: NURSE PRACTITIONER

## 2024-10-17 PROCEDURE — 1126F AMNT PAIN NOTED NONE PRSNT: CPT | Mod: CPTII,S$GLB,, | Performed by: NURSE PRACTITIONER

## 2024-10-17 PROCEDURE — 3008F BODY MASS INDEX DOCD: CPT | Mod: CPTII,S$GLB,, | Performed by: NURSE PRACTITIONER

## 2024-10-17 PROCEDURE — 3074F SYST BP LT 130 MM HG: CPT | Mod: CPTII,S$GLB,, | Performed by: NURSE PRACTITIONER

## 2024-10-17 PROCEDURE — 90472 IMMUNIZATION ADMIN EACH ADD: CPT | Mod: S$GLB,,, | Performed by: NURSE PRACTITIONER

## 2024-10-17 PROCEDURE — 3078F DIAST BP <80 MM HG: CPT | Mod: CPTII,S$GLB,, | Performed by: NURSE PRACTITIONER

## 2024-10-17 PROCEDURE — 90653 IIV ADJUVANT VACCINE IM: CPT | Mod: S$GLB,,, | Performed by: NURSE PRACTITIONER

## 2024-10-17 PROCEDURE — 4010F ACE/ARB THERAPY RXD/TAKEN: CPT | Mod: CPTII,S$GLB,, | Performed by: NURSE PRACTITIONER

## 2024-10-17 PROCEDURE — 3044F HG A1C LEVEL LT 7.0%: CPT | Mod: CPTII,S$GLB,, | Performed by: NURSE PRACTITIONER

## 2024-10-17 PROCEDURE — 3288F FALL RISK ASSESSMENT DOCD: CPT | Mod: CPTII,S$GLB,, | Performed by: NURSE PRACTITIONER

## 2024-10-17 PROCEDURE — G0008 ADMIN INFLUENZA VIRUS VAC: HCPCS | Mod: S$GLB,,, | Performed by: NURSE PRACTITIONER

## 2024-10-17 PROCEDURE — 99214 OFFICE O/P EST MOD 30 MIN: CPT | Mod: 25,S$GLB,, | Performed by: NURSE PRACTITIONER

## 2024-10-17 RX ORDER — ZOSTER VACCINE RECOMBINANT, ADJUVANTED 50 MCG/0.5
0.5 KIT INTRAMUSCULAR ONCE
Qty: 1 EACH | Refills: 0 | Status: SHIPPED | OUTPATIENT
Start: 2024-10-17 | End: 2024-10-17 | Stop reason: CLARIF

## 2024-10-21 ENCOUNTER — LAB VISIT (OUTPATIENT)
Dept: LAB | Facility: HOSPITAL | Age: 69
End: 2024-10-21
Attending: INTERNAL MEDICINE
Payer: MEDICARE

## 2024-10-21 DIAGNOSIS — Z17.0 MALIGNANT NEOPLASM OF LOWER-OUTER QUADRANT OF LEFT BREAST OF FEMALE, ESTROGEN RECEPTOR POSITIVE: ICD-10-CM

## 2024-10-21 DIAGNOSIS — E11.9 TYPE 2 DIABETES MELLITUS WITHOUT COMPLICATION: ICD-10-CM

## 2024-10-21 DIAGNOSIS — C50.512 MALIGNANT NEOPLASM OF LOWER-OUTER QUADRANT OF LEFT BREAST OF FEMALE, ESTROGEN RECEPTOR POSITIVE: ICD-10-CM

## 2024-10-21 DIAGNOSIS — Z85.038 HISTORY OF COLON CANCER, STAGE I: ICD-10-CM

## 2024-10-21 LAB
ALBUMIN SERPL BCP-MCNC: 3.8 G/DL (ref 3.5–5.2)
ALBUMIN/CREAT UR: 190.2 UG/MG (ref 0–30)
ALP SERPL-CCNC: 81 U/L (ref 40–150)
ALT SERPL W/O P-5'-P-CCNC: 20 U/L (ref 10–44)
ANION GAP SERPL CALC-SCNC: 13 MMOL/L (ref 8–16)
AST SERPL-CCNC: 31 U/L (ref 10–40)
BASOPHILS # BLD AUTO: 0.05 K/UL (ref 0–0.2)
BASOPHILS NFR BLD: 1 % (ref 0–1.9)
BILIRUB SERPL-MCNC: 0.2 MG/DL (ref 0.1–1)
BUN SERPL-MCNC: 9 MG/DL (ref 8–23)
CALCIUM SERPL-MCNC: 9.7 MG/DL (ref 8.7–10.5)
CEA SERPL-MCNC: 6.8 NG/ML (ref 0–5)
CHLORIDE SERPL-SCNC: 99 MMOL/L (ref 95–110)
CO2 SERPL-SCNC: 25 MMOL/L (ref 23–29)
CREAT SERPL-MCNC: 0.9 MG/DL (ref 0.5–1.4)
CREAT UR-MCNC: 132 MG/DL (ref 15–325)
DIFFERENTIAL METHOD BLD: ABNORMAL
EOSINOPHIL # BLD AUTO: 0.2 K/UL (ref 0–0.5)
EOSINOPHIL NFR BLD: 3.3 % (ref 0–8)
ERYTHROCYTE [DISTWIDTH] IN BLOOD BY AUTOMATED COUNT: 13.4 % (ref 11.5–14.5)
EST. GFR  (NO RACE VARIABLE): >60 ML/MIN/1.73 M^2
GLUCOSE SERPL-MCNC: 188 MG/DL (ref 70–110)
HCT VFR BLD AUTO: 34.3 % (ref 37–48.5)
HGB BLD-MCNC: 10.9 G/DL (ref 12–16)
IMM GRANULOCYTES # BLD AUTO: 0.03 K/UL (ref 0–0.04)
IMM GRANULOCYTES NFR BLD AUTO: 0.6 % (ref 0–0.5)
LYMPHOCYTES # BLD AUTO: 0.5 K/UL (ref 1–4.8)
LYMPHOCYTES NFR BLD: 10.3 % (ref 18–48)
MCH RBC QN AUTO: 30.2 PG (ref 27–31)
MCHC RBC AUTO-ENTMCNC: 31.8 G/DL (ref 32–36)
MCV RBC AUTO: 95 FL (ref 82–98)
MICROALBUMIN UR DL<=1MG/L-MCNC: 251 UG/ML
MONOCYTES # BLD AUTO: 0.3 K/UL (ref 0.3–1)
MONOCYTES NFR BLD: 6 % (ref 4–15)
NEUTROPHILS # BLD AUTO: 3.8 K/UL (ref 1.8–7.7)
NEUTROPHILS NFR BLD: 78.8 % (ref 38–73)
NRBC BLD-RTO: 0 /100 WBC
PLATELET # BLD AUTO: 346 K/UL (ref 150–450)
PMV BLD AUTO: 8 FL (ref 9.2–12.9)
POTASSIUM SERPL-SCNC: 3.4 MMOL/L (ref 3.5–5.1)
PROT SERPL-MCNC: 7.9 G/DL (ref 6–8.4)
RBC # BLD AUTO: 3.61 M/UL (ref 4–5.4)
SODIUM SERPL-SCNC: 137 MMOL/L (ref 136–145)
WBC # BLD AUTO: 4.84 K/UL (ref 3.9–12.7)

## 2024-10-21 PROCEDURE — 82043 UR ALBUMIN QUANTITATIVE: CPT | Performed by: FAMILY MEDICINE

## 2024-10-21 PROCEDURE — 36415 COLL VENOUS BLD VENIPUNCTURE: CPT | Performed by: INTERNAL MEDICINE

## 2024-10-21 PROCEDURE — 82378 CARCINOEMBRYONIC ANTIGEN: CPT | Performed by: INTERNAL MEDICINE

## 2024-10-21 PROCEDURE — 80053 COMPREHEN METABOLIC PANEL: CPT | Performed by: INTERNAL MEDICINE

## 2024-10-21 PROCEDURE — 85025 COMPLETE CBC W/AUTO DIFF WBC: CPT | Performed by: INTERNAL MEDICINE

## 2024-10-21 PROCEDURE — 82570 ASSAY OF URINE CREATININE: CPT | Performed by: FAMILY MEDICINE

## 2024-10-22 ENCOUNTER — OFFICE VISIT (OUTPATIENT)
Dept: HEMATOLOGY/ONCOLOGY | Facility: CLINIC | Age: 69
End: 2024-10-22
Payer: MEDICARE

## 2024-10-22 VITALS
SYSTOLIC BLOOD PRESSURE: 115 MMHG | DIASTOLIC BLOOD PRESSURE: 63 MMHG | HEART RATE: 83 BPM | BODY MASS INDEX: 22.15 KG/M2 | OXYGEN SATURATION: 97 % | WEIGHT: 137.81 LBS | TEMPERATURE: 98 F | HEIGHT: 66 IN

## 2024-10-22 DIAGNOSIS — Z79.899 OTHER LONG TERM (CURRENT) DRUG THERAPY: ICD-10-CM

## 2024-10-22 DIAGNOSIS — C50.412 MALIGNANT NEOPLASM OF UPPER-OUTER QUADRANT OF LEFT BREAST IN FEMALE, ESTROGEN RECEPTOR POSITIVE: ICD-10-CM

## 2024-10-22 DIAGNOSIS — R11.2 NAUSEA AND VOMITING, UNSPECIFIED VOMITING TYPE: ICD-10-CM

## 2024-10-22 DIAGNOSIS — C50.512 MALIGNANT NEOPLASM OF LOWER-OUTER QUADRANT OF LEFT BREAST OF FEMALE, ESTROGEN RECEPTOR POSITIVE: ICD-10-CM

## 2024-10-22 DIAGNOSIS — Z17.0 MALIGNANT NEOPLASM OF LOWER-OUTER QUADRANT OF LEFT BREAST OF FEMALE, ESTROGEN RECEPTOR POSITIVE: ICD-10-CM

## 2024-10-22 DIAGNOSIS — Z17.0 MALIGNANT NEOPLASM OF UPPER-OUTER QUADRANT OF LEFT BREAST IN FEMALE, ESTROGEN RECEPTOR POSITIVE: ICD-10-CM

## 2024-10-22 DIAGNOSIS — Z85.038 HISTORY OF COLON CANCER, STAGE I: Chronic | ICD-10-CM

## 2024-10-22 DIAGNOSIS — Z79.811 AROMATASE INHIBITOR USE: ICD-10-CM

## 2024-10-22 DIAGNOSIS — C50.512 MALIGNANT NEOPLASM OF LOWER-OUTER QUADRANT OF LEFT BREAST OF FEMALE, ESTROGEN RECEPTOR POSITIVE: Primary | ICD-10-CM

## 2024-10-22 DIAGNOSIS — Z17.0 MALIGNANT NEOPLASM OF LOWER-OUTER QUADRANT OF LEFT BREAST OF FEMALE, ESTROGEN RECEPTOR POSITIVE: Primary | ICD-10-CM

## 2024-10-22 DIAGNOSIS — Z85.038 HISTORY OF COLON CANCER, STAGE I: Primary | ICD-10-CM

## 2024-10-22 DIAGNOSIS — R91.8 PULMONARY NODULES/LESIONS, MULTIPLE: ICD-10-CM

## 2024-10-22 PROCEDURE — 3044F HG A1C LEVEL LT 7.0%: CPT | Mod: CPTII,S$GLB,,

## 2024-10-22 PROCEDURE — 3060F POS MICROALBUMINURIA REV: CPT | Mod: CPTII,S$GLB,,

## 2024-10-22 PROCEDURE — 3078F DIAST BP <80 MM HG: CPT | Mod: CPTII,S$GLB,,

## 2024-10-22 PROCEDURE — 1159F MED LIST DOCD IN RCRD: CPT | Mod: CPTII,S$GLB,,

## 2024-10-22 PROCEDURE — 1160F RVW MEDS BY RX/DR IN RCRD: CPT | Mod: CPTII,S$GLB,,

## 2024-10-22 PROCEDURE — 1126F AMNT PAIN NOTED NONE PRSNT: CPT | Mod: CPTII,S$GLB,,

## 2024-10-22 PROCEDURE — 3066F NEPHROPATHY DOC TX: CPT | Mod: CPTII,S$GLB,,

## 2024-10-22 PROCEDURE — 1101F PT FALLS ASSESS-DOCD LE1/YR: CPT | Mod: CPTII,S$GLB,,

## 2024-10-22 PROCEDURE — 3288F FALL RISK ASSESSMENT DOCD: CPT | Mod: CPTII,S$GLB,,

## 2024-10-22 PROCEDURE — 99215 OFFICE O/P EST HI 40 MIN: CPT | Mod: S$GLB,,,

## 2024-10-22 PROCEDURE — 99999 PR PBB SHADOW E&M-EST. PATIENT-LVL V: CPT | Mod: PBBFAC,,,

## 2024-10-22 PROCEDURE — 3074F SYST BP LT 130 MM HG: CPT | Mod: CPTII,S$GLB,,

## 2024-10-22 PROCEDURE — 4010F ACE/ARB THERAPY RXD/TAKEN: CPT | Mod: CPTII,S$GLB,,

## 2024-10-22 PROCEDURE — 3008F BODY MASS INDEX DOCD: CPT | Mod: CPTII,S$GLB,,

## 2024-10-22 RX ORDER — PROCHLORPERAZINE MALEATE 5 MG
5 TABLET ORAL EVERY 6 HOURS PRN
Qty: 30 TABLET | Refills: 1 | Status: SHIPPED | OUTPATIENT
Start: 2024-10-22 | End: 2025-10-22

## 2024-10-22 NOTE — PROGRESS NOTES
Subjective:     Patient ID:?Anne Farmer is a 68 y.o. female.?? MR#: [id number]   ?   PRIMARY ONCOLOGIST: Dr. Murray  ?   CHIEF COMPLAINT: Follow-up ?????   ?   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   CURRENT TREATMENT:  anastrazole 1mg daily  Zometa q 6 months    PAST TREATMENT:  Radiation, completed 10/10/22    HPI  Mrs. Farmer is a pleasant 68 year old female with left breast cancer who presents today for follow-up after starting Arimidex. Per review of the medical record, after routine screening mammogram 4/2022 , an abnormality was found in left upper outer quadrant breast. She denies any personal history of prior breast biopsy.  No family history of malignancy including breast/ovarian that she is aware of.  She denies any breast concerns including no pain, skin change/distortion or nipple discharge.  She has never used hormone replacement therapy.     Follow-up diagnostic mammogram on 05/02/2022 showed to areas of abnormality in close proximity 6 mm and 8 mm area spanning 1.3 cm per report.  No lymphadenopathy appreciated.  05/12/2022 biopsy showed invasive ductal carcinoma with mucinous features grade 2, no lymphovascular invasion, ER 95%, KY 40-45%, HER2 2+ IHC, fish negative     She is in good health follows with primary care for chronic medical issues including type 2 diabetes hypertension hyperlipidemia.  She has history of colon cancer stage I 2016 status post resection in surveillance most recent colonoscopy in June 2020 recommended 5 year follow-up per report.    Interval History: Today pt c/o of constant nausea, vomiting after eatign, and poor appetite. Denies d/c, bowel bladder changes, abdominal pain, fever, chills, sob, cp, hot flashes, rash, swelling.       Oncology History   Malignant neoplasm of lower-outer quadrant of left breast of female, estrogen  receptor positive   2022 Initial Diagnosis    Malignant neoplasm of lower-outer quadrant of left breast of female, estrogen receptor positive     2022 Cancer Staged    Staging form: Breast, AJCC 8th Edition  - Clinical stage from 2022: Stage IA (cT1c, cN0, cM0, G2, ER+, MS+, HER2-)     2022 Cancer Staged    Staging form: Breast, AJCC 8th Edition  - Pathologic stage from 2022: Stage IA (pT2, pN0, cM0, G2, ER+, MS+, HER2-, Oncotype DX score: 8)     2022 - 10/10/2022 Radiation Therapy    Treating physician: Niko  Total Dose: 40 Gy  Fractions: 15        Social History     Socioeconomic History    Marital status: Single   Occupational History     Employer: [employer]   Tobacco Use    Smoking status: Never     Passive exposure: Never    Smokeless tobacco: Never   Substance and Sexual Activity    Alcohol use: Yes     Alcohol/week: 0.0 standard drinks of alcohol     Comment: weekends.       Drug use: No    Sexual activity: Not Currently      Family History   Problem Relation Name Age of Onset    Hypertension Mother      Diabetes Mother      Hypertension Father      Hypertension Sister      Hypertension Brother      Hypertension Sister      Hypertension Sister      Hypertension Sister      Hypertension Brother      Hypertension Brother        Past Surgical History:   Procedure Laterality Date    BREAST BIOPSY Left     BREAST LUMPECTOMY Left      SECTION      COLON SURGERY      COLONOSCOPY N/A 2015    Procedure: COLONOSCOPY;  Surgeon: Adela Sam MD;  Location: [facility];  Service: Endoscopy;  Laterality: N/A;    COLONOSCOPY N/A 2017    Procedure: COLONOSCOPY;  Surgeon: Chintan Flores MD;  Location: [facility];  Service: Endoscopy;  Laterality: N/A;    COLONOSCOPY N/A 2020    Procedure: COLONOSCOPY;  Surgeon: Grisel Atkins MD;  Location: [facility];  Service: Endoscopy;  Laterality: N/A;    SENTINEL LYMPH NODE BIOPSY Left 2022    Procedure:  BIOPSY, LYMPH NODE, SENTINEL;  Surgeon: Arvin Hernandez MD;  Location: [facility];  Service: General;  Laterality: Left;        Review of Systems   Constitutional:  Negative for activity change, appetite change, chills, fatigue, fever and unexpected weight change.   HENT:  Negative for congestion, dental problem, mouth sores and nosebleeds.    Eyes:  Negative for visual disturbance.   Respiratory:  Negative for cough, choking and chest tightness.    Cardiovascular:  Negative for chest pain, palpitations and leg swelling.   Gastrointestinal:  Positive for nausea and vomiting. Negative for abdominal distention, abdominal pain, anal bleeding, blood in stool, constipation and diarrhea.   Endocrine: Negative.    Genitourinary:  Negative for dysuria, frequency, hematuria and urgency.   Musculoskeletal:  Negative for arthralgias, back pain, gait problem, joint swelling and myalgias.   Skin:  Negative for wound.   Allergic/Immunologic: Negative for immunocompromised state.   Neurological:  Negative for dizziness, light-headedness, numbness and headaches.   Hematological:  Negative for adenopathy. Does not bruise/bleed easily.   Psychiatric/Behavioral:  The patient is not nervous/anxious.        ?   A comprehensive 14-point review of systems was reviewed with patient and was negative other than as specified above.   ?     Objective:      Physical Exam  Vitals reviewed.   Constitutional:       Appearance: Normal appearance. She is not ill-appearing.   HENT:      Head: Normocephalic and atraumatic.      Right Ear: External ear normal.      Left Ear: External ear normal.      Nose: Nose normal.      Mouth/Throat:      Mouth: Mucous membranes are moist.      Pharynx: Oropharynx is clear.   Eyes:      Conjunctiva/sclera: Conjunctivae normal.   Cardiovascular:      Rate and Rhythm: Normal rate.   Pulmonary:      Effort: Pulmonary effort is normal.   Abdominal:      General: Abdomen is flat. There is no distension.      Palpations:  Abdomen is soft. There is no mass.      Tenderness: There is no abdominal tenderness. There is no guarding or rebound.      Hernia: No hernia is present.   Genitourinary:     Comments: deferred  Musculoskeletal:         General: Normal range of motion.      Cervical back: Normal range of motion.      Right lower leg: No edema.      Left lower leg: No edema.   Skin:     General: Skin is warm and dry.      Capillary Refill: Capillary refill takes less than 2 seconds.   Neurological:      Mental Status: She is alert and oriented to person, place, and time.      Motor: No weakness.   Psychiatric:         Mood and Affect: Mood normal.         Behavior: Behavior normal.           ?   Vitals:    10/22/24 1257   BP: 115/63   Pulse: 83   Temp: 97.7 °F (36.5 °C)      ?       ?   Laboratory:  ?   No visits with results within 1 Day(s) from this visit.   Latest known visit with results is:   Lab Visit on 10/21/2024   Component Date Value Ref Range Status    WBC 10/21/2024 4.84  3.90 - 12.70 K/uL Final    RBC 10/21/2024 3.61 (L)  4.00 - 5.40 M/uL Final    Hemoglobin 10/21/2024 10.9 (L)  12.0 - 16.0 g/dL Final    Hematocrit 10/21/2024 34.3 (L)  37.0 - 48.5 % Final    MCV 10/21/2024 95  82 - 98 fL Final    MCH 10/21/2024 30.2  27.0 - 31.0 pg Final    MCHC 10/21/2024 31.8 (L)  32.0 - 36.0 g/dL Final    RDW 10/21/2024 13.4  11.5 - 14.5 % Final    Platelets 10/21/2024 346  150 - 450 K/uL Final    MPV 10/21/2024 8.0 (L)  9.2 - 12.9 fL Final    Immature Granulocytes 10/21/2024 0.6 (H)  0.0 - 0.5 % Final    Gran # (ANC) 10/21/2024 3.8  1.8 - 7.7 K/uL Final    Immature Grans (Abs) 10/21/2024 0.03  0.00 - 0.04 K/uL Final    Lymph # 10/21/2024 0.5 (L)  1.0 - 4.8 K/uL Final    Mono # 10/21/2024 0.3  0.3 - 1.0 K/uL Final    Eos # 10/21/2024 0.2  0.0 - 0.5 K/uL Final    Baso # 10/21/2024 0.05  0.00 - 0.20 K/uL Final    nRBC 10/21/2024 0  0 /100 WBC Final    Gran % 10/21/2024 78.8 (H)  38.0 - 73.0 % Final    Lymph % 10/21/2024 10.3 (L)  18.0 -  48.0 % Final    Mono % 10/21/2024 6.0  4.0 - 15.0 % Final    Eosinophil % 10/21/2024 3.3  0.0 - 8.0 % Final    Basophil % 10/21/2024 1.0  0.0 - 1.9 % Final    Differential Method 10/21/2024 Automated   Final    Sodium 10/21/2024 137  136 - 145 mmol/L Final    Potassium 10/21/2024 3.4 (L)  3.5 - 5.1 mmol/L Final    Chloride 10/21/2024 99  95 - 110 mmol/L Final    CO2 10/21/2024 25  23 - 29 mmol/L Final    Glucose 10/21/2024 188 (H)  70 - 110 mg/dL Final    BUN 10/21/2024 9  8 - 23 mg/dL Final    Creatinine 10/21/2024 0.9  0.5 - 1.4 mg/dL Final    Calcium 10/21/2024 9.7  8.7 - 10.5 mg/dL Final    Total Protein 10/21/2024 7.9  6.0 - 8.4 g/dL Final    Albumin 10/21/2024 3.8  3.5 - 5.2 g/dL Final    Total Bilirubin 10/21/2024 0.2  0.1 - 1.0 mg/dL Final    Alkaline Phosphatase 10/21/2024 81  40 - 150 U/L Final    AST 10/21/2024 31  10 - 40 U/L Final    ALT 10/21/2024 20  10 - 44 U/L Final    eGFR 10/21/2024 >60  >60 mL/min/1.73 m^2 Final    Anion Gap 10/21/2024 13  8 - 16 mmol/L Final    CEA 10/21/2024 6.8 (H)  0.0 - 5.0 ng/mL Final    Microalbumin, Urine 10/21/2024 251.0  ug/mL Final    Creatinine, Urine 10/21/2024 132.0  15.0 - 325.0 mg/dL Final    Microalb/Creat Ratio 10/21/2024 190.2 (H)  0.0 - 30.0 ug/mg Final      ?   Imaging:    No results found. However, due to the size of the patient record, not all encounters were searched. Please check Results Review for a complete set of results.     Results for orders placed or performed during the hospital encounter of 08/02/24 (from the past 2160 hours)   CT Head Without Contrast    Narrative    EXAMINATION:  CT HEAD WITHOUT CONTRAST    CLINICAL HISTORY:  Neuro deficit, acute, stroke suspected;    TECHNIQUE:  Low dose axial CT images obtained throughout the head without intravenous contrast. Sagittal and coronal reconstructions were performed.    COMPARISON:  None.    FINDINGS:  Atrophy and chronic white matter changes    Ventricles and sulci are normal in size for age  without evidence of hydrocephalus. No extra-axial blood or fluid collections.    No parenchymal mass, hemorrhage, edema or major vascular distribution infarct.    Skull/extracranial contents (limited evaluation): No fracture. Mastoid air cells and paranasal sinuses are essentially clear.      Impression    Atrophy and chronic white matter changes i    No hemorrhage mass effect or midline shift    All CT scans   are performed using dose optimization techniques including the following: automated exposure control; adjustment of the mA and/or kV; use of iterative reconstruction technique.  Dose modulation was employed for ALARA by means of: Automated exposure control; adjustment of the mA and/or kV according to patient size (this includes techniques or standardized protocols for targeted exams where dose is matched to indication/reason for exam; i.e. extremities or head); and/or use of iterative reconstructive technique.      Electronically signed by: Simeon Cutler  Date:    08/02/2024  Time:    19:56        ?   Assessment/Plan:     Problem List Items Addressed This Visit       History of colon cancer, stage I (Chronic)     Diagnosed in 2016 with Stage I colon cancer s/p resection. Currently on surveillance    Pt currently with persistent nausea and vomiting after each meal   Plan for repeat imaging next available          Malignant neoplasm of lower-outer quadrant of left breast of female, estrogen receptor positive - Primary      Cancer Staging   Malignant neoplasm of lower-outer quadrant of left breast of female, estrogen receptor positive  Staging form: Breast, AJCC 8th Edition  - Clinical stage from 6/9/2022: Stage IA (cT1c, cN0, cM0, G2, ER+, KY+, HER2-) - Signed by Soraya Murray MD on 6/9/2022  - Pathologic stage from 9/12/2022: Stage IA (pT2, pN0, cM0, G2, ER+, KY+, HER2-, Oncotype DX score: 8) - Signed by Soraya Murray MD on 9/12/2022    Completed XRT 10/10/22  Initiated on Arimidex  10/31/2022  Last DEXA 09/2022 found osteopenia  Plan for Zometa q six months, last dose 07/2024  Continues on Ca and Vitamin D supplement    Plan for   History and Physical Exam: q3-6 months years 1-3, q6 months years 4-5  Mammogram: Yearly, starting 6 months post completion of radiation:    --There is no mammographic evidence of malignancy 07/2024  DEXA scan: q2 years   --07/05/23 No evidence of significant bone density loss     Continues on arimidex daily                Med Onc Chart Routing      Follow up with physician Other. f/u post CT   Follow up with JEFF    Infusion scheduling note    Injection scheduling note    Labs    Imaging CT chest abdomen pelvis      Pharmacy appointment    Other referrals                       DIANA GastonP-C  Hematology/Oncology

## 2024-10-22 NOTE — ASSESSMENT & PLAN NOTE
Cancer Staging   Malignant neoplasm of lower-outer quadrant of left breast of female, estrogen receptor positive  Staging form: Breast, AJCC 8th Edition  - Clinical stage from 6/9/2022: Stage IA (cT1c, cN0, cM0, G2, ER+, IL+, HER2-) - Signed by Soraya Murray MD on 6/9/2022  - Pathologic stage from 9/12/2022: Stage IA (pT2, pN0, cM0, G2, ER+, IL+, HER2-, Oncotype DX score: 8) - Signed by Soraya Murray MD on 9/12/2022    Completed XRT 10/10/22  Initiated on Arimidex 10/31/2022  Last DEXA 09/2022 found osteopenia  Plan for Zometa q six months, last dose 07/2024  Continues on Ca and Vitamin D supplement    Plan for   History and Physical Exam: q3-6 months years 1-3, q6 months years 4-5  Mammogram: Yearly, starting 6 months post completion of radiation:    --There is no mammographic evidence of malignancy 07/2024  DEXA scan: q2 years   --07/05/23 No evidence of significant bone density loss     Zometa   Continues on arimidex daily   Noted increase in CEA

## 2024-10-22 NOTE — ASSESSMENT & PLAN NOTE
Diagnosed in 2016 with Stage I colon cancer s/p resection. Currently on surveillance    Pt currently with persistent nausea and vomiting after each meal   Plan for repeat imaging next available    normal (ped)...

## 2024-10-29 ENCOUNTER — HOSPITAL ENCOUNTER (OUTPATIENT)
Dept: RADIOLOGY | Facility: HOSPITAL | Age: 69
Discharge: HOME | End: 2024-10-29
Payer: MEDICARE

## 2024-10-29 DIAGNOSIS — Z17.0 MALIGNANT NEOPLASM OF LOWER-OUTER QUADRANT OF LEFT BREAST OF FEMALE, ESTROGEN RECEPTOR POSITIVE: ICD-10-CM

## 2024-10-29 DIAGNOSIS — Z17.0 MALIGNANT NEOPLASM OF UPPER-OUTER QUADRANT OF LEFT BREAST IN FEMALE, ESTROGEN RECEPTOR POSITIVE: ICD-10-CM

## 2024-10-29 DIAGNOSIS — Z79.811 AROMATASE INHIBITOR USE: ICD-10-CM

## 2024-10-29 DIAGNOSIS — C50.512 MALIGNANT NEOPLASM OF LOWER-OUTER QUADRANT OF LEFT BREAST OF FEMALE, ESTROGEN RECEPTOR POSITIVE: ICD-10-CM

## 2024-10-29 DIAGNOSIS — C50.412 MALIGNANT NEOPLASM OF UPPER-OUTER QUADRANT OF LEFT BREAST IN FEMALE, ESTROGEN RECEPTOR POSITIVE: ICD-10-CM

## 2024-10-29 DIAGNOSIS — Z85.038 HISTORY OF COLON CANCER, STAGE I: ICD-10-CM

## 2024-10-29 PROCEDURE — 74177 CT ABD & PELVIS W/CONTRAST: CPT | Mod: TC

## 2024-10-29 PROCEDURE — 71260 CT THORAX DX C+: CPT | Mod: TC

## 2024-10-29 PROCEDURE — 71260 CT THORAX DX C+: CPT | Mod: 26,,, | Performed by: RADIOLOGY

## 2024-10-29 PROCEDURE — A9698 NON-RAD CONTRAST MATERIALNOC: HCPCS

## 2024-10-29 PROCEDURE — 74177 CT ABD & PELVIS W/CONTRAST: CPT | Mod: 26,,, | Performed by: RADIOLOGY

## 2024-10-29 PROCEDURE — 25500020 PHARM REV CODE 255

## 2024-10-29 RX ADMIN — IOHEXOL 100 ML: 350 INJECTION, SOLUTION INTRAVENOUS at 11:10

## 2024-10-29 RX ADMIN — IOHEXOL 500 ML: 12 SOLUTION ORAL at 11:10

## 2024-10-31 RX ORDER — CYCLOBENZAPRINE HCL 5 MG
TABLET ORAL
Qty: 30 TABLET | Refills: 0 | Status: SHIPPED | OUTPATIENT
Start: 2024-10-31

## 2024-11-05 ENCOUNTER — TELEPHONE (OUTPATIENT)
Dept: INFECTIOUS DISEASES | Facility: CLINIC | Age: 69
End: 2024-11-05
Payer: MEDICARE

## 2024-11-05 NOTE — TELEPHONE ENCOUNTER
----- Message from Lauryn sent at 11/5/2024  3:34 PM CST -----  Contact: Anne  .Type:  RX Refill Request    Who Called: Anne  Refill or New Rx:refill  RX Name and Strength:fluconazole (DIFLUCAN) 200 MG Tab  How is the patient currently taking it? (ex. 1XDay):twice a day  Is this a 30 day or 90 day RX:90  Preferred Pharmacy with phone number: .  [facility]/pharmacy #[id number] - [place], [place] - [address]  [address] [address]  Phone: [phone] Fax: [phone]  Local or Mail Order:local  Ordering Provider:  Would the patient rather a call back or a response via [facility]? Call back   Best Call Back Number:[phone]  Additional Information: she also stated about a paper that was suppose to fax over to the [facility] office she was trying to see if they faxed the paperwork over

## 2024-11-05 NOTE — TELEPHONE ENCOUNTER
Pt is requesting a rx for Diflucan. Pt also wants to know if document was completed and faxed. The document was brought to the [place] location but I emailed the document to [facility]. Please advise.

## 2024-11-06 ENCOUNTER — TELEPHONE (OUTPATIENT)
Dept: INFUSION THERAPY | Facility: HOSPITAL | Age: 69
End: 2024-11-06
Payer: MEDICARE

## 2024-11-06 ENCOUNTER — TELEPHONE (OUTPATIENT)
Dept: INFECTIOUS DISEASES | Facility: CLINIC | Age: 69
End: 2024-11-06
Payer: MEDICARE

## 2024-11-06 RX ORDER — FLUCONAZOLE 200 MG/1
200 TABLET ORAL DAILY
Qty: 90 TABLET | Refills: 3 | Status: SHIPPED | OUTPATIENT
Start: 2024-11-06 | End: 2025-11-06

## 2024-11-06 NOTE — TELEPHONE ENCOUNTER
----- Message from Dipika sent at 11/6/2024  2:08 PM CST -----  Contact: pt  Type:  Request to have form fax to Dr. Carvalho (pt's pcp)    Name of Caller: pt  Best Call Back Number:  [phone]  Additional Information: pt is calling and request if the form she dropped off at the office for unemployment is still at the office and can you all please fax to Dr. Amira Carvalho office for her.  Dr. Carvalho's fax is [phone]  Can you please call pt back to advise?

## 2024-11-06 NOTE — TELEPHONE ENCOUNTER
----- Message from Dipika sent at 11/6/2024  2:08 PM CST -----  Contact: pt  Type:  Request to have form fax to Dr. Carvalho (pt's pcp)    Name of Caller: pt  Best Call Back Number:  [phone]  Additional Information: pt is calling and request if the form she dropped off at the office for unemployment is still at the office and can you all please fax to Dr. Amira Carvalho office for her.  Dr. Carvalho's fax is [phone]  Can you please call pt back to advise?

## 2024-11-06 NOTE — TELEPHONE ENCOUNTER
Pt is asking for a fungal infection she took in the past from you. I see diflucan refill request. Pls advise.

## 2024-11-07 ENCOUNTER — TELEPHONE (OUTPATIENT)
Dept: INFECTIOUS DISEASES | Facility: CLINIC | Age: 69
End: 2024-11-07
Payer: MEDICARE

## 2024-11-07 DIAGNOSIS — C50.412 MALIGNANT NEOPLASM OF UPPER-OUTER QUADRANT OF LEFT BREAST IN FEMALE, ESTROGEN RECEPTOR POSITIVE: ICD-10-CM

## 2024-11-07 DIAGNOSIS — Z17.0 MALIGNANT NEOPLASM OF UPPER-OUTER QUADRANT OF LEFT BREAST IN FEMALE, ESTROGEN RECEPTOR POSITIVE: ICD-10-CM

## 2024-11-07 DIAGNOSIS — M85.80 OSTEOPENIA AFTER MENOPAUSE: ICD-10-CM

## 2024-11-07 DIAGNOSIS — Z78.0 OSTEOPENIA AFTER MENOPAUSE: ICD-10-CM

## 2024-11-07 RX ORDER — MECLIZINE HCL 12.5 MG 12.5 MG/1
12.5 TABLET ORAL 3 TIMES DAILY PRN
Qty: 30 TABLET | Refills: 1 | Status: SHIPPED | OUTPATIENT
Start: 2024-11-07

## 2024-11-07 RX ORDER — PROMETHAZINE HYDROCHLORIDE 25 MG/1
25 TABLET ORAL EVERY 6 HOURS PRN
Qty: 15 TABLET | Refills: 0 | Status: SHIPPED | OUTPATIENT
Start: 2024-11-07

## 2024-11-07 RX ORDER — FUROSEMIDE 20 MG/1
20 TABLET ORAL DAILY PRN
Qty: 30 TABLET | Refills: 1 | Status: SHIPPED | OUTPATIENT
Start: 2024-11-07 | End: 2025-11-07

## 2024-11-07 RX ORDER — ANASTROZOLE 1 MG/1
1 TABLET ORAL DAILY
Qty: 90 TABLET | Refills: 1 | Status: SHIPPED | OUTPATIENT
Start: 2024-11-07

## 2024-11-07 RX ORDER — MIRTAZAPINE 30 MG/1
30 TABLET, FILM COATED ORAL NIGHTLY
Qty: 90 TABLET | Refills: 1 | Status: SHIPPED | OUTPATIENT
Start: 2024-11-07

## 2024-11-07 NOTE — TELEPHONE ENCOUNTER
----- Message from Ileana sent at 11/7/2024 12:10 PM CST -----  Contact: patient  [phone]  .1MEDICALADVICE     Patient is calling for Medical Advice regarding:Patient is calling to check if form for work release was received. Please call and advise     How long has patient had these symptoms:    Pharmacy name and phone#:    Patient wants a call back or thru [facility]:    Comments:    Please advise patient replies from provider may take up to 48 hours.

## 2024-11-07 NOTE — TELEPHONE ENCOUNTER
----- Message from Ileana sent at 11/7/2024 12:20 PM CST -----  Contact: patient  [phone]  Requesting an RX refill or new RX.    Is this a refill or new RX:     RX name and strength furosemide (LASIX) 20 MG tablet    Is this a 30 day or 90 day RX: 90    Pharmacy name and phone #  [facility]pharm[id number] - [place], [place] - [address]  [address] [address]  Phone: [phone] Fax: [phone]          The doctors have asked that we provide their patients with the following 2 reminders -- prescription refills can take up to 72 hours, and a friendly reminder that in the future you can use your [facility] account to request refills: yes

## 2024-11-07 NOTE — TELEPHONE ENCOUNTER
No care due was identified.  [facility] Embedded Care Due Messages. Reference number: [id number].   11/07/2024 2:34:49 PM CST

## 2024-11-07 NOTE — TELEPHONE ENCOUNTER
----- Message from Ileana sent at 11/7/2024 12:18 PM CST -----  Contact: patient  [phone]  Requesting an RX refill or new RX.    Is this a refill or new RX:     RX name and strength promethazine (PHENERGAN) 25 MG tablet    Is this a 30 day or 90 day RX: 90    Pharmacy name and phone #  [facility]/pharmacy #[address] [place] [address] [address] [address]  Phone: [phone] Fax: [phone]      Requesting an RX refill or new RX.    Is this a refill or new RX:     RX name and strength meclizine (ANTIVERT) 12.5 mg tablet    Is this a 30 day or 90 day RX: 90    Pharmacy name and phone #  [facility]/pharmacy #[address] [place] [address], [address] [address]  Phone: [phone] Fax: [phone]    Requesting an RX refill or new RX.    Is this a refill or new RX:     RX name and strength mirtazapine (REMERON) 30 MG tablet    Is this a 30 day or 90 day RX:     Pharmacy name and phone #   [facility]/pharmacy #[address] [place] [address], [address] [address]  Phone: [phone] Fax: [phone]                            The doctors have asked that we provide their patients with the following 2 reminders -- prescription refills can take up to 72 hours, and a friendly reminder that in the future you can use your [facility] account to request refills: yes

## 2024-11-07 NOTE — TELEPHONE ENCOUNTER
----- Message from Ileana sent at 11/7/2024 12:09 PM CST -----  Contact: patient @  [phone]  .1MEDICALADVICE     Patient is calling for Medical Advice regarding:Patient is calling to check if her work release was sent to her PCP . Please call and let her know    How long has patient had these symptoms:    Pharmacy name and phone#:    Patient wants a call back or thru [facility]:call     Comments:    Please advise patient replies from provider may take up to 48 hours.

## 2024-11-07 NOTE — TELEPHONE ENCOUNTER
Called to inform the pt the papework has been forwarded to her PCP/ Chiquita Pryor email for completion .

## 2024-11-08 ENCOUNTER — TELEPHONE (OUTPATIENT)
Dept: INFECTIOUS DISEASES | Facility: CLINIC | Age: 69
End: 2024-11-08
Payer: MEDICARE

## 2024-11-08 NOTE — TELEPHONE ENCOUNTER
----- Message from Virginie sent at 11/8/2024  2:56 PM CST -----  Contact: Anne Rodríguez would like a call back at  [phone] in regards to needing to speak with nurse about getting a letter for her return to work. Patient would like to  3 copies due to having to send it to three different companies.  Thanks   Am

## 2024-11-12 ENCOUNTER — OFFICE VISIT (OUTPATIENT)
Dept: FAMILY MEDICINE | Facility: CLINIC | Age: 69
End: 2024-11-12
Attending: FAMILY MEDICINE
Payer: MEDICARE

## 2024-11-12 VITALS
DIASTOLIC BLOOD PRESSURE: 64 MMHG | HEART RATE: 52 BPM | HEIGHT: 66 IN | TEMPERATURE: 97 F | SYSTOLIC BLOOD PRESSURE: 120 MMHG | OXYGEN SATURATION: 100 % | BODY MASS INDEX: 22.02 KG/M2 | WEIGHT: 137 LBS

## 2024-11-12 DIAGNOSIS — E11.69 DM TYPE 2 WITH DIABETIC DYSLIPIDEMIA: Primary | ICD-10-CM

## 2024-11-12 DIAGNOSIS — I51.89 DIASTOLIC DYSFUNCTION: ICD-10-CM

## 2024-11-12 DIAGNOSIS — B45.1 CRYPTOCOCCAL MENINGOENCEPHALITIS: ICD-10-CM

## 2024-11-12 DIAGNOSIS — I10 HYPERTENSION, UNSPECIFIED TYPE: ICD-10-CM

## 2024-11-12 DIAGNOSIS — E78.5 DM TYPE 2 WITH DIABETIC DYSLIPIDEMIA: Primary | ICD-10-CM

## 2024-11-12 PROCEDURE — 3288F FALL RISK ASSESSMENT DOCD: CPT | Mod: CPTII,S$GLB,, | Performed by: FAMILY MEDICINE

## 2024-11-12 PROCEDURE — 3008F BODY MASS INDEX DOCD: CPT | Mod: CPTII,S$GLB,, | Performed by: FAMILY MEDICINE

## 2024-11-12 PROCEDURE — 1160F RVW MEDS BY RX/DR IN RCRD: CPT | Mod: CPTII,S$GLB,, | Performed by: FAMILY MEDICINE

## 2024-11-12 PROCEDURE — G2211 COMPLEX E/M VISIT ADD ON: HCPCS | Mod: S$GLB,,, | Performed by: FAMILY MEDICINE

## 2024-11-12 PROCEDURE — 3044F HG A1C LEVEL LT 7.0%: CPT | Mod: CPTII,S$GLB,, | Performed by: FAMILY MEDICINE

## 2024-11-12 PROCEDURE — 99214 OFFICE O/P EST MOD 30 MIN: CPT | Mod: S$GLB,,, | Performed by: FAMILY MEDICINE

## 2024-11-12 PROCEDURE — 99999 PR PBB SHADOW E&M-EST. PATIENT-LVL V: CPT | Mod: PBBFAC,,, | Performed by: FAMILY MEDICINE

## 2024-11-12 PROCEDURE — 3074F SYST BP LT 130 MM HG: CPT | Mod: CPTII,S$GLB,, | Performed by: FAMILY MEDICINE

## 2024-11-12 PROCEDURE — 4010F ACE/ARB THERAPY RXD/TAKEN: CPT | Mod: CPTII,S$GLB,, | Performed by: FAMILY MEDICINE

## 2024-11-12 PROCEDURE — 1125F AMNT PAIN NOTED PAIN PRSNT: CPT | Mod: CPTII,S$GLB,, | Performed by: FAMILY MEDICINE

## 2024-11-12 PROCEDURE — 3066F NEPHROPATHY DOC TX: CPT | Mod: CPTII,S$GLB,, | Performed by: FAMILY MEDICINE

## 2024-11-12 PROCEDURE — 1159F MED LIST DOCD IN RCRD: CPT | Mod: CPTII,S$GLB,, | Performed by: FAMILY MEDICINE

## 2024-11-12 PROCEDURE — 3078F DIAST BP <80 MM HG: CPT | Mod: CPTII,S$GLB,, | Performed by: FAMILY MEDICINE

## 2024-11-12 PROCEDURE — 3060F POS MICROALBUMINURIA REV: CPT | Mod: CPTII,S$GLB,, | Performed by: FAMILY MEDICINE

## 2024-11-12 PROCEDURE — 1101F PT FALLS ASSESS-DOCD LE1/YR: CPT | Mod: CPTII,S$GLB,, | Performed by: FAMILY MEDICINE

## 2024-11-12 NOTE — PROGRESS NOTES
Subjective:       Patient ID: Anne Farmer is a 68 y.o. female.    Chief Complaint: Follow-up and Arm Pain (/)    68 y old female with t2 dm , htn , dlp here for f.u . Not monitoring glucose . 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 Her feet have been swollen . Acknowledges SOB which is not new .     Follow-up    Arm Pain       Review of Systems   Constitutional: Negative.    HENT: Negative.     Eyes: Negative.    Respiratory: Negative.     Cardiovascular:  Positive for leg swelling.   Gastrointestinal: Negative.    Genitourinary: Negative.    Musculoskeletal: Negative.    Skin: Negative.    Hematological: Negative.        Objective:      Physical Exam  Vitals and nursing note reviewed.   Constitutional:       General: She is not in acute distress.     Appearance: She is well-developed. She is not diaphoretic.   HENT:      Head: Normocephalic and atraumatic.      Right Ear: External ear normal.      Left Ear: External ear normal.      Nose: Nose normal.   Eyes:      General: No scleral icterus.        Left eye: No discharge.      Conjunctiva/sclera: Conjunctivae normal.      Pupils: Pupils are equal, round, and reactive to light.   Neck:      Thyroid: No thyromegaly.      Vascular: No JVD.      Trachea: No tracheal deviation.   Cardiovascular:      Rate and Rhythm: Normal rate and regular rhythm.      Pulses:           Dorsalis pedis pulses are 1+ on the right side and 1+ on the left side.        Posterior tibial pulses are 1+ on the right side and 1+ on the left side.      Heart sounds: Normal heart sounds. No murmur heard.     No friction rub. No gallop.   Pulmonary:      Effort: Pulmonary effort is normal. No respiratory distress.      Breath sounds: Normal breath sounds. No wheezing or rales.   Chest:      Chest wall: No tenderness.   Abdominal:      General: Bowel sounds are normal. There is no distension.      Palpations: Abdomen is soft. There  is no mass.      Tenderness: There is no abdominal tenderness. There is no guarding.   Musculoskeletal:      Cervical back: Normal range of motion and neck supple.      Right foot: Normal range of motion. No deformity, bunion, Charcot foot, foot drop or prominent metatarsal heads.      Left foot: Normal range of motion. No deformity, bunion, Charcot foot, foot drop or prominent metatarsal heads.   Feet:      Right foot:      Protective Sensation: 10 sites tested.  10 sites sensed.      Skin integrity: Skin integrity normal.      Toenail Condition: Right toenails are normal.      Left foot:      Protective Sensation: 10 sites tested.  10 sites sensed.      Skin integrity: Skin integrity normal.      Toenail Condition: Left toenails are normal.   Lymphadenopathy:      Cervical: No cervical adenopathy.         Assessment:     Anne was seen today for follow-up and arm pain.    Diagnoses and all orders for this visit:    DM type 2 with diabetic dyslipidemia  -     Ambulatory referral/consult to Ophthalmology; Future  -     CBC Auto Differential; Future  -     Comprehensive Metabolic Panel; Future  -     Hemoglobin A1C; Future  -     Lipid Panel; Future  -     Microalbumin/Creatinine Ratio, Urine; Future    Hypertension, unspecified type    Diastolic dysfunction  -     BNP; Future    Cryptococcal meningoencephalitis           Plan:     DIet and exercise   Controlled. Cont med   BNP  F.u with ID

## 2024-11-14 DIAGNOSIS — M85.80 OSTEOPENIA AFTER MENOPAUSE: ICD-10-CM

## 2024-11-14 DIAGNOSIS — I70.0 AORTIC ATHEROSCLEROSIS: ICD-10-CM

## 2024-11-14 DIAGNOSIS — Z78.0 OSTEOPENIA AFTER MENOPAUSE: ICD-10-CM

## 2024-11-14 DIAGNOSIS — Z17.0 MALIGNANT NEOPLASM OF UPPER-OUTER QUADRANT OF LEFT BREAST IN FEMALE, ESTROGEN RECEPTOR POSITIVE: ICD-10-CM

## 2024-11-14 DIAGNOSIS — C50.412 MALIGNANT NEOPLASM OF UPPER-OUTER QUADRANT OF LEFT BREAST IN FEMALE, ESTROGEN RECEPTOR POSITIVE: ICD-10-CM

## 2024-11-14 RX ORDER — POTASSIUM CHLORIDE 750 MG/1
20 TABLET, FILM COATED, EXTENDED RELEASE ORAL DAILY PRN
Qty: 90 TABLET | Refills: 1 | Status: SHIPPED | OUTPATIENT
Start: 2024-11-14

## 2024-11-14 RX ORDER — PRAVASTATIN SODIUM 40 MG/1
40 TABLET ORAL DAILY
Qty: 90 TABLET | Refills: 3 | Status: SHIPPED | OUTPATIENT
Start: 2024-11-14 | End: 2025-11-14

## 2024-11-14 RX ORDER — ANASTROZOLE 1 MG/1
1 TABLET ORAL DAILY
Qty: 90 TABLET | Refills: 1 | OUTPATIENT
Start: 2024-11-14

## 2024-11-14 NOTE — TELEPHONE ENCOUNTER
No care due was identified.  [facility] Embedded Care Due Messages. Reference number: [id number].   11/14/2024 10:28:21 AM CST

## 2024-11-14 NOTE — TELEPHONE ENCOUNTER
Return call to patient requesting medication refill. Sending message to Dr. Campos. Please advise.

## 2024-11-14 NOTE — TELEPHONE ENCOUNTER
----- Message from Corry sent at 11/14/2024  9:22 AM CST -----  Contact: Anne  Type:  RX Refill Request    Who Called: Anne  Refill or New Rx: Refill  RX Name and Strength:Anastrozole (ARIMIDEX) 1 mg Tab  How is the patient currently taking it? (ex. 1XDay): 1x day  Is this a 30 day or 90 day RX:90 day  Preferred Pharm[facility] with phone number:  [id number] [place], [place] - [address] [address]  Phone: [phone] Fax: [phone]  Local or Mail Order:Local  Ordering Provider:KRISTINA Marquez   Would the patient rather a call back or a response via [facility]? Call back  Best Call Back Number:[phone]  Additional Information:      Type:  RX Refill Request    Who Called: Anne  Refill or New Rx: Refill  RX Name and Strength:Pravastatin (PRAVACHOL) 40 MG tablet  How is the patient currently taking it? (ex. 1XDay):  1x day  Is this a 30 day or 90 day RX: 90 day  Preferred Pharm[facility] with phone number:  [id number] [place], [place] - [address] [address]  Phone: [phone] Fax: [phone]  Local or Mail Order:Local  Ordering Provider:KRISTINA Marquez   Would the patient rather a call back or a response via [facility]? Call back  Best Call Back Number:[phone]  Additional Information:     Patient stated she also need a refill on her potassium medication but doesn't know the name of it.    Thanks  Sl

## 2024-11-15 ENCOUNTER — DOCUMENTATION ONLY (OUTPATIENT)
Dept: HEMATOLOGY/ONCOLOGY | Facility: CLINIC | Age: 69
End: 2024-11-15

## 2024-11-15 ENCOUNTER — OFFICE VISIT (OUTPATIENT)
Dept: HEMATOLOGY/ONCOLOGY | Facility: CLINIC | Age: 69
End: 2024-11-15
Payer: MEDICARE

## 2024-11-15 VITALS
WEIGHT: 138.69 LBS | BODY MASS INDEX: 22.29 KG/M2 | HEIGHT: 66 IN | TEMPERATURE: 98 F | OXYGEN SATURATION: 96 % | SYSTOLIC BLOOD PRESSURE: 127 MMHG | DIASTOLIC BLOOD PRESSURE: 74 MMHG | HEART RATE: 84 BPM

## 2024-11-15 DIAGNOSIS — C50.412 MALIGNANT NEOPLASM OF UPPER-OUTER QUADRANT OF LEFT BREAST IN FEMALE, ESTROGEN RECEPTOR POSITIVE: Primary | ICD-10-CM

## 2024-11-15 DIAGNOSIS — Z17.0 MALIGNANT NEOPLASM OF UPPER-OUTER QUADRANT OF LEFT BREAST IN FEMALE, ESTROGEN RECEPTOR POSITIVE: Primary | ICD-10-CM

## 2024-11-15 DIAGNOSIS — Z17.0 MALIGNANT NEOPLASM OF LOWER-OUTER QUADRANT OF LEFT BREAST OF FEMALE, ESTROGEN RECEPTOR POSITIVE: ICD-10-CM

## 2024-11-15 DIAGNOSIS — Z85.038 HISTORY OF COLON CANCER, STAGE I: Chronic | ICD-10-CM

## 2024-11-15 DIAGNOSIS — C50.512 MALIGNANT NEOPLASM OF LOWER-OUTER QUADRANT OF LEFT BREAST OF FEMALE, ESTROGEN RECEPTOR POSITIVE: ICD-10-CM

## 2024-11-15 DIAGNOSIS — R59.0 MEDIASTINAL LYMPHADENOPATHY: ICD-10-CM

## 2024-11-15 PROCEDURE — 99999 PR PBB SHADOW E&M-EST. PATIENT-LVL V: CPT | Mod: PBBFAC,,, | Performed by: INTERNAL MEDICINE

## 2024-11-15 NOTE — PROGRESS NOTES
CARLOS ENRIQUE met with pt in clinic room. SW provided 1 box of Ensure Max protein per pt/clinic nurse request. Pt is diabetic. SW dept is out chocolate glucose control Boost at this time. No other needs/concerns expressed. SW will remain available.

## 2024-11-15 NOTE — PROGRESS NOTES
[facility]  [address] [place] [address]  Phone: [phone];  Fax: [phone]    Patient ID: Anne Farmer   Chief Complaint: Follow-up  MRN:  [id number]     Oncologic Diagnoses:    - History of Breast Cancer - Stage IA (pT2, pN0, cM0, G2, ER+, [place]+, HER2-, Oncotype DX score: 8   - History of Colon Cancer, Stage I - 2004  Previous Treatment:  Lumpectomy 8/16/2022 and XRT completed 10/10/2022   Current Treatment:    - Arimidex  - Zometa  - Surveillance  Subjective   Anne Farmer is a 68 y.o. female who presents to clinic for follow up.    CT CAP 10/29/2024 reviewed and shows multiple noncalcified pulmonary nodules including a larger opacity in the subpleural region of the right lung measuring up to 15 mm in size, Noncalcified and calcified mediastinal and bilateral hilar adenopathy and Celiac axis/left periaortic adenopathy along with adenopathy in the adele hepatis. I discussed with her the concern for recurrent/metastatic malignancy of colon vs breast and possibility that this is sarcoidosis.  She will need tissue sampling to confirm diagnosis.  She does have a history of sarcoidosis.    She reports that she continues to feel fatigued and her appetite is lessening.  She has lost 32 lbs in 3 months. I reviewed my recommendations as outlined below and she is in agreement.      Review of Systems   Constitutional:  Positive for appetite change (decreased) and fatigue. Negative for activity change, chills, diaphoresis, fever and unexpected weight change.   HENT:  Negative for nosebleeds.    Respiratory:  Negative for shortness of breath.    Cardiovascular:  Negative for chest pain.   Gastrointestinal:  Positive for nausea. Negative for abdominal distention, abdominal pain, anal bleeding, blood in stool, constipation, diarrhea and vomiting.   Genitourinary:  Negative for difficulty urinating and hematuria.   Musculoskeletal:  Negative for arthralgias, back  pain and myalgias.   Skin:  Negative for rash.   Neurological:  Negative for dizziness, weakness, light-headedness and headaches.   Hematological:  Does not bruise/bleed easily.   Psychiatric/Behavioral:  The patient is not nervous/anxious.      History     Oncology History   Malignant neoplasm of lower-outer quadrant of left breast of female, estrogen receptor positive   2022 Initial Diagnosis    Malignant neoplasm of lower-outer quadrant of left breast of female, estrogen receptor positive     2022 Cancer Staged    Staging form: Breast, AJCC 8th Edition  - Clinical stage from 2022: Stage IA (cT1c, cN0, cM0, G2, ER+, [place]+, HER2-)     2022 Cancer Staged    Staging form: Breast, AJCC 8th Edition  - Pathologic stage from 2022: Stage IA (pT2, pN0, cM0, G2, ER+, [place]+, HER2-, Oncotype DX score: 8)     2022 - 10/10/2022 Radiation Therapy    Treating physician: Niko  Total Dose: 40 Gy  Fractions: 15         Past Medical History:   Diagnosis Date    Allergy     Arthritis     Cancer 2016    colon    CHF (congestive heart failure)     Colon cancer 2004    Colon cancer screening 2015    Diabetes mellitus type 2 in nonobese 3/9/2016    borderline    Diverticulosis     DM (diabetes mellitus) 2016    BS didn't check 2019    DM (diabetes mellitus) 2016    BS didn't check 2020    Hyperlipidemia 10/25/2016    Hypertension     Insomnia     Malignant neoplasm of colon 2021    Malignant neoplasm of lower-outer quadrant of left breast of female, estrogen receptor positive 2022       Past Surgical History:   Procedure Laterality Date    BREAST BIOPSY Left     BREAST LUMPECTOMY Left      SECTION      COLON SURGERY      COLONOSCOPY N/A 2015    Procedure: COLONOSCOPY;  Surgeon: Adela Sam MD;  Location: [facility];  Service: Endoscopy;  Laterality: N/A;    COLONOSCOPY N/A 2017    Procedure: COLONOSCOPY;  Surgeon: Chintan Flores MD;  Location: [facility];   "Service: Endoscopy;  Laterality: N/A;    COLONOSCOPY N/A 06/30/2020    Procedure: COLONOSCOPY;  Surgeon: Grisel Atkins MD;  Location: [facility] ENDO;  Service: Endoscopy;  Laterality: N/A;    SENTINEL LYMPH NODE BIOPSY Left 07/05/2022    Procedure: BIOPSY, LYMPH NODE, SENTINEL;  Surgeon: Arvin Hernandez MD;  Location: [facility] OR;  Service: General;  Laterality: Left;       Family History   Problem Relation Name Age of Onset    Hypertension Mother      Diabetes Mother      Hypertension Father      Hypertension Sister      Hypertension Brother      Hypertension Sister      Hypertension Sister      Hypertension Sister      Hypertension Brother      Hypertension Brother         Review of patient's allergies indicates:  No Known Allergies    Social History     Tobacco Use    Smoking status: Never     Passive exposure: Never    Smokeless tobacco: Never   Substance Use Topics    Alcohol use: Yes     Alcohol/week: 0.0 standard drinks of alcohol     Comment: weekends.       Drug use: No       Physical Exam   ECOG:   ECOG SCORE    0 - Fully active-able to carry on all pre-disease performance without restriction          Vitals:  /74   Pulse 84   Temp 98 °F (36.7 °C) (Temporal)   Ht 5' 6" (1.676 m)   Wt 62.9 kg (138 lb 10.7 oz)   SpO2 96%   BMI 22.38 kg/m²     Physical Exam:  Physical Exam  Constitutional:       General: She is not in acute distress.     Appearance: Normal appearance. She is not ill-appearing.   HENT:      Head: Normocephalic and atraumatic.   Eyes:      Extraocular Movements: Extraocular movements intact.      Conjunctiva/sclera: Conjunctivae normal.   Cardiovascular:      Rate and Rhythm: Normal rate.   Pulmonary:      Effort: Pulmonary effort is normal. No respiratory distress.   Abdominal:      Palpations: There is no hepatomegaly or splenomegaly.   Musculoskeletal:         General: Normal range of motion.      Right lower leg: No edema.      Left lower leg: No edema.   Skin:     Findings: No " rash.   Neurological:      General: No focal deficit present.      Mental Status: She is alert and oriented to person, place, and time.   Psychiatric:         Mood and Affect: Mood normal.         Behavior: Behavior normal.         Thought Content: Thought content normal.        Labs   Labs:  No visits with results within 2 Day(s) from this visit.   Latest known visit with results is:   Lab Visit on 10/21/2024   Component Date Value Ref Range Status    WBC 10/21/2024 4.84  3.90 - 12.70 K/uL Final    RBC 10/21/2024 3.61 (L)  4.00 - 5.40 M/uL Final    Hemoglobin 10/21/2024 10.9 (L)  12.0 - 16.0 g/dL Final    Hematocrit 10/21/2024 34.3 (L)  37.0 - 48.5 % Final    MCV 10/21/2024 95  82 - 98 fL Final    MCH 10/21/2024 30.2  27.0 - 31.0 pg Final    MCHC 10/21/2024 31.8 (L)  32.0 - 36.0 g/dL Final    RDW 10/21/2024 13.4  11.5 - 14.5 % Final    Platelets 10/21/2024 346  150 - 450 K/uL Final    MPV 10/21/2024 8.0 (L)  9.2 - 12.9 fL Final    Immature Granulocytes 10/21/2024 0.6 (H)  0.0 - 0.5 % Final    Gran # (ANC) 10/21/2024 3.8  1.8 - 7.7 K/uL Final    Immature Grans (Abs) 10/21/2024 0.03  0.00 - 0.04 K/uL Final    Comment: Mild elevation in immature granulocytes is non specific and   can be seen in a variety of conditions including stress response,   acute inflammation, trauma and pregnancy. Correlation with other   laboratory and clinical findings is essential.      Lymph # 10/21/2024 0.5 (L)  1.0 - 4.8 K/uL Final    Mono # 10/21/2024 0.3  0.3 - 1.0 K/uL Final    Eos # 10/21/2024 0.2  0.0 - 0.5 K/uL Final    Baso # 10/21/2024 0.05  0.00 - 0.20 K/uL Final    nRBC 10/21/2024 0  0 /100 WBC Final    Gran % 10/21/2024 78.8 (H)  38.0 - 73.0 % Final    Lymph % 10/21/2024 10.3 (L)  18.0 - 48.0 % Final    Mono % 10/21/2024 6.0  4.0 - 15.0 % Final    Eosinophil % 10/21/2024 3.3  0.0 - 8.0 % Final    Basophil % 10/21/2024 1.0  0.0 - 1.9 % Final    Differential Method 10/21/2024 Automated   Final    Sodium 10/21/2024 137  136 - 145  mmol/L Final    Potassium 10/21/2024 3.4 (L)  3.5 - 5.1 mmol/L Final    Chloride 10/21/2024 99  95 - 110 mmol/L Final    CO2 10/21/2024 25  23 - 29 mmol/L Final    Glucose 10/21/2024 188 (H)  70 - 110 mg/dL Final    BUN 10/21/2024 9  8 - 23 mg/dL Final    Creatinine 10/21/2024 0.9  0.5 - 1.4 mg/dL Final    Calcium 10/21/2024 9.7  8.7 - 10.5 mg/dL Final    Total Protein 10/21/2024 7.9  6.0 - 8.4 g/dL Final    Albumin 10/21/2024 3.8  3.5 - 5.2 g/dL Final    Total Bilirubin 10/21/2024 0.2  0.1 - 1.0 mg/dL Final    Comment: For infants and newborns, interpretation of results should be based  on gestational age, weight and in agreement with clinical  observations.    Premature Infant recommended reference ranges:  Up to 24 hours.............<8.0 mg/dL  Up to 48 hours............<12.0 mg/dL  3-5 days..................<15.0 mg/dL  6-29 days.................<15.0 mg/dL      Alkaline Phosphatase 10/21/2024 81  40 - 150 U/L Final    AST 10/21/2024 31  10 - 40 U/L Final    ALT 10/21/2024 20  10 - 44 U/L Final    eGFR 10/21/2024 >60  >60 mL/min/1.73 m^2 Final    Anion Gap 10/21/2024 13  8 - 16 mmol/L Final    CEA 10/21/2024 6.8 (H)  0.0 - 5.0 ng/mL Final    Comment: CEA Normal Range:  Non-Smokers: 0-3.0 ng/mL  Smokers:     0-5.0 ng/mL    The testing method is a chemiluminescent microparticle immunoassay   manufactured by [employer] and performed on the [employer]   or   [employer] system. Values obtained with different assay manufacturers   for   methods may be different and cannot be used interchangeably.      Microalbumin, Urine 10/21/2024 251.0  ug/mL Final    Creatinine, Urine 10/21/2024 132.0  15.0 - 325.0 mg/dL Final    Microalb/Creat Ratio 10/21/2024 190.2 (H)  0.0 - 30.0 ug/mg Final        Assessment and Plan   Hilar LAD, [place] [address] LAD  The patient has a diagnosis of sarcoidosis EBUS In 2017 was non-diagnostic and repeat biopsy 03/28/2017 resulted as Granulomatous inflammation with necrosis (caseating)   CT CAP  10/29/2024 reviewed and shows multiple noncalcified pulmonary nodules including a larger opacity in the subpleural region of the right lung measuring up to 15 mm in size, Noncalcified and calcified mediastinal and bilateral hilar adenopathy and Celiac axis/left periaortic adenopathy along with adenopathy in the adele hepatis. I discussed with her the concern for recurrent/metastatic malignancy of colon vs breast and possibility that this is progressed sarcoidosis.    Recommend repeat biopsy of multiple areas and she is in agreement      History of Breast Cancer - Stage IA (pT2, pN0, cM0, G2, ER+, [place]+, HER2-, Oncotype DX score: 8    Last MMG 05/2023: BIRADS-2  Last DXA showed no evidence of significant bone density loss   MMG 07/11/24: BIRADS 2  Continue with Aromatase Inhibitor daily (pt notes no side effects) and prophylactic Zometa q6m      History of Colon Cancer, Stage I   Laparoscopic verus open right hemicolectomy 11/5/15   Next surveillance colonoscopy due in 06/2025  Recommend she consider genetic counseling given history of two malignancies      Cryptococcal meningoencephalitis   On Fluconazole for 6-12 more months  Drug interactions check against Zometa and Arimidex with no notable interactions found      Cancer Screening  PAP Smear: Last in 2010? and normal  Colonoscopy 06/30/20: no specimens collected; repeat in 5 years (06/2025)      Chronic Medical Conditions  HTN  DM II  Hypertensive cardiomyopathy  Sarcoidosis  DDD  Restrictive Lung Disease  Vitamin D Deficiency        Med Onc Chart Routing      Follow up with physician 2 weeks. review biopsy results   Follow up with JEFF    Infusion scheduling note    Injection scheduling note    Labs Vitamin D   Scheduling:  Preferred lab:  Lab interval:     Imaging PET scan   STAT   Pharmacy appointment    Other referrals       Additional referrals needed  Pulmonology STAT                 The patient was seen, interviewed and examined. Pertinent lab and radiologic  studies were reviewed. Pt instructed to call should they develop concerning signs/symptoms or have further questions.        Portions of the record may have been created with voice recognition software. Occasional wrong-word or sound-a-like substitutions may have occurred due to the inherent limitations of voice recognition software. Read the chart carefully and recognize, using context, where substitutions have occurred.      Nakia Lay MD    Hematology/Oncology

## 2024-11-25 ENCOUNTER — HOSPITAL ENCOUNTER (OUTPATIENT)
Dept: RADIOLOGY | Facility: HOSPITAL | Age: 69
Discharge: HOME | End: 2024-11-25
Attending: INTERNAL MEDICINE
Payer: MEDICARE

## 2024-11-25 DIAGNOSIS — C50.412 MALIGNANT NEOPLASM OF UPPER-OUTER QUADRANT OF LEFT BREAST IN FEMALE, ESTROGEN RECEPTOR POSITIVE: ICD-10-CM

## 2024-11-25 DIAGNOSIS — Z17.0 MALIGNANT NEOPLASM OF UPPER-OUTER QUADRANT OF LEFT BREAST IN FEMALE, ESTROGEN RECEPTOR POSITIVE: ICD-10-CM

## 2024-11-25 DIAGNOSIS — R59.0 MEDIASTINAL LYMPHADENOPATHY: ICD-10-CM

## 2024-11-25 PROCEDURE — 78815 PET IMAGE W/CT SKULL-THIGH: CPT | Mod: TC

## 2024-11-25 PROCEDURE — A9552 F18 FDG: HCPCS | Performed by: INTERNAL MEDICINE

## 2024-11-25 PROCEDURE — 78815 PET IMAGE W/CT SKULL-THIGH: CPT | Mod: 26,PS,, | Performed by: RADIOLOGY

## 2024-11-25 RX ORDER — FLUDEOXYGLUCOSE F18 500 MCI/ML
11.49 INJECTION INTRAVENOUS
Status: COMPLETED | OUTPATIENT
Start: 2024-11-25 | End: 2024-11-25

## 2024-11-25 RX ADMIN — FLUDEOXYGLUCOSE F-18 11.49 MILLICURIE: 500 INJECTION INTRAVENOUS at 09:11

## 2024-11-26 ENCOUNTER — PATIENT OUTREACH (OUTPATIENT)
Dept: ADMINISTRATIVE | Facility: HOSPITAL | Age: 69
End: 2024-11-26
Payer: MEDICARE

## 2024-11-26 NOTE — PROGRESS NOTES
Lab Report: Tried calling patient, no answer, LVM. Patient needs Lipid panel linked to upcoming lab. Did link A1c and micro.

## 2024-11-29 NOTE — TELEPHONE ENCOUNTER
No care due was identified.  [facility] Embedded Care Due Messages. Reference number: [id number].   11/29/2024 1:51:00 PM CST

## 2024-12-02 RX ORDER — CYCLOBENZAPRINE HCL 5 MG
TABLET ORAL
Qty: 30 TABLET | Refills: 0 | Status: SHIPPED | OUTPATIENT
Start: 2024-12-02

## 2024-12-02 RX ORDER — FUROSEMIDE 20 MG/1
20 TABLET ORAL DAILY PRN
Qty: 90 TABLET | Refills: 1 | Status: SHIPPED | OUTPATIENT
Start: 2024-12-02 | End: 2025-12-02

## 2024-12-02 NOTE — TELEPHONE ENCOUNTER
Please see the attached refill request.     Pharmacy comment: REQUEST FOR 90 DAYS PRESCRIPTION.

## 2024-12-03 ENCOUNTER — LAB VISIT (OUTPATIENT)
Dept: LAB | Facility: HOSPITAL | Age: 69
End: 2024-12-03
Attending: INTERNAL MEDICINE
Payer: MEDICARE

## 2024-12-03 ENCOUNTER — OFFICE VISIT (OUTPATIENT)
Dept: HEMATOLOGY/ONCOLOGY | Facility: CLINIC | Age: 69
End: 2024-12-03
Payer: MEDICARE

## 2024-12-03 ENCOUNTER — OFFICE VISIT (OUTPATIENT)
Dept: OPHTHALMOLOGY | Facility: CLINIC | Age: 69
End: 2024-12-03
Payer: MEDICARE

## 2024-12-03 VITALS
SYSTOLIC BLOOD PRESSURE: 135 MMHG | WEIGHT: 134.94 LBS | HEART RATE: 77 BPM | TEMPERATURE: 97 F | OXYGEN SATURATION: 99 % | DIASTOLIC BLOOD PRESSURE: 76 MMHG | BODY MASS INDEX: 21.69 KG/M2 | HEIGHT: 66 IN

## 2024-12-03 DIAGNOSIS — C50.512 MALIGNANT NEOPLASM OF LOWER-OUTER QUADRANT OF LEFT BREAST OF FEMALE, ESTROGEN RECEPTOR POSITIVE: ICD-10-CM

## 2024-12-03 DIAGNOSIS — Z79.811 AROMATASE INHIBITOR USE: ICD-10-CM

## 2024-12-03 DIAGNOSIS — Z17.0 MALIGNANT NEOPLASM OF LOWER-OUTER QUADRANT OF LEFT BREAST OF FEMALE, ESTROGEN RECEPTOR POSITIVE: ICD-10-CM

## 2024-12-03 DIAGNOSIS — E11.69 DM TYPE 2 WITH DIABETIC DYSLIPIDEMIA: ICD-10-CM

## 2024-12-03 DIAGNOSIS — Z85.038 HISTORY OF COLON CANCER, STAGE I: Primary | Chronic | ICD-10-CM

## 2024-12-03 DIAGNOSIS — E78.5 DM TYPE 2 WITH DIABETIC DYSLIPIDEMIA: ICD-10-CM

## 2024-12-03 DIAGNOSIS — Z79.899 OTHER LONG TERM (CURRENT) DRUG THERAPY: ICD-10-CM

## 2024-12-03 DIAGNOSIS — R59.0 MEDIASTINAL LYMPHADENOPATHY: ICD-10-CM

## 2024-12-03 DIAGNOSIS — E11.9 DIABETES MELLITUS TYPE 2 WITHOUT RETINOPATHY: Primary | ICD-10-CM

## 2024-12-03 DIAGNOSIS — Z96.1 PSEUDOPHAKIA OF RIGHT EYE: ICD-10-CM

## 2024-12-03 DIAGNOSIS — R91.8 PULMONARY NODULES/LESIONS, MULTIPLE: ICD-10-CM

## 2024-12-03 DIAGNOSIS — H25.012 CORTICAL AGE-RELATED CATARACT, LEFT EYE: ICD-10-CM

## 2024-12-03 LAB
ESTIMATED AVG GLUCOSE: 134 MG/DL (ref 68–131)
HBA1C MFR BLD: 6.3 % (ref 4–5.6)

## 2024-12-03 PROCEDURE — 83036 HEMOGLOBIN GLYCOSYLATED A1C: CPT | Performed by: FAMILY MEDICINE

## 2024-12-03 PROCEDURE — 4010F ACE/ARB THERAPY RXD/TAKEN: CPT | Mod: CPTII,S$GLB,, | Performed by: INTERNAL MEDICINE

## 2024-12-03 PROCEDURE — 3060F POS MICROALBUMINURIA REV: CPT | Mod: CPTII,S$GLB,, | Performed by: INTERNAL MEDICINE

## 2024-12-03 PROCEDURE — 1159F MED LIST DOCD IN RCRD: CPT | Mod: CPTII,S$GLB,, | Performed by: INTERNAL MEDICINE

## 2024-12-03 PROCEDURE — 82652 VIT D 1 25-DIHYDROXY: CPT | Performed by: INTERNAL MEDICINE

## 2024-12-03 PROCEDURE — 2023F DILAT RTA XM W/O RTNOPTHY: CPT | Mod: CPTII,S$GLB,, | Performed by: OPHTHALMOLOGY

## 2024-12-03 PROCEDURE — 3066F NEPHROPATHY DOC TX: CPT | Mod: CPTII,S$GLB,, | Performed by: OPHTHALMOLOGY

## 2024-12-03 PROCEDURE — 1125F AMNT PAIN NOTED PAIN PRSNT: CPT | Mod: CPTII,S$GLB,, | Performed by: INTERNAL MEDICINE

## 2024-12-03 PROCEDURE — 1160F RVW MEDS BY RX/DR IN RCRD: CPT | Mod: CPTII,S$GLB,, | Performed by: OPHTHALMOLOGY

## 2024-12-03 PROCEDURE — 99999 PR PBB SHADOW E&M-EST. PATIENT-LVL II: CPT | Mod: PBBFAC,,, | Performed by: OPHTHALMOLOGY

## 2024-12-03 PROCEDURE — 3008F BODY MASS INDEX DOCD: CPT | Mod: CPTII,S$GLB,, | Performed by: INTERNAL MEDICINE

## 2024-12-03 PROCEDURE — 82043 UR ALBUMIN QUANTITATIVE: CPT | Performed by: FAMILY MEDICINE

## 2024-12-03 PROCEDURE — 3066F NEPHROPATHY DOC TX: CPT | Mod: CPTII,S$GLB,, | Performed by: INTERNAL MEDICINE

## 2024-12-03 PROCEDURE — 4010F ACE/ARB THERAPY RXD/TAKEN: CPT | Mod: CPTII,S$GLB,, | Performed by: OPHTHALMOLOGY

## 2024-12-03 PROCEDURE — 99203 OFFICE O/P NEW LOW 30 MIN: CPT | Mod: S$GLB,,, | Performed by: OPHTHALMOLOGY

## 2024-12-03 PROCEDURE — 1101F PT FALLS ASSESS-DOCD LE1/YR: CPT | Mod: CPTII,S$GLB,, | Performed by: INTERNAL MEDICINE

## 2024-12-03 PROCEDURE — 3075F SYST BP GE 130 - 139MM HG: CPT | Mod: CPTII,S$GLB,, | Performed by: INTERNAL MEDICINE

## 2024-12-03 PROCEDURE — 99999 PR PBB SHADOW E&M-EST. PATIENT-LVL IV: CPT | Mod: PBBFAC,,, | Performed by: INTERNAL MEDICINE

## 2024-12-03 PROCEDURE — 3078F DIAST BP <80 MM HG: CPT | Mod: CPTII,S$GLB,, | Performed by: INTERNAL MEDICINE

## 2024-12-03 PROCEDURE — 3044F HG A1C LEVEL LT 7.0%: CPT | Mod: CPTII,S$GLB,, | Performed by: OPHTHALMOLOGY

## 2024-12-03 PROCEDURE — 3044F HG A1C LEVEL LT 7.0%: CPT | Mod: CPTII,S$GLB,, | Performed by: INTERNAL MEDICINE

## 2024-12-03 PROCEDURE — 3288F FALL RISK ASSESSMENT DOCD: CPT | Mod: CPTII,S$GLB,, | Performed by: INTERNAL MEDICINE

## 2024-12-03 PROCEDURE — 1159F MED LIST DOCD IN RCRD: CPT | Mod: CPTII,S$GLB,, | Performed by: OPHTHALMOLOGY

## 2024-12-03 PROCEDURE — 99214 OFFICE O/P EST MOD 30 MIN: CPT | Mod: S$GLB,,, | Performed by: INTERNAL MEDICINE

## 2024-12-03 PROCEDURE — 3060F POS MICROALBUMINURIA REV: CPT | Mod: CPTII,S$GLB,, | Performed by: OPHTHALMOLOGY

## 2024-12-03 PROCEDURE — 36415 COLL VENOUS BLD VENIPUNCTURE: CPT | Performed by: FAMILY MEDICINE

## 2024-12-03 NOTE — PROGRESS NOTES
CLAUDETTE'reji - Hematol Oncol [facility]  [address] [place] [address]  Phone: [phone];  Fax: [phone]    Patient ID: Anne Farmer   Chief Complaint: Follow-up  MRN:  [id number]     Oncologic Diagnoses:    - History of Breast Cancer - Stage IA (pT2, pN0, cM0, G2, ER+, MS+, HER2-, Oncotype DX score: 8   - History of Colon Cancer, Stage I - 2004  Previous Treatment:  Lumpectomy 8/16/2022 and XRT completed 10/10/2022   Current Treatment:    - Arimidex  - Zometa  - Surveillance  Subjective   Anne Farmer is a 68 y.o. female who presents to clinic for follow up.    PET-CT 11/25/2024 reviewed with patient and showed extensive adenopathy similar in appearence to prior pet done 01/23/2017.  She continues to loss weight and states she is eating twice per day.  She also feels fatigued.  She has lost 30-40 lbs in the last 3-4 months.  I reviewed my recommendations as outlined below and she is in agreement.      Review of Systems   Constitutional:  Positive for appetite change (decreased) and fatigue. Negative for activity change, chills, diaphoresis, fever and unexpected weight change.   HENT:  Negative for nosebleeds.    Respiratory:  Negative for shortness of breath.    Cardiovascular:  Negative for chest pain.   Gastrointestinal:  Positive for nausea. Negative for abdominal distention, abdominal pain, anal bleeding, blood in stool, constipation, diarrhea and vomiting.   Genitourinary:  Negative for difficulty urinating and hematuria.   Musculoskeletal:  Positive for arthralgias (shoulders). Negative for back pain and myalgias.   Skin:  Negative for rash.   Neurological:  Negative for dizziness, weakness, light-headedness and headaches.   Hematological:  Does not bruise/bleed easily.   Psychiatric/Behavioral:  The patient is not nervous/anxious.      History     Oncology History   Malignant neoplasm of lower-outer quadrant of left breast of female, estrogen receptor positive    2022 Initial Diagnosis    Malignant neoplasm of lower-outer quadrant of left breast of female, estrogen receptor positive     2022 Cancer Staged    Staging form: Breast, AJCC 8th Edition  - Clinical stage from 2022: Stage IA (cT1c, cN0, cM0, G2, ER+, PA+, HER2-)     2022 Cancer Staged    Staging form: Breast, AJCC 8th Edition  - Pathologic stage from 2022: Stage IA (pT2, pN0, cM0, G2, ER+, PA+, HER2-, Oncotype DX score: 8)     2022 - 10/10/2022 Radiation Therapy    Treating physician: Niko  Total Dose: 40 Gy  Fractions: 15         Past Medical History:   Diagnosis Date    Allergy     Arthritis     Cancer     colon    CHF (congestive heart failure)     Colon cancer 2004    Colon cancer screening 2015    Diabetes mellitus type 2 in nonobese 3/9/2016    borderline    Diverticulosis     DM (diabetes mellitus) 2016    BS didn't check 2019    DM (diabetes mellitus) 2016    BS didn't check 2020    Hyperlipidemia 10/25/2016    Hypertension     Insomnia     Malignant neoplasm of colon 2021    Malignant neoplasm of lower-outer quadrant of left breast of female, estrogen receptor positive 2022       Past Surgical History:   Procedure Laterality Date    BREAST BIOPSY Left     BREAST LUMPECTOMY Left      SECTION      COLON SURGERY      COLONOSCOPY N/A 2015    Procedure: COLONOSCOPY;  Surgeon: Adela Sam MD;  Location: [facility] ENDO;  Service: Endoscopy;  Laterality: N/A;    COLONOSCOPY N/A 2017    Procedure: COLONOSCOPY;  Surgeon: Chintan Flores MD;  Location: [facility] ENDO;  Service: Endoscopy;  Laterality: N/A;    COLONOSCOPY N/A 2020    Procedure: COLONOSCOPY;  Surgeon: Grisel Atkins MD;  Location: [facility] ENDO;  Service: Endoscopy;  Laterality: N/A;    SENTINEL LYMPH NODE BIOPSY Left 2022    Procedure: BIOPSY, LYMPH NODE, SENTINEL;  Surgeon: Arvin Hernandez MD;  Location: [facility] OR;  Service: General;  Laterality: Left;  "      Family History   Problem Relation Name Age of Onset    Hypertension Mother      Diabetes Mother      Hypertension Father      Hypertension Sister      Hypertension Brother      Hypertension Sister      Hypertension Sister      Hypertension Sister      Hypertension Brother      Hypertension Brother         Review of patient's allergies indicates:  No Known Allergies    Social History     Tobacco Use    Smoking status: Never     Passive exposure: Never    Smokeless tobacco: Never   Substance Use Topics    Alcohol use: Yes     Alcohol/week: 0.0 standard drinks of alcohol     Comment: weekends.       Drug use: No       Physical Exam   ECOG:   ECOG SCORE              Vitals:  /76   Pulse 77   Temp 97.1 °F (36.2 °C) (Temporal)   Ht 5' 6" (1.676 m)   Wt 61.2 kg (134 lb 14.7 oz)   SpO2 99%   BMI 21.78 kg/m²     Physical Exam:  Physical Exam  Constitutional:       General: She is not in acute distress.     Appearance: Normal appearance. She is not ill-appearing.   HENT:      Head: Normocephalic and atraumatic.   Eyes:      Extraocular Movements: Extraocular movements intact.      Conjunctiva/sclera: Conjunctivae normal.   Cardiovascular:      Rate and Rhythm: Normal rate.   Pulmonary:      Effort: Pulmonary effort is normal. No respiratory distress.   Abdominal:      Palpations: There is no hepatomegaly or splenomegaly.   Musculoskeletal:         General: Normal range of motion.      Right lower leg: No edema.      Left lower leg: No edema.   Skin:     Findings: No rash.   Neurological:      General: No focal deficit present.      Mental Status: She is alert and oriented to person, place, and time.   Psychiatric:         Mood and Affect: Mood normal.         Behavior: Behavior normal.         Thought Content: Thought content normal.        Labs   Labs:  No visits with results within 2 Day(s) from this visit.   Latest known visit with results is:   Lab Visit on 10/21/2024   Component Date Value Ref Range " Status    WBC 10/21/2024 4.84  3.90 - 12.70 K/uL Final    RBC 10/21/2024 3.61 (L)  4.00 - 5.40 M/uL Final    Hemoglobin 10/21/2024 10.9 (L)  12.0 - 16.0 g/dL Final    Hematocrit 10/21/2024 34.3 (L)  37.0 - 48.5 % Final    MCV 10/21/2024 95  82 - 98 fL Final    MCH 10/21/2024 30.2  27.0 - 31.0 pg Final    MCHC 10/21/2024 31.8 (L)  32.0 - 36.0 g/dL Final    RDW 10/21/2024 13.4  11.5 - 14.5 % Final    Platelets 10/21/2024 346  150 - 450 K/uL Final    MPV 10/21/2024 8.0 (L)  9.2 - 12.9 fL Final    Immature Granulocytes 10/21/2024 0.6 (H)  0.0 - 0.5 % Final    Gran # (ANC) 10/21/2024 3.8  1.8 - 7.7 K/uL Final    Immature Grans (Abs) 10/21/2024 0.03  0.00 - 0.04 K/uL Final    Comment: Mild elevation in immature granulocytes is non specific and   can be seen in a variety of conditions including stress response,   acute inflammation, trauma and pregnancy. Correlation with other   laboratory and clinical findings is essential.      Lymph # 10/21/2024 0.5 (L)  1.0 - 4.8 K/uL Final    Mono # 10/21/2024 0.3  0.3 - 1.0 K/uL Final    Eos # 10/21/2024 0.2  0.0 - 0.5 K/uL Final    Baso # 10/21/2024 0.05  0.00 - 0.20 K/uL Final    nRBC 10/21/2024 0  0 /100 WBC Final    Gran % 10/21/2024 78.8 (H)  38.0 - 73.0 % Final    Lymph % 10/21/2024 10.3 (L)  18.0 - 48.0 % Final    Mono % 10/21/2024 6.0  4.0 - 15.0 % Final    Eosinophil % 10/21/2024 3.3  0.0 - 8.0 % Final    Basophil % 10/21/2024 1.0  0.0 - 1.9 % Final    Differential Method 10/21/2024 Automated   Final    Sodium 10/21/2024 137  136 - 145 mmol/L Final    Potassium 10/21/2024 3.4 (L)  3.5 - 5.1 mmol/L Final    Chloride 10/21/2024 99  95 - 110 mmol/L Final    CO2 10/21/2024 25  23 - 29 mmol/L Final    Glucose 10/21/2024 188 (H)  70 - 110 mg/dL Final    BUN 10/21/2024 9  8 - 23 mg/dL Final    Creatinine 10/21/2024 0.9  0.5 - 1.4 mg/dL Final    Calcium 10/21/2024 9.7  8.7 - 10.5 mg/dL Final    Total Protein 10/21/2024 7.9  6.0 - 8.4 g/dL Final    Albumin 10/21/2024 3.8  3.5 - 5.2  g/dL Final    Total Bilirubin 10/21/2024 0.2  0.1 - 1.0 mg/dL Final    Comment: For infants and newborns, interpretation of results should be based  on gestational age, weight and in agreement with clinical  observations.    Premature Infant recommended reference ranges:  Up to 24 hours.............<8.0 mg/dL  Up to 48 hours............<12.0 mg/dL  3-5 days..................<15.0 mg/dL  6-29 days.................<15.0 mg/dL      Alkaline Phosphatase 10/21/2024 81  40 - 150 U/L Final    AST 10/21/2024 31  10 - 40 U/L Final    ALT 10/21/2024 20  10 - 44 U/L Final    eGFR 10/21/2024 >60  >60 mL/min/1.73 m^2 Final    Anion Gap 10/21/2024 13  8 - 16 mmol/L Final    CEA 10/21/2024 6.8 (H)  0.0 - 5.0 ng/mL Final    Comment: CEA Normal Range:  Non-Smokers: 0-3.0 ng/mL  Smokers:     0-5.0 ng/mL    The testing method is a chemiluminescent microparticle immunoassay   manufactured by [employer] and performed on the [employer]   or   [facility] system. Values obtained with different assay manufacturers   for   methods may be different and cannot be used interchangeably.      Microalbumin, Urine 10/21/2024 251.0  ug/mL Final    Creatinine, Urine 10/21/2024 132.0  15.0 - 325.0 mg/dL Final    Microalb/Creat Ratio 10/21/2024 190.2 (H)  0.0 - 30.0 ug/mg Final        Assessment and Plan   Hilar LAD, Kylee [place] LAD  The patient has a diagnosis of sarcoidosis EBUS In 2017 was non-diagnostic and repeat biopsy 03/28/2017 resulted as Granulomatous inflammation with necrosis (caseating)   CT CAP 10/29/2024 reviewed and shows multiple noncalcified pulmonary nodules including a larger opacity in the subpleural region of the right lung measuring up to 15 mm in size, Noncalcified and calcified mediastinal and bilateral hilar adenopathy and Celiac axis/left periaortic adenopathy along with adenopathy in the adele hepatis. I discussed with her the concern for recurrent/metastatic malignancy of colon vs breast and the possibility that this  is progressed sarcoidosis.    PET-CT 11/25/2024 showed extensive adenopathy similar in appearence to prior pet done 01/23/2017  Recommend repeat biopsy of multiple areas and she is in agreement  Pending pulmonology evaluation for biopsy and possible sarcoidosis treatment      History of Breast Cancer - Stage IA (pT2, pN0, cM0, G2, ER+, CO+, HER2-, Oncotype DX score: 8    Last MMG 05/2023: BIRADS-2  Last DXA showed no evidence of significant bone density loss   MMG 07/11/24: BIRADS 2  On Aromatase Inhibitor daily and prophylactic Zometa q6m  Hold AI for a few weeks to assess for improvement in Arthralgias      AI Induced Arthralgias   Patient with motion restricting pain in both shoulders  Hold Arimidex  Re-evaluate in 4 weeks and if arthralgias improved will switch AI to Letrazole      History of Colon Cancer, Stage I   Laparoscopic verus open right hemicolectomy 11/5/15   Next surveillance colonoscopy due in 06/2025  Recommend she consider genetic counseling given history of two malignancies      Cryptococcal meningoencephalitis   On Fluconazole for 6-12 more months  Drug interactions check against Zometa and Arimidex with no notable interactions found      Cancer Screening  PAP Smear: Last in 2010? and normal  Colonoscopy 06/30/20: no specimens collected; repeat in 5 years (06/2025)      Chronic Medical Conditions  HTN  DM II  Hypertensive cardiomyopathy  Sarcoidosis  DDD  Restrictive Lung Disease  Vitamin D Deficiency        Med Onc Chart Routing      Follow up with physician 4 weeks.   Follow up with JEFF    Infusion scheduling note    Injection scheduling note    Labs    Imaging    Pharmacy appointment    Other referrals       Additional referrals needed  Pulmonology STAT             The patient was seen, interviewed and examined. Pertinent lab and radiologic studies were reviewed. Pt instructed to call should they develop concerning signs/symptoms or have further questions.        Portions of the record may  have been created with voice recognition software. Occasional wrong-word or sound-a-like substitutions may have occurred due to the inherent limitations of voice recognition software. Read the chart carefully and recognize, using context, where substitutions have occurred.      Nakia Lay MD    Hematology/Oncology

## 2024-12-03 NOTE — PROGRESS NOTES
HPI     Diabetic Eye Exam     Additional comments: Patient states vision OU doing well no complaint at   this time. Sees PCP every 3 months pt states she refuse to use and   medication prescribed by PCP to control diabetic symptoms            Comments    HPI     S/p PCIOL OD 06/02/2021.  No complaints.  She is happy with vision.  No eye pain.  NS OS  Diabetes    Last edited by Ajay Dinero, OD on 7/27/2021  9:22 AM. (History)               Assessment /Plan      For exam results, see Encounter Report.     Post-operative state        No cell or flare OD     Dispense Final Rx for glasses or may use OTC glasses.  RTC 6 months DFE   Discussed above and answered questions.               Last edited by Bia Dee on 12/3/2024  3:18 PM.            Assessment /Plan     For exam results, see Encounter Report.    Diabetes mellitus type 2 without retinopathy  Last A1c 6.7 . Diabetes controlled with no diabetic retinopathy on dilated exam. Reviewed diabetic eye precautions including excellent blood sugar control, and importance of regular follow up.     Cortical age-related cataract, left eye  Cataracts are present but not visually significant. Will continue to monitor.    Pseudophakia of right eye  Stable, monitor yearly.       RTC 1 year, sooner prn

## 2024-12-04 LAB
ALBUMIN/CREAT UR: 160.6 UG/MG (ref 0–30)
CREAT UR-MCNC: 264 MG/DL (ref 15–325)
MICROALBUMIN UR DL<=1MG/L-MCNC: 424 UG/ML

## 2024-12-05 LAB — 1,25(OH)2D3 SERPL-MCNC: 62 PG/ML (ref 20–79)

## 2024-12-11 ENCOUNTER — OFFICE VISIT (OUTPATIENT)
Dept: INFECTIOUS DISEASES | Facility: CLINIC | Age: 69
End: 2024-12-11
Payer: MEDICARE

## 2024-12-11 VITALS
HEIGHT: 66 IN | HEART RATE: 75 BPM | TEMPERATURE: 98 F | BODY MASS INDEX: 21.9 KG/M2 | RESPIRATION RATE: 18 BRPM | OXYGEN SATURATION: 98 % | WEIGHT: 136.25 LBS | SYSTOLIC BLOOD PRESSURE: 124 MMHG | DIASTOLIC BLOOD PRESSURE: 73 MMHG

## 2024-12-11 DIAGNOSIS — E78.5 DM TYPE 2 WITH DIABETIC DYSLIPIDEMIA: ICD-10-CM

## 2024-12-11 DIAGNOSIS — E78.5 HYPERLIPIDEMIA, UNSPECIFIED HYPERLIPIDEMIA TYPE: ICD-10-CM

## 2024-12-11 DIAGNOSIS — E11.69 DM TYPE 2 WITH DIABETIC DYSLIPIDEMIA: ICD-10-CM

## 2024-12-11 DIAGNOSIS — B45.1 CRYPTOCOCCAL MENINGOENCEPHALITIS: Primary | ICD-10-CM

## 2024-12-11 DIAGNOSIS — I10 PRIMARY HYPERTENSION: ICD-10-CM

## 2024-12-11 PROCEDURE — 99999 PR PBB SHADOW E&M-EST. PATIENT-LVL IV: CPT | Mod: PBBFAC,,, | Performed by: STUDENT IN AN ORGANIZED HEALTH CARE EDUCATION/TRAINING PROGRAM

## 2024-12-11 PROCEDURE — 3074F SYST BP LT 130 MM HG: CPT | Mod: CPTII,S$GLB,, | Performed by: STUDENT IN AN ORGANIZED HEALTH CARE EDUCATION/TRAINING PROGRAM

## 2024-12-11 PROCEDURE — 3060F POS MICROALBUMINURIA REV: CPT | Mod: CPTII,S$GLB,, | Performed by: STUDENT IN AN ORGANIZED HEALTH CARE EDUCATION/TRAINING PROGRAM

## 2024-12-11 PROCEDURE — 1125F AMNT PAIN NOTED PAIN PRSNT: CPT | Mod: CPTII,S$GLB,, | Performed by: STUDENT IN AN ORGANIZED HEALTH CARE EDUCATION/TRAINING PROGRAM

## 2024-12-11 PROCEDURE — 3066F NEPHROPATHY DOC TX: CPT | Mod: CPTII,S$GLB,, | Performed by: STUDENT IN AN ORGANIZED HEALTH CARE EDUCATION/TRAINING PROGRAM

## 2024-12-11 PROCEDURE — 4010F ACE/ARB THERAPY RXD/TAKEN: CPT | Mod: CPTII,S$GLB,, | Performed by: STUDENT IN AN ORGANIZED HEALTH CARE EDUCATION/TRAINING PROGRAM

## 2024-12-11 PROCEDURE — 3288F FALL RISK ASSESSMENT DOCD: CPT | Mod: CPTII,S$GLB,, | Performed by: STUDENT IN AN ORGANIZED HEALTH CARE EDUCATION/TRAINING PROGRAM

## 2024-12-11 PROCEDURE — 1101F PT FALLS ASSESS-DOCD LE1/YR: CPT | Mod: CPTII,S$GLB,, | Performed by: STUDENT IN AN ORGANIZED HEALTH CARE EDUCATION/TRAINING PROGRAM

## 2024-12-11 PROCEDURE — 1159F MED LIST DOCD IN RCRD: CPT | Mod: CPTII,S$GLB,, | Performed by: STUDENT IN AN ORGANIZED HEALTH CARE EDUCATION/TRAINING PROGRAM

## 2024-12-11 PROCEDURE — 3078F DIAST BP <80 MM HG: CPT | Mod: CPTII,S$GLB,, | Performed by: STUDENT IN AN ORGANIZED HEALTH CARE EDUCATION/TRAINING PROGRAM

## 2024-12-11 PROCEDURE — 99214 OFFICE O/P EST MOD 30 MIN: CPT | Mod: S$GLB,,, | Performed by: STUDENT IN AN ORGANIZED HEALTH CARE EDUCATION/TRAINING PROGRAM

## 2024-12-11 PROCEDURE — 3008F BODY MASS INDEX DOCD: CPT | Mod: CPTII,S$GLB,, | Performed by: STUDENT IN AN ORGANIZED HEALTH CARE EDUCATION/TRAINING PROGRAM

## 2024-12-11 PROCEDURE — 3044F HG A1C LEVEL LT 7.0%: CPT | Mod: CPTII,S$GLB,, | Performed by: STUDENT IN AN ORGANIZED HEALTH CARE EDUCATION/TRAINING PROGRAM
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
## 2024-12-26 ENCOUNTER — OFFICE VISIT (OUTPATIENT)
Dept: CARDIOLOGY | Facility: CLINIC | Age: 69
End: 2024-12-26
Payer: MEDICARE

## 2024-12-26 VITALS
BODY MASS INDEX: 21.79 KG/M2 | DIASTOLIC BLOOD PRESSURE: 58 MMHG | HEIGHT: 66 IN | HEART RATE: 75 BPM | WEIGHT: 135.56 LBS | SYSTOLIC BLOOD PRESSURE: 128 MMHG

## 2024-12-26 DIAGNOSIS — E11.69 DM TYPE 2 WITH DIABETIC DYSLIPIDEMIA: ICD-10-CM

## 2024-12-26 DIAGNOSIS — J98.4 CHRONIC RESTRICTIVE LUNG DISEASE: ICD-10-CM

## 2024-12-26 DIAGNOSIS — R00.1 BRADYCARDIA: ICD-10-CM

## 2024-12-26 DIAGNOSIS — E78.5 DM TYPE 2 WITH DIABETIC DYSLIPIDEMIA: ICD-10-CM

## 2024-12-26 DIAGNOSIS — I10 PRIMARY HYPERTENSION: ICD-10-CM

## 2024-12-26 DIAGNOSIS — E78.5 HYPERLIPIDEMIA, UNSPECIFIED HYPERLIPIDEMIA TYPE: ICD-10-CM

## 2024-12-26 DIAGNOSIS — B45.1 CRYPTOCOCCAL MENINGOENCEPHALITIS: ICD-10-CM

## 2024-12-26 DIAGNOSIS — Z86.73 HISTORY OF STROKE: ICD-10-CM

## 2024-12-26 DIAGNOSIS — R42 DIZZINESS: ICD-10-CM

## 2024-12-26 DIAGNOSIS — R06.09 OTHER FORM OF DYSPNEA: Primary | ICD-10-CM

## 2024-12-26 DIAGNOSIS — I51.89 DIASTOLIC DYSFUNCTION: ICD-10-CM

## 2024-12-26 DIAGNOSIS — R06.09 DOE (DYSPNEA ON EXERTION): ICD-10-CM

## 2024-12-26 DIAGNOSIS — R60.0 LOCALIZED EDEMA: ICD-10-CM

## 2024-12-26 PROCEDURE — 99214 OFFICE O/P EST MOD 30 MIN: CPT | Mod: S$GLB,,, | Performed by: INTERNAL MEDICINE

## 2024-12-26 PROCEDURE — 1101F PT FALLS ASSESS-DOCD LE1/YR: CPT | Mod: CPTII,S$GLB,, | Performed by: INTERNAL MEDICINE

## 2024-12-26 PROCEDURE — 3066F NEPHROPATHY DOC TX: CPT | Mod: CPTII,S$GLB,, | Performed by: INTERNAL MEDICINE

## 2024-12-26 PROCEDURE — 99999 PR PBB SHADOW E&M-EST. PATIENT-LVL III: CPT | Mod: PBBFAC,,, | Performed by: INTERNAL MEDICINE

## 2024-12-26 PROCEDURE — 3008F BODY MASS INDEX DOCD: CPT | Mod: CPTII,S$GLB,, | Performed by: INTERNAL MEDICINE

## 2024-12-26 PROCEDURE — 3288F FALL RISK ASSESSMENT DOCD: CPT | Mod: CPTII,S$GLB,, | Performed by: INTERNAL MEDICINE

## 2024-12-26 PROCEDURE — 4010F ACE/ARB THERAPY RXD/TAKEN: CPT | Mod: CPTII,S$GLB,, | Performed by: INTERNAL MEDICINE

## 2024-12-26 PROCEDURE — G2211 COMPLEX E/M VISIT ADD ON: HCPCS | Mod: S$GLB,,, | Performed by: INTERNAL MEDICINE

## 2024-12-26 PROCEDURE — 1126F AMNT PAIN NOTED NONE PRSNT: CPT | Mod: CPTII,S$GLB,, | Performed by: INTERNAL MEDICINE

## 2024-12-26 PROCEDURE — 3044F HG A1C LEVEL LT 7.0%: CPT | Mod: CPTII,S$GLB,, | Performed by: INTERNAL MEDICINE

## 2024-12-26 PROCEDURE — 3060F POS MICROALBUMINURIA REV: CPT | Mod: CPTII,S$GLB,, | Performed by: INTERNAL MEDICINE

## 2024-12-26 PROCEDURE — 1160F RVW MEDS BY RX/DR IN RCRD: CPT | Mod: CPTII,S$GLB,, | Performed by: INTERNAL MEDICINE

## 2024-12-26 PROCEDURE — 3074F SYST BP LT 130 MM HG: CPT | Mod: CPTII,S$GLB,, | Performed by: INTERNAL MEDICINE

## 2024-12-26 PROCEDURE — 1159F MED LIST DOCD IN RCRD: CPT | Mod: CPTII,S$GLB,, | Performed by: INTERNAL MEDICINE

## 2024-12-26 PROCEDURE — 3078F DIAST BP <80 MM HG: CPT | Mod: CPTII,S$GLB,, | Performed by: INTERNAL MEDICINE

## 2024-12-26 RX ORDER — LANOLIN ALCOHOL/MO/W.PET/CERES
400 CREAM (GRAM) TOPICAL DAILY
Qty: 90 TABLET | Refills: 1 | Status: SHIPPED | OUTPATIENT
Start: 2024-12-26

## 2024-12-26 NOTE — PROGRESS NOTES
Subjective:   Patient ID:  Anne Farmer is a 69 y.o. female who presents for cardiac consult of No chief complaint on file.      Referral by: No referring provider defined for this encounter.     Reason for consult: dizziness       HPI  The patient came in today for cardiac consult of No chief complaint on file.      Anne Farmer is a 69 y.o. female pt with HTN, HLD, HFPEF, aortic atherosc, sarcoidosis, chronic restrictive lung diseaes, DM2 presents for follow up up CV eval.     5/16/24  BP stable. HR 50s. BMI 26 - 165   She has been getting more dizzy and lightheaded along with LOZADA.   Last BNP elevated - takes HCTZ but still has more edema, and SOB.   She had prior nuc stress in 2016, will need further CV eval - ECHO, nuc stress once euvolemic.     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 She reports that she has been having dizziness for past 4 weeks. She also c/o intermittent SOB, lower extremity edema, right upper extremity weakness, right upper extremity tremor, decreased appetite, decreased oral intake, and weight loss.      Initial VSS, temp 98.9°, blood pressure 139/65, heart rate 66, respirations 18, 98% SpO2 on RA. Labs no leukocytosis, H/H 12/33, platelets 313, Na 129, K 3.0, Cl 94, CO2 19, Bun/Cr 16/1.5, glucose 143, normal LFTs, , troponin 0.030, urinalysis with proteinuria, cloudy appearance, WBC 11, few bacteria. Urine culture is in progress. EKG NSR, 63, CXR some vascular congestion. CT head without  contrast- Atrophy and chronic white matter changes. MRI brain synaptic stealth with and without contrast was done which demonstrated no acute process, again notes atrophy and chronic microvascular ischemic changes.   Pt received potassium supplementation and admitted to Hospital Medicine for hyponatremia, hypokalemia, Mild SAGRARIO, and mild troponin elevation.  8/5- Pt seen and examined, VSS Afeb, Looks and feels much better. Her labs have mostly improved, SAGRARIO resolved, K corrected. Na still at 128 which is chronic. Dizziness is chronic and stable as well. She is eating drinking well, walking around well with PT/OT about 400 feet. She was seen and examined and deemed stable for discharge back to SNF today.       BNP (pg/mL)   Date Value   09/05/2024 166 (H)   08/02/2024 220 (H)   07/23/2024 77   06/02/2024 271 (H)   03/24/2024 197 (H)        She was admitted in Aug for dizziness with dec PO intake, edema, weakness and hyponatremia, hypokal and mild trop elevated.   BP and HR stable today. BMI 22 - 140 lbs   BNP improving  She has no appetite, is doing protein drinks/shakes now.   She has started Remeron for sleep and appetite    12/26/24   LE u/s venous - r/o DVT - neg 10/2024  She has not gained much weight.     Results for orders placed during the hospital encounter of 05/22/24    Echo    Interpretation Summary    Left Ventricle: The left ventricle is normal in size. Normal wall thickness. There is concentric remodeling. Normal wall motion. Ejection fraction by visual approximation is 60%. There is indeterminate diastolic function.    Right Ventricle: Normal right ventricular cavity size. Wall thickness is normal. Systolic function is normal.    Aortic Valve: The aortic valve is a trileaflet valve. There is mild aortic regurgitation.    Mitral Valve: There is mild regurgitation.    Tricuspid Valve: There is moderate regurgitation.    Pulmonary Artery: The estimated pulmonary artery systolic pressure is 49 mmHg.     IVC/SVC: Normal venous pressure at 3 mmHg.      ECG  Sinus bradycardia with sinus arrhythmia   Right bundle branch block   Left anterior fascicular block    Bifascicular block   LVH with repolarization abnormality   Cannot rule out Septal infarct ,age undetermined   Abnormal ECG   When compared with ECG of 2022 12:33,   Minimal criteria for Septal infarct are now Present   T wave inversion now evident in Lateral leads   Confirmed by REJI LOPEZ MD ([id number]) on 2024 3:43:46 PM     No cardiac monitor results found for the past 12 months         Past Medical History:   Diagnosis Date    Allergy     Arthritis     Cancer 2016    colon    CHF (congestive heart failure)     Colon cancer 2004    Colon cancer screening 2015    Diabetes mellitus type 2 in nonobese 3/9/2016    borderline    Diverticulosis     DM (diabetes mellitus) 2016    BS didn't check 2019    DM (diabetes mellitus) 2016    BS didn't check 2020    Hyperlipidemia 10/25/2016    Hypertension     Insomnia     Malignant neoplasm of colon 2021    Malignant neoplasm of lower-outer quadrant of left breast of female, estrogen receptor positive 2022       Past Surgical History:   Procedure Laterality Date    BREAST BIOPSY Left     BREAST LUMPECTOMY Left      SECTION      COLON SURGERY      COLONOSCOPY N/A 2015    Procedure: COLONOSCOPY;  Surgeon: Adela Sam MD;  Location: [facility];  Service: Endoscopy;  Laterality: N/A;    COLONOSCOPY N/A 2017    Procedure: COLONOSCOPY;  Surgeon: Chintan Flores MD;  Location: [facility] ENDO;  Service: Endoscopy;  Laterality: N/A;    COLONOSCOPY N/A 2020    Procedure: COLONOSCOPY;  Surgeon: Grisel Atkins MD;  Location: [facility] ENDO;  Service: Endoscopy;  Laterality: N/A;    SENTINEL LYMPH NODE BIOPSY Left 2022    Procedure: BIOPSY, LYMPH NODE, SENTINEL;  Surgeon: Arvin Hernandez MD;  Location: [facility] OR;  Service: General;  Laterality: Left;        Social History     Tobacco Use    Smoking status: Never     Passive exposure: Never    Smokeless tobacco: Never   Substance Use Topics    Alcohol use: Yes     Alcohol/week: 0.0 standard drinks of alcohol     Comment: weekends.       Drug use: No       Family History   Problem Relation Name Age of Onset    Hypertension Mother      Diabetes Mother      Hypertension Father      Hypertension Sister      Hypertension Brother      Hypertension Sister      Hypertension Sister      Hypertension Sister      Hypertension Brother      Hypertension Brother         Patient's Medications   New Prescriptions    No medications on file   Previous Medications    ALBUTEROL (PROVENTIL/VENTOLIN HFA) 90 MCG/ACTUATION INHALER    INHALE 1-2 PUFFS BY MOUTH EVERY 6 HOURS AS NEEDED FOR WHEEZE OR SHORTNESS OF BREATH    AMLODIPINE (NORVASC) 10 MG TABLET    TAKE 1 TABLET BY MOUTH EVERY DAY    ANASTROZOLE (ARIMIDEX) 1 MG TAB    Take 1 tablet (1 mg total) by mouth once daily.    AZELASTINE (ASTELIN) 137 MCG (0.1 %) NASAL SPRAY    2 sprays (274 mcg total) by Nasal route 2 (two) times daily.    CYCLOBENZAPRINE (FLEXERIL) 5 MG TABLET    TAKE 1 TABLET BY MOUTH THREE TIMES A DAY AS NEEDED FOR MUSCLE SPASM    FLUCONAZOLE (DIFLUCAN) 200 MG TAB    Take 1 tablet (200 mg total) by mouth once daily.    FLUTICASONE PROPIONATE (FLONASE) 50 MCG/ACTUATION NASAL SPRAY    2 sprays (100 mcg total) by Each Nostril route once daily.    FUROSEMIDE (LASIX) 20 MG TABLET    TAKE 1 TABLET (20 MG TOTAL) BY MOUTH DAILY AS NEEDED (EDEMA, SWELLING, FLUID). DO NOT TAKE IF BP IS BELOW 110/70    KLOR-CON 10 10 MEQ TBSR    Take 2 tablets (20 mEq total) by mouth daily as needed (take with lasix).    LEVOCETIRIZINE (XYZAL) 5 MG TABLET    Take 1 tablet (5 mg total) by mouth every evening.    LOSARTAN (COZAAR) 100 MG TABLET    TAKE 1 TABLET BY MOUTH EVERY DAY    MAGNESIUM OXIDE (MAG-OX) 400 MG (241.3 MG MAGNESIUM) TABLET    Take 1 tablet (400 mg total) by mouth once daily.     MECLIZINE (ANTIVERT) 12.5 MG TABLET    Take 1 tablet (12.5 mg total) by mouth 3 (three) times daily as needed for Dizziness.    MIRTAZAPINE (REMERON) 30 MG TABLET    Take 1 tablet (30 mg total) by mouth every evening.    MULTIVITAMIN (ONE DAILY MULTIVITAMIN) PER TABLET    Take 1 tablet by mouth once daily.    OMEPRAZOLE (PRILOSEC) 20 MG CAPSULE    TAKE 1 CAPSULE BY MOUTH EVERY DAY    ONDANSETRON (ZOFRAN-ODT) 4 MG TBDL    Take 1 tablet (4 mg total) by mouth every 8 (eight) hours as needed (n/v).    PRAVASTATIN (PRAVACHOL) 40 MG TABLET    Take 1 tablet (40 mg total) by mouth once daily.    PROCHLORPERAZINE (COMPAZINE) 5 MG TABLET    Take 1 tablet (5 mg total) by mouth every 6 (six) hours as needed for Nausea.    PROMETHAZINE (PHENERGAN) 25 MG TABLET    Take 1 tablet (25 mg total) by mouth every 6 (six) hours as needed for Nausea.    TRAZODONE (DESYREL) 50 MG TABLET    TAKE 1 TABLET BY MOUTH EVERY DAY AT NIGHT   Modified Medications    No medications on file   Discontinued Medications    No medications on file       Review of Systems   Constitutional:  Positive for malaise/fatigue.   HENT: Negative.     Eyes: Negative.    Respiratory:  Positive for shortness of breath.    Cardiovascular:  Positive for orthopnea and leg swelling.   Gastrointestinal: Negative.    Genitourinary: Negative.    Musculoskeletal: Negative.    Skin: Negative.    Neurological:  Positive for dizziness.   Endo/Heme/Allergies: Negative.    Psychiatric/Behavioral: Negative.     All 12 systems otherwise negative.      Wt Readings from Last 3 Encounters:   12/11/24 61.8 kg (136 lb 3.9 oz)   12/03/24 61.2 kg (134 lb 14.7 oz)   11/15/24 62.9 kg (138 lb 10.7 oz)     Temp Readings from Last 3 Encounters:   12/11/24 97.9 °F (36.6 °C) (Oral)   12/03/24 97.1 °F (36.2 °C) (Temporal)   11/15/24 98 °F (36.7 °C) (Temporal)     BP Readings from Last 3 Encounters:   12/11/24 124/73   12/03/24 135/76   11/15/24 127/74     Pulse Readings from Last 3 Encounters:    12/11/24 75   12/03/24 77   11/15/24 84       There were no vitals taken for this visit.    Objective:   Physical Exam  Vitals and nursing note reviewed.   Constitutional:       General: She is not in acute distress.     Appearance: She is well-developed. She is not diaphoretic.   HENT:      Head: Normocephalic and atraumatic.      Nose: Nose normal.   Eyes:      General: No scleral icterus.     Conjunctiva/sclera: Conjunctivae normal.   Neck:      Thyroid: No thyromegaly.      Vascular: No JVD.   Cardiovascular:      Rate and Rhythm: Normal rate and regular rhythm.      Heart sounds: S1 normal and S2 normal. Murmur heard.      No friction rub. No gallop. No S3 or S4 sounds.   Pulmonary:      Effort: Pulmonary effort is normal. No respiratory distress.      Breath sounds: Normal breath sounds. No stridor. No wheezing or rales.   Chest:      Chest wall: No tenderness.   Abdominal:      General: Bowel sounds are normal. There is no distension.      Palpations: Abdomen is soft. There is no mass.      Tenderness: There is no abdominal tenderness. There is no rebound.   Genitourinary:     Comments: Deferred  Musculoskeletal:         General: No tenderness or deformity. Normal range of motion.      Cervical back: Normal range of motion and neck supple.      Right lower leg: Edema present.      Left lower leg: Edema present.   Lymphadenopathy:      Cervical: No cervical adenopathy.   Skin:     General: Skin is warm and dry.      Coloration: Skin is not pale.      Findings: No erythema or rash.   Neurological:      Mental Status: She is alert and oriented to person, place, and time.      Motor: No abnormal muscle tone.      Coordination: Coordination normal.   Psychiatric:         Behavior: Behavior normal.         Thought Content: Thought content normal.         Judgment: Judgment normal.         Lab Results   Component Value Date     10/21/2024    K 3.4 (L) 10/21/2024    CL 99 10/21/2024    CO2 25 10/21/2024     BUN 9 10/21/2024    CREATININE 0.9 10/21/2024     (H) 10/21/2024    HGBA1C 6.3 (H) 12/03/2024    MG 2.0 08/03/2024    AST 31 10/21/2024    ALT 20 10/21/2024    ALBUMIN 3.8 10/21/2024    PROT 7.9 10/21/2024    BILITOT 0.2 10/21/2024    WBC 4.84 10/21/2024    HGB 10.9 (L) 10/21/2024    HCT 34.3 (L) 10/21/2024    MCV 95 10/21/2024     10/21/2024    INR 0.9 06/03/2024    TSH 4.065 (H) 08/03/2024    CHOL 208 (H) 06/02/2024    HDL 86 (H) 06/02/2024    LDLCALC 106.4 06/02/2024    TRIG 78 06/02/2024     (H) 09/05/2024         BNP (pg/mL)   Date Value   09/05/2024 166 (H)   08/02/2024 220 (H)   07/23/2024 77   06/02/2024 271 (H)   03/24/2024 197 (H)     INR (no units)   Date Value   06/03/2024 0.9   06/02/2024 1.0   04/03/2012 0.9          Assessment:      1. Other form of dyspnea    2. Hyperlipidemia, unspecified hyperlipidemia type    3. Localized edema    4. Primary hypertension    5. Diastolic dysfunction    6. DM type 2 with diabetic dyslipidemia    7. History of stroke    8. Chronic restrictive lung disease    9. LOZADA (dyspnea on exertion)    10. Dizziness    11. Cryptococcal meningoencephalitis    12. Bradycardia            Plan:     1  Dizziness, dyspnea   - ECHO 5/2024 with normal bi V function, DD, mild AI, mild MR, mod TR, PASP 49 mmHg  - hosp - Aug 24 for dizziness with dec PO intake, edema, weakness and hyponatremia, hypokal and mild trop elevated.     2. HTN, diastolic dysfunction with LOZADA , min elevated trop -->  BNP improving 166 lbs   - titrate meds ,rec compression stockings  - cont diuretics, low salt diet - lasix PRN  -ECHO 5/2024 with normal bi V function, DD, mild AI, mild MR, mod TR, PASP 49 mmHg  - LE u/s venous - r/o DVT - neg 10/2024    3. Chronic restrictive lung disease, Sarcoid  - cont tx - on inhalers     4. Aortic atherosc, HLD, DM2  - cont to monitor - not on DM meds now  - cont statin    5. H/O breast CA  - cont tx and f/u heme/onc   - on Arimidex     6. H/O brainstem  stroke, cryptococcal meningitis  - cont tx and f/u with neuro-0  - stable     Visit today included increased complexity associated with the care of the episodic problem dyspnea addressed and managing the longitudinal care of the patient due to the serious and/or complex managed problem(s) .    Thank you for allowing me to participate in this patient's care. Please do not hesitate to contact me with any questions or concerns. Consult note has been forwarded to the referral physician.

## 2025-01-10 RX ORDER — CYCLOBENZAPRINE HCL 5 MG
TABLET ORAL
Qty: 30 TABLET | Refills: 0 | Status: SHIPPED | OUTPATIENT
Start: 2025-01-10

## 2025-01-10 RX ORDER — PROMETHAZINE HYDROCHLORIDE 25 MG/1
25 TABLET ORAL EVERY 6 HOURS PRN
Qty: 15 TABLET | Refills: 0 | Status: SHIPPED | OUTPATIENT
Start: 2025-01-10

## 2025-01-10 NOTE — TELEPHONE ENCOUNTER
No care due was identified.  [facility] Embedded Care Due Messages. Reference number: [id number].   1/10/2025 11:33:57 AM CST

## 2025-01-16 ENCOUNTER — CLINICAL SUPPORT (OUTPATIENT)
Dept: FAMILY MEDICINE | Facility: CLINIC | Age: 70
End: 2025-01-16
Payer: MEDICARE

## 2025-01-16 DIAGNOSIS — Z23 NEED FOR ZOSTER VACCINE: Primary | ICD-10-CM

## 2025-01-16 PROCEDURE — 90471 IMMUNIZATION ADMIN: CPT | Mod: S$GLB,,, | Performed by: FAMILY MEDICINE

## 2025-01-16 PROCEDURE — 99999 PR PBB SHADOW E&M-EST. PATIENT-LVL III: CPT | Mod: PBBFAC,,,

## 2025-01-16 PROCEDURE — 90750 HZV VACC RECOMBINANT IM: CPT | Mod: S$GLB,,, | Performed by: FAMILY MEDICINE

## 2025-01-19 DIAGNOSIS — R91.8 PULMONARY NODULES/LESIONS, MULTIPLE: Primary | ICD-10-CM

## 2025-01-24 ENCOUNTER — TELEPHONE (OUTPATIENT)
Dept: HEMATOLOGY/ONCOLOGY | Facility: CLINIC | Age: 70
End: 2025-01-24
Payer: MEDICARE

## 2025-01-24 NOTE — TELEPHONE ENCOUNTER
SW attempted to reach pt re: staff message received re: referral out due to insurance no longer accepted No answer, SW left vm.

## 2025-01-27 ENCOUNTER — LAB VISIT (OUTPATIENT)
Dept: LAB | Facility: HOSPITAL | Age: 70
End: 2025-01-27
Attending: INTERNAL MEDICINE
Payer: MEDICARE

## 2025-01-27 ENCOUNTER — TELEPHONE (OUTPATIENT)
Dept: HEMATOLOGY/ONCOLOGY | Facility: CLINIC | Age: 70
End: 2025-01-27
Payer: MEDICARE

## 2025-01-27 ENCOUNTER — OFFICE VISIT (OUTPATIENT)
Dept: PULMONOLOGY | Facility: CLINIC | Age: 70
End: 2025-01-27
Payer: MEDICARE

## 2025-01-27 VITALS
HEART RATE: 75 BPM | BODY MASS INDEX: 22.18 KG/M2 | HEIGHT: 66 IN | WEIGHT: 138 LBS | RESPIRATION RATE: 20 BRPM | SYSTOLIC BLOOD PRESSURE: 128 MMHG | DIASTOLIC BLOOD PRESSURE: 72 MMHG | OXYGEN SATURATION: 98 %

## 2025-01-27 DIAGNOSIS — I27.20 PULMONARY HYPERTENSION: ICD-10-CM

## 2025-01-27 DIAGNOSIS — R91.8 PULMONARY NODULES/LESIONS, MULTIPLE: ICD-10-CM

## 2025-01-27 DIAGNOSIS — R59.0 MEDIASTINAL LYMPHADENOPATHY: ICD-10-CM

## 2025-01-27 DIAGNOSIS — R59.0 MEDIASTINAL LYMPHADENOPATHY: Primary | ICD-10-CM

## 2025-01-27 DIAGNOSIS — R05.3 CHRONIC COUGH: ICD-10-CM

## 2025-01-27 DIAGNOSIS — R74.8 ABNORMAL SERUM ACE LEVEL: ICD-10-CM

## 2025-01-27 DIAGNOSIS — C50.512 MALIGNANT NEOPLASM OF LOWER-OUTER QUADRANT OF LEFT BREAST OF FEMALE, ESTROGEN RECEPTOR POSITIVE: ICD-10-CM

## 2025-01-27 DIAGNOSIS — Z17.0 MALIGNANT NEOPLASM OF LOWER-OUTER QUADRANT OF LEFT BREAST OF FEMALE, ESTROGEN RECEPTOR POSITIVE: ICD-10-CM

## 2025-01-27 DIAGNOSIS — J98.4 RESTRICTIVE LUNG DISEASE: ICD-10-CM

## 2025-01-27 DIAGNOSIS — Z85.038 HISTORY OF COLON CANCER, STAGE I: Chronic | ICD-10-CM

## 2025-01-27 LAB
CRP SERPL-MCNC: 23.7 MG/L (ref 0–8.2)
ERYTHROCYTE [SEDIMENTATION RATE] IN BLOOD BY WESTERGREN METHOD: 35 MM/HR (ref 0–20)

## 2025-01-27 PROCEDURE — 1160F RVW MEDS BY RX/DR IN RCRD: CPT | Mod: CPTII,S$GLB,, | Performed by: INTERNAL MEDICINE

## 2025-01-27 PROCEDURE — 82164 ANGIOTENSIN I ENZYME TEST: CPT | Performed by: INTERNAL MEDICINE

## 2025-01-27 PROCEDURE — 85651 RBC SED RATE NONAUTOMATED: CPT | Performed by: INTERNAL MEDICINE

## 2025-01-27 PROCEDURE — 99204 OFFICE O/P NEW MOD 45 MIN: CPT | Mod: S$GLB,,, | Performed by: INTERNAL MEDICINE

## 2025-01-27 PROCEDURE — 3078F DIAST BP <80 MM HG: CPT | Mod: CPTII,S$GLB,, | Performed by: INTERNAL MEDICINE

## 2025-01-27 PROCEDURE — 3008F BODY MASS INDEX DOCD: CPT | Mod: CPTII,S$GLB,, | Performed by: INTERNAL MEDICINE

## 2025-01-27 PROCEDURE — 83520 IMMUNOASSAY QUANT NOS NONAB: CPT | Performed by: INTERNAL MEDICINE

## 2025-01-27 PROCEDURE — 36415 COLL VENOUS BLD VENIPUNCTURE: CPT | Performed by: INTERNAL MEDICINE

## 2025-01-27 PROCEDURE — 1126F AMNT PAIN NOTED NONE PRSNT: CPT | Mod: CPTII,S$GLB,, | Performed by: INTERNAL MEDICINE

## 2025-01-27 PROCEDURE — 86140 C-REACTIVE PROTEIN: CPT | Performed by: INTERNAL MEDICINE

## 2025-01-27 PROCEDURE — 1159F MED LIST DOCD IN RCRD: CPT | Mod: CPTII,S$GLB,, | Performed by: INTERNAL MEDICINE

## 2025-01-27 PROCEDURE — 99999 PR PBB SHADOW E&M-EST. PATIENT-LVL V: CPT | Mod: PBBFAC,,, | Performed by: INTERNAL MEDICINE

## 2025-01-27 PROCEDURE — 3288F FALL RISK ASSESSMENT DOCD: CPT | Mod: CPTII,S$GLB,, | Performed by: INTERNAL MEDICINE

## 2025-01-27 PROCEDURE — 1101F PT FALLS ASSESS-DOCD LE1/YR: CPT | Mod: CPTII,S$GLB,, | Performed by: INTERNAL MEDICINE

## 2025-01-27 PROCEDURE — 3072F LOW RISK FOR RETINOPATHY: CPT | Mod: CPTII,S$GLB,, | Performed by: INTERNAL MEDICINE

## 2025-01-27 PROCEDURE — 3074F SYST BP LT 130 MM HG: CPT | Mod: CPTII,S$GLB,, | Performed by: INTERNAL MEDICINE

## 2025-01-27 NOTE — PROGRESS NOTES
Initial Outpatient Pulmonary Evaluation       SUBJECTIVE:     Chief Complaint   Patient presents with    Pulmonary Nodules       History of Present Illness:    Patient is a 69 y.o. female with a history of early stage colon cancer status post resection 2016, early stage breast cancer status post resection 2022 currently on Arimidex, completed radiation therapy 10/10/2022, history of PET positive mediastinal and cervical lymphadenopathy and non avid lung nodules status post EBUS TBNA biopsy of mediastinal lymphadenopathy without a confirmed pathological diagnosis of sarcoidosis or presence of granulomatous inflammation on specimen not treated with prednisone presents for re-evaluation.      Recent CT scan of the chest showed lung nodules and mediastinal lymphadenopathy status post PET scan that showed PET avidity specifically in the bilateral hilar and paratracheal lymphadenopathies with SUV activity up to 8.1.      A right lower lobe nodule thought to be present in 2017 but PET non avid is showing now a 3.1 avidity on current PET scan.      The patient complains of chronic coughing wheezing and shortness of breath.      PFT 2020 shows restrictive pattern and decreased DLCO declining when compared to 2017.        Never smoker.  Review of Systems   Respiratory:  Positive for cough, shortness of breath, dyspnea on extertion and use of rescue inhaler.        Review of patient's allergies indicates:  No Known Allergies    Current Outpatient Medications   Medication Sig Dispense Refill    albuterol (PROVENTIL/VENTOLIN HFA) 90 mcg/actuation inhaler INHALE 1-2 PUFFS BY MOUTH EVERY 6 HOURS AS NEEDED FOR WHEEZE OR SHORTNESS OF BREATH 18 g 3    amLODIPine (NORVASC) 10 MG tablet TAKE 1 TABLET BY MOUTH EVERY DAY 90 tablet 3    anastrozole (ARIMIDEX) 1 mg Tab Take 1 tablet (1 mg total) by mouth once daily. 90 tablet 1    azelastine (ASTELIN) 137 mcg (0.1 %) nasal spray 2 sprays (274 mcg  total) by Nasal route 2 (two) times daily. 30 mL 0    cyclobenzaprine (FLEXERIL) 5 MG tablet TAKE 1 TABLET BY MOUTH THREE TIMES A DAY AS NEEDED FOR MUSCLE SPASM 30 tablet 0    fluconazole (DIFLUCAN) 200 MG Tab Take 1 tablet (200 mg total) by mouth once daily. 90 tablet 3    fluticasone propionate (FLONASE) 50 mcg/actuation nasal spray 2 sprays (100 mcg total) by Each Nostril route once daily. 48 mL 3    furosemide (LASIX) 20 MG tablet TAKE 1 TABLET (20 MG TOTAL) BY MOUTH DAILY AS NEEDED (EDEMA, SWELLING, FLUID). DO NOT TAKE IF BP IS BELOW 110/70 90 tablet 1    KLOR-CON 10 10 mEq TbSR Take 2 tablets (20 mEq total) by mouth daily as needed (take with lasix). 90 tablet 1    levocetirizine (XYZAL) 5 MG tablet Take 1 tablet (5 mg total) by mouth every evening. 90 tablet 2    losartan (COZAAR) 100 MG tablet TAKE 1 TABLET BY MOUTH EVERY DAY 90 tablet 3    magnesium oxide (MAG-OX) 400 mg (241.3 mg magnesium) tablet Take 1 tablet (400 mg total) by mouth once daily. 90 tablet 1    meclizine (ANTIVERT) 12.5 mg tablet Take 1 tablet (12.5 mg total) by mouth 3 (three) times daily as needed for Dizziness. 30 tablet 1    mirtazapine (REMERON) 30 MG tablet Take 1 tablet (30 mg total) by mouth every evening. 90 tablet 1    multivitamin (ONE DAILY MULTIVITAMIN) per tablet Take 1 tablet by mouth once daily.      omeprazole (PRILOSEC) 20 MG capsule TAKE 1 CAPSULE BY MOUTH EVERY DAY 90 capsule 3    ondansetron (ZOFRAN-ODT) 4 MG TbDL Take 1 tablet (4 mg total) by mouth every 8 (eight) hours as needed (n/v). 30 tablet 3    pravastatin (PRAVACHOL) 40 MG tablet Take 1 tablet (40 mg total) by mouth once daily. 90 tablet 3    prochlorperazine (COMPAZINE) 5 MG tablet Take 1 tablet (5 mg total) by mouth every 6 (six) hours as needed for Nausea. 30 tablet 1    promethazine (PHENERGAN) 25 MG tablet TAKE 1 TABLET BY MOUTH EVERY 6 HOURS AS NEEDED FOR NAUSEA 15 tablet 0    traZODone (DESYREL) 50 MG tablet TAKE 1 TABLET BY MOUTH EVERY DAY AT NIGHT 90  tablet 3     Current Facility-Administered Medications   Medication Dose Route Frequency Provider Last Rate Last Admin    influenza (adjuvanted) (Fluad) 45 mcg/0.5 mL IM vaccine (> or = 66 yo) 0.5 mL  0.5 mL Intramuscular 1 time in Clinic/HOD            Past Medical History:   Diagnosis Date    Allergy     Arthritis     Cancer 2016    colon    CHF (congestive heart failure)     Colon cancer 2004    Colon cancer screening 2015    Diabetes mellitus type 2 in nonobese 3/9/2016    borderline    Diverticulosis     DM (diabetes mellitus) 2016    BS didn't check 2019    DM (diabetes mellitus) 2016    BS didn't check 2020    Hyperlipidemia 10/25/2016    Hypertension     Insomnia     Malignant neoplasm of colon 2021    Malignant neoplasm of lower-outer quadrant of left breast of female, estrogen receptor positive 2022     Past Surgical History:   Procedure Laterality Date    BREAST BIOPSY Left     BREAST LUMPECTOMY Left      SECTION      COLON SURGERY      COLONOSCOPY N/A 2015    Procedure: COLONOSCOPY;  Surgeon: Adela Sam MD;  Location: [facility] ENDO;  Service: Endoscopy;  Laterality: N/A;    COLONOSCOPY N/A 2017    Procedure: COLONOSCOPY;  Surgeon: Chintan Flores MD;  Location: [facility] ENDO;  Service: Endoscopy;  Laterality: N/A;    COLONOSCOPY N/A 2020    Procedure: COLONOSCOPY;  Surgeon: Grisel Atkins MD;  Location: [facility] ENDO;  Service: Endoscopy;  Laterality: N/A;    SENTINEL LYMPH NODE BIOPSY Left 2022    Procedure: BIOPSY, LYMPH NODE, SENTINEL;  Surgeon: Arvin Hernandez MD;  Location: [facility] OR;  Service: General;  Laterality: Left;     Family History   Problem Relation Name Age of Onset    Hypertension Mother      Diabetes Mother      Hypertension Father      Hypertension Sister      Hypertension Brother      Hypertension Sister      Hypertension Sister      Hypertension Sister      Hypertension Brother      Hypertension Brother       Social  "History     Tobacco Use    Smoking status: Never     Passive exposure: Never    Smokeless tobacco: Never   Substance Use Topics    Alcohol use: Not Currently     Comment: weekends.       Drug use: No          OBJECTIVE:     Vital Signs (Most Recent)  Vital Signs  Pulse: 75  Resp: 20  SpO2: 98 %  BP: 128/72  Pain Score: 0-No pain  Height and Weight  Height: 5' 6" (167.6 cm)  Weight: 62.6 kg (138 lb 0.1 oz)  BSA (Calculated - sq m): 1.71 sq meters  BMI (Calculated): 22.3  Weight in (lb) to have BMI = 25: 154.6]  Wt Readings from Last 2 Encounters:   01/27/25 62.6 kg (138 lb 0.1 oz)   01/14/25 61.7 kg (136 lb 0.4 oz)         Physical Exam:  Physical Exam   Constitutional: She is oriented to person, place, and time. She appears well-developed and well-nourished.   Pulmonary/Chest: Normal expansion and effort normal. She has decreased breath sounds.   Neurological: She is alert and oriented to person, place, and time.   Psychiatric: Her behavior is normal.       Laboratory  Lab Results   Component Value Date    WBC 4.84 10/21/2024    RBC 3.61 (L) 10/21/2024    HGB 10.9 (L) 10/21/2024    HCT 34.3 (L) 10/21/2024    MCV 95 10/21/2024    MCH 30.2 10/21/2024    MCHC 31.8 (L) 10/21/2024    RDW 13.4 10/21/2024     10/21/2024    MPV 8.0 (L) 10/21/2024    GRAN 3.8 10/21/2024    GRAN 78.8 (H) 10/21/2024    LYMPH 0.5 (L) 10/21/2024    LYMPH 10.3 (L) 10/21/2024    MONO 0.3 10/21/2024    MONO 6.0 10/21/2024    EOS 0.2 10/21/2024    BASO 0.05 10/21/2024    EOSINOPHIL 3.3 10/21/2024    BASOPHIL 1.0 10/21/2024       BMP  Lab Results   Component Value Date     10/21/2024    K 3.4 (L) 10/21/2024    CL 99 10/21/2024    CO2 25 10/21/2024    BUN 9 10/21/2024    CREATININE 0.9 10/21/2024    CALCIUM 9.7 10/21/2024    ANIONGAP 13 10/21/2024    ESTGFRAFRICA >60.0 06/16/2022    EGFRNONAA >60.0 06/16/2022    AST 31 10/21/2024    ALT 20 10/21/2024    PROT 7.9 10/21/2024       Lab Results   Component Value Date     (H) 09/05/2024 " "    (H) 08/02/2024    BNP 77 07/23/2024     (H) 06/02/2024     (H) 03/24/2024    BNP 45 02/29/2012       Lab Results   Component Value Date    TSH 4.065 (H) 08/03/2024       Lab Results   Component Value Date    SEDRATE 35 (H) 03/17/2021       Lab Results   Component Value Date    CRP 20.5 (H) 03/17/2021       No results found for: "IGE"    Lab Results   Component Value Date    ASPERGILLUS None Detected 02/15/2017     No results found for: "AFUMIGATUSCL"     Lab Results   Component Value Date    ACE 65 06/07/2022   Echo May 2024      Left Ventricle: The left ventricle is normal in size. Normal wall thickness. There is concentric remodeling. Normal wall motion. Ejection fraction by visual approximation is 60%. There is indeterminate diastolic function.    Right Ventricle: Normal right ventricular cavity size. Wall thickness is normal. Systolic function is normal.    Aortic Valve: The aortic valve is a trileaflet valve. There is mild aortic regurgitation.    Mitral Valve: There is mild regurgitation.    Tricuspid Valve: There is moderate regurgitation.    Pulmonary Artery: The estimated pulmonary artery systolic pressure is 49 mmHg.    IVC/SVC: Normal venous pressure at 3 mmHg.     CT scan and PET scan personally reviewed.  Diagnostic Results:  I have personally reviewed today the following studies :        ASSESSMENT/PLAN:     Mediastinal lymphadenopathy  -     Ambulatory referral/consult to Pulmonology  -     Angiotensin Converting Enzyme; Future; Expected date: 01/27/2025  -     INTERLEUKIN-2 RECEPTOR; Future; Expected date: 01/27/2025  -     Sedimentation rate; Future; Expected date: 01/27/2025  -     C-REACTIVE PROTEIN; Future; Expected date: 01/27/2025    Pulmonary nodules/lesions, multiple  -     Ambulatory referral/consult to Pulmonology  -     Ambulatory referral/consult to Pulmonology  -     Angiotensin Converting Enzyme; Future; Expected date: 01/27/2025  -     INTERLEUKIN-2 RECEPTOR; " Future; Expected date: 2025  -     Sedimentation rate; Future; Expected date: 2025  -     C-REACTIVE PROTEIN; Future; Expected date: 2025    History of colon cancer, stage I  -     Ambulatory referral/consult to Pulmonology    Malignant neoplasm of lower-outer quadrant of left breast of female, estrogen receptor positive  -     Ambulatory referral/consult to Pulmonology    Restrictive lung disease  -     Complete PFT with bronchodilator; Future  -     Stress test, pulmonary; Future    Chronic cough  -     Fraction of  Nitric Oxide; Future    Abnormal serum ACE level  -     Angiotensin Converting Enzyme; Future; Expected date: 2025  -     INTERLEUKIN-2 RECEPTOR; Future; Expected date: 2025  -     Sedimentation rate; Future; Expected date: 2025  -     C-REACTIVE PROTEIN; Future; Expected date: 2025    Pulmonary hypertension  -     Stress test, pulmonary; Future        Rule out progressive sarcoidosis in the context of the patient clinical symptoms of chronic coughing wheezing and shortness of breath, PET avid lymphadenopathies and newly PET avid 3 SUV previously present right lower lobe nodule but not avid.      Check inflammatory serologies.      Repeat PFT and 6 minute walk.      Not treated with prednisone previously, did not find a confirmed granulomatous pathology on prior testing.      Will likely  need repeat bronchoscopy and biopsy to confirm sarcoidosis before initiating disease modifying drugs due to clinical respiratory declined      Additional recommendation follow above workup.  Follow up in about 3 weeks (around 2025).    This note was prepared using voice recognition system and is likely to have sound alike errors that may have been overlooked even after proof reading.  Please call me with any questions    Discussed diagnosis, its evaluation, treatment and usual course. All questions answered.    Thank you for the courtesy of participating in the  care of this patient    Dandre Ross MD

## 2025-01-27 NOTE — H&P (VIEW-ONLY)
Initial Outpatient Pulmonary Evaluation       SUBJECTIVE:     Chief Complaint   Patient presents with    Pulmonary Nodules       History of Present Illness:    Patient is a 69 y.o. female with a history of early stage colon cancer status post resection 2016, early stage breast cancer status post resection 2022 currently on Arimidex, completed radiation therapy 10/10/2022, history of PET positive mediastinal and cervical lymphadenopathy and non avid lung nodules status post EBUS TBNA biopsy of mediastinal lymphadenopathy without a confirmed pathological diagnosis of sarcoidosis or presence of granulomatous inflammation on specimen not treated with prednisone presents for re-evaluation.      Recent CT scan of the chest showed lung nodules and mediastinal lymphadenopathy status post PET scan that showed PET avidity specifically in the bilateral hilar and paratracheal lymphadenopathies with SUV activity up to 8.1.      A right lower lobe nodule thought to be present in 2017 but PET non avid is showing now a 3.1 avidity on current PET scan.      The patient complains of chronic coughing wheezing and shortness of breath.      PFT 2020 shows restrictive pattern and decreased DLCO declining when compared to 2017.        Never smoker.  Review of Systems   Respiratory:  Positive for cough, shortness of breath, dyspnea on extertion and use of rescue inhaler.        Review of patient's allergies indicates:  No Known Allergies    Current Outpatient Medications   Medication Sig Dispense Refill    albuterol (PROVENTIL/VENTOLIN HFA) 90 mcg/actuation inhaler INHALE 1-2 PUFFS BY MOUTH EVERY 6 HOURS AS NEEDED FOR WHEEZE OR SHORTNESS OF BREATH 18 g 3    amLODIPine (NORVASC) 10 MG tablet TAKE 1 TABLET BY MOUTH EVERY DAY 90 tablet 3    anastrozole (ARIMIDEX) 1 mg Tab Take 1 tablet (1 mg total) by mouth once daily. 90 tablet 1    azelastine (ASTELIN) 137 mcg (0.1 %) nasal spray 2 sprays (274 mcg  total) by Nasal route 2 (two) times daily. 30 mL 0    cyclobenzaprine (FLEXERIL) 5 MG tablet TAKE 1 TABLET BY MOUTH THREE TIMES A DAY AS NEEDED FOR MUSCLE SPASM 30 tablet 0    fluconazole (DIFLUCAN) 200 MG Tab Take 1 tablet (200 mg total) by mouth once daily. 90 tablet 3    fluticasone propionate (FLONASE) 50 mcg/actuation nasal spray 2 sprays (100 mcg total) by Each Nostril route once daily. 48 mL 3    furosemide (LASIX) 20 MG tablet TAKE 1 TABLET (20 MG TOTAL) BY MOUTH DAILY AS NEEDED (EDEMA, SWELLING, FLUID). DO NOT TAKE IF BP IS BELOW 110/70 90 tablet 1    KLOR-CON 10 10 mEq TbSR Take 2 tablets (20 mEq total) by mouth daily as needed (take with lasix). 90 tablet 1    levocetirizine (XYZAL) 5 MG tablet Take 1 tablet (5 mg total) by mouth every evening. 90 tablet 2    losartan (COZAAR) 100 MG tablet TAKE 1 TABLET BY MOUTH EVERY DAY 90 tablet 3    magnesium oxide (MAG-OX) 400 mg (241.3 mg magnesium) tablet Take 1 tablet (400 mg total) by mouth once daily. 90 tablet 1    meclizine (ANTIVERT) 12.5 mg tablet Take 1 tablet (12.5 mg total) by mouth 3 (three) times daily as needed for Dizziness. 30 tablet 1    mirtazapine (REMERON) 30 MG tablet Take 1 tablet (30 mg total) by mouth every evening. 90 tablet 1    multivitamin (ONE DAILY MULTIVITAMIN) per tablet Take 1 tablet by mouth once daily.      omeprazole (PRILOSEC) 20 MG capsule TAKE 1 CAPSULE BY MOUTH EVERY DAY 90 capsule 3    ondansetron (ZOFRAN-ODT) 4 MG TbDL Take 1 tablet (4 mg total) by mouth every 8 (eight) hours as needed (n/v). 30 tablet 3    pravastatin (PRAVACHOL) 40 MG tablet Take 1 tablet (40 mg total) by mouth once daily. 90 tablet 3    prochlorperazine (COMPAZINE) 5 MG tablet Take 1 tablet (5 mg total) by mouth every 6 (six) hours as needed for Nausea. 30 tablet 1    promethazine (PHENERGAN) 25 MG tablet TAKE 1 TABLET BY MOUTH EVERY 6 HOURS AS NEEDED FOR NAUSEA 15 tablet 0    traZODone (DESYREL) 50 MG tablet TAKE 1 TABLET BY MOUTH EVERY DAY AT NIGHT 90  tablet 3     Current Facility-Administered Medications   Medication Dose Route Frequency Provider Last Rate Last Admin    influenza (adjuvanted) (Fluad) 45 mcg/0.5 mL IM vaccine (> or = 64 yo) 0.5 mL  0.5 mL Intramuscular 1 time in Clinic/HOD            Past Medical History:   Diagnosis Date    Allergy     Arthritis     Cancer 2016    colon    CHF (congestive heart failure)     Colon cancer 2004    Colon cancer screening 2015    Diabetes mellitus type 2 in nonobese 3/9/2016    borderline    Diverticulosis     DM (diabetes mellitus) 2016    BS didn't check 2019    DM (diabetes mellitus) 2016    BS didn't check 2020    Hyperlipidemia 10/25/2016    Hypertension     Insomnia     Malignant neoplasm of colon 2021    Malignant neoplasm of lower-outer quadrant of left breast of female, estrogen receptor positive 2022     Past Surgical History:   Procedure Laterality Date    BREAST BIOPSY Left     BREAST LUMPECTOMY Left      SECTION      COLON SURGERY      COLONOSCOPY N/A 2015    Procedure: COLONOSCOPY;  Surgeon: Adela Sam MD;  Location: [facility] ENDO;  Service: Endoscopy;  Laterality: N/A;    COLONOSCOPY N/A 2017    Procedure: COLONOSCOPY;  Surgeon: Chintan Flores MD;  Location: [facility] ENDO;  Service: Endoscopy;  Laterality: N/A;    COLONOSCOPY N/A 2020    Procedure: COLONOSCOPY;  Surgeon: Grisel Atkins MD;  Location: [facility] ENDO;  Service: Endoscopy;  Laterality: N/A;    SENTINEL LYMPH NODE BIOPSY Left 2022    Procedure: BIOPSY, LYMPH NODE, SENTINEL;  Surgeon: Arvin Hernandez MD;  Location: [facility] OR;  Service: General;  Laterality: Left;     Family History   Problem Relation Name Age of Onset    Hypertension Mother      Diabetes Mother      Hypertension Father      Hypertension Sister      Hypertension Brother      Hypertension Sister      Hypertension Sister      Hypertension Sister      Hypertension Brother      Hypertension Brother       Social  "History     Tobacco Use    Smoking status: Never     Passive exposure: Never    Smokeless tobacco: Never   Substance Use Topics    Alcohol use: Not Currently     Comment: weekends.       Drug use: No          OBJECTIVE:     Vital Signs (Most Recent)  Vital Signs  Pulse: 75  Resp: 20  SpO2: 98 %  BP: 128/72  Pain Score: 0-No pain  Height and Weight  Height: 5' 6" (167.6 cm)  Weight: 62.6 kg (138 lb 0.1 oz)  BSA (Calculated - sq m): 1.71 sq meters  BMI (Calculated): 22.3  Weight in (lb) to have BMI = 25: 154.6]  Wt Readings from Last 2 Encounters:   01/27/25 62.6 kg (138 lb 0.1 oz)   01/14/25 61.7 kg (136 lb 0.4 oz)         Physical Exam:  Physical Exam   Constitutional: She is oriented to person, place, and time. She appears well-developed and well-nourished.   Pulmonary/Chest: Normal expansion and effort normal. She has decreased breath sounds.   Neurological: She is alert and oriented to person, place, and time.   Psychiatric: Her behavior is normal.       Laboratory  Lab Results   Component Value Date    WBC 4.84 10/21/2024    RBC 3.61 (L) 10/21/2024    HGB 10.9 (L) 10/21/2024    HCT 34.3 (L) 10/21/2024    MCV 95 10/21/2024    MCH 30.2 10/21/2024    MCHC 31.8 (L) 10/21/2024    RDW 13.4 10/21/2024     10/21/2024    MPV 8.0 (L) 10/21/2024    GRAN 3.8 10/21/2024    GRAN 78.8 (H) 10/21/2024    LYMPH 0.5 (L) 10/21/2024    LYMPH 10.3 (L) 10/21/2024    MONO 0.3 10/21/2024    MONO 6.0 10/21/2024    EOS 0.2 10/21/2024    BASO 0.05 10/21/2024    EOSINOPHIL 3.3 10/21/2024    BASOPHIL 1.0 10/21/2024       BMP  Lab Results   Component Value Date     10/21/2024    K 3.4 (L) 10/21/2024    CL 99 10/21/2024    CO2 25 10/21/2024    BUN 9 10/21/2024    CREATININE 0.9 10/21/2024    CALCIUM 9.7 10/21/2024    ANIONGAP 13 10/21/2024    ESTGFRAFRICA >60.0 06/16/2022    EGFRNONAA >60.0 06/16/2022    AST 31 10/21/2024    ALT 20 10/21/2024    PROT 7.9 10/21/2024       Lab Results   Component Value Date     (H) 09/05/2024 " "    (H) 08/02/2024    BNP 77 07/23/2024     (H) 06/02/2024     (H) 03/24/2024    BNP 45 02/29/2012       Lab Results   Component Value Date    TSH 4.065 (H) 08/03/2024       Lab Results   Component Value Date    SEDRATE 35 (H) 03/17/2021       Lab Results   Component Value Date    CRP 20.5 (H) 03/17/2021       No results found for: "IGE"    Lab Results   Component Value Date    ASPERGILLUS None Detected 02/15/2017     No results found for: "AFUMIGATUSCL"     Lab Results   Component Value Date    ACE 65 06/07/2022   Echo May 2024      Left Ventricle: The left ventricle is normal in size. Normal wall thickness. There is concentric remodeling. Normal wall motion. Ejection fraction by visual approximation is 60%. There is indeterminate diastolic function.    Right Ventricle: Normal right ventricular cavity size. Wall thickness is normal. Systolic function is normal.    Aortic Valve: The aortic valve is a trileaflet valve. There is mild aortic regurgitation.    Mitral Valve: There is mild regurgitation.    Tricuspid Valve: There is moderate regurgitation.    Pulmonary Artery: The estimated pulmonary artery systolic pressure is 49 mmHg.    IVC/SVC: Normal venous pressure at 3 mmHg.     CT scan and PET scan personally reviewed.  Diagnostic Results:  I have personally reviewed today the following studies :        ASSESSMENT/PLAN:     Mediastinal lymphadenopathy  -     Ambulatory referral/consult to Pulmonology  -     Angiotensin Converting Enzyme; Future; Expected date: 01/27/2025  -     INTERLEUKIN-2 RECEPTOR; Future; Expected date: 01/27/2025  -     Sedimentation rate; Future; Expected date: 01/27/2025  -     C-REACTIVE PROTEIN; Future; Expected date: 01/27/2025    Pulmonary nodules/lesions, multiple  -     Ambulatory referral/consult to Pulmonology  -     Ambulatory referral/consult to Pulmonology  -     Angiotensin Converting Enzyme; Future; Expected date: 01/27/2025  -     INTERLEUKIN-2 RECEPTOR; " Future; Expected date: 2025  -     Sedimentation rate; Future; Expected date: 2025  -     C-REACTIVE PROTEIN; Future; Expected date: 2025    History of colon cancer, stage I  -     Ambulatory referral/consult to Pulmonology    Malignant neoplasm of lower-outer quadrant of left breast of female, estrogen receptor positive  -     Ambulatory referral/consult to Pulmonology    Restrictive lung disease  -     Complete PFT with bronchodilator; Future  -     Stress test, pulmonary; Future    Chronic cough  -     Fraction of  Nitric Oxide; Future    Abnormal serum ACE level  -     Angiotensin Converting Enzyme; Future; Expected date: 2025  -     INTERLEUKIN-2 RECEPTOR; Future; Expected date: 2025  -     Sedimentation rate; Future; Expected date: 2025  -     C-REACTIVE PROTEIN; Future; Expected date: 2025    Pulmonary hypertension  -     Stress test, pulmonary; Future        Rule out progressive sarcoidosis in the context of the patient clinical symptoms of chronic coughing wheezing and shortness of breath, PET avid lymphadenopathies and newly PET avid 3 SUV previously present right lower lobe nodule but not avid.      Check inflammatory serologies.      Repeat PFT and 6 minute walk.      Not treated with prednisone previously, did not find a confirmed granulomatous pathology on prior testing.      Will likely  need repeat bronchoscopy and biopsy to confirm sarcoidosis before initiating disease modifying drugs due to clinical respiratory declined      Additional recommendation follow above workup.  Follow up in about 3 weeks (around 2025).    This note was prepared using voice recognition system and is likely to have sound alike errors that may have been overlooked even after proof reading.  Please call me with any questions    Discussed diagnosis, its evaluation, treatment and usual course. All questions answered.    Thank you for the courtesy of participating in the  care of this patient    Dandre Ross MD

## 2025-01-29 LAB — ACE SERPL-CCNC: 92 U/L (ref 16–85)

## 2025-01-31 LAB — SOL IL2 RECEP SERPL-MCNC: 1248.9 PG/ML (ref 175.3–858.2)

## 2025-02-01 DIAGNOSIS — R59.0 MEDIASTINAL LYMPHADENOPATHY: ICD-10-CM

## 2025-02-01 DIAGNOSIS — R91.1 LUNG NODULE: Primary | ICD-10-CM

## 2025-02-06 ENCOUNTER — HOSPITAL ENCOUNTER (OUTPATIENT)
Dept: ENDOSCOPY | Facility: HOSPITAL | Age: 70
Discharge: HOME | End: 2025-02-06
Attending: INTERNAL MEDICINE | Admitting: INTERNAL MEDICINE
Payer: MEDICARE

## 2025-02-06 ENCOUNTER — HOSPITAL ENCOUNTER (OUTPATIENT)
Dept: RADIOLOGY | Facility: HOSPITAL | Age: 70
Discharge: HOME | End: 2025-02-06
Attending: INTERNAL MEDICINE | Admitting: INTERNAL MEDICINE
Payer: MEDICARE

## 2025-02-06 ENCOUNTER — ANESTHESIA EVENT (OUTPATIENT)
Dept: ENDOSCOPY | Facility: HOSPITAL | Age: 70
End: 2025-02-06
Payer: MEDICARE

## 2025-02-06 ENCOUNTER — ANESTHESIA (OUTPATIENT)
Dept: ENDOSCOPY | Facility: HOSPITAL | Age: 70
End: 2025-02-06
Payer: MEDICARE

## 2025-02-06 VITALS
DIASTOLIC BLOOD PRESSURE: 66 MMHG | RESPIRATION RATE: 16 BRPM | OXYGEN SATURATION: 98 % | SYSTOLIC BLOOD PRESSURE: 150 MMHG | TEMPERATURE: 98 F | HEART RATE: 58 BPM

## 2025-02-06 DIAGNOSIS — R59.0 MEDIASTINAL LYMPHADENOPATHY: ICD-10-CM

## 2025-02-06 DIAGNOSIS — R91.1 LUNG NODULE: ICD-10-CM

## 2025-02-06 LAB
KOH PREP SPEC: NORMAL
POCT GLUCOSE: 116 MG/DL (ref 70–110)

## 2025-02-06 PROCEDURE — 31654 BRONCH EBUS IVNTJ PERPH LES: CPT | Mod: HCNC | Performed by: INTERNAL MEDICINE

## 2025-02-06 PROCEDURE — 31624 DX BRONCHOSCOPE/LAVAGE: CPT | Mod: 51,HCNC,, | Performed by: INTERNAL MEDICINE

## 2025-02-06 PROCEDURE — 88305 TISSUE EXAM BY PATHOLOGIST: CPT | Mod: 59,HCNC | Performed by: STUDENT IN AN ORGANIZED HEALTH CARE EDUCATION/TRAINING PROGRAM

## 2025-02-06 PROCEDURE — 87116 MYCOBACTERIA CULTURE: CPT | Mod: HCNC | Performed by: INTERNAL MEDICINE

## 2025-02-06 PROCEDURE — 87210 SMEAR WET MOUNT SALINE/INK: CPT | Mod: HCNC | Performed by: INTERNAL MEDICINE

## 2025-02-06 PROCEDURE — 31627 NAVIGATIONAL BRONCHOSCOPY: CPT | Mod: HCNC | Performed by: INTERNAL MEDICINE

## 2025-02-06 PROCEDURE — 27202059 HC NEEDLE, FNA (ANY): Mod: HCNC

## 2025-02-06 PROCEDURE — 31627 NAVIGATIONAL BRONCHOSCOPY: CPT | Mod: HCNC,,, | Performed by: INTERNAL MEDICINE

## 2025-02-06 PROCEDURE — 88172 CYTP DX EVAL FNA 1ST EA SITE: CPT | Mod: 26,HCNC,, | Performed by: STUDENT IN AN ORGANIZED HEALTH CARE EDUCATION/TRAINING PROGRAM

## 2025-02-06 PROCEDURE — 88177 CYTP FNA EVAL EA ADDL: CPT | Mod: HCNC | Performed by: STUDENT IN AN ORGANIZED HEALTH CARE EDUCATION/TRAINING PROGRAM

## 2025-02-06 PROCEDURE — 87015 SPECIMEN INFECT AGNT CONCNTJ: CPT | Mod: HCNC | Performed by: INTERNAL MEDICINE

## 2025-02-06 PROCEDURE — 88172 CYTP DX EVAL FNA 1ST EA SITE: CPT | Mod: 59,HCNC | Performed by: STUDENT IN AN ORGANIZED HEALTH CARE EDUCATION/TRAINING PROGRAM

## 2025-02-06 PROCEDURE — 88173 CYTOPATH EVAL FNA REPORT: CPT | Mod: 26,HCNC,, | Performed by: STUDENT IN AN ORGANIZED HEALTH CARE EDUCATION/TRAINING PROGRAM

## 2025-02-06 PROCEDURE — 88305 TISSUE EXAM BY PATHOLOGIST: CPT | Mod: 26,HCNC,, | Performed by: STUDENT IN AN ORGANIZED HEALTH CARE EDUCATION/TRAINING PROGRAM

## 2025-02-06 PROCEDURE — 31652 BRONCH EBUS SAMPLNG 1/2 NODE: CPT | Mod: HCNC | Performed by: INTERNAL MEDICINE

## 2025-02-06 PROCEDURE — 25000003 PHARM REV CODE 250: Mod: HCNC | Performed by: NURSE ANESTHETIST, CERTIFIED REGISTERED

## 2025-02-06 PROCEDURE — 88173 CYTOPATH EVAL FNA REPORT: CPT | Mod: HCNC | Performed by: STUDENT IN AN ORGANIZED HEALTH CARE EDUCATION/TRAINING PROGRAM

## 2025-02-06 PROCEDURE — 88112 CYTOPATH CELL ENHANCE TECH: CPT | Mod: 59,HCNC | Performed by: STUDENT IN AN ORGANIZED HEALTH CARE EDUCATION/TRAINING PROGRAM

## 2025-02-06 PROCEDURE — 71045 X-RAY EXAM CHEST 1 VIEW: CPT | Mod: TC,HCNC

## 2025-02-06 PROCEDURE — 76000 FLUOROSCOPY <1 HR PHYS/QHP: CPT | Mod: TC,HCNC

## 2025-02-06 PROCEDURE — 71250 CT THORAX DX C-: CPT | Mod: TC,HCNC

## 2025-02-06 PROCEDURE — 88177 CYTP FNA EVAL EA ADDL: CPT | Mod: 26,HCNC,, | Performed by: STUDENT IN AN ORGANIZED HEALTH CARE EDUCATION/TRAINING PROGRAM

## 2025-02-06 PROCEDURE — 37000008 HC ANESTHESIA 1ST 15 MINUTES: Mod: HCNC

## 2025-02-06 PROCEDURE — 31628 BRONCHOSCOPY/LUNG BX EACH: CPT | Mod: HCNC | Performed by: INTERNAL MEDICINE

## 2025-02-06 PROCEDURE — 31624 DX BRONCHOSCOPE/LAVAGE: CPT | Mod: HCNC | Performed by: INTERNAL MEDICINE

## 2025-02-06 PROCEDURE — 87205 SMEAR GRAM STAIN: CPT | Mod: HCNC | Performed by: INTERNAL MEDICINE

## 2025-02-06 PROCEDURE — 37000009 HC ANESTHESIA EA ADD 15 MINS: Mod: HCNC

## 2025-02-06 PROCEDURE — 88112 CYTOPATH CELL ENHANCE TECH: CPT | Mod: 26,59,HCNC, | Performed by: STUDENT IN AN ORGANIZED HEALTH CARE EDUCATION/TRAINING PROGRAM

## 2025-02-06 PROCEDURE — 87102 FUNGUS ISOLATION CULTURE: CPT | Mod: HCNC | Performed by: INTERNAL MEDICINE

## 2025-02-06 PROCEDURE — 31652 BRONCH EBUS SAMPLNG 1/2 NODE: CPT | Mod: HCNC,,, | Performed by: INTERNAL MEDICINE

## 2025-02-06 PROCEDURE — 31628 BRONCHOSCOPY/LUNG BX EACH: CPT | Mod: 51,HCNC,, | Performed by: INTERNAL MEDICINE

## 2025-02-06 PROCEDURE — 71045 X-RAY EXAM CHEST 1 VIEW: CPT | Mod: 26,HCNC,, | Performed by: RADIOLOGY

## 2025-02-06 PROCEDURE — 87206 SMEAR FLUORESCENT/ACID STAI: CPT | Mod: HCNC | Performed by: INTERNAL MEDICINE

## 2025-02-06 PROCEDURE — 63600175 PHARM REV CODE 636 W HCPCS: Mod: HCNC | Performed by: NURSE ANESTHETIST, CERTIFIED REGISTERED

## 2025-02-06 PROCEDURE — 87070 CULTURE OTHR SPECIMN AEROBIC: CPT | Mod: HCNC | Performed by: INTERNAL MEDICINE

## 2025-02-06 PROCEDURE — 71250 CT THORAX DX C-: CPT | Mod: 26,HCNC,, | Performed by: RADIOLOGY

## 2025-02-06 PROCEDURE — 31654 BRONCH EBUS IVNTJ PERPH LES: CPT | Mod: HCNC,,, | Performed by: INTERNAL MEDICINE

## 2025-02-06 PROCEDURE — 27200944 HC BRONCH FORCEPS DISPOSABLE: Mod: HCNC

## 2025-02-06 RX ORDER — ONDANSETRON HYDROCHLORIDE 2 MG/ML
INJECTION, SOLUTION INTRAVENOUS
Status: DISCONTINUED | OUTPATIENT
Start: 2025-02-06 | End: 2025-02-06

## 2025-02-06 RX ORDER — ROCURONIUM BROMIDE 10 MG/ML
INJECTION, SOLUTION INTRAVENOUS
Status: DISCONTINUED | OUTPATIENT
Start: 2025-02-06 | End: 2025-02-06

## 2025-02-06 RX ORDER — LIDOCAINE HYDROCHLORIDE 10 MG/ML
INJECTION, SOLUTION EPIDURAL; INFILTRATION; INTRACAUDAL; PERINEURAL
Status: DISCONTINUED | OUTPATIENT
Start: 2025-02-06 | End: 2025-02-06

## 2025-02-06 RX ORDER — PROPOFOL 10 MG/ML
VIAL (ML) INTRAVENOUS
Status: DISCONTINUED | OUTPATIENT
Start: 2025-02-06 | End: 2025-02-06

## 2025-02-06 RX ADMIN — SODIUM CHLORIDE, SODIUM LACTATE, POTASSIUM CHLORIDE, AND CALCIUM CHLORIDE: .6; .31; .03; .02 INJECTION, SOLUTION INTRAVENOUS at 08:02

## 2025-02-06 RX ADMIN — ROCURONIUM BROMIDE 50 MG: 10 INJECTION, SOLUTION INTRAVENOUS at 08:02

## 2025-02-06 RX ADMIN — PROPOFOL 140 MG: 10 INJECTION, EMULSION INTRAVENOUS at 08:02

## 2025-02-06 RX ADMIN — LIDOCAINE HYDROCHLORIDE 100 MG: 10 SOLUTION INTRAVENOUS at 08:02

## 2025-02-06 RX ADMIN — SUGAMMADEX 200 MG: 100 INJECTION, SOLUTION INTRAVENOUS at 10:02

## 2025-02-06 RX ADMIN — ONDANSETRON 4 MG: 2 INJECTION INTRAMUSCULAR; INTRAVENOUS at 10:02

## 2025-02-06 NOTE — DISCHARGE INSTRUCTIONS
You might experience today a low-grade fever please take acetaminophen 650 mg every 8 hours as needed    You might experience today blood tinged phlegm, this should subside within 24-48 hours     For severe blood or respiratory distress please proceed to the emergency room

## 2025-02-06 NOTE — TRANSFER OF CARE
Anesthesia Transfer of Care Note    Patient: Anne Farmer    Procedure(s) Performed: * No procedures listed *    Patient location: PACU    Anesthesia Type: MAC and general    Transport from OR: Transported from OR on room air with adequate spontaneous ventilation    Post pain: adequate analgesia    Post assessment: no apparent anesthetic complications    Post vital signs: stable    Level of consciousness: awake and alert    Nausea/Vomiting: no nausea/vomiting    Complications: none    Transfer of care protocol was followed      Last vitals: Visit Vitals  BP (!) 154/67   Pulse 66   Temp 36.8 °C (98.2 °F)   Resp 18   SpO2 96%   Breastfeeding No

## 2025-02-06 NOTE — INTERVAL H&P NOTE
The patient has been examined and the H&P has been reviewed:    I concur with the findings and no changes have occurred since H&P was written.    Robotic bronchoscopy and biopsy of PET positive right lower lobe nodule.  EBUS TBNA biopsy of mediastinal lymphadenopathy PET positive.

## 2025-02-06 NOTE — ANESTHESIA PREPROCEDURE EVALUATION
02/06/2025  Anne Farmer is a 69 y.o., female.      Pre-op Assessment    I have reviewed the Patient Summary Reports.     I have reviewed the Nursing Notes. I have reviewed the NPO Status.   I have reviewed the Medications.     Review of Systems  Anesthesia Hx:  No problems with previous Anesthesia                Cardiovascular:     Hypertension, well controlled       CHF                                   Pulmonary:  Pulmonary Normal                       Hepatic/GI:     Bowel Conditions:  Bowel Neoplasm:, Colorectal Cancer        Musculoskeletal:  Arthritis          Spine Disorders:  Degenerative disease and Disc disease           Neurological:    Neuromuscular Disease,                                   Endocrine:  Diabetes, well controlled, type 2           Dermatological:  Skin Normal    Psych:  Psychiatric Normal                    Physical Exam  General: Well nourished    Airway:  Mallampati: II   Mouth Opening: Normal  TM Distance: Normal  Tongue: Normal  Neck ROM: Normal ROM    Dental:  Intact    Chest/Lungs:  Clear to auscultation    Heart:  Rate: Normal        Anesthesia Plan  Type of Anesthesia, risks & benefits discussed:    Anesthesia Type: Gen ETT  Intra-op Monitoring Plan: Standard ASA Monitors  Induction:  IV  Informed Consent: Informed consent signed with the Patient and all parties understand the risks and agree with anesthesia plan.  All questions answered. Patient consented to blood products? Yes  ASA Score: 3    Ready For Surgery From Anesthesia Perspective.     .

## 2025-02-06 NOTE — DISCHARGE SUMMARY
[facility] ([facility])  Pulmonology  Discharge Summary      Patient Name: Anne Farmer  MRN: [id number]  Admission Date: 2/6/2025  Hospital Length of Stay: 0 days  Discharge Date and Time:  02/06/2025 10:35 AM  Attending Physician: Dandre Ross MD   Discharging Provider: Dandre Ross MD  Primary Care Provider: Chiquita Talley MD    HPI:  Status post right lung biopsy and EBUS TBNA biopsy and BAL for PET positive nodules and lymphadenopathy    * No procedures listed *    Indwelling Lines/Drains at Time of Discharge:   Lines/Drains/Airways       None                       Pending Diagnostic Studies:       Procedure Component Value Units Date/Time    Cytology- FNA Radiology Guided, Bronch/EBUS, EUS/GI [[id number]] Collected: 02/06/25 1009    Order Status: Sent Lab Status: In process Updated: 02/06/25 1010    Specimen: Fine Needle Aspirate     X-Ray Chest 1 View [[id number]]     Order Status: Sent Lab Status: No result         Abnormal PET scan lung    Discharged Condition: stable    Disposition: Home or Self Care    Follow Up:   Follow-up Information       Dandre Ross MD Follow up in 2 day(s).    Specialty: Pulmonary Disease  Contact information:  [address] [place] [address] [address] [place]OPEZ [address] [phone]                           Patient Instructions:   You might experience today a low-grade fever please take acetaminophen 650 mg every 8 hours as needed    You might experience today blood tinged phlegm, this should subside within 24-48 hours     For severe blood or respiratory distress please proceed to the emergency room    Medications:  Reconciled Home Medications:      Medication List        CONTINUE taking these medications      albuterol 90 mcg/actuation inhaler  Commonly known as: PROVENTIL/VENTOLIN HFA  INHALE 1-2 PUFFS BY MOUTH EVERY 6 HOURS AS NEEDED FOR WHEEZE OR SHORTNESS OF BREATH     amLODIPine 10 MG tablet  Commonly known as: NORVASC  TAKE 1 TABLET BY MOUTH  EVERY DAY     anastrozole 1 mg Tab  Commonly known as: ARIMIDEX  Take 1 tablet (1 mg total) by mouth once daily.     azelastine 137 mcg (0.1 %) nasal spray  Commonly known as: ASTELIN  2 sprays (274 mcg total) by Nasal route 2 (two) times daily.     cyclobenzaprine 5 MG tablet  Commonly known as: FLEXERIL  TAKE 1 TABLET BY MOUTH THREE TIMES A DAY AS NEEDED FOR MUSCLE SPASM     fluconazole 200 MG Tab  Commonly known as: DIFLUCAN  Take 1 tablet (200 mg total) by mouth once daily.     fluticasone propionate 50 mcg/actuation nasal spray  Commonly known as: FLONASE  2 sprays (100 mcg total) by Each Nostril route once daily.     furosemide 20 MG tablet  Commonly known as: LASIX  TAKE 1 TABLET (20 MG TOTAL) BY MOUTH DAILY AS NEEDED (EDEMA, SWELLING, FLUID). DO NOT TAKE IF BP IS BELOW 110/70     KLOR-CON 10 10 mEq Tbsr  Generic drug: potassium chloride  Take 2 tablets (20 mEq total) by mouth daily as needed (take with lasix).     levocetirizine 5 MG tablet  Commonly known as: XYZAL  Take 1 tablet (5 mg total) by mouth every evening.     losartan 100 MG tablet  Commonly known as: COZAAR  TAKE 1 TABLET BY MOUTH EVERY DAY     magnesium oxide 400 mg (241.3 mg magnesium) tablet  Commonly known as: MAG-OX  Take 1 tablet (400 mg total) by mouth once daily.     meclizine 12.5 mg tablet  Commonly known as: ANTIVERT  Take 1 tablet (12.5 mg total) by mouth 3 (three) times daily as needed for Dizziness.     mirtazapine 30 MG tablet  Commonly known as: REMERON  Take 1 tablet (30 mg total) by mouth every evening.     omeprazole 20 MG capsule  Commonly known as: PRILOSEC  TAKE 1 CAPSULE BY MOUTH EVERY DAY     ondansetron 4 MG Tbdl  Commonly known as: ZOFRAN-ODT  Take 1 tablet (4 mg total) by mouth every 8 (eight) hours as needed (n/v).     ONE DAILY MULTIVITAMIN tablet  Generic drug: multivitamin  Take 1 tablet by mouth once daily.     pravastatin 40 MG tablet  Commonly known as: PRAVACHOL  Take 1 tablet (40 mg total) by mouth once  daily.     prochlorperazine 5 MG tablet  Commonly known as: COMPAZINE  Take 1 tablet (5 mg total) by mouth every 6 (six) hours as needed for Nausea.     promethazine 25 MG tablet  Commonly known as: PHENERGAN  TAKE 1 TABLET BY MOUTH EVERY 6 HOURS AS NEEDED FOR NAUSEA     traZODone 50 MG tablet  Commonly known as: DESYREL  TAKE 1 TABLET BY MOUTH EVERY DAY AT NIGHT              Dandre Ross MD  Pulmonology  [facility] ([facility])

## 2025-02-06 NOTE — ANESTHESIA PROCEDURE NOTES
Intubation    Date/Time: 2/6/2025 8:26 AM    Performed by: Alfonso Agee CRNA  Authorized by: Jordi Miller MD    Intubation:     Induction:  Intravenous    Intubated:  Postinduction    Mask Ventilation:  Not attempted    Attempts:  1    Attempted By:  CRNA    Method of Intubation:  Video laryngoscopy    Blade:  Vic 3    Laryngeal View Grade: Grade I - full view of cords      Difficult Airway Encountered?: No      Complications:  None    Airway Device:  Oral endotracheal tube    Airway Device Size:  8.5    Style/Cuff Inflation:  Cuffed (inflated to minimal occlusive pressure)    Inflation Amount (mL):  8    Tube secured:  20    Secured at:  The lips    Placement Verified By:  Capnometry    Complicating Factors:  None    Findings Post-Intubation:  BS equal bilateral

## 2025-02-06 NOTE — PLAN OF CARE
CXR Complete-viewed by Dr. Ross; no pneumothorax. Tolerated well. Dr. Ross at bedside to discuss results of test with patient.

## 2025-02-08 LAB
ACID FAST MOD KINY STN SPEC: NORMAL
MYCOBACTERIUM SPEC QL CULT: NORMAL

## 2025-02-10 LAB
BACTERIA SPEC AEROBE CULT: NO GROWTH
FINAL PATHOLOGIC DIAGNOSIS: NORMAL
GRAM STN SPEC: NORMAL
GRAM STN SPEC: NORMAL
Lab: NORMAL

## 2025-02-12 ENCOUNTER — CLINICAL SUPPORT (OUTPATIENT)
Dept: PULMONOLOGY | Facility: CLINIC | Age: 70
End: 2025-02-12
Payer: MEDICARE

## 2025-02-12 VITALS — WEIGHT: 138 LBS | HEIGHT: 66 IN | BODY MASS INDEX: 22.18 KG/M2

## 2025-02-12 DIAGNOSIS — R05.3 CHRONIC COUGH: ICD-10-CM

## 2025-02-12 DIAGNOSIS — J98.4 RESTRICTIVE LUNG DISEASE: ICD-10-CM

## 2025-02-12 DIAGNOSIS — J98.4 RESTRICTIVE LUNG DISEASE: Primary | ICD-10-CM

## 2025-02-12 DIAGNOSIS — I27.20 PULMONARY HYPERTENSION: ICD-10-CM

## 2025-02-12 LAB
BRPFT: ABNORMAL
DLCO SINGLE BREATH LLN: 16.64
DLCO SINGLE BREATH PRE REF: 37.9 %
DLCO SINGLE BREATH REF: 22.38
DLCOC SBVA LLN: 2.89
DLCOC SBVA REF: 4.24
DLCOC SINGLE BREATH LLN: 16.64
DLCOC SINGLE BREATH REF: 22.38
DLCOVA LLN: 2.89
DLCOVA PRE REF: 81.3 %
DLCOVA PRE: 3.45 ML/(MIN*MMHG*L) (ref 2.89–5.59)
DLCOVA REF: 4.24
ERV LLN: -16449.34
ERV PRE REF: 75.6 %
ERV REF: 0.66
FEF 25 75 CHG: 1.5 %
FEF 25 75 LLN: 1.2
FEF 25 75 POST REF: 53.1 %
FEF 25 75 PRE REF: 52.4 %
FEF 25 75 REF: 2.6
FET100 CHG: -2.6 %
FEV1 CHG: -1.8 %
FEV1 FVC CHG: 0.2 %
FEV1 FVC LLN: 65
FEV1 FVC POST REF: 103.8 %
FEV1 FVC PRE REF: 103.6 %
FEV1 FVC REF: 78
FEV1 LLN: 1.39
FEV1 POST REF: 63.2 %
FEV1 PRE REF: 64.3 %
FEV1 REF: 2
FRCPLETH LLN: 2
FRCPLETH PREREF: 66.6 %
FRCPLETH REF: 2.83
FVC CHG: -2 %
FVC LLN: 1.82
FVC POST REF: 60.4 %
FVC PRE REF: 61.6 %
FVC REF: 2.58
IVC PRE: 1.53 L (ref 1.82–3.39)
IVC SINGLE BREATH LLN: 1.82
IVC SINGLE BREATH PRE REF: 59.4 %
IVC SINGLE BREATH REF: 2.58
MVV LLN: 76
MVV PRE REF: 67.4 %
MVV REF: 90
PEF CHG: 18 %
PEF LLN: 2.96
PEF POST REF: 81.2 %
PEF PRE REF: 68.8 %
PEF REF: 5.09
POST FEF 25 75: 1.38 L/S (ref 1.2–4)
POST FET 100: 6.44 SEC
POST FEV1 FVC: 81.14 % (ref 65.15–89.42)
POST FEV1: 1.27 L (ref 1.39–2.58)
POST FVC: 1.56 L (ref 1.82–3.39)
POST PEF: 4.14 L/S (ref 2.96–7.23)
PRE DLCO: 8.49 ML/(MIN*MMHG) (ref 16.64–28.11)
PRE ERV: 0.5 L (ref -16449.34–16450.66)
PRE FEF 25 75: 1.36 L/S (ref 1.2–4)
PRE FET 100: 6.61 SEC
PRE FEV1 FVC: 80.96 % (ref 65.15–89.42)
PRE FEV1: 1.29 L (ref 1.39–2.58)
PRE FRC PL: 1.88 L (ref 2–3.65)
PRE FVC: 1.59 L (ref 1.82–3.39)
PRE MVV: 60.58 L/MIN (ref 76.39–103.35)
PRE PEF: 3.5 L/S (ref 2.96–7.23)
PRE RV: 1.39 L (ref 1.59–2.75)
PRE TLC: 2.98 L (ref 4.29–6.26)
RAW LLN: 3.06
RAW PRE REF: 218.9 %
RAW PRE: 6.7 CMH2O*S/L (ref 3.06–3.06)
RAW REF: 3.06
RV LLN: 1.59
RV PRE REF: 63.9 %
RV REF: 2.17
RVTLC LLN: 34
RVTLC PRE REF: 108.1 %
RVTLC PRE: 46.57 % (ref 33.51–52.69)
RVTLC REF: 43
TLC LLN: 4.29
TLC PRE REF: 56.5 %
TLC REF: 5.27
VA PRE: 2.46 L (ref 5.12–5.12)
VA SINGLE BREATH LLN: 5.12
VA SINGLE BREATH PRE REF: 48 %
VA SINGLE BREATH REF: 5.12
VC LLN: 1.82
VC PRE REF: 61.6 %
VC PRE: 1.59 L (ref 1.82–3.39)
VC REF: 2.58

## 2025-02-12 PROCEDURE — 99999 PR PBB SHADOW E&M-EST. PATIENT-LVL I: CPT | Mod: PBBFAC,HCNC,,

## 2025-02-12 NOTE — PROCEDURES
Clinical Guide to Interpretation or FeNO Levels :    FeNO  (ppb) LOW INTERMEDIATE HIGH   ADULT VALUES < 25 25-50          > 50   Th2-driven Inflammation Unlikely Likely Significant     Patients FeNO level at this visit : __13__ (ppb)    Interpretation of FeNO measurement in adults:  [] FENO is less than 25 ppb implies non eosinophilic airway inflammation or the absence of airway inflammation.    Comment: Low FENO (<25 ppb) in adult asthmatics with persistent symptoms suggests other etiologies for these symptoms and a lower likelihood of benefit from adding or increasing inhaled glucocorticoids.    [] FENO between 25 and 50 ppb in adults should be interpreted cautiously with reference to the clinical situation (eg, symptomatic, on or off therapy, current smoking).    [] FENO greater than 50 ppb in adults  suggests eosinophilic airway inflammation   Comment: High FENO (>50 ppb) in adult asthmatics even with atypical symptoms suggests glucocorticoid responsiveness. High FENO (>50 ppb) can help identify poor adherence or uncontrolled inflammation in asthma patients with otherwise seemingly controlled asthma.    Discussion:  A FENO less than 25 ppb in adults and less than 20 ppb in children younger than 12 years of age implies noneosinophilic airway inflammation or the absence of airway inflammation.  A FENO greater than 50 ppb in adults or greater than 35 ppb in children suggests eosinophilic airway inflammation.   Values of FENO between 25 and 50 ppb in adults (20 to 35 ppb in children) should be interpreted cautiously with reference to the clinical situation (eg, symptomatic, on or off therapy, current smoking).  A rising FENO with a greater than 20 percent change and more than 25 ppb (20 ppb in children) from a previously stable level suggests increasing eosinophilic airway inflammation, but there are wide inter-individual differences.  A decrease in FENO greater than 20 percent for values over 50 ppb or more than  10 ppb for values less than 50 ppb may be clinically important.  ?FENO in other respiratory diseases - Several other diseases are associated with altered levels of exhaled NO: low levels of FENO have been noted in cystic fibrosis, current smoking, pulmonary hypertension, hypothermia, primary ciliary dyskinesia, and bronchopulmonary dysplasia. Elevated FENO has been noted in atopy, nonasthmatic eosinophilic bronchitis, COPD exacerbations, noncystic fibrosis bronchiectasis, and viral upper respiratory infections.    REFERENCE:  ATS Board of Directors, December 2004, and by the ERS Executive Committee, June 2004. ATS/ERS Recommendations for Standardized Procedures for the Online and Offline Measurement of Exhaled Lower Respiratory Nitric Oxide and Nasal Nitric Oxide. Guideline 2005

## 2025-02-12 NOTE — PROCEDURES
"[facility] - Pul[facility] Function  Six Minute Walk     SUMMARY     Ordering Provider: Dr. Ross   Interpreting Provider: Dr. Ross  Performing nurse/tech/RT: GAVI Simon, RRT  Diagnosis:  (RLD)  Height: 5' 6" (167.6 cm)  Weight: 62.6 kg (138 lb 0.1 oz)  BMI (Calculated): 22.3                Phase Oxygen Assessment Supplemental O2 Heart   Rate Blood Pressure Dorothy Dyspnea Scale Rating   Resting 98 % Room Air 66 bpm 147/67 3   Exercise        Minute        1 99 % Room Air 70 bpm     2 96 % Room Air 96 bpm     3 95 % Room Air 94 bpm     4 95 % Room Air 95 bpm     5 94 % Room Air 94 bpm     6  95 % Room Air 94 bpm 156/69 4   Recovery        Minute        1 97 % Room Air 86 bpm     2 97 % Room Air 73 bpm     3 97 % Room Air 68 bpm     4 98 % Room Air 69 bpm 152/69 3     Six Minute Walk Summary  6MWT Status: completed without stopping  Patient Reported: Dyspnea, Lightheadedness     Interpretation:  Did the patient stop or pause?: No                                         Total Time Walked (Calculated): 360 seconds  Final Partial Lap Distance (feet): 100 feet  Total Distance Meters (Calculated): 335.28 meters  Predicted Distance Meters (Calculated): 466.9 meters  Percentage of Predicted (Calculated): 71.81  Peak VO2 (Calculated): 14.04  Mets: 4.01  Has The Patient Had a Previous Six Minute Walk Test?: Yes       Previous 6MWT Results  Has The Patient Had a Previous Six Minute Walk Test?: Yes  Date of Previous Test: 12/16/20  Total Time Walked: 360 seconds  Total Distance (meters): 403.86  Predicted Distance (meters): 450.52 meters  Percentage of Predicted: 89.64  Percent Change (Calculated): 0.17    "

## 2025-02-13 ENCOUNTER — TELEPHONE (OUTPATIENT)
Dept: INFECTIOUS DISEASES | Facility: CLINIC | Age: 70
End: 2025-02-13
Payer: MEDICARE

## 2025-02-13 NOTE — TELEPHONE ENCOUNTER
Attempted to schedule pt for sooner appt with ID per Dr. Ross. Pt did not answer. LVM to rtc to clinic. Unable to send portal message, portal not active.

## 2025-02-21 DIAGNOSIS — Z00.00 ENCOUNTER FOR MEDICARE ANNUAL WELLNESS EXAM: ICD-10-CM

## 2025-02-24 LAB
FUNGUS SPEC CULT: NORMAL
FUNGUS SPEC CULT: NORMAL

## 2025-02-26 ENCOUNTER — TELEPHONE (OUTPATIENT)
Dept: PULMONOLOGY | Facility: CLINIC | Age: 70
End: 2025-02-26
Payer: MEDICARE

## 2025-02-27 ENCOUNTER — RESULTS FOLLOW-UP (OUTPATIENT)
Dept: PULMONOLOGY | Facility: CLINIC | Age: 70
End: 2025-02-27

## 2025-02-27 ENCOUNTER — LAB VISIT (OUTPATIENT)
Dept: LAB | Facility: HOSPITAL | Age: 70
End: 2025-02-27
Attending: INTERNAL MEDICINE
Payer: MEDICARE

## 2025-02-27 DIAGNOSIS — J98.4 RESTRICTIVE LUNG DISEASE: ICD-10-CM

## 2025-02-27 PROCEDURE — 86403 PARTICLE AGGLUT ANTBDY SCRN: CPT | Mod: HCNC | Performed by: INTERNAL MEDICINE

## 2025-02-27 PROCEDURE — 36415 COLL VENOUS BLD VENIPUNCTURE: CPT | Mod: HCNC | Performed by: INTERNAL MEDICINE

## 2025-02-28 LAB — CRYPTOC AG SER QL LA: ABNORMAL

## 2025-03-05 ENCOUNTER — OFFICE VISIT (OUTPATIENT)
Dept: PULMONOLOGY | Facility: CLINIC | Age: 70
End: 2025-03-05
Payer: MEDICARE

## 2025-03-05 ENCOUNTER — LAB VISIT (OUTPATIENT)
Dept: LAB | Facility: HOSPITAL | Age: 70
End: 2025-03-05
Attending: INTERNAL MEDICINE
Payer: MEDICARE

## 2025-03-05 VITALS
DIASTOLIC BLOOD PRESSURE: 60 MMHG | HEIGHT: 66 IN | RESPIRATION RATE: 18 BRPM | WEIGHT: 144.38 LBS | HEART RATE: 75 BPM | OXYGEN SATURATION: 97 % | SYSTOLIC BLOOD PRESSURE: 134 MMHG | BODY MASS INDEX: 23.2 KG/M2

## 2025-03-05 DIAGNOSIS — R59.0 MEDIASTINAL LYMPHADENOPATHY: Primary | ICD-10-CM

## 2025-03-05 DIAGNOSIS — J98.4 RESTRICTIVE LUNG DISEASE: ICD-10-CM

## 2025-03-05 DIAGNOSIS — Z85.038 HISTORY OF COLON CANCER, STAGE I: ICD-10-CM

## 2025-03-05 DIAGNOSIS — R59.0 MEDIASTINAL LYMPHADENOPATHY: ICD-10-CM

## 2025-03-05 DIAGNOSIS — Z17.0 MALIGNANT NEOPLASM OF LOWER-OUTER QUADRANT OF LEFT BREAST OF FEMALE, ESTROGEN RECEPTOR POSITIVE: ICD-10-CM

## 2025-03-05 DIAGNOSIS — R74.8 ABNORMAL SERUM ACE LEVEL: ICD-10-CM

## 2025-03-05 DIAGNOSIS — C50.512 MALIGNANT NEOPLASM OF LOWER-OUTER QUADRANT OF LEFT BREAST OF FEMALE, ESTROGEN RECEPTOR POSITIVE: ICD-10-CM

## 2025-03-05 DIAGNOSIS — R91.8 PULMONARY NODULES/LESIONS, MULTIPLE: ICD-10-CM

## 2025-03-05 PROCEDURE — 99999 PR PBB SHADOW E&M-EST. PATIENT-LVL V: CPT | Mod: PBBFAC,HCNC,, | Performed by: INTERNAL MEDICINE

## 2025-03-05 PROCEDURE — 86612 BLASTOMYCES ANTIBODY: CPT | Mod: HCNC | Performed by: INTERNAL MEDICINE

## 2025-03-05 PROCEDURE — 36415 COLL VENOUS BLD VENIPUNCTURE: CPT | Mod: HCNC | Performed by: INTERNAL MEDICINE

## 2025-03-05 NOTE — PROGRESS NOTES
"                                         Pulmonary Outpatient Follow Up Visit     Subjective:       Patient ID: Anne Farmer is a 69 y.o. female.    Chief Complaint: Pulmonary Nodules      Pulmonary Nodules  Associated symptoms include coughing.             69-year-old female patient presenting for follow-up.      Status post bronchoscopy and EBUS TBNA biopsy.  Did not confirm granulomatous disorder/sarcoidosis based on biopsy results.      Overall stable.  Slight decline on spirometric values noted.      Cryptococcal antigen serologies repeated, trended down possibly related to prior infection.      This was discussed with Infectious Disease.        Review of Systems   Respiratory:  Positive for cough, shortness of breath, dyspnea on extertion and use of rescue inhaler.        Encounter Medications[1]    Objective:     Vital Signs (Most Recent)  Vital Signs  Pulse: 75  Resp: 18  SpO2: 97 %  BP: 134/60  Pain Score: 0-No pain  Height and Weight  Height: 5' 6" (167.6 cm)  Weight: 65.5 kg (144 lb 6.4 oz)  BSA (Calculated - sq m): 1.75 sq meters  BMI (Calculated): 23.3  Weight in (lb) to have BMI = 25: 154.6]  Wt Readings from Last 2 Encounters:   03/05/25 65.5 kg (144 lb 6.4 oz)   02/12/25 62.6 kg (138 lb 0.1 oz)       Physical Exam   Constitutional: She is oriented to person, place, and time. She appears well-developed and well-nourished.   Pulmonary/Chest: Normal expansion and effort normal. She has decreased breath sounds.   Neurological: She is alert and oriented to person, place, and time.   Psychiatric: Her behavior is normal.       Laboratory  Lab Results   Component Value Date    WBC 4.84 10/21/2024    RBC 3.61 (L) 10/21/2024    HGB 10.9 (L) 10/21/2024    HCT 34.3 (L) 10/21/2024    MCV 95 10/21/2024    MCH 30.2 10/21/2024    MCHC 31.8 (L) 10/21/2024    RDW 13.4 10/21/2024     10/21/2024    MPV 8.0 (L) 10/21/2024    GRAN 3.8 10/21/2024    GRAN 78.8 (H) 10/21/2024    LYMPH 0.5 (L) 10/21/2024    " "LYMPH 10.3 (L) 10/21/2024    MONO 0.3 10/21/2024    MONO 6.0 10/21/2024    EOS 0.2 10/21/2024    BASO 0.05 10/21/2024    EOSINOPHIL 3.3 10/21/2024    BASOPHIL 1.0 10/21/2024       BMP  Lab Results   Component Value Date     10/21/2024    K 3.4 (L) 10/21/2024    CL 99 10/21/2024    CO2 25 10/21/2024    BUN 9 10/21/2024    CREATININE 0.9 10/21/2024    CALCIUM 9.7 10/21/2024    ANIONGAP 13 10/21/2024    ESTGFRAFRICA >60.0 06/16/2022    EGFRNONAA >60.0 06/16/2022    AST 31 10/21/2024    ALT 20 10/21/2024    PROT 7.9 10/21/2024       Lab Results   Component Value Date     (H) 09/05/2024     (H) 08/02/2024    BNP 77 07/23/2024     (H) 06/02/2024     (H) 03/24/2024    BNP 45 02/29/2012       Lab Results   Component Value Date    TSH 4.065 (H) 08/03/2024       Lab Results   Component Value Date    SEDRATE 35 (H) 01/27/2025       Lab Results   Component Value Date    CRP 23.7 (H) 01/27/2025     No results found for: "IGE"     Lab Results   Component Value Date    ASPERGILLUS None Detected 02/15/2017     No results found for: "AFUMIGATUSCL"     Lab Results   Component Value Date    ACE 92 (H) 01/27/2025        Diagnostic Results:  I have personally reviewed today the following studies:  Echo May 2024       Left Ventricle: The left ventricle is normal in size. Normal wall thickness. There is concentric remodeling. Normal wall motion. Ejection fraction by visual approximation is 60%. There is indeterminate diastolic function.    Right Ventricle: Normal right ventricular cavity size. Wall thickness is normal. Systolic function is normal.    Aortic Valve: The aortic valve is a trileaflet valve. There is mild aortic regurgitation.    Mitral Valve: There is mild regurgitation.    Tricuspid Valve: There is moderate regurgitation.    Pulmonary Artery: The estimated pulmonary artery systolic pressure is 49 mmHg.    IVC/SVC: Normal venous pressure at 3 mmHg.     CT scan and PET scan personally " reviewed.  Diagnostic Results:  I have personally reviewed today the following studies :         Assessment/Plan:   Mediastinal lymphadenopathy  -     Ambulatory referral/consult to Cardiothoracic Surgery; Future; Expected date: 03/12/2025  -     FUNGAL IMMUNODIFFUSION - BLOOD; Future; Expected date: 03/05/2025    Restrictive lung disease    Malignant neoplasm of lower-outer quadrant of left breast of female, estrogen receptor positive    History of colon cancer, stage I    Abnormal serum ACE level    Pulmonary nodules/lesions, multiple      PET avid CT noted but was not able to prove granulomatous lung disorder/sarcoidosis on biopsy via EBUS x2 so far.      I prefer to have a proven tissue diagnosis before proceeding with prednisone therapy.      She has mild symptoms and mild decline of her spirometric values.      I referred her Cardiothoracic surgery today to evaluate for mediastinoscopy.      Check fungal immune diffusion follow-up with Infectious Disease.          Follow up in about 3 months (around 6/5/2025).    This note was prepared using voice recognition system and is likely to have sound alike errors that may have been overlooked even after proof reading.  Please call me with any questions    Discussed diagnosis, its evaluation, treatment and usual course. All questions answered.      Dandre Ross MD         [1]   Outpatient Encounter Medications as of 3/5/2025   Medication Sig Dispense Refill    albuterol (PROVENTIL/VENTOLIN HFA) 90 mcg/actuation inhaler INHALE 1-2 PUFFS BY MOUTH EVERY 6 HOURS AS NEEDED FOR WHEEZE OR SHORTNESS OF BREATH 18 g 3    amLODIPine (NORVASC) 10 MG tablet TAKE 1 TABLET BY MOUTH EVERY DAY 90 tablet 3    anastrozole (ARIMIDEX) 1 mg Tab Take 1 tablet (1 mg total) by mouth once daily. 90 tablet 1    azelastine (ASTELIN) 137 mcg (0.1 %) nasal spray 2 sprays (274 mcg total) by Nasal route 2 (two) times daily. 30 mL 0    cyclobenzaprine (FLEXERIL) 5 MG tablet TAKE 1 TABLET BY  MOUTH THREE TIMES A DAY AS NEEDED FOR MUSCLE SPASM 30 tablet 0    fluconazole (DIFLUCAN) 200 MG Tab Take 1 tablet (200 mg total) by mouth once daily. 90 tablet 3    fluticasone propionate (FLONASE) 50 mcg/actuation nasal spray 2 sprays (100 mcg total) by Each Nostril route once daily. 48 mL 3    furosemide (LASIX) 20 MG tablet TAKE 1 TABLET (20 MG TOTAL) BY MOUTH DAILY AS NEEDED (EDEMA, SWELLING, FLUID). DO NOT TAKE IF BP IS BELOW 110/70 90 tablet 1    KLOR-CON 10 10 mEq TbSR Take 2 tablets (20 mEq total) by mouth daily as needed (take with lasix). 90 tablet 1    levocetirizine (XYZAL) 5 MG tablet Take 1 tablet (5 mg total) by mouth every evening. 90 tablet 2    losartan (COZAAR) 100 MG tablet TAKE 1 TABLET BY MOUTH EVERY DAY 90 tablet 3    magnesium oxide (MAG-OX) 400 mg (241.3 mg magnesium) tablet Take 1 tablet (400 mg total) by mouth once daily. 90 tablet 1    meclizine (ANTIVERT) 12.5 mg tablet Take 1 tablet (12.5 mg total) by mouth 3 (three) times daily as needed for Dizziness. 30 tablet 1    mirtazapine (REMERON) 30 MG tablet Take 1 tablet (30 mg total) by mouth every evening. 90 tablet 1    multivitamin (ONE DAILY MULTIVITAMIN) per tablet Take 1 tablet by mouth once daily.      omeprazole (PRILOSEC) 20 MG capsule TAKE 1 CAPSULE BY MOUTH EVERY DAY 90 capsule 3    ondansetron (ZOFRAN-ODT) 4 MG TbDL Take 1 tablet (4 mg total) by mouth every 8 (eight) hours as needed (n/v). 30 tablet 3    pravastatin (PRAVACHOL) 40 MG tablet Take 1 tablet (40 mg total) by mouth once daily. 90 tablet 3    prochlorperazine (COMPAZINE) 5 MG tablet Take 1 tablet (5 mg total) by mouth every 6 (six) hours as needed for Nausea. 30 tablet 1    promethazine (PHENERGAN) 25 MG tablet TAKE 1 TABLET BY MOUTH EVERY 6 HOURS AS NEEDED FOR NAUSEA 15 tablet 0    traZODone (DESYREL) 50 MG tablet TAKE 1 TABLET BY MOUTH EVERY DAY AT NIGHT 90 tablet 3     Facility-Administered Encounter Medications as of 3/5/2025   Medication Dose Route Frequency  Provider Last Rate Last Admin    influenza (adjuvanted) (Fluad) 45 mcg/0.5 mL IM vaccine (> or = 66 yo) 0.5 mL  0.5 mL Intramuscular 1 time in Clinic/HOD

## 2025-03-10 LAB
ASPERGILLUS AB SER QL ID: NOT DETECTED
B DERMAT AB SER QL ID: NOT DETECTED
C IMMITIS AB SER QL ID: NOT DETECTED
H CAPSUL AB SER QL ID: NOT DETECTED

## 2025-03-18 DIAGNOSIS — R59.0 MEDIASTINAL LYMPHADENOPATHY: Primary | ICD-10-CM

## 2025-04-09 ENCOUNTER — NURSE TRIAGE (OUTPATIENT)
Dept: ADMINISTRATIVE | Facility: CLINIC | Age: 70
End: 2025-04-09
Payer: MEDICARE

## 2025-04-09 RX ORDER — FLUCONAZOLE 200 MG/1
200 TABLET ORAL DAILY
Qty: 90 TABLET | Refills: 3 | Status: SHIPPED | OUTPATIENT
Start: 2025-04-09 | End: 2026-04-09

## 2025-04-09 RX ORDER — MECLIZINE HCL 12.5 MG 12.5 MG/1
12.5 TABLET ORAL 3 TIMES DAILY PRN
Qty: 30 TABLET | Refills: 1 | Status: SHIPPED | OUTPATIENT
Start: 2025-04-09

## 2025-04-09 RX ORDER — CYCLOBENZAPRINE HCL 5 MG
5 TABLET ORAL 3 TIMES DAILY PRN
Qty: 30 TABLET | Refills: 0 | Status: SHIPPED | OUTPATIENT
Start: 2025-04-09

## 2025-04-09 RX ORDER — FLUCONAZOLE 200 MG/1
200 TABLET ORAL DAILY
Qty: 90 TABLET | Refills: 3 | OUTPATIENT
Start: 2025-04-09 | End: 2026-04-09

## 2025-04-09 NOTE — TELEPHONE ENCOUNTER
Patient reports left side pain that started a week ago. Pain is 8/10.Disposition provided - go to ER now. Instructed to call back with additional questions or worsening of symptoms. Patient verbalized understanding.   Reason for Disposition   SEVERE pain (e.g., excruciating, scale 8-10) and present > 1 hour    Additional Information   Negative: Passed out (e.g., fainted, lost consciousness, blacked out and was not responding)   Negative: Shock suspected (e.g., cold/pale/clammy skin, too weak to stand, low BP, rapid pulse)   Negative: Sounds like a life-threatening emergency to the triager    Protocols used: Flank Pain-A-[place]

## 2025-04-09 NOTE — TELEPHONE ENCOUNTER
Care Due:                  Date            Visit Type   Department     Provider  --------------------------------------------------------------------------------                                EP -                              PRIMARY      [facility] INTERNAL  Chiquita FLORES  Last Visit: 11-      CARE ([facility])   MEDICINE       [facility]                               -                              PRIMARY      [facility] RAY FLORES  Next Visit: 04-      CARE ([facility])   MEDICINE       [facility]eth[place]tons                                                            Last  Test          Frequency    Reason                     Performed    Due Date  --------------------------------------------------------------------------------    Lipid Panel.  12 months..  pravastatin..............  06- 05-    [facility] Embedded Care Due Messages. Reference number: [id number].   4/09/2025 3:13:36 PM CDT

## 2025-04-12 ENCOUNTER — HOSPITAL ENCOUNTER (EMERGENCY)
Facility: HOSPITAL | Age: 70
Discharge: HOME | End: 2025-04-12
Attending: EMERGENCY MEDICINE
Payer: MEDICARE

## 2025-04-12 VITALS
SYSTOLIC BLOOD PRESSURE: 171 MMHG | OXYGEN SATURATION: 100 % | TEMPERATURE: 98 F | BODY MASS INDEX: 24.47 KG/M2 | RESPIRATION RATE: 20 BRPM | HEART RATE: 62 BPM | WEIGHT: 151.63 LBS | DIASTOLIC BLOOD PRESSURE: 80 MMHG

## 2025-04-12 DIAGNOSIS — R91.8 LUNG NODULES: ICD-10-CM

## 2025-04-12 DIAGNOSIS — R00.1 BRADYCARDIA: ICD-10-CM

## 2025-04-12 DIAGNOSIS — K59.00 CONSTIPATION, UNSPECIFIED CONSTIPATION TYPE: ICD-10-CM

## 2025-04-12 DIAGNOSIS — R10.9 LEFT SIDED ABDOMINAL PAIN OF UNKNOWN CAUSE: Primary | ICD-10-CM

## 2025-04-12 LAB
ABSOLUTE EOSINOPHIL (OHS): 0.23 K/UL
ABSOLUTE MONOCYTE (OHS): 0.4 K/UL (ref 0.3–1)
ABSOLUTE NEUTROPHIL COUNT (OHS): 3.04 K/UL (ref 1.8–7.7)
ALBUMIN SERPL BCP-MCNC: 4.4 G/DL (ref 3.5–5.2)
ALP SERPL-CCNC: 64 UNIT/L (ref 40–150)
ALT SERPL W/O P-5'-P-CCNC: 14 UNIT/L (ref 10–44)
ANION GAP (OHS): 10 MMOL/L (ref 8–16)
AST SERPL-CCNC: 22 UNIT/L (ref 11–45)
BACTERIA #/AREA URNS AUTO: ABNORMAL /HPF
BASOPHILS # BLD AUTO: 0.04 K/UL
BASOPHILS NFR BLD AUTO: 0.9 %
BILIRUB SERPL-MCNC: 0.3 MG/DL (ref 0.1–1)
BILIRUB UR QL STRIP.AUTO: NEGATIVE
BUN SERPL-MCNC: 17 MG/DL (ref 8–23)
CALCIUM SERPL-MCNC: 9.9 MG/DL (ref 8.7–10.5)
CHLORIDE SERPL-SCNC: 103 MMOL/L (ref 95–110)
CLARITY UR: CLEAR
CO2 SERPL-SCNC: 26 MMOL/L (ref 23–29)
COLOR UR AUTO: YELLOW
CREAT SERPL-MCNC: 0.8 MG/DL (ref 0.5–1.4)
ERYTHROCYTE [DISTWIDTH] IN BLOOD BY AUTOMATED COUNT: 13.1 % (ref 11.5–14.5)
GFR SERPLBLD CREATININE-BSD FMLA CKD-EPI: >60 ML/MIN/1.73/M2
GLUCOSE SERPL-MCNC: 76 MG/DL (ref 70–110)
GLUCOSE UR QL STRIP: NEGATIVE
HCT VFR BLD AUTO: 34.3 % (ref 37–48.5)
HCV AB SERPL QL IA: NEGATIVE
HGB BLD-MCNC: 11.2 GM/DL (ref 12–16)
HGB UR QL STRIP: NEGATIVE
HIV 1+2 AB+HIV1 P24 AG SERPL QL IA: NEGATIVE
HYALINE CASTS UR QL AUTO: 2 /LPF (ref 0–1)
IMM GRANULOCYTES # BLD AUTO: 0.03 K/UL (ref 0–0.04)
IMM GRANULOCYTES NFR BLD AUTO: 0.7 % (ref 0–0.5)
KETONES UR QL STRIP: NEGATIVE
LEUKOCYTE ESTERASE UR QL STRIP: ABNORMAL
LIPASE SERPL-CCNC: 13 U/L (ref 4–60)
LYMPHOCYTES # BLD AUTO: 0.83 K/UL (ref 1–4.8)
MCH RBC QN AUTO: 31 PG (ref 27–31)
MCHC RBC AUTO-ENTMCNC: 32.7 G/DL (ref 32–36)
MCV RBC AUTO: 95 FL (ref 82–98)
MICROSCOPIC COMMENT: ABNORMAL
NITRITE UR QL STRIP: NEGATIVE
NUCLEATED RBC (/100WBC) (OHS): 0 /100 WBC
PH UR STRIP: 6 [PH]
PLATELET # BLD AUTO: 258 K/UL (ref 150–450)
PMV BLD AUTO: 8.6 FL (ref 9.2–12.9)
POTASSIUM SERPL-SCNC: 3.8 MMOL/L (ref 3.5–5.1)
PROT SERPL-MCNC: 9 GM/DL (ref 6–8.4)
PROT UR QL STRIP: ABNORMAL
RBC # BLD AUTO: 3.61 M/UL (ref 4–5.4)
RBC #/AREA URNS AUTO: 0 /HPF (ref 0–4)
RELATIVE EOSINOPHIL (OHS): 5 %
RELATIVE LYMPHOCYTE (OHS): 18.2 % (ref 18–48)
RELATIVE MONOCYTE (OHS): 8.8 % (ref 4–15)
RELATIVE NEUTROPHIL (OHS): 66.4 % (ref 38–73)
SODIUM SERPL-SCNC: 139 MMOL/L (ref 136–145)
SP GR UR STRIP: 1.02
SQUAMOUS #/AREA URNS AUTO: 5 /HPF
TROPONIN I SERPL DL<=0.01 NG/ML-MCNC: 0.01 NG/ML
UROBILINOGEN UR STRIP-ACNC: ABNORMAL EU/DL
WBC # BLD AUTO: 4.57 K/UL (ref 3.9–12.7)
WBC #/AREA URNS AUTO: 5 /HPF (ref 0–5)
YEAST UR QL AUTO: ABNORMAL /HPF

## 2025-04-12 PROCEDURE — 83690 ASSAY OF LIPASE: CPT | Mod: HCNC | Performed by: NURSE PRACTITIONER

## 2025-04-12 PROCEDURE — 87389 HIV-1 AG W/HIV-1&-2 AB AG IA: CPT | Mod: HCNC | Performed by: EMERGENCY MEDICINE

## 2025-04-12 PROCEDURE — 96374 THER/PROPH/DIAG INJ IV PUSH: CPT | Mod: HCNC

## 2025-04-12 PROCEDURE — 81003 URINALYSIS AUTO W/O SCOPE: CPT | Mod: HCNC | Performed by: NURSE PRACTITIONER

## 2025-04-12 PROCEDURE — 80053 COMPREHEN METABOLIC PANEL: CPT | Mod: HCNC | Performed by: NURSE PRACTITIONER

## 2025-04-12 PROCEDURE — 86803 HEPATITIS C AB TEST: CPT | Mod: HCNC | Performed by: EMERGENCY MEDICINE

## 2025-04-12 PROCEDURE — 93005 ELECTROCARDIOGRAM TRACING: CPT | Mod: HCNC

## 2025-04-12 PROCEDURE — 84484 ASSAY OF TROPONIN QUANT: CPT | Mod: HCNC | Performed by: EMERGENCY MEDICINE

## 2025-04-12 PROCEDURE — 93010 ELECTROCARDIOGRAM REPORT: CPT | Mod: HCNC,,, | Performed by: INTERNAL MEDICINE

## 2025-04-12 PROCEDURE — 99285 EMERGENCY DEPT VISIT HI MDM: CPT | Mod: 25,HCNC

## 2025-04-12 PROCEDURE — 63600175 PHARM REV CODE 636 W HCPCS: Mod: JZ,TB,HCNC | Performed by: EMERGENCY MEDICINE

## 2025-04-12 PROCEDURE — 96375 TX/PRO/DX INJ NEW DRUG ADDON: CPT | Mod: HCNC

## 2025-04-12 PROCEDURE — 85025 COMPLETE CBC W/AUTO DIFF WBC: CPT | Mod: HCNC | Performed by: NURSE PRACTITIONER

## 2025-04-12 RX ORDER — ATROPINE SULFATE 0.1 MG/ML
1 INJECTION INTRAVENOUS
Status: COMPLETED | OUTPATIENT
Start: 2025-04-12 | End: 2025-04-12

## 2025-04-12 RX ORDER — KETOROLAC TROMETHAMINE 10 MG/1
10 TABLET, FILM COATED ORAL EVERY 6 HOURS PRN
Qty: 12 TABLET | Refills: 0 | Status: SHIPPED | OUTPATIENT
Start: 2025-04-12 | End: 2025-04-16

## 2025-04-12 RX ORDER — POLYETHYLENE GLYCOL 3350 17 G/17G
17 POWDER, FOR SOLUTION ORAL DAILY PRN
Qty: 238 G | Refills: 0 | Status: SHIPPED | OUTPATIENT
Start: 2025-04-12

## 2025-04-12 RX ORDER — KETOROLAC TROMETHAMINE 30 MG/ML
30 INJECTION, SOLUTION INTRAMUSCULAR; INTRAVENOUS
Status: COMPLETED | OUTPATIENT
Start: 2025-04-12 | End: 2025-04-12

## 2025-04-12 RX ADMIN — KETOROLAC TROMETHAMINE 30 MG: 30 INJECTION, SOLUTION INTRAMUSCULAR; INTRAVENOUS at 03:04

## 2025-04-12 RX ADMIN — ATROPINE SULFATE 1 MG: 0.1 INJECTION INTRAVENOUS at 03:04

## 2025-04-12 NOTE — FIRST PROVIDER EVALUATION
Medical screening examination initiated.  I have conducted a focused provider triage encounter, findings are as follows:    Brief history of present illness:  Complaining of left-sided abdominal pain for 1 week.    Vitals:    04/12/25 1312   BP: (!) 161/75   BP Location: Right arm   Pulse: 60   Resp: 16   Temp: 98.2 °F (36.8 °C)   TempSrc: Oral   SpO2: 98%   Weight: 68.8 kg (151 lb 9.6 oz)       Pertinent physical exam:  Mildly hypertensive in triage.    Brief workup plan:  Labs, further evaluation    Preliminary workup initiated; this workup will be continued and followed by the physician or advanced practice provider that is assigned to the patient when roomed.

## 2025-04-12 NOTE — ED PROVIDER NOTES
SCRIBE #1 NOTE: I, Erin Myers, am scribing for, and in the presence of, Andres Moss Jr., MD. I have scribed the entire note.       History     Chief Complaint   Patient presents with    Abdominal Pain     Left lateral abd pain x 1 week. Denies dysuria or constipation. Denies N/V, denies trauma.      Review of patient's allergies indicates:  No Known Allergies      History of Present Illness     HPI    2025, 2:47 PM  History obtained from the patient and medical records      History of Present Illness: Anne Farmer is a 69 y.o. female patient with a PMHx of arthritis, CHF, HTN, HLD, and DM Type II who presents to the Emergency Department for evaluation of intermittent left-sided flank pain which began about one week ago. Symptoms are constant and moderate in severity. No mitigating or exacerbating factors reported. Patient denies any N/V, abd pain, or back pain. No prior Tx specified.  No further complaints or concerns at this time.       Arrival mode: Personal Transportation    PCP: Chiquita Talley MD        Past Medical History:  Past Medical History:   Diagnosis Date    Allergy     Arthritis     Cancer 2016    colon    CHF (congestive heart failure)     Colon cancer 2004    Colon cancer screening 2015    Diabetes mellitus type 2 in nonobese 2016    borderline    Diverticulosis     DM (diabetes mellitus) 2016    BS didn't check 2019    DM (diabetes mellitus) 2016    BS didn't check 2020    Hyperlipidemia 10/25/2016    Hypertension     Insomnia     Malignant neoplasm of colon 2021    Malignant neoplasm of lower-outer quadrant of left breast of female, estrogen receptor positive 2022    Sarcoidosis, unspecified        Past Surgical History:  Past Surgical History:   Procedure Laterality Date    BREAST BIOPSY Left     BREAST LUMPECTOMY Left      SECTION      COLON SURGERY      COLONOSCOPY N/A 2015    Procedure: COLONOSCOPY;   Surgeon: Adela Sam MD;  Location: [facility] ENDO;  Service: Endoscopy;  Laterality: N/A;    COLONOSCOPY N/A 08/24/2017    Procedure: COLONOSCOPY;  Surgeon: Chintan Flores MD;  Location: [facility] ENDO;  Service: Endoscopy;  Laterality: N/A;    COLONOSCOPY N/A 06/30/2020    Procedure: COLONOSCOPY;  Surgeon: Grisel Atkins MD;  Location: [facility] ENDO;  Service: Endoscopy;  Laterality: N/A;    SENTINEL LYMPH NODE BIOPSY Left 07/05/2022    Procedure: BIOPSY, LYMPH NODE, SENTINEL;  Surgeon: Arvin Hernandez MD;  Location: [facility] OR;  Service: General;  Laterality: Left;         Family History:  Family History   Problem Relation Name Age of Onset    Hypertension Mother      Diabetes Mother      Hypertension Father      Hypertension Sister      Hypertension Brother      Hypertension Sister      Hypertension Sister      Hypertension Sister      Hypertension Brother      Hypertension Brother         Social History:  Social History     Tobacco Use    Smoking status: Never     Passive exposure: Never    Smokeless tobacco: Never   Substance and Sexual Activity    Alcohol use: Not Currently    Drug use: No    Sexual activity: Not Currently        Review of Systems     Review of Systems   Constitutional:  Negative for fever.   HENT:  Negative for sore throat.    Respiratory:  Negative for shortness of breath.    Cardiovascular:  Negative for chest pain.   Gastrointestinal:  Negative for abdominal pain, nausea and vomiting.   Genitourinary:  Positive for flank pain (left-sided). Negative for dysuria.   Musculoskeletal:  Negative for back pain.   Skin:  Negative for rash.   Neurological:  Negative for weakness.   Hematological:  Does not bruise/bleed easily.   All other systems reviewed and are negative.     Physical Exam     Initial Vitals [04/12/25 1312]   BP Pulse Resp Temp SpO2   (!) 161/75 60 16 98.2 °F (36.8 °C) 98 %      MAP       --          Physical Exam  Nursing Notes and Vital Signs Reviewed.  Constitutional: Patient is in no  acute distress. Well-developed and well-nourished.  Head: Atraumatic. Normocephalic.  Eyes: PERRL. EOM intact. Conjunctivae are not pale. No scleral icterus.  ENT: Mucous membranes are moist. Oropharynx is clear and symmetric.    Neck: Supple. Full ROM. No lymphadenopathy.  Cardiovascular: Regular rate. Regular rhythm. No murmurs, rubs, or gallops. Distal pulses are 2+ and symmetric.  Pulmonary/Chest: No respiratory distress. Clear to auscultation bilaterally. No wheezing or rales.  Abdominal: Soft and non-distended. No rebound, guarding, or rigidity. Good bowel sounds.  Genitourinary: No CVA tenderness. Left flank tenderness to palpation.   Musculoskeletal: Moves all extremities. No obvious deformities. No edema. No calf tenderness.  Skin: Warm and dry.  Neurological:  Alert, awake, and appropriate.  Normal speech.  No acute focal neurological deficits are appreciated.  Psychiatric: Normal affect. Good eye contact. Appropriate in content.     ED Course   Procedures  ED Vital Signs:  Vitals:    04/12/25 1312 04/12/25 1515 04/12/25 1547 04/12/25 1548   BP: (!) 161/75 (!) 178/76 (!) 167/74    Pulse: 60 (!) 42 (!) 43 66   Resp: 16 16 20 19   Temp: 98.2 °F (36.8 °C)      TempSrc: Oral      SpO2: 98% 97% 100% 99%   Weight: 68.8 kg (151 lb 9.6 oz)       04/12/25 1549 04/12/25 1618 04/12/25 1629 04/12/25 1630   BP:  (!) 149/69     Pulse: 60 (!) 57 (!) 59 72   Resp: 20 18     Temp:       TempSrc:       SpO2: 98% 99%     Weight:        04/12/25 1631 04/12/25 1632 04/12/25 1637 04/12/25 1703   BP:    (!) 171/80   Pulse: 77 61 (!) 57 (!) 59   Resp:   17 20   Temp:       TempSrc:       SpO2:   99% 96%   Weight:        04/12/25 1720   BP:    Pulse: 62   Resp: 20   Temp:    TempSrc:    SpO2: 100%   Weight:        Abnormal Lab Results:  Labs Reviewed   COMPREHENSIVE METABOLIC PANEL - Abnormal       Result Value    Sodium 139      Potassium 3.8      Chloride 103      CO2 26      Glucose 76      BUN 17      Creatinine 0.8       Calcium 9.9      Protein Total 9.0 (*)     Albumin 4.4      Bilirubin Total 0.3      ALP 64      AST 22      ALT 14      Anion Gap 10      eGFR >60     URINALYSIS, REFLEX TO URINE CULTURE - Abnormal    Color, UA Yellow      Appearance, UA Clear      pH, UA 6.0      Spec Grav UA 1.025      Protein, UA 1+ (*)     Glucose, UA Negative      Ketones, UA Negative      Bilirubin, UA Negative      Blood, UA Negative      Nitrites, UA Negative      Urobilinogen, UA 2.0-3.0 (*)     Leukocyte Esterase, UA Trace (*)    CBC WITH DIFFERENTIAL - Abnormal    WBC 4.57      RBC 3.61 (*)     HGB 11.2 (*)     HCT 34.3 (*)     MCV 95      MCH 31.0      MCHC 32.7      RDW 13.1      Platelet Count 258      MPV 8.6 (*)     Nucleated RBC 0      Neut % 66.4      Lymph % 18.2      Mono % 8.8      Eos % 5.0      Basophil % 0.9      Imm Grans % 0.7 (*)     Neut # 3.04      Lymph # 0.83 (*)     Mono # 0.40      Eos # 0.23      Baso # 0.04      Imm Grans # 0.03     URINALYSIS MICROSCOPIC - Abnormal    RBC, UA 0      WBC, UA 5      Bacteria, UA None      Yeast, UA None      Squamous Epithelial Cells, UA 5      Hyaline Casts, UA 2 (*)     Microscopic Comment       HEPATITIS C ANTIBODY - Normal    Hep C Ab Interp Negative     HIV 1 / 2 ANTIBODY - Normal    HIV 1/2 Ag/Ab Negative     LIPASE - Normal    Lipase Level 13     TROPONIN I - Normal    Troponin-I 0.011     CBC W/ AUTO DIFFERENTIAL    Narrative:     The following orders were created for panel order CBC W/ AUTO DIFFERENTIAL.  Procedure                               Abnormality         Status                     ---------                               -----------         ------                     CBC with Differential[[id number]]       Abnormal            Final result                 Please view results for these tests on the individual orders.   HEP C VIRUS HOLD SPECIMEN   GREY TOP URINE HOLD        All Lab Results:  Results for orders placed or performed during the hospital encounter of  04/12/25   Hepatitis C Antibody    Collection Time: 04/12/25  2:57 PM   Result Value Ref Range    Hep C Ab Interp Negative Negative   HIV 1/2 Ag/Ab (4th Gen)    Collection Time: 04/12/25  2:57 PM   Result Value Ref Range    HIV 1/2 Ag/Ab Negative Negative   Comp. Metabolic Panel    Collection Time: 04/12/25  2:57 PM   Result Value Ref Range    Sodium 139 136 - 145 mmol/L    Potassium 3.8 3.5 - 5.1 mmol/L    Chloride 103 95 - 110 mmol/L    CO2 26 23 - 29 mmol/L    Glucose 76 70 - 110 mg/dL    BUN 17 8 - 23 mg/dL    Creatinine 0.8 0.5 - 1.4 mg/dL    Calcium 9.9 8.7 - 10.5 mg/dL    Protein Total 9.0 (H) 6.0 - 8.4 gm/dL    Albumin 4.4 3.5 - 5.2 g/dL    Bilirubin Total 0.3 0.1 - 1.0 mg/dL    ALP 64 40 - 150 unit/L    AST 22 11 - 45 unit/L    ALT 14 10 - 44 unit/L    Anion Gap 10 8 - 16 mmol/L    eGFR >60 >60 mL/min/1.73/m2   Lipase    Collection Time: 04/12/25  2:57 PM   Result Value Ref Range    Lipase Level 13 4 - 60 U/L   Urinalysis, Reflex to Urine Culture Urine, Clean Catch    Collection Time: 04/12/25  2:57 PM    Specimen: Urine   Result Value Ref Range    Color, UA Yellow Straw, [place], Yellow, Light-Orange    Appearance, UA Clear Clear    pH, UA 6.0 5.0 - 8.0    Spec Grav UA 1.025 1.005 - 1.030    Protein, UA 1+ (A) Negative    Glucose, UA Negative Negative    Ketones, UA Negative Negative    Bilirubin, UA Negative Negative    Blood, UA Negative Negative    Nitrites, UA Negative Negative    Urobilinogen, UA 2.0-3.0 (A) <2.0 EU/dL    Leukocyte Esterase, UA Trace (A) Negative   CBC with Differential    Collection Time: 04/12/25  2:57 PM   Result Value Ref Range    WBC 4.57 3.90 - 12.70 K/uL    RBC 3.61 (L) 4.00 - 5.40 M/uL    HGB 11.2 (L) 12.0 - 16.0 gm/dL    HCT 34.3 (L) 37.0 - 48.5 %    MCV 95 82 - 98 fL    MCH 31.0 27.0 - 31.0 pg    MCHC 32.7 32.0 - 36.0 g/dL    RDW 13.1 11.5 - 14.5 %    Platelet Count 258 150 - 450 K/uL    MPV 8.6 (L) 9.2 - 12.9 fL    Nucleated RBC 0 <=0 /100 WBC    Neut % 66.4 38 - 73 %     Lymph % 18.2 18 - 48 %    Mono % 8.8 4 - 15 %    Eos % 5.0 <=8 %    Basophil % 0.9 <=1.9 %    Imm Grans % 0.7 (H) 0.0 - 0.5 %    Neut # 3.04 1.8 - 7.7 K/uL    Lymph # 0.83 (L) 1 - 4.8 K/uL    Mono # 0.40 0.3 - 1 K/uL    Eos # 0.23 <=0.5 K/uL    Baso # 0.04 <=0.2 K/uL    Imm Grans # 0.03 0.00 - 0.04 K/uL   Urinalysis Microscopic    Collection Time: 04/12/25  2:57 PM   Result Value Ref Range    RBC, UA 0 0 - 4 /HPF    WBC, UA 5 0 - 5 /HPF    Bacteria, UA None None, Rare, Occasional /HPF    Yeast, UA None None /HPF    Squamous Epithelial Cells, UA 5 /HPF    Hyaline Casts, UA 2 (H) 0 - 1 /LPF    Microscopic Comment     Troponin I    Collection Time: 04/12/25  3:45 PM   Result Value Ref Range    Troponin-I 0.011 <=0.026 ng/mL     *Note: Due to a large number of results and/or encounters for the requested time period, some results have not been displayed. A complete set of results can be found in Results Review.       Imaging Results:  Imaging Results              CT Abdomen Pelvis  Without Contrast (Final result)  Result time 04/12/25 16:58:08      Final result by Aric Riojas MD (04/12/25 16:58:08)                   Impression:      1.  Negative for acute process involving the lower chest, abdomen or pelvis.  Negative for inflammatory changes.  I do not see a normal or an abnormal appendix.  Negative for renal stone disease or hydronephrosis.  Negative for free air.  2.  There are 2 left lower lobe pulmonary opacities measuring up to 1.4 cm.  The largest is incompletely evaluated.  Nonemergent chest CT scan recommended to completely evaluate the chest for other pulmonary lesions.  3.  Increased stool burden.  Constipation symptoms question marked postoperative changes to the cecum noted.  4.  Scattered colonic diverticula.  Cholelithiasis.  Moderate degenerative changes of the mid and lower lumbar spine.  Postoperative changes in the left breast.  Multiple fat filled abdominal wall hernias, to include a right  spigelian hernia.  Coronary artery calcifications.  Aortic valve plane calcifications.  Tiny hiatal hernia.  Other nonemergent findings as described above.      All CT scans at [this location] are performed using dose modulation techniques as appropriate to a performed exam including the following: automated exposure control; adjustment of the mA and/or kV according to patient size (this includes techniques or standardized protocols for targeted exams where dose is matched to indication / reason for exam; i.e. extremities or head); use of iterative reconstruction technique.    Finalized on: 4/12/2025 4:58 PM By:  Aric Riojas MD  [facility]# [id number]      2025-04-12 17:00:10.018     [facility]               Narrative:    EXAM: CT ABDOMEN PELVIS WITHOUT CONTRAST, multiplanar reconstructions    TECHNIQUE:  Axial images through the abdomen and pelvis were obtained without IV or oral contrast. Sagittal and coronal reconstructions are provided for review.    CLINICAL INDICATION: Flank pain    FINDINGS: No comparison studies are available.    Lung Bases: Incompletely evaluated 1.4 cm posterior right lower lobe opacity.  There is also a 7 mm right lower lobe pulmonary nodule.  Mild dependent atelectasis in the lung bases. Lung bases are otherwise clear.  Negative for pleural or pericardial effusions.  The distal esophagus is normal. Cardiac chambers are enlarged.  Coronary artery calcifications.  Aortic valve plane calcifications.  Tiny hiatal hernia.    Abdomen: Single large gallstone. Noncontrasted appearances of the liver, pancreas, and spleen are normal. The gallbladder and biliary tree are otherwise normal. The adrenal glands are normal.    The kidneys are without solid or cystic masses, hydronephrosis or renal stones.    Negative for adenopathy, ascites or inflammatory changes demonstrated.  Vascular calcifications are present without aneurysmal change.    Increased stool burden.  Postoperative changes involve the cecum.  I do  not see a normal or an abnormal appendix.  Negative for dilated loops of large or small bowel.  There are multiple diverticula seen.  Negative for free air.    Pelvis:  The urinary bladder is contracted.     The female pelvic organs are normal.  Numerous pelvic phleboliths noted.    Fat filled umbilical hernia.  Fat filled right spigelian hernia.  Fat filled inguinal hernias.  Abdominal wall is otherwise intact.    Negative for osseous abnormalities.  Negative for significant spinal canal stenosis. Disc height reduction with endplate sclerosis, marginal spondylosis and vacuum disc phenomenon at L2/L3-L5/S1.  DISH changes of the lower thoracic spine.  Age-appropriate degenerative changes of the pelvis and hips.  Convex right curvature of the lumbar spine.    Negative for groin adenopathy.    Postoperative changes involve the left breast.                                       The EKG was ordered, reviewed, and independently interpreted by the ED provider.  Interpretation time: 15:29  Rate: 43 BPM  Rhythm: Marked sinus bradycardia with 1st degree A-V block  Interpretation: Cannot rule out anterior infarct, age undetermined. ST and T wave abnormality, consider lateral ischemia. No STEMI.             The Emergency Provider reviewed the vital signs and test results, which are outlined above.     ED Discussion     3:40 PM: Re-evaluated pt at bedside. Patient is resting comfortably and is in no acute distress. Pt pulse in 40s. Pt states she is not having any CP.  Discussed with pt all pertinent results. Discussed with pt any concerns expressed at this time. Answered all questions. Pt  expresses understanding at this time.    5:33 PM: Reassessed pt at this time. Discussed with patient all pertinent ED information and results. Discussed pt dx and plan of tx. Gave the patient all f/u and return to the ED instructions. All questions and concerns were addressed at this time. Patient expresses understanding of information and  instructions, and is comfortable with plan to discharge. Pt is stable for discharge.     I discussed with patient that evaluation in the ED does not suggest any emergent or life threatening medical conditions requiring immediate intervention beyond what was provided in the ED, and I believe patient is safe for discharge. Regardless, an unremarkable evaluation in the ED does not preclude the development or presence of a serious of life threatening condition. As such, I instructed that the patient is to return immediately for any worsening or change in current symptoms.         Medical Decision Making  Amount and/or Complexity of Data Reviewed  Labs: ordered. Decision-making details documented in ED Course.  Radiology: ordered. Decision-making details documented in ED Course.  ECG/medicine tests: ordered and independent interpretation performed. Decision-making details documented in ED Course.    Risk  OTC drugs.  Prescription drug management.                ED Medication(s):  Medications   ketorolac injection 30 mg (30 mg Intravenous Given 4/12/25 0196)   atropine injection 1 mg (1 mg Intravenous Given 4/12/25 1545)       New Prescriptions    KETOROLAC (TORADOL) 10 MG TABLET    Take 1 tablet (10 mg total) by mouth every 6 (six) hours as needed for Pain (take with food).    POLYETHYLENE GLYCOL (GLYCOLAX) 17 GRAM/DOSE POWDER    Take 17 g by mouth daily as needed for Constipation.        Follow-up Information       Call  Chiquita Talley MD.    Specialty: Family Medicine  Why: call and make appt to see Dr Urbina this week for recheck  Contact information:  [address] [place] [id number]  [phone]                                 Scribe Attestation:   Scribe #1: I performed the above scribed service and the documentation accurately describes the services I performed. I attest to the accuracy of the note.     Attending:   Physician Attestation Statement for Scribe #1: I, Andres Moss Jr., MD,  personally performed the services described in this documentation, as scribed by Erin Myers, in my presence, and it is both accurate and complete.           Clinical Impression       ICD-10-CM ICD-9-CM   1. Left sided abdominal pain of unknown cause  R10.9 789.09   2. Bradycardia  R00.1 427.89   3. Lung nodules  R91.8 793.19   4. Constipation, unspecified constipation type  K59.00 564.00       Disposition:   Disposition: Discharged  Condition: Stable         Andres Moss Jr., MD  04/12/25 2974

## 2025-04-12 NOTE — DISCHARGE INSTRUCTIONS
On your abdomen and pelvis ct scan lung nodules were noted  discuss with Dr Urbina at your next appt with her need for outpatient imaging of your  chest to monitor further evaluate     Miralax as directed for constipation

## 2025-04-13 DIAGNOSIS — J30.9 ALLERGIC RHINITIS, UNSPECIFIED SEASONALITY, UNSPECIFIED TRIGGER: ICD-10-CM

## 2025-04-13 LAB
OHS QRS DURATION: 94 MS
OHS QTC CALCULATION: 417 MS

## 2025-04-13 NOTE — TELEPHONE ENCOUNTER
No care due was identified.  [facility] Embedded Care Due Messages. Reference number: [id number].   4/13/2025 6:55:36 AM CDT

## 2025-04-14 RX ORDER — FLUTICASONE PROPIONATE 50 MCG
2 SPRAY, SUSPENSION (ML) NASAL
Qty: 48 ML | Refills: 3 | Status: SHIPPED | OUTPATIENT
Start: 2025-04-14

## 2025-04-17 PROCEDURE — 29105 APPLICATION LONG ARM SPLINT: CPT | Mod: HCNC,RT

## 2025-04-17 PROCEDURE — 99284 EMERGENCY DEPT VISIT MOD MDM: CPT | Mod: 25,HCNC

## 2025-04-18 ENCOUNTER — HOSPITAL ENCOUNTER (EMERGENCY)
Facility: HOSPITAL | Age: 70
Discharge: HOME | End: 2025-04-18
Attending: EMERGENCY MEDICINE
Payer: MEDICARE

## 2025-04-18 VITALS
SYSTOLIC BLOOD PRESSURE: 152 MMHG | OXYGEN SATURATION: 99 % | RESPIRATION RATE: 18 BRPM | DIASTOLIC BLOOD PRESSURE: 68 MMHG | HEIGHT: 66 IN | WEIGHT: 156.5 LBS | TEMPERATURE: 98 F | HEART RATE: 61 BPM | BODY MASS INDEX: 25.15 KG/M2

## 2025-04-18 DIAGNOSIS — M25.531 RIGHT WRIST PAIN: ICD-10-CM

## 2025-04-18 DIAGNOSIS — S52.501A CLOSED FRACTURE OF DISTAL END OF RIGHT RADIUS, UNSPECIFIED FRACTURE MORPHOLOGY, INITIAL ENCOUNTER: ICD-10-CM
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
## 2025-04-18 DIAGNOSIS — T14.90XA INJURY: ICD-10-CM

## 2025-04-18 PROCEDURE — 25000003 PHARM REV CODE 250: Mod: HCNC | Performed by: NURSE PRACTITIONER

## 2025-04-18 PROCEDURE — 63600175 PHARM REV CODE 636 W HCPCS: Mod: HCNC | Performed by: NURSE PRACTITIONER

## 2025-04-18 PROCEDURE — 96372 THER/PROPH/DIAG INJ SC/IM: CPT | Performed by: NURSE PRACTITIONER

## 2025-04-18 RX ORDER — HYDROCODONE BITARTRATE AND ACETAMINOPHEN 5; 325 MG/1; MG/1
1 TABLET ORAL EVERY 6 HOURS PRN
Qty: 12 TABLET | Refills: 0 | Status: SHIPPED | OUTPATIENT
Start: 2025-04-18

## 2025-04-18 RX ORDER — ONDANSETRON 4 MG/1
4 TABLET, ORALLY DISINTEGRATING ORAL
Status: COMPLETED | OUTPATIENT
Start: 2025-04-18 | End: 2025-04-18

## 2025-04-18 RX ORDER — MORPHINE SULFATE 4 MG/ML
4 INJECTION, SOLUTION INTRAMUSCULAR; INTRAVENOUS
Refills: 0 | Status: COMPLETED | OUTPATIENT
Start: 2025-04-18 | End: 2025-04-18

## 2025-04-18 RX ADMIN — ONDANSETRON 4 MG: 4 TABLET, ORALLY DISINTEGRATING ORAL at 12:04

## 2025-04-18 RX ADMIN — MORPHINE SULFATE 4 MG: 4 INJECTION INTRAVENOUS at 12:04

## 2025-04-18 NOTE — ED PROVIDER NOTES
Encounter Date: 2025       History     Chief Complaint   Patient presents with    Wrist Injury     Pt reports around  she caught her right wrist on a cabinet. Obvious deformity, possible fracture/dislocation. CMS intact     69-year-old female who presents to ER complaining of pain to right wrist after falling yesterday.  Denies head injury or loss of consciousness.  Denies any other injuries.  Reports no treatment prior to arrival.        Review of patient's allergies indicates:  No Known Allergies  Past Medical History:   Diagnosis Date    Allergy     Arthritis     Cancer 2016    colon    CHF (congestive heart failure)     Colon cancer 2004    Colon cancer screening 2015    Diabetes mellitus type 2 in nonobese 2016    borderline    Diverticulosis     DM (diabetes mellitus) 2016    BS didn't check 2019    DM (diabetes mellitus) 2016    BS didn't check 2020    Hyperlipidemia 10/25/2016    Hypertension     Insomnia     Malignant neoplasm of colon 2021    Malignant neoplasm of lower-outer quadrant of left breast of female, estrogen receptor positive 2022    Sarcoidosis, unspecified      Past Surgical History:   Procedure Laterality Date    BREAST BIOPSY Left     BREAST LUMPECTOMY Left      SECTION      COLON SURGERY      COLONOSCOPY N/A 2015    Procedure: COLONOSCOPY;  Surgeon: Adela Sam MD;  Location: [facility] ENDO;  Service: Endoscopy;  Laterality: N/A;    COLONOSCOPY N/A 2017    Procedure: COLONOSCOPY;  Surgeon: Chintan Flores MD;  Location: [facility] ENDO;  Service: Endoscopy;  Laterality: N/A;    COLONOSCOPY N/A 2020    Procedure: COLONOSCOPY;  Surgeon: Grisel Atkins MD;  Location: [facility] ENDO;  Service: Endoscopy;  Laterality: N/A;    SENTINEL LYMPH NODE BIOPSY Left 2022    Procedure: BIOPSY, LYMPH NODE, SENTINEL;  Surgeon: Arvin Hernandez MD;  Location: [facility] OR;  Service: General;  Laterality: Left;     Family History    Problem Relation Name Age of Onset    Hypertension Mother      Diabetes Mother      Hypertension Father      Hypertension Sister      Hypertension Brother      Hypertension Sister      Hypertension Sister      Hypertension Sister      Hypertension Brother      Hypertension Brother       Social History[1]  Review of Systems   Constitutional:  Negative for fever.   HENT:  Negative for sore throat.    Respiratory:  Negative for shortness of breath.    Cardiovascular:  Negative for chest pain.   Gastrointestinal:  Negative for nausea.   Genitourinary:  Negative for dysuria.   Musculoskeletal:  Negative for back pain.        Positive wrist pain   Skin:  Negative for rash.   Neurological:  Negative for weakness.   Hematological:  Does not bruise/bleed easily.       Physical Exam     Initial Vitals [04/17/25 2328]   BP Pulse Resp Temp SpO2   (!) 153/67 62 18 97.6 °F (36.4 °C) 99 %      MAP       --         Physical Exam    Nursing note and vitals reviewed.  Constitutional: She appears well-developed and well-nourished.   HENT:   Head: Normocephalic and atraumatic.   Eyes: Conjunctivae and EOM are normal. Pupils are equal, round, and reactive to light.   Neck: Neck supple.   Normal range of motion.  Cardiovascular:  Normal rate and regular rhythm.           Pulmonary/Chest: Breath sounds normal. No respiratory distress.   Abdominal: She exhibits no distension. There is no abdominal tenderness.   Musculoskeletal:      Right elbow: Normal.      Left elbow: Normal.      Right forearm: Normal.      Left forearm: Normal.      Right wrist: Swelling, deformity and bony tenderness present. No lacerations. Decreased range of motion. Normal pulse.      Left wrist: Normal.      Right hand: Normal. Normal sensation. There is no disruption of two-point discrimination. Normal capillary refill. Normal pulse.      Left hand: Normal.      Cervical back: Normal range of motion and neck supple.     Neurological: She is alert and oriented to  person, place, and time. She has normal strength. No cranial nerve deficit or sensory deficit. GCS score is 15. GCS eye subscore is 4. GCS verbal subscore is 5. GCS motor subscore is 6.   Skin: Skin is warm and dry. Capillary refill takes less than 2 seconds.   Psychiatric: She has a normal mood and affect.         ED Course   Splint Application    Date/Time: 4/18/2025 1:14 AM    Performed by: Cindy Bonilla NP  Authorized by: Cindy Bonilla NP  Location details: right wrist  Splint type: sugar tong  Supplies used: Ortho-Glass  Post-procedure: The splinted body part was neurovascularly unchanged following the procedure.  Patient tolerance: Patient tolerated the procedure well with no immediate complications        Labs Reviewed - No data to display       Imaging Results              X-Ray Wrist Complete Right (Final result)  Result time 04/18/25 08:04:24      Final result by Chang Crowder MD (04/18/25 08:04:24)                   Impression:      1.  Subacute fracture deformities of the distal radius and base of the ulnar styloid.  2.  Bone demineralization.    Finalized on: 4/18/2025 8:04 AM By:  Chang Crowder  [facility]# [id number]      2025-04-18 08:06:25.220     [facility]               Narrative:    EXAM: XR WRIST COMPLETE 3 VIEWS RIGHT    CLINICAL HISTORY: Trauma    COMPARISON: None    TECHNIQUE:  4 views of the right wrist were performed.    FINDINGS: There are lucencies consistent with fractures which appear to be subacute in the distal radius and base of the ulnar styloid.  The carpal bones are normally aligned on the lateral view.  There is a corticated calcification visible in the ulnocarpal space felt to indicate evidence of old injury to the triangular fibrocartilage.  Incidental vascular calcification is visible in the distal radial artery.  Generalized bone demineralization is also visible.                                      X-Rays:   Independently Interpreted Readings:   Other  Readings:  Wet read of right wrist x-ray reveals displaced distal radius fracture    Medications   morphine injection 4 mg (4 mg Intramuscular Given 4/18/25 0056)   ondansetron disintegrating tablet 4 mg (4 mg Oral Given 4/18/25 0056)     Medical Decision Making  Risk  Prescription drug management.                   1:12 AM  Patient presented to ER for evaluation of wrist injury.  Patient comfortable.  Afebrile.  Nontoxic appearing.  Vital signs stable.  Imaging reveals displaced distal radius fracture.  Case discussed with ortho on-call Dr. Saha.  He recommended sugar-tong splint and we will see in his office on Monday.  Results and treatment plan discussed with patient.  She is in agreement.  Stable for discharge.  Differential diagnosis include compartment syndrome, wrist sprain, rheumatoid arthritis, gout                   Clinical Impression:  Final diagnoses:  [W19.XXXA] Fall, initial encounter (Primary)  [S52.501A] Closed fracture of distal end of right radius, unspecified fracture morphology, initial encounter  [M25.531] Right wrist pain          ED Disposition Condition    Discharge Stable          ED Prescriptions       Medication Sig Dispense Start Date End Date Auth. Provider    HYDROcodone-acetaminophen (NORCO) 5-325 mg per tablet Take 1 tablet by mouth every 6 (six) hours as needed for Pain. 12 tablet 4/18/2025 -- Cindy Bonilla NP          Follow-up Information       Follow up With Specialties Details Why Contact Info    Maria A Butler Jr., MD Orthopedic Surgery On 4/21/2025  [id number] [place] [address]  [phone]                   [1]   Social History  Tobacco Use    Smoking status: Never     Passive exposure: Never    Smokeless tobacco: Never   Substance Use Topics    Alcohol use: Not Currently    Drug use: No        Cindy Bonilla NP  04/20/25 [id number]

## 2025-04-18 NOTE — FIRST PROVIDER EVALUATION
"Medical screening examination initiated.  I have conducted a focused provider triage encounter, findings are as follows:    Brief history of present illness:  Patient complains of pain to right wrist after falling    Vitals:    04/17/25 2328   BP: (!) 153/67   BP Location: Left arm   Pulse: 62   Resp: 18   Temp: 97.6 °F (36.4 °C)   TempSrc: Oral   SpO2: 99%   Weight: 71 kg (156 lb 8.4 oz)   Height: 5' 6" (1.676 m)       Pertinent physical exam:  Obvious deformity right wrist.  Skin intact.  Neurovascular exam intact.    Brief workup plan:  Imaging, analgesic, ortho consult    Preliminary workup initiated; this workup will be continued and followed by the physician or advanced practice provider that is assigned to the patient when roomed.  "

## 2025-04-18 NOTE — DISCHARGE INSTRUCTIONS
Take medication as prescribed.  Take medication with food.  Do not remove splint.  May continue to put ice over splint.  Follow up with orthopedist in clinic on Monday.  Return to ER for new or worsening symptoms.

## 2025-04-21 ENCOUNTER — OFFICE VISIT (OUTPATIENT)
Dept: ORTHOPEDICS | Facility: CLINIC | Age: 70
End: 2025-04-21
Payer: MEDICARE

## 2025-04-21 VITALS — BODY MASS INDEX: 25.15 KG/M2 | HEIGHT: 66 IN | WEIGHT: 156.5 LBS

## 2025-04-21 DIAGNOSIS — S52.501A CLOSED FRACTURE OF DISTAL END OF RIGHT RADIUS, UNSPECIFIED FRACTURE MORPHOLOGY, INITIAL ENCOUNTER: Primary | ICD-10-CM

## 2025-04-21 DIAGNOSIS — M25.532 ACUTE PAIN OF LEFT WRIST: ICD-10-CM

## 2025-04-21 PROCEDURE — 3288F FALL RISK ASSESSMENT DOCD: CPT | Mod: HCNC,CPTII,S$GLB, | Performed by: PHYSICIAN ASSISTANT

## 2025-04-21 PROCEDURE — 99999 PR PBB SHADOW E&M-EST. PATIENT-LVL V: CPT | Mod: PBBFAC,HCNC,, | Performed by: PHYSICIAN ASSISTANT

## 2025-04-21 PROCEDURE — 1100F PTFALLS ASSESS-DOCD GE2>/YR: CPT | Mod: HCNC,CPTII,S$GLB, | Performed by: PHYSICIAN ASSISTANT

## 2025-04-21 PROCEDURE — 1125F AMNT PAIN NOTED PAIN PRSNT: CPT | Mod: HCNC,CPTII,S$GLB, | Performed by: PHYSICIAN ASSISTANT

## 2025-04-21 PROCEDURE — 4010F ACE/ARB THERAPY RXD/TAKEN: CPT | Mod: HCNC,CPTII,S$GLB, | Performed by: PHYSICIAN ASSISTANT

## 2025-04-21 PROCEDURE — 99213 OFFICE O/P EST LOW 20 MIN: CPT | Mod: HCNC,S$GLB,, | Performed by: PHYSICIAN ASSISTANT

## 2025-04-21 PROCEDURE — 1159F MED LIST DOCD IN RCRD: CPT | Mod: HCNC,CPTII,S$GLB, | Performed by: PHYSICIAN ASSISTANT

## 2025-04-21 PROCEDURE — 3008F BODY MASS INDEX DOCD: CPT | Mod: HCNC,CPTII,S$GLB, | Performed by: PHYSICIAN ASSISTANT

## 2025-04-21 PROCEDURE — 3072F LOW RISK FOR RETINOPATHY: CPT | Mod: HCNC,CPTII,S$GLB, | Performed by: PHYSICIAN ASSISTANT

## 2025-04-21 NOTE — PROGRESS NOTES
"    Abdirahman Dykes PA-C  Orthopedic Surgery / Sports Medicine  [facility]      PATIENT ID: Anne Farmer  YOB: 1955  MRN: [id number]    CHIEF COMPLAINT: Right wrist pain    REFERRED BY: Cindy Bonilla (Emergency Dept)    HISTORY OF PRESENT ILLNESS:   Anne Farmer is a 69 y.o. female with acute right wrist pain due to distal radius fracture suffered from a fall on 4/17/25.  The injury occurred from a fall on an outstretched hand.  She denies any other acute orthopedic injuries or pain.  No history of right wrist pain prior to this injury.  The pain started immediately after the fall.  She went to the ED on 04/18/2025 where an x-ray was performed and she was diagnosed with a right distal radius fracture.  Dr. Maria A Butler was on-call for the [facility] ED and recommended a sugar-tong splint and have her follow up in clinic this week.  She has a splint on in place today with a right shoulder sling as well.  She states the splint is comfortable and denies any numbness tingling in the fingers of the right hand or of the right upper extremity.  No other acute complaints to discuss at this time        Patient was queried and this is the extent of the patients current complaints today.      PAST MEDICAL HISTORY:   Estimated body mass index is 25.26 kg/m² as calculated from the following:    Height as of this encounter: 5' 6" (1.676 m).    Weight as of this encounter: 71 kg (156 lb 8.4 oz).  Past Medical History:   Diagnosis Date    Allergy     Arthritis     Cancer 2016    colon    CHF (congestive heart failure)     Colon cancer 2004    Colon cancer screening 09/21/2015    Diabetes mellitus type 2 in nonobese 03/09/2016    borderline    Diverticulosis     DM (diabetes mellitus) 03/2016    BS didn't check 02/13/2019    DM (diabetes mellitus) 03/2016    BS didn't check 03/04/2020    Hyperlipidemia 10/25/2016    Hypertension     Insomnia     Malignant neoplasm of " colon 2021    Malignant neoplasm of lower-outer quadrant of left breast of female, estrogen receptor positive 2022    Sarcoidosis, unspecified      Past Surgical History:   Procedure Laterality Date    BREAST BIOPSY Left     BREAST LUMPECTOMY Left      SECTION      COLON SURGERY      COLONOSCOPY N/A 2015    Procedure: COLONOSCOPY;  Surgeon: Adela Sam MD;  Location: [facility] ENDO;  Service: Endoscopy;  Laterality: N/A;    COLONOSCOPY N/A 2017    Procedure: COLONOSCOPY;  Surgeon: Chintan Flores MD;  Location: [facility] ENDO;  Service: Endoscopy;  Laterality: N/A;    COLONOSCOPY N/A 2020    Procedure: COLONOSCOPY;  Surgeon: Grisel Atkins MD;  Location: [facility] ENDO;  Service: Endoscopy;  Laterality: N/A;    SENTINEL LYMPH NODE BIOPSY Left 2022    Procedure: BIOPSY, LYMPH NODE, SENTINEL;  Surgeon: Arvin Hernandez MD;  Location: [facility] OR;  Service: General;  Laterality: Left;     Family History   Problem Relation Name Age of Onset    Hypertension Mother      Diabetes Mother      Hypertension Father      Hypertension Sister      Hypertension Brother      Hypertension Sister      Hypertension Sister      Hypertension Sister      Hypertension Brother      Hypertension Brother       Social History[1]  Medication List with Changes/Refills   Current Medications    ALBUTEROL (PROVENTIL/VENTOLIN HFA) 90 MCG/ACTUATION INHALER    INHALE 1-2 PUFFS BY MOUTH EVERY 6 HOURS AS NEEDED FOR WHEEZE OR SHORTNESS OF BREATH    AMLODIPINE (NORVASC) 10 MG TABLET    TAKE 1 TABLET BY MOUTH EVERY DAY    ANASTROZOLE (ARIMIDEX) 1 MG TAB    Take 1 tablet (1 mg total) by mouth once daily.    AZELASTINE (ASTELIN) 137 MCG (0.1 %) NASAL SPRAY    2 sprays (274 mcg total) by Nasal route 2 (two) times daily.    CYCLOBENZAPRINE (FLEXERIL) 5 MG TABLET    Take 1 tablet (5 mg total) by mouth 3 (three) times daily as needed for Muscle spasms.    FLUCONAZOLE (DIFLUCAN) 200 MG TAB    Take 1 tablet (200 mg total) by  mouth once daily.    FLUTICASONE PROPIONATE (FLONASE) 50 MCG/ACTUATION NASAL SPRAY    SPRAY 2 SPRAYS BY EACH NOSTRIL ROUTE ONCE DAILY.    FUROSEMIDE (LASIX) 20 MG TABLET    TAKE 1 TABLET (20 MG TOTAL) BY MOUTH DAILY AS NEEDED (EDEMA, SWELLING, FLUID). DO NOT TAKE IF BP IS BELOW 110/70    HYDROCODONE-ACETAMINOPHEN (NORCO) 5-325 MG PER TABLET    Take 1 tablet by mouth every 6 (six) hours as needed for Pain.    KLOR-CON 10 10 MEQ TBSR    Take 2 tablets (20 mEq total) by mouth daily as needed (take with lasix).    LEVOCETIRIZINE (XYZAL) 5 MG TABLET    Take 1 tablet (5 mg total) by mouth every evening.    LOSARTAN (COZAAR) 100 MG TABLET    TAKE 1 TABLET BY MOUTH EVERY DAY    MAGNESIUM OXIDE (MAG-OX) 400 MG (241.3 MG MAGNESIUM) TABLET    Take 1 tablet (400 mg total) by mouth once daily.    MECLIZINE (ANTIVERT) 12.5 MG TABLET    Take 1 tablet (12.5 mg total) by mouth 3 (three) times daily as needed for Dizziness.    MIRTAZAPINE (REMERON) 30 MG TABLET    Take 1 tablet (30 mg total) by mouth every evening.    MULTIVITAMIN (ONE DAILY MULTIVITAMIN) PER TABLET    Take 1 tablet by mouth once daily.    OMEPRAZOLE (PRILOSEC) 20 MG CAPSULE    TAKE 1 CAPSULE BY MOUTH EVERY DAY    ONDANSETRON (ZOFRAN-ODT) 4 MG TBDL    Take 1 tablet (4 mg total) by mouth every 8 (eight) hours as needed (n/v).    POLYETHYLENE GLYCOL (GLYCOLAX) 17 GRAM/DOSE POWDER    Take 17 g by mouth daily as needed for Constipation.    PRAVASTATIN (PRAVACHOL) 40 MG TABLET    Take 1 tablet (40 mg total) by mouth once daily.    PROCHLORPERAZINE (COMPAZINE) 5 MG TABLET    Take 1 tablet (5 mg total) by mouth every 6 (six) hours as needed for Nausea.    PROMETHAZINE (PHENERGAN) 25 MG TABLET    TAKE 1 TABLET BY MOUTH EVERY 6 HOURS AS NEEDED FOR NAUSEA    TRAZODONE (DESYREL) 50 MG TABLET    TAKE 1 TABLET BY MOUTH EVERY DAY AT NIGHT     Review of patient's allergies indicates:  No Known Allergies  Review of Systems   Constitutional: Negative for chills, fever, night sweats,  weight gain and weight loss.   Respiratory:  Negative for shortness of breath.    Skin:  Negative for rash and suspicious lesions.   Musculoskeletal:  Positive for joint pain (right wrist) and joint swelling (right wrist).   Gastrointestinal:  Negative for bowel incontinence, nausea and vomiting.   Psychiatric/Behavioral:  Negative for altered mental status.        PHYSICAL EXAM:   GENERAL: Well appearing, appropriate for stated age, no acute distress, well nourished.  CARDIOVASCULAR:  No obvious cyanosis, extremities warm and well perfused.  PULMONARY: Normal respiratory effort, even and unlabored respirations.  NEURO: Awake, alert, and oriented x 3. NAD.  PSYCH: Mood & affect are appropriate.  SKIN: No readily visible rashes or skin breakdown.  HEENT: Head is normocephalic and atraumatic.    Ortho/SPM Exam  RIGHT WRIST EXAM:  Sugar-tong splint in place the right upper extremity for right wrist fracture.    Right shoulder sling in place as well   Intact RUE elbow flexion-extension   intact finger flexion/extension to all 5 digits  Less than 2 second capillary refill to all 5 digits and sensation intact to the fingertip in all 5 fingers   Full range of motion to the left shoulder  All compartments soft and compressible proximal and distal to the fracture site      IMAGING:  Relevant imaging results reviewed and interpreted by me, discussed with the patient and / or family today.     EXAM: XR WRIST COMPLETE 3 VIEWS RIGHT     CLINICAL HISTORY: Trauma     COMPARISON: None     TECHNIQUE:  4 views of the right wrist were performed.     FINDINGS: There are lucencies consistent with fractures which appear to be subacute in the distal radius and base of the ulnar styloid.  The carpal bones are normally aligned on the lateral view.  There is a corticated calcification visible in the ulnocarpal space felt to indicate evidence of old injury to the triangular fibrocartilage.  Incidental vascular calcification is visible in the  distal radial artery.  Generalized bone demineralization is also visible.        Impression:     1.  Subacute fracture deformities of the distal radius and base of the ulnar styloid.  2.  Bone demineralization.     Finalized on: 2025 8:04 AM By:  Chang Crowder  [facility]# [id number]      2025 08:06:25.220     [facility]    ASSESSMENT:      Encounter Diagnoses   Name Primary?    Closed fracture of distal end of right radius, unspecified fracture morphology, initial encounter Yes    Acute pain of left wrist           PLAN / MEDICAL DECISION MAKIN.  Medications:    Patient has pain medication from the ED. continue to use as needed for pain control for acute left    2.  PT/OT:   Not recommended at this time, may need some PT in the future once fracture is healed  3.  Advanced Imaging:   Not recommended at this time   4.  Injections:   Not recommended   5.  Referral:    Refer to Dr. Maria A Butler for surgical consultation for her right distal radius fracture   6.  Return to clinic:    P.r.n.   Imaging needed at next follow-up: per Dr. Butler recommendation at her follow-up  7.  DME:    Counseled on splint care.  Leave in place until follow-up tomorrow with Dr. Butler  8. Weight Bearing Status:    Nonweightbearing left upper extremity     I discussed worrisome and red flag signs and symptoms with the patient. The patient expressed understanding and agreed to alert me immediately or to go to the emergency room if they experience any of these.   Treatment plan was developed with input from the patient/family, and they expressed understanding and agreement with the plan. All questions were answered today.           Abdirahman Dykes PA-C  Sports Medicine Physician Assistant     There are no Patient Instructions on file for this visit.    Disclaimer: This note was prepared using a voice recognition system and is likely to have sound alike errors within the text.           [1]   Social History  Socioeconomic  History    Marital status: Single   Occupational History     Employer: [employer]   Tobacco Use    Smoking status: Never     Passive exposure: Never    Smokeless tobacco: Never   Substance and Sexual Activity    Alcohol use: Not Currently    Drug use: No    Sexual activity: Not Currently     Social Drivers of Health     Housing Stability: Low Risk  (3/5/2023)    Received from [facility]     Think about the place you live. Do you have problems with:  Choose all that apply.: None of the above     What is your living situation today?: I have a steady place to live

## 2025-04-22 ENCOUNTER — HOSPITAL ENCOUNTER (OUTPATIENT)
Dept: CARDIOLOGY | Facility: HOSPITAL | Age: 70
Discharge: HOME | End: 2025-04-22
Attending: ORTHOPAEDIC SURGERY
Payer: MEDICARE

## 2025-04-22 ENCOUNTER — OFFICE VISIT (OUTPATIENT)
Dept: ORTHOPEDICS | Facility: CLINIC | Age: 70
End: 2025-04-22
Payer: MEDICARE

## 2025-04-22 ENCOUNTER — TELEPHONE (OUTPATIENT)
Dept: PREADMISSION TESTING | Facility: HOSPITAL | Age: 70
End: 2025-04-22
Payer: MEDICARE

## 2025-04-22 VITALS
SYSTOLIC BLOOD PRESSURE: 145 MMHG | BODY MASS INDEX: 25.15 KG/M2 | WEIGHT: 156.5 LBS | HEART RATE: 60 BPM | HEIGHT: 66 IN | DIASTOLIC BLOOD PRESSURE: 68 MMHG | TEMPERATURE: 98 F

## 2025-04-22 DIAGNOSIS — S62.101A CLOSED FRACTURE OF RIGHT WRIST, INITIAL ENCOUNTER: Primary | ICD-10-CM

## 2025-04-22 DIAGNOSIS — S62.101A CLOSED FRACTURE OF RIGHT WRIST, INITIAL ENCOUNTER: ICD-10-CM

## 2025-04-22 LAB
OHS QRS DURATION: 108 MS
OHS QTC CALCULATION: 424 MS

## 2025-04-22 PROCEDURE — 93010 ELECTROCARDIOGRAM REPORT: CPT | Mod: HCNC,,, | Performed by: INTERNAL MEDICINE

## 2025-04-22 PROCEDURE — 99999 PR PBB SHADOW E&M-EST. PATIENT-LVL IV: CPT | Mod: PBBFAC,HCNC,, | Performed by: ORTHOPAEDIC SURGERY

## 2025-04-22 PROCEDURE — 93005 ELECTROCARDIOGRAM TRACING: CPT | Mod: HCNC

## 2025-04-22 NOTE — PROGRESS NOTES
Patient ID: Anne Farmer  YOB: 1955  MRN: [id number]    Chief Complaint: Pain and Injury of the Right Wrist      Referred By: Cindy Bonilla NP     History of Present Illness: Anne Farmer is a  69 y.o. female   Data Unavailable with a chief complaint of Pain and Injury of the Right Wrist    69-year-old right-hand dominant female who had a fall onto her right wrist.  He had pain and swelling was seen in the emergency department was found to have distal radius fracture was splinted and referred over for evaluation.  Denies any tingling and numbness in her fingers any other injury    HPI    Past Medical History:   Past Medical History:   Diagnosis Date    Allergy     Arthritis     Cancer     colon    CHF (congestive heart failure)     Colon cancer     Colon cancer screening 2015    Diabetes mellitus type 2 in nonobese 2016    borderline    Diverticulosis     DM (diabetes mellitus) 2016    BS didn't check 2019    DM (diabetes mellitus) 2016    BS didn't check 2020    Hyperlipidemia 10/25/2016    Hypertension     Insomnia     Malignant neoplasm of colon 2021    Malignant neoplasm of lower-outer quadrant of left breast of female, estrogen receptor positive 2022    Sarcoidosis, unspecified      Past Surgical History:   Procedure Laterality Date    BREAST BIOPSY Left     BREAST LUMPECTOMY Left      SECTION      COLON SURGERY      COLONOSCOPY N/A 2015    Procedure: COLONOSCOPY;  Surgeon: Adela Sam MD;  Location: [facility];  Service: Endoscopy;  Laterality: N/A;    COLONOSCOPY N/A 2017    Procedure: COLONOSCOPY;  Surgeon: Chintan Flores MD;  Location: [facility] ENDO;  Service: Endoscopy;  Laterality: N/A;    COLONOSCOPY N/A 2020    Procedure: COLONOSCOPY;  Surgeon: Grisel Atkins MD;  Location: [facility];  Service: Endoscopy;  Laterality: N/A;    SENTINEL LYMPH NODE BIOPSY Left 2022    Procedure:  BIOPSY, LYMPH NODE, SENTINEL;  Surgeon: Arvin Hernandez MD;  Location: [facility] OR;  Service: General;  Laterality: Left;     Family History   Problem Relation Name Age of Onset    Hypertension Mother      Diabetes Mother      Hypertension Father      Hypertension Sister      Hypertension Brother      Hypertension Sister      Hypertension Sister      Hypertension Sister      Hypertension Brother      Hypertension Brother       Social History[1]  Medication List with Changes/Refills   Current Medications    ALBUTEROL (PROVENTIL/VENTOLIN HFA) 90 MCG/ACTUATION INHALER    INHALE 1-2 PUFFS BY MOUTH EVERY 6 HOURS AS NEEDED FOR WHEEZE OR SHORTNESS OF BREATH    AMLODIPINE (NORVASC) 10 MG TABLET    TAKE 1 TABLET BY MOUTH EVERY DAY    ANASTROZOLE (ARIMIDEX) 1 MG TAB    Take 1 tablet (1 mg total) by mouth once daily.    AZELASTINE (ASTELIN) 137 MCG (0.1 %) NASAL SPRAY    2 sprays (274 mcg total) by Nasal route 2 (two) times daily.    CYCLOBENZAPRINE (FLEXERIL) 5 MG TABLET    Take 1 tablet (5 mg total) by mouth 3 (three) times daily as needed for Muscle spasms.    FLUCONAZOLE (DIFLUCAN) 200 MG TAB    Take 1 tablet (200 mg total) by mouth once daily.    FLUTICASONE PROPIONATE (FLONASE) 50 MCG/ACTUATION NASAL SPRAY    SPRAY 2 SPRAYS BY EACH NOSTRIL ROUTE ONCE DAILY.    FUROSEMIDE (LASIX) 20 MG TABLET    TAKE 1 TABLET (20 MG TOTAL) BY MOUTH DAILY AS NEEDED (EDEMA, SWELLING, FLUID). DO NOT TAKE IF BP IS BELOW 110/70    HYDROCODONE-ACETAMINOPHEN (NORCO) 5-325 MG PER TABLET    Take 1 tablet by mouth every 6 (six) hours as needed for Pain.    KLOR-CON 10 10 MEQ TBSR    Take 2 tablets (20 mEq total) by mouth daily as needed (take with lasix).    LEVOCETIRIZINE (XYZAL) 5 MG TABLET    Take 1 tablet (5 mg total) by mouth every evening.    LOSARTAN (COZAAR) 100 MG TABLET    TAKE 1 TABLET BY MOUTH EVERY DAY    MAGNESIUM OXIDE (MAG-OX) 400 MG (241.3 MG MAGNESIUM) TABLET    Take 1 tablet (400 mg total) by mouth once daily.    MECLIZINE  (ANTIVERT) 12.5 MG TABLET    Take 1 tablet (12.5 mg total) by mouth 3 (three) times daily as needed for Dizziness.    MIRTAZAPINE (REMERON) 30 MG TABLET    Take 1 tablet (30 mg total) by mouth every evening.    MULTIVITAMIN (ONE DAILY MULTIVITAMIN) PER TABLET    Take 1 tablet by mouth once daily.    OMEPRAZOLE (PRILOSEC) 20 MG CAPSULE    TAKE 1 CAPSULE BY MOUTH EVERY DAY    ONDANSETRON (ZOFRAN-ODT) 4 MG TBDL    Take 1 tablet (4 mg total) by mouth every 8 (eight) hours as needed (n/v).    POLYETHYLENE GLYCOL (GLYCOLAX) 17 GRAM/DOSE POWDER    Take 17 g by mouth daily as needed for Constipation.    PRAVASTATIN (PRAVACHOL) 40 MG TABLET    Take 1 tablet (40 mg total) by mouth once daily.    PROCHLORPERAZINE (COMPAZINE) 5 MG TABLET    Take 1 tablet (5 mg total) by mouth every 6 (six) hours as needed for Nausea.    PROMETHAZINE (PHENERGAN) 25 MG TABLET    TAKE 1 TABLET BY MOUTH EVERY 6 HOURS AS NEEDED FOR NAUSEA    TRAZODONE (DESYREL) 50 MG TABLET    TAKE 1 TABLET BY MOUTH EVERY DAY AT NIGHT     Review of patient's allergies indicates:  No Known Allergies  ROS    Physical Exam:   Body mass index is 25.26 kg/m².  Vitals:    04/22/25 0936   BP: (!) 145/68   Pulse: 60   Temp: 98.2 °F (36.8 °C)      GENERAL: Well appearing, appropriate for stated age, no acute distress.  CARDIOVASCULAR: Pulses regular by peripheral palpation.  PULMONARY: Respirations are even and non-labored.  NEURO: Awake, alert, and oriented x 3.  PSYCH: Mood & affect are appropriate.  HEENT: Head is normocephalic and atraumatic.  Ortho/SPM Exam  Skin is intact no signs of erythema or infection  She has swelling about the wrist and there is some deformity  Tendon function is intact throughout  Neurovascular exam of the hand is normal  Elbow and shoulder exams are benign    Imaging:    X-Ray Wrist Complete Right  Narrative: EXAM: XR WRIST COMPLETE 3 VIEWS RIGHT    CLINICAL HISTORY: Trauma    COMPARISON: None    TECHNIQUE:  4 views of the right wrist were  performed.    FINDINGS: There are lucencies consistent with fractures which appear to be subacute in the distal radius and base of the ulnar styloid.  The carpal bones are normally aligned on the lateral view.  There is a corticated calcification visible in the ulnocarpal space felt to indicate evidence of old injury to the triangular fibrocartilage.  Incidental vascular calcification is visible in the distal radial artery.  Generalized bone demineralization is also visible.  Impression: 1.  Subacute fracture deformities of the distal radius and base of the ulnar styloid.  2.  Bone demineralization.    Finalized on: 4/18/2025 8:04 AM By:  Chang Crowder  [facility]# [id number]      2025-04-18 08:06:25.220     [facility]      Relevant imaging results reviewed and interpreted by me, discussed with the patient and / or family today.  X-rays show apex volar distal radius fracture angulated 20° with intra-articular extension    Other Tests:     None    There are no Patient Instructions on file for this visit.  Provider Note/Medical Decision Making:     We talked about treatment options for this.  As enough angulation and shortening that I think this can cause her some functional issues for her dominant hand.  I gave her the pluses and minuses of casting in Situ versus surgical intervention and she would like the best chance to have his normal function as possible which is the surgical option.  We went over the risks and benefits of surgery.  Risks include infection, persistent pain, injury to blood vessels or nerves, anesthesia problems, need for further surgery is being some but not all the risks.  She understood this well and consented to proceed and we will set this up for her this week.    I discussed worrisome and red flag signs and symptoms with the patient. The patient expressed understanding and agreed to alert me immediately or to go to the emergency room if they experience any of these.   Treatment plan was developed  with input from the patient/family, and they expressed understanding and agreement with the plan. All questions were answered today.         Maria A Butler MD  Orthopaedic Surgery       Disclaimer: This note was prepared using a voice recognition system and is likely to have sound alike errors within the text.            [1]   Social History  Socioeconomic History    Marital status: Single   Occupational History     Employer: [employer]   Tobacco Use    Smoking status: Never     Passive exposure: Never    Smokeless tobacco: Never   Substance and Sexual Activity    Alcohol use: Not Currently    Drug use: No    Sexual activity: Not Currently     Social Drivers of Health     Housing Stability: Low Risk  (3/5/2023)    Received from [facility]     Think about the place you live. Do you have problems with:  Choose all that apply.: None of the above     What is your living situation today?: I have a steady place to live

## 2025-04-22 NOTE — PRE-PROCEDURE INSTRUCTIONS
Pre op instructions reviewed with PATIENT over telephone, verbalized understanding.    Procedure Date: 4/25/25  Arrival Time:  TBD; We will call you after 2pm the day before your procedure with your arrival time.    Address:   [facility] (Off [facility] on the left)  [address] [place] [address] [place] [address]  >>>Please enter through front entrance Lobby of 1st floor to Registration desk<<<      !!!INSTRUCTIONS IMPORTANT!!!  NO FOOD, DRINK OR TOBACCO PRODUCTS AFTER MIDNIGHT THE NIGHT BEFORE SURGERY.     Do not eat after 12 midnight, Do not smoke or use chewing tobacco after 12 midnight!  OK to brush teeth, but no gum, candy, or mints!      >>>MEDICATION INSTRUCTIONS<<<: Morning of Surgery, take small sip of water with ONLY these medications:  AMLODIPINE    *ADHD Medication: Stop taking ADHD/ADD medications 48 hrs prior to surgery, as this can affect the anesthesia used. Bariatric surgeries must HOLD 7 Days prior to surgery!    *Diabetic/ Prediabetic Patients: !!!If you take diabetic or weight loss medication, Do NOT take morning of surgery unless instructed by Doctor!!!  Metformin to be stopped 24 hrs prior to surgery.   Long Acting Insulin Instructions: HOLD the night before surgery unless instructed differently by Provider!  Ozempic/ Mounjaro/ Wegovy/ Trulicity/ Semaglutide injections or weight loss medication to be stopped 7 days prior to surgery.    If you take Ozempic/ Mounjaro / Wegovy / Trulicity / Semaglutide, any weight loss injections OR PHENTERMINE --->>> PLEASE LET US KNOW IMMEDIATELY, as these medications need to be stopped 7 days prior to surgery!    ____  Avoid Alcoholic beverages 3 days prior to surgery, as it can thin the blood.  ____  NO Acrylic/fake nails or nail polish worn day of surgery (specifically hand/arm & foot surgeries).  ____  NO powder, lotions, deodorants, oils or cream on body.  ____  Remove all jewelry & piercings & foreign objects before arrival & leave  at home.  ____  Remove Dentures, Hearing Aids & Contact Lens prior to surgery.  ____  Bring photo ID and insurance information to hospital (Leave Valuables at Home).  ____  If going home the same day, arrange for a ride home. You will not be able to drive for 24 hrs if Anesthesia was used.   ____  Females (ages 11-60): may need to give a urine sample the morning of surgery; please see Pre op Nurse prior to using the restroom.  ____  Males: Stop ED medications (Viagra, Cialis) 24 hrs prior to surgery.  ____  Wear clean, loose fitting clothing to allow for dressings/ bandages.      Bathing Instructions:    -Shower with anti-bacterial Soap (Hibiclens or Dial) the night before surgery and the morning of.   -Do not use Hibiclens on your face or genitals.   -Apply clean clothes after shower.  -Do not shave your face or body 2 days prior to surgery unless instructed otherwise by your Surgeon.  -Do not shave your pubic area 7 days prior to surgery (GYN PATIENTS)    [facility] Vis[facility]/Ride Policy:  Only 2 adults allowed in pre op/recovery area during your procedure. You MUST HAVE A RIDE HOME from a responsible adult that you know and trust. Medical Transport, Uber or Lyft can ONLY be used if patient has a responsible adult to accompany them during ride home.       *Signs and symptoms of Infection Before or After Surgery:               !!!If you experience any fever, chills, nausea/ vomiting, foul odor/ excessive drainage from surgical site, flu-like symptoms, new wounds or cuts, PLEASE CALL THE SURGEON OFFICE at [phone] or SEND MESSAGE THROUGH [facility] PORTAL!!!     *If you are running late the morning of surgery, please call the Hospital Surgery Dept @ [phone].     *Billing questions:  [phone]     Thank you,  -[facility] Pre Admit Dept.  [phone] or [phone]  M-F 7:30 am-4:00 pm (Closed Major Holidays)

## 2025-04-22 NOTE — H&P (VIEW-ONLY)
Patient ID: Anne Farmer  YOB: 1955  MRN: [id number]    Chief Complaint: Pain and Injury of the Right Wrist      Referred By: Cindy Bonilla NP     History of Present Illness: Anne Farmer is a  69 y.o. female   Data Unavailable with a chief complaint of Pain and Injury of the Right Wrist    69-year-old right-hand dominant female who had a fall onto her right wrist.  He had pain and swelling was seen in the emergency department was found to have distal radius fracture was splinted and referred over for evaluation.  Denies any tingling and numbness in her fingers any other injury    HPI    Past Medical History:   Past Medical History:   Diagnosis Date    Allergy     Arthritis     Cancer     colon    CHF (congestive heart failure)     Colon cancer     Colon cancer screening 2015    Diabetes mellitus type 2 in nonobese 2016    borderline    Diverticulosis     DM (diabetes mellitus) 2016    BS didn't check 2019    DM (diabetes mellitus) 2016    BS didn't check 2020    Hyperlipidemia 10/25/2016    Hypertension     Insomnia     Malignant neoplasm of colon 2021    Malignant neoplasm of lower-outer quadrant of left breast of female, estrogen receptor positive 2022    Sarcoidosis, unspecified      Past Surgical History:   Procedure Laterality Date    BREAST BIOPSY Left     BREAST LUMPECTOMY Left      SECTION      COLON SURGERY      COLONOSCOPY N/A 2015    Procedure: COLONOSCOPY;  Surgeon: Adela Sam MD;  Location: [facility];  Service: Endoscopy;  Laterality: N/A;    COLONOSCOPY N/A 2017    Procedure: COLONOSCOPY;  Surgeon: Chintan Flores MD;  Location: [facility] ENDO;  Service: Endoscopy;  Laterality: N/A;    COLONOSCOPY N/A 2020    Procedure: COLONOSCOPY;  Surgeon: Grisel Atkins MD;  Location: [facility];  Service: Endoscopy;  Laterality: N/A;    SENTINEL LYMPH NODE BIOPSY Left 2022    Procedure:  BIOPSY, LYMPH NODE, SENTINEL;  Surgeon: Arvin Hernandez MD;  Location: [facility] OR;  Service: General;  Laterality: Left;     Family History   Problem Relation Name Age of Onset    Hypertension Mother      Diabetes Mother      Hypertension Father      Hypertension Sister      Hypertension Brother      Hypertension Sister      Hypertension Sister      Hypertension Sister      Hypertension Brother      Hypertension Brother       Social History[1]  Medication List with Changes/Refills   Current Medications    ALBUTEROL (PROVENTIL/VENTOLIN HFA) 90 MCG/ACTUATION INHALER    INHALE 1-2 PUFFS BY MOUTH EVERY 6 HOURS AS NEEDED FOR WHEEZE OR SHORTNESS OF BREATH    AMLODIPINE (NORVASC) 10 MG TABLET    TAKE 1 TABLET BY MOUTH EVERY DAY    ANASTROZOLE (ARIMIDEX) 1 MG TAB    Take 1 tablet (1 mg total) by mouth once daily.    AZELASTINE (ASTELIN) 137 MCG (0.1 %) NASAL SPRAY    2 sprays (274 mcg total) by Nasal route 2 (two) times daily.    CYCLOBENZAPRINE (FLEXERIL) 5 MG TABLET    Take 1 tablet (5 mg total) by mouth 3 (three) times daily as needed for Muscle spasms.    FLUCONAZOLE (DIFLUCAN) 200 MG TAB    Take 1 tablet (200 mg total) by mouth once daily.    FLUTICASONE PROPIONATE (FLONASE) 50 MCG/ACTUATION NASAL SPRAY    SPRAY 2 SPRAYS BY EACH NOSTRIL ROUTE ONCE DAILY.    FUROSEMIDE (LASIX) 20 MG TABLET    TAKE 1 TABLET (20 MG TOTAL) BY MOUTH DAILY AS NEEDED (EDEMA, SWELLING, FLUID). DO NOT TAKE IF BP IS BELOW 110/70    HYDROCODONE-ACETAMINOPHEN (NORCO) 5-325 MG PER TABLET    Take 1 tablet by mouth every 6 (six) hours as needed for Pain.    KLOR-CON 10 10 MEQ TBSR    Take 2 tablets (20 mEq total) by mouth daily as needed (take with lasix).    LEVOCETIRIZINE (XYZAL) 5 MG TABLET    Take 1 tablet (5 mg total) by mouth every evening.    LOSARTAN (COZAAR) 100 MG TABLET    TAKE 1 TABLET BY MOUTH EVERY DAY    MAGNESIUM OXIDE (MAG-OX) 400 MG (241.3 MG MAGNESIUM) TABLET    Take 1 tablet (400 mg total) by mouth once daily.    MECLIZINE  (ANTIVERT) 12.5 MG TABLET    Take 1 tablet (12.5 mg total) by mouth 3 (three) times daily as needed for Dizziness.    MIRTAZAPINE (REMERON) 30 MG TABLET    Take 1 tablet (30 mg total) by mouth every evening.    MULTIVITAMIN (ONE DAILY MULTIVITAMIN) PER TABLET    Take 1 tablet by mouth once daily.    OMEPRAZOLE (PRILOSEC) 20 MG CAPSULE    TAKE 1 CAPSULE BY MOUTH EVERY DAY    ONDANSETRON (ZOFRAN-ODT) 4 MG TBDL    Take 1 tablet (4 mg total) by mouth every 8 (eight) hours as needed (n/v).    POLYETHYLENE GLYCOL (GLYCOLAX) 17 GRAM/DOSE POWDER    Take 17 g by mouth daily as needed for Constipation.    PRAVASTATIN (PRAVACHOL) 40 MG TABLET    Take 1 tablet (40 mg total) by mouth once daily.    PROCHLORPERAZINE (COMPAZINE) 5 MG TABLET    Take 1 tablet (5 mg total) by mouth every 6 (six) hours as needed for Nausea.    PROMETHAZINE (PHENERGAN) 25 MG TABLET    TAKE 1 TABLET BY MOUTH EVERY 6 HOURS AS NEEDED FOR NAUSEA    TRAZODONE (DESYREL) 50 MG TABLET    TAKE 1 TABLET BY MOUTH EVERY DAY AT NIGHT     Review of patient's allergies indicates:  No Known Allergies  ROS    Physical Exam:   Body mass index is 25.26 kg/m².  Vitals:    04/22/25 0936   BP: (!) 145/68   Pulse: 60   Temp: 98.2 °F (36.8 °C)      GENERAL: Well appearing, appropriate for stated age, no acute distress.  CARDIOVASCULAR: Pulses regular by peripheral palpation.  PULMONARY: Respirations are even and non-labored.  NEURO: Awake, alert, and oriented x 3.  PSYCH: Mood & affect are appropriate.  HEENT: Head is normocephalic and atraumatic.  Ortho/SPM Exam  Skin is intact no signs of erythema or infection  She has swelling about the wrist and there is some deformity  Tendon function is intact throughout  Neurovascular exam of the hand is normal  Elbow and shoulder exams are benign    Imaging:    X-Ray Wrist Complete Right  Narrative: EXAM: XR WRIST COMPLETE 3 VIEWS RIGHT    CLINICAL HISTORY: Trauma    COMPARISON: None    TECHNIQUE:  4 views of the right wrist were  performed.    FINDINGS: There are lucencies consistent with fractures which appear to be subacute in the distal radius and base of the ulnar styloid.  The carpal bones are normally aligned on the lateral view.  There is a corticated calcification visible in the ulnocarpal space felt to indicate evidence of old injury to the triangular fibrocartilage.  Incidental vascular calcification is visible in the distal radial artery.  Generalized bone demineralization is also visible.  Impression: 1.  Subacute fracture deformities of the distal radius and base of the ulnar styloid.  2.  Bone demineralization.    Finalized on: 4/18/2025 8:04 AM By:  Chang Crowder  [facility]# [id number]      2025-04-18 08:06:25.220     [facility]      Relevant imaging results reviewed and interpreted by me, discussed with the patient and / or family today.  X-rays show apex volar distal radius fracture angulated 20° with intra-articular extension    Other Tests:     None    There are no Patient Instructions on file for this visit.  Provider Note/Medical Decision Making:     We talked about treatment options for this.  As enough angulation and shortening that I think this can cause her some functional issues for her dominant hand.  I gave her the pluses and minuses of casting in Situ versus surgical intervention and she would like the best chance to have his normal function as possible which is the surgical option.  We went over the risks and benefits of surgery.  Risks include infection, persistent pain, injury to blood vessels or nerves, anesthesia problems, need for further surgery is being some but not all the risks.  She understood this well and consented to proceed and we will set this up for her this week.    I discussed worrisome and red flag signs and symptoms with the patient. The patient expressed understanding and agreed to alert me immediately or to go to the emergency room if they experience any of these.   Treatment plan was developed  with input from the patient/family, and they expressed understanding and agreement with the plan. All questions were answered today.         Maria A Butler MD  Orthopaedic Surgery       Disclaimer: This note was prepared using a voice recognition system and is likely to have sound alike errors within the text.            [1]   Social History  Socioeconomic History    Marital status: Single   Occupational History     Employer: [employer]   Tobacco Use    Smoking status: Never     Passive exposure: Never    Smokeless tobacco: Never   Substance and Sexual Activity    Alcohol use: Not Currently    Drug use: No    Sexual activity: Not Currently     Social Drivers of Health     Housing Stability: Low Risk  (3/5/2023)    [facility]     Think about the place you live. Do you have problems with:  Choose all that apply.: None of the above     What is your living situation today?: I have a steady place to live

## 2025-04-23 ENCOUNTER — LAB VISIT (OUTPATIENT)
Dept: LAB | Facility: HOSPITAL | Age: 70
End: 2025-04-23
Attending: ORTHOPAEDIC SURGERY
Payer: MEDICARE

## 2025-04-23 DIAGNOSIS — S62.101A CLOSED FRACTURE OF RIGHT WRIST, INITIAL ENCOUNTER: ICD-10-CM

## 2025-04-23 LAB
ABORH RETYPE: NORMAL
INDIRECT COOMBS: NORMAL
RH BLD: NORMAL
SPECIMEN OUTDATE: NORMAL

## 2025-04-23 PROCEDURE — 86850 RBC ANTIBODY SCREEN: CPT | Mod: HCNC | Performed by: ORTHOPAEDIC SURGERY

## 2025-04-23 PROCEDURE — 36415 COLL VENOUS BLD VENIPUNCTURE: CPT | Mod: HCNC

## 2025-04-24 ENCOUNTER — TELEPHONE (OUTPATIENT)
Dept: PREADMISSION TESTING | Facility: HOSPITAL | Age: 70
End: 2025-04-24
Payer: MEDICARE

## 2025-04-24 NOTE — PRE-PROCEDURE INSTRUCTIONS
Called and spoke with PATIENT about the following:     Please arrive to [facility] ([facility] at 12:00PM on 4/25/25 for your scheduled procedure.  Address: [address] [place] [address] [place] [address] (2nd Building on left, 1st Floor Lobby)    !!!NO FOOD after midnight! You may have clear liquids up to 3 hrs before your arrival to the Hospital!!!  Clear liquids include Gatorade, water, soda, black coffee or tea (no milk or creamer), and clear juices.  Clear liquids do NOT include anything with pulp or food particles (Chicken broth, ice cream, yogurt, Jello, etc.)    Thank you,  -[facility] Pre Admit Dept.  Mon-Fri 7:30 am - 4 pm [phone]

## 2025-04-25 ENCOUNTER — ANESTHESIA (OUTPATIENT)
Dept: SURGERY | Facility: HOSPITAL | Age: 70
End: 2025-04-25
Payer: MEDICARE

## 2025-04-25 ENCOUNTER — ANESTHESIA EVENT (OUTPATIENT)
Dept: SURGERY | Facility: HOSPITAL | Age: 70
End: 2025-04-25
Payer: MEDICARE

## 2025-04-25 ENCOUNTER — HOSPITAL ENCOUNTER (OUTPATIENT)
Facility: HOSPITAL | Age: 70
Discharge: HOME | End: 2025-04-25
Attending: ORTHOPAEDIC SURGERY | Admitting: ORTHOPAEDIC SURGERY
Payer: MEDICARE

## 2025-04-25 VITALS
WEIGHT: 151.56 LBS | DIASTOLIC BLOOD PRESSURE: 75 MMHG | RESPIRATION RATE: 19 BRPM | HEART RATE: 65 BPM | BODY MASS INDEX: 24.36 KG/M2 | TEMPERATURE: 97 F | SYSTOLIC BLOOD PRESSURE: 164 MMHG | HEIGHT: 66 IN | OXYGEN SATURATION: 95 %

## 2025-04-25 DIAGNOSIS — S52.571D OTHER CLOSED INTRA-ARTICULAR FRACTURE OF DISTAL END OF RIGHT RADIUS WITH ROUTINE HEALING, SUBSEQUENT ENCOUNTER: Primary | ICD-10-CM

## 2025-04-25 PROCEDURE — 63600175 PHARM REV CODE 636 W HCPCS: Mod: HCNC | Performed by: NURSE ANESTHETIST, CERTIFIED REGISTERED

## 2025-04-25 PROCEDURE — 25608 OPTX DST RD XART FX/EPI SEP2: CPT | Mod: HCNC,RT,, | Performed by: ORTHOPAEDIC SURGERY

## 2025-04-25 PROCEDURE — 71000033 HC RECOVERY, INTIAL HOUR: Mod: HCNC | Performed by: ORTHOPAEDIC SURGERY

## 2025-04-25 PROCEDURE — 36000711: Mod: HCNC | Performed by: ORTHOPAEDIC SURGERY

## 2025-04-25 PROCEDURE — 37000008 HC ANESTHESIA 1ST 15 MINUTES: Mod: HCNC | Performed by: ORTHOPAEDIC SURGERY

## 2025-04-25 PROCEDURE — C1713 ANCHOR/SCREW BN/BN,TIS/BN: HCPCS | Mod: HCNC | Performed by: ORTHOPAEDIC SURGERY

## 2025-04-25 PROCEDURE — 71000015 HC POSTOP RECOV 1ST HR: Mod: HCNC | Performed by: ORTHOPAEDIC SURGERY

## 2025-04-25 PROCEDURE — 63600175 PHARM REV CODE 636 W HCPCS: Mod: HCNC | Performed by: ANESTHESIOLOGY

## 2025-04-25 PROCEDURE — 25000003 PHARM REV CODE 250: Mod: HCNC | Performed by: ORTHOPAEDIC SURGERY

## 2025-04-25 PROCEDURE — 27201423 OPTIME MED/SURG SUP & DEVICES STERILE SUPPLY: Mod: HCNC | Performed by: ORTHOPAEDIC SURGERY

## 2025-04-25 PROCEDURE — 63600175 PHARM REV CODE 636 W HCPCS: Mod: HCNC | Performed by: ORTHOPAEDIC SURGERY

## 2025-04-25 PROCEDURE — 36000710: Mod: HCNC | Performed by: ORTHOPAEDIC SURGERY

## 2025-04-25 PROCEDURE — 37000009 HC ANESTHESIA EA ADD 15 MINS: Mod: HCNC | Performed by: ORTHOPAEDIC SURGERY

## 2025-04-25 DEVICE — SCREW STRDRV REC T8 2.4X14 SS: Type: IMPLANTABLE DEVICE | Site: WRIST | Status: FUNCTIONAL

## 2025-04-25 DEVICE — PLATE 2.4 RAD DIST VLR STRL: Type: IMPLANTABLE DEVICE | Site: WRIST | Status: FUNCTIONAL

## 2025-04-25 DEVICE — SCREW LOCKING 2.4 X 18MM: Type: IMPLANTABLE DEVICE | Site: WRIST | Status: FUNCTIONAL

## 2025-04-25 DEVICE — SCREW LOCKING 2.4 X 20MM: Type: IMPLANTABLE DEVICE | Site: WRIST | Status: FUNCTIONAL

## 2025-04-25 RX ORDER — ONDANSETRON HYDROCHLORIDE 2 MG/ML
INJECTION, SOLUTION INTRAVENOUS
Status: DISCONTINUED | OUTPATIENT
Start: 2025-04-25 | End: 2025-04-25

## 2025-04-25 RX ORDER — KETOROLAC TROMETHAMINE 30 MG/ML
15 INJECTION, SOLUTION INTRAMUSCULAR; INTRAVENOUS EVERY 8 HOURS PRN
Status: DISCONTINUED | OUTPATIENT
Start: 2025-04-25 | End: 2025-04-25 | Stop reason: HOSPADM

## 2025-04-25 RX ORDER — CHLORHEXIDINE GLUCONATE ORAL RINSE 1.2 MG/ML
10 SOLUTION DENTAL 2 TIMES DAILY
OUTPATIENT
Start: 2025-04-25 | End: 2025-04-30

## 2025-04-25 RX ORDER — OXYCODONE AND ACETAMINOPHEN 5; 325 MG/1; MG/1
1 TABLET ORAL
Status: DISCONTINUED | OUTPATIENT
Start: 2025-04-25 | End: 2025-04-25 | Stop reason: HOSPADM

## 2025-04-25 RX ORDER — HYDROMORPHONE HYDROCHLORIDE 2 MG/ML
0.2 INJECTION, SOLUTION INTRAMUSCULAR; INTRAVENOUS; SUBCUTANEOUS EVERY 5 MIN PRN
Status: DISCONTINUED | OUTPATIENT
Start: 2025-04-25 | End: 2025-04-25 | Stop reason: HOSPADM

## 2025-04-25 RX ORDER — CEPHALEXIN 500 MG/1
500 CAPSULE ORAL EVERY 6 HOURS
Qty: 20 CAPSULE | Refills: 0 | Status: SHIPPED | OUTPATIENT
Start: 2025-04-25

## 2025-04-25 RX ORDER — CEFAZOLIN SODIUM 1 G/3ML
INJECTION, POWDER, FOR SOLUTION INTRAMUSCULAR; INTRAVENOUS
Status: DISCONTINUED | OUTPATIENT
Start: 2025-04-25 | End: 2025-04-25

## 2025-04-25 RX ORDER — LIDOCAINE HYDROCHLORIDE 10 MG/ML
INJECTION, SOLUTION EPIDURAL; INFILTRATION; INTRACAUDAL; PERINEURAL
Status: DISCONTINUED | OUTPATIENT
Start: 2025-04-25 | End: 2025-04-25

## 2025-04-25 RX ORDER — PROPOFOL 10 MG/ML
VIAL (ML) INTRAVENOUS
Status: DISCONTINUED | OUTPATIENT
Start: 2025-04-25 | End: 2025-04-25

## 2025-04-25 RX ORDER — PROMETHAZINE HYDROCHLORIDE 25 MG/1
25 SUPPOSITORY RECTAL EVERY 6 HOURS PRN
OUTPATIENT
Start: 2025-04-25

## 2025-04-25 RX ORDER — KETOROLAC TROMETHAMINE 30 MG/ML
INJECTION, SOLUTION INTRAMUSCULAR; INTRAVENOUS
Status: DISCONTINUED | OUTPATIENT
Start: 2025-04-25 | End: 2025-04-25

## 2025-04-25 RX ORDER — FENTANYL CITRATE 50 UG/ML
INJECTION, SOLUTION INTRAMUSCULAR; INTRAVENOUS
Status: DISCONTINUED | OUTPATIENT
Start: 2025-04-25 | End: 2025-04-25

## 2025-04-25 RX ORDER — HYDROCODONE BITARTRATE AND ACETAMINOPHEN 5; 325 MG/1; MG/1
1 TABLET ORAL EVERY 4 HOURS PRN
Status: DISCONTINUED | OUTPATIENT
Start: 2025-04-25 | End: 2025-04-25 | Stop reason: HOSPADM

## 2025-04-25 RX ORDER — ONDANSETRON HYDROCHLORIDE 2 MG/ML
4 INJECTION, SOLUTION INTRAVENOUS DAILY PRN
Status: DISCONTINUED | OUTPATIENT
Start: 2025-04-25 | End: 2025-04-25 | Stop reason: HOSPADM

## 2025-04-25 RX ORDER — BUPIVACAINE HYDROCHLORIDE 2.5 MG/ML
INJECTION, SOLUTION EPIDURAL; INFILTRATION; INTRACAUDAL; PERINEURAL
Status: DISCONTINUED | OUTPATIENT
Start: 2025-04-25 | End: 2025-04-25 | Stop reason: HOSPADM

## 2025-04-25 RX ORDER — ONDANSETRON HYDROCHLORIDE 2 MG/ML
4 INJECTION, SOLUTION INTRAVENOUS EVERY 12 HOURS PRN
OUTPATIENT
Start: 2025-04-25

## 2025-04-25 RX ORDER — HYDROCODONE BITARTRATE AND ACETAMINOPHEN 5; 325 MG/1; MG/1
1 TABLET ORAL EVERY 4 HOURS PRN
Qty: 42 TABLET | Refills: 0 | Status: SHIPPED | OUTPATIENT
Start: 2025-04-25 | End: 2025-05-02

## 2025-04-25 RX ADMIN — SODIUM CHLORIDE, SODIUM LACTATE, POTASSIUM CHLORIDE, AND CALCIUM CHLORIDE: .6; .31; .03; .02 INJECTION, SOLUTION INTRAVENOUS at 02:04

## 2025-04-25 RX ADMIN — FENTANYL CITRATE 50 MCG: 50 INJECTION, SOLUTION INTRAMUSCULAR; INTRAVENOUS at 02:04

## 2025-04-25 RX ADMIN — HYDROMORPHONE HYDROCHLORIDE 0.2 MG: 2 INJECTION INTRAMUSCULAR; INTRAVENOUS; SUBCUTANEOUS at 03:04

## 2025-04-25 RX ADMIN — KETOROLAC TROMETHAMINE 30 MG: 30 INJECTION, SOLUTION INTRAMUSCULAR; INTRAVENOUS at 02:04

## 2025-04-25 RX ADMIN — LIDOCAINE HYDROCHLORIDE 50 MG: 10 SOLUTION INTRAVENOUS at 02:04

## 2025-04-25 RX ADMIN — PROPOFOL 50 MG: 10 INJECTION, EMULSION INTRAVENOUS at 02:04

## 2025-04-25 RX ADMIN — PROPOFOL 100 MG: 10 INJECTION, EMULSION INTRAVENOUS at 02:04

## 2025-04-25 RX ADMIN — CEFAZOLIN 2 G: 330 INJECTION, POWDER, FOR SOLUTION INTRAMUSCULAR; INTRAVENOUS at 02:04

## 2025-04-25 RX ADMIN — HYDROCODONE BITARTRATE AND ACETAMINOPHEN 1 TABLET: 5; 325 TABLET ORAL at 03:04

## 2025-04-25 RX ADMIN — SODIUM CHLORIDE, SODIUM LACTATE, POTASSIUM CHLORIDE, AND CALCIUM CHLORIDE: .6; .31; .03; .02 INJECTION, SOLUTION INTRAVENOUS at 03:04

## 2025-04-25 RX ADMIN — ONDANSETRON 4 MG: 2 INJECTION INTRAMUSCULAR; INTRAVENOUS at 02:04

## 2025-04-25 NOTE — TRANSFER OF CARE
"Anesthesia Transfer of Care Note    Patient: Anne Farmer    Procedure(s) Performed: Procedure(s) (LRB):  ORIF, FRACTURE, RADIUS, DISTAL (Right)    Patient location: PACU    Anesthesia Type: general    Transport from OR: Transported from OR on room air with adequate spontaneous ventilation    Post pain: adequate analgesia    Post assessment: no apparent anesthetic complications    Post vital signs: stable    Level of consciousness: sedated    Nausea/Vomiting: no nausea/vomiting    Complications: none    Transfer of care protocol was followed      Last vitals: Visit Vitals  BP (!) 165/67   Pulse 62   Temp 36.2 °C (97.2 °F) (Temporal)   Resp 16   Ht 5' 6" (1.676 m)   Wt 68.8 kg (151 lb 9.1 oz)   SpO2 99%   Breastfeeding No   BMI 24.46 kg/m²     "

## 2025-04-25 NOTE — ANESTHESIA PROCEDURE NOTES
Intubation    Date/Time: 4/25/2025 2:10 PM    Performed by: Charleen Lima CRNA  Authorized by: Wesley Adkins II, MD    Intubation:     Induction:  Intravenous    Intubated:  Postinduction    Mask Ventilation:  Not attempted    Attempts:  1    Attempted By:  CRNA    Difficult Airway Encountered?: No      Complications:  None    Airway Device:  Supraglottic airway/LMA    Airway Device Size:  3.0    Style/Cuff Inflation:  Uncuffed    Placement Verified By:  Capnometry    Complicating Factors:  None    Findings Post-Intubation:  Atraumatic/condition of teeth unchanged

## 2025-04-25 NOTE — DISCHARGE SUMMARY
[facility] (Hospital)  Discharge Note  Short Stay    Procedure(s) (LRB):  ORIF, FRACTURE, RADIUS, DISTAL (Right)      OUTCOME: Patient tolerated treatment/procedure well without complication and is now ready for discharge.    DISPOSITION: Home or Self Care    FINAL DIAGNOSIS:  <principal problem not specified>    FOLLOWUP: In clinic    DISCHARGE INSTRUCTIONS:  No discharge procedures on file.     TIME SPENT ON DISCHARGE: 2 minutes

## 2025-04-25 NOTE — ANESTHESIA POSTPROCEDURE EVALUATION
Anesthesia Post Evaluation    Patient: Anne Farmer    Procedure(s) Performed: Procedure(s) (LRB):  ORIF, FRACTURE, RADIUS, DISTAL (Right)    Final Anesthesia Type: general      Patient location during evaluation: PACU  Patient participation: Yes- Able to Participate  Level of consciousness: awake and alert  Post-procedure vital signs: reviewed and stable  Pain management: adequate  Airway patency: patent  ESCOBAR mitigation strategies: Extubation while patient is awake  PONV status at discharge: No PONV  Anesthetic complications: no      Cardiovascular status: hemodynamically stable  Respiratory status: spontaneous ventilation  Hydration status: euvolemic  Follow-up not needed.              Vitals Value Taken Time   /75 04/25/25 15:45   Temp  04/25/25 17:10   Pulse 72 04/25/25 15:56   Resp 23 04/25/25 15:56   SpO2 98 % 04/25/25 15:55   Vitals shown include unfiled device data.      Event Time   Out of Recovery 15:51:49         Pain/Bernadette Score: Pain Rating Prior to Med Admin: 7 (4/25/2025  3:35 PM)  Bernadette Score: 10 (4/25/2025  3:54 PM)

## 2025-04-25 NOTE — ANESTHESIA PREPROCEDURE EVALUATION
2025  Anne Farmer is a 69 y.o., female.    Past Medical History:   Diagnosis Date    Allergy     Arthritis     Cancer 2016    colon    CHF (congestive heart failure)     Colon cancer 2004    Colon cancer screening 2015    Diabetes mellitus type 2 in nonobese 2016    borderline    Diverticulosis     DM (diabetes mellitus) 2016    BS didn't check 2019    DM (diabetes mellitus) 2016    BS didn't check 2020    Hyperlipidemia 10/25/2016    Hypertension     Insomnia     Malignant neoplasm of colon 2021    Malignant neoplasm of lower-outer quadrant of left breast of female, estrogen receptor positive 2022    Sarcoidosis, unspecified      Past Surgical History:   Procedure Laterality Date    BREAST BIOPSY Left     BREAST LUMPECTOMY Left      SECTION      COLON SURGERY      COLONOSCOPY N/A 2015    Procedure: COLONOSCOPY;  Surgeon: Adela Sam MD;  Location: [facility] ENDO;  Service: Endoscopy;  Laterality: N/A;    COLONOSCOPY N/A 2017    Procedure: COLONOSCOPY;  Surgeon: Chintan Flores MD;  Location: [facility] ENDO;  Service: Endoscopy;  Laterality: N/A;    COLONOSCOPY N/A 2020    Procedure: COLONOSCOPY;  Surgeon: Grisel Atkins MD;  Location: [facility] ENDO;  Service: Endoscopy;  Laterality: N/A;    SENTINEL LYMPH NODE BIOPSY Left 2022    Procedure: BIOPSY, LYMPH NODE, SENTINEL;  Surgeon: Arvin Hernandez MD;  Location: [facility] OR;  Service: General;  Laterality: Left;       Pre-op Assessment    I have reviewed the Patient Summary Reports.    I have reviewed the NPO Status.   I have reviewed the Medications.     Review of Systems  Anesthesia Hx:  No problems with previous Anesthesia                Social:  Non-Smoker       Hematology/Oncology:  Hematology Normal                       --  Cancer in past history:       Breast               Cardiovascular:     Hypertension       CHF    hyperlipidemia                               Pulmonary:  Pulmonary Normal        Sarcoidosis               Renal/:  Renal/ Normal                 Hepatic/GI:  Hepatic/GI Normal                    Musculoskeletal:  Arthritis               Neurological:  Neurology Normal                                      Endocrine:  Diabetes               Physical Exam  General: Well nourished    Airway:  Mallampati: II   Mouth Opening: Normal  TM Distance: Normal  Neck ROM: Normal ROM    Dental:  Intact        Anesthesia Plan  Type of Anesthesia, risks & benefits discussed:    Anesthesia Type: Gen ETT, Gen Supraglottic Airway  Intra-op Monitoring Plan: Standard ASA Monitors  Post Op Pain Control Plan: multimodal analgesia  Induction:  IV  Airway Plan: , Post-Induction  Informed Consent: Informed consent signed with the Patient and all parties understand the risks and agree with anesthesia plan.  All questions answered.   ASA Score: 3    Ready For Surgery From Anesthesia Perspective.     .    Chemistry        Component Value Date/Time     04/12/2025 1457     10/21/2024 1049    K 3.8 04/12/2025 1457    K 3.4 (L) 10/21/2024 1049     04/12/2025 1457    CL 99 10/21/2024 1049    CO2 26 04/12/2025 1457    CO2 25 10/21/2024 1049    BUN 17 04/12/2025 1457    CREATININE 0.8 04/12/2025 1457     (H) 10/21/2024 1049        Component Value Date/Time    CALCIUM 9.9 04/12/2025 1457    CALCIUM 9.7 10/21/2024 1049    ALKPHOS 64 04/12/2025 1457    ALKPHOS 81 10/21/2024 1049    AST 22 04/12/2025 1457    AST 31 10/21/2024 1049    ALT 14 04/12/2025 1457    ALT 20 10/21/2024 1049    BILITOT 0.3 04/12/2025 1457    BILITOT 0.2 10/21/2024 1049    ESTGFRAFRICA >60.0 06/16/2022 1259    EGFRNONAA >60.0 06/16/2022 1259        Lab Results   Component Value Date    WBC 4.57 04/12/2025    HGB 11.2 (L) 04/12/2025    HCT 34.3 (L) 04/12/2025    MCV 95 04/12/2025     04/12/2025        Results for orders placed during the hospital encounter of 05/22/24    Echo    Interpretation Summary    Left Ventricle: The left ventricle is normal in size. Normal wall thickness. There is concentric remodeling. Normal wall motion. Ejection fraction by visual approximation is 60%. There is indeterminate diastolic function.    Right Ventricle: Normal right ventricular cavity size. Wall thickness is normal. Systolic function is normal.    Aortic Valve: The aortic valve is a trileaflet valve. There is mild aortic regurgitation.    Mitral Valve: There is mild regurgitation.    Tricuspid Valve: There is moderate regurgitation.    Pulmonary Artery: The estimated pulmonary artery systolic pressure is 49 mmHg.    IVC/SVC: Normal venous pressure at 3 mmHg.

## 2025-04-25 NOTE — OP NOTE
OP NOTE:      Date of Procedure: 4/25/2025     Pre-op Diagnosis: Closed fracture of right wrist, initial encounter [S62.101A]     Post-op Diagnosis: Post-Op Diagnosis Codes:     * Closed fracture of right wrist, initial encounter [S62.101A]     Procedure Performed: Procedure(s):  ORIF, FRACTURE, RADIUS, DISTAL (Right)    Surgeon/Assistants: Surgeons and Role:     * Maria A Butler Jr., MD - Primary    Assistant: Stevie Smalls PA-C        Anesthesia: General     Estimated Blood Loss:  50    Drains:  None    Specimen: * No specimens in log *    Complications:  None    Condition of Patient:  Stable         HISTORY/INDICATIONS  69-year-old female who is right-hand dominant has intra-articular distal radius fracture with 2 articular fragments.  Risks and benefits of surgery were discussed with her at length she understood this well and consented to proceed    DESCRIPTION OF PROCEDURE:    Patient was taken to the operating room where general anesthetic was administered.  We prepped and draped the upper extremity usual sterile manner.  I did use a tourniquet about the upper arm.  I made a 4 cm incision between the FCR and the radial neurovascular bundle bluntly dissected through subcutaneous tissues we isolated the radial neurovascular bundle and retracted it preserved it throughout the procedure and then elevated the pronator quadratus off the distal radius and we mobilized the fracture site and used a Synthes distal radius plate with a combination of locking and nonlocking screws and we were able to get this reduced well.  Fluoroscopy was used for assistance throughout the procedure.  We irrigated thoroughly with saline closed the subcutaneous tissues with 3-0 Vicryl suture and the skin with nylon.  Volar splint was applied and patient was taken to the postanesthesia care unit.

## 2025-04-26 LAB — POCT GLUCOSE: 108 MG/DL (ref 70–110)

## 2025-04-29 ENCOUNTER — OFFICE VISIT (OUTPATIENT)
Dept: CARDIOLOGY | Facility: CLINIC | Age: 70
End: 2025-04-29
Payer: MEDICARE

## 2025-04-29 VITALS
WEIGHT: 151.44 LBS | HEART RATE: 63 BPM | HEIGHT: 66 IN | OXYGEN SATURATION: 100 % | DIASTOLIC BLOOD PRESSURE: 64 MMHG | SYSTOLIC BLOOD PRESSURE: 154 MMHG | BODY MASS INDEX: 24.34 KG/M2

## 2025-04-29 DIAGNOSIS — I70.0 AORTIC ATHEROSCLEROSIS: ICD-10-CM

## 2025-04-29 DIAGNOSIS — Z86.73 HISTORY OF STROKE: ICD-10-CM

## 2025-04-29 DIAGNOSIS — R42 DIZZINESS: ICD-10-CM

## 2025-04-29 DIAGNOSIS — R60.0 LOCALIZED EDEMA: ICD-10-CM

## 2025-04-29 DIAGNOSIS — J98.4 CHRONIC RESTRICTIVE LUNG DISEASE: ICD-10-CM

## 2025-04-29 DIAGNOSIS — R00.1 BRADYCARDIA: ICD-10-CM

## 2025-04-29 DIAGNOSIS — E78.5 DM TYPE 2 WITH DIABETIC DYSLIPIDEMIA: ICD-10-CM

## 2025-04-29 DIAGNOSIS — I10 PRIMARY HYPERTENSION: Primary | ICD-10-CM

## 2025-04-29 DIAGNOSIS — R06.09 DOE (DYSPNEA ON EXERTION): ICD-10-CM

## 2025-04-29 DIAGNOSIS — E11.69 DM TYPE 2 WITH DIABETIC DYSLIPIDEMIA: ICD-10-CM

## 2025-04-29 DIAGNOSIS — I51.89 DIASTOLIC DYSFUNCTION: ICD-10-CM

## 2025-04-29 DIAGNOSIS — R06.09 OTHER FORM OF DYSPNEA: ICD-10-CM

## 2025-04-29 PROCEDURE — 3288F FALL RISK ASSESSMENT DOCD: CPT | Mod: CPTII,HCNC,S$GLB, | Performed by: INTERNAL MEDICINE

## 2025-04-29 PROCEDURE — 3072F LOW RISK FOR RETINOPATHY: CPT | Mod: CPTII,HCNC,S$GLB, | Performed by: INTERNAL MEDICINE

## 2025-04-29 PROCEDURE — 1126F AMNT PAIN NOTED NONE PRSNT: CPT | Mod: CPTII,HCNC,S$GLB, | Performed by: INTERNAL MEDICINE

## 2025-04-29 PROCEDURE — 1159F MED LIST DOCD IN RCRD: CPT | Mod: CPTII,HCNC,S$GLB, | Performed by: INTERNAL MEDICINE

## 2025-04-29 PROCEDURE — 1160F RVW MEDS BY RX/DR IN RCRD: CPT | Mod: CPTII,HCNC,S$GLB, | Performed by: INTERNAL MEDICINE

## 2025-04-29 PROCEDURE — 3077F SYST BP >= 140 MM HG: CPT | Mod: CPTII,HCNC,S$GLB, | Performed by: INTERNAL MEDICINE

## 2025-04-29 PROCEDURE — 4010F ACE/ARB THERAPY RXD/TAKEN: CPT | Mod: CPTII,HCNC,S$GLB, | Performed by: INTERNAL MEDICINE

## 2025-04-29 PROCEDURE — 99999 PR PBB SHADOW E&M-EST. PATIENT-LVL IV: CPT | Mod: PBBFAC,HCNC,, | Performed by: INTERNAL MEDICINE

## 2025-04-29 PROCEDURE — 1101F PT FALLS ASSESS-DOCD LE1/YR: CPT | Mod: CPTII,HCNC,S$GLB, | Performed by: INTERNAL MEDICINE

## 2025-04-29 PROCEDURE — G2211 COMPLEX E/M VISIT ADD ON: HCPCS | Mod: HCNC,S$GLB,, | Performed by: INTERNAL MEDICINE

## 2025-04-29 PROCEDURE — 3008F BODY MASS INDEX DOCD: CPT | Mod: CPTII,HCNC,S$GLB, | Performed by: INTERNAL MEDICINE

## 2025-04-29 PROCEDURE — 99214 OFFICE O/P EST MOD 30 MIN: CPT | Mod: HCNC,S$GLB,, | Performed by: INTERNAL MEDICINE

## 2025-04-29 PROCEDURE — 3078F DIAST BP <80 MM HG: CPT | Mod: CPTII,HCNC,S$GLB, | Performed by: INTERNAL MEDICINE

## 2025-04-29 RX ORDER — PRAVASTATIN SODIUM 40 MG/1
40 TABLET ORAL DAILY
Qty: 90 TABLET | Refills: 3 | Status: SHIPPED | OUTPATIENT
Start: 2025-04-29 | End: 2026-04-29

## 2025-04-29 RX ORDER — AMLODIPINE BESYLATE 10 MG/1
10 TABLET ORAL DAILY
Qty: 90 TABLET | Refills: 3 | Status: SHIPPED | OUTPATIENT
Start: 2025-04-29

## 2025-04-29 NOTE — PROGRESS NOTES
Subjective:   Patient ID:  Anne Farmer is a 69 y.o. female who presents for cardiac consult of No chief complaint on file.      Referral by: No referring provider defined for this encounter.     Reason for consult: dizziness       HPI  The patient came in today for cardiac consult of No chief complaint on file.      Anne Farmer is a 69 y.o. female pt with HTN, HLD, HFPEF, aortic atherosc, sarcoidosis, chronic restrictive lung diseaes, DM2 presents for follow up up CV eval.       12/26/24   LE u/s venous - r/o DVT - neg 10/2024  She has not gained much weight.     4/29/25  BP elevated 154/64. HR 60s. BMI 24 - 151 lbs  S/p wrist surgery she had a fall, no syncope.   She is wearing a splint now.   She did not take BP med this AM yet  No CP/SOB.     Results for orders placed during the hospital encounter of 05/22/24    Echo    Interpretation Summary    Left Ventricle: The left ventricle is normal in size. Normal wall thickness. There is concentric remodeling. Normal wall motion. Ejection fraction by visual approximation is 60%. There is indeterminate diastolic function.    Right Ventricle: Normal right ventricular cavity size. Wall thickness is normal. Systolic function is normal.    Aortic Valve: The aortic valve is a trileaflet valve. There is mild aortic regurgitation.    Mitral Valve: There is mild regurgitation.    Tricuspid Valve: There is moderate regurgitation.    Pulmonary Artery: The estimated pulmonary artery systolic pressure is 49 mmHg.    IVC/SVC: Normal venous pressure at 3 mmHg.      ECG  Sinus bradycardia with sinus arrhythmia   Right bundle branch block   Left anterior fascicular block    Bifascicular block   LVH with repolarization abnormality   Cannot rule out Septal infarct ,age undetermined   Abnormal ECG   When compared with ECG of 16-JUN-2022 12:33,   Minimal criteria for Septal infarct are now Present   T wave inversion now evident in Lateral leads   Confirmed by JOHN MEYER,  REJI ([id number]) on 2024 3:43:46 PM     No cardiac monitor results found for the past 12 months         Past Medical History:   Diagnosis Date    Allergy     Arthritis     Cancer 2016    colon    CHF (congestive heart failure)     Colon cancer 2004    Colon cancer screening 2015    Diabetes mellitus type 2 in nonobese 2016    borderline    Diverticulosis     DM (diabetes mellitus) 2016    BS didn't check 2019    DM (diabetes mellitus) 2016    BS didn't check 2020    Hyperlipidemia 10/25/2016    Hypertension     Insomnia     Malignant neoplasm of colon 2021    Malignant neoplasm of lower-outer quadrant of left breast of female, estrogen receptor positive 2022    Sarcoidosis, unspecified        Past Surgical History:   Procedure Laterality Date    BREAST BIOPSY Left     BREAST LUMPECTOMY Left      SECTION      COLON SURGERY      COLONOSCOPY N/A 2015    Procedure: COLONOSCOPY;  Surgeon: Adela Sam MD;  Location: [facility] ENDO;  Service: Endoscopy;  Laterality: N/A;    COLONOSCOPY N/A 2017    Procedure: COLONOSCOPY;  Surgeon: Chintan Flores MD;  Location: [facility] ENDO;  Service: Endoscopy;  Laterality: N/A;    COLONOSCOPY N/A 2020    Procedure: COLONOSCOPY;  Surgeon: Grisel Atkins MD;  Location: [facility] ENDO;  Service: Endoscopy;  Laterality: N/A;    SENTINEL LYMPH NODE BIOPSY Left 2022    Procedure: BIOPSY, LYMPH NODE, SENTINEL;  Surgeon: Arvin Hernandez MD;  Location: [facility] OR;  Service: General;  Laterality: Left;       Social History     Tobacco Use    Smoking status: Never     Passive exposure: Never    Smokeless tobacco: Never   Substance Use Topics    Alcohol use: Not Currently    Drug use: No       Family History   Problem Relation Name Age of Onset    Hypertension Mother      Diabetes Mother      Hypertension Father      Hypertension Sister      Hypertension Brother      Hypertension Sister      Hypertension Sister       Hypertension Sister      Hypertension Brother      Hypertension Brother         Patient's Medications   New Prescriptions    No medications on file   Previous Medications    ALBUTEROL (PROVENTIL/VENTOLIN HFA) 90 MCG/ACTUATION INHALER    INHALE 1-2 PUFFS BY MOUTH EVERY 6 HOURS AS NEEDED FOR WHEEZE OR SHORTNESS OF BREATH    AMLODIPINE (NORVASC) 10 MG TABLET    TAKE 1 TABLET BY MOUTH EVERY DAY    ANASTROZOLE (ARIMIDEX) 1 MG TAB    Take 1 tablet (1 mg total) by mouth once daily.    AZELASTINE (ASTELIN) 137 MCG (0.1 %) NASAL SPRAY    2 sprays (274 mcg total) by Nasal route 2 (two) times daily.    CEPHALEXIN (KEFLEX) 500 MG CAPSULE    Take 1 capsule (500 mg total) by mouth every 6 (six) hours.    CYCLOBENZAPRINE (FLEXERIL) 5 MG TABLET    Take 1 tablet (5 mg total) by mouth 3 (three) times daily as needed for Muscle spasms.    FLUCONAZOLE (DIFLUCAN) 200 MG TAB    Take 1 tablet (200 mg total) by mouth once daily.    FLUTICASONE PROPIONATE (FLONASE) 50 MCG/ACTUATION NASAL SPRAY    SPRAY 2 SPRAYS BY EACH NOSTRIL ROUTE ONCE DAILY.    FUROSEMIDE (LASIX) 20 MG TABLET    TAKE 1 TABLET (20 MG TOTAL) BY MOUTH DAILY AS NEEDED (EDEMA, SWELLING, FLUID). DO NOT TAKE IF BP IS BELOW 110/70    HYDROCODONE-ACETAMINOPHEN (NORCO) 5-325 MG PER TABLET    Take 1 tablet by mouth every 6 (six) hours as needed for Pain.    HYDROCODONE-ACETAMINOPHEN (NORCO) 5-325 MG PER TABLET    Take 1 tablet by mouth every 4 (four) hours as needed for Pain.    KLOR-CON 10 10 MEQ TBSR    Take 2 tablets (20 mEq total) by mouth daily as needed (take with lasix).    LEVOCETIRIZINE (XYZAL) 5 MG TABLET    Take 1 tablet (5 mg total) by mouth every evening.    LOSARTAN (COZAAR) 100 MG TABLET    TAKE 1 TABLET BY MOUTH EVERY DAY    MAGNESIUM OXIDE (MAG-OX) 400 MG (241.3 MG MAGNESIUM) TABLET    Take 1 tablet (400 mg total) by mouth once daily.    MECLIZINE (ANTIVERT) 12.5 MG TABLET    Take 1 tablet (12.5 mg total) by mouth 3 (three) times daily as needed for Dizziness.     MIRTAZAPINE (REMERON) 30 MG TABLET    Take 1 tablet (30 mg total) by mouth every evening.    MULTIVITAMIN (ONE DAILY MULTIVITAMIN) PER TABLET    Take 1 tablet by mouth once daily.    OMEPRAZOLE (PRILOSEC) 20 MG CAPSULE    TAKE 1 CAPSULE BY MOUTH EVERY DAY    ONDANSETRON (ZOFRAN-ODT) 4 MG TBDL    Take 1 tablet (4 mg total) by mouth every 8 (eight) hours as needed (n/v).    POLYETHYLENE GLYCOL (GLYCOLAX) 17 GRAM/DOSE POWDER    Take 17 g by mouth daily as needed for Constipation.    PRAVASTATIN (PRAVACHOL) 40 MG TABLET    Take 1 tablet (40 mg total) by mouth once daily.    PROCHLORPERAZINE (COMPAZINE) 5 MG TABLET    Take 1 tablet (5 mg total) by mouth every 6 (six) hours as needed for Nausea.    PROMETHAZINE (PHENERGAN) 25 MG TABLET    TAKE 1 TABLET BY MOUTH EVERY 6 HOURS AS NEEDED FOR NAUSEA    TRAZODONE (DESYREL) 50 MG TABLET    TAKE 1 TABLET BY MOUTH EVERY DAY AT NIGHT   Modified Medications    No medications on file   Discontinued Medications    No medications on file       Review of Systems   Constitutional:  Positive for malaise/fatigue.   HENT: Negative.     Eyes: Negative.    Respiratory:  Positive for shortness of breath.    Cardiovascular:  Positive for orthopnea and leg swelling.   Gastrointestinal: Negative.    Genitourinary: Negative.    Musculoskeletal: Negative.    Skin: Negative.    Neurological:  Positive for dizziness.   Endo/Heme/Allergies: Negative.    Psychiatric/Behavioral: Negative.     All 12 systems otherwise negative.      Wt Readings from Last 3 Encounters:   04/29/25 68.7 kg (151 lb 7.3 oz)   04/25/25 68.8 kg (151 lb 9.1 oz)   04/22/25 71 kg (156 lb 8.4 oz)     Temp Readings from Last 3 Encounters:   04/25/25 97 °F (36.1 °C) (Skin)   04/22/25 98.2 °F (36.8 °C)   04/18/25 97.8 °F (36.6 °C) (Oral)     BP Readings from Last 3 Encounters:   04/29/25 (!) 154/64   04/25/25 (!) 164/75   04/22/25 (!) 145/68     Pulse Readings from Last 3 Encounters:   04/29/25 63   04/25/25 65   04/22/25 60       BP  "(!) 154/64 (Patient Position: Sitting) Comment: pt stated that she did not take her BP meds this morning  Pulse 63   Ht 5' 6" (1.676 m)   Wt 68.7 kg (151 lb 7.3 oz)   SpO2 100%   BMI 24.45 kg/m²     Objective:   Physical Exam  Vitals and nursing note reviewed.   Constitutional:       General: She is not in acute distress.     Appearance: She is well-developed. She is not diaphoretic.   HENT:      Head: Normocephalic and atraumatic.      Nose: Nose normal.   Eyes:      General: No scleral icterus.     Conjunctiva/sclera: Conjunctivae normal.   Neck:      Thyroid: No thyromegaly.      Vascular: No JVD.   Cardiovascular:      Rate and Rhythm: Normal rate and regular rhythm.      Heart sounds: S1 normal and S2 normal. Murmur heard.      No friction rub. No gallop. No S3 or S4 sounds.   Pulmonary:      Effort: Pulmonary effort is normal. No respiratory distress.      Breath sounds: Normal breath sounds. No stridor. No wheezing or rales.   Chest:      Chest wall: No tenderness.   Abdominal:      General: Bowel sounds are normal. There is no distension.      Palpations: Abdomen is soft. There is no mass.      Tenderness: There is no abdominal tenderness. There is no rebound.   Genitourinary:     Comments: Deferred  Musculoskeletal:         General: No tenderness or deformity. Normal range of motion.      Cervical back: Normal range of motion and neck supple.      Right lower leg: Edema present.      Left lower leg: Edema present.   Lymphadenopathy:      Cervical: No cervical adenopathy.   Skin:     General: Skin is warm and dry.      Coloration: Skin is not pale.      Findings: No erythema or rash.   Neurological:      Mental Status: She is alert and oriented to person, place, and time.      Motor: No abnormal muscle tone.      Coordination: Coordination normal.   Psychiatric:         Behavior: Behavior normal.         Thought Content: Thought content normal.         Judgment: Judgment normal.         Lab Results "   Component Value Date     04/12/2025     10/21/2024    K 3.8 04/12/2025    K 3.4 (L) 10/21/2024     04/12/2025    CL 99 10/21/2024    CO2 26 04/12/2025    CO2 25 10/21/2024    BUN 17 04/12/2025    CREATININE 0.8 04/12/2025    GLU 76 04/12/2025     (H) 10/21/2024    HGBA1C 6.3 (H) 12/03/2024    MG 2.0 08/03/2024    AST 22 04/12/2025    AST 31 10/21/2024    ALT 14 04/12/2025    ALT 20 10/21/2024    ALBUMIN 4.4 04/12/2025    ALBUMIN 3.8 10/21/2024    PROT 9.0 (H) 04/12/2025    PROT 7.9 10/21/2024    BILITOT 0.3 04/12/2025    BILITOT 0.2 10/21/2024    WBC 4.57 04/12/2025    HGB 11.2 (L) 04/12/2025    HGB 10.9 (L) 10/21/2024    HCT 34.3 (L) 04/12/2025    HCT 34.3 (L) 10/21/2024    MCV 95 04/12/2025    MCV 95 10/21/2024     04/12/2025     10/21/2024    INR 0.9 06/03/2024    TSH 4.065 (H) 08/03/2024    CHOL 208 (H) 06/02/2024    HDL 86 (H) 06/02/2024    LDLCALC 106.4 06/02/2024    TRIG 78 06/02/2024     (H) 09/05/2024         BNP (pg/mL)   Date Value   09/05/2024 166 (H)   08/02/2024 220 (H)   07/23/2024 77   06/02/2024 271 (H)   03/24/2024 197 (H)     INR (no units)   Date Value   06/03/2024 0.9   06/02/2024 1.0   04/03/2012 0.9          Assessment:      1. Primary hypertension    2. Diastolic dysfunction    3. DM type 2 with diabetic dyslipidemia    4. Localized edema    5. Chronic restrictive lung disease    6. History of stroke    7. Dizziness    8. LOZADA (dyspnea on exertion)    9. Bradycardia    10. Other form of dyspnea    11. Aortic atherosclerosis            Plan:     1  Dizziness, dyspnea    - ECHO 5/2024 with normal bi V function, DD, mild AI, mild MR, mod TR, PASP 49 mmHg  - hosp - Aug 2024 for dizziness with dec PO intake, edema, weakness and hyponatremia, hypokal and mild trop elevated.     2. HTN, diastolic dysfunction with LOZADA , min elevated trop -->  BNP improving 166 lbs ; elevated BP today   - titrate meds ,rec compression stockings  - cont diuretics, low salt  diet - lasix PRN  -ECHO 5/2024 with normal bi V function, DD, mild AI, mild MR, mod TR, PASP 49 mmHg  - LE u/s venous - r/o DVT - neg 10/2024    3. Chronic restrictive lung disease, Sarcoid  - cont tx - on inhalers     4. Aortic atherosc, HLD, DM2 -- A1c 6.3   - cont to monitor - not on DM meds now  - cont statin    5. H/O breast CA  - cont tx and f/u heme/onc   - on Arimidex     6. H/O brainstem stroke, cryptococcal meningitis  - cont tx and f/u with neuro-0  - stable     Visit today included increased complexity associated with the care of the episodic problem dyspnea addressed and managing the longitudinal care of the patient due to the serious and/or complex managed problem(s) .    Thank you for allowing me to participate in this patient's care. Please do not hesitate to contact me with any questions or concerns. Consult note has been forwarded to the referral physician.

## 2025-05-07 ENCOUNTER — HOSPITAL ENCOUNTER (EMERGENCY)
Facility: HOSPITAL | Age: 70
Discharge: HOME | End: 2025-05-07
Attending: EMERGENCY MEDICINE
Payer: MEDICARE

## 2025-05-07 VITALS
OXYGEN SATURATION: 99 % | DIASTOLIC BLOOD PRESSURE: 75 MMHG | HEART RATE: 51 BPM | SYSTOLIC BLOOD PRESSURE: 181 MMHG | RESPIRATION RATE: 20 BRPM | TEMPERATURE: 98 F

## 2025-05-07 DIAGNOSIS — R10.9 LEFT FLANK PAIN: Primary | ICD-10-CM

## 2025-05-07 LAB
ABSOLUTE EOSINOPHIL (OHS): 0.15 K/UL
ABSOLUTE MONOCYTE (OHS): 0.38 K/UL (ref 0.3–1)
ABSOLUTE NEUTROPHIL COUNT (OHS): 3.09 K/UL (ref 1.8–7.7)
ALBUMIN SERPL BCP-MCNC: 4.4 G/DL (ref 3.5–5.2)
ALP SERPL-CCNC: 64 UNIT/L (ref 40–150)
ALT SERPL W/O P-5'-P-CCNC: 13 UNIT/L (ref 10–44)
ANION GAP (OHS): 13 MMOL/L (ref 8–16)
AST SERPL-CCNC: 20 UNIT/L (ref 11–45)
BASOPHILS # BLD AUTO: 0.06 K/UL
BASOPHILS NFR BLD AUTO: 1.3 %
BILIRUB SERPL-MCNC: 0.3 MG/DL (ref 0.1–1)
BILIRUB UR QL STRIP.AUTO: NEGATIVE
BNP SERPL-MCNC: 138 PG/ML (ref 0–99)
BUN SERPL-MCNC: 16 MG/DL (ref 8–23)
CALCIUM SERPL-MCNC: 10 MG/DL (ref 8.7–10.5)
CHLORIDE SERPL-SCNC: 102 MMOL/L (ref 95–110)
CLARITY UR: CLEAR
CO2 SERPL-SCNC: 19 MMOL/L (ref 23–29)
COLOR UR AUTO: YELLOW
CREAT SERPL-MCNC: 0.8 MG/DL (ref 0.5–1.4)
ERYTHROCYTE [DISTWIDTH] IN BLOOD BY AUTOMATED COUNT: 12.2 % (ref 11.5–14.5)
GFR SERPLBLD CREATININE-BSD FMLA CKD-EPI: >60 ML/MIN/1.73/M2
GLUCOSE SERPL-MCNC: 84 MG/DL (ref 70–110)
GLUCOSE UR QL STRIP: NEGATIVE
HCT VFR BLD AUTO: 36.7 % (ref 37–48.5)
HGB BLD-MCNC: 12.4 GM/DL (ref 12–16)
HGB UR QL STRIP: NEGATIVE
HOLD SPECIMEN: NORMAL
IMM GRANULOCYTES # BLD AUTO: 0.02 K/UL (ref 0–0.04)
IMM GRANULOCYTES NFR BLD AUTO: 0.4 % (ref 0–0.5)
KETONES UR QL STRIP: NEGATIVE
LEUKOCYTE ESTERASE UR QL STRIP: NEGATIVE
LIPASE SERPL-CCNC: 13 U/L (ref 4–60)
LYMPHOCYTES # BLD AUTO: 0.82 K/UL (ref 1–4.8)
MAGNESIUM SERPL-MCNC: 2.3 MG/DL (ref 1.6–2.6)
MCH RBC QN AUTO: 31 PG (ref 27–31)
MCHC RBC AUTO-ENTMCNC: 33.8 G/DL (ref 32–36)
MCV RBC AUTO: 92 FL (ref 82–98)
NITRITE UR QL STRIP: NEGATIVE
NUCLEATED RBC (/100WBC) (OHS): 0 /100 WBC
PH UR STRIP: 7 [PH]
PLATELET # BLD AUTO: 315 K/UL (ref 150–450)
PMV BLD AUTO: 8.5 FL (ref 9.2–12.9)
POTASSIUM SERPL-SCNC: 4.2 MMOL/L (ref 3.5–5.1)
PROT SERPL-MCNC: 9 GM/DL (ref 6–8.4)
PROT UR QL STRIP: ABNORMAL
RBC # BLD AUTO: 4 M/UL (ref 4–5.4)
RELATIVE EOSINOPHIL (OHS): 3.3 %
RELATIVE LYMPHOCYTE (OHS): 18.1 % (ref 18–48)
RELATIVE MONOCYTE (OHS): 8.4 % (ref 4–15)
RELATIVE NEUTROPHIL (OHS): 68.5 % (ref 38–73)
SODIUM SERPL-SCNC: 134 MMOL/L (ref 136–145)
SP GR UR STRIP: 1.01
TROPONIN I SERPL DL<=0.01 NG/ML-MCNC: 0.01 NG/ML
UROBILINOGEN UR STRIP-ACNC: NEGATIVE EU/DL
WBC # BLD AUTO: 4.52 K/UL (ref 3.9–12.7)

## 2025-05-07 PROCEDURE — 25500020 PHARM REV CODE 255: Mod: HCNC | Performed by: EMERGENCY MEDICINE

## 2025-05-07 PROCEDURE — 93010 ELECTROCARDIOGRAM REPORT: CPT | Mod: HCNC,,, | Performed by: INTERNAL MEDICINE

## 2025-05-07 PROCEDURE — 83690 ASSAY OF LIPASE: CPT | Mod: HCNC | Performed by: EMERGENCY MEDICINE

## 2025-05-07 PROCEDURE — 81003 URINALYSIS AUTO W/O SCOPE: CPT | Mod: HCNC | Performed by: EMERGENCY MEDICINE

## 2025-05-07 PROCEDURE — 83880 ASSAY OF NATRIURETIC PEPTIDE: CPT | Mod: HCNC | Performed by: EMERGENCY MEDICINE

## 2025-05-07 PROCEDURE — 93005 ELECTROCARDIOGRAM TRACING: CPT | Mod: HCNC

## 2025-05-07 PROCEDURE — 83735 ASSAY OF MAGNESIUM: CPT | Mod: HCNC | Performed by: EMERGENCY MEDICINE

## 2025-05-07 PROCEDURE — 80053 COMPREHEN METABOLIC PANEL: CPT | Mod: HCNC | Performed by: EMERGENCY MEDICINE

## 2025-05-07 PROCEDURE — 85025 COMPLETE CBC W/AUTO DIFF WBC: CPT | Mod: HCNC | Performed by: EMERGENCY MEDICINE

## 2025-05-07 PROCEDURE — 99285 EMERGENCY DEPT VISIT HI MDM: CPT | Mod: 25,HCNC

## 2025-05-07 PROCEDURE — 84484 ASSAY OF TROPONIN QUANT: CPT | Mod: HCNC | Performed by: EMERGENCY MEDICINE

## 2025-05-07 RX ADMIN — IOHEXOL 100 ML: 350 INJECTION, SOLUTION INTRAVENOUS at 06:05

## 2025-05-07 NOTE — ED PROVIDER NOTES
SCRIBE #1 NOTE: I, Ricardo Anguiano, am scribing for, and in the presence of, Boris Stewart DO. I have scribed the entire note.       History     Chief Complaint   Patient presents with    Flank Pain     Left sided flank pain for over a week that's progressively worsened. Pt seen in the ED recently for similar symptoms.      Review of patient's allergies indicates:  No Known Allergies      History of Present Illness     HPI    5/7/2025, 4:47 PM  History obtained from the patient and medical records      History of Present Illness: Anne Farmer is a 69 y.o. female patient with a PMHx of DMII, HLD, HTN, insomnia, sarcoidosis, CHF, and colon cancer who presents to the Emergency Department for evaluation of left flank pain which began ~1 week ago. Pt describes pain as worsening when sitting up and getting up to move around.  Pt denies any pain radiating to her back. Pain has worsened greatly in the last 2 days.  Symptoms are constant and moderate in severity. Associated sxs include mid-abdominal pain and increased urinary frequency. Patient denies any dysuria, n/v, or SOB. No prior Tx specified.  No further complaints or concerns at this time.       Arrival mode: Personal Transportation    PCP: Chiquita Talley MD        Past Medical History:  Past Medical History:   Diagnosis Date    Allergy     Arthritis     Cancer 2016    colon    CHF (congestive heart failure)     Colon cancer 2004    Colon cancer screening 09/21/2015    Diabetes mellitus type 2 in nonobese 03/09/2016    borderline    Diverticulosis     DM (diabetes mellitus) 03/2016    BS didn't check 02/13/2019    DM (diabetes mellitus) 03/2016    BS didn't check 03/04/2020    Hyperlipidemia 10/25/2016    Hypertension     Insomnia     Malignant neoplasm of colon 05/13/2021    Malignant neoplasm of lower-outer quadrant of left breast of female, estrogen receptor positive 06/09/2022    Sarcoidosis, unspecified        Past Surgical  History:  Past Surgical History:   Procedure Laterality Date    BREAST BIOPSY Left     BREAST LUMPECTOMY Left      SECTION      COLON SURGERY      COLONOSCOPY N/A 2015    Procedure: COLONOSCOPY;  Surgeon: Adela Sam MD;  Location: [facility] ENDO;  Service: Endoscopy;  Laterality: N/A;    COLONOSCOPY N/A 2017    Procedure: COLONOSCOPY;  Surgeon: Chintan Flores MD;  Location: [facility] ENDO;  Service: Endoscopy;  Laterality: N/A;    COLONOSCOPY N/A 2020    Procedure: COLONOSCOPY;  Surgeon: Grisel Atkins MD;  Location: [facility] ENDO;  Service: Endoscopy;  Laterality: N/A;    OPEN REDUCTION AND INTERNAL FIXATION (ORIF) OF FRACTURE OF DISTAL RADIUS Right 2025    Procedure: ORIF, FRACTURE, RADIUS, DISTAL;  Surgeon: Maria A Butler Jr., MD;  Location: [facility] OR;  Service: Orthopedics;  Laterality: Right;    SENTINEL LYMPH NODE BIOPSY Left 2022    Procedure: BIOPSY, LYMPH NODE, SENTINEL;  Surgeon: Arvin Hernandez MD;  Location: [facility] OR;  Service: General;  Laterality: Left;         Family History:  Family History   Problem Relation Name Age of Onset    Hypertension Mother      Diabetes Mother      Hypertension Father      Hypertension Sister      Hypertension Brother      Hypertension Sister      Hypertension Sister      Hypertension Sister      Hypertension Brother      Hypertension Brother         Social History:  Social History     Tobacco Use    Smoking status: Never     Passive exposure: Never    Smokeless tobacco: Never   Substance and Sexual Activity    Alcohol use: Not Currently    Drug use: No    Sexual activity: Not Currently        Review of Systems     Review of Systems   Respiratory:  Negative for shortness of breath.    Gastrointestinal:  Negative for nausea and vomiting.   Genitourinary:  Positive for flank pain (left flank) and frequency. Negative for dysuria.   Musculoskeletal:  Negative for back pain.        Physical Exam     Initial Vitals [25 1504]   BP  Pulse Resp Temp SpO2   (!) 160/68 (!) 56 15 98.3 °F (36.8 °C) 96 %      MAP       --          Physical Exam  Constitutional:       General: She is not in acute distress.     Appearance: Normal appearance.   Abdominal:      General: There is no distension.      Palpations: Abdomen is soft.      Tenderness: There is no abdominal tenderness.   Genitourinary:     Comments: There is left flank tenderness to palpation no rigidity  Musculoskeletal:      Comments: There is no midline tenderness to palpation, step-offs, crepitus noted to the thoracic or lumbar spine, muscle strength is 5/5 to the left lower extremity, pulses are 2+ bilateral lower extremities both DP and PT   Skin:     General: Skin is warm and dry.      Findings: No rash.   Neurological:      General: No focal deficit present.      Mental Status: She is alert and oriented to person, place, and time.      Sensory: No sensory deficit.      Motor: No weakness.            ED Course   Procedures  ED Vital Signs:  Vitals:    05/07/25 1504 05/07/25 1543 05/07/25 1600 05/07/25 1803   BP: (!) 160/68  (!) 162/72 (!) 176/72   Pulse: (!) 56 66 (!) 52 (!) 56   Resp: 15  16 20   Temp: 98.3 °F (36.8 °C)      TempSrc: Oral      SpO2: 96%  98% 100%    05/07/25 1845 05/07/25 1950   BP:  (!) 181/75   Pulse: (!) 50 (!) 51   Resp: 18 20   Temp:     TempSrc:     SpO2: 99% 99%       Abnormal Lab Results:  Labs Reviewed   B-TYPE NATRIURETIC PEPTIDE - Abnormal       Result Value     (*)    COMPREHENSIVE METABOLIC PANEL - Abnormal    Sodium 134 (*)     Potassium 4.2      Chloride 102      CO2 19 (*)     Glucose 84      BUN 16      Creatinine 0.8      Calcium 10.0      Protein Total 9.0 (*)     Albumin 4.4      Bilirubin Total 0.3      ALP 64      AST 20      ALT 13      Anion Gap 13      eGFR >60     URINALYSIS, REFLEX TO URINE CULTURE - Abnormal    Color, UA Yellow      Appearance, UA Clear      pH, UA 7.0      Spec Grav UA 1.015      Protein, UA Trace (*)     Glucose, UA  Negative      Ketones, UA Negative      Bilirubin, UA Negative      Blood, UA Negative      Nitrites, UA Negative      Urobilinogen, UA Negative      Leukocyte Esterase, UA Negative     CBC WITH DIFFERENTIAL - Abnormal    WBC 4.52      RBC 4.00      HGB 12.4      HCT 36.7 (*)     MCV 92      MCH 31.0      MCHC 33.8      RDW 12.2      Platelet Count 315      MPV 8.5 (*)     Nucleated RBC 0      Neut % 68.5      Lymph % 18.1      Mono % 8.4      Eos % 3.3      Basophil % 1.3      Imm Grans % 0.4      Neut # 3.09      Lymph # 0.82 (*)     Mono # 0.38      Eos # 0.15      Baso # 0.06      Imm Grans # 0.02     LIPASE - Normal    Lipase Level 13     MAGNESIUM - Normal    Magnesium  2.3     TROPONIN I - Normal    Troponin-I 0.006     CBC W/ AUTO DIFFERENTIAL    Narrative:     The following orders were created for panel order CBC auto differential.  Procedure                               Abnormality         Status                     ---------                               -----------         ------                     CBC with Differential[[id number]]       Abnormal            Final result                 Please view results for these tests on the individual orders.   GREY TOP URINE HOLD    Extra Tube Hold for add-ons.          All Lab Results:  Results for orders placed or performed during the hospital encounter of 05/07/25   Brain natriuretic peptide    Collection Time: 05/07/25  4:46 PM   Result Value Ref Range     (H) 0 - 99 pg/mL   Comprehensive metabolic panel    Collection Time: 05/07/25  4:46 PM   Result Value Ref Range    Sodium 134 (L) 136 - 145 mmol/L    Potassium 4.2 3.5 - 5.1 mmol/L    Chloride 102 95 - 110 mmol/L    CO2 19 (L) 23 - 29 mmol/L    Glucose 84 70 - 110 mg/dL    BUN 16 8 - 23 mg/dL    Creatinine 0.8 0.5 - 1.4 mg/dL    Calcium 10.0 8.7 - 10.5 mg/dL    Protein Total 9.0 (H) 6.0 - 8.4 gm/dL    Albumin 4.4 3.5 - 5.2 g/dL    Bilirubin Total 0.3 0.1 - 1.0 mg/dL    ALP 64 40 - 150 unit/L    AST 20  11 - 45 unit/L    ALT 13 10 - 44 unit/L    Anion Gap 13 8 - 16 mmol/L    eGFR >60 >60 mL/min/1.73/m2   Lipase    Collection Time: 05/07/25  4:46 PM   Result Value Ref Range    Lipase Level 13 4 - 60 U/L   Magnesium    Collection Time: 05/07/25  4:46 PM   Result Value Ref Range    Magnesium  2.3 1.6 - 2.6 mg/dL   Troponin I    Collection Time: 05/07/25  4:46 PM   Result Value Ref Range    Troponin-I 0.006 <=0.026 ng/mL   CBC with Differential    Collection Time: 05/07/25  4:46 PM   Result Value Ref Range    WBC 4.52 3.90 - 12.70 K/uL    RBC 4.00 4.00 - 5.40 M/uL    HGB 12.4 12.0 - 16.0 gm/dL    HCT 36.7 (L) 37.0 - 48.5 %    MCV 92 82 - 98 fL    MCH 31.0 27.0 - 31.0 pg    MCHC 33.8 32.0 - 36.0 g/dL    RDW 12.2 11.5 - 14.5 %    Platelet Count 315 150 - 450 K/uL    MPV 8.5 (L) 9.2 - 12.9 fL    Nucleated RBC 0 <=0 /100 WBC    Neut % 68.5 38 - 73 %    Lymph % 18.1 18 - 48 %    Mono % 8.4 4 - 15 %    Eos % 3.3 <=8 %    Basophil % 1.3 <=1.9 %    Imm Grans % 0.4 0.0 - 0.5 %    Neut # 3.09 1.8 - 7.7 K/uL    Lymph # 0.82 (L) 1 - 4.8 K/uL    Mono # 0.38 0.3 - 1 K/uL    Eos # 0.15 <=0.5 K/uL    Baso # 0.06 <=0.2 K/uL    Imm Grans # 0.02 0.00 - 0.04 K/uL   Urinalysis, Reflex to Urine Culture Urine, Clean Catch    Collection Time: 05/07/25  4:58 PM    Specimen: Urine, Clean Catch   Result Value Ref Range    Color, UA Yellow Straw, [place], Yellow, Light-Orange    Appearance, UA Clear Clear    pH, UA 7.0 5.0 - 8.0    Spec Grav UA 1.015 1.005 - 1.030    Protein, UA Trace (A) Negative    Glucose, UA Negative Negative    Ketones, UA Negative Negative    Bilirubin, UA Negative Negative    Blood, UA Negative Negative    Nitrites, UA Negative Negative    Urobilinogen, UA Negative <2.0 EU/dL    Leukocyte Esterase, UA Negative Negative   GREY TOP URINE HOLD    Collection Time: 05/07/25  4:58 PM   Result Value Ref Range    Extra Tube Hold for add-ons.    EKG 12-lead    Collection Time: 05/07/25  5:16 PM   Result Value Ref Range    QRS  Duration 112 ms    [facility] QTC Calculation 420 ms     *Note: Due to a large number of results and/or encounters for the requested time period, some results have not been displayed. A complete set of results can be found in Results Review.       Imaging Results:         The EKG was ordered, reviewed, and independently interpreted by the ED provider.  Interpretation time: 17:16  Rate: 52 BPM  Rhythm: sinus bradycardia with 1st degree A-V block with premature atrial complexes  Interpretation: Left axis deviation. Incomplete right bundle branch block. Moderate voltage criteria for LVH, may be normal variant (R in aVL, [place] product). Septal infarct, age undetermined. No STEMI.             The Emergency Provider reviewed the vital signs and test results, which are outlined above.     ED Discussion     7:38 PM: Reassessed pt at this time. Discussed with patient and/or family/caretaker all pertinent ED information and results. Discussed pt dx and plan of tx. Gave the patient all f/u and return to the ED instructions. All questions and concerns were addressed at this time. Patient and/or family/caretaker expresses understanding of information and instructions, and is comfortable with plan to discharge. Pt is stable for discharge.     I discussed with patient and/or family/caretaker that evaluation in the ED does not suggest any emergent or life threatening medical conditions requiring immediate intervention beyond what was provided in the ED, and I believe patient is safe for discharge.  Regardless, an unremarkable evaluation in the ED does not preclude the development or presence of a serious of life threatening condition. As such, I instructed that the patient is to return immediately for any worsening or change in current symptoms.        ED Course as of 05/08/25 1847   Wed May 07, 2025   1924 Brain natriuretic peptide(!)  Elevated, does not appear to be acute [CD]   1924 Lipase  Within normal limits [CD]   1925 CBC auto  differential(!)  Nonspecific findings [CD]   1925 Urinalysis, Reflex to Urine Culture Urine, Clean Catch(!)  No UTI [CD]   1925 Troponin I  Within normal limits [CD]   1925 Magnesium  Within normal limits [CD]   1925 Comprehensive metabolic panel(!)  Nonspecific findings [CD]   1925 CT Abdomen Pelvis With IV Contrast NO Oral Contrast  No acute bowel findings.  Kidneys are unremarkable.  2.3 cm gallstone without gallbladder wall thickening or bile duct dilatation. [CD]   1925 X-Ray Hip 2 or 3 views Left with Pelvis when performed  No acute findings [CD]   1925 X-Ray Chest AP Portable  No acute findings [CD]      ED Course User Index  [CD] Boris Stewart, DO     Medical Decision Making  At the time of discharge patient is able to tolerate fluids and ambulate around the department with no difficulty and with no assistance.  She is instructed to return immediately for any new or worsening symptoms and she verbalized understanding.    Amount and/or Complexity of Data Reviewed  Labs: ordered. Decision-making details documented in ED Course.  Radiology: ordered. Decision-making details documented in ED Course.  ECG/medicine tests: ordered and independent interpretation performed. Decision-making details documented in ED Course.    Risk  Prescription drug management.  Risk Details: Differential diagnosis includes but is not limited to: UTI, ureteral stone, diverticulitis, bowel obstruction, colitis, constipation, ovarian cyst, ovarian torsion, degenerative disc disease, foraminal stenosis, musculoskeletal pain                ED Medication(s):  Medications   iohexoL (OMNIPAQUE 350) injection 100 mL (100 mLs Intravenous Given 5/7/25 1855)       Discharge Medication List as of 5/7/2025  7:36 PM           Follow-up Information       Schedule an appointment as soon as possible for a visit  with Chiquita Talley MD.    Specialty: Family Medicine  Contact information:  [address] [place] [address]  [id number]  [phone]                                 Scribe Attestation:   Scribe #1: I performed the above scribed service and the documentation accurately describes the services I performed. I attest to the accuracy of the note.     Attending:   Physician Attestation Statement for Scribe #1: I, Boris Stewart DO, personally performed the services described in this documentation, as scribed by Ricardo Anguiano, in my presence, and it is both accurate and complete.           Clinical Impression       ICD-10-CM ICD-9-CM   1. Left flank pain  R10.9 789.09       Disposition:   Disposition: Discharged  Condition: Stable        Boris Stewart DO  05/09/25 0242

## 2025-05-08 ENCOUNTER — OFFICE VISIT (OUTPATIENT)
Dept: ORTHOPEDICS | Facility: CLINIC | Age: 70
End: 2025-05-08
Payer: MEDICARE

## 2025-05-08 ENCOUNTER — HOSPITAL ENCOUNTER (OUTPATIENT)
Dept: RADIOLOGY | Facility: HOSPITAL | Age: 70
Discharge: HOME | End: 2025-05-08
Attending: ORTHOPAEDIC SURGERY
Payer: MEDICARE

## 2025-05-08 VITALS
WEIGHT: 151.44 LBS | HEIGHT: 66 IN | SYSTOLIC BLOOD PRESSURE: 153 MMHG | DIASTOLIC BLOOD PRESSURE: 76 MMHG | BODY MASS INDEX: 24.34 KG/M2 | HEART RATE: 74 BPM

## 2025-05-08 DIAGNOSIS — M25.531 RIGHT WRIST PAIN: ICD-10-CM

## 2025-05-08 DIAGNOSIS — M25.531 RIGHT WRIST PAIN: Primary | ICD-10-CM

## 2025-05-08 DIAGNOSIS — S52.501D CLOSED FRACTURE OF DISTAL END OF RIGHT RADIUS WITH ROUTINE HEALING, UNSPECIFIED FRACTURE MORPHOLOGY, SUBSEQUENT ENCOUNTER: Primary | ICD-10-CM

## 2025-05-08 PROCEDURE — 3288F FALL RISK ASSESSMENT DOCD: CPT | Mod: CPTII,HCNC,S$GLB, | Performed by: PHYSICIAN ASSISTANT

## 2025-05-08 PROCEDURE — 1159F MED LIST DOCD IN RCRD: CPT | Mod: CPTII,HCNC,S$GLB, | Performed by: PHYSICIAN ASSISTANT

## 2025-05-08 PROCEDURE — 1125F AMNT PAIN NOTED PAIN PRSNT: CPT | Mod: CPTII,HCNC,S$GLB, | Performed by: PHYSICIAN ASSISTANT

## 2025-05-08 PROCEDURE — 99024 POSTOP FOLLOW-UP VISIT: CPT | Mod: HCNC,S$GLB,, | Performed by: PHYSICIAN ASSISTANT

## 2025-05-08 PROCEDURE — 3078F DIAST BP <80 MM HG: CPT | Mod: CPTII,HCNC,S$GLB, | Performed by: PHYSICIAN ASSISTANT

## 2025-05-08 PROCEDURE — 73110 X-RAY EXAM OF WRIST: CPT | Mod: TC,HCNC,RT

## 2025-05-08 PROCEDURE — 73110 X-RAY EXAM OF WRIST: CPT | Mod: 26,HCNC,RT, | Performed by: STUDENT IN AN ORGANIZED HEALTH CARE EDUCATION/TRAINING PROGRAM

## 2025-05-08 PROCEDURE — 3072F LOW RISK FOR RETINOPATHY: CPT | Mod: CPTII,HCNC,S$GLB, | Performed by: PHYSICIAN ASSISTANT

## 2025-05-08 PROCEDURE — 99999 PR PBB SHADOW E&M-EST. PATIENT-LVL V: CPT | Mod: PBBFAC,HCNC,, | Performed by: PHYSICIAN ASSISTANT

## 2025-05-08 PROCEDURE — 3077F SYST BP >= 140 MM HG: CPT | Mod: CPTII,HCNC,S$GLB, | Performed by: PHYSICIAN ASSISTANT

## 2025-05-08 PROCEDURE — 4010F ACE/ARB THERAPY RXD/TAKEN: CPT | Mod: CPTII,HCNC,S$GLB, | Performed by: PHYSICIAN ASSISTANT

## 2025-05-08 PROCEDURE — 1100F PTFALLS ASSESS-DOCD GE2>/YR: CPT | Mod: CPTII,HCNC,S$GLB, | Performed by: PHYSICIAN ASSISTANT

## 2025-05-08 RX ORDER — HYDROCODONE BITARTRATE AND ACETAMINOPHEN 5; 325 MG/1; MG/1
1 TABLET ORAL EVERY 8 HOURS PRN
Qty: 21 TABLET | Refills: 0 | Status: SHIPPED | OUTPATIENT
Start: 2025-05-08 | End: 2025-05-15

## 2025-05-08 NOTE — PROGRESS NOTES
"Subjective:      Patient ID: Anne Farmer is a 69 y.o. female.    History of Present Illness    CHIEF COMPLAINT:  - Post-op follow-up for right distal radius ORIF.    HPI:  Anne presents for a 13-day post-op follow-up after undergoing ORIF of the right distal radius. Her wrist is improving. She reports some discomfort when the splint is removed. She denies numbness or tingling in the extremity.    SURGICAL HISTORY:  - ORIF of right distal radius: 13 days ago        Past Medical History:   Diagnosis Date    Allergy     Arthritis     Cancer 2016    colon    CHF (congestive heart failure)     Colon cancer 2004    Colon cancer screening 09/21/2015    Diabetes mellitus type 2 in nonobese 03/09/2016    borderline    Diverticulosis     DM (diabetes mellitus) 03/2016    BS didn't check 02/13/2019    DM (diabetes mellitus) 03/2016    BS didn't check 03/04/2020    Hyperlipidemia 10/25/2016    Hypertension     Insomnia     Malignant neoplasm of colon 05/13/2021    Malignant neoplasm of lower-outer quadrant of left breast of female, estrogen receptor positive 06/09/2022    Sarcoidosis, unspecified    Current Medications[1]    Review of patient's allergies indicates:  No Known Allergies    BP (!) 153/76 (BP Location: Right arm, Patient Position: Sitting)   Pulse 74   Ht 5' 6" (1.676 m)   Wt 68.7 kg (151 lb 7.3 oz)   BMI 24.45 kg/m²       Objective:    Ortho Exam   Right wrist:   Splint was removed for physical exam   Sutures intact, incision well healed, no signs of infection   Moderate edema diffusely   Mild to moderate TTP, especially of the dorsum of the wrist and ulnar aspect of the wrist   ROM of the wrist not tested  Forearm and compartments are soft and compressible   Motor exam normal   Sensation and pulses intact   Cap refill brisk    GEN: Well developed, well nourished female. AAOX3. No acute distress.   Head: Normocephalic, atraumatic.   Eyes: RITO  Neck: Trachea is midline, no adenopathy  Resp: " Breathing unlabored.  Neuro: Motor function normal, Cranial nerves intact  Psych: Mood and affect appropriate.    Assessment:     Imaging:  X-ray right wrist obtained today was reviewed and shows hardware in satisfactory alignment no signs of failure.  No significant healing noted.  No detrimental changes.      1. Closed fracture of distal end of right radius with routine healing, unspecified fracture morphology, subsequent encounter        Plan:     Reviewed the radiographs with the patient.    Encouraged her that everything is well aligned and stable.    Pain medication was refilled today.    Sutures removed, patient instructed on normal wound care with soap and water.    We place her into a removable wrist brace and instructed her to wear it at all times until follow up.    We will plan to see her back in clinic in about 1 month for repeat radiographs and further evaluation.      Orders Placed This Encounter    HYDROcodone-acetaminophen (NORCO) 5-325 mg per tablet     Follow up in about 1 month (around 6/8/2025).      Patient note was created using MModal Dictation.  Any errors in syntax or even information may not have been identified and edited on initial review prior to signing this note.    This note was generated with the assistance of ambient listening technology. Verbal consent was obtained by the patient and accompanying visitor(s) for the recording of patient appointment to facilitate this note. I attest to having reviewed and edited the generated note for accuracy, though some syntax or spelling errors may persist. Please contact the author of this note for any clarification.         [1]   Current Outpatient Medications:     albuterol (PROVENTIL/VENTOLIN HFA) 90 mcg/actuation inhaler, INHALE 1-2 PUFFS BY MOUTH EVERY 6 HOURS AS NEEDED FOR WHEEZE OR SHORTNESS OF BREATH, Disp: 18 g, Rfl: 3    amLODIPine (NORVASC) 10 MG tablet, Take 1 tablet (10 mg total) by mouth once daily., Disp: 90 tablet, Rfl: 3     anastrozole (ARIMIDEX) 1 mg Tab, Take 1 tablet (1 mg total) by mouth once daily., Disp: 90 tablet, Rfl: 1    cephALEXin (KEFLEX) 500 MG capsule, Take 1 capsule (500 mg total) by mouth every 6 (six) hours., Disp: 20 capsule, Rfl: 0    cyclobenzaprine (FLEXERIL) 5 MG tablet, Take 1 tablet (5 mg total) by mouth 3 (three) times daily as needed for Muscle spasms., Disp: 30 tablet, Rfl: 0    fluconazole (DIFLUCAN) 200 MG Tab, Take 1 tablet (200 mg total) by mouth once daily., Disp: 90 tablet, Rfl: 3    fluticasone propionate (FLONASE) 50 mcg/actuation nasal spray, SPRAY 2 SPRAYS BY EACH NOSTRIL ROUTE ONCE DAILY., Disp: 48 mL, Rfl: 3    furosemide (LASIX) 20 MG tablet, TAKE 1 TABLET (20 MG TOTAL) BY MOUTH DAILY AS NEEDED (EDEMA, SWELLING, FLUID). DO NOT TAKE IF BP IS BELOW 110/70, Disp: 90 tablet, Rfl: 1    KLOR-CON 10 10 mEq TbSR, Take 2 tablets (20 mEq total) by mouth daily as needed (take with lasix)., Disp: 90 tablet, Rfl: 1    levocetirizine (XYZAL) 5 MG tablet, Take 1 tablet (5 mg total) by mouth every evening., Disp: 90 tablet, Rfl: 2    losartan (COZAAR) 100 MG tablet, TAKE 1 TABLET BY MOUTH EVERY DAY, Disp: 90 tablet, Rfl: 3    magnesium oxide (MAG-OX) 400 mg (241.3 mg magnesium) tablet, Take 1 tablet (400 mg total) by mouth once daily., Disp: 90 tablet, Rfl: 1    meclizine (ANTIVERT) 12.5 mg tablet, Take 1 tablet (12.5 mg total) by mouth 3 (three) times daily as needed for Dizziness., Disp: 30 tablet, Rfl: 1    mirtazapine (REMERON) 30 MG tablet, Take 1 tablet (30 mg total) by mouth every evening., Disp: 90 tablet, Rfl: 1    multivitamin (ONE DAILY MULTIVITAMIN) per tablet, Take 1 tablet by mouth once daily., Disp: , Rfl:     omeprazole (PRILOSEC) 20 MG capsule, TAKE 1 CAPSULE BY MOUTH EVERY DAY, Disp: 90 capsule, Rfl: 3    ondansetron (ZOFRAN-ODT) 4 MG TbDL, Take 1 tablet (4 mg total) by mouth every 8 (eight) hours as needed (n/v)., Disp: 30 tablet, Rfl: 3    polyethylene glycol (GLYCOLAX) 17 gram/dose powder,  Take 17 g by mouth daily as needed for Constipation., Disp: 238 g, Rfl: 0    pravastatin (PRAVACHOL) 40 MG tablet, Take 1 tablet (40 mg total) by mouth once daily., Disp: 90 tablet, Rfl: 3    prochlorperazine (COMPAZINE) 5 MG tablet, Take 1 tablet (5 mg total) by mouth every 6 (six) hours as needed for Nausea., Disp: 30 tablet, Rfl: 1    promethazine (PHENERGAN) 25 MG tablet, TAKE 1 TABLET BY MOUTH EVERY 6 HOURS AS NEEDED FOR NAUSEA, Disp: 15 tablet, Rfl: 0    traZODone (DESYREL) 50 MG tablet, TAKE 1 TABLET BY MOUTH EVERY DAY AT NIGHT, Disp: 90 tablet, Rfl: 3    azelastine (ASTELIN) 137 mcg (0.1 %) nasal spray, 2 sprays (274 mcg total) by Nasal route 2 (two) times daily., Disp: 30 mL, Rfl: 0    HYDROcodone-acetaminophen (NORCO) 5-325 mg per tablet, Take 1 tablet by mouth every 8 (eight) hours as needed for Pain., Disp: 21 tablet, Rfl: 0    Current Facility-Administered Medications:     influenza (adjuvanted) (Fluad) 45 mcg/0.5 mL IM vaccine (> or = 66 yo) 0.5 mL, 0.5 mL, Intramuscular, 1 time in Clinic/HOD,

## 2025-05-10 LAB
OHS QRS DURATION: 112 MS
OHS QTC CALCULATION: 420 MS

## 2025-05-14 ENCOUNTER — TELEPHONE (OUTPATIENT)
Dept: FAMILY MEDICINE | Facility: CLINIC | Age: 70
End: 2025-05-14

## 2025-05-14 ENCOUNTER — OFFICE VISIT (OUTPATIENT)
Dept: FAMILY MEDICINE | Facility: CLINIC | Age: 70
End: 2025-05-14
Attending: FAMILY MEDICINE
Payer: MEDICARE

## 2025-05-14 VITALS
TEMPERATURE: 98 F | DIASTOLIC BLOOD PRESSURE: 72 MMHG | HEIGHT: 66 IN | OXYGEN SATURATION: 100 % | BODY MASS INDEX: 23.97 KG/M2 | HEART RATE: 72 BPM | WEIGHT: 149.13 LBS | SYSTOLIC BLOOD PRESSURE: 112 MMHG

## 2025-05-14 DIAGNOSIS — D86.1 SARCOIDOSIS OF LYMPH NODES: Primary | ICD-10-CM

## 2025-05-14 DIAGNOSIS — Z85.038 HISTORY OF COLON CANCER, STAGE I: Chronic | ICD-10-CM

## 2025-05-14 DIAGNOSIS — C50.412 MALIGNANT NEOPLASM OF UPPER-OUTER QUADRANT OF LEFT BREAST IN FEMALE, ESTROGEN RECEPTOR POSITIVE: ICD-10-CM

## 2025-05-14 DIAGNOSIS — Z17.0 MALIGNANT NEOPLASM OF UPPER-OUTER QUADRANT OF LEFT BREAST IN FEMALE, ESTROGEN RECEPTOR POSITIVE: ICD-10-CM

## 2025-05-14 PROCEDURE — 99999 PR PBB SHADOW E&M-EST. PATIENT-LVL V: CPT | Mod: PBBFAC,HCNC,, | Performed by: FAMILY MEDICINE

## 2025-05-14 NOTE — TELEPHONE ENCOUNTER
Please offer pt krystin with [facility] for possible tissue sampling of nodes noted on Abdomen while she waits to see Rheum . Also , remind her to keep pulm krystin next month

## 2025-05-14 NOTE — PROGRESS NOTES
"Subjective:       Patient ID: Anne Farmer is a 69 y.o. female.    Chief Complaint: Flank Pain (Left side x 2 weeks )    69 y old female with hx of colon ca, Breast ca, Cryptococcal meningitis and Sarcoidosis  f.u today on ER visit on 5/7 due to severe L flank pain . CT abdomen and pelvis demonstrated : "There are multiple enlarged lymph nodes in the upper abdomen in the portal caval and adele hepatis region and adele hepatis lymph node measures 3 x 2.0 cm portal caval lymph node measures 3.7 x 1.7 cm.  There are also prominent lymph nodes in the gastrohepatic ligament measuring up to 1.1 cm.  There is also a prominent left retroperitoneal lymph node measuring 1.2 cm" . Similar findings were present on PET scan from 11/24 ordered by Dr Rea  with additional lymphadenopathy in chest and neck  ( not to this severity ) . She was recommended to f.u with Pulm ASAP to rule out metastasis .She underwent 2 EBUS  which did not  prove granulomatous lung disorder/sarcoidosis on biopsy.  Tissue sampling was discussed prior starting  prednisone therapy . She is scheduled to see pulmonology in June .       Review of Systems   Constitutional: Negative.    HENT: Negative.     Eyes: Negative.    Respiratory: Negative.     Cardiovascular: Negative.    Gastrointestinal:  Positive for abdominal pain.   Genitourinary: Negative.    Musculoskeletal: Negative.    Skin: Negative.    Hematological: Negative.        Objective:      Physical Exam  Vitals and nursing note reviewed.   Constitutional:       General: She is not in acute distress.     Appearance: She is well-developed. She is not diaphoretic.   HENT:      Head: Normocephalic and atraumatic.      Right Ear: External ear normal.      Left Ear: External ear normal.      Nose: Nose normal.   Eyes:      General: No scleral icterus.        Left eye: No discharge.      Conjunctiva/sclera: Conjunctivae normal.      Pupils: Pupils are equal, round, and reactive to light.   Neck: "      Thyroid: No thyromegaly.      Vascular: No JVD.      Trachea: No tracheal deviation.   Cardiovascular:      Rate and Rhythm: Normal rate and regular rhythm.      Heart sounds: Normal heart sounds. No murmur heard.     No friction rub. No gallop.   Pulmonary:      Effort: Pulmonary effort is normal. No respiratory distress.      Breath sounds: Normal breath sounds. No wheezing or rales.   Chest:      Chest wall: No tenderness.   Abdominal:      General: Bowel sounds are normal. There is no distension.      Palpations: Abdomen is soft. There is no mass.      Tenderness: There is abdominal tenderness in the left upper quadrant and left lower quadrant. There is no guarding.   Musculoskeletal:      Cervical back: Normal range of motion and neck supple.   Lymphadenopathy:      Cervical: No cervical adenopathy.         Assessment:         Anne was seen today for flank pain.    Diagnoses and all orders for this visit:    Sarcoidosis of lymph nodes  -     Ambulatory referral/consult to General Surgery; Future    Malignant neoplasm of upper-outer quadrant of left breast in female, estrogen receptor positive    History of colon cancer, stage I       Plan:     1.- Pt will follow up with  GS  por possible tissue sampling and rheum to discuss treatment plan   2.- Continue Arimidex  3.- In remission .   Time spent: 60minutes in face to face discussion concerning diagnosis, prognosis, review of lab and test results, benefits of treatment as well as management of disease, counseling of patient and coordination of care between various health care providers . Greater than half the time spent was used for coordination of care and counseling of patient.

## 2025-05-21 ENCOUNTER — TELEPHONE (OUTPATIENT)
Dept: SURGERY | Facility: CLINIC | Age: 70
End: 2025-05-21
Payer: MEDICARE

## 2025-05-21 NOTE — TELEPHONE ENCOUNTER
Contacting pt to advise that she needs to be seen by an MD and she is currently scheduled with our PA. We got her scheduled with Dr. Hernandez for 6/19/25. Pt verbalized understanding.       ----- Message from Alyson Garcias PA-C sent at 5/21/2025  8:50 AM CDT -----  This patient is on my schedule for Monday when I am in clinic alone. Can we get her moved to one of the surgeon's schedules? Thanks

## 2025-06-02 ENCOUNTER — CLINICAL SUPPORT (OUTPATIENT)
Dept: PULMONOLOGY | Facility: CLINIC | Age: 70
End: 2025-06-02
Attending: INTERNAL MEDICINE
Payer: MEDICARE

## 2025-06-02 ENCOUNTER — HOSPITAL ENCOUNTER (OUTPATIENT)
Dept: RADIOLOGY | Facility: HOSPITAL | Age: 70
Discharge: HOME | End: 2025-06-02
Attending: INTERNAL MEDICINE
Payer: MEDICARE

## 2025-06-02 ENCOUNTER — OFFICE VISIT (OUTPATIENT)
Dept: PULMONOLOGY | Facility: CLINIC | Age: 70
End: 2025-06-02
Payer: MEDICARE

## 2025-06-02 ENCOUNTER — TELEPHONE (OUTPATIENT)
Dept: PULMONOLOGY | Facility: CLINIC | Age: 70
End: 2025-06-02
Payer: MEDICARE

## 2025-06-02 VITALS
BODY MASS INDEX: 23.99 KG/M2 | SYSTOLIC BLOOD PRESSURE: 118 MMHG | DIASTOLIC BLOOD PRESSURE: 70 MMHG | OXYGEN SATURATION: 95 % | HEIGHT: 66 IN | WEIGHT: 149.25 LBS | HEART RATE: 55 BPM

## 2025-06-02 DIAGNOSIS — R59.0 MEDIASTINAL LYMPHADENOPATHY: ICD-10-CM

## 2025-06-02 DIAGNOSIS — Z85.038 HISTORY OF COLON CANCER: ICD-10-CM

## 2025-06-02 DIAGNOSIS — J98.4 RESTRICTIVE LUNG DISEASE: ICD-10-CM

## 2025-06-02 DIAGNOSIS — J98.4 RESTRICTIVE LUNG DISEASE: Primary | ICD-10-CM

## 2025-06-02 DIAGNOSIS — Z86.19 HISTORY OF CRYPTOCOCCAL MENINGITIS: ICD-10-CM

## 2025-06-02 DIAGNOSIS — J84.10 GRANULOMA PRESENT ON BIOPSY OF LUNG: Primary | ICD-10-CM

## 2025-06-02 DIAGNOSIS — Z85.3 HISTORY OF BREAST CANCER: ICD-10-CM

## 2025-06-02 LAB
BRPFT: ABNORMAL
FEF 25 75 CHG: -20.6 %
FEF 25 75 LLN: 1.2
FEF 25 75 POST REF: 55.3 %
FEF 25 75 PRE REF: 69.7 %
FEF 25 75 REF: 2.6
FET100 CHG: 76.2 %
FEV1 CHG: -6.3 %
FEV1 FVC CHG: -5.8 %
FEV1 FVC LLN: 65
FEV1 FVC POST REF: 102.3 %
FEV1 FVC PRE REF: 108.6 %
FEV1 FVC REF: 78
FEV1 LLN: 1.38
FEV1 POST REF: 68.3 %
FEV1 PRE REF: 72.8 %
FEV1 REF: 1.99
FVC CHG: -0.5 %
FVC LLN: 1.81
FVC POST REF: 66.2 %
FVC PRE REF: 66.5 %
FVC REF: 2.57
PEF CHG: -12.2 %
PEF LLN: 2.96
PEF POST REF: 80.3 %
PEF PRE REF: 91.4 %
PEF REF: 5.09
POST FEF 25 75: 1.44 L/S (ref 1.2–4)
POST FET 100: 6.87 SEC
POST FEV1 FVC: 79.92 % (ref 65.07–89.42)
POST FEV1: 1.36 L (ref 1.38–2.58)
POST FVC: 1.7 L (ref 1.81–3.38)
POST PEF: 4.09 L/S (ref 2.96–7.23)
PRE FEF 25 75: 1.81 L/S (ref 1.2–4)
PRE FET 100: 3.9 SEC
PRE FEV1 FVC: 84.86 % (ref 65.07–89.42)
PRE FEV1: 1.45 L (ref 1.38–2.58)
PRE FVC: 1.71 L (ref 1.81–3.38)
PRE PEF: 4.66 L/S (ref 2.96–7.23)

## 2025-06-02 PROCEDURE — 71046 X-RAY EXAM CHEST 2 VIEWS: CPT | Mod: 26,HCNC,, | Performed by: RADIOLOGY

## 2025-06-02 PROCEDURE — 71046 X-RAY EXAM CHEST 2 VIEWS: CPT | Mod: TC,HCNC

## 2025-06-02 PROCEDURE — 99999 PR PBB SHADOW E&M-EST. PATIENT-LVL III: CPT | Mod: PBBFAC,HCNC,, | Performed by: INTERNAL MEDICINE

## 2025-06-03 ENCOUNTER — RESULTS FOLLOW-UP (OUTPATIENT)
Dept: PULMONOLOGY | Facility: CLINIC | Age: 70
End: 2025-06-03

## 2025-06-04 DIAGNOSIS — J01.40 ACUTE NON-RECURRENT PANSINUSITIS: ICD-10-CM

## 2025-06-04 RX ORDER — LEVOCETIRIZINE DIHYDROCHLORIDE 5 MG/1
5 TABLET, FILM COATED ORAL NIGHTLY
Qty: 90 TABLET | Refills: 3 | Status: SHIPPED | OUTPATIENT
Start: 2025-06-04

## 2025-06-05 ENCOUNTER — HOSPITAL ENCOUNTER (OUTPATIENT)
Dept: RADIOLOGY | Facility: HOSPITAL | Age: 70
Discharge: HOME | End: 2025-06-05
Attending: PHYSICIAN ASSISTANT
Payer: MEDICARE

## 2025-06-05 ENCOUNTER — OFFICE VISIT (OUTPATIENT)
Dept: ORTHOPEDICS | Facility: CLINIC | Age: 70
End: 2025-06-05
Payer: MEDICARE

## 2025-06-05 VITALS
SYSTOLIC BLOOD PRESSURE: 148 MMHG | BODY MASS INDEX: 23.99 KG/M2 | DIASTOLIC BLOOD PRESSURE: 68 MMHG | HEART RATE: 53 BPM | WEIGHT: 149.25 LBS | HEIGHT: 66 IN

## 2025-06-05 DIAGNOSIS — M25.531 RIGHT WRIST PAIN: Primary | ICD-10-CM

## 2025-06-05 DIAGNOSIS — M25.531 RIGHT WRIST PAIN: ICD-10-CM

## 2025-06-05 DIAGNOSIS — S52.501D CLOSED FRACTURE OF DISTAL END OF RIGHT RADIUS WITH ROUTINE HEALING, UNSPECIFIED FRACTURE MORPHOLOGY, SUBSEQUENT ENCOUNTER: Primary | ICD-10-CM

## 2025-06-05 PROCEDURE — 1159F MED LIST DOCD IN RCRD: CPT | Mod: CPTII,S$GLB,, | Performed by: PHYSICIAN ASSISTANT

## 2025-06-05 PROCEDURE — 1160F RVW MEDS BY RX/DR IN RCRD: CPT | Mod: CPTII,S$GLB,, | Performed by: PHYSICIAN ASSISTANT

## 2025-06-05 PROCEDURE — 73110 X-RAY EXAM OF WRIST: CPT | Mod: 26,RT,, | Performed by: RADIOLOGY

## 2025-06-05 PROCEDURE — 99999 PR PBB SHADOW E&M-EST. PATIENT-LVL IV: CPT | Mod: PBBFAC,,, | Performed by: PHYSICIAN ASSISTANT

## 2025-06-05 PROCEDURE — 4010F ACE/ARB THERAPY RXD/TAKEN: CPT | Mod: CPTII,S$GLB,, | Performed by: PHYSICIAN ASSISTANT

## 2025-06-05 PROCEDURE — 3077F SYST BP >= 140 MM HG: CPT | Mod: CPTII,S$GLB,, | Performed by: PHYSICIAN ASSISTANT

## 2025-06-05 PROCEDURE — 3078F DIAST BP <80 MM HG: CPT | Mod: CPTII,S$GLB,, | Performed by: PHYSICIAN ASSISTANT

## 2025-06-05 PROCEDURE — 3288F FALL RISK ASSESSMENT DOCD: CPT | Mod: CPTII,S$GLB,, | Performed by: PHYSICIAN ASSISTANT

## 2025-06-05 PROCEDURE — 99024 POSTOP FOLLOW-UP VISIT: CPT | Mod: S$GLB,,, | Performed by: PHYSICIAN ASSISTANT

## 2025-06-05 PROCEDURE — 1101F PT FALLS ASSESS-DOCD LE1/YR: CPT | Mod: CPTII,S$GLB,, | Performed by: PHYSICIAN ASSISTANT

## 2025-06-05 PROCEDURE — 1125F AMNT PAIN NOTED PAIN PRSNT: CPT | Mod: CPTII,S$GLB,, | Performed by: PHYSICIAN ASSISTANT

## 2025-06-05 PROCEDURE — 3072F LOW RISK FOR RETINOPATHY: CPT | Mod: CPTII,S$GLB,, | Performed by: PHYSICIAN ASSISTANT

## 2025-06-05 PROCEDURE — 73110 X-RAY EXAM OF WRIST: CPT | Mod: TC,RT

## 2025-06-09 DIAGNOSIS — R11.2 NAUSEA AND VOMITING, UNSPECIFIED VOMITING TYPE: ICD-10-CM

## 2025-06-09 NOTE — TELEPHONE ENCOUNTER
No care due was identified.  Health [facility] Embedded Care Due Messages. Reference number: [id number].   6/09/2025 4:47:08 PM CDT

## 2025-06-10 RX ORDER — PROCHLORPERAZINE MALEATE 5 MG
5 TABLET ORAL EVERY 6 HOURS PRN
Qty: 30 TABLET | Refills: 1 | Status: SHIPPED | OUTPATIENT
Start: 2025-06-10

## 2025-06-10 RX ORDER — PROMETHAZINE HYDROCHLORIDE 25 MG/1
25 TABLET ORAL EVERY 6 HOURS PRN
Qty: 15 TABLET | Refills: 0 | Status: SHIPPED | OUTPATIENT
Start: 2025-06-10

## 2025-06-10 RX ORDER — CYCLOBENZAPRINE HCL 5 MG
TABLET ORAL
Qty: 30 TABLET | Refills: 0 | Status: SHIPPED | OUTPATIENT
Start: 2025-06-10

## 2025-06-11 ENCOUNTER — LAB VISIT (OUTPATIENT)
Dept: LAB | Facility: HOSPITAL | Age: 70
End: 2025-06-11
Attending: STUDENT IN AN ORGANIZED HEALTH CARE EDUCATION/TRAINING PROGRAM
Payer: MEDICARE

## 2025-06-11 ENCOUNTER — OFFICE VISIT (OUTPATIENT)
Dept: INFECTIOUS DISEASES | Facility: CLINIC | Age: 70
End: 2025-06-11
Payer: MEDICARE

## 2025-06-11 VITALS
DIASTOLIC BLOOD PRESSURE: 62 MMHG | WEIGHT: 152.56 LBS | HEIGHT: 66 IN | SYSTOLIC BLOOD PRESSURE: 142 MMHG | BODY MASS INDEX: 24.52 KG/M2 | HEART RATE: 60 BPM

## 2025-06-11 DIAGNOSIS — E78.5 DM TYPE 2 WITH DIABETIC DYSLIPIDEMIA: ICD-10-CM

## 2025-06-11 DIAGNOSIS — R42 DIZZINESS AND GIDDINESS: ICD-10-CM

## 2025-06-11 DIAGNOSIS — E11.69 DM TYPE 2 WITH DIABETIC DYSLIPIDEMIA: ICD-10-CM

## 2025-06-11 DIAGNOSIS — E78.5 HYPERLIPIDEMIA, UNSPECIFIED HYPERLIPIDEMIA TYPE: ICD-10-CM

## 2025-06-11 DIAGNOSIS — I10 PRIMARY HYPERTENSION: ICD-10-CM

## 2025-06-11 DIAGNOSIS — B45.1 CRYPTOCOCCAL MENINGOENCEPHALITIS: Primary | ICD-10-CM

## 2025-06-11 LAB
CREAT SERPL-MCNC: 0.9 MG/DL (ref 0.5–1.4)
GFR SERPLBLD CREATININE-BSD FMLA CKD-EPI: >60 ML/MIN/1.73/M2

## 2025-06-11 PROCEDURE — 1101F PT FALLS ASSESS-DOCD LE1/YR: CPT | Mod: CPTII,S$GLB,, | Performed by: STUDENT IN AN ORGANIZED HEALTH CARE EDUCATION/TRAINING PROGRAM

## 2025-06-11 PROCEDURE — 1125F AMNT PAIN NOTED PAIN PRSNT: CPT | Mod: CPTII,S$GLB,, | Performed by: STUDENT IN AN ORGANIZED HEALTH CARE EDUCATION/TRAINING PROGRAM

## 2025-06-11 PROCEDURE — 3077F SYST BP >= 140 MM HG: CPT | Mod: CPTII,S$GLB,, | Performed by: STUDENT IN AN ORGANIZED HEALTH CARE EDUCATION/TRAINING PROGRAM

## 2025-06-11 PROCEDURE — 3072F LOW RISK FOR RETINOPATHY: CPT | Mod: CPTII,S$GLB,, | Performed by: STUDENT IN AN ORGANIZED HEALTH CARE EDUCATION/TRAINING PROGRAM

## 2025-06-11 PROCEDURE — 3008F BODY MASS INDEX DOCD: CPT | Mod: CPTII,S$GLB,, | Performed by: STUDENT IN AN ORGANIZED HEALTH CARE EDUCATION/TRAINING PROGRAM

## 2025-06-11 PROCEDURE — 4010F ACE/ARB THERAPY RXD/TAKEN: CPT | Mod: CPTII,S$GLB,, | Performed by: STUDENT IN AN ORGANIZED HEALTH CARE EDUCATION/TRAINING PROGRAM

## 2025-06-11 PROCEDURE — 3288F FALL RISK ASSESSMENT DOCD: CPT | Mod: CPTII,S$GLB,, | Performed by: STUDENT IN AN ORGANIZED HEALTH CARE EDUCATION/TRAINING PROGRAM

## 2025-06-11 PROCEDURE — 1159F MED LIST DOCD IN RCRD: CPT | Mod: CPTII,S$GLB,, | Performed by: STUDENT IN AN ORGANIZED HEALTH CARE EDUCATION/TRAINING PROGRAM

## 2025-06-11 PROCEDURE — 3078F DIAST BP <80 MM HG: CPT | Mod: CPTII,S$GLB,, | Performed by: STUDENT IN AN ORGANIZED HEALTH CARE EDUCATION/TRAINING PROGRAM

## 2025-06-11 PROCEDURE — 99999 PR PBB SHADOW E&M-EST. PATIENT-LVL V: CPT | Mod: PBBFAC,,, | Performed by: STUDENT IN AN ORGANIZED HEALTH CARE EDUCATION/TRAINING PROGRAM

## 2025-06-11 PROCEDURE — 82565 ASSAY OF CREATININE: CPT

## 2025-06-11 PROCEDURE — 36415 COLL VENOUS BLD VENIPUNCTURE: CPT

## 2025-06-11 PROCEDURE — 99214 OFFICE O/P EST MOD 30 MIN: CPT | Mod: S$GLB,,, | Performed by: STUDENT IN AN ORGANIZED HEALTH CARE EDUCATION/TRAINING PROGRAM
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
## 2025-06-13 NOTE — PATIENT INSTRUCTIONS
You surgery scheduled for August 10th at approximately 1 in the afternoon.  The hospital call you with the time of arrival.    No solid food after midnight but you may have clear liquids up to 3 hours before arriving to the hospital.    Please take all your usual morning medications with a sip of water on the morning of surgery   Bed/Stretcher in lowest position, wheels locked, appropriate side rails in place/Call bell, personal items and telephone in reach/Instruct patient to call for assistance before getting out of bed/chair/stretcher/Non-slip footwear applied when patient is off stretcher/[place] to call system/Physically safe environment - no spills, clutter or unnecessary equipment/Purposeful proactive rounding/Room/bathroom lighting operational, light cord in reach

## 2025-06-19 RX ORDER — MIRTAZAPINE 30 MG/1
30 TABLET, FILM COATED ORAL NIGHTLY
Qty: 90 TABLET | Refills: 3 | Status: SHIPPED | OUTPATIENT
Start: 2025-06-19

## 2025-06-19 NOTE — TELEPHONE ENCOUNTER
No care due was identified.  [facility] Embedded Care Due Messages. Reference number: [id number].   6/19/2025 12:59:06 AM CDT

## 2025-06-19 NOTE — PROGRESS NOTES
Health Maintenance Due   Topic Date Due    Shingles Vaccine (1 of 2) Never done    Foot Exam  09/23/2021    Diabetes Urine Screening  09/24/2021    Hemoglobin A1c  11/13/2021    DEXA Scan  01/25/2022    COVID-19 Vaccine (4 - Booster for Moderna series) 04/08/2022     Updates were requested from care everywhere.  Chart was reviewed for overdue Proactive [facility] Encounters (SHAKILA) topics (CRS, Breast Cancer Screening, Eye exam)  Health Maintenance has been updated.  LINKS immunization registry triggered.  Immunizations were reconciled.     Detail Level: Detailed Hide Additional Notes?: No

## 2025-06-19 NOTE — TELEPHONE ENCOUNTER
Refill Decision Note   Anne Farmer  is requesting a refill authorization.  Brief Assessment and Rationale for Refill:  Approve     Medication Therapy Plan:         Comments:     Note composed:8:12 AM 06/19/2025

## 2025-06-26 ENCOUNTER — HOSPITAL ENCOUNTER (OUTPATIENT)
Dept: RADIOLOGY | Facility: HOSPITAL | Age: 70
Discharge: HOME | End: 2025-06-26
Attending: STUDENT IN AN ORGANIZED HEALTH CARE EDUCATION/TRAINING PROGRAM
Payer: MEDICARE

## 2025-06-26 DIAGNOSIS — B45.1 CRYPTOCOCCAL MENINGOENCEPHALITIS: ICD-10-CM

## 2025-06-26 LAB
C GATTII+NEOFOR DNA CSF QL NAA+NON-PROBE: NOT DETECTED
CLARITY CSF: CLEAR
CMV DNA CSF QL NAA+NON-PROBE: NOT DETECTED
COLOR CSF: COLORLESS
CSF TUBE NUMBER (OHS): 1
CSF TUBE NUMBER (OHS): 1
E COLI K1 DNA CSF QL NAA+NON-PROBE: NOT DETECTED
EV RNA CSF QL NAA+NON-PROBE: NOT DETECTED
GLUCOSE CSF-MCNC: 43 MG/DL (ref 40–70)
GP B STREP DNA CSF QL NAA+NON-PROBE: NOT DETECTED
HAEM INFLU DNA CSF QL NAA+NON-PROBE: NOT DETECTED
HHV6 DNA CSF QL NAA+NON-PROBE: NOT DETECTED
HSV1 DNA CSF QL NAA+NON-PROBE: NOT DETECTED
HSV2 DNA CSF QL NAA+NON-PROBE: NOT DETECTED
L MONOCYTOG DNA CSF QL NAA+NON-PROBE: NOT DETECTED
LYMPHOCYTES NFR CSF MANUAL: 87 % (ref 40–80)
MONOS+MACROS NFR CSF MANUAL: 9 % (ref 15–45)
N MEN DNA CSF QL NAA+NON-PROBE: NOT DETECTED
NEUTROPHILS NFR CSF MANUAL: 4 %
PARECHOVIRUS A RNA CSF QL NAA+NON-PROBE: NOT DETECTED
PROT CSF-MCNC: 141 MG/DL (ref 15–40)
RBC # CSF: 842 /CU MM
S PNEUM DNA CSF QL NAA+NON-PROBE: NOT DETECTED
SPECIMEN VOL CSF: 2 ML
VZV DNA CSF QL NAA+NON-PROBE: NOT DETECTED
WBC # CSF: 18 /CU MM

## 2025-06-26 PROCEDURE — 87070 CULTURE OTHR SPECIMN AEROBIC: CPT

## 2025-06-26 PROCEDURE — 89051 BODY FLUID CELL COUNT: CPT

## 2025-06-26 PROCEDURE — 87483 CNS DNA AMP PROBE TYPE 12-25: CPT

## 2025-06-26 PROCEDURE — 84157 ASSAY OF PROTEIN OTHER: CPT

## 2025-06-26 PROCEDURE — 82945 GLUCOSE OTHER FLUID: CPT

## 2025-06-26 NOTE — DISCHARGE SUMMARY
[facility] (Hospital)  Discharge Note  Short Stay    FL LUMBAR PUNCTURE DIAGNOSTIC WITH IMAGING  ME Panel  Cryptococcal antigen, CSF  Gram stain  CSF culture  Protein, CSF  Glucose, CSF  Cell Count w/ Diff, CSF      OUTCOME: Patient tolerated treatment/procedure well without complication and is now ready for discharge.    DISPOSITION: Home or Self Care    FINAL DIAGNOSIS:  <principal problem not specified>    FOLLOWUP: In clinic    DISCHARGE INSTRUCTIONS:  No discharge procedures on file.     TIME SPENT ON DISCHARGE: 15 minutes    Pre Op Diagnosis: eval csf     Post Op Diagnosis: same     Procedure:  LP     Procedure performed by: Stevo MEYER, Willem POON     Written Informed Consent Obtained: Yes     Specimen Removed:  yes     Estimated Blood Loss:  minimal     Findings: Local anesthesia     Sedation:  no     The patient tolerated the procedure well and there were no complications.      Disposition:  F/U in clinic or with ordering physician    Discharge instructions:  Light activity for 24 hours.  Remove band aid in 24 hours.  No baths (showers are appropriate).      Sterile technique was performed in the lower back, lidocaine was used as a local anesthetic.  OP 18 cm of h2o, 11 ccs clear csf to lab (traumatic tap which cleared).  Pt tolerated the procedure well without immediate complications.  Please see radiologist report for details. F/u with PCP and/or ordering physician.

## 2025-06-28 LAB
BACTERIA CSF CULT: NORMAL
GRAM STN SPEC: NORMAL

## 2025-06-30 ENCOUNTER — TELEPHONE (OUTPATIENT)
Dept: ORTHOPEDICS | Facility: CLINIC | Age: 70
End: 2025-06-30
Payer: MEDICARE

## 2025-06-30 RX ORDER — OMEPRAZOLE 20 MG/1
20 CAPSULE, DELAYED RELEASE ORAL
Qty: 90 CAPSULE | Refills: 3 | Status: SHIPPED | OUTPATIENT
Start: 2025-06-30

## 2025-06-30 NOTE — TELEPHONE ENCOUNTER
No care due was identified.  [facility] Embedded Care Due Messages. Reference number: [id number].   6/30/2025 10:22:05 AM CDT

## 2025-06-30 NOTE — TELEPHONE ENCOUNTER
Contacted patient to reschedule appointment on 07/03/2025 due to the provider being out of clinic that day. The patient's voicemail was full so I could not leave a message. The patient can be rescheduled to any other day.

## 2025-07-01 NOTE — TELEPHONE ENCOUNTER
Refill Decision Note   Anne Farmer  is requesting a refill authorization.  Brief Assessment and Rationale for Refill:  Approve     Medication Therapy Plan:        Comments:     Note composed:7:42 PM 06/30/2025

## 2025-07-02 ENCOUNTER — OFFICE VISIT (OUTPATIENT)
Dept: ORTHOPEDICS | Facility: CLINIC | Age: 70
End: 2025-07-02
Payer: MEDICARE

## 2025-07-02 ENCOUNTER — HOSPITAL ENCOUNTER (OUTPATIENT)
Dept: RADIOLOGY | Facility: HOSPITAL | Age: 70
Discharge: HOME | End: 2025-07-02
Attending: PHYSICIAN ASSISTANT
Payer: MEDICARE

## 2025-07-02 VITALS
BODY MASS INDEX: 24.52 KG/M2 | SYSTOLIC BLOOD PRESSURE: 145 MMHG | DIASTOLIC BLOOD PRESSURE: 74 MMHG | WEIGHT: 152.56 LBS | HEIGHT: 66 IN

## 2025-07-02 DIAGNOSIS — M25.531 RIGHT WRIST PAIN: ICD-10-CM

## 2025-07-02 DIAGNOSIS — S52.501D CLOSED FRACTURE OF DISTAL END OF RIGHT RADIUS WITH ROUTINE HEALING, UNSPECIFIED FRACTURE MORPHOLOGY, SUBSEQUENT ENCOUNTER: Primary | ICD-10-CM

## 2025-07-02 PROCEDURE — 73110 X-RAY EXAM OF WRIST: CPT | Mod: 26,RT,, | Performed by: RADIOLOGY

## 2025-07-02 PROCEDURE — 73110 X-RAY EXAM OF WRIST: CPT | Mod: TC,RT

## 2025-07-02 PROCEDURE — 99999 PR PBB SHADOW E&M-EST. PATIENT-LVL IV: CPT | Mod: PBBFAC,,, | Performed by: PHYSICIAN ASSISTANT

## 2025-07-02 PROCEDURE — 1159F MED LIST DOCD IN RCRD: CPT | Mod: CPTII,S$GLB,, | Performed by: PHYSICIAN ASSISTANT

## 2025-07-02 PROCEDURE — 3077F SYST BP >= 140 MM HG: CPT | Mod: CPTII,S$GLB,, | Performed by: PHYSICIAN ASSISTANT

## 2025-07-02 PROCEDURE — 1125F AMNT PAIN NOTED PAIN PRSNT: CPT | Mod: CPTII,S$GLB,, | Performed by: PHYSICIAN ASSISTANT

## 2025-07-02 PROCEDURE — 3078F DIAST BP <80 MM HG: CPT | Mod: CPTII,S$GLB,, | Performed by: PHYSICIAN ASSISTANT

## 2025-07-02 PROCEDURE — 3288F FALL RISK ASSESSMENT DOCD: CPT | Mod: CPTII,S$GLB,, | Performed by: PHYSICIAN ASSISTANT

## 2025-07-02 PROCEDURE — 99024 POSTOP FOLLOW-UP VISIT: CPT | Mod: S$GLB,,, | Performed by: PHYSICIAN ASSISTANT

## 2025-07-02 PROCEDURE — 3072F LOW RISK FOR RETINOPATHY: CPT | Mod: CPTII,S$GLB,, | Performed by: PHYSICIAN ASSISTANT

## 2025-07-02 PROCEDURE — 4010F ACE/ARB THERAPY RXD/TAKEN: CPT | Mod: CPTII,S$GLB,, | Performed by: PHYSICIAN ASSISTANT

## 2025-07-02 PROCEDURE — 1160F RVW MEDS BY RX/DR IN RCRD: CPT | Mod: CPTII,S$GLB,, | Performed by: PHYSICIAN ASSISTANT

## 2025-07-02 PROCEDURE — 1101F PT FALLS ASSESS-DOCD LE1/YR: CPT | Mod: CPTII,S$GLB,, | Performed by: PHYSICIAN ASSISTANT

## 2025-07-02 NOTE — PROGRESS NOTES
"Subjective:      Patient ID: Anne Farmer is a 69 y.o. female.    History of Present Illness    CHIEF COMPLAINT:  - Right distal radius fracture follow-up    HPI:  Anne presents to the clinic today for a 10-week post-op follow-up after ORIF of right distal radius fracture. She reports that the wrist is still stiff but improving, though not fully recovered. She is no longer wearing the brace. She denies pain with movement, reporting only stiffness. She reports making progress with the prescribed exercises.    She was last seen in this clinic on 06/05/2025, at which time she was encouraged to wean out of the wrist brace and increase activity as tolerated. A home exercise plan was preferred over outpatient PT, and she was encouraged to work on ROM of the wrist and digits.    SURGICAL HISTORY:  - ORIF of right distal radius fracture: 10 weeks ago        Past Medical History:   Diagnosis Date    Allergy     Arthritis     Cancer 2016    colon    CHF (congestive heart failure)     Colon cancer 2004    Colon cancer screening 09/21/2015    Diabetes mellitus type 2 in nonobese 03/09/2016    borderline    Diverticulosis     DM (diabetes mellitus) 03/2016    BS didn't check 02/13/2019    DM (diabetes mellitus) 03/2016    BS didn't check 03/04/2020    Hyperlipidemia 10/25/2016    Hypertension     Insomnia     Malignant neoplasm of colon 05/13/2021    Malignant neoplasm of lower-outer quadrant of left breast of female, estrogen receptor positive 06/09/2022    Sarcoidosis, unspecified    Current Medications[1]    Review of patient's allergies indicates:  No Known Allergies    BP (!) 145/74 (BP Location: Left arm, Patient Position: Sitting)   Ht 5' 6" (1.676 m)   Wt 69.2 kg (152 lb 8.9 oz)   BMI 24.62 kg/m²       Objective:    Ortho Exam   Right wrist:   Incision is well healed, no signs of infection   Mild edema   No TTP   ROM decreased secondary to stiffness, but no pain   Forearm and compartments are soft and " compressible   Motor exam normal   Sensation and pulses intact   Cap refill brisk    GEN: Well developed, well nourished female. AAOX3. No acute distress.   Head: Normocephalic, atraumatic.   Eyes: RITO  Neck: Trachea is midline, no adenopathy  Resp: Breathing unlabored.  Neuro: Motor function normal, Cranial nerves intact  Psych: Mood and affect appropriate.    Assessment:     Imaging:  X-ray right wrist obtained today was reviewed and shows hardware in satisfactory alignment with no evidence of failure.  There is interval healing at the distal radius fracture and ulnar styloid fracture.  Degenerative changes once again noted.  No detrimental changes.      1. Closed fracture of distal end of right radius with routine healing, unspecified fracture morphology, subsequent encounter        Plan:     Reviewed the radiographs with the patient.    Encouraged her that everything is healing and well aligned.    Encouraged her to continue to increase activities with the hand.    She should continue to work on ROM of the wrist and digits.  She would like to return to this clinic as needed, which is fine as long as she lets us know if she feels like she needs to begin going to outpatient physical therapy to help with her stiffness.     Follow up if symptoms worsen or fail to improve.      Patient note was created using MModal Dictation.  Any errors in syntax or even information may not have been identified and edited on initial review prior to signing this note.    This note was generated with the assistance of ambient listening technology. Verbal consent was obtained by the patient and accompanying visitor(s) for the recording of patient appointment to facilitate this note. I attest to having reviewed and edited the generated note for accuracy, though some syntax or spelling errors may persist. Please contact the author of this note for any clarification.         [1]   Current Outpatient Medications:     albuterol  (PROVENTIL/VENTOLIN HFA) 90 mcg/actuation inhaler, INHALE 1-2 PUFFS BY MOUTH EVERY 6 HOURS AS NEEDED FOR WHEEZE OR SHORTNESS OF BREATH, Disp: 18 g, Rfl: 3    amLODIPine (NORVASC) 10 MG tablet, Take 1 tablet (10 mg total) by mouth once daily., Disp: 90 tablet, Rfl: 3    anastrozole (ARIMIDEX) 1 mg Tab, Take 1 tablet (1 mg total) by mouth once daily., Disp: 90 tablet, Rfl: 1    azelastine (ASTELIN) 137 mcg (0.1 %) nasal spray, 2 sprays (274 mcg total) by Nasal route 2 (two) times daily., Disp: 30 mL, Rfl: 0    cyclobenzaprine (FLEXERIL) 5 MG tablet, TAKE 1 TABLET BY MOUTH THREE TIMES A DAY AS NEEDED FOR MUSCLE SPASMS. (NOT COVERED), Disp: 30 tablet, Rfl: 0    fluconazole (DIFLUCAN) 200 MG Tab, Take 1 tablet (200 mg total) by mouth once daily., Disp: 90 tablet, Rfl: 3    fluticasone propionate (FLONASE) 50 mcg/actuation nasal spray, SPRAY 2 SPRAYS BY EACH NOSTRIL ROUTE ONCE DAILY., Disp: 48 mL, Rfl: 3    furosemide (LASIX) 20 MG tablet, TAKE 1 TABLET (20 MG TOTAL) BY MOUTH DAILY AS NEEDED (EDEMA, SWELLING, FLUID). DO NOT TAKE IF BP IS BELOW 110/70, Disp: 90 tablet, Rfl: 1    KLOR-CON 10 10 mEq TbSR, Take 2 tablets (20 mEq total) by mouth daily as needed (take with lasix)., Disp: 90 tablet, Rfl: 1    levocetirizine (XYZAL) 5 MG tablet, TAKE 1 TABLET BY MOUTH EVERY DAY IN THE EVENING, Disp: 90 tablet, Rfl: 3    losartan (COZAAR) 100 MG tablet, TAKE 1 TABLET BY MOUTH EVERY DAY, Disp: 90 tablet, Rfl: 3    magnesium oxide (MAG-OX) 400 mg (241.3 mg magnesium) tablet, Take 1 tablet (400 mg total) by mouth once daily., Disp: 90 tablet, Rfl: 1    meclizine (ANTIVERT) 12.5 mg tablet, Take 1 tablet (12.5 mg total) by mouth 3 (three) times daily as needed for Dizziness., Disp: 30 tablet, Rfl: 1    mirtazapine (REMERON) 30 MG tablet, TAKE 1 TABLET BY MOUTH EVERY EVENING., Disp: 90 tablet, Rfl: 3    multivitamin (ONE DAILY MULTIVITAMIN) per tablet, Take 1 tablet by mouth once daily., Disp: , Rfl:     omeprazole (PRILOSEC) 20 MG  capsule, TAKE 1 CAPSULE BY MOUTH EVERY DAY, Disp: 90 capsule, Rfl: 3    ondansetron (ZOFRAN-ODT) 4 MG TbDL, Take 1 tablet (4 mg total) by mouth every 8 (eight) hours as needed (n/v)., Disp: 30 tablet, Rfl: 3    pravastatin (PRAVACHOL) 40 MG tablet, Take 1 tablet (40 mg total) by mouth once daily., Disp: 90 tablet, Rfl: 3    prochlorperazine (COMPAZINE) 5 MG tablet, TAKE 1 TABLET BY MOUTH EVERY 6 HOURS AS NEEDED FOR NAUSEA., Disp: 30 tablet, Rfl: 1    promethazine (PHENERGAN) 25 MG tablet, TAKE 1 TABLET BY MOUTH EVERY 6 HOURS AS NEEDED FOR NAUSEA, Disp: 15 tablet, Rfl: 0    traZODone (DESYREL) 50 MG tablet, TAKE 1 TABLET BY MOUTH EVERY DAY AT NIGHT, Disp: 90 tablet, Rfl: 3    Current Facility-Administered Medications:     influenza (adjuvanted) (Fluad) 45 mcg/0.5 mL IM vaccine (> or = 66 yo) 0.5 mL, 0.5 mL, Intramuscular, 1 time in Clinic/HOD,

## 2025-07-07 ENCOUNTER — TELEPHONE (OUTPATIENT)
Dept: INFECTIOUS DISEASES | Facility: CLINIC | Age: 70
End: 2025-07-07
Payer: MEDICARE

## 2025-07-07 NOTE — TELEPHONE ENCOUNTER
----- Message from Ant Tsang sent at 7/6/2025 12:05 AM CDT -----  Regarding: Lab Client Services  Contact: [phone]  Good Morning,      My name is Trinh Lennon, I work in the Lab Client Services. We had a problem with some lab work on this patient. If someone from your office could call us at [phone] or ext. [phone] that would be great. Anyone in my department can help.      Thank you

## 2025-07-07 NOTE — TELEPHONE ENCOUNTER
Lab called reported that Cryptococcal antigen, CSF  was not resulted, not enough specimen. Please advise.

## 2025-07-08 RX ORDER — CYCLOBENZAPRINE HCL 5 MG
TABLET ORAL
Qty: 30 TABLET | Refills: 0 | Status: SHIPPED | OUTPATIENT
Start: 2025-07-08

## 2025-07-22 DIAGNOSIS — B45.1 CRYPTOCOCCAL MENINGOENCEPHALITIS: Primary | ICD-10-CM

## 2025-07-24 ENCOUNTER — HOSPITAL ENCOUNTER (OUTPATIENT)
Dept: RADIOLOGY | Facility: HOSPITAL | Age: 70
Discharge: HOME | End: 2025-07-24
Attending: STUDENT IN AN ORGANIZED HEALTH CARE EDUCATION/TRAINING PROGRAM
Payer: MEDICARE

## 2025-07-24 DIAGNOSIS — R42 DIZZINESS AND GIDDINESS: ICD-10-CM

## 2025-07-24 PROCEDURE — 70553 MRI BRAIN STEM W/O & W/DYE: CPT | Mod: 26,,, | Performed by: STUDENT IN AN ORGANIZED HEALTH CARE EDUCATION/TRAINING PROGRAM

## 2025-07-24 PROCEDURE — A9585 GADOBUTROL INJECTION: HCPCS | Performed by: STUDENT IN AN ORGANIZED HEALTH CARE EDUCATION/TRAINING PROGRAM

## 2025-07-24 PROCEDURE — 70553 MRI BRAIN STEM W/O & W/DYE: CPT | Mod: TC

## 2025-07-24 PROCEDURE — 25500020 PHARM REV CODE 255: Performed by: STUDENT IN AN ORGANIZED HEALTH CARE EDUCATION/TRAINING PROGRAM

## 2025-07-24 RX ORDER — GADOBUTROL 604.72 MG/ML
10 INJECTION INTRAVENOUS
Status: COMPLETED | OUTPATIENT
Start: 2025-07-24 | End: 2025-07-24

## 2025-07-24 RX ADMIN — GADOBUTROL 7 ML: 604.72 INJECTION INTRAVENOUS at 09:07

## 2025-07-28 RX ORDER — PROMETHAZINE HYDROCHLORIDE 25 MG/1
25 TABLET ORAL EVERY 6 HOURS PRN
Qty: 15 TABLET | Refills: 0 | Status: SHIPPED | OUTPATIENT
Start: 2025-07-28

## 2025-07-28 RX ORDER — MECLIZINE HCL 12.5 MG 12.5 MG/1
TABLET ORAL
Qty: 30 TABLET | Refills: 1 | Status: SHIPPED | OUTPATIENT
Start: 2025-07-28

## 2025-07-28 RX ORDER — CYCLOBENZAPRINE HCL 5 MG
TABLET ORAL
Qty: 30 TABLET | Refills: 0 | Status: SHIPPED | OUTPATIENT
Start: 2025-07-28

## 2025-07-28 NOTE — TELEPHONE ENCOUNTER
No care due was identified.  [facility] Embedded Care Due Messages. Reference number: [id number].   7/28/2025 2:12:48 PM CDT

## 2025-07-29 ENCOUNTER — HOSPITAL ENCOUNTER (OUTPATIENT)
Dept: RADIOLOGY | Facility: HOSPITAL | Age: 70
Discharge: HOME | End: 2025-07-29
Attending: FAMILY MEDICINE
Payer: MEDICARE

## 2025-07-29 DIAGNOSIS — Z85.3 HISTORY OF LEFT BREAST CANCER: ICD-10-CM

## 2025-07-29 PROCEDURE — 77062 BREAST TOMOSYNTHESIS BI: CPT | Mod: 26,,, | Performed by: STUDENT IN AN ORGANIZED HEALTH CARE EDUCATION/TRAINING PROGRAM

## 2025-07-29 PROCEDURE — 77066 DX MAMMO INCL CAD BI: CPT | Mod: TC

## 2025-07-29 PROCEDURE — 77066 DX MAMMO INCL CAD BI: CPT | Mod: 26,,, | Performed by: STUDENT IN AN ORGANIZED HEALTH CARE EDUCATION/TRAINING PROGRAM

## 2025-08-01 ENCOUNTER — TELEPHONE (OUTPATIENT)
Dept: FAMILY MEDICINE | Facility: CLINIC | Age: 70
End: 2025-08-01
Payer: MEDICARE

## 2025-08-06 NOTE — TELEPHONE ENCOUNTER
No care due was identified.  [facility] Embedded Care Due Messages. Reference number: [id number].   8/06/2025 12:15:23 AM CDT

## 2025-08-06 NOTE — TELEPHONE ENCOUNTER
Refill Routing Note   Medication(s) are not appropriate for processing by [facility] for the following reason(s):        Required vitals abnormal    [facility] action(s):  Defer               Appointments  past 12m or future 3m with PCP    Date Provider   Last Visit   5/14/2025 Chiquita Talley MD   Next Visit   Visit date not found Chiquita Talley MD   ED visits in past 90 days: 0        Note composed:7:09 AM 08/06/2025

## 2025-08-08 RX ORDER — LOSARTAN POTASSIUM 100 MG/1
100 TABLET ORAL
Qty: 90 TABLET | Refills: 3 | Status: SHIPPED | OUTPATIENT
Start: 2025-08-08

## 2025-08-12 ENCOUNTER — TELEPHONE (OUTPATIENT)
Dept: INFECTIOUS DISEASES | Facility: CLINIC | Age: 70
End: 2025-08-12
Payer: MEDICARE

## 2025-08-15 RX ORDER — CYCLOBENZAPRINE HCL 5 MG
TABLET ORAL
Qty: 30 TABLET | Refills: 0 | Status: SHIPPED | OUTPATIENT
Start: 2025-08-15

## 2025-08-15 RX ORDER — PROMETHAZINE HYDROCHLORIDE 25 MG/1
25 TABLET ORAL EVERY 6 HOURS PRN
Qty: 15 TABLET | Refills: 0 | Status: SHIPPED | OUTPATIENT
Start: 2025-08-15

## 2025-08-20 ENCOUNTER — OFFICE VISIT (OUTPATIENT)
Dept: INFECTIOUS DISEASES | Facility: CLINIC | Age: 70
End: 2025-08-20
Payer: MEDICARE

## 2025-08-20 VITALS
DIASTOLIC BLOOD PRESSURE: 62 MMHG | SYSTOLIC BLOOD PRESSURE: 120 MMHG | HEART RATE: 65 BPM | WEIGHT: 153 LBS | HEIGHT: 66 IN | BODY MASS INDEX: 24.59 KG/M2 | TEMPERATURE: 98 F

## 2025-08-20 DIAGNOSIS — B45.1 CRYPTOCOCCAL MENINGOENCEPHALITIS: Primary | ICD-10-CM

## 2025-08-20 DIAGNOSIS — E11.69 DM TYPE 2 WITH DIABETIC DYSLIPIDEMIA: ICD-10-CM

## 2025-08-20 DIAGNOSIS — E78.5 HYPERLIPIDEMIA, UNSPECIFIED HYPERLIPIDEMIA TYPE: ICD-10-CM

## 2025-08-20 DIAGNOSIS — I10 PRIMARY HYPERTENSION: ICD-10-CM

## 2025-08-20 DIAGNOSIS — E78.5 DM TYPE 2 WITH DIABETIC DYSLIPIDEMIA: ICD-10-CM

## 2025-08-20 PROCEDURE — 4010F ACE/ARB THERAPY RXD/TAKEN: CPT | Mod: CPTII,S$GLB,, | Performed by: STUDENT IN AN ORGANIZED HEALTH CARE EDUCATION/TRAINING PROGRAM

## 2025-08-20 PROCEDURE — 1101F PT FALLS ASSESS-DOCD LE1/YR: CPT | Mod: CPTII,S$GLB,, | Performed by: STUDENT IN AN ORGANIZED HEALTH CARE EDUCATION/TRAINING PROGRAM

## 2025-08-20 PROCEDURE — 3288F FALL RISK ASSESSMENT DOCD: CPT | Mod: CPTII,S$GLB,, | Performed by: STUDENT IN AN ORGANIZED HEALTH CARE EDUCATION/TRAINING PROGRAM

## 2025-08-20 PROCEDURE — 99999 PR PBB SHADOW E&M-EST. PATIENT-LVL III: CPT | Mod: PBBFAC,,, | Performed by: STUDENT IN AN ORGANIZED HEALTH CARE EDUCATION/TRAINING PROGRAM

## 2025-08-20 PROCEDURE — 3074F SYST BP LT 130 MM HG: CPT | Mod: CPTII,S$GLB,, | Performed by: STUDENT IN AN ORGANIZED HEALTH CARE EDUCATION/TRAINING PROGRAM

## 2025-08-20 PROCEDURE — 3078F DIAST BP <80 MM HG: CPT | Mod: CPTII,S$GLB,, | Performed by: STUDENT IN AN ORGANIZED HEALTH CARE EDUCATION/TRAINING PROGRAM

## 2025-08-20 PROCEDURE — 1159F MED LIST DOCD IN RCRD: CPT | Mod: CPTII,S$GLB,, | Performed by: STUDENT IN AN ORGANIZED HEALTH CARE EDUCATION/TRAINING PROGRAM

## 2025-08-20 PROCEDURE — 99214 OFFICE O/P EST MOD 30 MIN: CPT | Mod: S$GLB,,, | Performed by: STUDENT IN AN ORGANIZED HEALTH CARE EDUCATION/TRAINING PROGRAM

## 2025-08-20 PROCEDURE — 3072F LOW RISK FOR RETINOPATHY: CPT | Mod: CPTII,S$GLB,, | Performed by: STUDENT IN AN ORGANIZED HEALTH CARE EDUCATION/TRAINING PROGRAM

## 2025-08-20 PROCEDURE — 3008F BODY MASS INDEX DOCD: CPT | Mod: CPTII,S$GLB,, | Performed by: STUDENT IN AN ORGANIZED HEALTH CARE EDUCATION/TRAINING PROGRAM

## 2025-08-20 PROCEDURE — 1126F AMNT PAIN NOTED NONE PRSNT: CPT | Mod: CPTII,S$GLB,, | Performed by: STUDENT IN AN ORGANIZED HEALTH CARE EDUCATION/TRAINING PROGRAM

## 2025-09-01 ENCOUNTER — HOSPITAL ENCOUNTER (EMERGENCY)
Facility: HOSPITAL | Age: 70
Discharge: HOME | End: 2025-09-01
Attending: EMERGENCY MEDICINE
Payer: MEDICARE

## 2025-09-01 VITALS
SYSTOLIC BLOOD PRESSURE: 149 MMHG | RESPIRATION RATE: 20 BRPM | BODY MASS INDEX: 24.27 KG/M2 | OXYGEN SATURATION: 98 % | DIASTOLIC BLOOD PRESSURE: 64 MMHG | WEIGHT: 151 LBS | HEART RATE: 57 BPM | TEMPERATURE: 98 F | HEIGHT: 66 IN

## 2025-09-01 DIAGNOSIS — R07.9 CHEST PAIN: ICD-10-CM

## 2025-09-01 DIAGNOSIS — M54.50 ACUTE BILATERAL LOW BACK PAIN WITHOUT SCIATICA: Primary | ICD-10-CM

## 2025-09-01 LAB
ABSOLUTE EOSINOPHIL (OHS): 0.31 K/UL
ABSOLUTE MONOCYTE (OHS): 0.56 K/UL (ref 0.3–1)
ABSOLUTE NEUTROPHIL COUNT (OHS): 3.46 K/UL (ref 1.8–7.7)
ALBUMIN SERPL BCP-MCNC: 4.1 G/DL (ref 3.5–5.2)
ALP SERPL-CCNC: 74 UNIT/L (ref 40–150)
ALT SERPL W/O P-5'-P-CCNC: 16 UNIT/L (ref 10–44)
ANION GAP (OHS): 11 MMOL/L (ref 8–16)
AST SERPL-CCNC: 27 UNIT/L (ref 11–45)
BASOPHILS # BLD AUTO: 0.05 K/UL
BASOPHILS NFR BLD AUTO: 0.9 %
BILIRUB SERPL-MCNC: 0.2 MG/DL (ref 0.1–1)
BILIRUB UR QL STRIP.AUTO: NEGATIVE
BUN SERPL-MCNC: 17 MG/DL (ref 8–23)
CALCIUM SERPL-MCNC: 9.9 MG/DL (ref 8.7–10.5)
CHLORIDE SERPL-SCNC: 103 MMOL/L (ref 95–110)
CK SERPL-CCNC: 62 U/L (ref 20–180)
CLARITY UR: CLEAR
CO2 SERPL-SCNC: 21 MMOL/L (ref 23–29)
COLOR UR AUTO: YELLOW
CREAT SERPL-MCNC: 0.9 MG/DL (ref 0.5–1.4)
ERYTHROCYTE [DISTWIDTH] IN BLOOD BY AUTOMATED COUNT: 13.1 % (ref 11.5–14.5)
FLUAV AG UPPER RESP QL IA.RAPID: NEGATIVE
FLUBV AG UPPER RESP QL IA.RAPID: NEGATIVE
GFR SERPLBLD CREATININE-BSD FMLA CKD-EPI: >60 ML/MIN/1.73/M2
GLUCOSE SERPL-MCNC: 105 MG/DL (ref 70–110)
GLUCOSE UR QL STRIP: NEGATIVE
HCT VFR BLD AUTO: 32.6 % (ref 37–48.5)
HGB BLD-MCNC: 11 GM/DL (ref 12–16)
HGB UR QL STRIP: NEGATIVE
IMM GRANULOCYTES # BLD AUTO: 0.04 K/UL (ref 0–0.04)
IMM GRANULOCYTES NFR BLD AUTO: 0.7 % (ref 0–0.5)
KETONES UR QL STRIP: NEGATIVE
LEUKOCYTE ESTERASE UR QL STRIP: NEGATIVE
LYMPHOCYTES # BLD AUTO: 0.96 K/UL (ref 1–4.8)
MCH RBC QN AUTO: 32 PG (ref 27–31)
MCHC RBC AUTO-ENTMCNC: 33.7 G/DL (ref 32–36)
MCV RBC AUTO: 95 FL (ref 82–98)
NITRITE UR QL STRIP: NEGATIVE
NT-PROBNP SERPL-MCNC: 505 PG/ML
NUCLEATED RBC (/100WBC) (OHS): 0 /100 WBC
OHS QRS DURATION: 104 MS
OHS QTC CALCULATION: 410 MS
PH UR STRIP: 6 [PH]
PLATELET # BLD AUTO: 290 K/UL (ref 150–450)
PMV BLD AUTO: 8.4 FL (ref 9.2–12.9)
POTASSIUM SERPL-SCNC: 4.2 MMOL/L (ref 3.5–5.1)
PROT SERPL-MCNC: 8.1 GM/DL (ref 6–8.4)
PROT UR QL STRIP: ABNORMAL
RBC # BLD AUTO: 3.44 M/UL (ref 4–5.4)
RELATIVE EOSINOPHIL (OHS): 5.8 %
RELATIVE LYMPHOCYTE (OHS): 17.8 % (ref 18–48)
RELATIVE MONOCYTE (OHS): 10.4 % (ref 4–15)
RELATIVE NEUTROPHIL (OHS): 64.4 % (ref 38–73)
SARS-COV-2 RDRP RESP QL NAA+PROBE: NEGATIVE
SODIUM SERPL-SCNC: 135 MMOL/L (ref 136–145)
SP GR UR STRIP: 1.02
TROPONIN I SERPL HS-MCNC: 10 NG/L
UROBILINOGEN UR STRIP-ACNC: NEGATIVE EU/DL
WBC # BLD AUTO: 5.38 K/UL (ref 3.9–12.7)

## 2025-09-01 PROCEDURE — 99285 EMERGENCY DEPT VISIT HI MDM: CPT | Mod: 25

## 2025-09-01 PROCEDURE — 84484 ASSAY OF TROPONIN QUANT: CPT | Performed by: EMERGENCY MEDICINE

## 2025-09-01 PROCEDURE — U0002 COVID-19 LAB TEST NON-CDC: HCPCS | Performed by: EMERGENCY MEDICINE

## 2025-09-01 PROCEDURE — 81003 URINALYSIS AUTO W/O SCOPE: CPT | Performed by: EMERGENCY MEDICINE

## 2025-09-01 PROCEDURE — 83880 ASSAY OF NATRIURETIC PEPTIDE: CPT | Performed by: EMERGENCY MEDICINE

## 2025-09-01 PROCEDURE — 80053 COMPREHEN METABOLIC PANEL: CPT | Performed by: EMERGENCY MEDICINE

## 2025-09-01 PROCEDURE — 87502 INFLUENZA DNA AMP PROBE: CPT | Performed by: EMERGENCY MEDICINE

## 2025-09-01 PROCEDURE — 96374 THER/PROPH/DIAG INJ IV PUSH: CPT

## 2025-09-01 PROCEDURE — 85025 COMPLETE CBC W/AUTO DIFF WBC: CPT | Performed by: EMERGENCY MEDICINE

## 2025-09-01 PROCEDURE — 93005 ELECTROCARDIOGRAM TRACING: CPT

## 2025-09-01 PROCEDURE — 93010 ELECTROCARDIOGRAM REPORT: CPT | Mod: ,,, | Performed by: INTERNAL MEDICINE

## 2025-09-01 PROCEDURE — 82550 ASSAY OF CK (CPK): CPT | Performed by: EMERGENCY MEDICINE

## 2025-09-01 PROCEDURE — 63600175 PHARM REV CODE 636 W HCPCS: Mod: JZ,TB | Performed by: EMERGENCY MEDICINE

## 2025-09-01 RX ORDER — KETOROLAC TROMETHAMINE 30 MG/ML
15 INJECTION, SOLUTION INTRAMUSCULAR; INTRAVENOUS
Status: COMPLETED | OUTPATIENT
Start: 2025-09-01 | End: 2025-09-01

## 2025-09-01 RX ORDER — NAPROXEN 375 MG/1
375 TABLET ORAL 2 TIMES DAILY WITH MEALS
Qty: 30 TABLET | Refills: 0 | Status: SHIPPED | OUTPATIENT
Start: 2025-09-01

## 2025-09-01 RX ADMIN — KETOROLAC TROMETHAMINE 15 MG: 30 INJECTION, SOLUTION INTRAMUSCULAR at 08:09

## 2025-09-02 LAB — HOLD SPECIMEN: NORMAL

## 2025-09-03 ENCOUNTER — PATIENT OUTREACH (OUTPATIENT)
Facility: OTHER | Age: 70
End: 2025-09-03
Payer: MEDICARE

## (undated) DEVICE — NDL BLNT STD 1.5IN 18G

## (undated) DEVICE — TUBING MEDI-VAC 20FT .25IN

## (undated) DEVICE — SYR 3CC LUER LOC

## (undated) DEVICE — DRAPE CHEST FEN 15X10IN

## (undated) DEVICE — CONNECTOR TUBING STR 5 IN 1

## (undated) DEVICE — CLIP LIGACLIP XTRA TITANIUM

## (undated) DEVICE — DRESSING XEROFORM NONADH 1X8IN

## (undated) DEVICE — SUT ETHILON 4-0 PS2 18 BLK

## (undated) DEVICE — SYR 10CC LUER LOCK

## (undated) DEVICE — GOWN POLY REINF BRTH SLV XL

## (undated) DEVICE — SEE MEDLINE ITEM 152622

## (undated) DEVICE — NDL SAFETY 22G X 1.5 ECLIPSE

## (undated) DEVICE — SPONGE COTTON TRAY 4X4IN

## (undated) DEVICE — GAUZE SPONGE 4X4 12PLY

## (undated) DEVICE — SHEET THYROID W/ISO-BAC

## (undated) DEVICE — TOWEL OR DISP STRL BLUE 4/PK

## (undated) DEVICE — MANIFOLD 4 PORT

## (undated) DEVICE — CLOSURE SKIN STERI STRIP 1/2X4

## (undated) DEVICE — SEE MEDLINE ITEM 146420

## (undated) DEVICE — SUT VICRYL 3-0 27 SH

## (undated) DEVICE — SCREW VA LOCK STARDRIVE 2.4X12
Type: IMPLANTABLE DEVICE | Site: WRIST | Status: NON-FUNCTIONAL
Removed: 2025-04-25

## (undated) DEVICE — GLOVE SURG BIOGEL LATEX SZ 7.5

## (undated) DEVICE — ELECTRODE REM PLYHSV RETURN 9

## (undated) DEVICE — CONTAINER SPECIMEN STRL 4OZ

## (undated) DEVICE — SEE MEDLINE ITEM 146292

## (undated) DEVICE — DRAPE MINI C-ARM

## (undated) DEVICE — SOL NS 1000CC

## (undated) DEVICE — BNDG COFLEX FOAM LF2 ST 4X5YD

## (undated) DEVICE — SEE MEDLINE ITEM 157027

## (undated) DEVICE — ADHESIVE DERMABOND ADVANCED

## (undated) DEVICE — SEE MEDLINE ITEM 152739

## (undated) DEVICE — APPLICATOR CHLORAPREP ORN 26ML

## (undated) DEVICE — APPLIER CLIP LIAGCLIP 9.375IN

## (undated) DEVICE — SUT VICRYL CTD 2-0 GI 27 SH

## (undated) DEVICE — SEE MEDLINE ITEM 154981

## (undated) DEVICE — BOARD SPEC RAD

## (undated) DEVICE — SUT VICRYL 2-0 27 CT-1

## (undated) DEVICE — STOCKINETTE IMPERVIOUS MEDIUM

## (undated) DEVICE — BANDAGE ROLL COTTN 4.5INX4.1YD

## (undated) DEVICE — COVER LIGHT HANDLE 80/CA

## (undated) DEVICE — SUT VICRYL 4-0 ANTIBACT

## (undated) DEVICE — DRESSING TELFA N ADH 3X8

## (undated) DEVICE — CLIPPER BLADE MOD 4406 (CAREF)

## (undated) DEVICE — NDL HYPODERMIC BLUNT 18G 1.5IN

## (undated) DEVICE — PACK BASIC SETUP SC BR

## (undated) DEVICE — SUT MONOCYRL 4-0 PS2 UND

## (undated) DEVICE — CORD BIPOLAR 12 FOOT

## (undated) DEVICE — NDL SAFETY 25G X 1.5 ECLIPSE

## (undated) DEVICE — SUT SILK 2-0 STRANDS 30IN

## (undated) DEVICE — SUT 3/0 18IN PDS II CLR MON

## (undated) DEVICE — SEE MEDLINE ITEM 157117

## (undated) DEVICE — SUT VICRYL 4-0 27 SH

## (undated) DEVICE — SOL NACL IRR 1000ML BTL

## (undated) DEVICE — COVER OVERHEAD SURG LT BLUE

## (undated) DEVICE — ALCOHOL 70% ANTISEPTIC ISO 4OZ

## (undated) DEVICE — SEE MEDLINE ITEM 157137

## (undated) DEVICE — COVER PROBE NEOGUARD 1X11.8IN

## (undated) DEVICE — UNDERGLOVES BIOGEL PI SIZE 8

## (undated) DEVICE — DRESSING TRANS 4X4 TEGADERM

## (undated) DEVICE — SUT 2/0 30IN SILK BLK BRAI

## (undated) DEVICE — TOURNIQUET SB QC DP 18X4IN

## (undated) DEVICE — SPONGE DERMACEA 4X4IN 12PLY

## (undated) DEVICE — ADHESIVE MASTISOL VIAL 48/BX

## (undated) DEVICE — HEADREST ROUND DISP FOAM 9IN

## (undated) DEVICE — BIT DRILL QC 1.8X110MM